# Patient Record
Sex: FEMALE | Race: OTHER | HISPANIC OR LATINO | URBAN - METROPOLITAN AREA
[De-identification: names, ages, dates, MRNs, and addresses within clinical notes are randomized per-mention and may not be internally consistent; named-entity substitution may affect disease eponyms.]

---

## 2017-04-23 ENCOUNTER — EMERGENCY (EMERGENCY)
Facility: HOSPITAL | Age: 62
LOS: 1 days | Discharge: HOME | End: 2017-04-23
Attending: EMERGENCY MEDICINE
Payer: MEDICAID

## 2017-04-23 VITALS
DIASTOLIC BLOOD PRESSURE: 85 MMHG | HEIGHT: 63 IN | OXYGEN SATURATION: 98 % | WEIGHT: 160.06 LBS | SYSTOLIC BLOOD PRESSURE: 148 MMHG | TEMPERATURE: 98 F | HEART RATE: 94 BPM | RESPIRATION RATE: 16 BRPM

## 2017-04-23 VITALS
TEMPERATURE: 98 F | SYSTOLIC BLOOD PRESSURE: 138 MMHG | HEART RATE: 91 BPM | DIASTOLIC BLOOD PRESSURE: 86 MMHG | OXYGEN SATURATION: 98 % | RESPIRATION RATE: 20 BRPM
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
## 2017-04-23 DIAGNOSIS — E03.9 HYPOTHYROIDISM, UNSPECIFIED: ICD-10-CM
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
## 2017-04-23 DIAGNOSIS — Z79.899 OTHER LONG TERM (CURRENT) DRUG THERAPY: ICD-10-CM

## 2017-04-23 DIAGNOSIS — I10 ESSENTIAL (PRIMARY) HYPERTENSION: ICD-10-CM

## 2017-04-23 DIAGNOSIS — F17.200 NICOTINE DEPENDENCE, UNSPECIFIED, UNCOMPLICATED: ICD-10-CM
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
## 2017-04-23 DIAGNOSIS — S00.11XA CONTUSION OF RIGHT EYELID AND PERIOCULAR AREA, INITIAL ENCOUNTER: ICD-10-CM

## 2017-04-23 DIAGNOSIS — K21.9 GASTRO-ESOPHAGEAL REFLUX DISEASE WITHOUT ESOPHAGITIS: ICD-10-CM

## 2017-04-23 DIAGNOSIS — Z98.89 OTHER SPECIFIED POSTPROCEDURAL STATES: Chronic | ICD-10-CM

## 2017-04-23 DIAGNOSIS — Z98.890 OTHER SPECIFIED POSTPROCEDURAL STATES: ICD-10-CM
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
## 2017-04-23 DIAGNOSIS — F10.10 ALCOHOL ABUSE, UNCOMPLICATED: ICD-10-CM

## 2017-04-23 LAB
ALBUMIN SERPL ELPH-MCNC: 2.8 G/DL — LOW (ref 3.3–5.2)
ALP SERPL-CCNC: 175 U/L — HIGH (ref 40–120)
ALT FLD-CCNC: 24 U/L — SIGNIFICANT CHANGE UP
ANION GAP SERPL CALC-SCNC: 13 MMOL/L — SIGNIFICANT CHANGE UP (ref 5–17)
ANION GAP SERPL CALC-SCNC: 16 MMOL/L — SIGNIFICANT CHANGE UP (ref 5–17)
ANISOCYTOSIS BLD QL: SLIGHT — SIGNIFICANT CHANGE UP
APTT BLD: 32.1 SEC — SIGNIFICANT CHANGE UP (ref 27.5–37.4)
AST SERPL-CCNC: 64 U/L — HIGH
BASOPHILS # BLD AUTO: 0 K/UL — SIGNIFICANT CHANGE UP (ref 0–0.2)
BASOPHILS NFR BLD AUTO: 0 % — SIGNIFICANT CHANGE UP (ref 0–2)
BILIRUB SERPL-MCNC: 0.9 MG/DL — SIGNIFICANT CHANGE UP (ref 0.4–2)
BUN SERPL-MCNC: 20 MG/DL — SIGNIFICANT CHANGE UP (ref 8–20)
BUN SERPL-MCNC: 23 MG/DL — HIGH (ref 8–20)
CALCIUM SERPL-MCNC: 8.6 MG/DL — SIGNIFICANT CHANGE UP (ref 8.6–10.2)
CALCIUM SERPL-MCNC: 8.6 MG/DL — SIGNIFICANT CHANGE UP (ref 8.6–10.2)
CHLORIDE SERPL-SCNC: 94 MMOL/L — LOW (ref 98–107)
CHLORIDE SERPL-SCNC: 98 MMOL/L — SIGNIFICANT CHANGE UP (ref 98–107)
CO2 SERPL-SCNC: 15 MMOL/L — LOW (ref 22–29)
CO2 SERPL-SCNC: 19 MMOL/L — LOW (ref 22–29)
CREAT SERPL-MCNC: 0.7 MG/DL — SIGNIFICANT CHANGE UP (ref 0.5–1.3)
CREAT SERPL-MCNC: 0.79 MG/DL — SIGNIFICANT CHANGE UP (ref 0.5–1.3)
EOSINOPHIL # BLD AUTO: 0 K/UL — SIGNIFICANT CHANGE UP (ref 0–0.5)
EOSINOPHIL NFR BLD AUTO: 1 % — SIGNIFICANT CHANGE UP (ref 0–5)
ETHANOL SERPL-MCNC: 224 MG/DL — SIGNIFICANT CHANGE UP
GLUCOSE SERPL-MCNC: 107 MG/DL — SIGNIFICANT CHANGE UP (ref 70–115)
GLUCOSE SERPL-MCNC: 99 MG/DL — SIGNIFICANT CHANGE UP (ref 70–115)
HCT VFR BLD CALC: 31.4 % — LOW (ref 37–47)
HGB BLD-MCNC: 10.3 G/DL — LOW (ref 12–16)
HYPOCHROMIA BLD QL: SLIGHT — SIGNIFICANT CHANGE UP
INR BLD: 1.15 RATIO — SIGNIFICANT CHANGE UP (ref 0.88–1.16)
LYMPHOCYTES # BLD AUTO: 1.7 K/UL — SIGNIFICANT CHANGE UP (ref 1–4.8)
LYMPHOCYTES # BLD AUTO: 16 % — LOW (ref 20–55)
MACROCYTES BLD QL: SLIGHT — SIGNIFICANT CHANGE UP
MCHC RBC-ENTMCNC: 27.8 PG — SIGNIFICANT CHANGE UP (ref 27–31)
MCHC RBC-ENTMCNC: 32.8 G/DL — SIGNIFICANT CHANGE UP (ref 32–36)
MCV RBC AUTO: 84.9 FL — SIGNIFICANT CHANGE UP (ref 81–99)
MICROCYTES BLD QL: SLIGHT — SIGNIFICANT CHANGE UP
MONOCYTES # BLD AUTO: 0.8 K/UL — SIGNIFICANT CHANGE UP (ref 0–0.8)
MONOCYTES NFR BLD AUTO: 7 % — SIGNIFICANT CHANGE UP (ref 3–10)
NEUTROPHILS # BLD AUTO: 5 K/UL — SIGNIFICANT CHANGE UP (ref 1.8–8)
NEUTROPHILS NFR BLD AUTO: 74 % — HIGH (ref 37–73)
NEUTS BAND # BLD: 2 % — SIGNIFICANT CHANGE UP (ref 0–8)
PLAT MORPH BLD: NORMAL — SIGNIFICANT CHANGE UP
PLATELET # BLD AUTO: 208 K/UL — SIGNIFICANT CHANGE UP (ref 150–400)
POLYCHROMASIA BLD QL SMEAR: SLIGHT — SIGNIFICANT CHANGE UP
POTASSIUM SERPL-MCNC: 4.6 MMOL/L — SIGNIFICANT CHANGE UP (ref 3.5–5.3)
POTASSIUM SERPL-MCNC: 4.8 MMOL/L — SIGNIFICANT CHANGE UP (ref 3.5–5.3)
POTASSIUM SERPL-SCNC: 4.6 MMOL/L — SIGNIFICANT CHANGE UP (ref 3.5–5.3)
POTASSIUM SERPL-SCNC: 4.8 MMOL/L — SIGNIFICANT CHANGE UP (ref 3.5–5.3)
PROT SERPL-MCNC: 8.3 G/DL — SIGNIFICANT CHANGE UP (ref 6.6–8.7)
PROTHROM AB SERPL-ACNC: 12.7 SEC — SIGNIFICANT CHANGE UP (ref 9.8–12.7)
RBC # BLD: 3.7 M/UL — LOW (ref 4.4–5.2)
RBC # FLD: 22.9 % — HIGH (ref 11–15.6)
RBC BLD AUTO: ABNORMAL
SODIUM SERPL-SCNC: 125 MMOL/L — LOW (ref 135–145)
SODIUM SERPL-SCNC: 130 MMOL/L — LOW (ref 135–145)
WBC # BLD: 7.6 K/UL — SIGNIFICANT CHANGE UP (ref 4.8–10.8)
WBC # FLD AUTO: 7.6 K/UL — SIGNIFICANT CHANGE UP (ref 4.8–10.8)

## 2017-04-23 PROCEDURE — 72125 CT NECK SPINE W/O DYE: CPT

## 2017-04-23 PROCEDURE — 70450 CT HEAD/BRAIN W/O DYE: CPT | Mod: 26

## 2017-04-23 PROCEDURE — 73552 X-RAY EXAM OF FEMUR 2/>: CPT

## 2017-04-23 PROCEDURE — 72125 CT NECK SPINE W/O DYE: CPT | Mod: 26

## 2017-04-23 PROCEDURE — 73502 X-RAY EXAM HIP UNI 2-3 VIEWS: CPT | Mod: 26,RT

## 2017-04-23 PROCEDURE — 85027 COMPLETE CBC AUTOMATED: CPT

## 2017-04-23 PROCEDURE — 80053 COMPREHEN METABOLIC PANEL: CPT

## 2017-04-23 PROCEDURE — 73552 X-RAY EXAM OF FEMUR 2/>: CPT | Mod: 26,RT

## 2017-04-23 PROCEDURE — 80307 DRUG TEST PRSMV CHEM ANLYZR: CPT

## 2017-04-23 PROCEDURE — 80048 BASIC METABOLIC PNL TOTAL CA: CPT

## 2017-04-23 PROCEDURE — 73590 X-RAY EXAM OF LOWER LEG: CPT | Mod: 26,RT

## 2017-04-23 PROCEDURE — 99284 EMERGENCY DEPT VISIT MOD MDM: CPT | Mod: 25

## 2017-04-23 PROCEDURE — 73590 X-RAY EXAM OF LOWER LEG: CPT

## 2017-04-23 PROCEDURE — 70450 CT HEAD/BRAIN W/O DYE: CPT

## 2017-04-23 PROCEDURE — 73502 X-RAY EXAM HIP UNI 2-3 VIEWS: CPT

## 2017-04-23 PROCEDURE — 85610 PROTHROMBIN TIME: CPT

## 2017-04-23 PROCEDURE — 85730 THROMBOPLASTIN TIME PARTIAL: CPT

## 2017-04-23 PROCEDURE — T1013: CPT

## 2017-04-23 PROCEDURE — 82962 GLUCOSE BLOOD TEST: CPT

## 2017-04-23 RX ORDER — SODIUM CHLORIDE 9 MG/ML
1000 INJECTION INTRAMUSCULAR; INTRAVENOUS; SUBCUTANEOUS ONCE
Qty: 0 | Refills: 0 | Status: COMPLETED | OUTPATIENT
Start: 2017-04-23 | End: 2017-04-23

## 2017-04-23 RX ORDER — SODIUM CHLORIDE 9 MG/ML
3 INJECTION INTRAMUSCULAR; INTRAVENOUS; SUBCUTANEOUS ONCE
Qty: 0 | Refills: 0 | Status: COMPLETED | OUTPATIENT
Start: 2017-04-23 | End: 2017-04-23

## 2017-04-23 RX ADMIN — Medication 50 MILLIGRAM(S): at 16:54

## 2017-04-23 RX ADMIN — SODIUM CHLORIDE 1000 MILLILITER(S): 9 INJECTION INTRAMUSCULAR; INTRAVENOUS; SUBCUTANEOUS at 16:27

## 2017-04-23 RX ADMIN — SODIUM CHLORIDE 1000 MILLILITER(S): 9 INJECTION INTRAMUSCULAR; INTRAVENOUS; SUBCUTANEOUS at 11:37

## 2017-04-23 RX ADMIN — SODIUM CHLORIDE 3 MILLILITER(S): 9 INJECTION INTRAMUSCULAR; INTRAVENOUS; SUBCUTANEOUS at 09:50

## 2017-04-23 NOTE — ED ADULT NURSE NOTE - OBJECTIVE STATEMENT
60 yo female presents s/p fall. Patient has alcohol on breath. Poor historian. Denies any complaints. EMS states fall.  Ecchymosis noted to head. 62 yo female presents s/p fall. Patient has alcohol on breath. Poor historian. Denies any complaints. EMS states fall.  Ecchymosis noted to head and right leg pain and guarding when ambulating. Pt poor historian.

## 2017-04-23 NOTE — ED PROVIDER NOTE - PMH
Alcohol abuse    Alcohol abuse    Anemia    Chronic back pain    Constipation    ETOH abuse    GERD (gastroesophageal reflux disease)    HTN (hypertension)    Hypothyroidism    Liver cirrhosis    Lump in female breast    Lymphangitis, acute, lower leg    Pancreatitis    Transitory  hyperthyroidism    Urea cycle metabolism disorder    UTI (urinary tract infection)

## 2017-04-23 NOTE — ED ADULT NURSE NOTE - CHIEF COMPLAINT QUOTE
.States had 2 beers last night. Does not remember falling. EMS states found on ground. Easily arousable to verbal and tactile stimuli. OMER. Following commands. Ecchymosis and swelling present to right orbital. Dr. Nunez called to bedside. Denies complaints. Clothing covered in urine.

## 2017-04-23 NOTE — ED PROVIDER NOTE - OBJECTIVE STATEMENT
60 yo F presents to ED s/p reported fall after drinking 2 beers.  Pt with noted R periorbital ecchymosis.  Pt initially reported drinking 2 beers, but now denies any EtOH use or complaints.  Pt with ALOOB and urine soaked clothing on arrival. Pt is an unreliable historian

## 2017-04-23 NOTE — ED PROVIDER NOTE - PROGRESS NOTE DETAILS
Repeat labs as noted.  Pt mildly tremulous and given Librium 50 mg with improvement.  Pt stable for d/c at this time

## 2017-04-23 NOTE — ED BEHAVIORAL HEALTH NOTE - BEHAVIORAL HEALTH NOTE
Social work note: Pt medically cleared per MD.  Pt requiring transportation back to home address. [address] in [place].  Pt provided with $20 taxi voucher and placed in waiting room.  No other social work needs at this time.

## 2017-04-23 NOTE — ED PROVIDER NOTE - ENMT, MLM
Airway patent, Nasal mucosa clear. Mouth with normal mucosa. Throat has no vesicles, no oropharyngeal exudates and uvula is midline, no evlia/rhinorrhea

## 2017-05-19 ENCOUNTER — INPATIENT (INPATIENT)
Facility: HOSPITAL | Age: 62
LOS: 6 days | Discharge: NURSING, REHAB OR LONG-TERM CARE | DRG: 442 | End: 2017-05-26
Attending: INTERNAL MEDICINE | Admitting: HOSPITALIST
Payer: MEDICAID

## 2017-05-19 VITALS
OXYGEN SATURATION: 97 % | RESPIRATION RATE: 20 BRPM | HEART RATE: 74 BPM | HEIGHT: 58 IN | DIASTOLIC BLOOD PRESSURE: 111 MMHG | SYSTOLIC BLOOD PRESSURE: 192 MMHG

## 2017-05-19 DIAGNOSIS — K72.90 HEPATIC FAILURE, UNSPECIFIED WITHOUT COMA: ICD-10-CM

## 2017-05-19 DIAGNOSIS — I10 ESSENTIAL (PRIMARY) HYPERTENSION: ICD-10-CM

## 2017-05-19 DIAGNOSIS — Z98.89 OTHER SPECIFIED POSTPROCEDURAL STATES: Chronic | ICD-10-CM

## 2017-05-19 LAB
ALBUMIN SERPL ELPH-MCNC: 3.2 G/DL — LOW (ref 3.3–5.2)
ALP SERPL-CCNC: 167 U/L — HIGH (ref 40–120)
ALT FLD-CCNC: 39 U/L — HIGH
AMMONIA BLD-MCNC: 112 UMOL/L — HIGH (ref 11–55)
AMMONIA BLD-MCNC: 124 UMOL/L — HIGH (ref 11–55)
AMMONIA BLD-MCNC: 146 UMOL/L — HIGH (ref 11–55)
AMMONIA BLD-MCNC: 149 UMOL/L — HIGH (ref 11–55)
ANION GAP SERPL CALC-SCNC: 13 MMOL/L — SIGNIFICANT CHANGE UP (ref 5–17)
ANISOCYTOSIS BLD QL: SLIGHT — SIGNIFICANT CHANGE UP
APTT BLD: 30.8 SEC — SIGNIFICANT CHANGE UP (ref 27.5–37.4)
AST SERPL-CCNC: 57 U/L — HIGH
BASOPHILS # BLD AUTO: 0 K/UL — SIGNIFICANT CHANGE UP (ref 0–0.2)
BASOPHILS NFR BLD AUTO: 1 % — SIGNIFICANT CHANGE UP (ref 0–2)
BILIRUB SERPL-MCNC: 0.9 MG/DL — SIGNIFICANT CHANGE UP (ref 0.4–2)
BUN SERPL-MCNC: 29 MG/DL — HIGH (ref 8–20)
CALCIUM SERPL-MCNC: 9.2 MG/DL — SIGNIFICANT CHANGE UP (ref 8.6–10.2)
CHLORIDE SERPL-SCNC: 105 MMOL/L — SIGNIFICANT CHANGE UP (ref 98–107)
CK SERPL-CCNC: 62 U/L — SIGNIFICANT CHANGE UP (ref 25–170)
CO2 SERPL-SCNC: 17 MMOL/L — LOW (ref 22–29)
CREAT SERPL-MCNC: 0.93 MG/DL — SIGNIFICANT CHANGE UP (ref 0.5–1.3)
DACRYOCYTES BLD QL SMEAR: SLIGHT — SIGNIFICANT CHANGE UP
ELLIPTOCYTES BLD QL SMEAR: SLIGHT — SIGNIFICANT CHANGE UP
EOSINOPHIL # BLD AUTO: 0.1 K/UL — SIGNIFICANT CHANGE UP (ref 0–0.5)
EOSINOPHIL NFR BLD AUTO: 2 % — SIGNIFICANT CHANGE UP (ref 0–5)
ETHANOL SERPL-MCNC: <10 MG/DL — SIGNIFICANT CHANGE UP
GAS PNL BLDA: SIGNIFICANT CHANGE UP
GLUCOSE SERPL-MCNC: 134 MG/DL — HIGH (ref 70–115)
HCT VFR BLD CALC: 35.2 % — LOW (ref 37–47)
HGB BLD-MCNC: 12.1 G/DL — SIGNIFICANT CHANGE UP (ref 12–16)
HYPOCHROMIA BLD QL: SLIGHT — SIGNIFICANT CHANGE UP
INR BLD: 1.04 RATIO — SIGNIFICANT CHANGE UP (ref 0.88–1.16)
LYMPHOCYTES # BLD AUTO: 2 K/UL — SIGNIFICANT CHANGE UP (ref 1–4.8)
LYMPHOCYTES # BLD AUTO: 26 % — SIGNIFICANT CHANGE UP (ref 20–55)
MCHC RBC-ENTMCNC: 30.9 PG — SIGNIFICANT CHANGE UP (ref 27–31)
MCHC RBC-ENTMCNC: 34.4 G/DL — SIGNIFICANT CHANGE UP (ref 32–36)
MCV RBC AUTO: 89.8 FL — SIGNIFICANT CHANGE UP (ref 81–99)
MONOCYTES # BLD AUTO: 1.1 K/UL — HIGH (ref 0–0.8)
MONOCYTES NFR BLD AUTO: 15 % — HIGH (ref 3–10)
NEUTROPHILS # BLD AUTO: 3.4 K/UL — SIGNIFICANT CHANGE UP (ref 1.8–8)
NEUTROPHILS NFR BLD AUTO: 53 % — SIGNIFICANT CHANGE UP (ref 37–73)
NEUTS BAND # BLD: 3 % — SIGNIFICANT CHANGE UP (ref 0–8)
OVALOCYTES BLD QL SMEAR: SLIGHT — SIGNIFICANT CHANGE UP
PLAT MORPH BLD: NORMAL — SIGNIFICANT CHANGE UP
PLATELET # BLD AUTO: 151 K/UL — SIGNIFICANT CHANGE UP (ref 150–400)
POIKILOCYTOSIS BLD QL AUTO: SLIGHT — SIGNIFICANT CHANGE UP
POTASSIUM SERPL-MCNC: 5 MMOL/L — SIGNIFICANT CHANGE UP (ref 3.5–5.3)
POTASSIUM SERPL-SCNC: 5 MMOL/L — SIGNIFICANT CHANGE UP (ref 3.5–5.3)
PROT SERPL-MCNC: 7.6 G/DL — SIGNIFICANT CHANGE UP (ref 6.6–8.7)
PROTHROM AB SERPL-ACNC: 11.4 SEC — SIGNIFICANT CHANGE UP (ref 9.8–12.7)
RBC # BLD: 3.92 M/UL — LOW (ref 4.4–5.2)
RBC # FLD: 27.6 % — HIGH (ref 11–15.6)
RBC BLD AUTO: ABNORMAL
SCHISTOCYTES BLD QL AUTO: SLIGHT — SIGNIFICANT CHANGE UP
SODIUM SERPL-SCNC: 135 MMOL/L — SIGNIFICANT CHANGE UP (ref 135–145)
TARGETS BLD QL SMEAR: SIGNIFICANT CHANGE UP
TROPONIN T SERPL-MCNC: <0.01 NG/ML — SIGNIFICANT CHANGE UP (ref 0–0.06)
WBC # BLD: 6.7 K/UL — SIGNIFICANT CHANGE UP (ref 4.8–10.8)
WBC # FLD AUTO: 6.7 K/UL — SIGNIFICANT CHANGE UP (ref 4.8–10.8)

## 2017-05-19 PROCEDURE — 71010: CPT | Mod: 26

## 2017-05-19 PROCEDURE — 70450 CT HEAD/BRAIN W/O DYE: CPT | Mod: 26

## 2017-05-19 PROCEDURE — 99222 1ST HOSP IP/OBS MODERATE 55: CPT

## 2017-05-19 PROCEDURE — 93010 ELECTROCARDIOGRAM REPORT: CPT

## 2017-05-19 PROCEDURE — 99285 EMERGENCY DEPT VISIT HI MDM: CPT | Mod: 25

## 2017-05-19 PROCEDURE — 99223 1ST HOSP IP/OBS HIGH 75: CPT

## 2017-05-19 RX ORDER — LABETALOL HCL 100 MG
100 TABLET ORAL
Qty: 0 | Refills: 0 | Status: DISCONTINUED | OUTPATIENT
Start: 2017-05-19 | End: 2017-05-19

## 2017-05-19 RX ORDER — LACTULOSE 10 G/15ML
200 SOLUTION ORAL ONCE
Qty: 0 | Refills: 0 | Status: COMPLETED | OUTPATIENT
Start: 2017-05-19 | End: 2017-05-19

## 2017-05-19 RX ORDER — FOLIC ACID 0.8 MG
1 TABLET ORAL DAILY
Qty: 0 | Refills: 0 | Status: DISCONTINUED | OUTPATIENT
Start: 2017-05-19 | End: 2017-05-19

## 2017-05-19 RX ORDER — LACTULOSE 10 G/15ML
200 SOLUTION ORAL EVERY 12 HOURS
Qty: 0 | Refills: 0 | Status: DISCONTINUED | OUTPATIENT
Start: 2017-05-19 | End: 2017-05-19

## 2017-05-19 RX ORDER — SPIRONOLACTONE 25 MG/1
100 TABLET, FILM COATED ORAL DAILY
Qty: 0 | Refills: 0 | Status: DISCONTINUED | OUTPATIENT
Start: 2017-05-19 | End: 2017-05-21

## 2017-05-19 RX ORDER — LACTULOSE 10 G/15ML
20 SOLUTION ORAL
Qty: 0 | Refills: 0 | Status: DISCONTINUED | OUTPATIENT
Start: 2017-05-19 | End: 2017-05-20

## 2017-05-19 RX ORDER — FUROSEMIDE 40 MG
40 TABLET ORAL DAILY
Qty: 0 | Refills: 0 | Status: DISCONTINUED | OUTPATIENT
Start: 2017-05-19 | End: 2017-05-21

## 2017-05-19 RX ORDER — LACTULOSE 10 G/15ML
30 SOLUTION ORAL EVERY 12 HOURS
Qty: 0 | Refills: 0 | Status: DISCONTINUED | OUTPATIENT
Start: 2017-05-19 | End: 2017-05-19

## 2017-05-19 RX ORDER — LABETALOL HCL 100 MG
20 TABLET ORAL
Qty: 0 | Refills: 0 | Status: DISCONTINUED | OUTPATIENT
Start: 2017-05-19 | End: 2017-05-19

## 2017-05-19 RX ORDER — HYDRALAZINE HCL 50 MG
10 TABLET ORAL
Qty: 0 | Refills: 0 | Status: DISCONTINUED | OUTPATIENT
Start: 2017-05-19 | End: 2017-05-19

## 2017-05-19 RX ORDER — LABETALOL HCL 100 MG
10 TABLET ORAL ONCE
Qty: 0 | Refills: 0 | Status: COMPLETED | OUTPATIENT
Start: 2017-05-19 | End: 2017-05-19

## 2017-05-19 RX ORDER — FAMOTIDINE 10 MG/ML
20 INJECTION INTRAVENOUS
Qty: 0 | Refills: 0 | Status: DISCONTINUED | OUTPATIENT
Start: 2017-05-19 | End: 2017-05-19

## 2017-05-19 RX ORDER — SODIUM CHLORIDE 9 MG/ML
1000 INJECTION, SOLUTION INTRAVENOUS
Qty: 0 | Refills: 0 | Status: DISCONTINUED | OUTPATIENT
Start: 2017-05-19 | End: 2017-05-20

## 2017-05-19 RX ORDER — NADOLOL 80 MG/1
10 TABLET ORAL DAILY
Qty: 0 | Refills: 0 | Status: DISCONTINUED | OUTPATIENT
Start: 2017-05-19 | End: 2017-05-19

## 2017-05-19 RX ORDER — LABETALOL HCL 100 MG
100 TABLET ORAL EVERY 12 HOURS
Qty: 0 | Refills: 0 | Status: DISCONTINUED | OUTPATIENT
Start: 2017-05-19 | End: 2017-05-19

## 2017-05-19 RX ORDER — ENOXAPARIN SODIUM 100 MG/ML
40 INJECTION SUBCUTANEOUS DAILY
Qty: 0 | Refills: 0 | Status: DISCONTINUED | OUTPATIENT
Start: 2017-05-19 | End: 2017-05-19

## 2017-05-19 RX ORDER — HYDRALAZINE HCL 50 MG
20 TABLET ORAL ONCE
Qty: 0 | Refills: 0 | Status: COMPLETED | OUTPATIENT
Start: 2017-05-19 | End: 2017-05-19

## 2017-05-19 RX ORDER — SODIUM CHLORIDE 9 MG/ML
1000 INJECTION, SOLUTION INTRAVENOUS
Qty: 0 | Refills: 0 | Status: DISCONTINUED | OUTPATIENT
Start: 2017-05-19 | End: 2017-05-19

## 2017-05-19 RX ORDER — PANTOPRAZOLE SODIUM 20 MG/1
40 TABLET, DELAYED RELEASE ORAL DAILY
Qty: 0 | Refills: 0 | Status: DISCONTINUED | OUTPATIENT
Start: 2017-05-19 | End: 2017-05-19

## 2017-05-19 RX ORDER — LACTULOSE 10 G/15ML
200 SOLUTION ORAL EVERY 6 HOURS
Qty: 0 | Refills: 0 | Status: DISCONTINUED | OUTPATIENT
Start: 2017-05-19 | End: 2017-05-20

## 2017-05-19 RX ORDER — LACTULOSE 10 G/15ML
30 SOLUTION ORAL ONCE
Qty: 0 | Refills: 0 | Status: COMPLETED | OUTPATIENT
Start: 2017-05-19 | End: 2017-05-19

## 2017-05-19 RX ADMIN — Medication 10 MILLIGRAM(S): at 15:30

## 2017-05-19 RX ADMIN — SODIUM CHLORIDE 100 MILLILITER(S): 9 INJECTION, SOLUTION INTRAVENOUS at 18:55

## 2017-05-19 RX ADMIN — Medication 20 MILLIGRAM(S): at 15:30

## 2017-05-19 RX ADMIN — Medication 10 MILLIGRAM(S): at 12:08

## 2017-05-19 RX ADMIN — Medication 10 MILLIGRAM(S): at 18:38

## 2017-05-19 RX ADMIN — LACTULOSE 30 GRAM(S): 10 SOLUTION ORAL at 09:55

## 2017-05-19 RX ADMIN — LACTULOSE 200 GRAM(S): 10 SOLUTION ORAL at 15:29

## 2017-05-19 NOTE — H&P ADULT - HISTORY OF PRESENT ILLNESS
62 yo female pmh cirrhosis , HTN, portal HTN, hepatic encephalopathy presented to ED from [facility],  with difficulty speaking and answering questions; pt withdrawing from painful stimuli; Pt's son is at bed side, reports her base line is normal, she eats by herself in NH, Pt's son last time met with his mother 2 days ago,        he reports she was fine, unable to get information from patient, unable to answer questions, 62 yo female pmh cirrhosis , HTN, portal HTN, hepatic encephalopathy presented to ED from [facility],  with difficulty speaking and answering questions; pt withdrawing from painful stimuli; Pt's son is at bed side, reports her base line is normal, she eats by herself in NH, Pt's son last time met with his mother 2 days ago,        he reports she was fine, unable to get information from patient, unable to answer questions,

## 2017-05-19 NOTE — ED ADULT NURSE NOTE - OBJECTIVE STATEMENT
Pt BIBA c/o AMS found in nursing home with AMS, a&ox0, Code stroke activated, last known well 3am MD Corona @ bedside, pt not following commands upon time of arrival, pt non-verbal to RN's leaning Right side on stretcher.  on arrival to ED. IV noted to Left AC by EMS, Pt to CT scan per protocol with MD @ bedside on cm. Pt with known ETOH abuse. #20G IV placed to Right hand, ran to lab per protocol. Blood work drawn and ran to lab. Pt transported by RN to A7Left by RN placed on cm, will continue to monitor and reassess

## 2017-05-19 NOTE — CONSULT NOTE ADULT - SUBJECTIVE AND OBJECTIVE BOX
Patient is a 61y old  Female who presents with a chief complaint of altered mental status    HPI:      REVIEW OF SYSTEMS:unable to obtain due to altered mental status    PAST MEDICAL & SURGICAL HISTORY:  Pancreatitis  GERD (gastroesophageal reflux disease)  HTN (hypertension)  Chronic back pain  Hypothyroidism  Anemia  Lymphangitis, acute, lower leg  UTI (urinary tract infection)  Lump in female breast  Constipation  Transitory  hyperthyroidism  Urea cycle metabolism disorder  ETOH abuse  Liver cirrhosis  No pertinent past medical history  Alcohol abuse  Alcohol abuse  No pertinent past medical history  S/P abdominoplasty  No significant past surgical history  No significant past surgical history      FAMILY HISTORY:  No pertinent family history      SOCIAL HISTORY:  Smoking Status: [ ] Current, [ ] Former, [ ] Never  Pack Years:  Alcohol Use:    MEDICATIONS:  MEDICATIONS  (STANDING):  enoxaparin Injectable 40milliGRAM(s) SubCutaneous daily  dextrose 5% + sodium chloride 0.9%. 1000milliLiter(s) IV Continuous <Continuous>  lactulose Syrup 20Gram(s) Enteral Tube every 3 hours  rifaximin 550milliGRAM(s) Oral two times a day    MEDICATIONS  (PRN):      Allergies    No Known Allergies    Intolerances        Vital Signs Last 24 Hrs  T(C): --  T(F): --  HR: 74 (74 - 74)  BP: 192/111 (192/111 - 192/111)  BP(mean): --  RR: 20 (20 - 20)  SpO2: 97% (97% - 97%)        PHYSICAL EXAM:    General: Well developed; well nourished; in no acute distress, awaoke but noncommunicative and does not follow simple commands  HEENT: MMM, conjunctiva and sclera clear  Lungs: Clear  Heart: Rhythm Regular, No Murmurs  Gastrointestinal: Soft, non-tender non-distended; Normal bowel sounds; No rebound or guarding; No Organomegaly & No Masses  Extremities: Normal range of motion, No clubbing, cyanosis or edema  Neurological: Alert and oriented x3, Non-focal  Skin: Warm and dry. No obvious rash  Rectal: No Masses, brown stool, scant but formed      LABS:                        12.1   6.7   )-----------( 151      ( 19 May 2017 07:52 )             35.2     05-19    135  |  105  |  29.0<H>  ----------------------------<  134<H>  5.0   |  17.0<L>  |  0.93    Ca    9.2      19 May 2017 07:52    TPro  7.6  /  Alb  3.2<L>  /  TBili  0.9  /  DBili  x   /  AST  57<H>  /  ALT  39<H>  /  AlkPhos  167<H>    Ammonia, Serum: 149: Ammonia levels will be falsely elevated if specimen is NOT collected,  processed and maintained at 4-8 C umol/L (17 @ 07:52    Prothrombin Time and INR, Plasma (17 @ 07:52)   Prothrombin Time, Plasma: 11.4: Effective , the reference range for PT has changed. sec    INR: 1.04: RECOMMENDED RANGES FOR THERAPEUTIC INR:                    RADIOLOGY & ADDITIONAL STUDIES:    EXAM:  CT BRAIN STROKE PROTOCOL                          PROCEDURE DATE:  2017        INTERPRETATION:  CLINICAL HISTORY: Stroke, altered mental status    COMPARISON: CT head dated 2017.    TECHNIQUE: Noncontrast CT of the head. Multiplanar reformations are   submitted.    FINDINGS:  There is periventricular and subcortical white matter hypodensity without   mass effect, nonspecific, likely representing moderate chronic   microvascular ischemic changes. There is no compelling evidence for an   acute transcortical infarction. There is no evidence of mass, mass   effect, midline shift or extra-axial fluid collection. The lateral   ventricles and cortical sulci are age-appropriate in size and   configuration. Trace inflammatory mucosal changes are seen throughout the   various portions of the paranasal sinuses. The orbits and mastoid air   cells are unremarkable. The calvarium is intact.    IMPRESSION:  Moderate chronic microvascular changes without evidence of   an acute transcortical infarction or hemorrhage. MR is a more sensitive   imaging modality for the evaluation of an acute infarction. Findings   discussed with Dr. Corona at 7:15 AM.                ALDO KOCH M.D., ATTENDING RADIOLOGIST  This document has been electronically signed. May 19 2017  7:27AM Patient is a 61y old  Female who presents with a chief complaint of altered mental status    HPI:Patient with known alcoholic cirrhosis with prior encephalopthy brought to ER by EMS for altered mental status. No other history available as patient is noncommunicative and family not present. Ct of brain without acute pathology and ammonia level is high.      REVIEW OF SYSTEMS:unable to obtain due to altered mental status    PAST MEDICAL & SURGICAL HISTORY:  Pancreatitis  GERD (gastroesophageal reflux disease)  HTN (hypertension)  Chronic back pain  Hypothyroidism  Anemia  Lymphangitis, acute, lower leg  UTI (urinary tract infection)  Lump in female breast  Constipation  Transitory  hyperthyroidism  Urea cycle metabolism disorder  ETOH abuse  Liver cirrhosis  No pertinent past medical history  Alcohol abuse  Alcohol abuse  No pertinent past medical history  S/P abdominoplasty  No significant past surgical history  No significant past surgical history      FAMILY HISTORY:  No pertinent family history      SOCIAL HISTORY:  Smoking Status: [ ] Current, [ ] Former, [ ] Never  Pack Years:  Alcohol Use:    Meds PTA in -? how many actual taking  xifaxan  lactulose  carvedilol  aldactone 25mg  pantoprazole  MVI  lasix  thiamine    MEDICATIONS:  MEDICATIONS  (STANDING):  enoxaparin Injectable 40milliGRAM(s) SubCutaneous daily  dextrose 5% + sodium chloride 0.9%. 1000milliLiter(s) IV Continuous <Continuous>  lactulose Syrup 20Gram(s) Enteral Tube every 3 hours  rifaximin 550milliGRAM(s) Oral two times a day    MEDICATIONS  (PRN):      Allergies    No Known Allergies    Intolerances        Vital Signs Last 24 Hrs  T(C): --  T(F): --  HR: 74 (74 - 74)  BP: 192/111 (192/111 - 192/111)  BP(mean): --  RR: 20 (20 - 20)  SpO2: 97% (97% - 97%)        PHYSICAL EXAM:    General: Well developed; well nourished; in no acute distress, awaoke but noncommunicative and does not follow simple commands  HEENT: MMM, conjunctiva and sclera clear  Lungs: Clear  Heart: Rhythm Regular, No Murmurs  Gastrointestinal: Soft, non-tender non-distended; Normal bowel sounds; No rebound or guarding; No Organomegaly & No Masses  Extremities: Normal range of motion, No clubbing, cyanosis or edema  Neurological: Alert and oriented x3, Non-focal  Skin: Warm and dry. No obvious rash  Rectal: No Masses, brown stool, scant but formed      LABS:                        12.1   6.7   )-----------( 151      ( 19 May 2017 07:52 )             35.2         135  |  105  |  29.0<H>  ----------------------------<  134<H>  5.0   |  17.0<L>  |  0.93    Ca    9.2      19 May 2017 07:52    TPro  7.6  /  Alb  3.2<L>  /  TBili  0.9  /  DBili  x   /  AST  57<H>  /  ALT  39<H>  /  AlkPhos  167<H>    Ammonia, Serum: 149: Ammonia levels will be falsely elevated if specimen is NOT collected,  processed and maintained at 4-8 C umol/L (17 @ 07:52    Prothrombin Time and INR, Plasma (17 @ 07:52)   Prothrombin Time, Plasma: 11.4: Effective , the reference range for PT has changed. sec    INR: 1.04: RECOMMENDED RANGES FOR THERAPEUTIC INR:                    RADIOLOGY & ADDITIONAL STUDIES:    EXAM:  CT BRAIN STROKE PROTOCOL                          PROCEDURE DATE:  2017        INTERPRETATION:  CLINICAL HISTORY: Stroke, altered mental status    COMPARISON: CT head dated 2017.    TECHNIQUE: Noncontrast CT of the head. Multiplanar reformations are   submitted.    FINDINGS:  There is periventricular and subcortical white matter hypodensity without   mass effect, nonspecific, likely representing moderate chronic   microvascular ischemic changes. There is no compelling evidence for an   acute transcortical infarction. There is no evidence of mass, mass   effect, midline shift or extra-axial fluid collection. The lateral   ventricles and cortical sulci are age-appropriate in size and   configuration. Trace inflammatory mucosal changes are seen throughout the   various portions of the paranasal sinuses. The orbits and mastoid air   cells are unremarkable. The calvarium is intact.    IMPRESSION:  Moderate chronic microvascular changes without evidence of   an acute transcortical infarction or hemorrhage. MR is a more sensitive   imaging modality for the evaluation of an acute infarction. Findings   discussed with Dr. Corona at 7:15 AM.                ALDO KOCH M.D., ATTENDING RADIOLOGIST  This document has been electronically signed. May 19 2017  7:27AM Patient is a 61y old  Female who presents with a chief complaint of altered mental status    HPI:Patient with known alcoholic cirrhosis with prior encephalopthy brought to ER by EMS for altered mental status. No other history available as patient is noncommunicative and family not present. Ct of brain without acute pathology and ammonia level is high. Office records note that she was seen in the hospital by our group in  when she presented with a small amount of ascites and intoxication. An EGD at that time revealed small distal esophageal varices and minor PUD.      REVIEW OF SYSTEMS:unable to obtain due to altered mental status    PAST MEDICAL & SURGICAL HISTORY:  Pancreatitis  GERD (gastroesophageal reflux disease)  HTN (hypertension)  Chronic back pain  Hypothyroidism  Anemia  Lymphangitis, acute, lower leg  UTI (urinary tract infection)  Lump in female breast  Constipation  Transitory  hyperthyroidism  Urea cycle metabolism disorder  ETOH abuse  Liver cirrhosis  No pertinent past medical history  Alcohol abuse  S/P abdominoplasty  No significant past surgical history      FAMILY HISTORY:  No pertinent family history      SOCIAL HISTORY:  Smoking Status: [ ] Current, [ ] Former, [ ] Never  Pack Years:  Alcohol Use:    Meds PTA in -? how many actual taking  xifaxan  lactulose  carvedilol  aldactone 25mg  pantoprazole  MVI  lasix  thiamine    MEDICATIONS:  MEDICATIONS  (STANDING):  enoxaparin Injectable 40milliGRAM(s) SubCutaneous daily  dextrose 5% + sodium chloride 0.9%. 1000milliLiter(s) IV Continuous <Continuous>  lactulose Syrup 20Gram(s) Enteral Tube every 3 hours  rifaximin 550milliGRAM(s) Oral two times a day    MEDICATIONS  (PRN):      Allergies    No Known Allergies    Intolerances        Vital Signs Last 24 Hrs  T(C): --  T(F): --  HR: 74 (74 - 74)  BP: 192/111 (192/111 - 192/111)  BP(mean): --  RR: 20 (20 - 20)  SpO2: 97% (97% - 97%)        PHYSICAL EXAM:    General: Well developed; well nourished; in no acute distress, awaoke but noncommunicative and does not follow simple commands  HEENT: MMM, conjunctiva and sclera clear  Lungs: Clear  Heart: Rhythm Regular, No Murmurs  Gastrointestinal: Soft, non-tender non-distended; Normal bowel sounds; No rebound or guarding; No Organomegaly & No Masses  Extremities: Normal range of motion, No clubbing, cyanosis or edema  Neurological: Alert and oriented x3, Non-focal  Skin: Warm and dry. No obvious rash  Rectal: No Masses, brown stool, scant but formed      LABS:                        12.1   6.7   )-----------( 151      ( 19 May 2017 07:52 )             35.2         135  |  105  |  29.0<H>  ----------------------------<  134<H>  5.0   |  17.0<L>  |  0.93    Ca    9.2      19 May 2017 07:52    TPro  7.6  /  Alb  3.2<L>  /  TBili  0.9  /  DBili  x   /  AST  57<H>  /  ALT  39<H>  /  AlkPhos  167<H>    Ammonia, Serum: 149: Ammonia levels will be falsely elevated if specimen is NOT collected,  processed and maintained at 4-8 C umol/L (17 @ 07:52    Prothrombin Time and INR, Plasma (17 @ 07:52)   Prothrombin Time, Plasma: 11.4: Effective , the reference range for PT has changed. sec    INR: 1.04: RECOMMENDED RANGES FOR THERAPEUTIC INR:                    RADIOLOGY & ADDITIONAL STUDIES:    EXAM:  CT BRAIN STROKE PROTOCOL                          PROCEDURE DATE:  2017        INTERPRETATION:  CLINICAL HISTORY: Stroke, altered mental status    COMPARISON: CT head dated 2017.    TECHNIQUE: Noncontrast CT of the head. Multiplanar reformations are   submitted.    FINDINGS:  There is periventricular and subcortical white matter hypodensity without   mass effect, nonspecific, likely representing moderate chronic   microvascular ischemic changes. There is no compelling evidence for an   acute transcortical infarction. There is no evidence of mass, mass   effect, midline shift or extra-axial fluid collection. The lateral   ventricles and cortical sulci are age-appropriate in size and   configuration. Trace inflammatory mucosal changes are seen throughout the   various portions of the paranasal sinuses. The orbits and mastoid air   cells are unremarkable. The calvarium is intact.    IMPRESSION:  Moderate chronic microvascular changes without evidence of   an acute transcortical infarction or hemorrhage. MR is a more sensitive   imaging modality for the evaluation of an acute infarction. Findings   discussed with Dr. Corona at 7:15 AM.                ALDO KOCH M.D., ATTENDING RADIOLOGIST  This document has been electronically signed. May 19 2017  7:27AM

## 2017-05-19 NOTE — H&P ADULT - ASSESSMENT
62 yo female pmh cirrhosis , HTN, portal HTN, hepatic encephalopathy presented to ED from [facility],  with difficulty speaking and answering questions; pt withdrawing from painful stimuli;

## 2017-05-19 NOTE — H&P ADULT - NSHPPHYSICALEXAM_GEN_ALL_CORE
PHYSICAL EXAM:  Vital Signs Last 24 Hrs  T(C): 37.2, Max: 37.2 (05-19 @ 12:04)  T(F): 98.9, Max: 98.9 (05-19 @ 12:04)  HR: 80 (74 - 80)  BP: 226/120 (192/111 - 226/120)  BP(mean): --  RR: 16 (16 - 20)  SpO2: 100% (97% - 100%)  GENERAL: AMS, responds to verbal stimuli   HEAD:  Atraumatic, Normocephalic  EYES: EOMI, PERRLA, conjunctiva and sclera clear  ENMT: unable to assess   NECK: Supple, No JVD, Normal thyroid  NERVOUS SYSTEM:  non verbal,   CHEST/LUNG: Clear to percussion bilaterally; No rales, rhonchi, wheezing, or rubs  HEART: Regular rate and rhythm; No murmurs, rubs, or gallops  ABDOMEN: Soft, Nontender, Nondistended; Bowel sounds present  EXTREMITIES:  2+ Peripheral Pulses, No clubbing, cyanosis, or edema  LYMPH: No lymphadenopathy noted  SKIN: No rashes or lesions PHYSICAL EXAM:  Vital Signs Last 24 Hrs  T(C): 37.2, Max: 37.2 (05-19 @ 12:04)  T(F): 98.9, Max: 98.9 (05-19 @ 12:04)  HR: 80 (74 - 80)  BP: 226/120 (192/111 - 226/120)  BP(mean): --  RR: 16 (16 - 20)  SpO2: 100% (97% - 100%)  GENERAL: withdraws with pain stimuli   HEAD:  Atraumatic, Normocephalic  EYES: EOMI, PERRLA, conjunctiva and sclera clear  ENMT: unable to assess   NECK: Supple, No JVD, Normal thyroid  NERVOUS SYSTEM:  non verbal, withdraws with verbal stimuli   CHEST/LUNG: Clear to percussion bilaterally; No rales, rhonchi, wheezing, or rubs  HEART: Regular rate and rhythm; No murmurs, rubs, or gallops  ABDOMEN: Soft, Nontender, Nondistended; Bowel sounds present  EXTREMITIES:  2+ Peripheral Pulses, No clubbing, cyanosis, or edema  LYMPH: No lymphadenopathy noted  SKIN: No rashes or lesions

## 2017-05-19 NOTE — ED PROVIDER NOTE - OBJECTIVE STATEMENT
62 yo female pmh cirrhosis , hepatic encephalopathy comes to ed  last known at baseline 3am  comes to ed with ams with difficulty speaking and answering questions;pt withdrawing from painful stimuli;

## 2017-05-19 NOTE — CONSULT NOTE ADULT - ASSESSMENT
altered mental status most likely hepatic encephalopathy-no evidence of acute CNS event or infection or bleed

## 2017-05-19 NOTE — H&P ADULT - PROBLEM SELECTOR PLAN 1
Pt is non verbal, Pt NH3 was 149, pt got lactulose, repeat ammonia level ordered is 129, c/w lactulse, GI consult Dr Huizar service called

## 2017-05-20 DIAGNOSIS — K70.30 ALCOHOLIC CIRRHOSIS OF LIVER WITHOUT ASCITES: ICD-10-CM

## 2017-05-20 DIAGNOSIS — F10.10 ALCOHOL ABUSE, UNCOMPLICATED: ICD-10-CM

## 2017-05-20 LAB
ALBUMIN SERPL ELPH-MCNC: 2.9 G/DL — LOW (ref 3.3–5.2)
ALP SERPL-CCNC: 117 U/L — SIGNIFICANT CHANGE UP (ref 40–120)
ALT FLD-CCNC: 36 U/L — HIGH
AMMONIA BLD-MCNC: 177 UMOL/L — HIGH (ref 11–55)
ANION GAP SERPL CALC-SCNC: 15 MMOL/L — SIGNIFICANT CHANGE UP (ref 5–17)
AST SERPL-CCNC: 56 U/L — HIGH
BILIRUB SERPL-MCNC: 1.6 MG/DL — SIGNIFICANT CHANGE UP (ref 0.4–2)
BUN SERPL-MCNC: 25 MG/DL — HIGH (ref 8–20)
CALCIUM SERPL-MCNC: 9.2 MG/DL — SIGNIFICANT CHANGE UP (ref 8.6–10.2)
CHLORIDE SERPL-SCNC: 109 MMOL/L — HIGH (ref 98–107)
CHOLEST SERPL-MCNC: 163 MG/DL — SIGNIFICANT CHANGE UP (ref 110–199)
CO2 SERPL-SCNC: 16 MMOL/L — LOW (ref 22–29)
CREAT SERPL-MCNC: 0.89 MG/DL — SIGNIFICANT CHANGE UP (ref 0.5–1.3)
GLUCOSE SERPL-MCNC: 129 MG/DL — HIGH (ref 70–115)
HCT VFR BLD CALC: 34.3 % — LOW (ref 37–47)
HDLC SERPL-MCNC: 82 MG/DL — SIGNIFICANT CHANGE UP
HGB BLD-MCNC: 11.7 G/DL — LOW (ref 12–16)
LIPID PNL WITH DIRECT LDL SERPL: 71 MG/DL — SIGNIFICANT CHANGE UP
MCHC RBC-ENTMCNC: 30.9 PG — SIGNIFICANT CHANGE UP (ref 27–31)
MCHC RBC-ENTMCNC: 34.1 G/DL — SIGNIFICANT CHANGE UP (ref 32–36)
MCV RBC AUTO: 90.5 FL — SIGNIFICANT CHANGE UP (ref 81–99)
PLATELET # BLD AUTO: 149 K/UL — LOW (ref 150–400)
POTASSIUM SERPL-MCNC: 4.3 MMOL/L — SIGNIFICANT CHANGE UP (ref 3.5–5.3)
POTASSIUM SERPL-SCNC: 4.3 MMOL/L — SIGNIFICANT CHANGE UP (ref 3.5–5.3)
PROT SERPL-MCNC: 7.4 G/DL — SIGNIFICANT CHANGE UP (ref 6.6–8.7)
RBC # BLD: 3.79 M/UL — LOW (ref 4.4–5.2)
RBC # FLD: 27.5 % — HIGH (ref 11–15.6)
SODIUM SERPL-SCNC: 140 MMOL/L — SIGNIFICANT CHANGE UP (ref 135–145)
TOTAL CHOLESTEROL/HDL RATIO MEASUREMENT: 2 RATIO — LOW (ref 3.3–7.1)
TRIGL SERPL-MCNC: 51 MG/DL — SIGNIFICANT CHANGE UP (ref 10–200)
WBC # BLD: 7 K/UL — SIGNIFICANT CHANGE UP (ref 4.8–10.8)
WBC # FLD AUTO: 7 K/UL — SIGNIFICANT CHANGE UP (ref 4.8–10.8)

## 2017-05-20 PROCEDURE — 99232 SBSQ HOSP IP/OBS MODERATE 35: CPT

## 2017-05-20 PROCEDURE — 99233 SBSQ HOSP IP/OBS HIGH 50: CPT

## 2017-05-20 RX ORDER — FUROSEMIDE 40 MG
40 TABLET ORAL ONCE
Qty: 0 | Refills: 0 | Status: COMPLETED | OUTPATIENT
Start: 2017-05-20 | End: 2017-05-20

## 2017-05-20 RX ORDER — LACTULOSE 10 G/15ML
20 SOLUTION ORAL THREE TIMES A DAY
Qty: 0 | Refills: 0 | Status: DISCONTINUED | OUTPATIENT
Start: 2017-05-20 | End: 2017-05-22

## 2017-05-20 RX ORDER — LACTULOSE 10 G/15ML
20 SOLUTION ORAL
Qty: 0 | Refills: 0 | Status: DISCONTINUED | OUTPATIENT
Start: 2017-05-20 | End: 2017-05-20

## 2017-05-20 RX ADMIN — LACTULOSE 20 GRAM(S): 10 SOLUTION ORAL at 13:34

## 2017-05-20 RX ADMIN — ENOXAPARIN SODIUM 40 MILLIGRAM(S): 100 INJECTION SUBCUTANEOUS at 11:53

## 2017-05-20 RX ADMIN — Medication 1 MILLIGRAM(S): at 11:53

## 2017-05-20 RX ADMIN — LACTULOSE 200 GRAM(S): 10 SOLUTION ORAL at 06:48

## 2017-05-20 RX ADMIN — Medication 100 MILLIGRAM(S): at 18:15

## 2017-05-20 RX ADMIN — LACTULOSE 20 GRAM(S): 10 SOLUTION ORAL at 21:43

## 2017-05-20 RX ADMIN — PANTOPRAZOLE SODIUM 40 MILLIGRAM(S): 20 TABLET, DELAYED RELEASE ORAL at 05:23

## 2017-05-20 RX ADMIN — LACTULOSE 200 GRAM(S): 10 SOLUTION ORAL at 00:41

## 2017-05-20 RX ADMIN — SODIUM CHLORIDE 100 MILLILITER(S): 9 INJECTION, SOLUTION INTRAVENOUS at 06:50

## 2017-05-20 RX ADMIN — Medication 40 MILLIGRAM(S): at 11:53

## 2017-05-20 NOTE — PROGRESS NOTE ADULT - SUBJECTIVE AND OBJECTIVE BOX
Internal Medicine Hospitalist - Dr. Tru LOVE    [id number]    61y      Female    Patient is a 61y old  Female who presents with a chief complaint of AMS (19 May 2017 13:26)    HPI:  62 yo female pmh cirrhosis , HTN, portal HTN, hepatic encephalopathy presented to ED from [facility],  with difficulty speaking and answering questions; pt withdrawing from painful stimuli; Pt's son is at bed side, reports her base line is normal, she eats by herself in NH, Pt's son last time met with his mother 2 days ago,        he reports she was fine, unable to get information from patient, unable to answer questions, (19 May 2017 13:26    INTERVAL HPI/ OVERNIGHT EVENTS: Patient is seen and examined, pt is more awake today, oriented to person and place, had few loose BM, denied abd. pain, nausea, vomiting, said she is hungry.     REVIEW OF SYSTEMS:    Denied fever, chills, abd. pain, nausea, vomiting, chest pain, SOB, headache, dizziness    PHYSICAL EXAM:    Vital Signs Last 24 Hrs  T(C): 37.1, Max: 37.8 (05-20 @ 04:53)  T(F): 98.8, Max: 100 (05-20 @ 04:53)  HR: 71 (71 - 113)  BP: 127/66 (127/66 - 194/95)  BP(mean): --  RR: 20 (16 - 24)  SpO2: 100% (98% - 100%)    GENERAL: NAD  HEENT: EOMI  Neck: supple  CHEST/LUNG: positive air entry   HEART: S1S2+ audible  ABDOMEN: Soft, Nontender, Nondistended; Bowel sounds present  EXTREMITIES:  no edema  CNS: AAO X 2 - person and place     LABS:                        11.7   7.0   )-----------( 149      ( 20 May 2017 08:04 )             34.3     05-20    140  |  109<H>  |  25.0<H>  ----------------------------<  129<H>  4.3   |  16.0<L>  |  0.89    Ca    9.2      20 May 2017 08:04    TPro  7.4  /  Alb  2.9<L>  /  TBili  1.6  /  DBili  x   /  AST  56<H>  /  ALT  36<H>  /  AlkPhos  117  05-20    PT/INR - ( 19 May 2017 07:52 )   PT: 11.4 sec;   INR: 1.04 ratio         PTT - ( 19 May 2017 07:52 )  PTT:30.8 sec    Ammonia, Serum (05.19.17 @ 18:07)   Ammonia, Serum: 112:       MEDICATIONS  (STANDING):  enoxaparin Injectable 40milliGRAM(s) SubCutaneous daily  labetalol 100milliGRAM(s) Oral two times a day  pantoprazole  Injectable 40milliGRAM(s) IV Push daily  folic acid 1milliGRAM(s) Oral daily  nadolol 10milliGRAM(s) Oral daily  furosemide    Tablet 40milliGRAM(s) Oral daily  spironolactone 100milliGRAM(s) Oral daily  rifaximin 550milliGRAM(s) Oral two times a day  lactulose Syrup 20Gram(s) Oral three times a day    MEDICATIONS  (PRN):  labetalol Injectable 20milliGRAM(s) IV Push every 2 hours PRN SBP > 180  hydrALAZINE Injectable 10milliGRAM(s) IV Push every 2 hours PRN SBP > 180      RADIOLOGY & ADDITIONAL TEST

## 2017-05-20 NOTE — SWALLOW BEDSIDE ASSESSMENT ADULT - SLP PERTINENT HISTORY OF CURRENT PROBLEM
60 yo female pmh cirrhosis , HTN, portal HTN, hepatic encephalopathy presented to ED from [facility],  with difficulty speaking and answering questions. CT head and CXR unremarkable

## 2017-05-20 NOTE — PROGRESS NOTE ADULT - SUBJECTIVE AND OBJECTIVE BOX
Pt seen and examined F/U hepatic encephalopathy  This morning the NG is out but she is awake and conversant in NAD. No somnolence.    REVIEW OF SYSTEMS:    CONSTITUTIONAL: No fever, weight loss, or fatigue  EYES: No eye pain, visual disturbances, or discharge  ENMT:  No difficulty hearing, tinnitus, vertigo; No sinus or throat pain  RESPIRATORY: No cough, wheezing, chills or hemoptysis; No shortness of breath  CARDIOVASCULAR: No chest pain, palpitations, dizziness, or leg swelling  GASTROINTESTINAL: No abdominal or epigastric pain. No nausea, vomiting, or hematemesis; No diarrhea or constipation. No melena or hematochezia.    MEDICATIONS:  MEDICATIONS  (STANDING):  enoxaparin Injectable 40milliGRAM(s) SubCutaneous daily  labetalol 100milliGRAM(s) Oral two times a day  pantoprazole  Injectable 40milliGRAM(s) IV Push daily  folic acid 1milliGRAM(s) Oral daily  nadolol 10milliGRAM(s) Oral daily  furosemide    Tablet 40milliGRAM(s) Oral daily  spironolactone 100milliGRAM(s) Oral daily  furosemide   Injectable 40milliGRAM(s) IV Push once  rifaximin 550milliGRAM(s) Oral two times a day  lactulose Syrup 20Gram(s) Oral three times a day    MEDICATIONS  (PRN):  labetalol Injectable 20milliGRAM(s) IV Push every 2 hours PRN SBP > 180  hydrALAZINE Injectable 10milliGRAM(s) IV Push every 2 hours PRN SBP > 180      Allergies    No Known Allergies    Intolerances        Vital Signs Last 24 Hrs  T(C): 37.4, Max: 37.8 (05-20 @ 04:53)  T(F): 99.4, Max: 100 (05-20 @ 04:53)  HR: 81 (76 - 113)  BP: 169/87 (164/83 - 226/120)  BP(mean): --  RR: 20 (16 - 24)  SpO2: 99% (98% - 100%)    I & Os for current day (as of 05-20 @ 09:53)  =============================================  IN: 0 ml / OUT: 1 ml / NET: -1 ml      PHYSICAL EXAM:    General: Well developed; well nourished; in no acute distress  HEENT: MMM, conjunctiva and sclera clear  Gastrointestinal:Abdomen: Soft non-tender non-distended; Normal bowel sounds; No hepatosplenomegaly  Extremities: no cyanosis, clubbing or edema.  Skin: Warm and dry. No obvious rash    LABS:  ABG - ( 19 May 2017 15:24 )  pH: 7.46  /  pCO2: 22    /  pO2: 121   / HCO3: 19    / Base Excess: -6.2  /  SaO2: 99                  CBC Full  -  ( 20 May 2017 08:04 )  WBC Count : 7.0 K/uL  Hemoglobin : 11.7 g/dL  Hematocrit : 34.3 %  Platelet Count - Automated : 149 K/uL  Mean Cell Volume : 90.5 fl  Mean Cell Hemoglobin : 30.9 pg  Mean Cell Hemoglobin Concentration : 34.1 g/dL  Auto Neutrophil # : x  Auto Lymphocyte # : x  Auto Monocyte # : x  Auto Eosinophil # : x  Auto Basophil # : x  Auto Neutrophil % : x  Auto Lymphocyte % : x  Auto Monocyte % : x  Auto Eosinophil % : x  Auto Basophil % : x    05-20    140  |  109<H>  |  25.0<H>  ----------------------------<  129<H>  4.3   |  16.0<L>  |  0.89    Ca    9.2      20 May 2017 08:04    TPro  7.4  /  Alb  2.9<L>  /  TBili  1.6  /  DBili  x   /  AST  56<H>  /  ALT  36<H>  /  AlkPhos  117  05-20    PT/INR - ( 19 May 2017 07:52 )   PT: 11.4 sec;   INR: 1.04 ratio         PTT - ( 19 May 2017 07:52 )  PTT:30.8 sec                  RADIOLOGY & ADDITIONAL STUDIES (The following images were personally reviewed):

## 2017-05-20 NOTE — ED ADULT NURSE REASSESSMENT NOTE - NS ED NURSE REASSESS COMMENT FT1
Attempted to place NG tube as ordered, unable to secure NG tube due to patient pulling tube out.  Dr. Birch at bedside and aware.
Dr. Birch aware of hypertensive VS.  Will medicate as ordered.
Family at bedside, informed pt has a bed, MD Ott called and informed speech passed pt to regular food, will change orders.
ICU JOSE Lamb at bedside for consult, patient /103, medicated as ordered, verbal orders at that time.  Patient not tolerating rectal enema or NGT, ICU JOSE Lamb inserted NGT and able to give medication (lactulose and xifaxan) and removed. Patient awake and confused at this time, uncooperative.  Patient not accepted to ICU. Will continue to monitor and reassess closely.
Spoke with Dr Birch, made aware patient confused, attempting to get out of bed, 1:1 at bedside for safety at this time, Dr Birch to put order in.
tried to call report, was told bed is not cleaned to call back in 5 minutes.
pt doing much better, able to make needs known, plan of care explained to pt.
MD Ott, Fnu made aware pt passed dysphasia screening, pt more awake and alert asks for food.

## 2017-05-20 NOTE — PATIENT PROFILE ADULT. - HEALTH/HEALTHCARE ANXIETIES, PROFILE
confused as to why she is here.  she thinks it is because of blood sugar even though has been educated numerous times and told it is liver/ammonia issues but due to confusion pt forgets

## 2017-05-20 NOTE — PROGRESS NOTE ADULT - ASSESSMENT
62 yo female pmh cirrhosis , HTN, portal HTN, hepatic encephalopathy presented to ED from [facility],  with AMS due to hepatic encephalopathy, seen by GI, started on Lactulose and Rifaximin, improving mentation and ammonia

## 2017-05-20 NOTE — SWALLOW BEDSIDE ASSESSMENT ADULT - SLP GENERAL OBSERVATIONS
Pt received sleeping on stretcher in ED, easily arousable, [place]ican speaking, able to follow commands, reduced cognition

## 2017-05-21 LAB
ALBUMIN SERPL ELPH-MCNC: 2.7 G/DL — LOW (ref 3.3–5.2)
ALP SERPL-CCNC: 153 U/L — HIGH (ref 40–120)
ALT FLD-CCNC: 27 U/L — SIGNIFICANT CHANGE UP
AMMONIA BLD-MCNC: 128 UMOL/L — HIGH (ref 11–55)
ANION GAP SERPL CALC-SCNC: 12 MMOL/L — SIGNIFICANT CHANGE UP (ref 5–17)
AST SERPL-CCNC: 44 U/L — HIGH
BILIRUB SERPL-MCNC: 0.6 MG/DL — SIGNIFICANT CHANGE UP (ref 0.4–2)
BUN SERPL-MCNC: 35 MG/DL — HIGH (ref 8–20)
CALCIUM SERPL-MCNC: 8 MG/DL — LOW (ref 8.6–10.2)
CHLORIDE SERPL-SCNC: 106 MMOL/L — SIGNIFICANT CHANGE UP (ref 98–107)
CO2 SERPL-SCNC: 18 MMOL/L — LOW (ref 22–29)
CREAT SERPL-MCNC: 1.07 MG/DL — SIGNIFICANT CHANGE UP (ref 0.5–1.3)
GLUCOSE SERPL-MCNC: 126 MG/DL — HIGH (ref 70–115)
HCT VFR BLD CALC: 28.9 % — LOW (ref 37–47)
HGB BLD-MCNC: 9.9 G/DL — LOW (ref 12–16)
MAGNESIUM SERPL-MCNC: 2.5 MG/DL — SIGNIFICANT CHANGE UP (ref 1.6–2.6)
MCHC RBC-ENTMCNC: 31.5 PG — HIGH (ref 27–31)
MCHC RBC-ENTMCNC: 34.3 G/DL — SIGNIFICANT CHANGE UP (ref 32–36)
MCV RBC AUTO: 92 FL — SIGNIFICANT CHANGE UP (ref 81–99)
PLATELET # BLD AUTO: 121 K/UL — LOW (ref 150–400)
POTASSIUM SERPL-MCNC: 4.6 MMOL/L — SIGNIFICANT CHANGE UP (ref 3.5–5.3)
POTASSIUM SERPL-SCNC: 4.6 MMOL/L — SIGNIFICANT CHANGE UP (ref 3.5–5.3)
PROT SERPL-MCNC: 6.5 G/DL — LOW (ref 6.6–8.7)
RBC # BLD: 3.14 M/UL — LOW (ref 4.4–5.2)
RBC # FLD: 27.8 % — HIGH (ref 11–15.6)
SODIUM SERPL-SCNC: 136 MMOL/L — SIGNIFICANT CHANGE UP (ref 135–145)
WBC # BLD: 5.7 K/UL — SIGNIFICANT CHANGE UP (ref 4.8–10.8)
WBC # FLD AUTO: 5.7 K/UL — SIGNIFICANT CHANGE UP (ref 4.8–10.8)

## 2017-05-21 PROCEDURE — 99232 SBSQ HOSP IP/OBS MODERATE 35: CPT

## 2017-05-21 PROCEDURE — 99233 SBSQ HOSP IP/OBS HIGH 50: CPT

## 2017-05-21 RX ORDER — SPIRONOLACTONE 25 MG/1
50 TABLET, FILM COATED ORAL DAILY
Qty: 0 | Refills: 0 | Status: DISCONTINUED | OUTPATIENT
Start: 2017-05-21 | End: 2017-05-19

## 2017-05-21 RX ORDER — FUROSEMIDE 40 MG
20 TABLET ORAL DAILY
Qty: 0 | Refills: 0 | Status: DISCONTINUED | OUTPATIENT
Start: 2017-05-21 | End: 2017-05-19

## 2017-05-21 RX ADMIN — Medication 40 MILLIGRAM(S): at 06:13

## 2017-05-21 RX ADMIN — LACTULOSE 20 GRAM(S): 10 SOLUTION ORAL at 13:44

## 2017-05-21 RX ADMIN — Medication 100 MILLIGRAM(S): at 06:13

## 2017-05-21 RX ADMIN — PANTOPRAZOLE SODIUM 40 MILLIGRAM(S): 20 TABLET, DELAYED RELEASE ORAL at 11:48

## 2017-05-21 RX ADMIN — ENOXAPARIN SODIUM 40 MILLIGRAM(S): 100 INJECTION SUBCUTANEOUS at 11:48

## 2017-05-21 RX ADMIN — Medication 1 MILLIGRAM(S): at 11:48

## 2017-05-21 RX ADMIN — LACTULOSE 20 GRAM(S): 10 SOLUTION ORAL at 06:13

## 2017-05-21 RX ADMIN — SPIRONOLACTONE 50 MILLIGRAM(S): 25 TABLET, FILM COATED ORAL at 13:44

## 2017-05-21 RX ADMIN — NADOLOL 10 MILLIGRAM(S): 80 TABLET ORAL at 06:13

## 2017-05-21 RX ADMIN — LACTULOSE 20 GRAM(S): 10 SOLUTION ORAL at 21:02

## 2017-05-21 RX ADMIN — SPIRONOLACTONE 100 MILLIGRAM(S): 25 TABLET, FILM COATED ORAL at 05:54

## 2017-05-21 RX ADMIN — Medication 100 MILLIGRAM(S): at 17:21

## 2017-05-21 RX ADMIN — Medication 20 MILLIGRAM(S): at 13:44

## 2017-05-21 NOTE — PROGRESS NOTE ADULT - PROBLEM SELECTOR PLAN 1
stable but BUN up on current dose of aldactone and lasix which I will decrease by half with repeat BMP in AM

## 2017-05-21 NOTE — PROGRESS NOTE ADULT - SUBJECTIVE AND OBJECTIVE BOX
Internal Medicine Hospitalist - Dr. Tru LOVE    [id number]    61y      Female    Patient is a 61y old  Female who presents with a chief complaint of AMS (19 May 2017 13:26)    HPI:  62 yo female pmh cirrhosis , HTN, portal HTN, hepatic encephalopathy presented to ED from [facility],  with difficulty speaking and answering questions; pt withdrawing from painful stimuli; Pt's son is at bed side, reports her base line is normal, she eats by herself in NH, Pt's son last time met with his mother 2 days ago,        he reports she was fine, unable to get information from patient, unable to answer questions, (19 May 2017 13:26)    INTERVAL HPI/ OVERNIGHT EVENTS: Patient is seen and examined, awake, following simple commands     REVIEW OF SYSTEMS:    Denied fever, chills, abd. pain, nausea, vomiting, chest pain, SOB, headache, dizziness    PHYSICAL EXAM:    Vital Signs Last 24 Hrs  T(C): 36.8, Max: 37.1 (05-20 @ 11:53)  T(F): 98.3, Max: 98.8 (05-20 @ 11:53)  HR: 80 (71 - 82)  BP: 110/63 (110/63 - 141/80)  BP(mean): --  RR: 18 (18 - 20)  SpO2: 99% (99% - 100%)    GENERAL: NAD  HEENT: EOMI  Neck: supple  CHEST/LUNG: positive air entry   HEART: S1S2+ audible  ABDOMEN: Soft, Nontender, Nondistended; Bowel sounds present  EXTREMITIES:  no edema  CNS: AAO X person       LABS:                        9.9    5.7   )-----------( 121      ( 21 May 2017 07:23 )             28.9     05-21    136  |  106  |  35.0<H>  ----------------------------<  126<H>  4.6   |  18.0<L>  |  1.07    Ca    8.0<L>      21 May 2017 07:23  Mg     2.5     05-21    TPro  6.5<L>  /  Alb  2.7<L>  /  TBili  0.6  /  DBili  x   /  AST  44<H>  /  ALT  27  /  AlkPhos  153<H>  05-21            MEDICATIONS  (STANDING):  enoxaparin Injectable 40milliGRAM(s) SubCutaneous daily  labetalol 100milliGRAM(s) Oral two times a day  pantoprazole  Injectable 40milliGRAM(s) IV Push daily  folic acid 1milliGRAM(s) Oral daily  nadolol 10milliGRAM(s) Oral daily  rifaximin 550milliGRAM(s) Oral two times a day  lactulose Syrup 20Gram(s) Oral three times a day  spironolactone 50milliGRAM(s) Oral daily  furosemide    Tablet 20milliGRAM(s) Oral daily    MEDICATIONS  (PRN):  labetalol Injectable 20milliGRAM(s) IV Push every 2 hours PRN SBP > 180  hydrALAZINE Injectable 10milliGRAM(s) IV Push every 2 hours PRN SBP > 180      RADIOLOGY & ADDITIONAL TEST

## 2017-05-21 NOTE — PROGRESS NOTE ADULT - ASSESSMENT
60 yo female pmh cirrhosis , HTN, portal HTN, hepatic encephalopathy presented to ED from [facility],  with AMS due to hepatic encephalopathy, seen by GI, started on Lactulose and Rifaximin, improving mentation and ammonia

## 2017-05-21 NOTE — PROGRESS NOTE ADULT - PROBLEM SELECTOR PLAN 1
appreciate GI input, improving mentation   cont. lactulose and Rifaximin   repeat ammonia 128  further management as per GI  speech eval - recommend regular diet

## 2017-05-21 NOTE — PROGRESS NOTE ADULT - SUBJECTIVE AND OBJECTIVE BOX
Pt seen and examined f/u hepatic encephalopathy  This morning she feels well with no complaints. She is alert and oriented. Eating wee. The BUN is up to 35.    REVIEW OF SYSTEMS:    CONSTITUTIONAL: No fever, weight loss, or fatigue  EYES: No eye pain, visual disturbances, or discharge  ENMT:  No difficulty hearing, tinnitus, vertigo; No sinus or throat pain  RESPIRATORY: No cough, wheezing, chills or hemoptysis; No shortness of breath  CARDIOVASCULAR: No chest pain, palpitations, dizziness, or leg swelling  GASTROINTESTINAL: No abdominal or epigastric pain. No nausea, vomiting, or hematemesis; No diarrhea or constipation. No melena or hematochezia.    MEDICATIONS:  MEDICATIONS  (STANDING):  enoxaparin Injectable 40milliGRAM(s) SubCutaneous daily  labetalol 100milliGRAM(s) Oral two times a day  pantoprazole  Injectable 40milliGRAM(s) IV Push daily  folic acid 1milliGRAM(s) Oral daily  nadolol 10milliGRAM(s) Oral daily  rifaximin 550milliGRAM(s) Oral two times a day  lactulose Syrup 20Gram(s) Oral three times a day  spironolactone 50milliGRAM(s) Oral daily  furosemide    Tablet 20milliGRAM(s) Oral daily    MEDICATIONS  (PRN):  labetalol Injectable 20milliGRAM(s) IV Push every 2 hours PRN SBP > 180  hydrALAZINE Injectable 10milliGRAM(s) IV Push every 2 hours PRN SBP > 180      Allergies    No Known Allergies    Intolerances        Vital Signs Last 24 Hrs  T(C): 36.8, Max: 37.1 (05-20 @ 11:53)  T(F): 98.3, Max: 98.8 (05-20 @ 11:53)  HR: 80 (71 - 82)  BP: 110/63 (110/63 - 141/80)  BP(mean): --  RR: 18 (18 - 20)  SpO2: 99% (99% - 100%)    I & Os for current day (as of 05-21 @ 09:04)  =============================================  IN: 360 ml / OUT: 0 ml / NET: 360 ml      PHYSICAL EXAM:    General: Well developed; well nourished; in no acute distress  HEENT: MMM, conjunctiva and sclera clear.  Gastrointestinal:Abdomen: Soft non-tender non-distended; Normal bowel sounds; No hepatosplenomegaly  Extremities: no cyanosis, clubbing or edema.  Skin: Warm and dry. No obvious rash    LABS:  ABG - ( 19 May 2017 15:24 )  pH: 7.46  /  pCO2: 22    /  pO2: 121   / HCO3: 19    / Base Excess: -6.2  /  SaO2: 99                  CBC Full  -  ( 21 May 2017 07:23 )  WBC Count : 5.7 K/uL  Hemoglobin : 9.9 g/dL  Hematocrit : 28.9 %  Platelet Count - Automated : 121 K/uL  Mean Cell Volume : 92.0 fl  Mean Cell Hemoglobin : 31.5 pg  Mean Cell Hemoglobin Concentration : 34.3 g/dL  Auto Neutrophil # : x  Auto Lymphocyte # : x  Auto Monocyte # : x  Auto Eosinophil # : x  Auto Basophil # : x  Auto Neutrophil % : x  Auto Lymphocyte % : x  Auto Monocyte % : x  Auto Eosinophil % : x  Auto Basophil % : x    05-21    136  |  106  |  35.0<H>  ----------------------------<  126<H>  4.6   |  18.0<L>  |  1.07    Ca    8.0<L>      21 May 2017 07:23  Mg     2.5     05-21    TPro  6.5<L>  /  Alb  2.7<L>  /  TBili  0.6  /  DBili  x   /  AST  44<H>  /  ALT  27  /  AlkPhos  153<H>  05-21

## 2017-05-22 LAB
AMMONIA BLD-MCNC: 126 UMOL/L — HIGH (ref 11–55)
AMPHET UR-MCNC: NEGATIVE — SIGNIFICANT CHANGE UP
ANION GAP SERPL CALC-SCNC: 11 MMOL/L — SIGNIFICANT CHANGE UP (ref 5–17)
BARBITURATES UR SCN-MCNC: NEGATIVE — SIGNIFICANT CHANGE UP
BENZODIAZ UR-MCNC: NEGATIVE — SIGNIFICANT CHANGE UP
BUN SERPL-MCNC: 31 MG/DL — HIGH (ref 8–20)
CALCIUM SERPL-MCNC: 8.3 MG/DL — LOW (ref 8.6–10.2)
CHLORIDE SERPL-SCNC: 106 MMOL/L — SIGNIFICANT CHANGE UP (ref 98–107)
CO2 SERPL-SCNC: 20 MMOL/L — LOW (ref 22–29)
COCAINE METAB.OTHER UR-MCNC: NEGATIVE — SIGNIFICANT CHANGE UP
CREAT SERPL-MCNC: 1.04 MG/DL — SIGNIFICANT CHANGE UP (ref 0.5–1.3)
GLUCOSE SERPL-MCNC: 113 MG/DL — SIGNIFICANT CHANGE UP (ref 70–115)
HCT VFR BLD CALC: 28.7 % — LOW (ref 37–47)
HGB BLD-MCNC: 9.6 G/DL — LOW (ref 12–16)
MCHC RBC-ENTMCNC: 31 PG — SIGNIFICANT CHANGE UP (ref 27–31)
MCHC RBC-ENTMCNC: 33.4 G/DL — SIGNIFICANT CHANGE UP (ref 32–36)
MCV RBC AUTO: 92.6 FL — SIGNIFICANT CHANGE UP (ref 81–99)
METHADONE UR-MCNC: NEGATIVE — SIGNIFICANT CHANGE UP
OPIATES UR-MCNC: NEGATIVE — SIGNIFICANT CHANGE UP
PCP SPEC-MCNC: SIGNIFICANT CHANGE UP
PCP UR-MCNC: NEGATIVE — SIGNIFICANT CHANGE UP
PLATELET # BLD AUTO: 119 K/UL — LOW (ref 150–400)
POTASSIUM SERPL-MCNC: 4.1 MMOL/L — SIGNIFICANT CHANGE UP (ref 3.5–5.3)
POTASSIUM SERPL-SCNC: 4.1 MMOL/L — SIGNIFICANT CHANGE UP (ref 3.5–5.3)
RBC # BLD: 3.1 M/UL — LOW (ref 4.4–5.2)
RBC # FLD: 27.3 % — HIGH (ref 11–15.6)
SODIUM SERPL-SCNC: 137 MMOL/L — SIGNIFICANT CHANGE UP (ref 135–145)
THC UR QL: NEGATIVE — SIGNIFICANT CHANGE UP
WBC # BLD: 5.3 K/UL — SIGNIFICANT CHANGE UP (ref 4.8–10.8)
WBC # FLD AUTO: 5.3 K/UL — SIGNIFICANT CHANGE UP (ref 4.8–10.8)

## 2017-05-22 PROCEDURE — 99233 SBSQ HOSP IP/OBS HIGH 50: CPT

## 2017-05-22 PROCEDURE — 99232 SBSQ HOSP IP/OBS MODERATE 35: CPT

## 2017-05-22 RX ORDER — LACTULOSE 10 G/15ML
30 SOLUTION ORAL THREE TIMES A DAY
Qty: 0 | Refills: 0 | Status: DISCONTINUED | OUTPATIENT
Start: 2017-05-22 | End: 2017-05-19

## 2017-05-22 RX ADMIN — ENOXAPARIN SODIUM 40 MILLIGRAM(S): 100 INJECTION SUBCUTANEOUS at 12:22

## 2017-05-22 RX ADMIN — Medication 1 MILLIGRAM(S): at 12:22

## 2017-05-22 RX ADMIN — SPIRONOLACTONE 50 MILLIGRAM(S): 25 TABLET, FILM COATED ORAL at 05:02

## 2017-05-22 RX ADMIN — LACTULOSE 30 GRAM(S): 10 SOLUTION ORAL at 21:42

## 2017-05-22 RX ADMIN — Medication 20 MILLIGRAM(S): at 05:03

## 2017-05-22 RX ADMIN — Medication 100 MILLIGRAM(S): at 17:27

## 2017-05-22 RX ADMIN — Medication 100 MILLIGRAM(S): at 05:02

## 2017-05-22 RX ADMIN — PANTOPRAZOLE SODIUM 40 MILLIGRAM(S): 20 TABLET, DELAYED RELEASE ORAL at 12:22

## 2017-05-22 RX ADMIN — LACTULOSE 30 GRAM(S): 10 SOLUTION ORAL at 14:04

## 2017-05-22 RX ADMIN — NADOLOL 10 MILLIGRAM(S): 80 TABLET ORAL at 05:02

## 2017-05-22 RX ADMIN — LACTULOSE 20 GRAM(S): 10 SOLUTION ORAL at 05:03

## 2017-05-22 NOTE — PROGRESS NOTE ADULT - SUBJECTIVE AND OBJECTIVE BOX
Pt seen and examined f/u hepatic encephalopathy  This morning she has no complaints and feels well. She is wide awake, alert and conversant. However she thinks she is at [facility]. She knows its May but not the year. There is some asterixis.    REVIEW OF SYSTEMS:    CONSTITUTIONAL: No fever, weight loss, or fatigue  EYES: No eye pain, visual disturbances, or discharge  ENMT:  No difficulty hearing, tinnitus, vertigo; No sinus or throat pain  RESPIRATORY: No cough, wheezing, chills or hemoptysis; No shortness of breath  CARDIOVASCULAR: No chest pain, palpitations, dizziness, or leg swelling  GASTROINTESTINAL: No abdominal or epigastric pain. No nausea, vomiting, or hematemesis; No diarrhea or constipation. No melena or hematochezia.    MEDICATIONS:  MEDICATIONS  (STANDING):  enoxaparin Injectable 40milliGRAM(s) SubCutaneous daily  labetalol 100milliGRAM(s) Oral two times a day  pantoprazole  Injectable 40milliGRAM(s) IV Push daily  folic acid 1milliGRAM(s) Oral daily  nadolol 10milliGRAM(s) Oral daily  rifaximin 550milliGRAM(s) Oral two times a day  lactulose Syrup 20Gram(s) Oral three times a day  spironolactone 50milliGRAM(s) Oral daily  furosemide    Tablet 20milliGRAM(s) Oral daily    MEDICATIONS  (PRN):  labetalol Injectable 20milliGRAM(s) IV Push every 2 hours PRN SBP > 180  hydrALAZINE Injectable 10milliGRAM(s) IV Push every 2 hours PRN SBP > 180      Allergies    No Known Allergies    Intolerances        Vital Signs Last 24 Hrs  T(C): 37, Max: 37 (05-22 @ 04:58)  T(F): 98.6, Max: 98.6 (05-22 @ 04:58)  HR: 72 (72 - 76)  BP: 121/73 (112/52 - 121/73)  BP(mean): --  RR: 18 (18 - 18)  SpO2: 97% (96% - 97%)      PHYSICAL EXAM:    General: Well developed; well nourished; in no acute distress  HEENT: MMM, conjunctiva and sclera clear  Gastrointestinal:Abdomen: Soft non-tender non-distended; Normal bowel sounds; No hepatosplenomegaly  Extremities: no cyanosis, clubbing or edema.  Skin: Warm and dry. No obvious rash    LABS:      CBC Full  -  ( 21 May 2017 07:23 )  WBC Count : 5.7 K/uL  Hemoglobin : 9.9 g/dL  Hematocrit : 28.9 %  Platelet Count - Automated : 121 K/uL  Mean Cell Volume : 92.0 fl  Mean Cell Hemoglobin : 31.5 pg  Mean Cell Hemoglobin Concentration : 34.3 g/dL  Auto Neutrophil # : x  Auto Lymphocyte # : x  Auto Monocyte # : x  Auto Eosinophil # : x  Auto Basophil # : x  Auto Neutrophil % : x  Auto Lymphocyte % : x  Auto Monocyte % : x  Auto Eosinophil % : x  Auto Basophil % : x    05-21    136  |  106  |  35.0<H>  ----------------------------<  126<H>  4.6   |  18.0<L>  |  1.07    Ca    8.0<L>      21 May 2017 07:23  Mg     2.5     05-21    TPro  6.5<L>  /  Alb  2.7<L>  /  TBili  0.6  /  DBili  x   /  AST  44<H>  /  ALT  27  /  AlkPhos  153<H>  05-21

## 2017-05-22 NOTE — PROGRESS NOTE ADULT - PROBLEM SELECTOR PLAN 1
appreciate GI input, orinented to person and month only, still elevated ammmonia, increase lactulose and cont. Rifaximin   repeat ammonia 126  further management as per GI  speech eval - recommend regular diet  Pt eval pending

## 2017-05-22 NOTE — PROGRESS NOTE ADULT - PROBLEM SELECTOR PLAN 1
Either still with some mild encephalopathy or she has an element of alcohol dementia. The cirrhosis is stable. Yesterday the BUN was elevated and the diuretic dose was decreased. Repeat chems are pending.

## 2017-05-22 NOTE — PROGRESS NOTE ADULT - ASSESSMENT
60 yo female pmh cirrhosis , HTN, portal HTN, hepatic encephalopathy presented to ED from [facility],  with AMS due to hepatic encephalopathy, seen by GI, started on Lactulose and Rifaximin, mentation improved, ammonia improved but still elevated

## 2017-05-22 NOTE — PROGRESS NOTE ADULT - SUBJECTIVE AND OBJECTIVE BOX
Internal Medicine Hospitalist - Dr. Tru LOVE    [id number]    61y      Female    Patient is a 61y old  Female who presents with a chief complaint of AMS (19 May 2017 13:26)    HPI:  62 yo female pmh cirrhosis , HTN, portal HTN, hepatic encephalopathy presented to ED from [facility],  with difficulty speaking and answering questions; pt withdrawing from painful stimuli; Pt's son is at bed side, reports her base line is normal, she eats by herself in NH, Pt's son last time met with his mother 2 days ago,        he reports she was fine, unable to get information from patient, unable to answer questions, (19 May 2017 13:26      INTERVAL HPI/ OVERNIGHT EVENTS: Patient is seen and examined, pt is awake, denied abd. pain, nausea, vomiting, had 2 BM last night, knows her name and month, report she is at [place].     REVIEW OF SYSTEMS:    Denied fever, chills, abd. pain, nausea, vomiting, chest pain, SOB, headache, dizziness    PHYSICAL EXAM:    Vital Signs Last 24 Hrs  T(C): 37, Max: 37 (05-22 @ 04:58)  T(F): 98.6, Max: 98.6 (05-22 @ 04:58)  HR: 72 (72 - 76)  BP: 121/73 (112/52 - 121/73)  BP(mean): --  RR: 18 (18 - 18)  SpO2: 97% (96% - 97%)    GENERAL: NAD  HEENT: EOMI  Neck: supple  CHEST/LUNG: positive air entry   HEART: S1S2+ audible  ABDOMEN: Soft, Nontender, Nondistended; Bowel sounds present  EXTREMITIES:  no edema, mild astrexis   CNS: AAO X name and month [facility]   Psychiatry: normal mood    LABS:                        9.9    5.7   )-----------( 121      ( 21 May 2017 07:23 )             28.9     05-21    136  |  106  |  35.0<H>  ----------------------------<  126<H>  4.6   |  18.0<L>  |  1.07    Ca    8.0<L>      21 May 2017 07:23  Mg     2.5     05-21    Ammonia, Serum (05.22.17 @ 08:33)  Ammonia, Serum: 126: Ammonia levels will be falsely elevated if specimen is NOT collected,      MEDICATIONS  (STANDING):  enoxaparin Injectable 40milliGRAM(s) SubCutaneous daily  labetalol 100milliGRAM(s) Oral two times a day  pantoprazole  Injectable 40milliGRAM(s) IV Push daily  folic acid 1milliGRAM(s) Oral daily  nadolol 10milliGRAM(s) Oral daily  rifaximin 550milliGRAM(s) Oral two times a day  spironolactone 50milliGRAM(s) Oral daily  furosemide    Tablet 20milliGRAM(s) Oral daily  lactulose Syrup 30Gram(s) Oral three times a day    MEDICATIONS  (PRN):  labetalol Injectable 20milliGRAM(s) IV Push every 2 hours PRN SBP > 180  hydrALAZINE Injectable 10milliGRAM(s) IV Push every 2 hours PRN SBP > 180      RADIOLOGY & ADDITIONAL TEST

## 2017-05-23 LAB
AMMONIA BLD-MCNC: 103 UMOL/L — HIGH (ref 11–55)
ANION GAP SERPL CALC-SCNC: 11 MMOL/L — SIGNIFICANT CHANGE UP (ref 5–17)
BUN SERPL-MCNC: 23 MG/DL — HIGH (ref 8–20)
CALCIUM SERPL-MCNC: 8.2 MG/DL — LOW (ref 8.6–10.2)
CHLORIDE SERPL-SCNC: 108 MMOL/L — HIGH (ref 98–107)
CO2 SERPL-SCNC: 18 MMOL/L — LOW (ref 22–29)
CREAT SERPL-MCNC: 0.94 MG/DL — SIGNIFICANT CHANGE UP (ref 0.5–1.3)
GLUCOSE SERPL-MCNC: 140 MG/DL — HIGH (ref 70–115)
HCT VFR BLD CALC: 29.9 % — LOW (ref 37–47)
HGB BLD-MCNC: 10 G/DL — LOW (ref 12–16)
MCHC RBC-ENTMCNC: 31.1 PG — HIGH (ref 27–31)
MCHC RBC-ENTMCNC: 33.4 G/DL — SIGNIFICANT CHANGE UP (ref 32–36)
MCV RBC AUTO: 92.9 FL — SIGNIFICANT CHANGE UP (ref 81–99)
PLATELET # BLD AUTO: 117 K/UL — LOW (ref 150–400)
POTASSIUM SERPL-MCNC: 4.4 MMOL/L — SIGNIFICANT CHANGE UP (ref 3.5–5.3)
POTASSIUM SERPL-SCNC: 4.4 MMOL/L — SIGNIFICANT CHANGE UP (ref 3.5–5.3)
RBC # BLD: 3.22 M/UL — LOW (ref 4.4–5.2)
RBC # FLD: 26.9 % — HIGH (ref 11–15.6)
SODIUM SERPL-SCNC: 137 MMOL/L — SIGNIFICANT CHANGE UP (ref 135–145)
WBC # BLD: 5.6 K/UL — SIGNIFICANT CHANGE UP (ref 4.8–10.8)
WBC # FLD AUTO: 5.6 K/UL — SIGNIFICANT CHANGE UP (ref 4.8–10.8)

## 2017-05-23 PROCEDURE — 99232 SBSQ HOSP IP/OBS MODERATE 35: CPT

## 2017-05-23 RX ADMIN — PANTOPRAZOLE SODIUM 40 MILLIGRAM(S): 20 TABLET, DELAYED RELEASE ORAL at 11:25

## 2017-05-23 RX ADMIN — Medication 1 MILLIGRAM(S): at 11:25

## 2017-05-23 RX ADMIN — NADOLOL 10 MILLIGRAM(S): 80 TABLET ORAL at 06:04

## 2017-05-23 RX ADMIN — Medication 100 MILLIGRAM(S): at 18:06

## 2017-05-23 RX ADMIN — LACTULOSE 30 GRAM(S): 10 SOLUTION ORAL at 21:42

## 2017-05-23 RX ADMIN — LACTULOSE 30 GRAM(S): 10 SOLUTION ORAL at 06:04

## 2017-05-23 RX ADMIN — SPIRONOLACTONE 50 MILLIGRAM(S): 25 TABLET, FILM COATED ORAL at 06:04

## 2017-05-23 RX ADMIN — ENOXAPARIN SODIUM 40 MILLIGRAM(S): 100 INJECTION SUBCUTANEOUS at 11:25

## 2017-05-23 RX ADMIN — Medication 20 MILLIGRAM(S): at 06:04

## 2017-05-23 RX ADMIN — LACTULOSE 30 GRAM(S): 10 SOLUTION ORAL at 14:31

## 2017-05-23 RX ADMIN — Medication 100 MILLIGRAM(S): at 06:04

## 2017-05-23 NOTE — PROGRESS NOTE ADULT - PROBLEM SELECTOR PLAN 2
Pt. appears stable for discharge from a GI standpoint on current diuretics and Lactulose and Xifaxan therapies. ETOH abstinence strongly advised.

## 2017-05-23 NOTE — PROGRESS NOTE ADULT - PROBLEM SELECTOR PLAN 1
appreciate GI input, mentation improving,  improving ammmonia 103,   cont. lactulose and cont. Rifaximin   further management as per GI  speech eval - recommend regular diet  Pt eval pending

## 2017-05-23 NOTE — PROGRESS NOTE ADULT - SUBJECTIVE AND OBJECTIVE BOX
Pt seen and examined She is much more alert this AM and is oriented to person, place and time.      MEDICATIONS:  MEDICATIONS  (STANDING):  enoxaparin Injectable 40milliGRAM(s) SubCutaneous daily  labetalol 100milliGRAM(s) Oral two times a day  pantoprazole  Injectable 40milliGRAM(s) IV Push daily  folic acid 1milliGRAM(s) Oral daily  nadolol 10milliGRAM(s) Oral daily  rifaximin 550milliGRAM(s) Oral two times a day  spironolactone 50milliGRAM(s) Oral daily  furosemide    Tablet 20milliGRAM(s) Oral daily  lactulose Syrup 30Gram(s) Oral three times a day    MEDICATIONS  (PRN):  labetalol Injectable 20milliGRAM(s) IV Push every 2 hours PRN SBP > 180  hydrALAZINE Injectable 10milliGRAM(s) IV Push every 2 hours PRN SBP > 180      Allergies    No Known Allergies    Intolerances        Vital Signs Last 24 Hrs  T(C): 36.9, Max: 37.1 (05-22 @ 16:27)  T(F): 98.5, Max: 98.8 (05-22 @ 16:27)  HR: 71 (69 - 71)  BP: 112/59 (110/64 - 117/67)  BP(mean): --  RR: 18 (17 - 20)  SpO2: 98% (98% - 98%)    I & Os for current day (as of 05-23 @ 07:54)  =============================================  IN: 1010 ml / OUT: 420 ml / NET: 590 ml      PHYSICAL EXAM:    General: Well developed; well nourished; in no acute distress  HEENT: MMM, conjunctiva and sclera icteric  Lungs: clear to auscultation and percussion.  Cor: RRR S1, S2 only.  Gastrointestinal: Abdomen: Soft non-tender non-distended; Normal bowel sounds, no splenomegaly.  Extremities: no cyanosis, clubbing or edema.  Skin: Warm and dry. No obvious rash  Neuro: Pt. a + o x 3 not encephalopathic this AM.    LABS:      CBC Full  -  ( 23 May 2017 07:16 )  WBC Count : 5.6 K/uL  Hemoglobin : 10.0 g/dL  Hematocrit : 29.9 %  Platelet Count - Automated : 117 K/uL  Mean Cell Volume : 92.9 fl  Mean Cell Hemoglobin : 31.1 pg  Mean Cell Hemoglobin Concentration : 33.4 g/dL  Auto Neutrophil # : x  Auto Lymphocyte # : x  Auto Monocyte # : x  Auto Eosinophil # : x  Auto Basophil # : x  Auto Neutrophil % : x  Auto Lymphocyte % : x  Auto Monocyte % : x  Auto Eosinophil % : x  Auto Basophil % : x    05-22    137  |  106  |  31.0<H>  ----------------------------<  113  4.1   |  20.0<L>  |  1.04    Ca    8.3<L>      22 May 2017 08:33                        RADIOLOGY & ADDITIONAL STUDIES (The following images were personally reviewed): Pt seen and examined She is much more alert this AM and is oriented to person, place and time.      MEDICATIONS:  MEDICATIONS  (STANDING):  enoxaparin Injectable 40milliGRAM(s) SubCutaneous daily  labetalol 100milliGRAM(s) Oral two times a day  pantoprazole  Injectable 40milliGRAM(s) IV Push daily  folic acid 1milliGRAM(s) Oral daily  nadolol 10milliGRAM(s) Oral daily  rifaximin 550milliGRAM(s) Oral two times a day  spironolactone 50milliGRAM(s) Oral daily  furosemide    Tablet 20milliGRAM(s) Oral daily  lactulose Syrup 30Gram(s) Oral three times a day    MEDICATIONS  (PRN):  labetalol Injectable 20milliGRAM(s) IV Push every 2 hours PRN SBP > 180  hydrALAZINE Injectable 10milliGRAM(s) IV Push every 2 hours PRN SBP > 180      Allergies    No Known Allergies    Intolerances        Vital Signs Last 24 Hrs  T(C): 36.9, Max: 37.1 (05-22 @ 16:27)  T(F): 98.5, Max: 98.8 (05-22 @ 16:27)  HR: 71 (69 - 71)  BP: 112/59 (110/64 - 117/67)  BP(mean): --  RR: 18 (17 - 20)  SpO2: 98% (98% - 98%)    I & Os for current day (as of 05-23 @ 07:54)  =============================================  IN: 1010 ml / OUT: 420 ml / NET: 590 ml      PHYSICAL EXAM:    General: Well developed; well nourished; in no acute distress  HEENT: MMM, conjunctiva and sclera anicteric  Lungs: clear to auscultation and percussion.  Cor: RRR S1, S2 only.  Gastrointestinal: Abdomen: Soft non-tender non-distended; Normal bowel sounds, no splenomegaly.  Extremities: no cyanosis, clubbing or edema.  Skin: Warm and dry. No obvious rash  Neuro: Pt. a + o x 3 not encephalopathic this AM.    LABS:      CBC Full  -  ( 23 May 2017 07:16 )  WBC Count : 5.6 K/uL  Hemoglobin : 10.0 g/dL  Hematocrit : 29.9 %  Platelet Count - Automated : 117 K/uL  Mean Cell Volume : 92.9 fl  Mean Cell Hemoglobin : 31.1 pg  Mean Cell Hemoglobin Concentration : 33.4 g/dL  Auto Neutrophil # : x  Auto Lymphocyte # : x  Auto Monocyte # : x  Auto Eosinophil # : x  Auto Basophil # : x  Auto Neutrophil % : x  Auto Lymphocyte % : x  Auto Monocyte % : x  Auto Eosinophil % : x  Auto Basophil % : x    05-22    137  |  106  |  31.0<H>  ----------------------------<  113  4.1   |  20.0<L>  |  1.04    Ca    8.3<L>      22 May 2017 08:33                        RADIOLOGY & ADDITIONAL STUDIES (The following images were personally reviewed):

## 2017-05-23 NOTE — PROGRESS NOTE ADULT - ASSESSMENT
60 yo female pmh cirrhosis , HTN, portal HTN, hepatic encephalopathy presented to ED from [facility],  with AMS due to hepatic encephalopathy, seen by GI, started on Lactulose and Rifaximin, mentation improved, ammonia improved but still elevated

## 2017-05-23 NOTE — PROGRESS NOTE ADULT - SUBJECTIVE AND OBJECTIVE BOX
Internal Medicine Hospitalist - Dr. Tru LOVE    [id number]    61y      Female    Patient is a 61y old  Female who presents with a chief complaint of AMS (19 May 2017 13:26)    HPI:  60 yo female pmh cirrhosis , HTN, portal HTN, hepatic encephalopathy presented to ED from [facility],  with difficulty speaking and answering questions; pt withdrawing from painful stimuli; Pt's son is at bed side, reports her base line is normal, she eats by herself in NH, Pt's son last time met with his mother 2 days ago,        he reports she was fine, unable to get information from patient, unable to answer questions, (19 May 2017 13:26)    INTERVAL HPI/ OVERNIGHT EVENTS: Patient is seen and examined, more awake today, denied abd. pain, nausea and vomiting     REVIEW OF SYSTEMS:    Denied fever, chills, abd. pain, nausea, vomiting, chest pain, SOB, headache, dizziness    PHYSICAL EXAM:    Vital Signs Last 24 Hrs  T(C): 37.1, Max: 37.1 (05-22 @ 16:27)  T(F): 98.8, Max: 98.8 (05-22 @ 16:27)  HR: 73 (69 - 73)  BP: 123/73 (110/64 - 123/73)  BP(mean): --  RR: 18 (18 - 20)  SpO2: 100% (98% - 100%)    GENERAL: NAD  HEENT: EOMI  Neck: supple  CHEST/LUNG: positive air entry   HEART: S1S2+ audible  ABDOMEN: Soft, Nontender, obese; Bowel sounds present  EXTREMITIES:  no edema  CNS: AAO X 3  Psychiatry: normal mood    LABS:                        10.0   5.6   )-----------( 117      ( 23 May 2017 07:16 )             29.9     05-23    137  |  108<H>  |  23.0<H>  ----------------------------<  140<H>  4.4   |  18.0<L>  |  0.94    Ca    8.2<L>      23 May 2017 07:16      Ammonia, Serum (05.23.17 @ 07:16)    Ammonia, Serum: 103: Ammonia levels will be falsely elevated if specimen is NOT collected,  processed and maintained at 4-8 C umol/L          MEDICATIONS  (STANDING):  enoxaparin Injectable 40milliGRAM(s) SubCutaneous daily  labetalol 100milliGRAM(s) Oral two times a day  pantoprazole  Injectable 40milliGRAM(s) IV Push daily  folic acid 1milliGRAM(s) Oral daily  nadolol 10milliGRAM(s) Oral daily  rifaximin 550milliGRAM(s) Oral two times a day  spironolactone 50milliGRAM(s) Oral daily  furosemide    Tablet 20milliGRAM(s) Oral daily  lactulose Syrup 30Gram(s) Oral three times a day    MEDICATIONS  (PRN):  labetalol Injectable 20milliGRAM(s) IV Push every 2 hours PRN SBP > 180  hydrALAZINE Injectable 10milliGRAM(s) IV Push every 2 hours PRN SBP > 180      RADIOLOGY & ADDITIONAL TEST

## 2017-05-23 NOTE — PROGRESS NOTE ADULT - PROBLEM SELECTOR PLAN 1
Pt. is no longer encephalopathic and is stable for discharge home from a GI standpoint on current Lactulose and Xifaxan therapies.

## 2017-05-24 ENCOUNTER — TRANSCRIPTION ENCOUNTER (OUTPATIENT)
Age: 62
End: 2017-05-24

## 2017-05-24 PROCEDURE — 99232 SBSQ HOSP IP/OBS MODERATE 35: CPT

## 2017-05-24 RX ORDER — SPIRONOLACTONE 25 MG/1
2 TABLET, FILM COATED ORAL
Qty: 60 | Refills: 0
Start: 2017-05-24 | End: 2017-06-23

## 2017-05-24 RX ORDER — LACTULOSE 10 G/15ML
30 SOLUTION ORAL
Qty: 2700 | Refills: 0
Start: 2017-05-24 | End: 2017-06-23

## 2017-05-24 RX ORDER — LABETALOL HCL 100 MG
1 TABLET ORAL
Qty: 0 | Refills: 0 | DISCHARGE
Start: 2017-05-24

## 2017-05-24 RX ORDER — FUROSEMIDE 40 MG
1 TABLET ORAL
Qty: 0 | Refills: 0 | DISCHARGE
Start: 2017-05-24

## 2017-05-24 RX ADMIN — LACTULOSE 30 GRAM(S): 10 SOLUTION ORAL at 12:32

## 2017-05-24 RX ADMIN — ENOXAPARIN SODIUM 40 MILLIGRAM(S): 100 INJECTION SUBCUTANEOUS at 12:32

## 2017-05-24 RX ADMIN — Medication 20 MILLIGRAM(S): at 06:10

## 2017-05-24 RX ADMIN — Medication 1 MILLIGRAM(S): at 12:32

## 2017-05-24 RX ADMIN — Medication 100 MILLIGRAM(S): at 06:10

## 2017-05-24 RX ADMIN — LACTULOSE 30 GRAM(S): 10 SOLUTION ORAL at 06:10

## 2017-05-24 RX ADMIN — SPIRONOLACTONE 50 MILLIGRAM(S): 25 TABLET, FILM COATED ORAL at 06:10

## 2017-05-24 RX ADMIN — LACTULOSE 30 GRAM(S): 10 SOLUTION ORAL at 22:21

## 2017-05-24 RX ADMIN — Medication 100 MILLIGRAM(S): at 17:35

## 2017-05-24 RX ADMIN — NADOLOL 10 MILLIGRAM(S): 80 TABLET ORAL at 06:10

## 2017-05-24 RX ADMIN — PANTOPRAZOLE SODIUM 40 MILLIGRAM(S): 20 TABLET, DELAYED RELEASE ORAL at 12:32

## 2017-05-24 NOTE — DISCHARGE NOTE ADULT - HOSPITAL COURSE
60 yo female pmh cirrhosis , HTN, portal HTN, hepatic encephalopathy presented to ED from [facility],  with difficulty speaking and answering questions; pt withdrawing from painful stimuli; Pt's son is at bed side, reports her base line is normal, she eats by herself in NH admitted for  hepatic encephalopathy, seen by GI, started on Lactulose and Rifaximin, mentation improved, ammonia improved, eating well, ambulating. Ammonia is still elevated around, reviewed previous labs she has h/o elevated ammonia is past. Pt is clear by  to d/c home today on current dose of rifaximin, Lactulose. I have tried to reach to update, no answer     Liver cirrhosis.  Plan: appreciate GI input, BUN was high, decrease  lasix 20, Aldactone 50, stable renal function      Essential hypertension.  Plan: Stable,  cont. Labetalol, lasix, aldactone.     Alcohol abuse.counseled.     Pt is feeling better, denied chest pain, SOB, abd. pain, nausea and vomiting. Counseled with the help of  for medication compliance and alcohol abstinence.

## 2017-05-24 NOTE — DISCHARGE NOTE ADULT - MEDICATION SUMMARY - MEDICATIONS TO STOP TAKING
I will STOP taking the medications listed below when I get home from the hospital:    carvedilol 6.25 mg oral tablet  -- 1 tab(s) by mouth every 12 hours

## 2017-05-24 NOTE — PROGRESS NOTE ADULT - ASSESSMENT
60 yo female pmh cirrhosis , HTN, portal HTN, hepatic encephalopathy presented to ED from [facility],  with AMS due to hepatic encephalopathy, seen by GI, started on Lactulose and Rifaximin, mentation improved, ammonia improved, mentation improved, eating well, ambulating. Pt si clear by THOR to d/c home today.

## 2017-05-24 NOTE — PROGRESS NOTE ADULT - SUBJECTIVE AND OBJECTIVE BOX
Internal Medicine Hospitalist - Dr. Tru LOVE    [id number]    61y      Female    Patient is a 61y old  Female who presents with a chief complaint of AMS (19 May 2017 13:26)    HPI:  60 yo female pmh cirrhosis , HTN, portal HTN, hepatic encephalopathy presented to ED from [facility],  with difficulty speaking and answering questions; pt withdrawing from painful stimuli; Pt's son is at bed side, reports her base line is normal, she eats by herself in NH, Pt's son last time met with his mother 2 days ago,        he reports she was fine, unable to get information from patient, unable to answer questions, (19 May 2017 13:26)        INTERVAL HPI/ OVERNIGHT EVENTS: Patient is seen and examined, no new event overnight.     PHYSICAL EXAM:    Vital Signs Last 24 Hrs  T(C): 36.9, Max: 37.1 (05-23 @ 18:02)  T(F): 98.4, Max: 98.8 (05-23 @ 18:02)  HR: 70 (70 - 79)  BP: 119/57 (119/57 - 125/71)  BP(mean): --  RR: 18 (18 - 18)  SpO2: 100% (100% - 100%)    GENERAL: NAD  CHEST/LUNG: Clear to percussion bilaterally; No wheezing,  S1S2+ audible  ABDOMEN: Soft, Nontender, Nondistended; Bowel sounds present  EXTREMITIES:  no edema      LABS:                        10.0   5.6   )-----------( 117      ( 23 May 2017 07:16 )             29.9     05-23    137  |  108<H>  |  23.0<H>  ----------------------------<  140<H>  4.4   |  18.0<L>  |  0.94    Ca    8.2<L>      23 May 2017 07:16              MEDICATIONS  (STANDING):  enoxaparin Injectable 40milliGRAM(s) SubCutaneous daily  labetalol 100milliGRAM(s) Oral two times a day  pantoprazole  Injectable 40milliGRAM(s) IV Push daily  folic acid 1milliGRAM(s) Oral daily  nadolol 10milliGRAM(s) Oral daily  rifaximin 550milliGRAM(s) Oral two times a day  spironolactone 50milliGRAM(s) Oral daily  furosemide    Tablet 20milliGRAM(s) Oral daily  lactulose Syrup 30Gram(s) Oral three times a day    MEDICATIONS  (PRN):  labetalol Injectable 20milliGRAM(s) IV Push every 2 hours PRN SBP > 180  hydrALAZINE Injectable 10milliGRAM(s) IV Push every 2 hours PRN SBP > 180      RADIOLOGY & ADDITIONAL TEST

## 2017-05-24 NOTE — DISCHARGE NOTE ADULT - CARE PROVIDER_API CALL
Miguel Rose), Gastroenterology; Internal Medicine  [address] [place] [address]  Phone: [phone]  Fax: [phone]    PCP,   Phone: (   )    -  Fax: (   )    -

## 2017-05-24 NOTE — DISCHARGE NOTE ADULT - PLAN OF CARE
improved cont. Lactulose, Rifaximin, f/u gi cont. labetalol, monitor BP f/u gi counseled about alcohol abstinence

## 2017-05-24 NOTE — PROGRESS NOTE ADULT - PROBLEM SELECTOR PLAN 1
appreciate GI input, mentation improving,  improving ammmonia 103,   cont. lactulose and cont. Rifaximin   patient is clear to d.c home by GI   speech eval - recommend regular diet  Pt eval pending

## 2017-05-24 NOTE — PHYSICAL THERAPY INITIAL EVALUATION ADULT - ADDITIONAL COMMENTS
pt a questionable historian, states does not use any DME, as per chart and ANTHONY pt lives at [facility], receives supervision prn

## 2017-05-24 NOTE — PROGRESS NOTE ADULT - SUBJECTIVE AND OBJECTIVE BOX
Pt seen and examined F/U hepatuc encephalopathy  This morning she is awake, alert, oriented X3 and eating well without complaints. BUN and creat yestersay down even more.    REVIEW OF SYSTEMS:    CONSTITUTIONAL: No fever, weight loss, or fatigue  EYES: No eye pain, visual disturbances, or discharge  ENMT:  No difficulty hearing, tinnitus, vertigo; No sinus or throat pain  RESPIRATORY: No cough, wheezing, chills or hemoptysis; No shortness of breath  CARDIOVASCULAR: No chest pain, palpitations, dizziness, or leg swelling  GASTROINTESTINAL: No abdominal or epigastric pain. No nausea, vomiting, or hematemesis; No diarrhea or constipation. No melena or hematochezia.    MEDICATIONS:  MEDICATIONS  (STANDING):  enoxaparin Injectable 40milliGRAM(s) SubCutaneous daily  labetalol 100milliGRAM(s) Oral two times a day  pantoprazole  Injectable 40milliGRAM(s) IV Push daily  folic acid 1milliGRAM(s) Oral daily  nadolol 10milliGRAM(s) Oral daily  rifaximin 550milliGRAM(s) Oral two times a day  spironolactone 50milliGRAM(s) Oral daily  furosemide    Tablet 20milliGRAM(s) Oral daily  lactulose Syrup 30Gram(s) Oral three times a day    MEDICATIONS  (PRN):  labetalol Injectable 20milliGRAM(s) IV Push every 2 hours PRN SBP > 180  hydrALAZINE Injectable 10milliGRAM(s) IV Push every 2 hours PRN SBP > 180      Allergies    No Known Allergies    Intolerances        Vital Signs Last 24 Hrs  T(C): 36.9, Max: 37.1 (05-23 @ 10:07)  T(F): 98.4, Max: 98.8 (05-23 @ 10:07)  HR: 70 (70 - 79)  BP: 119/57 (119/57 - 125/71)  BP(mean): --  RR: 18 (18 - 18)  SpO2: 100% (100% - 100%)      PHYSICAL EXAM:    General: Well developed; well nourished; in no acute distress  HEENT: MMM, conjunctiva and sclera regulo  Gastrointestinal:Abdomen: Soft non-tender non-distended; Normal bowel sounds; No hepatosplenomegaly  Extremities: no cyanosis, clubbing or edema.  Skin: Warm and dry. No obvious rash    LABS:      CBC Full  -  ( 23 May 2017 07:16 )  WBC Count : 5.6 K/uL  Hemoglobin : 10.0 g/dL  Hematocrit : 29.9 %  Platelet Count - Automated : 117 K/uL  Mean Cell Volume : 92.9 fl  Mean Cell Hemoglobin : 31.1 pg  Mean Cell Hemoglobin Concentration : 33.4 g/dL  Auto Neutrophil # : x  Auto Lymphocyte # : x  Auto Monocyte # : x  Auto Eosinophil # : x  Auto Basophil # : x  Auto Neutrophil % : x  Auto Lymphocyte % : x  Auto Monocyte % : x  Auto Eosinophil % : x  Auto Basophil % : x    05-23    137  |  108<H>  |  23.0<H>  ----------------------------<  140<H>  4.4   |  18.0<L>  |  0.94    Ca    8.2<L>      23 May 2017 07:16

## 2017-05-24 NOTE — DISCHARGE NOTE ADULT - PATIENT PORTAL LINK FT
“You can access the Follow[employer] Patient Portal, offered by [facility], by registering with the following website: [email or web address]”

## 2017-05-24 NOTE — DISCHARGE NOTE ADULT - CARE PLAN
Principal Discharge DX:	Hepatic encephalopathy  Goal:	improved  Instructions for follow-up, activity and diet:	cont. Lactulose, Rifaximin, f/u gi  Secondary Diagnosis:	Essential hypertension  Instructions for follow-up, activity and diet:	cont. labetalol, monitor BP  Secondary Diagnosis:	Alcoholic cirrhosis of liver without ascites  Instructions for follow-up, activity and diet:	f/u gi  Secondary Diagnosis:	Alcohol abuse  Instructions for follow-up, activity and diet:	counseled about alcohol abstinence  Secondary Diagnosis:	Hypothyroidism

## 2017-05-24 NOTE — DISCHARGE NOTE ADULT - MEDICATION SUMMARY - MEDICATIONS TO CHANGE
I will SWITCH the dose or number of times a day I take the medications listed below when I get home from the hospital:    spironolactone 25 mg oral tablet  -- 4 tab(s) by mouth once a day    lactulose 10 g/15 mL oral syrup  -- 45 milliliter(s) by mouth 3 times a day    Aldactone 25 mg oral tablet  --  by mouth once a day    Lasix 40 mg oral tablet  -- 1 tab(s) by mouth once a day

## 2017-05-24 NOTE — DISCHARGE NOTE ADULT - MEDICATION SUMMARY - MEDICATIONS TO TAKE
I will START or STAY ON the medications listed below when I get home from the hospital:    Aldactone 25 mg oral tablet  -- 2 tab(s) by mouth once a day  -- Indication: For cirrhosis     labetalol 100 mg oral tablet  -- 1 tab(s) by mouth 2 times a day  -- Indication: For Htn    furosemide 20 mg oral tablet  -- 1 tab(s) by mouth once a day  -- Indication: For cirrhosis    ferrous sulfate 324 mg (65 mg elemental iron) oral delayed release tablet  --  by mouth once a day  -- Indication: For supplement     Colace 100 mg oral capsule  --  by mouth 3 times a day  -- Indication: For constipation    lactulose 10 g/15 mL oral syrup  -- 30 milliliter(s) by mouth 3 times a day  hold if having more than 4 BM   -- Indication: For Hepatic encephalopathy    rifaximin 550 mg oral tablet  -- 1 tab(s) by mouth 2 times a day  -- Indication: For Hepatic encephalopathy    omeprazole 20 mg oral delayed release capsule  -- 2 tab(s) by mouth   -- Indication: For Home medication    Synthroid 25 mcg (0.025 mg) oral tablet  -- 1 tab(s) by mouth once a day  -- Indication: For Hypothyroidism     multivitamin  -- 1 tab(s)  once a day  -- Indication: For supplemenrt     Vitamin B1 100 mg oral tablet  -- 1 tab(s) by mouth once a day  -- Indication: For supplement     folic acid 1 mg oral tablet  -- 1 tab(s) by mouth once a day  -- Indication: For supplement

## 2017-05-24 NOTE — PROGRESS NOTE ADULT - PROBLEM SELECTOR PLAN 2
reolved-suggest patient be discharged on current dose of lactulose and xifaxan. Ammonia level does not have to be normal for patient to be discharged as ;wicho as she is awake and oriented. Will only see prn your request.

## 2017-05-24 NOTE — DISCHARGE NOTE ADULT - CARE PROVIDERS DIRECT ADDRESSES
[email or web address]

## 2017-05-25 PROCEDURE — 99232 SBSQ HOSP IP/OBS MODERATE 35: CPT

## 2017-05-25 RX ADMIN — PANTOPRAZOLE SODIUM 40 MILLIGRAM(S): 20 TABLET, DELAYED RELEASE ORAL at 11:57

## 2017-05-25 RX ADMIN — LACTULOSE 30 GRAM(S): 10 SOLUTION ORAL at 14:07

## 2017-05-25 RX ADMIN — Medication 1 MILLIGRAM(S): at 11:57

## 2017-05-25 RX ADMIN — Medication 20 MILLIGRAM(S): at 05:38

## 2017-05-25 RX ADMIN — Medication 100 MILLIGRAM(S): at 05:38

## 2017-05-25 RX ADMIN — NADOLOL 10 MILLIGRAM(S): 80 TABLET ORAL at 05:38

## 2017-05-25 RX ADMIN — Medication 100 MILLIGRAM(S): at 18:27

## 2017-05-25 RX ADMIN — LACTULOSE 30 GRAM(S): 10 SOLUTION ORAL at 05:39

## 2017-05-25 RX ADMIN — ENOXAPARIN SODIUM 40 MILLIGRAM(S): 100 INJECTION SUBCUTANEOUS at 11:57

## 2017-05-25 RX ADMIN — LACTULOSE 30 GRAM(S): 10 SOLUTION ORAL at 22:30

## 2017-05-25 RX ADMIN — SPIRONOLACTONE 50 MILLIGRAM(S): 25 TABLET, FILM COATED ORAL at 05:39

## 2017-05-25 NOTE — PROGRESS NOTE ADULT - ASSESSMENT
60 yo female pmh cirrhosis , HTN, portal HTN, hepatic encephalopathy presented to ED from [facility],  with AMS due to hepatic encephalopathy, seen by GI, started on Lactulose and Rifaximin, mentation improved, ammonia improved, mentation improved, eating well, ambulating. Pt si clear by THOR to d/c to [facility], pending auth

## 2017-05-25 NOTE — PROGRESS NOTE ADULT - PROBLEM SELECTOR PLAN 1
appreciate GI input, mentation improving  cont. lactulose and cont. Rifaximin   patient is clear to d.c home by GI   speech eval - recommend regular diet  Pt is discharged pending auth

## 2017-05-25 NOTE — PROGRESS NOTE ADULT - SUBJECTIVE AND OBJECTIVE BOX
Internal Medicine Hospitalist - Dr. Tru LOVE    [id number]    61y      Female    Patient is a 61y old  Female who presents with a chief complaint of AMS (24 May 2017 10:11)    HPI:  60 yo female pmh cirrhosis , HTN, portal HTN, hepatic encephalopathy presented to ED from [facility],  with difficulty speaking and answering questions; pt withdrawing from painful stimuli; Pt's son is at bed side, reports her base line is normal, she eats by herself in NH, Pt's son last time met with his mother 2 days ago,        he reports she was fine, unable to get information from patient, unable to answer questions, (19 May 2017 13:26)        INTERVAL HPI/ OVERNIGHT EVENTS: Patient is seen and examined, no new event overnight.     REVIEW OF SYSTEMS:    Denied fever, chills, abd. pain, nausea, vomiting, chest pain, SOB, headache, dizziness    PHYSICAL EXAM:    Vital Signs Last 24 Hrs  T(C): 37.3, Max: 37.7 (05-24 @ 23:37)  T(F): 99.2, Max: 99.9 (05-24 @ 23:37)  HR: 73 (73 - 78)  BP: 118/56 (118/56 - 119/67)  BP(mean): --  RR: 16 (16 - 18)  SpO2: 99% (99% - 100%)    GENERAL: NAD  CHEST/LUNG: positive air entry, s1/s2 audible   ABDOMEN: Soft, Nontender, obese ; Bowel sounds present  EXTREMITIES:  no edema      LABS:                  MEDICATIONS  (STANDING):  enoxaparin Injectable 40milliGRAM(s) SubCutaneous daily  labetalol 100milliGRAM(s) Oral two times a day  pantoprazole  Injectable 40milliGRAM(s) IV Push daily  folic acid 1milliGRAM(s) Oral daily  nadolol 10milliGRAM(s) Oral daily  rifaximin 550milliGRAM(s) Oral two times a day  spironolactone 50milliGRAM(s) Oral daily  furosemide    Tablet 20milliGRAM(s) Oral daily  lactulose Syrup 30Gram(s) Oral three times a day    MEDICATIONS  (PRN):  labetalol Injectable 20milliGRAM(s) IV Push every 2 hours PRN SBP > 180  hydrALAZINE Injectable 10milliGRAM(s) IV Push every 2 hours PRN SBP > 180      RADIOLOGY & ADDITIONAL TEST

## 2017-05-26 VITALS
RESPIRATION RATE: 18 BRPM | OXYGEN SATURATION: 99 % | TEMPERATURE: 98 F | DIASTOLIC BLOOD PRESSURE: 79 MMHG | HEART RATE: 78 BPM | SYSTOLIC BLOOD PRESSURE: 117 MMHG

## 2017-05-26 PROCEDURE — 70450 CT HEAD/BRAIN W/O DYE: CPT

## 2017-05-26 PROCEDURE — 93005 ELECTROCARDIOGRAM TRACING: CPT

## 2017-05-26 PROCEDURE — 80048 BASIC METABOLIC PNL TOTAL CA: CPT

## 2017-05-26 PROCEDURE — 82140 ASSAY OF AMMONIA: CPT

## 2017-05-26 PROCEDURE — 80307 DRUG TEST PRSMV CHEM ANLYZR: CPT

## 2017-05-26 PROCEDURE — 99285 EMERGENCY DEPT VISIT HI MDM: CPT | Mod: 25

## 2017-05-26 PROCEDURE — 83605 ASSAY OF LACTIC ACID: CPT

## 2017-05-26 PROCEDURE — T1013: CPT

## 2017-05-26 PROCEDURE — 82435 ASSAY OF BLOOD CHLORIDE: CPT

## 2017-05-26 PROCEDURE — 82803 BLOOD GASES ANY COMBINATION: CPT

## 2017-05-26 PROCEDURE — 84295 ASSAY OF SERUM SODIUM: CPT

## 2017-05-26 PROCEDURE — 36415 COLL VENOUS BLD VENIPUNCTURE: CPT

## 2017-05-26 PROCEDURE — 84484 ASSAY OF TROPONIN QUANT: CPT

## 2017-05-26 PROCEDURE — 85730 THROMBOPLASTIN TIME PARTIAL: CPT

## 2017-05-26 PROCEDURE — 85014 HEMATOCRIT: CPT

## 2017-05-26 PROCEDURE — 71045 X-RAY EXAM CHEST 1 VIEW: CPT

## 2017-05-26 PROCEDURE — 82550 ASSAY OF CK (CPK): CPT

## 2017-05-26 PROCEDURE — 80053 COMPREHEN METABOLIC PANEL: CPT

## 2017-05-26 PROCEDURE — 82947 ASSAY GLUCOSE BLOOD QUANT: CPT

## 2017-05-26 PROCEDURE — 92610 EVALUATE SWALLOWING FUNCTION: CPT

## 2017-05-26 PROCEDURE — 99238 HOSP IP/OBS DSCHRG MGMT 30/<: CPT

## 2017-05-26 PROCEDURE — 85610 PROTHROMBIN TIME: CPT

## 2017-05-26 PROCEDURE — 84132 ASSAY OF SERUM POTASSIUM: CPT

## 2017-05-26 PROCEDURE — 83735 ASSAY OF MAGNESIUM: CPT

## 2017-05-26 PROCEDURE — 82330 ASSAY OF CALCIUM: CPT

## 2017-05-26 PROCEDURE — 80061 LIPID PANEL: CPT

## 2017-05-26 PROCEDURE — 97163 PT EVAL HIGH COMPLEX 45 MIN: CPT

## 2017-05-26 PROCEDURE — 85027 COMPLETE CBC AUTOMATED: CPT

## 2017-05-26 RX ADMIN — Medication 20 MILLIGRAM(S): at 05:57

## 2017-05-26 RX ADMIN — PANTOPRAZOLE SODIUM 40 MILLIGRAM(S): 20 TABLET, DELAYED RELEASE ORAL at 11:28

## 2017-05-26 RX ADMIN — SPIRONOLACTONE 50 MILLIGRAM(S): 25 TABLET, FILM COATED ORAL at 05:57

## 2017-05-26 RX ADMIN — Medication 100 MILLIGRAM(S): at 05:57

## 2017-05-26 RX ADMIN — ENOXAPARIN SODIUM 40 MILLIGRAM(S): 100 INJECTION SUBCUTANEOUS at 11:28

## 2017-05-26 RX ADMIN — Medication 1 MILLIGRAM(S): at 11:28

## 2017-05-26 RX ADMIN — LACTULOSE 30 GRAM(S): 10 SOLUTION ORAL at 05:57

## 2017-05-26 RX ADMIN — NADOLOL 10 MILLIGRAM(S): 80 TABLET ORAL at 05:57

## 2017-05-26 RX ADMIN — LACTULOSE 30 GRAM(S): 10 SOLUTION ORAL at 13:15

## 2017-05-26 NOTE — PROGRESS NOTE ADULT - ASSESSMENT
62 yo female pmh cirrhosis , HTN, portal HTN, hepatic encephalopathy presented to ED from [facility],  with AMS due to hepatic encephalopathy, seen by GI, started on Lactulose and Rifaximin, mentation improved, ammonia improved, mentation improved, eating well, ambulating. Pt si clear by THOR to d/c to [facility], pending auth , will paced her on ALOC

## 2017-05-26 NOTE — PROGRESS NOTE ADULT - PROBLEM SELECTOR PLAN 4
still drinking, counseled

## 2017-05-26 NOTE — PROGRESS NOTE ADULT - PROBLEM SELECTOR PLAN 3
Stable,  cont. Labetalol, lasix, aldactone
Stable,  cont. Labetalol, lasix, aldactone  hydralazine prn
stable
Improving, cont. Labetalol, lasix, aldactone  hydralazine prn

## 2017-05-26 NOTE — PROGRESS NOTE ADULT - SUBJECTIVE AND OBJECTIVE BOX
Internal Medicine Hospitalist - Dr. Tru LOVE    [id number]    61y      Female    Patient is a 61y old  Female who presents with a chief complaint of AMS (24 May 2017 10:11)    HPI:  62 yo female pmh cirrhosis , HTN, portal HTN, hepatic encephalopathy presented to ED from [facility],  with difficulty speaking and answering questions; pt withdrawing from painful stimuli; Pt's son is at bed side, reports her base line is normal, she eats by herself in NH, Pt's son last time met with his mother 2 days ago,        he reports she was fine, unable to get information from patient, unable to answer questions, (19 May 2017 13:26)      INTERVAL HPI/ OVERNIGHT EVENTS: Patient is seen and examined, no new event overnight.     REVIEW OF SYSTEMS:    Denied fever, chills, abd. pain, nausea, vomiting, chest pain, SOB, headache, dizziness    PHYSICAL EXAM:    Vital Signs Last 24 Hrs  T(C): 37.7, Max: 37.7 (05-26 @ 08:12)  T(F): 99.8, Max: 99.8 (05-26 @ 08:12)  HR: 77 (74 - 79)  BP: 117/69 (116/76 - 129/77)  BP(mean): --  RR: 20 (16 - 20)  SpO2: 98% (98% - 99%)    GENERAL: NAD  CHEST/LUNG: Clear to percussion bilaterally; No wheezing, S1S2+ audible  ABDOMEN: Soft, Nontender, Nondistended; Bowel sounds present  EXTREMITIES:  no edema      LABS:                  MEDICATIONS  (STANDING):  enoxaparin Injectable 40milliGRAM(s) SubCutaneous daily  labetalol 100milliGRAM(s) Oral two times a day  pantoprazole  Injectable 40milliGRAM(s) IV Push daily  folic acid 1milliGRAM(s) Oral daily  nadolol 10milliGRAM(s) Oral daily  rifaximin 550milliGRAM(s) Oral two times a day  spironolactone 50milliGRAM(s) Oral daily  furosemide    Tablet 20milliGRAM(s) Oral daily  lactulose Syrup 30Gram(s) Oral three times a day    MEDICATIONS  (PRN):  labetalol Injectable 20milliGRAM(s) IV Push every 2 hours PRN SBP > 180  hydrALAZINE Injectable 10milliGRAM(s) IV Push every 2 hours PRN SBP > 180      RADIOLOGY & ADDITIONAL TEST

## 2017-05-26 NOTE — PROGRESS NOTE ADULT - PROBLEM SELECTOR PROBLEM 1
Hepatic encephalopathy
Alcoholic cirrhosis of liver without ascites
Hepatic encephalopathy
Hepatic encephalopathy

## 2017-05-26 NOTE — PROGRESS NOTE ADULT - PROBLEM SELECTOR PROBLEM 2
R/O Liver cirrhosis
Alcohol abuse
Alcoholic cirrhosis of liver without ascites
Hepatic encephalopathy

## 2017-11-22 NOTE — PROGRESS NOTE ADULT - PROBLEM SELECTOR PROBLEM 3
Essential hypertension
Alcohol abuse
Alcoholic cirrhosis of liver without ascites
Self

## 2017-12-06 NOTE — ED PROVIDER NOTE - NSCAREINITIATED _GEN_ER
please call patient and advise needs to be seen for further refills, and will need to establish care. Last OV notes from 10/5/17 advised follow up in one month. Please advise if unable to be seen this week as many openings tomorrow and Friday. If unable to be scheduled this week please route back to RN. Thank you,  Mili Bailey R.N.     Xavier Nunez(Attending)

## 2018-05-06 NOTE — ED ADULT TRIAGE NOTE - CCCP TRG CHIEF CMPLNT
TRANSFER - OUT REPORT:    Verbal report given to Nader Malone RN(name) on [address]  being transferred to NSTU(unit) for routine progression of care       Report consisted of patients Situation, Background, Assessment and   Recommendations(SBAR). Information from the following report(s) SBAR, ED Summary and Recent Results was reviewed with the receiving nurse. Opportunity for questions and clarification was provided.       Patient transported with:   Monitor  Registered Nurse
fall

## 2019-09-09 PROBLEM — Z00.00 ENCOUNTER FOR PREVENTIVE HEALTH EXAMINATION: Noted: 2019-09-09

## 2019-09-13 ENCOUNTER — APPOINTMENT (OUTPATIENT)
Dept: UROLOGY | Facility: CLINIC | Age: 64
End: 2019-09-13
Payer: MEDICAID

## 2019-09-13 VITALS — BODY MASS INDEX: 42.74 KG/M2 | HEIGHT: 56 IN | WEIGHT: 190 LBS

## 2019-09-13 DIAGNOSIS — Z86.39 PERSONAL HISTORY OF OTHER ENDOCRINE, NUTRITIONAL AND METABOLIC DISEASE: ICD-10-CM

## 2019-09-13 DIAGNOSIS — Z86.2 PERSONAL HISTORY OF DISEASES OF THE BLOOD AND BLOOD-FORMING ORGANS AND CERTAIN DISORDERS INVOLVING THE IMMUNE MECHANISM: ICD-10-CM

## 2019-09-13 DIAGNOSIS — Z87.19 PERSONAL HISTORY OF OTHER DISEASES OF THE DIGESTIVE SYSTEM: ICD-10-CM

## 2019-09-13 DIAGNOSIS — Z78.9 OTHER SPECIFIED HEALTH STATUS: ICD-10-CM

## 2019-09-13 DIAGNOSIS — K21.9 GASTRO-ESOPHAGEAL REFLUX DISEASE W/OUT ESOPHAGITIS: ICD-10-CM

## 2019-09-13 DIAGNOSIS — I89.1 LYMPHANGITIS: ICD-10-CM

## 2019-09-13 DIAGNOSIS — Z87.39 PERSONAL HISTORY OF OTHER DISEASES OF THE MUSCULOSKELETAL SYSTEM AND CONNECTIVE TISSUE: ICD-10-CM

## 2019-09-13 DIAGNOSIS — I10 ESSENTIAL (PRIMARY) HYPERTENSION: ICD-10-CM

## 2019-09-13 DIAGNOSIS — E72.20 DISORDER OF UREA CYCLE METABOLISM, UNSPECIFIED: ICD-10-CM

## 2019-09-13 DIAGNOSIS — F10.11 ALCOHOL ABUSE, IN REMISSION: ICD-10-CM

## 2019-09-13 DIAGNOSIS — N63.0 UNSPECIFIED LUMP IN UNSPECIFIED BREAST: ICD-10-CM

## 2019-09-13 LAB
BILIRUB UR QL STRIP: NORMAL
CLARITY UR: CLEAR
COLLECTION METHOD: NORMAL
GLUCOSE UR-MCNC: NORMAL
HCG UR QL: 0.2 EU/DL
HGB UR QL STRIP.AUTO: NORMAL
KETONES UR-MCNC: NORMAL
LEUKOCYTE ESTERASE UR QL STRIP: NORMAL
NITRITE UR QL STRIP: NORMAL
PH UR STRIP: 6.5
PROT UR STRIP-MCNC: 100
SP GR UR STRIP: 1.01

## 2019-09-13 PROCEDURE — 99204 OFFICE O/P NEW MOD 45 MIN: CPT | Mod: 25

## 2019-09-13 PROCEDURE — 81003 URINALYSIS AUTO W/O SCOPE: CPT | Mod: QW

## 2019-09-15 PROBLEM — Z78.9 CURRENT NON-DRINKER OF ALCOHOL: Status: ACTIVE | Noted: 2019-09-15

## 2019-09-15 PROBLEM — K21.9 CHRONIC GERD: Status: RESOLVED | Noted: 2019-09-15 | Resolved: 2019-09-15

## 2019-09-15 PROBLEM — Z87.39 HISTORY OF BACK PAIN: Status: RESOLVED | Noted: 2019-09-15 | Resolved: 2019-09-15

## 2019-09-15 PROBLEM — Z87.19 HISTORY OF PANCREATITIS: Status: RESOLVED | Noted: 2019-09-15 | Resolved: 2019-09-15

## 2019-09-15 PROBLEM — F10.11 HISTORY OF ALCOHOL ABUSE: Status: RESOLVED | Noted: 2019-09-15 | Resolved: 2019-09-15

## 2019-09-15 PROBLEM — I89.1 LYMPHANGITIS: Status: RESOLVED | Noted: 2019-09-15 | Resolved: 2019-09-15

## 2019-09-15 PROBLEM — N63.0 BREAST LUMP IN FEMALE: Status: RESOLVED | Noted: 2019-09-15 | Resolved: 2019-09-15

## 2019-09-15 PROBLEM — Z86.2 HISTORY OF ANEMIA: Status: RESOLVED | Noted: 2019-09-15 | Resolved: 2019-09-15

## 2019-09-15 PROBLEM — Z87.19 HISTORY OF CIRRHOSIS: Status: RESOLVED | Noted: 2019-09-15 | Resolved: 2019-09-15

## 2019-09-15 PROBLEM — E72.20: Status: RESOLVED | Noted: 2019-09-15 | Resolved: 2019-09-15

## 2019-09-15 PROBLEM — Z78.9 NON-SMOKER: Status: ACTIVE | Noted: 2019-09-15

## 2019-09-15 PROBLEM — Z86.39 HISTORY OF DIABETES MELLITUS: Status: RESOLVED | Noted: 2019-09-15 | Resolved: 2019-09-15

## 2019-09-15 PROBLEM — Z86.39 HISTORY OF HYPOTHYROIDISM: Status: RESOLVED | Noted: 2019-09-15 | Resolved: 2019-09-15

## 2019-09-15 PROBLEM — I10 HTN (HYPERTENSION), BENIGN: Status: RESOLVED | Noted: 2019-09-15 | Resolved: 2019-09-15

## 2019-09-15 RX ORDER — SPIRONOLACTONE 50 MG/1
50 TABLET, FILM COATED ORAL
Refills: 0 | Status: ACTIVE | COMMUNITY

## 2019-09-15 RX ORDER — OMEPRAZOLE 20 MG/1
20 CAPSULE, DELAYED RELEASE ORAL
Refills: 0 | Status: ACTIVE | COMMUNITY

## 2019-09-15 RX ORDER — LABETALOL HYDROCHLORIDE 100 MG/1
100 TABLET, FILM COATED ORAL
Refills: 0 | Status: ACTIVE | COMMUNITY

## 2019-09-15 RX ORDER — INSULIN GLARGINE 100 [IU]/ML
100 INJECTION, SOLUTION SUBCUTANEOUS
Refills: 0 | Status: ACTIVE | COMMUNITY

## 2019-09-15 RX ORDER — CYANOCOBALAMIN (VITAMIN B-12) 1000 MCG
TABLET ORAL
Refills: 0 | Status: ACTIVE | COMMUNITY

## 2019-09-15 RX ORDER — LACTULOSE 10 G/15ML
10 SOLUTION ORAL
Refills: 0 | Status: ACTIVE | COMMUNITY

## 2019-09-15 RX ORDER — LEVOTHYROXINE SODIUM 25 UG/1
25 TABLET ORAL
Refills: 0 | Status: ACTIVE | COMMUNITY

## 2019-09-15 RX ORDER — CALCIUM CARBONATE/VITAMIN D3 600 MG-10
600-400 TABLET ORAL
Refills: 0 | Status: ACTIVE | COMMUNITY

## 2019-09-15 RX ORDER — INSULIN LISPRO 100 U/ML
100 INJECTION, SOLUTION SUBCUTANEOUS
Refills: 0 | Status: ACTIVE | COMMUNITY

## 2019-09-15 RX ORDER — LORATADINE 10 MG/1
10 TABLET ORAL
Refills: 0 | Status: ACTIVE | COMMUNITY

## 2019-09-15 RX ORDER — FOLIC ACID 1 MG/1
1 TABLET ORAL
Refills: 0 | Status: ACTIVE | COMMUNITY

## 2019-09-15 NOTE — ASSESSMENT
[FreeTextEntry1] : 63 year old woman with microscopic hematuria. We discussed that the differential diagnosis includes both benign and malignant conditions including renal stones, urinary tract infections, and cancer of the bladder or ureter or kidney. Cystoscopy was recommended to rule out pathology in the bladder. CT Urogram was recommended to evaluate for presence of nephroureteral stones or malignancies. Urinalysis and urine culture were recommended to check for urinary tract infection. Pt agrees and understands.

## 2019-09-15 NOTE — PHYSICAL EXAM
[General Appearance - Well Developed] : well developed [Normal Appearance] : normal appearance [General Appearance - Well Nourished] : well nourished [Well Groomed] : well groomed [Edema] : no peripheral edema [General Appearance - In No Acute Distress] : no acute distress [Respiration, Rhythm And Depth] : normal respiratory rhythm and effort [Abdomen Soft] : soft [Exaggerated Use Of Accessory Muscles For Inspiration] : no accessory muscle use [Abdomen Tenderness] : non-tender [FreeTextEntry1] : R [Urinary Bladder Findings] : the bladder was normal on palpation [Costovertebral Angle Tenderness] : no ~M costovertebral angle tenderness [Normal Station and Gait] : the gait and station were normal for the patient's age [No Focal Deficits] : no focal deficits [] : no rash [Affect] : the affect was normal [Oriented To Time, Place, And Person] : oriented to person, place, and time [Mood] : the mood was normal [Not Anxious] : not anxious [No Palpable Adenopathy] : no palpable adenopathy

## 2019-09-15 NOTE — HISTORY OF PRESENT ILLNESS
[FreeTextEntry1] : 63 year old woman  with complaint of microscopic hematuria. This began on 9/6/19 when it was seen on routine screening UA. Cytology by PCP was negative. \par It is mild in severity as the patient denies any gross hematuria. She does report bilateral flank pain. It is moderate 7/10 in severity. Notihng makes the symptoms better. Lying down makes the symptoms worse. \par It is associated with nothing.\par No gross hematuria, no dysuria, no frequency, no urgency, no hesitancy, no straining. No incontinence. \par No fevers, no chills, no nausea, no vomiting. \par \par No family history contributory to microscopic hematuria.

## 2019-09-16 LAB
APPEARANCE: CLEAR
BACTERIA UR CULT: NORMAL
BACTERIA: NEGATIVE
BILIRUBIN URINE: NEGATIVE
BLOOD URINE: ABNORMAL
COLOR: YELLOW
GLUCOSE QUALITATIVE U: NEGATIVE
HYALINE CASTS: 2 /LPF
KETONES URINE: NEGATIVE
LEUKOCYTE ESTERASE URINE: NEGATIVE
MICROSCOPIC-UA: NORMAL
NITRITE URINE: NEGATIVE
PH URINE: 6.5
PROTEIN URINE: ABNORMAL
RED BLOOD CELLS URINE: 155 /HPF
SPECIFIC GRAVITY URINE: 1.01
SQUAMOUS EPITHELIAL CELLS: 2 /HPF
UROBILINOGEN URINE: NORMAL
WHITE BLOOD CELLS URINE: 3 /HPF

## 2019-09-27 ENCOUNTER — MESSAGE (OUTPATIENT)
Age: 64
End: 2019-09-27

## 2019-09-30 ENCOUNTER — APPOINTMENT (OUTPATIENT)
Dept: UROLOGY | Facility: CLINIC | Age: 64
End: 2019-09-30

## 2019-09-30 ENCOUNTER — FORM ENCOUNTER (OUTPATIENT)
Age: 64
End: 2019-09-30

## 2019-10-01 ENCOUNTER — OUTPATIENT (OUTPATIENT)
Dept: OUTPATIENT SERVICES | Facility: HOSPITAL | Age: 64
LOS: 1 days | End: 2019-10-01
Payer: MEDICAID

## 2019-10-01 ENCOUNTER — APPOINTMENT (OUTPATIENT)
Dept: CT IMAGING | Facility: CLINIC | Age: 64
End: 2019-10-01
Payer: MEDICAID

## 2019-10-01 DIAGNOSIS — R31.29 OTHER MICROSCOPIC HEMATURIA: ICD-10-CM

## 2019-10-01 DIAGNOSIS — Z98.89 OTHER SPECIFIED POSTPROCEDURAL STATES: Chronic | ICD-10-CM

## 2019-10-01 PROCEDURE — 74178 CT ABD&PLV WO CNTR FLWD CNTR: CPT | Mod: 26

## 2019-10-01 PROCEDURE — 82565 ASSAY OF CREATININE: CPT

## 2019-10-01 PROCEDURE — 74178 CT ABD&PLV WO CNTR FLWD CNTR: CPT

## 2019-10-14 ENCOUNTER — APPOINTMENT (OUTPATIENT)
Dept: UROLOGY | Facility: CLINIC | Age: 64
End: 2019-10-14
Payer: MEDICAID

## 2019-10-14 VITALS
SYSTOLIC BLOOD PRESSURE: 141 MMHG | DIASTOLIC BLOOD PRESSURE: 87 MMHG | HEIGHT: 56 IN | OXYGEN SATURATION: 98 % | RESPIRATION RATE: 16 BRPM | WEIGHT: 190 LBS | HEART RATE: 69 BPM | BODY MASS INDEX: 42.74 KG/M2

## 2019-10-14 PROCEDURE — 99213 OFFICE O/P EST LOW 20 MIN: CPT

## 2019-10-14 NOTE — PHYSICAL EXAM
[General Appearance - Well Developed] : well developed [General Appearance - Well Nourished] : well nourished [General Appearance - In No Acute Distress] : no acute distress [Well Groomed] : well groomed [Normal Appearance] : normal appearance [Abdomen Soft] : soft [Abdomen Tenderness] : non-tender [Urinary Bladder Findings] : the bladder was normal on palpation [Costovertebral Angle Tenderness] : no ~M costovertebral angle tenderness [Edema] : no peripheral edema [] : no rash [Exaggerated Use Of Accessory Muscles For Inspiration] : no accessory muscle use [Respiration, Rhythm And Depth] : normal respiratory rhythm and effort [Oriented To Time, Place, And Person] : oriented to person, place, and time [Not Anxious] : not anxious [Affect] : the affect was normal [Mood] : the mood was normal [Normal Station and Gait] : the gait and station were normal for the patient's age [No Focal Deficits] : no focal deficits [No Palpable Adenopathy] : no palpable adenopathy

## 2019-10-14 NOTE — ASSESSMENT
[FreeTextEntry1] : 63 year old woman with microscopic hematuria.  CT Urogram was negative for nephroureteral stones or malignancies.We discussed that the differential diagnosis still includes both intravesical pathology such as cancer of the bladder. Cystoscopy was recommended to rule out pathology in the bladder. Pt agrees and understands.

## 2019-10-14 NOTE — HISTORY OF PRESENT ILLNESS
[FreeTextEntry1] : 63 year old woman  with complaint of microscopic hematuria. This began on 9/6/19 when it was seen on routine screening UA. Cytology by PCP was negative. \par It is mild in severity as the patient denies any gross hematuria. She does report bilateral flank pain. It is moderate 7/10 in severity. Notihng makes the symptoms better. Lying down makes the symptoms worse. \par It is associated with nothing.\par No gross hematuria, no dysuria, no frequency, no urgency, no hesitancy, no straining. No incontinence. \par No fevers, no chills, no nausea, no vomiting. No family history contributory to microscopic hematuria. \par \par 10/14/2019: Patient presents for follow up. She obtained CTU, which showed bladder wall thickening but negative for upper tract pathology. She denies any new  symptoms and other medical  issues remain unchanged from above. No hematuria, no dysuria, no frequency, no urgency, no hesitancy, no straining. No incontinence. No fevers, no chills, no nausea, no vomiting, no flank pain.

## 2019-10-21 ENCOUNTER — APPOINTMENT (OUTPATIENT)
Dept: UROLOGY | Facility: CLINIC | Age: 64
End: 2019-10-21
Payer: MEDICAID

## 2019-10-21 VITALS
HEART RATE: 65 BPM | SYSTOLIC BLOOD PRESSURE: 146 MMHG | BODY MASS INDEX: 42.74 KG/M2 | OXYGEN SATURATION: 97 % | DIASTOLIC BLOOD PRESSURE: 79 MMHG | WEIGHT: 190 LBS | HEIGHT: 56 IN | RESPIRATION RATE: 16 BRPM

## 2019-10-21 DIAGNOSIS — R31.29 OTHER MICROSCOPIC HEMATURIA: ICD-10-CM

## 2019-10-21 PROCEDURE — 52000 CYSTOURETHROSCOPY: CPT

## 2019-10-22 PROBLEM — R31.29 MICROSCOPIC HEMATURIA: Status: ACTIVE | Noted: 2019-09-13

## 2019-10-25 LAB — URINE CYTOLOGY: NORMAL

## 2020-06-01 ENCOUNTER — APPOINTMENT (OUTPATIENT)
Dept: UROLOGY | Facility: CLINIC | Age: 65
End: 2020-06-01

## 2020-07-14 NOTE — DIETITIAN INITIAL EVALUATION ADULT. - NS AS NUTRI DX NUTRIENT
Please start process for home light box unit through [email or web address] for this patient. Thanks! Increased nutrient needs (specify)

## 2020-08-03 NOTE — DIETITIAN INITIAL EVALUATION ADULT. - WEIGHT IN KG
Outpatient Physical Therapy  INITIAL EVALUATION    [facility] Physical Therapy [place] [id number] [facility], [address]  [place] [address]  Phone:  [phone]    Date of Evaluation: 2020    Patient: Marilou Arias Reyes  YOB: 1959  MRN: [id number]     Referring Provider: Geno Benitez M.D.  [address]  [address]  [place] [address]   Referring Diagnosis Cervical spondylosis [M47.812];Impingement syndrome of shoulder region, left [M75.42];Right shoulder pain, unspecified chronicity [M25.511]     Time Calculation    Start time: 1030  Stop time: 1145 Time Calculation (min): 75 minutes         Chief Complaint: Shoulder Problem    Visit Diagnoses     ICD-10-CM   1. Cervical spondylosis  M47.812   2. Impingement syndrome of shoulder region, left  M75.42   3. Right shoulder pain, unspecified chronicity  M25.511         Subjective:   History of Present Illness:     Date of onset:  8/3/2018    Mechanism of injury:  L. Shoulder pain came on first and now feeling similar symptoms on R. Shoulder but to lesser intensity. Pain has been chronic and worsening over the years. Experiencing some tingling down L. Arm but it doesn't travel below the elbow. Don't like needs and trying to avoid injections.   Currently taking: mm relaxer as needed.   Occupation: pharmacist.   Tried PT before and attended a few visits but found it unhelpful   Hx of L. Clavicle fracture from 4 years ago  Sleep disturbance:  Interrupted sleep  Pain:     Current pain ratin    At best pain ratin    At worst pain ratin    Location:  Bilateral ACJ/GHJ region and tingling down L. arm above the elbow     Progression:  Worsening    Pain Comments::  Unable to lay onto L. Shoulder   Unable to weight bear through arms without shoulder pain  Pain with dressing and bathing.   Lifting above shoulder height  Carrying below shoulder height   Hand dominance:  Right  Patient Goals:     Patient goals for therapy:  Increased motion  and decreased pain      Past Medical History:   Diagnosis Date   • Arthritis    • Gestational diabetes    • Hypertension    • Prolactinoma (HCC)     Followed by Dr. Antony     Past Surgical History:   Procedure Laterality Date   • CHOLECYSTECTOMY     • PRIMARY C SECTION       Social History     Tobacco Use   • Smoking status: Never Smoker   • Smokeless tobacco: Never Used   Substance Use Topics   • Alcohol use: Yes     Comment: Rare     Family and Occupational History     Socioeconomic History   • Marital status:      Spouse name: Not on file   • Number of children: Not on file   • Years of education: Not on file   • Highest education level: Not on file   Occupational History   • Not on file       Objective     Palpation   Left   No palpable tenderness to the cervical paraspinals.   Hypertonic in the levator scapulae, pectoralis major, pectoralis minor, subscapularis, supraspinatus, teres minor and upper trapezius.   Tenderness of the levator scapulae, subscapularis, supraspinatus, teres minor and upper trapezius.     Active Range of Motion     Cervical Spine   Flexion: decreased  Extension: decreased  Left rotation: decreased  Right rotation: decreased  Left Shoulder   Flexion: 90 degrees with pain  Extension: 35 degrees with pain  Abduction: 70 degrees with pain  External rotation 0°: 30 degrees with pain  Internal rotation BTB: sacrum with pain    Right Shoulder   Flexion: with pain  Abduction: with pain  External rotation 0°: with pain    Additional Active Range of Motion Details  Flexion: DN  Extension: DN  Rotation: DN    Non painful but restricted cervical range      Passive Range of Motion   Left Shoulder   Flexion: 80 degrees with pain  Extension: 40 degrees with pain  Abduction: 60 degrees with pain  External rotation 0°: 25 degrees with pain  External rotation 45°: with pain    Additional Passive Range of Motion Details  Firm end feel for shoulder external rotation with pain. All others have empty  end feel with pain.     Joint Play   Left Shoulder     Anterior capsule: hypermobile    Posterior capsule: hypomobile    Inferior capsule: within functional limits    AC joint: hypomobile    Cervical spine: hypomobile    Thoracic spine: hypomobile    Additional joint play details:  Pain with prone spring test T1-T5. Pain in L. Shoulder     Additional Joint Play Details  Pain with prone spring test T1-T5. Pain in L. Shoulder     Tests   Cervical spine   Positive cervical spine distraction.  Additional testing details: All ULTT testing (+) for reproduction of shoulder pain and tingling down L. UE but does not travel below elbow     Left Shoulder   Positive painful arc, Spurling's sign, ULTT2, ULTT3 and ULTT4.     Right Shoulder   Positive Spurling's sign.         Therapeutic Treatments and Modalities:     1. Manual Therapy (CPT 58765), Cervical, Cervical distraction. upglide into RR C4-C6, STW scalenes and SCM for tonic inhibtion     2. Manual Therapy (CPT 47192), shoulder, Scap mobs; stw external rotators for tissue hydration and tonic inhibtion; lateral distraction GHJ gr II oscillation;     3. Functional Training, Self Care (CPT 94331), shoulder , sleep positioning for keeping GHJ in open pack position    4. E Stim Unattended (CPT 27718), shoulder and neck , IFC and MHP 15 min duration     Time-based treatments/modalities:    Physical Therapy Timed Treatment Charges  Functional training, self care minutes (CPT 26857): 5 minutes  Manual therapy minutes (CPT 34564): 20 minutes      Assessment, Response and Plan:   Impairments: abnormal muscle tone, abnormal or restricted ROM, limited ADL's and pain with function    Assessment details:  Patient attending PT for acute episode of chronic neck and shoulder pain. Patient has severe loss of active shoulder mobility, passive mobility was limited predominantly by pain but external rotation had significant loss of mobility as well as glenohumeral capsular stiffness. ULTT and  Spurling's were both positive for reproducing L. Shoulder pain as well as tingling in proximal L. Humerus. Cervical distraction was relieving of shoulder symptoms. Patient has combination of glenohumeral OA causing pain and restricted shoulder mobility as well as some (+) cervical radicular findings. Patient can benefit from PT to work towards improving shoulder functional mobility and strength.   Barriers to therapy:  Age and comorbidities  Prognosis: fair    Goals:   Short Term Goals:   25-50% improvement in shoulder AROM  Improved sleep posture with ability to sleep 4 hours or more without being awaken with shoulder pain  Improved ULTT findings for neural tension and paraesthesia   Short term goal time span:  4-6 weeks      Long Term Goals:    75% or better shoulder AROM with 0-2/10 pain   Patient will be independent for long term home exercise program, along with any progressions or regressions from previous, and all self care strategies   Patient will have a QDASH score indicating a significant perceived functional improvement from time of evaluation, and less than 15% perceived disability   Long term goal time span:  2-4 months    Plan:   Planned therapy interventions:  E Stim Unattended (CPT 80085), Functional Training, Self Care (CPT 57254), Hot or Cold Pack Therapy (CPT 36982), Manual Therapy (CPT 50615), Neuromuscular Re-education (CPT 31129), Self Care ADL Training (CPT 64538), Therapeutic Activities (CPT 19540) and Therapeutic Exercise (CPT 34795)  Frequency:  2x week  Duration in weeks:  10  Duration in visits:  20  Discussed with:  Patient      Functional Assessment Used        Referring provider co-signature:  I have reviewed this plan of care and my co-signature certifies the need for services.    Certification Period: 08/03/2020 to  10/19/20    Physician Signature: ________________________________ Date: ______________                53.5

## 2020-12-23 NOTE — ED ADULT NURSE NOTE - AS SC BRADEN ACTIVITY
Attempt #1 to call report. Floor 2s will call back  
Report was given to Elly SOLORIO, pt is going to [facility] as a medical patient.  
Still awaiting urine from pt. Pt aware of sample needed. Urinal at bedside.   
(1) bedfast

## 2021-05-18 ENCOUNTER — RX ONLY (RX ONLY)
Age: 66
End: 2021-05-18

## 2021-05-18 ENCOUNTER — DOCTOR'S OFFICE (OUTPATIENT)
Dept: URBAN - METROPOLITAN AREA CLINIC 163 | Facility: CLINIC | Age: 66
Setting detail: OPHTHALMOLOGY
End: 2021-05-18
Payer: COMMERCIAL

## 2021-05-18 PROCEDURE — 92020 GONIOSCOPY: CPT | Performed by: OPHTHALMOLOGY

## 2021-05-18 PROCEDURE — 92250 FUNDUS PHOTOGRAPHY W/I&R: CPT | Performed by: OPHTHALMOLOGY

## 2021-05-18 PROCEDURE — 92014 COMPRE OPH EXAM EST PT 1/>: CPT | Performed by: OPHTHALMOLOGY

## 2021-05-18 ASSESSMENT — VISUAL ACUITY
OD_BCVA: 20/20
OS_BCVA: 20/20-2

## 2021-05-18 ASSESSMENT — CONFRONTATIONAL VISUAL FIELD TEST (CVF)
OD_FINDINGS: FULL
OS_FINDINGS: FULL

## 2021-06-12 ENCOUNTER — INPATIENT (INPATIENT)
Facility: HOSPITAL | Age: 66
LOS: 15 days | Discharge: HOME | DRG: 433 | End: 2021-06-28
Attending: STUDENT IN AN ORGANIZED HEALTH CARE EDUCATION/TRAINING PROGRAM | Admitting: HOSPITALIST
Payer: MEDICAID

## 2021-06-12 VITALS
HEART RATE: 93 BPM | OXYGEN SATURATION: 100 % | RESPIRATION RATE: 18 BRPM | SYSTOLIC BLOOD PRESSURE: 129 MMHG | TEMPERATURE: 99 F | DIASTOLIC BLOOD PRESSURE: 80 MMHG

## 2021-06-12 DIAGNOSIS — Z98.89 OTHER SPECIFIED POSTPROCEDURAL STATES: Chronic | ICD-10-CM

## 2021-06-12 DIAGNOSIS — R17 UNSPECIFIED JAUNDICE: ICD-10-CM

## 2021-06-12 LAB
ALBUMIN SERPL ELPH-MCNC: 2.4 G/DL — LOW (ref 3.3–5.2)
ALP SERPL-CCNC: 505 U/L — HIGH (ref 40–120)
ALT FLD-CCNC: 88 U/L — HIGH
AMMONIA BLD-MCNC: 28 UMOL/L — SIGNIFICANT CHANGE UP (ref 11–55)
ANION GAP SERPL CALC-SCNC: 10 MMOL/L — SIGNIFICANT CHANGE UP (ref 5–17)
ANION GAP SERPL CALC-SCNC: 11 MMOL/L — SIGNIFICANT CHANGE UP (ref 5–17)
ANISOCYTOSIS BLD QL: SLIGHT — SIGNIFICANT CHANGE UP
APPEARANCE UR: CLEAR — SIGNIFICANT CHANGE UP
APTT BLD: 25.3 SEC — LOW (ref 27.5–35.5)
AST SERPL-CCNC: 191 U/L — HIGH
BASOPHILS # BLD AUTO: 0.11 K/UL — SIGNIFICANT CHANGE UP (ref 0–0.2)
BASOPHILS NFR BLD AUTO: 0.9 % — SIGNIFICANT CHANGE UP (ref 0–2)
BILIRUB SERPL-MCNC: 5.3 MG/DL — HIGH (ref 0.4–2)
BILIRUB UR-MCNC: NEGATIVE — SIGNIFICANT CHANGE UP
BLD GP AB SCN SERPL QL: SIGNIFICANT CHANGE UP
BUN SERPL-MCNC: 22.7 MG/DL — HIGH (ref 8–20)
BUN SERPL-MCNC: 25 MG/DL — HIGH (ref 8–20)
CALCIUM SERPL-MCNC: 7.6 MG/DL — LOW (ref 8.6–10.2)
CALCIUM SERPL-MCNC: 8.4 MG/DL — LOW (ref 8.6–10.2)
CHLORIDE SERPL-SCNC: 76 MMOL/L — LOW (ref 98–107)
CHLORIDE SERPL-SCNC: 85 MMOL/L — LOW (ref 98–107)
CO2 SERPL-SCNC: 26 MMOL/L — SIGNIFICANT CHANGE UP (ref 22–29)
CO2 SERPL-SCNC: 27 MMOL/L — SIGNIFICANT CHANGE UP (ref 22–29)
COLOR SPEC: YELLOW — SIGNIFICANT CHANGE UP
CREAT SERPL-MCNC: 0.85 MG/DL — SIGNIFICANT CHANGE UP (ref 0.5–1.3)
CREAT SERPL-MCNC: 0.96 MG/DL — SIGNIFICANT CHANGE UP (ref 0.5–1.3)
DIFF PNL FLD: ABNORMAL
EOSINOPHIL # BLD AUTO: 0.11 K/UL — SIGNIFICANT CHANGE UP (ref 0–0.5)
EOSINOPHIL NFR BLD AUTO: 0.9 % — SIGNIFICANT CHANGE UP (ref 0–6)
EPI CELLS # UR: SIGNIFICANT CHANGE UP
GIANT PLATELETS BLD QL SMEAR: PRESENT — SIGNIFICANT CHANGE UP
GLUCOSE BLDC GLUCOMTR-MCNC: 369 MG/DL — HIGH (ref 70–99)
GLUCOSE SERPL-MCNC: 385 MG/DL — HIGH (ref 70–99)
GLUCOSE SERPL-MCNC: 641 MG/DL — CRITICAL HIGH (ref 70–99)
GLUCOSE UR QL: 1000 MG/DL
HCT VFR BLD CALC: 16.1 % — CRITICAL LOW (ref 34.5–45)
HGB BLD-MCNC: 5.2 G/DL — CRITICAL LOW (ref 11.5–15.5)
HYPOCHROMIA BLD QL: SLIGHT — SIGNIFICANT CHANGE UP
INR BLD: 1.75 RATIO — HIGH (ref 0.88–1.16)
KETONES UR-MCNC: ABNORMAL
LACTATE BLDV-MCNC: 3.6 MMOL/L — HIGH (ref 0.5–2)
LEUKOCYTE ESTERASE UR-ACNC: ABNORMAL
LIDOCAIN IGE QN: 173 U/L — HIGH (ref 22–51)
LYMPHOCYTES # BLD AUTO: 0.63 K/UL — LOW (ref 1–3.3)
LYMPHOCYTES # BLD AUTO: 5.3 % — LOW (ref 13–44)
MACROCYTES BLD QL: SLIGHT — SIGNIFICANT CHANGE UP
MANUAL SMEAR VERIFICATION: SIGNIFICANT CHANGE UP
MCHC RBC-ENTMCNC: 29.4 PG — SIGNIFICANT CHANGE UP (ref 27–34)
MCHC RBC-ENTMCNC: 32.3 GM/DL — SIGNIFICANT CHANGE UP (ref 32–36)
MCV RBC AUTO: 91 FL — SIGNIFICANT CHANGE UP (ref 80–100)
MONOCYTES # BLD AUTO: 0.53 K/UL — SIGNIFICANT CHANGE UP (ref 0–0.9)
MONOCYTES NFR BLD AUTO: 4.4 % — SIGNIFICANT CHANGE UP (ref 2–14)
NEUTROPHILS # BLD AUTO: 10.58 K/UL — HIGH (ref 1.8–7.4)
NEUTROPHILS NFR BLD AUTO: 88.5 % — HIGH (ref 43–77)
NITRITE UR-MCNC: NEGATIVE — SIGNIFICANT CHANGE UP
NRBC # BLD: 2 /100 — HIGH (ref 0–0)
OB PNL STL: NEGATIVE — SIGNIFICANT CHANGE UP
PH UR: 6.5 — SIGNIFICANT CHANGE UP (ref 5–8)
PLAT MORPH BLD: NORMAL — SIGNIFICANT CHANGE UP
PLATELET # BLD AUTO: 220 K/UL — SIGNIFICANT CHANGE UP (ref 150–400)
POIKILOCYTOSIS BLD QL AUTO: SLIGHT — SIGNIFICANT CHANGE UP
POLYCHROMASIA BLD QL SMEAR: SLIGHT — SIGNIFICANT CHANGE UP
POTASSIUM SERPL-MCNC: 3.7 MMOL/L — SIGNIFICANT CHANGE UP (ref 3.5–5.3)
POTASSIUM SERPL-MCNC: 4.1 MMOL/L — SIGNIFICANT CHANGE UP (ref 3.5–5.3)
POTASSIUM SERPL-SCNC: 3.7 MMOL/L — SIGNIFICANT CHANGE UP (ref 3.5–5.3)
POTASSIUM SERPL-SCNC: 4.1 MMOL/L — SIGNIFICANT CHANGE UP (ref 3.5–5.3)
PROT SERPL-MCNC: 6.1 G/DL — LOW (ref 6.6–8.7)
PROT UR-MCNC: 15 MG/DL
PROTHROM AB SERPL-ACNC: 19.8 SEC — HIGH (ref 10.6–13.6)
RBC # BLD: 1.77 M/UL — LOW (ref 3.8–5.2)
RBC # FLD: 20.1 % — HIGH (ref 10.3–14.5)
RBC BLD AUTO: ABNORMAL
RBC CASTS # UR COMP ASSIST: ABNORMAL /HPF (ref 0–4)
SODIUM SERPL-SCNC: 113 MMOL/L — CRITICAL LOW (ref 135–145)
SODIUM SERPL-SCNC: 122 MMOL/L — LOW (ref 135–145)
SP GR SPEC: 1.01 — SIGNIFICANT CHANGE UP (ref 1.01–1.02)
TARGETS BLD QL SMEAR: SLIGHT — SIGNIFICANT CHANGE UP
UROBILINOGEN FLD QL: 1 MG/DL
WBC # BLD: 11.95 K/UL — HIGH (ref 3.8–10.5)
WBC # FLD AUTO: 11.95 K/UL — HIGH (ref 3.8–10.5)
WBC UR QL: SIGNIFICANT CHANGE UP

## 2021-06-12 PROCEDURE — 71045 X-RAY EXAM CHEST 1 VIEW: CPT | Mod: 26

## 2021-06-12 PROCEDURE — 99223 1ST HOSP IP/OBS HIGH 75: CPT

## 2021-06-12 PROCEDURE — 93010 ELECTROCARDIOGRAM REPORT: CPT

## 2021-06-12 PROCEDURE — 74177 CT ABD & PELVIS W/CONTRAST: CPT | Mod: 26,MG

## 2021-06-12 PROCEDURE — 99497 ADVNCD CARE PLAN 30 MIN: CPT | Mod: 25

## 2021-06-12 RX ORDER — FAMOTIDINE 10 MG/ML
20 INJECTION INTRAVENOUS ONCE
Refills: 0 | Status: COMPLETED | OUTPATIENT
Start: 2021-06-12 | End: 2021-06-12

## 2021-06-12 RX ORDER — SODIUM CHLORIDE 9 MG/ML
1000 INJECTION INTRAMUSCULAR; INTRAVENOUS; SUBCUTANEOUS ONCE
Refills: 0 | Status: COMPLETED | OUTPATIENT
Start: 2021-06-12 | End: 2021-06-12

## 2021-06-12 RX ORDER — PANTOPRAZOLE SODIUM 20 MG/1
80 TABLET, DELAYED RELEASE ORAL ONCE
Refills: 0 | Status: COMPLETED | OUTPATIENT
Start: 2021-06-12 | End: 2021-06-12

## 2021-06-12 RX ORDER — INSULIN HUMAN 100 [IU]/ML
6 INJECTION, SOLUTION SUBCUTANEOUS ONCE
Refills: 0 | Status: COMPLETED | OUTPATIENT
Start: 2021-06-12 | End: 2021-06-12

## 2021-06-12 RX ORDER — ONDANSETRON 8 MG/1
4 TABLET, FILM COATED ORAL ONCE
Refills: 0 | Status: COMPLETED | OUTPATIENT
Start: 2021-06-12 | End: 2021-06-12

## 2021-06-12 RX ORDER — SODIUM CHLORIDE 9 MG/ML
500 INJECTION INTRAMUSCULAR; INTRAVENOUS; SUBCUTANEOUS ONCE
Refills: 0 | Status: COMPLETED | OUTPATIENT
Start: 2021-06-12 | End: 2021-06-12

## 2021-06-12 RX ORDER — INSULIN LISPRO 100/ML
6 VIAL (ML) SUBCUTANEOUS ONCE
Refills: 0 | Status: COMPLETED | OUTPATIENT
Start: 2021-06-12 | End: 2021-06-12

## 2021-06-12 RX ADMIN — ONDANSETRON 4 MILLIGRAM(S): 8 TABLET, FILM COATED ORAL at 18:21

## 2021-06-12 RX ADMIN — SODIUM CHLORIDE 500 MILLILITER(S): 9 INJECTION INTRAMUSCULAR; INTRAVENOUS; SUBCUTANEOUS at 18:22

## 2021-06-12 RX ADMIN — INSULIN HUMAN 6 UNIT(S): 100 INJECTION, SOLUTION SUBCUTANEOUS at 21:04

## 2021-06-12 RX ADMIN — PANTOPRAZOLE SODIUM 80 MILLIGRAM(S): 20 TABLET, DELAYED RELEASE ORAL at 21:10

## 2021-06-12 RX ADMIN — FAMOTIDINE 20 MILLIGRAM(S): 10 INJECTION INTRAVENOUS at 18:21

## 2021-06-12 RX ADMIN — SODIUM CHLORIDE 1000 MILLILITER(S): 9 INJECTION INTRAMUSCULAR; INTRAVENOUS; SUBCUTANEOUS at 21:04

## 2021-06-12 NOTE — ED PROVIDER NOTE - CARE PLAN
Principal Discharge DX:	Jaundice of recent onset  Secondary Diagnosis:	Symptomatic anemia  Secondary Diagnosis:	Hyperglycemia

## 2021-06-12 NOTE — ED PROVIDER NOTE - OBJECTIVE STATEMENT
62 yo female pmh cirrhosis , HTN, portal HTN, hepatic encephalopathy; now p/w abd pain x1 week--epigastric, cramping, associated with nausea and vomiting, severe abd pain today. states she has been drinking pedialyte this whole week and unable to tolerate any solids. denies f/c/s. reports some dark stools earlier in the week that had resolved in the past few days.  denies cp/sob/palp. denies f/c/s. denies back pain.  prior admission to [facility] for hepatic encephalopathy and was given rifaximin and Lactulose at that time. she has not followed up since.  last alcoholic drink 2 weeks ago  PMH: cirrhosis , HTN, portal HTN, hepatic encephalopathy  SOCIAL: No tobacco/illicit substance use/socialEtOH

## 2021-06-12 NOTE — H&P ADULT - NSHPPHYSICALEXAM_GEN_ALL_CORE
Vital Signs Last 24 Hrs  T(C): 37 (12 Jun 2021 17:20), Max: 37 (12 Jun 2021 17:20)  T(F): 98.6 (12 Jun 2021 17:20), Max: 98.6 (12 Jun 2021 17:20)  HR: 86 (12 Jun 2021 19:13) (86 - 93)  BP: 122/73 (12 Jun 2021 19:13) (122/73 - 129/80)  BP(mean): --  RR: 17 (12 Jun 2021 19:13) (17 - 18)  SpO2: 100% (12 Jun 2021 19:13) (100% - 100%)

## 2021-06-12 NOTE — H&P ADULT - NSHPSOCIALHISTORY_GEN_ALL_CORE
No cigarette, EtOH hx, now drinks beer occasionally last drink 2 weeks ago. No illicit drug use. Lives with sister

## 2021-06-12 NOTE — ED ADULT TRIAGE NOTE - CHIEF COMPLAINT QUOTE
patient c/o feeling weak since thursday, and vomiting since yesterday, patient stated that she has no appetite and generalized body aches.

## 2021-06-12 NOTE — ED PROVIDER NOTE - PHYSICAL EXAMINATION
General:     NAD, well-nourished, well-appearing  Head:     NC/AT, EOMI, oral mucosa moist, scleral icterus  Neck:     trachea midline  Lungs:     CTA b/l, no w/r/r  CVS:     S1S2, RRR, no m/g/r  Abd:     +BS, TTP @ epigastrium, no rebound or guardin, s/nd, no organomegaly  Ext:    2+ radial and pedal pulses, trace pedal edema  Neuro: AAOx3, no sensory/motor deficits

## 2021-06-12 NOTE — H&P ADULT - NSICDXPASTMEDICALHX_GEN_ALL_CORE_FT
PAST MEDICAL HISTORY:  Alcohol abuse     Alcohol abuse     Anemia     Chronic back pain     Constipation     ETOH abuse     GERD (gastroesophageal reflux disease)     HTN (hypertension)     Hypothyroidism     Liver cirrhosis     Lump in female breast     Lymphangitis, acute, lower leg     Pancreatitis     Transitory  hyperthyroidism     Urea cycle metabolism disorder     UTI (urinary tract infection)

## 2021-06-12 NOTE — H&P ADULT - NSICDXFAMILYHX_GEN_ALL_CORE_FT
FAMILY HISTORY:  Child  Still living? Yes, Estimated age: Age Unknown  FH: depression, Age at diagnosis: Age Unknown

## 2021-06-12 NOTE — H&P ADULT - HISTORY OF PRESENT ILLNESS
60 y/o female with PMH of alcoholic liver cirrhosis with hepatic encephalopathy, portal HTN, HTN, DM-2, hypothyroidism came to the ED complaining of epigastric pain x 1 week. Pain was sharp radiating to her chest, associated with nausea and non-bloody emesis. Patient said her symptoms now improved at the time of interview. She endorsed dark stool to ED but said no when I asked her. She also noted shortness of breath with exertion and fatigue. She has no chest pain, melena, hematemesis, hematuria, recent travel, fever, chills. Of note patient still drinks alcohol, last drink was 2 weeks ago.

## 2021-06-12 NOTE — H&P ADULT - ASSESSMENT
60 y/o female with PMH of alcoholic liver cirrhosis with hepatic encephalopathy, portal HTN, HTN, DM-2, hypothyroidism came to the ED complaining of epigastric pain x 1 week. Pain was sharp radiating to her chest, associated with nausea and non-bloody emesis. Patient said her symptoms now improved at the time of interview. She endorsed dark stool to ED attending but said no when I asked her. She also noted shortness of breath with exertion and fatigue. She has no chest pain, melena, hematemesis, hematuria, recent travel, fever, chills. Of note patient still drinks alcohol, last drink was 2 weeks ago.   Found to have hb of 5.2 in the ED, occult negative and also hyperglycemic.     Please complete med rec in AM, patient does not know her medications and she said there is no one home to call    Acute anemia   Admit to telemetry   Likely due to underlying alcohol liver dx   Hb: 5.2;  FOBT negative   Total melvin: 5.3; will check direct/indirect melvin, haptoglobin LDH, retic count   2 units ordered   Monitor CBC    Epigastric abdominal pain   Likely due to gastritis patient endorsed hx of gastric ulcer   PPI 40mg   Pain control   CT abdomen done, no pancreatitis     Hyperglycemia with underlying DM-2   Patient said she is not on insulin   HbA1C   Insulin sliding scale     Alcoholic liver cirrhosis with hepatic encephalopathy and portal HTN   Patient was last seen in 2017, said she does not follow with GI/hepatologist   Was discharged on Rifaximin 550mg bid will continue   Aldactone 50mg   Lasix 20mg   PPI 40mg     EtOH abuse   Thiamine 100mg for possible thiamine deficiency in the setting of EtOH use  Folic acid   MVT   Ativan PRN for withdrawal precaution     HTN   Labetalol 100mg bid with holding parameters     Hypothyroidism   Synthroid 25mcg     Supportive   DVT prophylaxis: CSD for now given low Hb   Diet: DASH/CHO     Plan of care discussed with patient     70 minutes spent

## 2021-06-12 NOTE — ED ADULT NURSE NOTE - OBJECTIVE STATEMENT
65 year old female, PMHx of pancreatitis, GERD, HTN, anemia, UTI, ETOH abuse, liver cirrhosis and hypothyroid, present to the ED with c/o feeling weak with associated nausea and vomiting since Thursday. Pt denies any blood in her vomit or dark stools. Patient A/Ox4, respirations are even and non labored, NSR on the monitor, abdomen is soft and non tender, NAD noted.

## 2021-06-12 NOTE — ED PROVIDER NOTE - CLINICAL SUMMARY MEDICAL DECISION MAKING FREE TEXT BOX
patient arrived with abd pain, vomiting s/p drinking 2 weeks ago. now with new onset jaundice, elevated in LFTs and bili. also found to have drop in H/H. found to have hyperglycemia.  hyperglycemia treated with insulin and fluids, beginning to improve. ct to evaluate elevated bili revealed cirrhosis and ascites.  guaiac negative to acute gi bleed. will transfuse. will admit for further eval of acute jaundice.

## 2021-06-12 NOTE — ED PROVIDER NOTE - NS ED ROS FT
Constitutional: (-) fever  (-)chills  (-)sweats  Eyes/ENT: (-)   Cardiovascular: (-) chest pain, (-) palpitations (-) edema   Respiratory: (-) cough, (-) shortness of breath   Gastrointestinal: (+)nausea  (+)vomiting, (-) diarrhea  (+) abdominal pain   :  (-)dysuria, (-)frequency, (-)urgency, (-)hematuria  Musculoskeletal: (-) neck pain, (-) back pain, (-) joint pain  Integumentary: (-) rash, (-) edema  Neurological: (-) headache, (-) altered mental status  (-)LOC

## 2021-06-13 LAB
A1C WITH ESTIMATED AVERAGE GLUCOSE RESULT: 8 % — HIGH (ref 4–5.6)
ALBUMIN SERPL ELPH-MCNC: 2.4 G/DL — LOW (ref 3.3–5.2)
ALP SERPL-CCNC: 476 U/L — HIGH (ref 40–120)
ALT FLD-CCNC: 86 U/L — HIGH
ANION GAP SERPL CALC-SCNC: 13 MMOL/L — SIGNIFICANT CHANGE UP (ref 5–17)
ANION GAP SERPL CALC-SCNC: 9 MMOL/L — SIGNIFICANT CHANGE UP (ref 5–17)
AST SERPL-CCNC: 216 U/L — HIGH
BILIRUB DIRECT SERPL-MCNC: 4.6 MG/DL — HIGH (ref 0–0.3)
BILIRUB SERPL-MCNC: 6.5 MG/DL — HIGH (ref 0.4–2)
BUN SERPL-MCNC: 15.1 MG/DL — SIGNIFICANT CHANGE UP (ref 8–20)
BUN SERPL-MCNC: 16.6 MG/DL — SIGNIFICANT CHANGE UP (ref 8–20)
CALCIUM SERPL-MCNC: 7.8 MG/DL — LOW (ref 8.6–10.2)
CALCIUM SERPL-MCNC: 8.1 MG/DL — LOW (ref 8.6–10.2)
CHLORIDE SERPL-SCNC: 82 MMOL/L — LOW (ref 98–107)
CHLORIDE SERPL-SCNC: 84 MMOL/L — LOW (ref 98–107)
CO2 SERPL-SCNC: 26 MMOL/L — SIGNIFICANT CHANGE UP (ref 22–29)
CO2 SERPL-SCNC: 28 MMOL/L — SIGNIFICANT CHANGE UP (ref 22–29)
CREAT SERPL-MCNC: 0.89 MG/DL — SIGNIFICANT CHANGE UP (ref 0.5–1.3)
CREAT SERPL-MCNC: 0.89 MG/DL — SIGNIFICANT CHANGE UP (ref 0.5–1.3)
CULTURE RESULTS: SIGNIFICANT CHANGE UP
ESTIMATED AVERAGE GLUCOSE: 183 MG/DL — HIGH (ref 68–114)
GLUCOSE BLDC GLUCOMTR-MCNC: 217 MG/DL — HIGH (ref 70–99)
GLUCOSE BLDC GLUCOMTR-MCNC: 239 MG/DL — HIGH (ref 70–99)
GLUCOSE BLDC GLUCOMTR-MCNC: 292 MG/DL — HIGH (ref 70–99)
GLUCOSE BLDC GLUCOMTR-MCNC: 336 MG/DL — HIGH (ref 70–99)
GLUCOSE SERPL-MCNC: 240 MG/DL — HIGH (ref 70–99)
GLUCOSE SERPL-MCNC: 262 MG/DL — HIGH (ref 70–99)
HAPTOGLOB SERPL-MCNC: 20 MG/DL — LOW (ref 34–200)
HCT VFR BLD CALC: 33 % — LOW (ref 34.5–45)
HCV AB S/CO SERPL IA: 0.27 S/CO — SIGNIFICANT CHANGE UP (ref 0–0.99)
HCV AB SERPL-IMP: SIGNIFICANT CHANGE UP
HGB BLD-MCNC: 10.6 G/DL — LOW (ref 11.5–15.5)
LDH SERPL L TO P-CCNC: 341 U/L — HIGH (ref 98–192)
MAGNESIUM SERPL-MCNC: 1.8 MG/DL — SIGNIFICANT CHANGE UP (ref 1.6–2.6)
MCHC RBC-ENTMCNC: 29 PG — SIGNIFICANT CHANGE UP (ref 27–34)
MCHC RBC-ENTMCNC: 32.1 GM/DL — SIGNIFICANT CHANGE UP (ref 32–36)
MCV RBC AUTO: 90.2 FL — SIGNIFICANT CHANGE UP (ref 80–100)
PHOSPHATE SERPL-MCNC: 1.2 MG/DL — LOW (ref 2.4–4.7)
PLATELET # BLD AUTO: 188 K/UL — SIGNIFICANT CHANGE UP (ref 150–400)
POTASSIUM SERPL-MCNC: 3.1 MMOL/L — LOW (ref 3.5–5.3)
POTASSIUM SERPL-MCNC: 3.5 MMOL/L — SIGNIFICANT CHANGE UP (ref 3.5–5.3)
POTASSIUM SERPL-SCNC: 3.1 MMOL/L — LOW (ref 3.5–5.3)
POTASSIUM SERPL-SCNC: 3.5 MMOL/L — SIGNIFICANT CHANGE UP (ref 3.5–5.3)
PROT SERPL-MCNC: 6.2 G/DL — LOW (ref 6.6–8.7)
RBC # BLD: 3.66 M/UL — LOW (ref 3.8–5.2)
RBC # BLD: 3.66 M/UL — LOW (ref 3.8–5.2)
RBC # FLD: 18.3 % — HIGH (ref 10.3–14.5)
RETICS #: 179 K/UL — HIGH (ref 25–125)
RETICS/RBC NFR: 4.9 % — HIGH (ref 0.5–2.5)
SARS-COV-2 RNA SPEC QL NAA+PROBE: SIGNIFICANT CHANGE UP
SODIUM SERPL-SCNC: 121 MMOL/L — LOW (ref 135–145)
SODIUM SERPL-SCNC: 121 MMOL/L — LOW (ref 135–145)
SPECIMEN SOURCE: SIGNIFICANT CHANGE UP
WBC # BLD: 13.14 K/UL — HIGH (ref 3.8–10.5)
WBC # FLD AUTO: 13.14 K/UL — HIGH (ref 3.8–10.5)

## 2021-06-13 PROCEDURE — 99233 SBSQ HOSP IP/OBS HIGH 50: CPT

## 2021-06-13 RX ORDER — SODIUM CHLORIDE 9 MG/ML
1000 INJECTION INTRAMUSCULAR; INTRAVENOUS; SUBCUTANEOUS
Refills: 0 | Status: COMPLETED | OUTPATIENT
Start: 2021-06-13 | End: 2021-06-13

## 2021-06-13 RX ORDER — SODIUM CHLORIDE 9 MG/ML
1000 INJECTION, SOLUTION INTRAVENOUS
Refills: 0 | Status: DISCONTINUED | OUTPATIENT
Start: 2021-06-13 | End: 2021-06-28

## 2021-06-13 RX ORDER — LABETALOL HCL 100 MG
100 TABLET ORAL
Refills: 0 | Status: DISCONTINUED | OUTPATIENT
Start: 2021-06-13 | End: 2021-06-15

## 2021-06-13 RX ORDER — DEXTROSE 50 % IN WATER 50 %
25 SYRINGE (ML) INTRAVENOUS ONCE
Refills: 0 | Status: DISCONTINUED | OUTPATIENT
Start: 2021-06-13 | End: 2021-06-28

## 2021-06-13 RX ORDER — FOLIC ACID 0.8 MG
1 TABLET ORAL DAILY
Refills: 0 | Status: DISCONTINUED | OUTPATIENT
Start: 2021-06-13 | End: 2021-06-28

## 2021-06-13 RX ORDER — POTASSIUM CHLORIDE 20 MEQ
40 PACKET (EA) ORAL EVERY 4 HOURS
Refills: 0 | Status: COMPLETED | OUTPATIENT
Start: 2021-06-13 | End: 2021-06-13

## 2021-06-13 RX ORDER — INSULIN LISPRO 100/ML
VIAL (ML) SUBCUTANEOUS AT BEDTIME
Refills: 0 | Status: DISCONTINUED | OUTPATIENT
Start: 2021-06-13 | End: 2021-06-28

## 2021-06-13 RX ORDER — DEXTROSE 50 % IN WATER 50 %
12.5 SYRINGE (ML) INTRAVENOUS ONCE
Refills: 0 | Status: DISCONTINUED | OUTPATIENT
Start: 2021-06-13 | End: 2021-06-28

## 2021-06-13 RX ORDER — PANTOPRAZOLE SODIUM 20 MG/1
40 TABLET, DELAYED RELEASE ORAL
Refills: 0 | Status: DISCONTINUED | OUTPATIENT
Start: 2021-06-13 | End: 2021-06-15

## 2021-06-13 RX ORDER — GLUCAGON INJECTION, SOLUTION 0.5 MG/.1ML
1 INJECTION, SOLUTION SUBCUTANEOUS ONCE
Refills: 0 | Status: DISCONTINUED | OUTPATIENT
Start: 2021-06-13 | End: 2021-06-28

## 2021-06-13 RX ORDER — INSULIN GLARGINE 100 [IU]/ML
5 INJECTION, SOLUTION SUBCUTANEOUS AT BEDTIME
Refills: 0 | Status: DISCONTINUED | OUTPATIENT
Start: 2021-06-13 | End: 2021-06-13

## 2021-06-13 RX ORDER — LACTULOSE 10 G/15ML
10 SOLUTION ORAL DAILY
Refills: 0 | Status: DISCONTINUED | OUTPATIENT
Start: 2021-06-13 | End: 2021-06-28

## 2021-06-13 RX ORDER — DEXTROSE 50 % IN WATER 50 %
15 SYRINGE (ML) INTRAVENOUS ONCE
Refills: 0 | Status: DISCONTINUED | OUTPATIENT
Start: 2021-06-13 | End: 2021-06-28

## 2021-06-13 RX ORDER — THIAMINE MONONITRATE (VIT B1) 100 MG
100 TABLET ORAL DAILY
Refills: 0 | Status: DISCONTINUED | OUTPATIENT
Start: 2021-06-13 | End: 2021-06-28

## 2021-06-13 RX ORDER — FUROSEMIDE 40 MG
20 TABLET ORAL DAILY
Refills: 0 | Status: DISCONTINUED | OUTPATIENT
Start: 2021-06-13 | End: 2021-06-13

## 2021-06-13 RX ORDER — SPIRONOLACTONE 25 MG/1
50 TABLET, FILM COATED ORAL DAILY
Refills: 0 | Status: DISCONTINUED | OUTPATIENT
Start: 2021-06-13 | End: 2021-06-13

## 2021-06-13 RX ORDER — LEVOTHYROXINE SODIUM 125 MCG
25 TABLET ORAL DAILY
Refills: 0 | Status: DISCONTINUED | OUTPATIENT
Start: 2021-06-13 | End: 2021-06-14

## 2021-06-13 RX ORDER — INSULIN GLARGINE 100 [IU]/ML
10 INJECTION, SOLUTION SUBCUTANEOUS EVERY MORNING
Refills: 0 | Status: DISCONTINUED | OUTPATIENT
Start: 2021-06-13 | End: 2021-06-22

## 2021-06-13 RX ORDER — INSULIN LISPRO 100/ML
VIAL (ML) SUBCUTANEOUS
Refills: 0 | Status: DISCONTINUED | OUTPATIENT
Start: 2021-06-13 | End: 2021-06-28

## 2021-06-13 RX ADMIN — Medication 100 MILLIGRAM(S): at 07:57

## 2021-06-13 RX ADMIN — INSULIN GLARGINE 10 UNIT(S): 100 INJECTION, SOLUTION SUBCUTANEOUS at 11:40

## 2021-06-13 RX ADMIN — Medication 4: at 16:20

## 2021-06-13 RX ADMIN — Medication 1 TABLET(S): at 07:57

## 2021-06-13 RX ADMIN — Medication 20 MILLIGRAM(S): at 06:40

## 2021-06-13 RX ADMIN — Medication 40 MILLIEQUIVALENT(S): at 16:19

## 2021-06-13 RX ADMIN — SPIRONOLACTONE 50 MILLIGRAM(S): 25 TABLET, FILM COATED ORAL at 06:40

## 2021-06-13 RX ADMIN — Medication 1 MILLIGRAM(S): at 07:57

## 2021-06-13 RX ADMIN — SODIUM CHLORIDE 75 MILLILITER(S): 9 INJECTION INTRAMUSCULAR; INTRAVENOUS; SUBCUTANEOUS at 16:20

## 2021-06-13 RX ADMIN — Medication 8: at 07:58

## 2021-06-13 RX ADMIN — Medication 25 MICROGRAM(S): at 06:39

## 2021-06-13 RX ADMIN — LACTULOSE 10 GRAM(S): 10 SOLUTION ORAL at 16:20

## 2021-06-13 RX ADMIN — Medication 6: at 11:39

## 2021-06-13 RX ADMIN — Medication 40 MILLIEQUIVALENT(S): at 20:01

## 2021-06-13 RX ADMIN — PANTOPRAZOLE SODIUM 40 MILLIGRAM(S): 20 TABLET, DELAYED RELEASE ORAL at 06:40

## 2021-06-13 RX ADMIN — Medication 6 UNIT(S): at 00:09

## 2021-06-13 NOTE — PROGRESS NOTE ADULT - ASSESSMENT
62 y/o female with PMH of alcoholic liver cirrhosis with hepatic encephalopathy, portal HTN, HTN, DM-2, hypothyroidism came to the ED complaining of epigastric pain x 1 week. Pain was sharp radiating to her chest, associated with nausea and non-bloody emesis. Patient said her symptoms now improved at the time of interview. She endorsed dark stool to ED attending but said no when I asked her. She also noted shortness of breath with exertion and fatigue. She has no chest pain, melena, hematemesis, hematuria, recent travel, fever, chills. Of note patient still drinks alcohol, last drink was 2 weeks ago. Found to have hb of 5.2 in the ED, occult negative and also hyperglycemic.     Acute anemia likely anemia of chronic disease  possibly due to bone marrow suppression from ETOH and liver disease  Hb 5.2 on admission; denies hematochezia or melena  occult blood negative; rule out hemolysis  check retic, LDH and haptoglobin   Hb 10.1 s/p 2 units of PRBC  Hematology consulted - Dr. Noriega  Repeat CBC this evening    Hypovolemic Hyponatremia  -patient on lasix and aldactone at home  -appears hypovolemic  -check serum osm, uric acid, TSH, AM cortisol  -check urine studies  -corrected sodium 122 on admission which has corrected to 127 (when corrected for glucose)  -hold diuretics  -check orthostatics  -judicious hydration given small ascites and small effusion  -monitor I/Os  -repeat labs in AM; if sodium does not appropriately improve will consult renal    Epigastric abdominal pain - resolved  Likely due to gastritis as seen on imaging  Continue PPI    Analgesia as needed  CT abdomen negative for pancreatitis but suggestive of gastritis    Hyperglycemia with underlying DM-2   Patient said she is not on insulin   HbA1c 8.0  Increase lantus  Continue ISS  Fingersticks slowly improving  ADA diet    Alcoholic liver cirrhosis with hepatic encephalopathy and portal HTN   Patient was last seen in 2017, said she does not follow with GI/hepatologist   Was discharged on Rifaximin 550mg bid will continue   Hold diuretics due to hyponatremia  Check ammonia level; continue rifaximin and add lactulose  Continue beta-blocker  Outpatient GI/hepatology follow  Outpatient surveillance for HCC  Monitor LFTs    History of ETOH abuse   no evidence of withdrawals  continue Thiamine, Folic Acid and MVI  Ativan PRN for withdrawal precaution     HTN   BP stable  Continue Labetalol      Hypothyroidism   Check TSH  Continue Synthroid     Hypokalemia  -replete   -repeat BMP this evening    DVT ppx - SCDs    Dispo - Monitor H/H. Anemia work up; hem/onc consulted. PT evaluation.

## 2021-06-13 NOTE — PROGRESS NOTE ADULT - SUBJECTIVE AND OBJECTIVE BOX
CHIEF COMPLAINT/INTERVAL HISTORY: ( Inga)     Patient is a 65y old  Female who presents with a chief complaint of Anemia (2021 23:34)    SUBJECTIVE & OBJECTIVE: Pt seen and examined at bedside. No overnight events reported. Patient received 2 units of PRBC. Post transfusion Hb 10. Patient reports inability to ambulate properly; occasional dizziness.     ROS: No chest pain, palpitations, SOB, light headedness, headache, nausea/vomiting, fevers/chills, abdominal pain, dysuria or increased urinary frequency.    ICU Vital Signs Last 24 Hrs  T(C): 36.9 (2021 12:07), Max: 37.1 (2021 01:01)  T(F): 98.4 (2021 12:07), Max: 98.7 (2021 01:01)  HR: 83 (2021 12:07) (80 - 93)  BP: 123/80 (2021 12:07) (95/55 - 129/80)  RR: 16 (2021 12:07) (16 - 18)  SpO2: 95% (2021 12:07) (94% - 100%)    MEDICATIONS  (STANDING):  dextrose 40% Gel 15 Gram(s) Oral once  dextrose 5%. 1000 milliLiter(s) (50 mL/Hr) IV Continuous <Continuous>  dextrose 5%. 1000 milliLiter(s) (100 mL/Hr) IV Continuous <Continuous>  dextrose 50% Injectable 25 Gram(s) IV Push once  dextrose 50% Injectable 12.5 Gram(s) IV Push once  dextrose 50% Injectable 25 Gram(s) IV Push once  folic acid 1 milliGRAM(s) Oral daily  furosemide    Tablet 20 milliGRAM(s) Oral daily  glucagon  Injectable 1 milliGRAM(s) IntraMuscular once  insulin glargine Injectable (LANTUS) 10 Unit(s) SubCutaneous every morning  insulin lispro (ADMELOG) corrective regimen sliding scale   SubCutaneous three times a day before meals  insulin lispro (ADMELOG) corrective regimen sliding scale   SubCutaneous at bedtime  labetalol 100 milliGRAM(s) Oral two times a day  levothyroxine 25 MICROGram(s) Oral daily  multivitamin 1 Tablet(s) Oral daily  pantoprazole    Tablet 40 milliGRAM(s) Oral before breakfast  potassium chloride    Tablet ER 40 milliEquivalent(s) Oral every 4 hours  rifAXIMin 550 milliGRAM(s) Oral two times a day  sodium chloride 0.9%. 1000 milliLiter(s) (75 mL/Hr) IV Continuous <Continuous>  spironolactone 50 milliGRAM(s) Oral daily  thiamine 100 milliGRAM(s) Oral daily    MEDICATIONS  (PRN):  LORazepam   Injectable 1 milliGRAM(s) IV Push every 2 hours PRN Agitation      LABS:                        10.6   13.14 )-----------( 188      ( 2021 09:51 )             33.0     06-13    121<L>  |  82<L>  |  16.6  ----------------------------<  262<H>  3.1<L>   |  26.0  |  0.89    Ca    8.1<L>      2021 09:51    TPro  6.2<L>  /  Alb  2.4<L>  /  TBili  6.5<H>  /  DBili  4.6<H>  /  AST  216<H>  /  ALT  86<H>  /  AlkPhos  476<H>  06-13    PT/INR - ( 2021 18:36 )   PT: 19.8 sec;   INR: 1.75 ratio         PTT - ( 2021 18:36 )  PTT:25.3 sec  Urinalysis Basic - ( 2021 22:08 )    Color: Yellow / Appearance: Clear / S.010 / pH: x  Gluc: x / Ketone: Trace  / Bili: Negative / Urobili: 1 mg/dL   Blood: x / Protein: 15 mg/dL / Nitrite: Negative   Leuk Esterase: Moderate / RBC: 3-5 /HPF / WBC 3-5   Sq Epi: x / Non Sq Epi: Occasional / Bacteria: x        CAPILLARY BLOOD GLUCOSE      POCT Blood Glucose.: 292 mg/dL (2021 11:36)  POCT Blood Glucose.: 336 mg/dL (2021 07:51)  POCT Blood Glucose.: 369 mg/dL (2021 23:53)      PHYSICAL EXAM:    GENERAL: elderly female, laying in bed, appears weak, NAD  HEAD:  Atraumatic, Normocephalic  EYES: EOMI, PERRLA, conjunctiva and sclera clear  ENMT: Moist mucous membranes  NECK: Supple   NERVOUS SYSTEM:  Alert & Oriented X3   CHEST/LUNG: Clear to auscultation bilaterally   HEART: Regular rate and rhythm; + S1/S2  ABDOMEN: Soft, Nontender, Nondistended; Bowel sounds present  EXTREMITIES: no pedal edema

## 2021-06-13 NOTE — GOALS OF CARE CONVERSATION - ADVANCED CARE PLANNING - CONVERSATION DETAILS
Goal of care conversation had with patient, she mentioned her family knows her wishes and her son (Sunday) is her health care proxy. Patient said she is full code, wants everything done to keep her alive. Her diagnosis and continued use of EtOH discussed using .

## 2021-06-14 LAB
ALBUMIN SERPL ELPH-MCNC: 2 G/DL — LOW (ref 3.3–5.2)
ALP SERPL-CCNC: 352 U/L — HIGH (ref 40–120)
ALT FLD-CCNC: 70 U/L — HIGH
AMMONIA BLD-MCNC: 45 UMOL/L — SIGNIFICANT CHANGE UP (ref 11–55)
ANION GAP SERPL CALC-SCNC: 9 MMOL/L — SIGNIFICANT CHANGE UP (ref 5–17)
APPEARANCE UR: CLEAR — SIGNIFICANT CHANGE UP
AST SERPL-CCNC: 177 U/L — HIGH
BACTERIA # UR AUTO: ABNORMAL
BASOPHILS # BLD AUTO: 0.02 K/UL — SIGNIFICANT CHANGE UP (ref 0–0.2)
BASOPHILS NFR BLD AUTO: 0.2 % — SIGNIFICANT CHANGE UP (ref 0–2)
BILIRUB SERPL-MCNC: 4.4 MG/DL — HIGH (ref 0.4–2)
BILIRUB UR-MCNC: ABNORMAL
BUN SERPL-MCNC: 12.1 MG/DL — SIGNIFICANT CHANGE UP (ref 8–20)
CALCIUM SERPL-MCNC: 7.5 MG/DL — LOW (ref 8.6–10.2)
CHLORIDE SERPL-SCNC: 88 MMOL/L — LOW (ref 98–107)
CO2 SERPL-SCNC: 26 MMOL/L — SIGNIFICANT CHANGE UP (ref 22–29)
COLOR SPEC: YELLOW — SIGNIFICANT CHANGE UP
CORTIS AM PEAK SERPL-MCNC: 13.4 UG/DL — SIGNIFICANT CHANGE UP (ref 6–18.4)
COVID-19 SPIKE DOMAIN AB INTERP: POSITIVE
COVID-19 SPIKE DOMAIN ANTIBODY RESULT: >250 U/ML — HIGH
CREAT SERPL-MCNC: 0.68 MG/DL — SIGNIFICANT CHANGE UP (ref 0.5–1.3)
DIFF PNL FLD: ABNORMAL
EOSINOPHIL # BLD AUTO: 0.12 K/UL — SIGNIFICANT CHANGE UP (ref 0–0.5)
EOSINOPHIL NFR BLD AUTO: 1.4 % — SIGNIFICANT CHANGE UP (ref 0–6)
EPI CELLS # UR: SIGNIFICANT CHANGE UP
FERRITIN SERPL-MCNC: 76 NG/ML — SIGNIFICANT CHANGE UP (ref 15–150)
FOLATE SERPL-MCNC: 12.7 NG/ML — SIGNIFICANT CHANGE UP
GLUCOSE BLDC GLUCOMTR-MCNC: 244 MG/DL — HIGH (ref 70–99)
GLUCOSE BLDC GLUCOMTR-MCNC: 263 MG/DL — HIGH (ref 70–99)
GLUCOSE BLDC GLUCOMTR-MCNC: 290 MG/DL — HIGH (ref 70–99)
GLUCOSE BLDC GLUCOMTR-MCNC: 300 MG/DL — HIGH (ref 70–99)
GLUCOSE SERPL-MCNC: 216 MG/DL — HIGH (ref 70–99)
GLUCOSE UR QL: NEGATIVE MG/DL — SIGNIFICANT CHANGE UP
HCT VFR BLD CALC: 25.1 % — LOW (ref 34.5–45)
HCT VFR BLD CALC: 26 % — LOW (ref 34.5–45)
HGB BLD-MCNC: 8.3 G/DL — LOW (ref 11.5–15.5)
HGB BLD-MCNC: 8.7 G/DL — LOW (ref 11.5–15.5)
IMM GRANULOCYTES NFR BLD AUTO: 0.8 % — SIGNIFICANT CHANGE UP (ref 0–1.5)
IRON SATN MFR SERPL: 11 % — LOW (ref 14–50)
IRON SATN MFR SERPL: 23 UG/DL — LOW (ref 37–145)
KETONES UR-MCNC: ABNORMAL
LEUKOCYTE ESTERASE UR-ACNC: ABNORMAL
LYMPHOCYTES # BLD AUTO: 1.52 K/UL — SIGNIFICANT CHANGE UP (ref 1–3.3)
LYMPHOCYTES # BLD AUTO: 17.3 % — SIGNIFICANT CHANGE UP (ref 13–44)
MAGNESIUM SERPL-MCNC: 1.7 MG/DL — SIGNIFICANT CHANGE UP (ref 1.6–2.6)
MCHC RBC-ENTMCNC: 28.8 PG — SIGNIFICANT CHANGE UP (ref 27–34)
MCHC RBC-ENTMCNC: 33.1 GM/DL — SIGNIFICANT CHANGE UP (ref 32–36)
MCV RBC AUTO: 87.2 FL — SIGNIFICANT CHANGE UP (ref 80–100)
MONOCYTES # BLD AUTO: 1.06 K/UL — HIGH (ref 0–0.9)
MONOCYTES NFR BLD AUTO: 12.1 % — SIGNIFICANT CHANGE UP (ref 2–14)
NEUTROPHILS # BLD AUTO: 5.98 K/UL — SIGNIFICANT CHANGE UP (ref 1.8–7.4)
NEUTROPHILS NFR BLD AUTO: 68.2 % — SIGNIFICANT CHANGE UP (ref 43–77)
NITRITE UR-MCNC: NEGATIVE — SIGNIFICANT CHANGE UP
OSMOLALITY SERPL: 269 MOSMOL/KG — LOW (ref 280–301)
OSMOLALITY UR: 453 MOSM/KG — SIGNIFICANT CHANGE UP (ref 300–1000)
OSMOLALITY UR: 498 MOSM/KG — SIGNIFICANT CHANGE UP (ref 300–1000)
PH UR: 6 — SIGNIFICANT CHANGE UP (ref 5–8)
PHOSPHATE SERPL-MCNC: 2.2 MG/DL — LOW (ref 2.4–4.7)
PLATELET # BLD AUTO: 131 K/UL — LOW (ref 150–400)
POTASSIUM SERPL-MCNC: 3.9 MMOL/L — SIGNIFICANT CHANGE UP (ref 3.5–5.3)
POTASSIUM SERPL-SCNC: 3.9 MMOL/L — SIGNIFICANT CHANGE UP (ref 3.5–5.3)
POTASSIUM UR-SCNC: 90 MMOL/L — SIGNIFICANT CHANGE UP
PROT SERPL-MCNC: 5.3 G/DL — LOW (ref 6.6–8.7)
PROT UR-MCNC: 30 MG/DL
RBC # BLD: 2.88 M/UL — LOW (ref 3.8–5.2)
RBC # FLD: 18.5 % — HIGH (ref 10.3–14.5)
RBC CASTS # UR COMP ASSIST: SIGNIFICANT CHANGE UP /HPF (ref 0–4)
SARS-COV-2 IGG+IGM SERPL QL IA: >250 U/ML — HIGH
SARS-COV-2 IGG+IGM SERPL QL IA: POSITIVE
SODIUM SERPL-SCNC: 123 MMOL/L — LOW (ref 135–145)
SODIUM UR-SCNC: <30 MMOL/L — SIGNIFICANT CHANGE UP
SODIUM UR-SCNC: <30 MMOL/L — SIGNIFICANT CHANGE UP
SP GR SPEC: 1.01 — SIGNIFICANT CHANGE UP (ref 1.01–1.02)
TIBC SERPL-MCNC: 209 UG/DL — LOW (ref 220–430)
TRANSFERRIN SERPL-MCNC: 146 MG/DL — LOW (ref 192–382)
TSH SERPL-MCNC: 10.61 UIU/ML — HIGH (ref 0.27–4.2)
URATE SERPL-MCNC: 4.1 MG/DL — SIGNIFICANT CHANGE UP (ref 2.4–5.7)
UROBILINOGEN FLD QL: 12 MG/DL
VIT B12 SERPL-MCNC: >2000 PG/ML — HIGH (ref 232–1245)
WBC # BLD: 8.77 K/UL — SIGNIFICANT CHANGE UP (ref 3.8–10.5)
WBC # FLD AUTO: 8.77 K/UL — SIGNIFICANT CHANGE UP (ref 3.8–10.5)
WBC UR QL: ABNORMAL

## 2021-06-14 PROCEDURE — 99233 SBSQ HOSP IP/OBS HIGH 50: CPT

## 2021-06-14 RX ORDER — SPIRONOLACTONE 25 MG/1
50 TABLET, FILM COATED ORAL DAILY
Refills: 0 | Status: DISCONTINUED | OUTPATIENT
Start: 2021-06-14 | End: 2021-06-23

## 2021-06-14 RX ORDER — LEVOTHYROXINE SODIUM 125 MCG
50 TABLET ORAL DAILY
Refills: 0 | Status: DISCONTINUED | OUTPATIENT
Start: 2021-06-14 | End: 2021-06-28

## 2021-06-14 RX ORDER — SODIUM CHLORIDE 5 G/100ML
1000 INJECTION, SOLUTION INTRAVENOUS
Refills: 0 | Status: DISCONTINUED | OUTPATIENT
Start: 2021-06-14 | End: 2021-06-15

## 2021-06-14 RX ORDER — IRON SUCROSE 20 MG/ML
200 INJECTION, SOLUTION INTRAVENOUS EVERY 24 HOURS
Refills: 0 | Status: COMPLETED | OUTPATIENT
Start: 2021-06-14 | End: 2021-06-16

## 2021-06-14 RX ADMIN — Medication 100 MILLIGRAM(S): at 18:31

## 2021-06-14 RX ADMIN — Medication 100 MILLIGRAM(S): at 05:46

## 2021-06-14 RX ADMIN — LACTULOSE 10 GRAM(S): 10 SOLUTION ORAL at 12:10

## 2021-06-14 RX ADMIN — PANTOPRAZOLE SODIUM 40 MILLIGRAM(S): 20 TABLET, DELAYED RELEASE ORAL at 05:46

## 2021-06-14 RX ADMIN — IRON SUCROSE 110 MILLIGRAM(S): 20 INJECTION, SOLUTION INTRAVENOUS at 16:55

## 2021-06-14 RX ADMIN — SODIUM CHLORIDE 50 MILLILITER(S): 5 INJECTION, SOLUTION INTRAVENOUS at 18:31

## 2021-06-14 RX ADMIN — Medication 2: at 21:21

## 2021-06-14 RX ADMIN — Medication 6: at 18:30

## 2021-06-14 RX ADMIN — Medication 1 TABLET(S): at 12:11

## 2021-06-14 RX ADMIN — Medication 4: at 09:15

## 2021-06-14 RX ADMIN — Medication 62.5 MILLIMOLE(S): at 00:11

## 2021-06-14 RX ADMIN — Medication 25 MICROGRAM(S): at 05:46

## 2021-06-14 RX ADMIN — Medication 1 MILLIGRAM(S): at 12:11

## 2021-06-14 RX ADMIN — INSULIN GLARGINE 10 UNIT(S): 100 INJECTION, SOLUTION SUBCUTANEOUS at 09:16

## 2021-06-14 RX ADMIN — Medication 62.5 MILLIMOLE(S): at 12:08

## 2021-06-14 RX ADMIN — Medication 6: at 12:02

## 2021-06-14 NOTE — PROGRESS NOTE ADULT - SUBJECTIVE AND OBJECTIVE BOX
CC: anemia    INTERVAL HPI/OVERNIGHT EVENTS:  Pt seen and examined at bedside.   She remains AOx2, offers no complaints at this time.     ROS:   CONSTITUTIONAL: (-) fever, (-) chills  RESPIRATORY: (-) SOB  CV: (-) chest pain, (-) palpitations  GI: (-) abdominal pain, (-) nausea, (-) vomiting, (-) diarrhea, (-) constipation   NEURO: (-) headache    VITAL SIGNS:  T(C): 36.7 (21 @ 07:40), Max: 36.9 (21 @ 23:12)  HR: 70 (21 @ 07:40) (70 - 87)  BP: 99/61 (21 @ 07:40) (99/61 - 123/73)  RR: 18 (21 @ 07:40) (18 - 20)  SpO2: 97% (21 @ 07:40) (95% - 99%)  Wt(kg): --Vital Signs Last 24 Hrs  T(C): 36.7 (2021 07:40), Max: 36.9 (2021 23:12)  T(F): 98 (2021 07:40), Max: 98.4 (2021 23:12)  HR: 70 (2021 07:40) (70 - 87)  BP: 99/61 (2021 07:40) (99/61 - 123/73)  BP(mean): --  RR: 18 (2021 07:40) (18 - 20)  SpO2: 97% (2021 07:40) (95% - 99%)    PHYSICAL EXAM:  GENERAL: Laying comfortably in bed, AOx3, NAD  HEAD:  Atraumatic  EYES: sclera clear  CHEST/LUNG: Clear to auscultation bilaterally. Unlabored respirations on RA. speaks in full sentences without difficulty  HEART: +S1S2. Regular rate and rhythm  ABDOMEN: Bowel sounds present; Soft, Nontender, mild distension. No guarding or rigidity   EXTREMITIES: MAEx4. No clubbing, cyanosis, or edema.    LABS:                        8.3    8.77  )-----------( 131      ( 2021 08:08 )             25.1     06-14    123<L>  |  88<L>  |  12.1  ----------------------------<  216<H>  3.9   |  26.0  |  0.68    Ca    7.5<L>      2021 08:08  Phos  2.2     06-  Mg     1.7         TPro  5.3<L>  /  Alb  2.0<L>  /  TBili  4.4<H>  /  DBili  x   /  AST  177<H>  /  ALT  70<H>  /  AlkPhos  352<H>      PT/INR - ( 2021 18:36 )   PT: 19.8 sec;   INR: 1.75 ratio         PTT - ( 2021 18:36 )  PTT:25.3 sec    Urinalysis Basic - ( 2021 12:20 )  Color: Yellow / Appearance: Clear / S.015 / pH: x  Gluc: x / Ketone: Trace  / Bili: Moderate / Urobili: 12 mg/dL   Blood: x / Protein: 30 mg/dL / Nitrite: Negative   Leuk Esterase: Moderate / RBC: 0-2 /HPF / WBC 11-25   Sq Epi: x / Non Sq Epi: Few / Bacteria: Occasional     CC: anemia    INTERVAL HPI/OVERNIGHT EVENTS:  Pt seen and examined at bedside.   She remains AOx2, offers no complaints at this time.     ROS:   CONSTITUTIONAL: (-) fever, (-) chills  RESPIRATORY: (-) SOB  CV: (-) chest pain, (-) palpitations  GI: (-) abdominal pain, (-) nausea, (-) vomiting, (-) diarrhea, (-) constipation   NEURO: (-) headache    VITAL SIGNS:  T(C): 36.7 (21 @ 07:40), Max: 36.9 (21 @ 23:12)  HR: 70 (21 @ 07:40) (70 - 87)  BP: 99/61 (21 @ 07:40) (99/61 - 123/73)  RR: 18 (21 @ 07:40) (18 - 20)  SpO2: 97% (21 @ 07:40) (95% - 99%)  Wt(kg): --Vital Signs Last 24 Hrs  T(C): 36.7 (2021 07:40), Max: 36.9 (2021 23:12)  T(F): 98 (2021 07:40), Max: 98.4 (2021 23:12)  HR: 70 (2021 07:40) (70 - 87)  BP: 99/61 (2021 07:40) (99/61 - 123/73)  BP(mean): --  RR: 18 (2021 07:40) (18 - 20)  SpO2: 97% (2021 07:40) (95% - 99%)    PHYSICAL EXAM:  GENERAL: Laying comfortably in bed, AOx2, NAD  HEAD:  Atraumatic  EYES: sclera clear  CHEST/LUNG: Clear to auscultation bilaterally. Unlabored respirations on RA. speaks in full sentences without difficulty  HEART: +S1S2. Regular rate and rhythm  ABDOMEN: Bowel sounds present; Soft, Nontender, mild distension. No guarding or rigidity   EXTREMITIES: MAEx4. No clubbing, cyanosis, or edema.    LABS:                        8.3    8.77  )-----------( 131      ( 2021 08:08 )             25.1     06-14    123<L>  |  88<L>  |  12.1  ----------------------------<  216<H>  3.9   |  26.0  |  0.68    Ca    7.5<L>      2021 08:08  Phos  2.2     06-  Mg     1.7         TPro  5.3<L>  /  Alb  2.0<L>  /  TBili  4.4<H>  /  DBili  x   /  AST  177<H>  /  ALT  70<H>  /  AlkPhos  352<H>      PT/INR - ( 2021 18:36 )   PT: 19.8 sec;   INR: 1.75 ratio         PTT - ( 2021 18:36 )  PTT:25.3 sec    Urinalysis Basic - ( 2021 12:20 )  Color: Yellow / Appearance: Clear / S.015 / pH: x  Gluc: x / Ketone: Trace  / Bili: Moderate / Urobili: 12 mg/dL   Blood: x / Protein: 30 mg/dL / Nitrite: Negative   Leuk Esterase: Moderate / RBC: 0-2 /HPF / WBC 11-25   Sq Epi: x / Non Sq Epi: Few / Bacteria: Occasional

## 2021-06-14 NOTE — PROGRESS NOTE ADULT - ASSESSMENT
62 y/o female with PMH of alcoholic liver cirrhosis with hepatic encephalopathy, portal HTN, HTN, DM-2, hypothyroidism came to the ED complaining of epigastric pain x 1 week. She was found to have hb of 5.2 in the ED, occult negative. She was admitted for workup and treatment of anemia.     Acute anemia likely anemia of chronic disease  possibly due to bone marrow suppression from ETOH and liver disease  Hb 5.2 on admission, now improved s/p 2u PRBCs  Low haptoglobin and high LDH  Hematology following, likely not hemolytic anemia  Venofer  will recheck h/h tonight    Hypovolemic Hyponatremia  -patient on lasix and aldactone at home  -hold diuretics  -check orthostatics  -monitor I/Os  -renal consulted; pt now on hypertonic saline    Epigastric abdominal pain - resolved  Likely due to gastritis as seen on imaging  Continue PPI    Analgesia as needed  CT abdomen negative for pancreatitis but suggestive of gastritis    Hyperglycemia with underlying DM-2   Patient said she is not on insulin   HbA1c 8.0  Increase lantus  Continue ISS  Fingersticks slowly improving  ADA diet    Alcoholic liver cirrhosis with hepatic encephalopathy and portal HTN   Patient was last seen in 2017, said she does not follow with GI/hepatologist   Was discharged on Rifaximin 550mg bid will continue   Hold diuretics due to hyponatremia  Check ammonia level; continue rifaximin and add lactulose  Continue beta-blocker  Outpatient GI/hepatology follow  Outpatient surveillance for HCC  Monitor LFTs    History of ETOH abuse   no evidence of withdrawals  continue Thiamine, Folic Acid and MVI  Ativan PRN for withdrawal precaution     HTN   BP stable  Continue Labetalol      Hypothyroidism   Check TSH  Continue Synthroid     Hypokalemia  -replete   -repeat BMP this evening    DVT ppx - SCDs    Dispo - Monitor H/H. Anemia work up; hem/onc consulted. PT evaluation. 62 y/o female with PMH of alcoholic liver cirrhosis with hepatic encephalopathy, portal HTN, HTN, DM-2, hypothyroidism came to the ED complaining of epigastric pain x 1 week. She was found to have hb of 5.2 in the ED, occult negative. She was admitted for workup and treatment of anemia.     Acute anemia likely anemia of chronic disease  -possibly due to bone marrow suppression from ETOH and liver disease  -Hb 5.2 on admission, now improved s/p 2u PRBCs  -Low haptoglobin and high LDH  -Hematology following, likely not hemolytic anemia, may be 2/2 chronic liver dz  -Venofer  -will recheck h/h tonight    Hypovolemic Hyponatremia  -holding lasix, continue spironolactone  -check orthostatics  -monitor I/Os  -renal following; pt now on hypertonic saline    Alcoholic liver cirrhosis with hepatic encephalopathy and portal HTN   -Hold diuretics due to hyponatremia  -ammonia level WNL  -rifaximin, lactulose  -Continue beta-blocker  -Outpatient GI/hepatology follow  -Outpatient surveillance for HCC  -Monitor LFTs    Epigastric abdominal pain - resolved  -CT abdomen negative for pancreatitis but suggestive of gastritis  -Continue PPI, Analgesia PRN    Hyperglycemia with underlying DM-2, not on home insulin   -HbA1c 8.0  -Lantus increased to 10u  -Continue ISS  -ADA diet    History of ETOH abuse   -no evidence of withdrawal  -continue Thiamine, Folic Acid and MVI  -Ativan PRN for withdrawal precaution     HTN   -BP stable  -Continue Labetalol      Hypothyroidism   -TSH elevated, synthroid increased    Hypokalemia  -repleated, improved    DVT ppx - SCDs    Dispo - Monitor H/H. Not medically ready. PT consult ordered and pending.

## 2021-06-14 NOTE — CONSULT NOTE ADULT - SUBJECTIVE AND OBJECTIVE BOX
Patient is a 65y old  Female who presents with a chief complaint of Anemia (2021 08:53)      HPI:  62 y/o female with PMH of alcoholic liver cirrhosis with hepatic encephalopathy, portal HTN, HTN, DM-2, hypothyroidism came to the ED complaining of epigastric pain x 1 week. Pain was sharp radiating to her chest, associated with nausea and non-bloody emesis. Patient said her symptoms now improved at the time of interview. She endorsed dark stool to ED but said no when I asked her. She also noted shortness of breath with exertion and fatigue. She has no chest pain, melena, hematemesis, hematuria, recent travel, fever, chills. Of note patient still drinks alcohol, last drink was 2 weeks ago.      (2021 23:34)      PAST MEDICAL & SURGICAL HISTORY:    Alcohol abuse    Liver cirrhosis      Urea cycle metabolism disorder      Constipation    Lump in female breast    UTI (urinary tract infection)    Lymphangitis, acute, lower leg    Anemia    Hypothyroidism    Chronic back pain    HTN (hypertension)    GERD (gastroesophageal reflux disease)    Pancreatitis    No significant past surgical history    S/P abdominoplasty       FAMILY HISTORY:  FH: depression (Child)    NC    Social History: smoker    MEDICATIONS  (STANDING):  dextrose 40% Gel 15 Gram(s) Oral once  dextrose 5%. 1000 milliLiter(s) (50 mL/Hr) IV Continuous <Continuous>  dextrose 5%. 1000 milliLiter(s) (100 mL/Hr) IV Continuous <Continuous>  dextrose 50% Injectable 25 Gram(s) IV Push once  dextrose 50% Injectable 12.5 Gram(s) IV Push once  dextrose 50% Injectable 25 Gram(s) IV Push once  folic acid 1 milliGRAM(s) Oral daily  glucagon  Injectable 1 milliGRAM(s) IntraMuscular once  insulin glargine Injectable (LANTUS) 10 Unit(s) SubCutaneous every morning  insulin lispro (ADMELOG) corrective regimen sliding scale   SubCutaneous three times a day before meals  insulin lispro (ADMELOG) corrective regimen sliding scale   SubCutaneous at bedtime  iron sucrose IVPB 200 milliGRAM(s) IV Intermittent every 24 hours  labetalol 100 milliGRAM(s) Oral two times a day  lactulose Syrup 10 Gram(s) Oral daily  levothyroxine 25 MICROGram(s) Oral daily  multivitamin 1 Tablet(s) Oral daily  pantoprazole    Tablet 40 milliGRAM(s) Oral before breakfast  rifAXIMin 550 milliGRAM(s) Oral two times a day  sodium phosphate IVPB 15 milliMole(s) IV Intermittent once  thiamine 100 milliGRAM(s) Oral daily    MEDICATIONS  (PRN):  LORazepam   Injectable 1 milliGRAM(s) IV Push every 2 hours PRN Agitation   Meds reviewed    Allergies    No Known Allergies        REVIEW OF SYSTEMS:    As above    ALLERY AND IMMUNOLOGIC: No hives or eczema      Vital Signs Last 24 Hrs  T(C): 36.7 (2021 07:40), Max: 36.9 (2021 12:07)  T(F): 98 (2021 07:40), Max: 98.4 (2021 12:07)  HR: 70 (2021 07:40) (70 - 87)  BP: 99/61 (2021 07:40) (99/61 - 123/80)  BP(mean): --  RR: 18 (2021 07:40) (16 - 20)  SpO2: 97% (2021 07:40) (95% - 99%)      PHYSICAL EXAM:    GENERAL: appears chronically ill, sluggish in bed  HEAD:  Atraumatic, Normocephalic  EYES: EOMI, PERRLA, conjunctiva and sclera clear  ENMT: No tonsillar erythema, exudates, or enlargement; Moist mucous membranes  NECK: Supple, neck  veins flat  NERVOUS SYSTEM:  Alert & Oriented X3, Good concentration; Motor Strength wnl upper and lower extremities;  CHEST/LUNG: Clear to percussion bilaterally; No rales, rhonchi, wheezing, or rubs  HEART: Regular rate and rhythm; No rubs, or gallops  ABDOMEN: Soft, Nontender, Nondistended; Bowel sounds present  EXTREMITIES:  Muscle wasting  LYMPH: No lymphadenopathy noted  SKIN: No rashes or lesions, pale  : Neg      LABS:                        8.3    8.77  )-----------( 131      ( 2021 08:08 )             25.1     06-14    123<L>  |  88<L>  |  12.1  ----------------------------<  216<H>  3.9   |  26.0  |  0.68    Ca    7.5<L>      2021 08:08  Phos  2.2     06-14  Mg     1.7         TPro  5.3<L>  /  Alb  2.0<L>  /  TBili  4.4<H>  /  DBili  x   /  AST  177<H>  /  ALT  70<H>  /  AlkPhos  352<H>      PT/INR - ( 2021 18:36 )   PT: 19.8 sec;   INR: 1.75 ratio         PTT - ( 2021 18:36 )  PTT:25.3 sec  Urinalysis Basic - ( 2021 22:08 )    Color: Yellow / Appearance: Clear / S.010 / pH: x  Gluc: x / Ketone: Trace  / Bili: Negative / Urobili: 1 mg/dL   Blood: x / Protein: 15 mg/dL / Nitrite: Negative   Leuk Esterase: Moderate / RBC: 3-5 /HPF / WBC 3-5   Sq Epi: x / Non Sq Epi: Occasional / Bacteria: x      Magnesium, Serum: 1.7 mg/dL ( @ 08:08)  Phosphorus Level, Serum: 2.2 mg/dL ( @ 08:08)  Magnesium, Serum: 1.8 mg/dL ( @ 19:00)  Phosphorus Level, Serum: 1.2 mg/dL ( @ 19:00)          RADIOLOGY & ADDITIONAL TESTS:

## 2021-06-14 NOTE — CONSULT NOTE ADULT - SUBJECTIVE AND OBJECTIVE BOX
HPI: Patient is a 65y Female seen on consultation for the evaluation and management of anemia; has long term history of cirrhosis; admitted to [facility] with Hb of 5.2 unassociated with bleeding. Received 2 units of PRBC with reultant Hb of 10.6.  Noted TB of 6.5, mostly direct, with elevated transaminases, mildly elevated retic of 4.9.  Awake, alert, appears comfortable.  In no resp distress.     PAST MEDICAL & SURGICAL HISTORY:  Alcohol abuse    Alcohol abuse    Liver cirrhosis    ETOH abuse    Urea cycle metabolism disorder    Transitory  hyperthyroidism    Constipation    Lump in female breast    UTI (urinary tract infection)    Lymphangitis, acute, lower leg    Anemia    Hypothyroidism    Chronic back pain    HTN (hypertension)    GERD (gastroesophageal reflux disease)    Pancreatitis    No significant past surgical history    S/P abdominoplasty    MEDICATIONS  (STANDING):  dextrose 40% Gel 15 Gram(s) Oral once  dextrose 5%. 1000 milliLiter(s) (50 mL/Hr) IV Continuous <Continuous>  dextrose 5%. 1000 milliLiter(s) (100 mL/Hr) IV Continuous <Continuous>  dextrose 50% Injectable 25 Gram(s) IV Push once  dextrose 50% Injectable 12.5 Gram(s) IV Push once  dextrose 50% Injectable 25 Gram(s) IV Push once  folic acid 1 milliGRAM(s) Oral daily  glucagon  Injectable 1 milliGRAM(s) IntraMuscular once  insulin glargine Injectable (LANTUS) 10 Unit(s) SubCutaneous every morning  insulin lispro (ADMELOG) corrective regimen sliding scale   SubCutaneous three times a day before meals  insulin lispro (ADMELOG) corrective regimen sliding scale   SubCutaneous at bedtime  labetalol 100 milliGRAM(s) Oral two times a day  lactulose Syrup 10 Gram(s) Oral daily  levothyroxine 25 MICROGram(s) Oral daily  multivitamin 1 Tablet(s) Oral daily  pantoprazole    Tablet 40 milliGRAM(s) Oral before breakfast  rifAXIMin 550 milliGRAM(s) Oral two times a day  thiamine 100 milliGRAM(s) Oral daily    MEDICATIONS  (PRN):  LORazepam   Injectable 1 milliGRAM(s) IV Push every 2 hours PRN Agitation      Allergies    No Known Allergies    Intolerances        FAMILY HISTORY:  FH: depression (Child)        REVIEW OF SYSTEMS        Vital Signs Last 24 Hrs  T(C): 36.7 (2021 07:40), Max: 36.9 (2021 12:07)  T(F): 98 (2021 07:40), Max: 98.4 (2021 12:07)  HR: 70 (2021 07:40) (70 - 87)  BP: 99/61 (2021 07:40) (99/61 - 123/80)  BP(mean): --  RR: 18 (2021 07:40) (16 - 20)  SpO2: 97% (2021 07:40) (95% - 99%)    PHYSICAL EXAM:      Constitutional:awake, comfortable    Eyes:icteric    ENMT:no adenopathy    Respiratory:Clear    Cardiovascular:RRR    Gastrointestinal:distended, HSM, distended                Extremities:bilateral LE edema          LABS:                        8.3    8.77  )-----------( 131      ( 2021 08:08 )             25.1     06-13    121<L>  |  84<L>  |  15.1  ----------------------------<  240<H>  3.5   |  28.0  |  0.89    Ca    7.8<L>      2021 19:00  Phos  1.2     06-13  Mg     1.8     06-13    TPro  6.2<L>  /  Alb  2.4<L>  /  TBili  6.5<H>  /  DBili  4.6<H>  /  AST  216<H>  /  ALT  86<H>  /  AlkPhos  476<H>  06-13    PT/INR - ( 2021 18:36 )   PT: 19.8 sec;   INR: 1.75 ratio         PTT - ( 2021 18:36 )  PTT:25.3 sec  Urinalysis Basic - ( 2021 22:08 )    Color: Yellow / Appearance: Clear / S.010 / pH: x  Gluc: x / Ketone: Trace  / Bili: Negative / Urobili: 1 mg/dL   Blood: x / Protein: 15 mg/dL / Nitrite: Negative   Leuk Esterase: Moderate / RBC: 3-5 /HPF / WBC 3-5   Sq Epi: x / Non Sq Epi: Occasional / Bacteria: x        RADIOLOGY & ADDITIONAL STUDIES:

## 2021-06-14 NOTE — PROGRESS NOTE ADULT - ATTENDING COMMENTS
I have seen and examined the patient and agree with the above assessment and plan. Patient with flat affect I have seen and examined the patient and agree with the above assessment and plan. Patient with flat affect and denies any current symptoms. Labs reviewed; hyponatremic and not responding to NS. Renal consulted; started on hypertonic saline. Work up pending. Also, discussed anemia with hematology; not concerned for hemolysis. low hapto and high LDH can also be seen with liver dysfunction. Recommending serial CBC and transfusion as needed. Patient known to hematology service from hospitalization at [facility] in 2012 where she was admitted for anemia as well but failed to follow up. Unable to reach patient's sister today therefore will have social work assist with helping team obtain contact number. Rest of management as detailed above.    Plan discussed with patient, Dr. Paredes, Dr. Ramirez, medicine PA

## 2021-06-15 DIAGNOSIS — F10.10 ALCOHOL ABUSE, UNCOMPLICATED: ICD-10-CM

## 2021-06-15 DIAGNOSIS — R10.13 EPIGASTRIC PAIN: ICD-10-CM

## 2021-06-15 DIAGNOSIS — K70.31 ALCOHOLIC CIRRHOSIS OF LIVER WITH ASCITES: ICD-10-CM

## 2021-06-15 DIAGNOSIS — D64.9 ANEMIA, UNSPECIFIED: ICD-10-CM

## 2021-06-15 LAB
ALBUMIN SERPL ELPH-MCNC: 2.3 G/DL — LOW (ref 3.3–5.2)
ALP SERPL-CCNC: 474 U/L — HIGH (ref 40–120)
ALT FLD-CCNC: 78 U/L — HIGH
ANION GAP SERPL CALC-SCNC: 8 MMOL/L — SIGNIFICANT CHANGE UP (ref 5–17)
AST SERPL-CCNC: 177 U/L — HIGH
BASOPHILS # BLD AUTO: 0.05 K/UL — SIGNIFICANT CHANGE UP (ref 0–0.2)
BASOPHILS NFR BLD AUTO: 0.5 % — SIGNIFICANT CHANGE UP (ref 0–2)
BILIRUB DIRECT SERPL-MCNC: 2.8 MG/DL — HIGH (ref 0–0.3)
BILIRUB INDIRECT FLD-MCNC: 1.3 MG/DL — HIGH (ref 0.2–1)
BILIRUB SERPL-MCNC: 4 MG/DL — HIGH (ref 0.4–2)
BUN SERPL-MCNC: 12.1 MG/DL — SIGNIFICANT CHANGE UP (ref 8–20)
CA-I BLD-SCNC: 1.09 MMOL/L — LOW (ref 1.15–1.33)
CALCIUM SERPL-MCNC: 8 MG/DL — LOW (ref 8.6–10.2)
CHLORIDE SERPL-SCNC: 90 MMOL/L — LOW (ref 98–107)
CO2 SERPL-SCNC: 25 MMOL/L — SIGNIFICANT CHANGE UP (ref 22–29)
CREAT SERPL-MCNC: 0.88 MG/DL — SIGNIFICANT CHANGE UP (ref 0.5–1.3)
EOSINOPHIL # BLD AUTO: 0.12 K/UL — SIGNIFICANT CHANGE UP (ref 0–0.5)
EOSINOPHIL NFR BLD AUTO: 1.3 % — SIGNIFICANT CHANGE UP (ref 0–6)
GLUCOSE BLDC GLUCOMTR-MCNC: 234 MG/DL — HIGH (ref 70–99)
GLUCOSE BLDC GLUCOMTR-MCNC: 256 MG/DL — HIGH (ref 70–99)
GLUCOSE BLDC GLUCOMTR-MCNC: 267 MG/DL — HIGH (ref 70–99)
GLUCOSE BLDC GLUCOMTR-MCNC: 368 MG/DL — HIGH (ref 70–99)
GLUCOSE SERPL-MCNC: 239 MG/DL — HIGH (ref 70–99)
HCT VFR BLD CALC: 26.1 % — LOW (ref 34.5–45)
HGB BLD-MCNC: 8.7 G/DL — LOW (ref 11.5–15.5)
IMM GRANULOCYTES NFR BLD AUTO: 1 % — SIGNIFICANT CHANGE UP (ref 0–1.5)
LYMPHOCYTES # BLD AUTO: 1.46 K/UL — SIGNIFICANT CHANGE UP (ref 1–3.3)
LYMPHOCYTES # BLD AUTO: 15.5 % — SIGNIFICANT CHANGE UP (ref 13–44)
MAGNESIUM SERPL-MCNC: 1.9 MG/DL — SIGNIFICANT CHANGE UP (ref 1.6–2.6)
MCHC RBC-ENTMCNC: 28.8 PG — SIGNIFICANT CHANGE UP (ref 27–34)
MCHC RBC-ENTMCNC: 33.3 GM/DL — SIGNIFICANT CHANGE UP (ref 32–36)
MCV RBC AUTO: 86.4 FL — SIGNIFICANT CHANGE UP (ref 80–100)
MONOCYTES # BLD AUTO: 0.96 K/UL — HIGH (ref 0–0.9)
MONOCYTES NFR BLD AUTO: 10.2 % — SIGNIFICANT CHANGE UP (ref 2–14)
NEUTROPHILS # BLD AUTO: 6.73 K/UL — SIGNIFICANT CHANGE UP (ref 1.8–7.4)
NEUTROPHILS NFR BLD AUTO: 71.5 % — SIGNIFICANT CHANGE UP (ref 43–77)
PHOSPHATE SERPL-MCNC: 2.8 MG/DL — SIGNIFICANT CHANGE UP (ref 2.4–4.7)
PLATELET # BLD AUTO: 136 K/UL — LOW (ref 150–400)
POTASSIUM SERPL-MCNC: 3.9 MMOL/L — SIGNIFICANT CHANGE UP (ref 3.5–5.3)
POTASSIUM SERPL-SCNC: 3.9 MMOL/L — SIGNIFICANT CHANGE UP (ref 3.5–5.3)
PROT SERPL-MCNC: 6.2 G/DL — LOW (ref 6.6–8.7)
RBC # BLD: 3.02 M/UL — LOW (ref 3.8–5.2)
RBC # FLD: 18.8 % — HIGH (ref 10.3–14.5)
SODIUM SERPL-SCNC: 123 MMOL/L — LOW (ref 135–145)
T3FREE SERPL-MCNC: 1.26 PG/ML — LOW (ref 1.8–4.6)
T4 FREE SERPL-MCNC: 0.9 NG/DL — SIGNIFICANT CHANGE UP (ref 0.9–1.8)
WBC # BLD: 9.41 K/UL — SIGNIFICANT CHANGE UP (ref 3.8–10.5)
WBC # FLD AUTO: 9.41 K/UL — SIGNIFICANT CHANGE UP (ref 3.8–10.5)

## 2021-06-15 PROCEDURE — 99233 SBSQ HOSP IP/OBS HIGH 50: CPT

## 2021-06-15 RX ORDER — ALBUMIN HUMAN 25 %
50 VIAL (ML) INTRAVENOUS EVERY 12 HOURS
Refills: 0 | Status: COMPLETED | OUTPATIENT
Start: 2021-06-15 | End: 2021-06-17

## 2021-06-15 RX ORDER — PANTOPRAZOLE SODIUM 20 MG/1
40 TABLET, DELAYED RELEASE ORAL
Refills: 0 | Status: DISCONTINUED | OUTPATIENT
Start: 2021-06-15 | End: 2021-06-28

## 2021-06-15 RX ORDER — POTASSIUM CHLORIDE 20 MEQ
30 PACKET (EA) ORAL
Refills: 0 | Status: DISCONTINUED | OUTPATIENT
Start: 2021-06-15 | End: 2021-06-18

## 2021-06-15 RX ORDER — MIDODRINE HYDROCHLORIDE 2.5 MG/1
5 TABLET ORAL THREE TIMES A DAY
Refills: 0 | Status: DISCONTINUED | OUTPATIENT
Start: 2021-06-15 | End: 2021-06-17

## 2021-06-15 RX ORDER — FUROSEMIDE 40 MG
40 TABLET ORAL EVERY 12 HOURS
Refills: 0 | Status: COMPLETED | OUTPATIENT
Start: 2021-06-15 | End: 2021-06-17

## 2021-06-15 RX ORDER — SODIUM CHLORIDE 9 MG/ML
2 INJECTION INTRAMUSCULAR; INTRAVENOUS; SUBCUTANEOUS
Refills: 0 | Status: DISCONTINUED | OUTPATIENT
Start: 2021-06-15 | End: 2021-06-28

## 2021-06-15 RX ADMIN — Medication 30 MILLIEQUIVALENT(S): at 17:34

## 2021-06-15 RX ADMIN — IRON SUCROSE 110 MILLIGRAM(S): 20 INJECTION, SOLUTION INTRAVENOUS at 09:42

## 2021-06-15 RX ADMIN — Medication 6: at 17:34

## 2021-06-15 RX ADMIN — PANTOPRAZOLE SODIUM 40 MILLIGRAM(S): 20 TABLET, DELAYED RELEASE ORAL at 17:37

## 2021-06-15 RX ADMIN — Medication 100 MILLIGRAM(S): at 12:37

## 2021-06-15 RX ADMIN — Medication 1 TABLET(S): at 12:36

## 2021-06-15 RX ADMIN — SPIRONOLACTONE 50 MILLIGRAM(S): 25 TABLET, FILM COATED ORAL at 05:22

## 2021-06-15 RX ADMIN — INSULIN GLARGINE 10 UNIT(S): 100 INJECTION, SOLUTION SUBCUTANEOUS at 08:37

## 2021-06-15 RX ADMIN — PANTOPRAZOLE SODIUM 40 MILLIGRAM(S): 20 TABLET, DELAYED RELEASE ORAL at 07:22

## 2021-06-15 RX ADMIN — Medication 1 MILLIGRAM(S): at 12:36

## 2021-06-15 RX ADMIN — Medication 50 MILLILITER(S): at 17:38

## 2021-06-15 RX ADMIN — LACTULOSE 10 GRAM(S): 10 SOLUTION ORAL at 12:37

## 2021-06-15 RX ADMIN — SODIUM CHLORIDE 2 GRAM(S): 9 INJECTION INTRAMUSCULAR; INTRAVENOUS; SUBCUTANEOUS at 17:34

## 2021-06-15 RX ADMIN — Medication 4: at 08:35

## 2021-06-15 RX ADMIN — Medication 6: at 12:35

## 2021-06-15 RX ADMIN — MIDODRINE HYDROCHLORIDE 5 MILLIGRAM(S): 2.5 TABLET ORAL at 12:45

## 2021-06-15 RX ADMIN — Medication 6: at 21:24

## 2021-06-15 RX ADMIN — Medication 40 MILLIGRAM(S): at 17:32

## 2021-06-15 RX ADMIN — MIDODRINE HYDROCHLORIDE 5 MILLIGRAM(S): 2.5 TABLET ORAL at 17:34

## 2021-06-15 RX ADMIN — SODIUM CHLORIDE 50 MILLILITER(S): 5 INJECTION, SOLUTION INTRAVENOUS at 09:40

## 2021-06-15 RX ADMIN — Medication 50 MICROGRAM(S): at 05:22

## 2021-06-15 NOTE — PROGRESS NOTE ADULT - ATTENDING COMMENTS
I have seen and examined the patient and agree with the above assessment and plan. No overnight events reported. Patient with flat affect; ? alcoholic dementia. Unable to reach sister. Sodium remains low; trail of IV albumin and lasix per renal. Also salt tabs were added. Continue aldactone. Monitor I/Os. Worsening transaminitis. GI consulted. Hb remains stable. Continue venofer. Hem/onc on board. PT- recommending home upon discharge. Monitor labs closely; repeat ordered for AM. BP low normal, midodrine added per renal to assist with diuresis. Rest of management as detailed above. Prognosis guarded. SW consult.

## 2021-06-15 NOTE — PROGRESS NOTE ADULT - ASSESSMENT
60 y/o female with PMH of alcoholic liver cirrhosis with hepatic encephalopathy, portal HTN, HTN, DM-2, hypothyroidism came to the ED complaining of epigastric pain x 1 week. She was found to have hb of 5.2 in the ED, occult negative. She was admitted for workup and treatment of anemia.     Acute anemia likely anemia of chronic disease  -possibly due to bone marrow suppression from ETOH and liver disease  -Hb 5.2 on admission, now improved s/p 2u PRBCs  -Low haptoglobin and high LDH  -Hematology following, likely not hemolytic anemia, may be 2/2 chronic liver dz  -Venofer  -stable Hg now 8.7    Hypovolemic Hyponatremia  -lasix restarted by nephro and trial of IV albumin  -orthostatics negative  -monitor I/Os  -renal following; hypertonic saline discontinued  -midodrine started, beta-blocker discontinued    Alcoholic liver cirrhosis with hepatic encephalopathy and portal HTN   -diuretics restarted  -ammonia level WNL  -rifaximin, lactulose  -beta-blocker discontinued  -Outpatient GI/hepatology follow  -Outpatient surveillance for HCC  -Monitor LFTs    Epigastric abdominal pain - resolved  -CT abdomen negative for pancreatitis but suggestive of gastritis  -Continue PPI, Analgesia PRN    Hyperglycemia with underlying DM-2, not on home insulin   -HbA1c 8.0  -Lantus increased to 10u  -Continue ISS  -ADA diet    History of ETOH abuse   -no evidence of withdrawal  -continue Thiamine, Folic Acid and MVI  -Ativan PRN for withdrawal precaution     HTN   -BP stable  -Continue Labetalol      Hypothyroidism   -TSH elevated, synthroid increased    Hypokalemia  -repleated, improved    DVT ppx - SCDs    Dispo - Monitor H/H. Not medically ready. PT recommending home with assist

## 2021-06-15 NOTE — PROGRESS NOTE ADULT - SUBJECTIVE AND OBJECTIVE BOX
NEPHROLOGY INTERVAL HPI/OVERNIGHT EVENTS:    No new events.    MEDICATIONS  (STANDING):  dextrose 40% Gel 15 Gram(s) Oral once  dextrose 5%. 1000 milliLiter(s) (50 mL/Hr) IV Continuous <Continuous>  dextrose 5%. 1000 milliLiter(s) (100 mL/Hr) IV Continuous <Continuous>  dextrose 50% Injectable 25 Gram(s) IV Push once  dextrose 50% Injectable 12.5 Gram(s) IV Push once  dextrose 50% Injectable 25 Gram(s) IV Push once  folic acid 1 milliGRAM(s) Oral daily  glucagon  Injectable 1 milliGRAM(s) IntraMuscular once  insulin glargine Injectable (LANTUS) 10 Unit(s) SubCutaneous every morning  insulin lispro (ADMELOG) corrective regimen sliding scale   SubCutaneous three times a day before meals  insulin lispro (ADMELOG) corrective regimen sliding scale   SubCutaneous at bedtime  iron sucrose IVPB 200 milliGRAM(s) IV Intermittent every 24 hours  labetalol 100 milliGRAM(s) Oral two times a day  lactulose Syrup 10 Gram(s) Oral daily  levothyroxine 50 MICROGram(s) Oral daily  multivitamin 1 Tablet(s) Oral daily  pantoprazole    Tablet 40 milliGRAM(s) Oral before breakfast  rifAXIMin 550 milliGRAM(s) Oral two times a day  sodium chloride 1.5% 1000 milliLiter(s) (50 mL/Hr) IV Continuous <Continuous>  spironolactone 50 milliGRAM(s) Oral daily  thiamine 100 milliGRAM(s) Oral daily    MEDICATIONS  (PRN):  LORazepam   Injectable 1 milliGRAM(s) IV Push every 2 hours PRN Agitation      Allergies    No Known Allergies          Vital Signs Last 24 Hrs  T(C): 36.7 (15 Claudio 2021 08:00), Max: 37.1 (2021 15:37)  T(F): 98.1 (15 Claudio 2021 08:00), Max: 98.8 (2021 15:37)  HR: 73 (15 Claudio 2021 08:00) (71 - 75)  BP: 108/65 (15 Claudio 2021 08:00) (93/65 - 108/65)  BP(mean): --  RR: 18 (15 Claudio 2021 08:00) (17 - 18)  SpO2: 98% (15 Claudio 2021 08:00) (92% - 98%)      PHYSICAL EXAM:    GENERAL: OOB in chair appears chronically ill  HEAD:  wnl  EYES:   ENMT: wnl  NECK: veins  flat in chair  NERVOUS SYSTEM: alert, ate   CHEST/LUNG: decreased bs bases  HEART: no rub noted  ABDOMEN: distended with acsites   EXTREMITIES:  Muscle wasting, no edema  LYMPH:   SKIN: Jaundiced  : Neg        LABS:                        8.7    9.41  )-----------( 136      ( 15 Claudio 2021 05:58 )             26.1     06-15    123<L>  |  90<L>  |  12.1  ----------------------------<  239<H>  3.9   |  25.0  |  0.88    Ca    8.0<L>      15 Claudio 2021 05:58  Phos  2.8     06-15  Mg     1.9     06-15    TPro  6.2<L>  /  Alb  2.3<L>  /  TBili  4.0<H>  /  DBili  2.8<H>  /  AST  177<H>  /  ALT  78<H>  /  AlkPhos  474<H>  06-15      Urinalysis Basic - ( 2021 12:20 )    Color: Yellow / Appearance: Clear / S.015 / pH: x  Gluc: x / Ketone: Trace  / Bili: Moderate / Urobili: 12 mg/dL   Blood: x / Protein: 30 mg/dL / Nitrite: Negative   Leuk Esterase: Moderate / RBC: 0-2 /HPF / WBC 11-25   Sq Epi: x / Non Sq Epi: Few / Bacteria: Occasional      Magnesium, Serum: 1.9 mg/dL (06-15 @ 05:58)  Phosphorus Level, Serum: 2.8 mg/dL (06-15 @ 05:58)          RADIOLOGY & ADDITIONAL TESTS:

## 2021-06-15 NOTE — PROGRESS NOTE ADULT - SUBJECTIVE AND OBJECTIVE BOX
HPI: Patient is a 65y Female seen on consultation for the evaluation and management of anemia; has long term history of cirrhosis; admitted to [facility] with Hb of 5.2 unassociated with bleeding. Received 2 units of PRBC with reultant Hb of 10.6.  Noted TB of 6.5, mostly direct, with elevated transaminases, mildly elevated retic of 4.9.  Awake, alert, appears comfortable.  In no resp distress.   Pt seen this AM, awake, alert , comfortable; no bleeding reported.  Hb 8.7.    PAST MEDICAL & SURGICAL HISTORY:  Alcohol abuse    Alcohol abuse    Liver cirrhosis    ETOH abuse    Urea cycle metabolism disorder    Transitory  hyperthyroidism    Constipation    Lump in female breast    UTI (urinary tract infection)    Lymphangitis, acute, lower leg    Anemia    Hypothyroidism    Chronic back pain    HTN (hypertension)    GERD (gastroesophageal reflux disease)    Pancreatitis    No significant past surgical history    S/P abdominoplasty    MEDICATIONS  (STANDING):  dextrose 40% Gel 15 Gram(s) Oral once  dextrose 5%. 1000 milliLiter(s) (50 mL/Hr) IV Continuous <Continuous>  dextrose 5%. 1000 milliLiter(s) (100 mL/Hr) IV Continuous <Continuous>  dextrose 50% Injectable 25 Gram(s) IV Push once  dextrose 50% Injectable 12.5 Gram(s) IV Push once  dextrose 50% Injectable 25 Gram(s) IV Push once  folic acid 1 milliGRAM(s) Oral daily  glucagon  Injectable 1 milliGRAM(s) IntraMuscular once  insulin glargine Injectable (LANTUS) 10 Unit(s) SubCutaneous every morning  insulin lispro (ADMELOG) corrective regimen sliding scale   SubCutaneous three times a day before meals  insulin lispro (ADMELOG) corrective regimen sliding scale   SubCutaneous at bedtime  labetalol 100 milliGRAM(s) Oral two times a day  lactulose Syrup 10 Gram(s) Oral daily  levothyroxine 25 MICROGram(s) Oral daily  multivitamin 1 Tablet(s) Oral daily  pantoprazole    Tablet 40 milliGRAM(s) Oral before breakfast  rifAXIMin 550 milliGRAM(s) Oral two times a day  thiamine 100 milliGRAM(s) Oral daily    MEDICATIONS  (PRN):  LORazepam   Injectable 1 milliGRAM(s) IV Push every 2 hours PRN Agitation      Allergies    No Known Allergies    Intolerances        FAMILY HISTORY:  FH: depression (Child)        REVIEW OF SYSTEMS        Vital Signs Last 24 Hrs  T(C): 36.7 (2021 07:40), Max: 36.9 (2021 12:07)  T(F): 98 (2021 07:40), Max: 98.4 (2021 12:07)  HR: 70 (2021 07:40) (70 - 87)  BP: 99/61 (2021 07:40) (99/61 - 123/80)  BP(mean): --  RR: 18 (2021 07:40) (16 - 20)  SpO2: 97% (2021 07:40) (95% - 99%)    PHYSICAL EXAM:      Constitutional:awake, comfortable    Eyes:icteric    ENMT:no adenopathy    Respiratory:Clear    Cardiovascular:RRR    Gastrointestinal:distended, HSM, distended                Extremities:bilateral LE edema          LABS:           8.7/26.1             8.3    8.77  )-----------( 131      ( 2021 08:08 )             25.1     06-13    121<L>  |  84<L>  |  15.1  ----------------------------<  240<H>  3.5   |  28.0  |  0.89    Ca    7.8<L>      2021 19:00  Phos  1.2     06-  Mg     1.8     13    TPro  6.2<L>  /  Alb  2.4<L>  /  TBili  6.5<H>  /  DBili  4.6<H>  /  AST  216<H>  /  ALT  86<H>  /  AlkPhos  476<H>      PT/INR - ( 2021 18:36 )   PT: 19.8 sec;   INR: 1.75 ratio         PTT - ( 2021 18:36 )  PTT:25.3 sec  Urinalysis Basic - ( 2021 22:08 )    Color: Yellow / Appearance: Clear / S.010 / pH: x  Gluc: x / Ketone: Trace  / Bili: Negative / Urobili: 1 mg/dL   Blood: x / Protein: 15 mg/dL / Nitrite: Negative   Leuk Esterase: Moderate / RBC: 3-5 /HPF / WBC 3-5   Sq Epi: x / Non Sq Epi: Occasional / Bacteria: x        RADIOLOGY & ADDITIONAL STUDIES:

## 2021-06-15 NOTE — PROGRESS NOTE ADULT - ASSESSMENT
Imp:  62 y/o female with PMH of alcoholic liver cirrhosis with hepatic encephalopathy, portal HTN, HTN, DM-2, hypothyroidism came to the ED complaining of epigastric pain x 1 week. Pain was sharp radiating to her chest, associated with nausea and non-bloody emesis. Patient said her symptoms now improved at the time of interview. She endorsed dark stool to ED attending but said no when I asked her. She also noted shortness of breath with exertion and fatigue. She has no chest pain, melena, hematemesis, hematuria, recent travel, fever, chills. Of note patient still drinks alcohol, last drink was 2 weeks ago.   Found to have hb of 5.2 in the ED, occult negative and also hyperglycemic.         Acute anemia     Likely due to underlying alcohol liver dx   Hb: 5.2 on admit;  FOBT negative   Hb improved post transfusion; Elevated TB, mostly direct; unlikely hemolysis.  Low haptoglobin 2 chantelle to undrlying liver disease, as is LDH elevation; rec: trend retic, LDH  Monitor CBC; transfuse to keep Hb over 7; monitor coags    Epigastric abdominal pain   Likely due to gastritisPPI 40mg   CT abdomen done, no pancreatitis       Alcoholic liver cirrhosis with hepatic encephalopathy and portal HTN   Patient was last seen in 2017, said she does not follow with GI/hepatologist   Was discharged on Rifaximin 550mg bid   Aldactone 50mg   Lasix 20mg   PPI 40mg     EtOH abuse   Thiamine 100mg for possible thiamine deficiency in the setting of EtOH use  Folic acid   MVT   Ativan PRN for withdrawal precaution     HTN   Labetalol 100mg bid with holding parameters     Hypothyroidism   Synthroid 25mcg     Will follow

## 2021-06-15 NOTE — PROGRESS NOTE ADULT - SUBJECTIVE AND OBJECTIVE BOX
8.7    9.41  )-----------( 136      ( 15 Claudio 2021 05:58 )             26.1   CC: anemia    INTERVAL HPI/OVERNIGHT EVENTS:  Pt seen and examined at bedside. No overnight events  She remains AOx2, offers no complaints at this time.     ROS:   CONSTITUTIONAL: (-) fever, (-) chills  RESPIRATORY: (-) SOB  CV: (-) chest pain, (-) palpitations  GI: (-) abdominal pain, (-) nausea, (-) vomiting, (-) diarrhea, (-) constipation   NEURO: (-) headache    Vital Signs Last 24 Hrs  T(C): 36.7 (15 Claudio 2021 08:00), Max: 37.1 (14 Jun 2021 15:37)  T(F): 98.1 (15 Claudio 2021 08:00), Max: 98.8 (14 Jun 2021 15:37)  HR: 73 (15 Claudio 2021 08:00) (71 - 75)  BP: 108/65 (15 Claudio 2021 08:00) (93/65 - 108/65)  BP(mean): --  RR: 18 (15 Claudio 2021 08:00) (17 - 18)  SpO2: 98% (15 Claudio 2021 08:00) (92% - 98%)      PHYSICAL EXAM:  GENERAL: Laying comfortably in bed, AOx2, NAD  HEAD:  Atraumatic  EYES: sclera clear  CHEST/LUNG: Clear to auscultation bilaterally. Unlabored respirations on RA. speaks in full sentences without difficulty  HEART: +S1S2. Regular rate and rhythm  ABDOMEN: Bowel sounds present; Soft, Nontender, mild distension. No guarding or rigidity   EXTREMITIES: MAEx4. No clubbing, cyanosis, or edema.    LABS:                                         8.7    9.41  )-----------( 136      ( 15 Claudio 2021 05:58 )             26.1       06-15    123<L>  |  90<L>  |  12.1  ----------------------------<  239<H>  3.9   |  25.0  |  0.88    Ca    8.0<L>      15 Claudio 2021 05:58  Phos  2.8     06-15  Mg     1.9     06-15    TPro  6.2<L>  /  Alb  2.3<L>  /  TBili  4.0<H>  /  DBili  2.8<H>  /  AST  177<H>  /  ALT  78<H>  /  AlkPhos  474<H>  06-15

## 2021-06-15 NOTE — CONSULT NOTE ADULT - SUBJECTIVE AND OBJECTIVE BOX
Patient is a 65y old  Female who presents with a chief complaint of Anemia (15 Claudio 2021 11:58)      HPI:  62 y/o female with PMH of alcoholic liver cirrhosis with hepatic encephalopathy, portal HTN, HTN, DM-2, hypothyroidism came to the ED complaining of epigastric pain x 1 week. Pain was sharp radiating to her chest, associated with nausea and non-bloody emesis. Patient said her symptoms now improved at the time of interview. She endorsed dark stool to ED but said no when I asked her. She also noted shortness of breath with exertion and fatigue. She has no chest pain, melena, hematemesis, hematuria, recent travel, fever, chills. Of note patient still drinks alcohol, last drink was 2 weeks ago.    (2021 23:34)  Today patient being consulted by GI for anemia, worsening transaminitis and alcoholic cirrhosis, as per patient symptoms started last Thursday were she developed weakness to a point that she could not walk, patient also stating that last alcoholic drink was 2 weeks ago. CT of abdomen and pelvis revealed  cirrhosis with evidence of portal hypertension, mild volume of abdominopelvic ascites, mild left pleural effusion with associated subsegmental atelectasis, possible gastritis and gallstones. Denies ever having EGD or Colonoscopy. At present patient reports poor appetite but denies nausea, vomiting, abdominal pain, chest pain, shortness of breath, hematemesis, hematochezia, melena.    PAST MEDICAL & SURGICAL HISTORY:  Alcohol abuse    Alcohol abuse    Liver cirrhosis    ETOH abuse    Urea cycle metabolism disorder    Transitory  hyperthyroidism    Constipation    Lump in female breast    UTI (urinary tract infection)    Lymphangitis, acute, lower leg    Anemia    Hypothyroidism    Chronic back pain    HTN (hypertension)    GERD (gastroesophageal reflux disease)    Pancreatitis    No significant past surgical history    S/P abdominoplasty        Allergies    No Known Allergies    Intolerances        MEDICATIONS  (STANDING):  albumin human 25% IVPB 50 milliLiter(s) IV Intermittent every 12 hours  dextrose 40% Gel 15 Gram(s) Oral once  dextrose 5%. 1000 milliLiter(s) (50 mL/Hr) IV Continuous <Continuous>  dextrose 5%. 1000 milliLiter(s) (100 mL/Hr) IV Continuous <Continuous>  dextrose 50% Injectable 25 Gram(s) IV Push once  dextrose 50% Injectable 12.5 Gram(s) IV Push once  dextrose 50% Injectable 25 Gram(s) IV Push once  folic acid 1 milliGRAM(s) Oral daily  furosemide   Injectable 40 milliGRAM(s) IV Push every 12 hours  glucagon  Injectable 1 milliGRAM(s) IntraMuscular once  insulin glargine Injectable (LANTUS) 10 Unit(s) SubCutaneous every morning  insulin lispro (ADMELOG) corrective regimen sliding scale   SubCutaneous three times a day before meals  insulin lispro (ADMELOG) corrective regimen sliding scale   SubCutaneous at bedtime  iron sucrose IVPB 200 milliGRAM(s) IV Intermittent every 24 hours  lactulose Syrup 10 Gram(s) Oral daily  levothyroxine 50 MICROGram(s) Oral daily  midodrine. 5 milliGRAM(s) Oral three times a day  multivitamin 1 Tablet(s) Oral daily  pantoprazole    Tablet 40 milliGRAM(s) Oral before breakfast  potassium chloride    Tablet ER 30 milliEquivalent(s) Oral two times a day  rifAXIMin 550 milliGRAM(s) Oral two times a day  sodium chloride 2 Gram(s) Oral two times a day  spironolactone 50 milliGRAM(s) Oral daily  thiamine 100 milliGRAM(s) Oral daily    MEDICATIONS  (PRN):  LORazepam   Injectable 1 milliGRAM(s) IV Push every 2 hours PRN Agitation      Social History:    Marital Status:  (   )    (   ) Single    (   )    (  )   Occupation:   Lives with: (  ) alone  (  ) children   (  ) spouse   (  ) parents  (  ) other    Substance Use (street drugs): (  ) never used  (  ) other:  Tobacco Usage:  (   ) never smoked   (   ) former smoker   (   ) current smoker  (     ) pack year  (        ) last cigarette date  Alcohol Usage:  Sexual History:     Family History   IBD (  ) Yes   (  ) No  GI Malignancy (  )  Yes    (  ) No    Health Management     Last Colonoscopy -      (     ) Advanced Directives: (     ) None    (      ) DNR    (     ) DNI    (     ) Health Care Proxy:     Review of Systems:    General: Afebrile, weak  CV:  No pain, palpitations hypo/hypertension  Resp:  No dyspnea, cough, tachypnea, wheezing  GI:  No pain, No nausea, No vomiting, No diarrhea, No constipation No weight loss, No fever, No pruritis, No rectal bleeding, No tarry stools, No dysphagia,  :  No pain, bleeding, incontinence, nocturia  Muscle:  No pain, weakness  Neuro:  No weakness, tingling, memory problems  Psych:  No fatigue, insomnia, mood problems, depression  Endocrine:  No polyuria, polydipsia cold/heat intolerance  Heme:  No petechiae, ecchymosis, easy bruisability  Skin:  No rash, tattoos, scars, edema      Vital Signs Last 24 Hrs  T(C): 36.7 (15 Claudio 2021 08:00), Max: 37.1 (2021 15:37)  T(F): 98.1 (15 Claudio 2021 08:00), Max: 98.8 (2021 15:37)  HR: 73 (15 Claudio 2021 08:00) (71 - 75)  BP: 108/65 (15 Claudio 2021 08:00) (93/65 - 108/65)  BP(mean): --  RR: 18 (15 Claudio 2021 08:00) (17 - 18)  SpO2: 98% (15 Claudio 2021 08:00) (92% - 98%)    PHYSICAL EXAM:    Constitutional:   woman, fatigue, weak, in no apparent distress.   Neck: No LAD, supple  Respiratory: Lungs clear to auscultation,   Cardiovascular: S1 and S2, RRR, no MRG  Gastrointestinal: Abdomen soft non tender non distended. Bowel sounds present all 4 quads.   Extremities: No peripheral edema, neg clubbing cyanosis  Vascular: 2+ peripheral pulses  Neurological: A/O x 3, no focal deficits  Psychiatric: Normal mood, normal affect  Skin: No rashes        LABS:                        8.7    9.41  )-----------( 136      ( 15 Claudio 2021 05:58 )             26.1     06-15    123<L>  |  90<L>  |  12.1  ----------------------------<  239<H>  3.9   |  25.0  |  0.88    Ca    8.0<L>      15 Claudio 2021 05:58  Phos  2.8     06-15  Mg     1.9     06-15    TPro  6.2<L>  /  Alb  2.3<L>  /  TBili  4.0<H>  /  DBili  2.8<H>  /  AST  177<H>  /  ALT  78<H>  /  AlkPhos  474<H>  -15      Urinalysis Basic - ( 2021 12:20 )    Color: Yellow / Appearance: Clear / S.015 / pH: x  Gluc: x / Ketone: Trace  / Bili: Moderate / Urobili: 12 mg/dL   Blood: x / Protein: 30 mg/dL / Nitrite: Negative   Leuk Esterase: Moderate / RBC: 0-2 /HPF / WBC 11-25   Sq Epi: x / Non Sq Epi: Few / Bacteria: Occasional      LIVER FUNCTIONS - ( 15 Claudio 2021 05:58 )  Alb: 2.3 g/dL / Pro: 6.2 g/dL / ALK PHOS: 474 U/L / ALT: 78 U/L / AST: 177 U/L / GGT: x             RADIOLOGY & ADDITIONAL TESTS:     EXAM:  CT ABDOMEN AND PELVIS IC                          PROCEDURE DATE:  2021          INTERPRETATION:  CLINICAL INFORMATION: Abdominal pain, nausea and vomiting    COMPARISON: CT of 2019.    CONTRAST/COMPLICATIONS:  IV Contrast:96 cc of Omnipaque 300. 4 cc was discarded.  Oral Contrast:  Complications: None reported.    PROCEDURE:  Axial images were obtained, along with reformatted coronal and sagittal images.    FINDINGS:  LOWER CHEST: Mild left pleural effusion with associated subsegmental atelectasis.  LIVER: Cirrhotic and fatty.  BILE DUCTS: Normal caliber.  GALLBLADDER: Gallstones.  SPLEEN: Within normal limits.  PANCREAS: Within normal limits.  ADRENALS: Within normal limits.  KIDNEYS/URETERS: Within normal limits.    BLADDER: Incompletely distended.  REPRODUCTIVE ORGANS: No pelvic masses.    BOWEL: Although the stomach is under distended, there is apparent thickening of the distal gastric antrum and pyloric channel suspicious for gastritis. No bowel obstruction. Appendix is unremarkable. The colon is underdistended without significant fecal load.  PERITONEUM: Mild volume of abdominopelvic ascites.  VESSELS: The aorta is not dilated. Mild atherosclerotic vascular calcification. Redemonstration of an of a massively enlarged recanalized umbilical vein with umbilical varices that connect to the right external iliac vein.  RETROPERITONEUM/LYMPH NODES: Scattered subcentimeter retroperitoneal lymph nodes.  ABDOMINAL WALL: Subcutaneous edema.  BONES: Within normal limits.    IMPRESSION:    Cirrhosis with evidence of portal hypertension. Mild volume of abdominopelvic ascites. Mild left pleural effusion with associated subsegmental atelectasis.  Annual surveillance MRI is recommended in cirrhotic patients to screen for hepatocellular carcinoma.    Possible gastritis. Correlate with symptoms, upper endoscopy, or trial of proton pump inhibitors.    Gallstones.

## 2021-06-15 NOTE — CONSULT NOTE ADULT - ATTENDING COMMENTS
Patient with alcoholic cirrhosis presenting with epigastric pain and elevated liver chemistries.  Likely mild alcoholic pancreatitis, which appears to be improving with a peaked bilirubin.  Possible gastritis seen on CT.  Once chemically optimized, should undergo EGD for evaluation.  Would consider increasing PPI to BID for now.  Will follow up. Patient with alcoholic cirrhosis presenting with epigastric pain and elevated liver chemistries.  Likely mild alcoholic hepatitis, which appears to be improving with a peaked bilirubin.  Possible gastritis seen on CT.  Once chemically optimized, should undergo EGD for evaluation.  Would consider increasing PPI to BID for now.  Will follow up.

## 2021-06-16 ENCOUNTER — RESULT REVIEW (OUTPATIENT)
Age: 66
End: 2021-06-16

## 2021-06-16 LAB
ALBUMIN SERPL ELPH-MCNC: 2.6 G/DL — LOW (ref 3.3–5.2)
ALBUMIN SERPL ELPH-MCNC: 2.6 G/DL — LOW (ref 3.3–5.2)
ALP SERPL-CCNC: 387 U/L — HIGH (ref 40–120)
ALP SERPL-CCNC: 387 U/L — HIGH (ref 40–120)
ALT FLD-CCNC: 64 U/L — HIGH
ALT FLD-CCNC: 64 U/L — HIGH
ANION GAP SERPL CALC-SCNC: 10 MMOL/L — SIGNIFICANT CHANGE UP (ref 5–17)
ANION GAP SERPL CALC-SCNC: 10 MMOL/L — SIGNIFICANT CHANGE UP (ref 5–17)
ANION GAP SERPL CALC-SCNC: 14 MMOL/L — SIGNIFICANT CHANGE UP (ref 5–17)
APPEARANCE UR: CLEAR — SIGNIFICANT CHANGE UP
APTT BLD: 30.7 SEC — SIGNIFICANT CHANGE UP (ref 27.5–35.5)
AST SERPL-CCNC: 138 U/L — HIGH
AST SERPL-CCNC: 138 U/L — HIGH
B PERT IGG+IGM PNL SER: CLEAR — SIGNIFICANT CHANGE UP
BACTERIA # UR AUTO: ABNORMAL
BASOPHILS # BLD AUTO: 0.03 K/UL — SIGNIFICANT CHANGE UP (ref 0–0.2)
BASOPHILS NFR BLD AUTO: 0.2 % — SIGNIFICANT CHANGE UP (ref 0–2)
BILIRUB DIRECT SERPL-MCNC: 3 MG/DL — HIGH (ref 0–0.3)
BILIRUB INDIRECT FLD-MCNC: 1.7 MG/DL — HIGH (ref 0.2–1)
BILIRUB SERPL-MCNC: 4.7 MG/DL — HIGH (ref 0.4–2)
BILIRUB SERPL-MCNC: 4.7 MG/DL — HIGH (ref 0.4–2)
BILIRUB UR-MCNC: ABNORMAL
BLD GP AB SCN SERPL QL: SIGNIFICANT CHANGE UP
BUN SERPL-MCNC: 12 MG/DL — SIGNIFICANT CHANGE UP (ref 8–20)
BUN SERPL-MCNC: 12 MG/DL — SIGNIFICANT CHANGE UP (ref 8–20)
BUN SERPL-MCNC: 13.8 MG/DL — SIGNIFICANT CHANGE UP (ref 8–20)
CALCIUM SERPL-MCNC: 8.3 MG/DL — LOW (ref 8.6–10.2)
CALCIUM SERPL-MCNC: 8.3 MG/DL — LOW (ref 8.6–10.2)
CALCIUM SERPL-MCNC: 8.4 MG/DL — LOW (ref 8.6–10.2)
CHLORIDE SERPL-SCNC: 92 MMOL/L — LOW (ref 98–107)
CO2 SERPL-SCNC: 18 MMOL/L — LOW (ref 22–29)
CO2 SERPL-SCNC: 24 MMOL/L — SIGNIFICANT CHANGE UP (ref 22–29)
CO2 SERPL-SCNC: 24 MMOL/L — SIGNIFICANT CHANGE UP (ref 22–29)
COLOR FLD: YELLOW
COLOR SPEC: YELLOW — SIGNIFICANT CHANGE UP
CREAT SERPL-MCNC: 0.73 MG/DL — SIGNIFICANT CHANGE UP (ref 0.5–1.3)
CREAT SERPL-MCNC: 0.76 MG/DL — SIGNIFICANT CHANGE UP (ref 0.5–1.3)
CREAT SERPL-MCNC: 0.76 MG/DL — SIGNIFICANT CHANGE UP (ref 0.5–1.3)
DIFF PNL FLD: ABNORMAL
EOSINOPHIL # BLD AUTO: 0.06 K/UL — SIGNIFICANT CHANGE UP (ref 0–0.5)
EOSINOPHIL NFR BLD AUTO: 0.4 % — SIGNIFICANT CHANGE UP (ref 0–6)
EPI CELLS # UR: ABNORMAL
FLUID INTAKE SUBSTANCE CLASS: SIGNIFICANT CHANGE UP
FLUID SEGMENTED GRANULOCYTES: 48 % — SIGNIFICANT CHANGE UP
GLUCOSE BLDC GLUCOMTR-MCNC: 185 MG/DL — HIGH (ref 70–99)
GLUCOSE BLDC GLUCOMTR-MCNC: 220 MG/DL — HIGH (ref 70–99)
GLUCOSE BLDC GLUCOMTR-MCNC: 242 MG/DL — HIGH (ref 70–99)
GLUCOSE BLDC GLUCOMTR-MCNC: 393 MG/DL — HIGH (ref 70–99)
GLUCOSE SERPL-MCNC: 172 MG/DL — HIGH (ref 70–99)
GLUCOSE SERPL-MCNC: 172 MG/DL — HIGH (ref 70–99)
GLUCOSE SERPL-MCNC: 280 MG/DL — HIGH (ref 70–99)
GLUCOSE UR QL: NEGATIVE MG/DL — SIGNIFICANT CHANGE UP
GRAM STN FLD: SIGNIFICANT CHANGE UP
HCT VFR BLD CALC: 23.9 % — LOW (ref 34.5–45)
HCT VFR BLD CALC: 25.4 % — LOW (ref 34.5–45)
HGB BLD-MCNC: 7.9 G/DL — LOW (ref 11.5–15.5)
HGB BLD-MCNC: 8.4 G/DL — LOW (ref 11.5–15.5)
IMM GRANULOCYTES NFR BLD AUTO: 1.4 % — SIGNIFICANT CHANGE UP (ref 0–1.5)
INR BLD: 1.48 RATIO — HIGH (ref 0.88–1.16)
INR BLD: 1.86 RATIO — HIGH (ref 0.88–1.16)
KETONES UR-MCNC: NEGATIVE — SIGNIFICANT CHANGE UP
LACTATE BLDV-MCNC: 2.9 MMOL/L — HIGH (ref 0.5–2)
LACTATE SERPL-SCNC: 4.4 MMOL/L — CRITICAL HIGH (ref 0.5–2)
LDH SERPL L TO P-CCNC: 457 U/L — HIGH (ref 98–192)
LEUKOCYTE ESTERASE UR-ACNC: ABNORMAL
LYMPHOCYTES # BLD AUTO: 0.76 K/UL — LOW (ref 1–3.3)
LYMPHOCYTES # BLD AUTO: 5.4 % — LOW (ref 13–44)
LYMPHOCYTES # FLD: 12 % — SIGNIFICANT CHANGE UP
MAGNESIUM SERPL-MCNC: 1.6 MG/DL — SIGNIFICANT CHANGE UP (ref 1.6–2.6)
MCHC RBC-ENTMCNC: 29.7 PG — SIGNIFICANT CHANGE UP (ref 27–34)
MCHC RBC-ENTMCNC: 29.9 PG — SIGNIFICANT CHANGE UP (ref 27–34)
MCHC RBC-ENTMCNC: 33.1 GM/DL — SIGNIFICANT CHANGE UP (ref 32–36)
MCHC RBC-ENTMCNC: 33.1 GM/DL — SIGNIFICANT CHANGE UP (ref 32–36)
MCV RBC AUTO: 89.8 FL — SIGNIFICANT CHANGE UP (ref 80–100)
MCV RBC AUTO: 90.4 FL — SIGNIFICANT CHANGE UP (ref 80–100)
MESOTHL CELL # FLD: 3 % — SIGNIFICANT CHANGE UP
MONOCYTES # BLD AUTO: 0.52 K/UL — SIGNIFICANT CHANGE UP (ref 0–0.9)
MONOCYTES NFR BLD AUTO: 3.7 % — SIGNIFICANT CHANGE UP (ref 2–14)
MONOS+MACROS # FLD: 37 % — SIGNIFICANT CHANGE UP
NEUTROPHILS # BLD AUTO: 12.58 K/UL — HIGH (ref 1.8–7.4)
NEUTROPHILS NFR BLD AUTO: 88.9 % — HIGH (ref 43–77)
NITRITE UR-MCNC: NEGATIVE — SIGNIFICANT CHANGE UP
NRBC # BLD: 1 /100 WBCS — HIGH (ref 0–0)
PH UR: 6.5 — SIGNIFICANT CHANGE UP (ref 5–8)
PHOSPHATE SERPL-MCNC: 3.4 MG/DL — SIGNIFICANT CHANGE UP (ref 2.4–4.7)
PLATELET # BLD AUTO: 116 K/UL — LOW (ref 150–400)
PLATELET # BLD AUTO: 120 K/UL — LOW (ref 150–400)
POTASSIUM SERPL-MCNC: 4.6 MMOL/L — SIGNIFICANT CHANGE UP (ref 3.5–5.3)
POTASSIUM SERPL-MCNC: 4.6 MMOL/L — SIGNIFICANT CHANGE UP (ref 3.5–5.3)
POTASSIUM SERPL-MCNC: 4.8 MMOL/L — SIGNIFICANT CHANGE UP (ref 3.5–5.3)
POTASSIUM SERPL-SCNC: 4.6 MMOL/L — SIGNIFICANT CHANGE UP (ref 3.5–5.3)
POTASSIUM SERPL-SCNC: 4.6 MMOL/L — SIGNIFICANT CHANGE UP (ref 3.5–5.3)
POTASSIUM SERPL-SCNC: 4.8 MMOL/L — SIGNIFICANT CHANGE UP (ref 3.5–5.3)
PROCALCITONIN SERPL-MCNC: 0.83 NG/ML — HIGH (ref 0.02–0.1)
PROCALCITONIN SERPL-MCNC: 2.95 NG/ML — HIGH (ref 0.02–0.1)
PROT SERPL-MCNC: 6 G/DL — LOW (ref 6.6–8.7)
PROT SERPL-MCNC: 6 G/DL — LOW (ref 6.6–8.7)
PROT UR-MCNC: NEGATIVE MG/DL — SIGNIFICANT CHANGE UP
PROTHROM AB SERPL-ACNC: 16.8 SEC — HIGH (ref 10.6–13.6)
PROTHROM AB SERPL-ACNC: 21 SEC — HIGH (ref 10.6–13.6)
RAPID RVP RESULT: SIGNIFICANT CHANGE UP
RBC # BLD: 2.66 M/UL — LOW (ref 3.8–5.2)
RBC # BLD: 2.81 M/UL — LOW (ref 3.8–5.2)
RBC # BLD: 2.81 M/UL — LOW (ref 3.8–5.2)
RBC # FLD: 19.7 % — HIGH (ref 10.3–14.5)
RBC # FLD: 21.1 % — HIGH (ref 10.3–14.5)
RBC CASTS # UR COMP ASSIST: SIGNIFICANT CHANGE UP /HPF (ref 0–4)
RCV VOL RI: 20 /UL — HIGH (ref 0–0)
RETICS #: 180.8 K/UL — HIGH (ref 25–125)
RETICS/RBC NFR: 6.3 % — HIGH (ref 0.5–2.5)
SARS-COV-2 RNA SPEC QL NAA+PROBE: SIGNIFICANT CHANGE UP
SODIUM SERPL-SCNC: 124 MMOL/L — LOW (ref 135–145)
SODIUM SERPL-SCNC: 126 MMOL/L — LOW (ref 135–145)
SODIUM SERPL-SCNC: 126 MMOL/L — LOW (ref 135–145)
SP GR SPEC: 1 — LOW (ref 1.01–1.02)
SPECIMEN SOURCE: SIGNIFICANT CHANGE UP
TOTAL NUCLEATED CELL COUNT, BODY FLUID: 100 /UL — SIGNIFICANT CHANGE UP
TUBE TYPE: SIGNIFICANT CHANGE UP
UROBILINOGEN FLD QL: 8 MG/DL
WBC # BLD: 14.15 K/UL — HIGH (ref 3.8–10.5)
WBC # BLD: 22.37 K/UL — HIGH (ref 3.8–10.5)
WBC # FLD AUTO: 14.15 K/UL — HIGH (ref 3.8–10.5)
WBC # FLD AUTO: 22.37 K/UL — HIGH (ref 3.8–10.5)
WBC UR QL: SIGNIFICANT CHANGE UP

## 2021-06-16 PROCEDURE — 88112 CYTOPATH CELL ENHANCE TECH: CPT | Mod: 26

## 2021-06-16 PROCEDURE — 99233 SBSQ HOSP IP/OBS HIGH 50: CPT

## 2021-06-16 PROCEDURE — 76705 ECHO EXAM OF ABDOMEN: CPT | Mod: 26

## 2021-06-16 PROCEDURE — 71045 X-RAY EXAM CHEST 1 VIEW: CPT | Mod: 26

## 2021-06-16 PROCEDURE — 88305 TISSUE EXAM BY PATHOLOGIST: CPT | Mod: 26

## 2021-06-16 RX ORDER — IBUPROFEN 200 MG
400 TABLET ORAL ONCE
Refills: 0 | Status: COMPLETED | OUTPATIENT
Start: 2021-06-16 | End: 2021-06-16

## 2021-06-16 RX ORDER — VANCOMYCIN HCL 1 G
1000 VIAL (EA) INTRAVENOUS ONCE
Refills: 0 | Status: COMPLETED | OUTPATIENT
Start: 2021-06-16 | End: 2021-06-16

## 2021-06-16 RX ORDER — SODIUM CHLORIDE 9 MG/ML
1000 INJECTION INTRAMUSCULAR; INTRAVENOUS; SUBCUTANEOUS ONCE
Refills: 0 | Status: COMPLETED | OUTPATIENT
Start: 2021-06-16 | End: 2021-06-16

## 2021-06-16 RX ORDER — ACETAMINOPHEN 500 MG
650 TABLET ORAL ONCE
Refills: 0 | Status: COMPLETED | OUTPATIENT
Start: 2021-06-16 | End: 2021-06-16

## 2021-06-16 RX ORDER — PHYTONADIONE (VIT K1) 5 MG
5 TABLET ORAL DAILY
Refills: 0 | Status: DISCONTINUED | OUTPATIENT
Start: 2021-06-17 | End: 2021-06-17

## 2021-06-16 RX ORDER — PIPERACILLIN AND TAZOBACTAM 4; .5 G/20ML; G/20ML
3.38 INJECTION, POWDER, LYOPHILIZED, FOR SOLUTION INTRAVENOUS ONCE
Refills: 0 | Status: COMPLETED | OUTPATIENT
Start: 2021-06-16 | End: 2021-06-16

## 2021-06-16 RX ORDER — ACETAMINOPHEN 500 MG
1000 TABLET ORAL ONCE
Refills: 0 | Status: DISCONTINUED | OUTPATIENT
Start: 2021-06-16 | End: 2021-06-16

## 2021-06-16 RX ORDER — PIPERACILLIN AND TAZOBACTAM 4; .5 G/20ML; G/20ML
3.38 INJECTION, POWDER, LYOPHILIZED, FOR SOLUTION INTRAVENOUS EVERY 8 HOURS
Refills: 0 | Status: DISCONTINUED | OUTPATIENT
Start: 2021-06-16 | End: 2021-06-19

## 2021-06-16 RX ORDER — MORPHINE SULFATE 50 MG/1
1 CAPSULE, EXTENDED RELEASE ORAL EVERY 6 HOURS
Refills: 0 | Status: DISCONTINUED | OUTPATIENT
Start: 2021-06-16 | End: 2021-06-23

## 2021-06-16 RX ORDER — SODIUM CHLORIDE 9 MG/ML
1950 INJECTION, SOLUTION INTRAVENOUS ONCE
Refills: 0 | Status: DISCONTINUED | OUTPATIENT
Start: 2021-06-16 | End: 2021-06-16

## 2021-06-16 RX ADMIN — Medication 40 MILLIGRAM(S): at 05:15

## 2021-06-16 RX ADMIN — SODIUM CHLORIDE 2 GRAM(S): 9 INJECTION INTRAMUSCULAR; INTRAVENOUS; SUBCUTANEOUS at 05:16

## 2021-06-16 RX ADMIN — PANTOPRAZOLE SODIUM 40 MILLIGRAM(S): 20 TABLET, DELAYED RELEASE ORAL at 17:17

## 2021-06-16 RX ADMIN — SPIRONOLACTONE 50 MILLIGRAM(S): 25 TABLET, FILM COATED ORAL at 05:16

## 2021-06-16 RX ADMIN — MORPHINE SULFATE 1 MILLIGRAM(S): 50 CAPSULE, EXTENDED RELEASE ORAL at 10:34

## 2021-06-16 RX ADMIN — SODIUM CHLORIDE 2 GRAM(S): 9 INJECTION INTRAMUSCULAR; INTRAVENOUS; SUBCUTANEOUS at 17:36

## 2021-06-16 RX ADMIN — PIPERACILLIN AND TAZOBACTAM 200 GRAM(S): 4; .5 INJECTION, POWDER, LYOPHILIZED, FOR SOLUTION INTRAVENOUS at 09:57

## 2021-06-16 RX ADMIN — LACTULOSE 10 GRAM(S): 10 SOLUTION ORAL at 12:48

## 2021-06-16 RX ADMIN — MIDODRINE HYDROCHLORIDE 5 MILLIGRAM(S): 2.5 TABLET ORAL at 17:17

## 2021-06-16 RX ADMIN — Medication 2: at 08:42

## 2021-06-16 RX ADMIN — PIPERACILLIN AND TAZOBACTAM 200 GRAM(S): 4; .5 INJECTION, POWDER, LYOPHILIZED, FOR SOLUTION INTRAVENOUS at 17:22

## 2021-06-16 RX ADMIN — Medication 1 TABLET(S): at 12:50

## 2021-06-16 RX ADMIN — MORPHINE SULFATE 1 MILLIGRAM(S): 50 CAPSULE, EXTENDED RELEASE ORAL at 11:00

## 2021-06-16 RX ADMIN — Medication 50 MILLILITER(S): at 17:19

## 2021-06-16 RX ADMIN — Medication 650 MILLIGRAM(S): at 07:37

## 2021-06-16 RX ADMIN — Medication 1 MILLIGRAM(S): at 12:48

## 2021-06-16 RX ADMIN — Medication 30 MILLIEQUIVALENT(S): at 05:16

## 2021-06-16 RX ADMIN — Medication 4: at 12:46

## 2021-06-16 RX ADMIN — SODIUM CHLORIDE 1000 MILLILITER(S): 9 INJECTION INTRAMUSCULAR; INTRAVENOUS; SUBCUTANEOUS at 08:12

## 2021-06-16 RX ADMIN — Medication 100 MILLIGRAM(S): at 12:48

## 2021-06-16 RX ADMIN — Medication 50 MICROGRAM(S): at 05:16

## 2021-06-16 RX ADMIN — Medication 10: at 17:16

## 2021-06-16 RX ADMIN — MIDODRINE HYDROCHLORIDE 5 MILLIGRAM(S): 2.5 TABLET ORAL at 12:48

## 2021-06-16 RX ADMIN — INSULIN GLARGINE 10 UNIT(S): 100 INJECTION, SOLUTION SUBCUTANEOUS at 08:41

## 2021-06-16 RX ADMIN — MIDODRINE HYDROCHLORIDE 5 MILLIGRAM(S): 2.5 TABLET ORAL at 05:17

## 2021-06-16 RX ADMIN — Medication 400 MILLIGRAM(S): at 10:07

## 2021-06-16 RX ADMIN — Medication 400 MILLIGRAM(S): at 08:41

## 2021-06-16 RX ADMIN — Medication 30 MILLIEQUIVALENT(S): at 17:18

## 2021-06-16 RX ADMIN — Medication 50 MILLILITER(S): at 05:17

## 2021-06-16 RX ADMIN — SODIUM CHLORIDE 1000 MILLILITER(S): 9 INJECTION INTRAMUSCULAR; INTRAVENOUS; SUBCUTANEOUS at 19:04

## 2021-06-16 RX ADMIN — Medication 250 MILLIGRAM(S): at 19:06

## 2021-06-16 RX ADMIN — Medication 650 MILLIGRAM(S): at 06:21

## 2021-06-16 RX ADMIN — PANTOPRAZOLE SODIUM 40 MILLIGRAM(S): 20 TABLET, DELAYED RELEASE ORAL at 05:16

## 2021-06-16 RX ADMIN — IRON SUCROSE 110 MILLIGRAM(S): 20 INJECTION, SOLUTION INTRAVENOUS at 09:57

## 2021-06-16 NOTE — PROVIDER CONTACT NOTE (CRITICAL VALUE NOTIFICATION) - BACKGROUND
pt had fever this am cultures sent, bolus give and pt started on IV antibiotics, hx hyponatremia, liver cirrhosis

## 2021-06-16 NOTE — PROGRESS NOTE ADULT - SUBJECTIVE AND OBJECTIVE BOX
Patient seen and examined; chart reviewed;  Febrile overnight to 102 F.  Now afebrile.  Looks ok.  No cough or sputum production.  UA is negative.  No abdominal pain.  BC drawn and Empiric antibiotics started, Zosyn.  Arranged for LVP urgently.  Currently in progress.  clear yellow ascites fluid aspirated.    Coherent; not agitated.                PAST MEDICAL & SURGICAL HISTORY:  Alcohol abuse    Alcohol abuse    Liver cirrhosis    ETOH abuse    Urea cycle metabolism disorder    Transitory  hyperthyroidism    Constipation    Lump in female breast    UTI (urinary tract infection)    Lymphangitis, acute, lower leg    Anemia    Hypothyroidism    Chronic back pain    HTN (hypertension)    GERD (gastroesophageal reflux disease)    Pancreatitis    No significant past surgical history    S/P abdominoplasty        ROS:  No Heartburn, regurgitation, dysphagia, odynophagia.  No dyspepsia  No abdominal pain.    No Nausea, vomiting.  No Bleeding.  No hematemesis.   No diarrhea.    No hematochesia.  No weight loss, anorexia.  No edema.      MEDICATIONS  (STANDING):  albumin human 25% IVPB 50 milliLiter(s) IV Intermittent every 12 hours  dextrose 40% Gel 15 Gram(s) Oral once  dextrose 5%. 1000 milliLiter(s) (50 mL/Hr) IV Continuous <Continuous>  dextrose 5%. 1000 milliLiter(s) (100 mL/Hr) IV Continuous <Continuous>  dextrose 50% Injectable 25 Gram(s) IV Push once  dextrose 50% Injectable 12.5 Gram(s) IV Push once  dextrose 50% Injectable 25 Gram(s) IV Push once  folic acid 1 milliGRAM(s) Oral daily  furosemide   Injectable 40 milliGRAM(s) IV Push every 12 hours  glucagon  Injectable 1 milliGRAM(s) IntraMuscular once  insulin glargine Injectable (LANTUS) 10 Unit(s) SubCutaneous every morning  insulin lispro (ADMELOG) corrective regimen sliding scale   SubCutaneous three times a day before meals  insulin lispro (ADMELOG) corrective regimen sliding scale   SubCutaneous at bedtime  lactulose Syrup 10 Gram(s) Oral daily  levothyroxine 50 MICROGram(s) Oral daily  midodrine. 5 milliGRAM(s) Oral three times a day  multivitamin 1 Tablet(s) Oral daily  pantoprazole  Injectable 40 milliGRAM(s) IV Push two times a day  piperacillin/tazobactam IVPB. 3.375 Gram(s) IV Intermittent once  piperacillin/tazobactam IVPB.. 3.375 Gram(s) IV Intermittent every 8 hours  potassium chloride    Tablet ER 30 milliEquivalent(s) Oral two times a day  rifAXIMin 550 milliGRAM(s) Oral two times a day  sodium chloride 2 Gram(s) Oral two times a day  spironolactone 50 milliGRAM(s) Oral daily  thiamine 100 milliGRAM(s) Oral daily    MEDICATIONS  (PRN):  LORazepam   Injectable 1 milliGRAM(s) IV Push every 2 hours PRN Agitation  morphine  - Injectable 1 milliGRAM(s) IV Push every 6 hours PRN Moderate Pain (4 - 6)      Allergies    No Known Allergies    Intolerances        Vital Signs Last 24 Hrs  T(C): 36.8 (2021 14:02), Max: 39.3 (2021 06:01)  T(F): 98.3 (2021 14:02), Max: 102.8 (2021 06:01)  HR: 90 (2021 14:02) (80 - 122)  BP: 125/67 (2021 14:02) (94/58 - 146/69)  BP(mean): --  RR: 16 (2021 14:02) (16 - 17)  SpO2: 96% (2021 14:02) (92% - 98%)    PHYSICAL EXAM:    GENERAL: NAD, well-groomed, well-developed  HEAD:  Atraumatic, Normocephalic  EYES: EOMI, PERRLA, conjunctiva and sclera clear  ENMT: No tonsillar erythema, exudates, or enlargement; Moist mucous membranes, Good dentition, No lesions  NECK: Supple, No JVD, Normal thyroid  CHEST/LUNG: Clear to percussion bilaterally; No rales, rhonchi, wheezing, or rubs  HEART: Regular rate and rhythm; No murmurs, rubs, or gallops  ABDOMEN: Soft, Nontender, Moderate distention; Bowel sounds present;  No HSM.  No MTR.    EXTREMITIES:  2+ Peripheral Pulses, No clubbing, cyanosis, or edema  LYMPH: No lymphadenopathy noted  SKIN: No rashes or lesions      LABS:                        7.9    14.15 )-----------( 120      ( 2021 07:41 )             23.9     06-16    126<L>  |  92<L>  |  12.0  ----------------------------<  172<H>  4.6   |  24.0  |  0.76    Ca    8.3<L>      2021 07:41  Phos  3.4       Mg     1.6         TPro  6.0<L>  /  Alb  2.6<L>  /  TBili  4.7<H>  /  DBili  3.0<H>  /  AST  138<H>  /  ALT  64<H>  /  AlkPhos  387<H>      PT/INR - ( 2021 07:41 )   PT: 16.8 sec;   INR: 1.48 ratio         PTT - ( 2021 07:41 )  PTT:30.7 sec   Urinalysis Basic - ( 2021 10:10 )    Color: Yellow / Appearance: Clear / S.005 / pH: x  Gluc: x / Ketone: Negative  / Bili: Small / Urobili: 8 mg/dL   Blood: x / Protein: Negative mg/dL / Nitrite: Negative   Leuk Esterase: Trace / RBC: 0-2 /HPF / WBC 0-2   Sq Epi: x / Non Sq Epi: Moderate / Bacteria: Few          RADIOLOGY & ADDITIONAL STUDIES:  CT A/P:  cirrhosis:  FL, Gallstones;  Portal hypertension.  B/L pleural effusions.

## 2021-06-16 NOTE — PROGRESS NOTE ADULT - ASSESSMENT
Source for fever being investigated with LVP.  Seems to be a negative tap;  Awaiting results.    Awaiting BC results.  Hold on the Elective Endo for now.

## 2021-06-16 NOTE — PROGRESS NOTE ADULT - SUBJECTIVE AND OBJECTIVE BOX
HPI: Patient is a 65y Female seen on consultation for the evaluation and management of anemia; has long term history of cirrhosis; admitted to [facility] with Hb of 5.2 unassociated with bleeding. Received 2 units of PRBC with reultant Hb of 10.6.  Noted TB of 6.5, mostly direct, with elevated transaminases, mildly elevated retic of 4.9.  Awake, alert, appears comfortable.  In no resp distress.   Pt seen this AM, awake, alert , comfortable; no bleeding reported.  Hb 8.7.  C/O abd discomfort this AM    PAST MEDICAL & SURGICAL HISTORY:  Alcohol abuse    Alcohol abuse    Liver cirrhosis    ETOH abuse    Urea cycle metabolism disorder    Transitory  hyperthyroidism    Constipation    Lump in female breast    UTI (urinary tract infection)    Lymphangitis, acute, lower leg    Anemia    Hypothyroidism    Chronic back pain    HTN (hypertension)    GERD (gastroesophageal reflux disease)    Pancreatitis    No significant past surgical history    S/P abdominoplasty    MEDICATIONS  (STANDING):  dextrose 40% Gel 15 Gram(s) Oral once  dextrose 5%. 1000 milliLiter(s) (50 mL/Hr) IV Continuous <Continuous>  dextrose 5%. 1000 milliLiter(s) (100 mL/Hr) IV Continuous <Continuous>  dextrose 50% Injectable 25 Gram(s) IV Push once  dextrose 50% Injectable 12.5 Gram(s) IV Push once  dextrose 50% Injectable 25 Gram(s) IV Push once  folic acid 1 milliGRAM(s) Oral daily  glucagon  Injectable 1 milliGRAM(s) IntraMuscular once  insulin glargine Injectable (LANTUS) 10 Unit(s) SubCutaneous every morning  insulin lispro (ADMELOG) corrective regimen sliding scale   SubCutaneous three times a day before meals  insulin lispro (ADMELOG) corrective regimen sliding scale   SubCutaneous at bedtime  labetalol 100 milliGRAM(s) Oral two times a day  lactulose Syrup 10 Gram(s) Oral daily  levothyroxine 25 MICROGram(s) Oral daily  multivitamin 1 Tablet(s) Oral daily  pantoprazole    Tablet 40 milliGRAM(s) Oral before breakfast  rifAXIMin 550 milliGRAM(s) Oral two times a day  thiamine 100 milliGRAM(s) Oral daily    MEDICATIONS  (PRN):  LORazepam   Injectable 1 milliGRAM(s) IV Push every 2 hours PRN Agitation      Allergies    No Known Allergies    Intolerances        FAMILY HISTORY:  FH: depression (Child)        REVIEW OF SYSTEMS        Vital Signs Last 24 Hrs  T(C): 36.7 (2021 07:40), Max: 36.9 (2021 12:07)  T(F): 98 (2021 07:40), Max: 98.4 (2021 12:07)  HR: 70 (2021 07:40) (70 - 87)  BP: 99/61 (2021 07:40) (99/61 - 123/80)  BP(mean): --  RR: 18 (2021 07:40) (16 - 20)  SpO2: 97% (2021 07:40) (95% - 99%)    PHYSICAL EXAM:      Constitutional:awake, comfortable    Eyes:icteric    ENMT:no adenopathy    Respiratory:Clear    Cardiovascular:RRR    Gastrointestinal:distended, HSM, distended                Extremities:bilateral LE edema          LABS:           8.7/26.1             8.3    8.77  )-----------( 131      ( 2021 08:08 )             25.1     06-13    121<L>  |  84<L>  |  15.1  ----------------------------<  240<H>  3.5   |  28.0  |  0.89    Ca    7.8<L>      2021 19:00  Phos  1.2       Mg     1.8         TPro  6.2<L>  /  Alb  2.4<L>  /  TBili  6.5<H>  /  DBili  4.6<H>  /  AST  216<H>  /  ALT  86<H>  /  AlkPhos  476<H>  13    PT/INR - ( 2021 18:36 )   PT: 19.8 sec;   INR: 1.75 ratio         PTT - ( 2021 18:36 )  PTT:25.3 sec  Urinalysis Basic - ( 2021 22:08 )    Color: Yellow / Appearance: Clear / S.010 / pH: x  Gluc: x / Ketone: Trace  / Bili: Negative / Urobili: 1 mg/dL   Blood: x / Protein: 15 mg/dL / Nitrite: Negative   Leuk Esterase: Moderate / RBC: 3-5 /HPF / WBC 3-5   Sq Epi: x / Non Sq Epi: Occasional / Bacteria: x        RADIOLOGY & ADDITIONAL STUDIES:

## 2021-06-16 NOTE — PROGRESS NOTE ADULT - ASSESSMENT
Imp:  62 y/o female with PMH of alcoholic liver cirrhosis with hepatic encephalopathy, portal HTN, HTN, DM-2, hypothyroidism came to the ED complaining of epigastric pain x 1 week. Pain was sharp radiating to her chest, associated with nausea and non-bloody emesis. Patient said her symptoms now improved at the time of interview. She endorsed dark stool to ED attending but said no when I asked her. She also noted shortness of breath with exertion and fatigue. She has no chest pain, melena, hematemesis, hematuria, recent travel, fever, chills. Of note patient still drinks alcohol, last drink was 2 weeks ago.   Found to have hb of 5.2 in the ED, occult negative and also hyperglycemic.         Acute anemia     Likely due to underlying alcohol liver dx   Hb: 5.2 on admit;  FOBT negative   Hb improved post transfusion; Elevated TB, mostly direct; unlikely hemolysis.  Low haptoglobin 2 chantelle to undrlying liver disease, as is LDH elevation; rec: trend retic, LDH; would repeat LDH, retic, TB  Monitor CBC; transfuse to keep Hb over 7; monitor coags    Epigastric abdominal pain   Likely due to gastritisPPI 40mg   CT abdomen done, no pancreatitis       Alcoholic liver cirrhosis with hepatic encephalopathy and portal HTN   Patient was last seen in 2017, said she does not follow with GI/hepatologist   Was discharged on Rifaximin 550mg bid   Aldactone 50mg   Lasix 20mg   PPI 40mg     EtOH abuse   Thiamine 100mg for possible thiamine deficiency in the setting of EtOH use  Folic acid   MVT   Ativan PRN for withdrawal precaution     HTN   Labetalol 100mg bid with holding parameters     Hypothyroidism   Synthroid 25mcg     Will follow

## 2021-06-16 NOTE — PROGRESS NOTE ADULT - SUBJECTIVE AND OBJECTIVE BOX
NEPHROLOGY INTERVAL HPI/OVERNIGHT EVENTS:    Examined  No distress  Fever o/n      MEDICATIONS  (STANDING):  albumin human 25% IVPB 50 milliLiter(s) IV Intermittent every 12 hours  dextrose 40% Gel 15 Gram(s) Oral once  dextrose 5%. 1000 milliLiter(s) (50 mL/Hr) IV Continuous <Continuous>  dextrose 5%. 1000 milliLiter(s) (100 mL/Hr) IV Continuous <Continuous>  dextrose 50% Injectable 25 Gram(s) IV Push once  dextrose 50% Injectable 12.5 Gram(s) IV Push once  dextrose 50% Injectable 25 Gram(s) IV Push once  folic acid 1 milliGRAM(s) Oral daily  furosemide   Injectable 40 milliGRAM(s) IV Push every 12 hours  glucagon  Injectable 1 milliGRAM(s) IntraMuscular once  insulin glargine Injectable (LANTUS) 10 Unit(s) SubCutaneous every morning  insulin lispro (ADMELOG) corrective regimen sliding scale   SubCutaneous three times a day before meals  insulin lispro (ADMELOG) corrective regimen sliding scale   SubCutaneous at bedtime  lactulose Syrup 10 Gram(s) Oral daily  levothyroxine 50 MICROGram(s) Oral daily  midodrine. 5 milliGRAM(s) Oral three times a day  multivitamin 1 Tablet(s) Oral daily  pantoprazole  Injectable 40 milliGRAM(s) IV Push two times a day  piperacillin/tazobactam IVPB.. 3.375 Gram(s) IV Intermittent every 8 hours  potassium chloride    Tablet ER 30 milliEquivalent(s) Oral two times a day  rifAXIMin 550 milliGRAM(s) Oral two times a day  sodium chloride 2 Gram(s) Oral two times a day  spironolactone 50 milliGRAM(s) Oral daily  thiamine 100 milliGRAM(s) Oral daily    MEDICATIONS  (PRN):  LORazepam   Injectable 1 milliGRAM(s) IV Push every 2 hours PRN Agitation  morphine  - Injectable 1 milliGRAM(s) IV Push every 6 hours PRN Moderate Pain (4 - 6)      Allergies    No Known Allergies    Intolerances        Vital Signs Last 24 Hrs  T(C): 36.7 (2021 16:24), Max: 39.3 (2021 06:01)  T(F): 98.1 (2021 16:24), Max: 102.8 (2021 06:01)  HR: 85 (2021 16:24) (82 - 122)  BP: 118/70 (2021 16:24) (94/58 - 146/69)  BP(mean): --  RR: 17 (2021 16:24) (16 - 17)  SpO2: 97% (2021 16:24) (92% - 98%)  Daily     Daily     PHYSICAL EXAM:    GENERAL: NAD, chronically ill  HEAD: NCAT  EYES: EOMI  NECK: Supple  NERVOUS SYSTEM:  Awake Alert  CHEST/LUNG: Clear to percussion bilaterally  HEART: Regular rate and rhythm  ABDOMEN: Soft, mild distention  EXTREMITIES:  + edema B/L LE    LABS:                        7.9    14.15 )-----------( 120      ( 2021 07:41 )             23.9     06-16    126<L>  |  92<L>  |  12.0  ----------------------------<  172<H>  4.6   |  24.0  |  0.76    Ca    8.3<L>      2021 07:41  Phos  3.4       Mg     1.6         TPro  6.0<L>  /  Alb  2.6<L>  /  TBili  4.7<H>  /  DBili  3.0<H>  /  AST  138<H>  /  ALT  64<H>  /  AlkPhos  387<H>  16    PT/INR - ( 2021 07:41 )   PT: 16.8 sec;   INR: 1.48 ratio         PTT - ( 2021 07:41 )  PTT:30.7 sec  Urinalysis Basic - ( 2021 10:10 )    Color: Yellow / Appearance: Clear / S.005 / pH: x  Gluc: x / Ketone: Negative  / Bili: Small / Urobili: 8 mg/dL   Blood: x / Protein: Negative mg/dL / Nitrite: Negative   Leuk Esterase: Trace / RBC: 0-2 /HPF / WBC 0-2   Sq Epi: x / Non Sq Epi: Moderate / Bacteria: Few      Magnesium, Serum: 1.6 mg/dL ( @ 07:41)  Phosphorus Level, Serum: 3.4 mg/dL ( @ 07:41)          RADIOLOGY & ADDITIONAL TESTS:

## 2021-06-16 NOTE — PROGRESS NOTE ADULT - ASSESSMENT
60 y/o female with PMH of alcoholic liver cirrhosis with hepatic encephalopathy, portal HTN, HTN, DM-2  came to the ED complaining of epigastric pain x 1 week. She was found to have hg of 5.2 in the ED, occult negative  She was admitted for workup and treatment of anemia.    HypoNatremia suspect dilutional  Cont Lasix / Albumin   Serum Na improving  HypoKalemia supplemented --> better    AM labs, will follow

## 2021-06-16 NOTE — PROGRESS NOTE ADULT - ASSESSMENT
62 y/o female with PMH of alcoholic liver cirrhosis with hepatic encephalopathy, portal HTN, HTN, DM-2, hypothyroidism came to the ED complaining of epigastric pain x 1 week. She was found to have hb of 5.2 in the ED, occult negative. She was admitted for workup and treatment of anemia.     SIRS, concerning for sepsis 2/2 ?SBP in the setting of cirrhotic ascites   - patient with elevated temp(102.8), tachycardia and leukocytosis w/ L shift this AM  - 1L bolus NS given, given hypovolemic hyponatremia in the setting of cirrhosis  - Blood cultures were drawn prior to starting empiric zosyn   - lactate 2.9, will trend  - abdominal U/S noted for mild-moderate ascites.   - will consult IR for possible paracentesis     Acute anemia likely anemia of chronic disease  -possibly due to bone marrow suppression from ETOH and liver disease  -Hb 5.2 on admission, s/p 2u PRBCs, hgb 7.9 this AM  -Low haptoglobin and high LDH  -Hematology following, likely not hemolytic anemia, may be 2/2 chronic liver dz  -c/w Venofer  -will repeat H/H in afternoon and transfuse if <7.  - T/S ordered    Hypovolemic Hyponatremia  -lasix restarted by nephro and trial of IV albumin  -orthostatics negative  -monitor I/Os  -renal following; hypertonic saline discontinued  -midodrine started, beta-blocker discontinued    Alcoholic liver cirrhosis with hepatic encephalopathy and portal HTN   -diuretics restarted  -ammonia level WNL  -rifaximin, lactulose  -beta-blocker discontinued  -Outpatient GI/hepatology follow  -Outpatient surveillance for HCC  -Monitor LFTs    Epigastric abdominal pain - resolved  -CT abdomen negative for pancreatitis but suggestive of gastritis  - per GI once optimized, should undergo EGD  -Continue PPI, Analgesia PRN    Hyperglycemia with underlying DM-2, not on home insulin   -HbA1c 8.0  -Lantus increased to 10u  -Continue ISS  -ADA diet    History of ETOH abuse   -no evidence of withdrawal  -continue Thiamine, Folic Acid and MVI  -Ativan PRN for withdrawal precaution     HTN   -BP stable  -Continue Labetalol      Hypothyroidism   -TSH elevated, synthroid increased    Hypokalemia  -repleated, improved    DVT ppx - SCDs    Dispo: patient is still acute. Pending cultures and possible IR intervention. Not medically ready. PT recommending home with assist 60 y/o female with PMH of alcoholic liver cirrhosis with hepatic encephalopathy, portal HTN, HTN, DM-2, hypothyroidism came to the ED complaining of epigastric pain x 1 week. She was found to have hb of 5.2 in the ED, occult negative. She was admitted for workup and treatment of anemia.     SIRS, concerning for sepsis 2/2 ?SBP in the setting of cirrhotic ascites   - patient with elevated temp(102.8), tachycardia and leukocytosis w/ L shift this AM  - 1L bolus NS given, given hypovolemic hyponatremia in the setting of cirrhosis  - Blood cultures were drawn prior to starting empiric zosyn   - lactate 2.9, will trend  - abdominal U/S noted for mild-moderate ascites.   - will consult IR for possible paracentesis     Acute anemia likely anemia of chronic disease  -possibly due to bone marrow suppression from ETOH and liver disease  -Hb 5.2 on admission, s/p 2u PRBCs, hgb downtrending to 7.9 this AM  -Low haptoglobin and high LDH  -c/w Venofer  -will repeat H/H in afternoon and transfuse if <7.  - T/S ordered  -Hematology following, likely not hemolytic anemia, may be 2/2 chronic liver dz    Hypovolemic Hyponatremia  -sodium slowly improving to 126  -lasix restarted by nephro and trial of IV albumin  -received 1 liter NS bolus due to hypotension/fever earlier today  -hold afternoon lasix while waiting for BMP  -orthostatics negative  -monitor I/Os  -midodrine started  -f/u renal recommendations    Alcoholic liver cirrhosis with hepatic encephalopathy and portal HTN   -diuretics restarted  -ammonia level WNL  -rifaximin, lactulose  -beta-blocker discontinued  -Outpatient GI/hepatology follow  -Outpatient surveillance for HCC  -f/u hepatitis and serologies ordered by GI  -Monitor LFTs  -GI recommendations appreciated    Epigastric abdominal pain   - patient with worsening distention and abdominal pain  -CT abdomen negative for pancreatitis but suggestive of gastritis  -US today with mild to moderate ascites; s/p paracentesis with 700 cc removed  -f/u ascitic fluid studies  -per GI once optimized, should undergo EGD  -Continue PPI, Analgesia PRN    Hyperglycemia with underlying DM-2, not on home insulin   -HbA1c 8.0  -hyperglycemic today bc diet was changed to a regular diet   -change back to ADA diet  -continue current dose of insulin  -Continue ISS  -Adjust insulin as needed    History of ETOH abuse   -no evidence of withdrawal  -continue Thiamine, Folic Acid and MVI  -Ativan PRN for withdrawal precaution     HTN   -BP stable  -Continue Labetalol      Hypothyroidism   -TSH elevated, synthroid increased    Hypokalemia  -repleated, improved    DVT ppx - SCDs    Dispo: patient is still acute. Not medically ready. PT recommending home with assist

## 2021-06-16 NOTE — CHART NOTE - NSCHARTNOTEFT_GEN_A_CORE
Called by RN with sinus tachycardia on cardiac monitor.  Pt is admitted and being treated for anemia.  Pt with hypovolemic hyponatremia likely from dilutional as per Nephro, on lasix/albumin.  Pt is feeling cold, denies any other complaints.  RN to obtain rectal temp.  ICU Vital Signs Last 24 Hrs  T(C): 39.3 (16 Jun 2021 06:01), Max: 39.3 (16 Jun 2021 06:01)  T(F): 102.8 (16 Jun 2021 06:01), Max: 102.8 (16 Jun 2021 06:01)  HR: 84 (15 Claudio 2021 20:49) (73 - 84)  BP: 106/71 (15 Claudio 2021 20:49) (106/71 - 109/72)  BP(mean): --  ABP: --  ABP(mean): --  RR: 16 (15 Claudio 2021 20:49) (16 - 18)  SpO2: 96% (15 Claudio 2021 20:49) (96% - 98%)  General    Pt is A&Ox3 in NAD  Lungs       fair air entry with decreased BS in lower fields   Heart       s1/s2  Abdomen soft NT +BS  Ext            no edema  Fever  CBC with diff, CMP, Coags, lactate, procalcitonin, BCx2, UA, if positive, please f/u with urine culture  CXR, respiratory viral panel  Tylenol 650mg pox1  Cold compress to axilla/groin to decrease core temp  Will sign out to day PA to f/u with IVF due to pt's baseline hyponatremia  Continue to monitor and reassess prn.

## 2021-06-16 NOTE — CHART NOTE - NSCHARTNOTEFT_GEN_A_CORE
Medicine PA Note:    Procedure: Phlebotomy    Site: [L] Arm    Called by  re: blood work   unable to obtain afternoon labs x2  Patient identified  venipuncture performed and blood work collected by JOSE holman signed with writers initials and time of collect   RN and provider aware that labs were successfully drawn

## 2021-06-16 NOTE — PROGRESS NOTE ADULT - SUBJECTIVE AND OBJECTIVE BOX
JUSTIN LOVE  65y  Female    Interval/overnight events/pt complaints:  patient was seen and examined this morning. Patient was noted to have a elevated temp (102.8) and tachycardia into the 130s this morning. Per patient, she is endorsing abdominal pain.     MEDICATIONS  (STANDING):  albumin human 25% IVPB 50 milliLiter(s) IV Intermittent every 12 hours  dextrose 40% Gel 15 Gram(s) Oral once  dextrose 5%. 1000 milliLiter(s) (50 mL/Hr) IV Continuous <Continuous>  dextrose 5%. 1000 milliLiter(s) (100 mL/Hr) IV Continuous <Continuous>  dextrose 50% Injectable 25 Gram(s) IV Push once  dextrose 50% Injectable 12.5 Gram(s) IV Push once  dextrose 50% Injectable 25 Gram(s) IV Push once  folic acid 1 milliGRAM(s) Oral daily  furosemide   Injectable 40 milliGRAM(s) IV Push every 12 hours  glucagon  Injectable 1 milliGRAM(s) IntraMuscular once  insulin glargine Injectable (LANTUS) 10 Unit(s) SubCutaneous every morning  insulin lispro (ADMELOG) corrective regimen sliding scale   SubCutaneous three times a day before meals  insulin lispro (ADMELOG) corrective regimen sliding scale   SubCutaneous at bedtime  lactulose Syrup 10 Gram(s) Oral daily  levothyroxine 50 MICROGram(s) Oral daily  midodrine. 5 milliGRAM(s) Oral three times a day  multivitamin 1 Tablet(s) Oral daily  pantoprazole  Injectable 40 milliGRAM(s) IV Push two times a day  potassium chloride    Tablet ER 30 milliEquivalent(s) Oral two times a day  rifAXIMin 550 milliGRAM(s) Oral two times a day  sodium chloride 2 Gram(s) Oral two times a day  spironolactone 50 milliGRAM(s) Oral daily  thiamine 100 milliGRAM(s) Oral daily    MEDICATIONS  (PRN):  LORazepam   Injectable 1 milliGRAM(s) IV Push every 2 hours PRN Agitation  morphine  - Injectable 1 milliGRAM(s) IV Push every 6 hours PRN Moderate Pain (4 - 6)    AllergiesNo Known Allergies    Vital Signs Last 24 Hrs  T(C): 37.8 (2021 10:07), Max: 39.3 (2021 06:01)  T(F): 100 (2021 10:07), Max: 102.8 (2021 06:01)  HR: 119 (2021 07:37) (80 - 119)  BP: 94/58 (2021 07:37) (94/58 - 146/69)  BP(mean): --  RR: 16 (2021 07:37) (16 - 17)  SpO2: 98% (2021 07:37) (92% - 98%)  I&O's Summary    PHYSICAL EXAM:  GENERAL: NAD  HEENT - atraumatic, EOMI  NECK: Supple  CHEST/LUNG:  unlabored respirations, diminished bibasilar BS.   HEART: Regular rate and rhythm; No murmurs, rubs, or gallops  ABDOMEN: + distended, but soft, nontender; Bowel sounds present  EXTREMITIES:  2+ Peripheral Pulses, No clubbing, cyanosis, edema, calf tenderness  NEURO:  No Focal deficits, sensory and motor intact    CAPILLARY BLOOD GLUCOSE    POCT Blood Glucose.: 185 mg/dL (2021 08:40)  POCT Blood Glucose.: 368 mg/dL (15 Claudio 2021 21:23)  POCT Blood Glucose.: 256 mg/dL (15 Claudio 2021 17:31)  POCT Blood Glucose.: 267 mg/dL (15 Claudio 2021 12:34)      LABS    ( @ 07:41)                      7.9  14.15 )-----------( 120                 23.9    Neutrophils = 12.58 (88.9%)  Lymphocytes = 0.76 (5.4%)  Eosinophils = 0.06 (0.4%)  Basophils = 0.03 (0.2%)  Monocytes = 0.52 (3.7%)  Bands = --%    -16    126<L>  |  92<L>  |  12.0  ----------------------------<  172<H>  4.6   |  24.0  |  0.76    Ca    8.3<L>      2021 07:41  Phos  3.4     06-16  Mg     1.6     -16    TPro  6.0<L>  /  Alb  2.6<L>  /  TBili  4.7<H>  /  DBili  3.0<H>  /  AST  138<H>  /  ALT  64<H>  /  AlkPhos  387<H>  -    ( 2021 07:41 )   PT: 16.8 sec;   INR: 1.48 ratio; PTT:30.7 sec  Blood Gas Venous - Lactate: 2.90  Urinalysis Basic - ( 2021 10:10 )    Color: Yellow / Appearance: Clear / S.005 / pH: x  Gluc: x / Ketone: Negative  / Bili: Small / Urobili: 8 mg/dL   Blood: x / Protein: Negative mg/dL / Nitrite: Negative   Leuk Esterase: Trace / RBC: 0-2 /HPF / WBC 0-2   Sq Epi: x / Non Sq Epi: Moderate / Bacteria: Few    IMAGING  < from: Xray Chest 1 View- PORTABLE-Urgent (Xray Chest 1 View- PORTABLE-Urgent .) (21 @ 07:05) >   EXAM:  XR CHEST PORTABLE URGENT 1V                          PROCEDURE DATE:  2021      INTERPRETATION:  Clinical history: 65-year-old female, fever.    Two expiratory/rotated views are compared to 2021 and are correlated with the abdominal CT of 2021.    FINDINGS: Haze in the left hemithorax extending along the lateral aspect consistent with pleural effusion, increased.    Cardiac silhouette and pulmonary vasculature are within normal limits with no gross consolidation, right effusion, pneumothorax or acute osseous finding.    IMPRESSION:  Rotated study, large left pleural effusion, increased    < end of copied text >  < from: US Abdomen Limited (21 @ 11:02) >     EXAM:  US ABDOMEN LIMITED                          PROCEDURE DATE:  2021      INTERPRETATION:  CLINICAL INFORMATION: Abdominal distention    COMPARISON: 10/19/2015    TECHNIQUE: Limited abdominal ultrasound was performed using a low frequency curved transducer to assess for ascites    FINDINGS AND  IMPRESSION:    There is mild to moderate ascites in all 4 quadrants. Left-sided pleural effusion is partially visualized.    < end of copied text >    Imaging Personally Reviewed:  [X] YES  [ ] NO    Consultant(s) Notes Reviewed:  [X] YES  [ ] NO

## 2021-06-16 NOTE — PROGRESS NOTE ADULT - ATTENDING COMMENTS
I have seen and examined the patient and agree with the above assessment and plan. All necessary addendums made. Febrile earlier this morning with tachycardia and hypotension. Pancultured and started on empiric abx. Patient complaining of abdominal pain and increasing distention today. US obtained and revealed mild to moderate ascites. IR guided paracentesis s/p 700 cc fluid removal. F/u ascitic fluid studies. Vitals have stabilized s/p IV resuscitation. Continue IV albumin and hold lasix this evening until repeat labs are obtained. Rest of the management as detailed above. Prognosis remains guarded.     Plan discussed with patient, son at bedside, RN, Dr. Villareal, medicine PA, IR attending

## 2021-06-17 ENCOUNTER — RESULT REVIEW (OUTPATIENT)
Age: 66
End: 2021-06-17

## 2021-06-17 DIAGNOSIS — R50.9 FEVER, UNSPECIFIED: ICD-10-CM

## 2021-06-17 DIAGNOSIS — J90 PLEURAL EFFUSION, NOT ELSEWHERE CLASSIFIED: ICD-10-CM

## 2021-06-17 LAB
-  K. PNEUMONIAE GROUP: SIGNIFICANT CHANGE UP
ALBUMIN FLD-MCNC: 0.3 G/DL — SIGNIFICANT CHANGE UP
ANA TITR SER: NEGATIVE — SIGNIFICANT CHANGE UP
ANION GAP SERPL CALC-SCNC: 9 MMOL/L — SIGNIFICANT CHANGE UP (ref 5–17)
APTT BLD: 29.9 SEC — SIGNIFICANT CHANGE UP (ref 27.5–35.5)
B PERT IGG+IGM PNL SER: CLEAR — SIGNIFICANT CHANGE UP
BASOPHILS # BLD AUTO: 0.04 K/UL — SIGNIFICANT CHANGE UP (ref 0–0.2)
BASOPHILS NFR BLD AUTO: 0.2 % — SIGNIFICANT CHANGE UP (ref 0–2)
BUN SERPL-MCNC: 11.8 MG/DL — SIGNIFICANT CHANGE UP (ref 8–20)
CALCIUM SERPL-MCNC: 8.3 MG/DL — LOW (ref 8.6–10.2)
CHLORIDE SERPL-SCNC: 96 MMOL/L — LOW (ref 98–107)
CO2 SERPL-SCNC: 21 MMOL/L — LOW (ref 22–29)
COLOR FLD: YELLOW
CREAT SERPL-MCNC: 0.68 MG/DL — SIGNIFICANT CHANGE UP (ref 0.5–1.3)
CULTURE RESULTS: SIGNIFICANT CHANGE UP
CULTURE RESULTS: SIGNIFICANT CHANGE UP
EOSINOPHIL # BLD AUTO: 0.03 K/UL — SIGNIFICANT CHANGE UP (ref 0–0.5)
EOSINOPHIL NFR BLD AUTO: 0.2 % — SIGNIFICANT CHANGE UP (ref 0–6)
FLUID INTAKE SUBSTANCE CLASS: SIGNIFICANT CHANGE UP
FLUID SEGMENTED GRANULOCYTES: 62 % — SIGNIFICANT CHANGE UP
GLUCOSE BLDC GLUCOMTR-MCNC: 199 MG/DL — HIGH (ref 70–99)
GLUCOSE BLDC GLUCOMTR-MCNC: 223 MG/DL — HIGH (ref 70–99)
GLUCOSE BLDC GLUCOMTR-MCNC: 242 MG/DL — HIGH (ref 70–99)
GLUCOSE BLDC GLUCOMTR-MCNC: 277 MG/DL — HIGH (ref 70–99)
GLUCOSE FLD-MCNC: 243 MG/DL — SIGNIFICANT CHANGE UP
GLUCOSE SERPL-MCNC: 250 MG/DL — HIGH (ref 70–99)
GRAM STN FLD: SIGNIFICANT CHANGE UP
GRAM STN FLD: SIGNIFICANT CHANGE UP
HAPTOGLOB SERPL-MCNC: 36 MG/DL — SIGNIFICANT CHANGE UP (ref 34–200)
HAV IGG SER QL IA: REACTIVE
HBV CORE AB SER-ACNC: SIGNIFICANT CHANGE UP
HBV SURFACE AB SER-ACNC: SIGNIFICANT CHANGE UP
HBV SURFACE AG SER-ACNC: SIGNIFICANT CHANGE UP
HCT VFR BLD CALC: 25.1 % — LOW (ref 34.5–45)
HGB BLD-MCNC: 7.9 G/DL — LOW (ref 11.5–15.5)
IMM GRANULOCYTES NFR BLD AUTO: 1.1 % — SIGNIFICANT CHANGE UP (ref 0–1.5)
INR BLD: 1.64 RATIO — HIGH (ref 0.88–1.16)
LACTATE BLDV-MCNC: 2.6 MMOL/L — HIGH (ref 0.5–2)
LACTATE SERPL-SCNC: 2.6 MMOL/L — HIGH (ref 0.5–2)
LDH SERPL L TO P-CCNC: 33 U/L — SIGNIFICANT CHANGE UP
LYMPHOCYTES # BLD AUTO: 1.41 K/UL — SIGNIFICANT CHANGE UP (ref 1–3.3)
LYMPHOCYTES # BLD AUTO: 7.8 % — LOW (ref 13–44)
LYMPHOCYTES # FLD: 19 % — SIGNIFICANT CHANGE UP
MAGNESIUM SERPL-MCNC: 1.8 MG/DL — SIGNIFICANT CHANGE UP (ref 1.6–2.6)
MCHC RBC-ENTMCNC: 29.4 PG — SIGNIFICANT CHANGE UP (ref 27–34)
MCHC RBC-ENTMCNC: 31.5 GM/DL — LOW (ref 32–36)
MCV RBC AUTO: 93.3 FL — SIGNIFICANT CHANGE UP (ref 80–100)
MESOTHL CELL # FLD: 11 % — SIGNIFICANT CHANGE UP
METHOD TYPE: SIGNIFICANT CHANGE UP
MONOCYTES # BLD AUTO: 1.3 K/UL — HIGH (ref 0–0.9)
MONOCYTES NFR BLD AUTO: 7.2 % — SIGNIFICANT CHANGE UP (ref 2–14)
MONOS+MACROS # FLD: 8 % — SIGNIFICANT CHANGE UP
NEUTROPHILS # BLD AUTO: 15.13 K/UL — HIGH (ref 1.8–7.4)
NEUTROPHILS NFR BLD AUTO: 83.5 % — HIGH (ref 43–77)
OB PNL STL: NEGATIVE — SIGNIFICANT CHANGE UP
ORGANISM # SPEC MICROSCOPIC CNT: SIGNIFICANT CHANGE UP
PH FLD: 8 — SIGNIFICANT CHANGE UP
PHOSPHATE SERPL-MCNC: 2.4 MG/DL — SIGNIFICANT CHANGE UP (ref 2.4–4.7)
PLATELET # BLD AUTO: 95 K/UL — LOW (ref 150–400)
POTASSIUM SERPL-MCNC: 4.9 MMOL/L — SIGNIFICANT CHANGE UP (ref 3.5–5.3)
POTASSIUM SERPL-SCNC: 4.9 MMOL/L — SIGNIFICANT CHANGE UP (ref 3.5–5.3)
PROT FLD-MCNC: <1 G/DL — SIGNIFICANT CHANGE UP
PROTHROM AB SERPL-ACNC: 18.6 SEC — HIGH (ref 10.6–13.6)
RBC # BLD: 2.69 M/UL — LOW (ref 3.8–5.2)
RBC # FLD: 21.4 % — HIGH (ref 10.3–14.5)
RCV VOL RI: 115 /UL — HIGH (ref 0–0)
SODIUM SERPL-SCNC: 126 MMOL/L — LOW (ref 135–145)
SPECIMEN SOURCE: SIGNIFICANT CHANGE UP
TOTAL NUCLEATED CELL COUNT, BODY FLUID: 126 /UL — SIGNIFICANT CHANGE UP
TUBE TYPE: SIGNIFICANT CHANGE UP
WBC # BLD: 18.11 K/UL — HIGH (ref 3.8–10.5)
WBC # FLD AUTO: 18.11 K/UL — HIGH (ref 3.8–10.5)

## 2021-06-17 PROCEDURE — 71045 X-RAY EXAM CHEST 1 VIEW: CPT | Mod: 26

## 2021-06-17 PROCEDURE — 99233 SBSQ HOSP IP/OBS HIGH 50: CPT

## 2021-06-17 PROCEDURE — 99221 1ST HOSP IP/OBS SF/LOW 40: CPT | Mod: 25

## 2021-06-17 PROCEDURE — 32551 INSERTION OF CHEST TUBE: CPT

## 2021-06-17 PROCEDURE — 99223 1ST HOSP IP/OBS HIGH 75: CPT

## 2021-06-17 RX ORDER — FUROSEMIDE 40 MG
20 TABLET ORAL DAILY
Refills: 0 | Status: DISCONTINUED | OUTPATIENT
Start: 2021-06-17 | End: 2021-06-19

## 2021-06-17 RX ORDER — IBUPROFEN 200 MG
600 TABLET ORAL EVERY 8 HOURS
Refills: 0 | Status: DISCONTINUED | OUTPATIENT
Start: 2021-06-17 | End: 2021-06-18

## 2021-06-17 RX ORDER — LABETALOL HCL 100 MG
50 TABLET ORAL
Refills: 0 | Status: DISCONTINUED | OUTPATIENT
Start: 2021-06-17 | End: 2021-06-28

## 2021-06-17 RX ORDER — PHYTONADIONE (VIT K1) 5 MG
5 TABLET ORAL DAILY
Refills: 0 | Status: COMPLETED | OUTPATIENT
Start: 2021-06-17 | End: 2021-06-19

## 2021-06-17 RX ORDER — SODIUM CHLORIDE 9 MG/ML
1000 INJECTION INTRAMUSCULAR; INTRAVENOUS; SUBCUTANEOUS
Refills: 0 | Status: DISCONTINUED | OUTPATIENT
Start: 2021-06-17 | End: 2021-06-17

## 2021-06-17 RX ADMIN — MIDODRINE HYDROCHLORIDE 5 MILLIGRAM(S): 2.5 TABLET ORAL at 06:16

## 2021-06-17 RX ADMIN — MORPHINE SULFATE 1 MILLIGRAM(S): 50 CAPSULE, EXTENDED RELEASE ORAL at 11:35

## 2021-06-17 RX ADMIN — Medication 101 MILLIGRAM(S): at 11:40

## 2021-06-17 RX ADMIN — Medication 6: at 08:25

## 2021-06-17 RX ADMIN — MORPHINE SULFATE 1 MILLIGRAM(S): 50 CAPSULE, EXTENDED RELEASE ORAL at 02:02

## 2021-06-17 RX ADMIN — INSULIN GLARGINE 10 UNIT(S): 100 INJECTION, SOLUTION SUBCUTANEOUS at 08:25

## 2021-06-17 RX ADMIN — SODIUM CHLORIDE 83 MILLILITER(S): 9 INJECTION INTRAMUSCULAR; INTRAVENOUS; SUBCUTANEOUS at 16:23

## 2021-06-17 RX ADMIN — PIPERACILLIN AND TAZOBACTAM 25 GRAM(S): 4; .5 INJECTION, POWDER, LYOPHILIZED, FOR SOLUTION INTRAVENOUS at 00:40

## 2021-06-17 RX ADMIN — Medication 30 MILLIEQUIVALENT(S): at 06:16

## 2021-06-17 RX ADMIN — LACTULOSE 10 GRAM(S): 10 SOLUTION ORAL at 11:15

## 2021-06-17 RX ADMIN — Medication 1 TABLET(S): at 11:20

## 2021-06-17 RX ADMIN — MORPHINE SULFATE 1 MILLIGRAM(S): 50 CAPSULE, EXTENDED RELEASE ORAL at 11:20

## 2021-06-17 RX ADMIN — PIPERACILLIN AND TAZOBACTAM 25 GRAM(S): 4; .5 INJECTION, POWDER, LYOPHILIZED, FOR SOLUTION INTRAVENOUS at 06:15

## 2021-06-17 RX ADMIN — Medication 4: at 16:01

## 2021-06-17 RX ADMIN — Medication 20 MILLIGRAM(S): at 16:12

## 2021-06-17 RX ADMIN — Medication 2: at 11:15

## 2021-06-17 RX ADMIN — PIPERACILLIN AND TAZOBACTAM 25 GRAM(S): 4; .5 INJECTION, POWDER, LYOPHILIZED, FOR SOLUTION INTRAVENOUS at 15:00

## 2021-06-17 RX ADMIN — Medication 50 MICROGRAM(S): at 06:16

## 2021-06-17 RX ADMIN — Medication 50 MILLILITER(S): at 05:27

## 2021-06-17 RX ADMIN — MORPHINE SULFATE 1 MILLIGRAM(S): 50 CAPSULE, EXTENDED RELEASE ORAL at 02:40

## 2021-06-17 RX ADMIN — PANTOPRAZOLE SODIUM 40 MILLIGRAM(S): 20 TABLET, DELAYED RELEASE ORAL at 18:00

## 2021-06-17 RX ADMIN — SODIUM CHLORIDE 2 GRAM(S): 9 INJECTION INTRAMUSCULAR; INTRAVENOUS; SUBCUTANEOUS at 06:16

## 2021-06-17 RX ADMIN — Medication 600 MILLIGRAM(S): at 17:01

## 2021-06-17 RX ADMIN — SODIUM CHLORIDE 2 GRAM(S): 9 INJECTION INTRAMUSCULAR; INTRAVENOUS; SUBCUTANEOUS at 18:03

## 2021-06-17 RX ADMIN — Medication 30 MILLIEQUIVALENT(S): at 18:00

## 2021-06-17 RX ADMIN — PIPERACILLIN AND TAZOBACTAM 25 GRAM(S): 4; .5 INJECTION, POWDER, LYOPHILIZED, FOR SOLUTION INTRAVENOUS at 21:13

## 2021-06-17 RX ADMIN — PANTOPRAZOLE SODIUM 40 MILLIGRAM(S): 20 TABLET, DELAYED RELEASE ORAL at 06:15

## 2021-06-17 RX ADMIN — Medication 600 MILLIGRAM(S): at 16:11

## 2021-06-17 RX ADMIN — Medication 100 MILLIGRAM(S): at 11:20

## 2021-06-17 RX ADMIN — Medication 1 MILLIGRAM(S): at 11:15

## 2021-06-17 RX ADMIN — SPIRONOLACTONE 50 MILLIGRAM(S): 25 TABLET, FILM COATED ORAL at 06:16

## 2021-06-17 NOTE — PROGRESS NOTE ADULT - SUBJECTIVE AND OBJECTIVE BOX
JUSTIN LOVE  65y  Female    Interval/overnight events/pt complaints: Pt reportedly had a small amount of blood in stool overnight, FOBT negative and no further reports. Pt examined at bedside, c/o lower abominal/flank/back discomfort(-CVAT, U/A negative)which she states is intermittent and started a few weeks ago. Pt also noted to have mild temp 100.9 with tachycardia, Motrin prn ordered and IVF hydration. Pt denies NVDC, chest pain/sob, palpitations, headache, paresthesia or dizziness. Additional ROS wnl.       Vital Signs Last 24 Hrs  T(C): 37.9 (2021 14:00), Max: 38.1 (2021 21:05)  T(F): 100.2 (2021 14:00), Max: 100.6 (2021 21:05)  HR: 112 (2021 14:00) (84 - 135)  BP: 123/77 (2021 14:00) (118/70 - 173/82)  BP(mean): 91 (2021 12:00) (84 - 99)  RR: 18 (2021 14:00) (16 - 24)  SpO2: 98% (2021 14:00) (93% - 100%)  I&O's Summary    2021 07:  -  2021 07:00  --------------------------------------------------------  IN: 150 mL / OUT: 0 mL / NET: 150 mL    2021 07:01  -  2021 16:18  --------------------------------------------------------  IN: 177 mL / OUT: 200 mL / NET: -23 mL        PHYSICAL EXAM:  GENERAL: NAD, middle aged female, toxic appearing   HEENT - EOMI, PERRL;  MMM, icterus sclera   NECK: Supple, No JVD, no adenopathy, no bruit   CHEST/LUNG: diminished bibasilar L>R  HEART: Regular rate and rhythm; No murmurs, rubs, or gallops  ABDOMEN: Soft, Bowel sounds present, distended TTP, no guarding or rebound tenderness  EXTREMITIES:  2+ Peripheral Pulses, No clubbing, cyanosis,    NEURO:  A&O to person and place, sensory and motor intact, asterixis noted  SKIN: No rashes or lesions, warm, dry    CAPILLARY BLOOD GLUCOSE      POCT Blood Glucose.: 223 mg/dL (2021 16:00)  POCT Blood Glucose.: 199 mg/dL (2021 11:04)  POCT Blood Glucose.: 277 mg/dL (2021 08:09)  POCT Blood Glucose.: 242 mg/dL (2021 21:11)  POCT Blood Glucose.: 393 mg/dL (2021 16:39)      LABS    ( @ 06:05)                      7.9  18.11 )-----------( 95                 25.1    Neutrophils = 15.13 (83.5%)  Lymphocytes = 1.41 (7.8%)  Eosinophils = 0.03 (0.2%)  Basophils = 0.04 (0.2%)  Monocytes = 1.30 (7.2%)  Bands = --%        126<L>  |  96<L>  |  11.8  ----------------------------<  250<H>  4.9   |  21.0<L>  |  0.68    Ca    8.3<L>      2021 06:05  Phos  2.4       Mg     1.8         TPro  6.0<L>  /  Alb  2.6<L>  /  TBili  4.7<H>  /  DBili  3.0<H>  /  AST  138<H>  /  ALT  64<H>  /  AlkPhos  387<H>      ( 2021 06:05 )   PT: 18.6 sec;   INR: 1.64 ratio;       PTT:29.9 sec      ( @ 10:09)  NotDetec    Urinalysis Basic - ( 2021 10:10 )    Color: Yellow / Appearance: Clear / S.005 / pH: x  Gluc: x / Ketone: Negative  / Bili: Small / Urobili: 8 mg/dL   Blood: x / Protein: Negative mg/dL / Nitrite: Negative   Leuk Esterase: Trace / RBC: 0-2 /HPF / WBC 0-2   Sq Epi: x / Non Sq Epi: Moderate / Bacteria: Few          IMAGING    Imaging Personally Reviewed:  [ ] YES  [ ] NO    MEDICATIONS  (STANDING):  dextrose 40% Gel 15 Gram(s) Oral once  dextrose 5%. 1000 milliLiter(s) (50 mL/Hr) IV Continuous <Continuous>  dextrose 5%. 1000 milliLiter(s) (100 mL/Hr) IV Continuous <Continuous>  dextrose 50% Injectable 25 Gram(s) IV Push once  dextrose 50% Injectable 12.5 Gram(s) IV Push once  dextrose 50% Injectable 25 Gram(s) IV Push once  folic acid 1 milliGRAM(s) Oral daily  furosemide    Tablet 20 milliGRAM(s) Oral daily  glucagon  Injectable 1 milliGRAM(s) IntraMuscular once  insulin glargine Injectable (LANTUS) 10 Unit(s) SubCutaneous every morning  insulin lispro (ADMELOG) corrective regimen sliding scale   SubCutaneous three times a day before meals  insulin lispro (ADMELOG) corrective regimen sliding scale   SubCutaneous at bedtime  lactulose Syrup 10 Gram(s) Oral daily  levothyroxine 50 MICROGram(s) Oral daily  midodrine. 5 milliGRAM(s) Oral three times a day  multivitamin 1 Tablet(s) Oral daily  pantoprazole  Injectable 40 milliGRAM(s) IV Push two times a day  phytonadione  IVPB 5 milliGRAM(s) IV Intermittent daily  piperacillin/tazobactam IVPB.. 3.375 Gram(s) IV Intermittent every 8 hours  potassium chloride    Tablet ER 30 milliEquivalent(s) Oral two times a day  rifAXIMin 550 milliGRAM(s) Oral two times a day  sodium chloride 2 Gram(s) Oral two times a day  sodium chloride 0.9%. 1000 milliLiter(s) (83 mL/Hr) IV Continuous <Continuous>  spironolactone 50 milliGRAM(s) Oral daily  thiamine 100 milliGRAM(s) Oral daily    MEDICATIONS  (PRN):  ibuprofen  Tablet. 600 milliGRAM(s) Oral every 8 hours PRN Temp greater or equal to 38C (100.4F), Mild Pain (1 - 3)  LORazepam   Injectable 1 milliGRAM(s) IV Push every 2 hours PRN Agitation  morphine  - Injectable 1 milliGRAM(s) IV Push every 6 hours PRN Moderate Pain (4 - 6)    AllergiesNo Known Allergies      Consultant(s) Notes Reviewed:  [x ] YES  [ ] NO  Care Discussed with Consultants/Other Providers [ x] YES  [ ] NO       EXAM:  XR CHEST PORTABLE URGENT 1V                          PROCEDURE DATE:  2021      INTERPRETATION:  Clinical history: 65-year-old female, fever.    Two expiratory/rotated views are compared to 2021 and are correlated with the abdominal CT of 2021.    FINDINGS: Haze in the left hemithorax extending along the lateral aspect consistent with pleural effusion, increased.    Cardiac silhouette and pulmonary vasculature are within normal limits with no gross consolidation, right effusion, pneumothorax or acute osseous finding.    IMPRESSION:  Rotated study, large left pleural effusion, increased    < end of copied text >  < from: US Abdomen Limited (21 @ 11:02) >     EXAM:  US ABDOMEN LIMITED                          PROCEDURE DATE:  2021      INTERPRETATION:  CLINICAL INFORMATION: Abdominal distention    COMPARISON: 10/19/2015    TECHNIQUE: Limited abdominal ultrasound was performed using a low frequency curved transducer to assess for ascites    FINDINGS AND  IMPRESSION:    There is mild to moderate ascites in all 4 quadrants. Left-sided pleural effusion is partially visualized.

## 2021-06-17 NOTE — PROGRESS NOTE ADULT - ATTENDING COMMENTS
I have seen and examined the patient and agree with the above assessment and plan. No overnight events. I have seen and examined the patient and agree with the above assessment and plan. Addendum added. No overnight events reported. Patient with 100.9 fever today. Blood cultures positive for GNR. ID consulted. CXR with large effusion; CT surgery consulted for chest tube. Diuretics adjusted. No further IV fluids. Prognosis remains guarded; palliative care consulted.

## 2021-06-17 NOTE — PROGRESS NOTE ADULT - SUBJECTIVE AND OBJECTIVE BOX
HPI: Patient is a 65y Female seen on consultation for the evaluation and management of anemia; has long term history of cirrhosis; admitted to [facility] with Hb of 5.2 unassociated with bleeding. Received 2 units of PRBC with reultant Hb of 10.6.  Noted TB of 6.5, mostly direct, with elevated transaminases, mildly elevated retic of 4.9.  Awake, alert, appears comfortable.  In no resp distress.     seen at bedside, low grade temp 100.7, no bleeding, ON had episode of small thread of blood in stool.        MEDICATIONS  (STANDING):  dextrose 40% Gel 15 Gram(s) Oral once  dextrose 5%. 1000 milliLiter(s) (50 mL/Hr) IV Continuous <Continuous>  dextrose 5%. 1000 milliLiter(s) (100 mL/Hr) IV Continuous <Continuous>  dextrose 50% Injectable 25 Gram(s) IV Push once  dextrose 50% Injectable 12.5 Gram(s) IV Push once  dextrose 50% Injectable 25 Gram(s) IV Push once  folic acid 1 milliGRAM(s) Oral daily  glucagon  Injectable 1 milliGRAM(s) IntraMuscular once  insulin glargine Injectable (LANTUS) 10 Unit(s) SubCutaneous every morning  insulin lispro (ADMELOG) corrective regimen sliding scale   SubCutaneous at bedtime  insulin lispro (ADMELOG) corrective regimen sliding scale   SubCutaneous three times a day before meals  lactulose Syrup 10 Gram(s) Oral daily  levothyroxine 50 MICROGram(s) Oral daily  midodrine. 5 milliGRAM(s) Oral three times a day  multivitamin 1 Tablet(s) Oral daily  pantoprazole  Injectable 40 milliGRAM(s) IV Push two times a day  phytonadione   Solution 5 milliGRAM(s) Oral daily  piperacillin/tazobactam IVPB.. 3.375 Gram(s) IV Intermittent every 8 hours  potassium chloride    Tablet ER 30 milliEquivalent(s) Oral two times a day  rifAXIMin 550 milliGRAM(s) Oral two times a day  sodium chloride 2 Gram(s) Oral two times a day  spironolactone 50 milliGRAM(s) Oral daily  thiamine 100 milliGRAM(s) Oral daily    MEDICATIONS  (PRN):  LORazepam   Injectable 1 milliGRAM(s) IV Push every 2 hours PRN Agitation  morphine  - Injectable 1 milliGRAM(s) IV Push every 6 hours PRN Moderate Pain (4 - 6)    ICU Vital Signs Last 24 Hrs  T(C): 37.3 (2021 08:00), Max: 38.1 (2021 21:05)  T(F): 99.2 (2021 08:00), Max: 100.6 (2021 21:05)  HR: 92 (2021 08:00) (84 - 135)  BP: 118/86 (2021 08:00) (97/54 - 173/82)  BP(mean): 90 (2021 06:17) (84 - 99)  ABP: --  ABP(mean): --  RR: 20 (2021 08:00) (16 - 24)  SpO2: 98% (2021 08:00) (93% - 100%)              PHYSICAL EXAM:      Constitutional:awake, comfortable    Eyes:icteric    ENMT:no adenopathy    Respiratory:Clear    Cardiovascular:RRR    Gastrointestinal:distended, HSM, distended                Extremities:bilateral LE edema          LABS:                          7.9    18.11 )-----------( 95       ( 2021 06:05 )             25.1              8.7/26.1             8.3    8.77  )-----------( 131      ( 2021 08:08 )             25.1       06-17    126<L>  |  96<L>  |  11.8  ----------------------------<  250<H>  4.9   |  21.0<L>  |  0.68    Ca    8.3<L>      2021 06:05  Phos  2.4     -17  Mg     1.8         TPro  6.0<L>  /  Alb  2.6<L>  /  TBili  4.7<H>  /  DBili  3.0<H>  /  AST  138<H>  /  ALT  64<H>  /  AlkPhos  387<H>      121<L>  |  84<L>  |  15.1  ----------------------------<  240<H>  3.5   |  28.0  |  0.89    Ca    7.8<L>      2021 19:00  Phos  1.2     -  Mg     1.8         TPro  6.2<L>  /  Alb  2.4<L>  /  TBili  6.5<H>  /  DBili  4.6<H>  /  AST  216<H>  /  ALT  86<H>  /  AlkPhos  476<H>      PT/INR - ( 2021 18:36 )   PT: 19.8 sec;   INR: 1.75 ratio         PTT - ( 2021 18:36 )  PTT:25.3 sec  Urinalysis Basic - ( 2021 22:08 )    Color: Yellow / Appearance: Clear / S.010 / pH: x  Gluc: x / Ketone: Trace  / Bili: Negative / Urobili: 1 mg/dL   Blood: x / Protein: 15 mg/dL / Nitrite: Negative   Leuk Esterase: Moderate / RBC: 3-5 /HPF / WBC 3-5   Sq Epi: x / Non Sq Epi: Occasional / Bacteria: x        RADIOLOGY & ADDITIONAL STUDIES:

## 2021-06-17 NOTE — PROGRESS NOTE ADULT - SUBJECTIVE AND OBJECTIVE BOX
NEPHROLOGY INTERVAL HPI/OVERNIGHT EVENTS:    Examined  Appears chronically ill  today afebrile    MEDICATIONS  (STANDING):  dextrose 40% Gel 15 Gram(s) Oral once  dextrose 5%. 1000 milliLiter(s) (50 mL/Hr) IV Continuous <Continuous>  dextrose 5%. 1000 milliLiter(s) (100 mL/Hr) IV Continuous <Continuous>  dextrose 50% Injectable 25 Gram(s) IV Push once  dextrose 50% Injectable 12.5 Gram(s) IV Push once  dextrose 50% Injectable 25 Gram(s) IV Push once  folic acid 1 milliGRAM(s) Oral daily  furosemide    Tablet 20 milliGRAM(s) Oral daily  glucagon  Injectable 1 milliGRAM(s) IntraMuscular once  insulin glargine Injectable (LANTUS) 10 Unit(s) SubCutaneous every morning  insulin lispro (ADMELOG) corrective regimen sliding scale   SubCutaneous three times a day before meals  insulin lispro (ADMELOG) corrective regimen sliding scale   SubCutaneous at bedtime  lactulose Syrup 10 Gram(s) Oral daily  levothyroxine 50 MICROGram(s) Oral daily  midodrine. 5 milliGRAM(s) Oral three times a day  multivitamin 1 Tablet(s) Oral daily  pantoprazole  Injectable 40 milliGRAM(s) IV Push two times a day  phytonadione  IVPB 5 milliGRAM(s) IV Intermittent daily  piperacillin/tazobactam IVPB.. 3.375 Gram(s) IV Intermittent every 8 hours  potassium chloride    Tablet ER 30 milliEquivalent(s) Oral two times a day  rifAXIMin 550 milliGRAM(s) Oral two times a day  sodium chloride 2 Gram(s) Oral two times a day  spironolactone 50 milliGRAM(s) Oral daily  thiamine 100 milliGRAM(s) Oral daily    MEDICATIONS  (PRN):  LORazepam   Injectable 1 milliGRAM(s) IV Push every 2 hours PRN Agitation  morphine  - Injectable 1 milliGRAM(s) IV Push every 6 hours PRN Moderate Pain (4 - 6)      Allergies    No Known Allergies    Intolerances        Vital Signs Last 24 Hrs  T(C): 36.7 (2021 12:00), Max: 38.1 (2021 21:05)  T(F): 98.1 (2021 12:00), Max: 100.6 (2021 21:05)  HR: 86 (2021 12:00) (84 - 135)  BP: 123/74 (2021 12:00) (118/70 - 173/82)  BP(mean): 91 (2021 12:00) (84 - 99)  RR: 18 (2021 12:00) (16 - 24)  SpO2: 99% (2021 12:00) (93% - 100%)  Daily     Daily Weight in k.9 (2021 06:17)    PHYSICAL EXAM:  GENERAL: NAD, chronically ill  HEAD: NCAT  EYES: EOMI  NECK: Supple  NERVOUS SYSTEM:  Awake Alert  CHEST/LUNG: Clear to percussion bilaterally  HEART: Regular rate and rhythm  ABDOMEN: Soft, mild distention  EXTREMITIES:  trace edema B/L LE    LABS:                        7.9    18.11 )-----------( 95       ( 2021 06:05 )             25.1     06-17    126<L>  |  96<L>  |  11.8  ----------------------------<  250<H>  4.9   |  21.0<L>  |  0.68    Ca    8.3<L>      2021 06:05  Phos  2.4     -  Mg     1.8         TPro  6.0<L>  /  Alb  2.6<L>  /  TBili  4.7<H>  /  DBili  3.0<H>  /  AST  138<H>  /  ALT  64<H>  /  AlkPhos  387<H>  06-16    PT/INR - ( 2021 06:05 )   PT: 18.6 sec;   INR: 1.64 ratio         PTT - ( 2021 06:05 )  PTT:29.9 sec  Urinalysis Basic - ( 2021 10:10 )    Color: Yellow / Appearance: Clear / S.005 / pH: x  Gluc: x / Ketone: Negative  / Bili: Small / Urobili: 8 mg/dL   Blood: x / Protein: Negative mg/dL / Nitrite: Negative   Leuk Esterase: Trace / RBC: 0-2 /HPF / WBC 0-2   Sq Epi: x / Non Sq Epi: Moderate / Bacteria: Few      Magnesium, Serum: 1.8 mg/dL ( @ 06:05)  Phosphorus Level, Serum: 2.4 mg/dL ( @ 06:05)          RADIOLOGY & ADDITIONAL TESTS:

## 2021-06-17 NOTE — CONSULT NOTE ADULT - ASSESSMENT
60 y/o female with PMH of alcoholic liver cirrhosis with hepatic encephalopathy, portal HTN, HTN, DM-2, hypothyroidism presented to [facility] ED 6/12/21 complaining of sharp epigastric pain. Since admission, patient has been treated for gram negative bacteremia (remains febrile, followed by ID, on abx), anemia of chronic disease (likely cirrhosis related, negative GIB work up), hyponatremia, hyperglycemia, HTN, hypothyroidism, alcoholic liver cirrhosis with hepatic encephalopathy (s/p paracentesis 6/16, seen by GI considered end stage, on rifaximin and lactulose). Thoracic Surgery was consulted of left sided pleural effusion seen on CXR. Left thoracentesis was done for 1.1 L of serous fluid. Chest tube was not left in place 2/2 history of cirrhosis per Dr. Wilson. 
65y  Female with h/o alcoholic liver cirrhosis with hepatic encephalopathy, portal HTN, HTN, DM 2, hypothyroidism. Patient initially admitted with epigastric pain, worsening transaminitis, alcoholic cirrhosis and weakness. CT of abdomen and pelvis revealed  cirrhosis with evidence of portal hypertension, mild volume of abdominopelvic ascites, mild left pleural effusion with associated subsegmental atelectasis, possible gastritis and gallstones. Patient was followed by GI, Hematology, nephrology. Patient developed fever to 102.8F 6/16. Also had paracentesis 6/16. Blood cultures with GNR.       Gram negative sepsis  fever  leukocytosis  Alcoholic liver cirrrhosis      - Blood cultures reporting GNR  - Repeat blood cultures ordered   - RVP/COVID 19 PCR negative   - CXR reporting left pleural effusion   - UA negative   - Procalcitonin level 2.95  - Continue Zosyn  - D/C Vancomycin   - Follow up cultures  - Trend Fever  - Trend Leukocytosis      Thank you for allowing me to participate in the care of your patient.   Will Follow      d/w Dr Jackson   
Imp:  60 y/o female with PMH of alcoholic liver cirrhosis with hepatic encephalopathy, portal HTN, HTN, DM-2, hypothyroidism came to the ED complaining of epigastric pain x 1 week. Pain was sharp radiating to her chest, associated with nausea and non-bloody emesis. Patient said her symptoms now improved at the time of interview. She endorsed dark stool to ED attending but said no when I asked her. She also noted shortness of breath with exertion and fatigue. She has no chest pain, melena, hematemesis, hematuria, recent travel, fever, chills. Of note patient still drinks alcohol, last drink was 2 weeks ago.   Found to have hb of 5.2 in the ED, occult negative and also hyperglycemic.         Acute anemia     Likely due to underlying alcohol liver dx   Hb: 5.2 on admit;  FOBT negative   Hb improved post transfusion; Elevated TB, mostly direct; unlikely hemolysis  Monitor CBC; transfuse to keep Hb over 7; monitor coags    Epigastric abdominal pain   Likely due to gastritisPPI 40mg   CT abdomen done, no pancreatitis       Alcoholic liver cirrhosis with hepatic encephalopathy and portal HTN   Patient was last seen in 2017, said she does not follow with GI/hepatologist   Was discharged on Rifaximin 550mg bid   Aldactone 50mg   Lasix 20mg   PPI 40mg     EtOH abuse   Thiamine 100mg for possible thiamine deficiency in the setting of EtOH use  Folic acid   MVT   Ativan PRN for withdrawal precaution     HTN   Labetalol 100mg bid with holding parameters     Hypothyroidism   Synthroid 25mcg     Will follow
Cirrhosis with hyponatremia, hypothyroid, and high  glucose, low urine sodium, also has low H&H with low T-sat but also has low haptoglobin with high LDH. Low K.    P) Potassium, fluid restriction, iv sodium with K, thyroid med; cortisol level, ? hemolysis work up.

## 2021-06-17 NOTE — PROGRESS NOTE ADULT - ASSESSMENT
62 y/o female with PMH of alcoholic liver cirrhosis with hepatic encephalopathy, portal HTN, HTN, DM-2, hypothyroidism came to the ED complaining of epigastric pain x 1 week. She was found to have hb of 5.2 in the ED, occult negative. She was admitted for workup and treatment of anemia.     SIRS, concerning for sepsis 2/2 ?SBP in the setting of cirrhotic ascites   - motrin 600mg TID prn fever/mild pain ([id number])  -repeat lactate in am  - trend fever/leukocytosis  -BC and peritoneal cultures pending  -continue zoysn  -s/p paracentesis with 700ml removed  -ID and GI following, recs appreciated   -continue lasix/aldactone    Large Left Pleural Effusion with DOZIER exertional tachypnea  -CTS consulted  -plan for thoracentesis  -fluid analysis     Anemia of chronic disease in the setting of cirrhotic liver   -Hb 5.2 on admission, s/p 2u PRBCs, hgb downtrending to 7.9 this AM  -Low haptoglobin and high LDH  -transfuse <7  -FOBT negative   -s/p venofer     Hyponatremia improving   -sodium slowly improving to 126  -continue NaCL tabs/lasix   -NS @ 83ml/hr x12  -am labs   -orthostatics negative  -monitor I&O's  -neph following recs appreciated     Alcoholic liver cirrhosis with hepatic encephalopathy and portal HTN   -diuretics restarted  -ammonia level WNL  -rifaximin, lactulose  -beta-blocker discontinued  -Outpatient GI/hepatology follow  -Outpatient surveillance for HCC  -f/u hepatitis and serologies ordered by GI  -Monitor LFTs  -GI recommendations appreciated     Lower abdominal/back and flank pain   -Continue PPI, Analgesia PRN  -PT ordered   -U/A negative     Hyperglycemia with underlying DM-2, not on home insulin   -HbA1c 8.0, goal <7  -continue lantus   -Continue ISS  -appetite poor, add glucerna shakes     History of ETOH abuse   -no evidence of withdrawal  -continue Thiamine, Folic Acid and MVI  -Ativan PRN for withdrawal precaution     HTN   -BP stable  -Continue Labetalol      Hypothyroidism   -TSH elevated, synthroid increased    DVT ppx - SCDs  Plan of care discussed with RN, patient, son at bedside, and Dr. Jackson  Prognosis Guarded  Dispo: SDU for now, pending clinical improvement  60 y/o female with PMH of alcoholic liver cirrhosis with hepatic encephalopathy, portal HTN, HTN, DM-2, hypothyroidism came to the ED complaining of epigastric pain x 1 week. She was found to have hb of 5.2 in the ED, occult negative. She was admitted for workup and treatment of anemia.     SIRS, concerning for sepsis 2/2 ?SBP in the setting of cirrhotic ascites   - motrin 600mg TID prn fever/mild pain ([id number])  -repeat lactate in am  - trend fever/leukocytosis  -BC and peritoneal cultures pending  -continue zoysn  -s/p paracentesis with 700ml removed  -ID and GI following, recs appreciated   -continue lasix/aldactone    Large Left Pleural Effusion with DOZIER exertional tachypnea  -CTS consulted  -plan for thoracentesis  -fluid analysis     Anemia of chronic disease in the setting of cirrhotic liver   -Hb 5.2 on admission, s/p 2u PRBCs, hgb downtrending to 7.9 this AM  -Low haptoglobin and high LDH  -transfuse <7  -FOBT negative   -s/p venofer     Hyponatremia improving   -sodium slowly improving to 126  -continue NaCL tabs/lasix   -am labs   -orthostatics negative  -monitor I&O's  -neph following recs appreciated     Alcoholic liver cirrhosis with hepatic encephalopathy and portal HTN   -diuretics restarted  -ammonia level WNL  -rifaximin, lactulose  -beta-blocker discontinued  -Outpatient GI/hepatology follow  -Outpatient surveillance for HCC  -f/u hepatitis and serologies ordered by GI  -Monitor LFTs  -GI recommendations appreciated     Lower abdominal/back and flank pain   -Continue PPI, Analgesia PRN  -PT ordered   -U/A negative     Hyperglycemia with underlying DM-2, not on home insulin   -HbA1c 8.0, goal <7  -continue lantus   -Continue ISS  -appetite poor, add glucerna shakes     History of ETOH abuse   -no evidence of withdrawal  -continue Thiamine, Folic Acid and MVI  -Ativan PRN for withdrawal precaution     HTN   -BP stable  -Continue Labetalol      Hypothyroidism   -TSH elevated, synthroid increased    DVT ppx - SCDs  Plan of care discussed with RN, patient, son at bedside, and Dr. Jackson  Prognosis Guarded  Dispo: SDU for now, pending clinical improvement  60 y/o female with PMH of alcoholic liver cirrhosis with hepatic encephalopathy, portal HTN, HTN, DM-2, hypothyroidism came to the ED complaining of epigastric pain x 1 week. She was found to have hb of 5.2 in the ED, occult negative. She was admitted for workup and treatment of anemia.     SIRS, concerning for sepsis 2/2 ?SBP in the setting of cirrhotic ascites   - motrin 600mg TID prn fever/mild pain ([id number])  -repeat lactate in am  - trend fever/leukocytosis  -BC and peritoneal cultures pending  -continue zoysn  -s/p paracentesis with 700ml removed  -ID and GI following, recs appreciated   -continue lasix/aldactone    Large Left Pleural Effusion with DOZIER exertional tachypnea  -CTS consulted  -plan for thoracentesis  -fluid analysis     Anemia of chronic disease in the setting of cirrhotic liver   -Hb 5.2 on admission, s/p 2u PRBCs, hgb downtrending to 7.9 this AM  -Low haptoglobin and high LDH  -transfuse <7  -FOBT negative   -s/p venofer     Hyponatremia improving   -sodium slowly improving to 126  -continue NaCL tabs/lasix   -am labs   -orthostatics negative  -monitor I&O's  -neph following recs appreciated     Alcoholic liver cirrhosis with hepatic encephalopathy and portal HTN   -diuretics restarted  -ammonia level WNL  -rifaximin, lactulose  -beta-blocker discontinued  -Outpatient GI/hepatology follow  -Outpatient surveillance for HCC  -f/u hepatitis and serologies ordered by GI  -Monitor LFTs  -GI recommendations appreciated     Lower abdominal/back and flank pain   -Continue PPI, Analgesia PRN  -PT ordered   -U/A negative     Hyperglycemia with underlying DM-2, not on home insulin   -HbA1c 8.0, goal <7  -continue lantus   -Continue ISS  -appetite poor, add glucerna shakes     History of ETOH abuse   -no evidence of withdrawal  -continue Thiamine, Folic Acid and MVI  -Ativan PRN for withdrawal precaution     HTN   -BP stable  -Resume Labetalol, with hold parameters     Hypothyroidism   -TSH elevated, synthroid increased    DVT ppx - SCDs  Plan of care discussed with RN, patient, son at bedside, and Dr. Jackson  Prognosis Guarded  Dispo: SDU for now, pending clinical improvement  60 y/o female with PMH of alcoholic liver cirrhosis with hepatic encephalopathy, portal HTN, HTN, DM-2, hypothyroidism came to the ED complaining of epigastric pain x 1 week. She was found to have hb of 5.2 in the ED, occult negative. She was admitted for workup and treatment of anemia.     Gram Negative Bacteremia; unclear source possibly due to infected pleural effusion  -patient remains critically ill with fevers; Tmax 100.9  -will add motrin 600mg TID prn fever/mild pain given no GI bleed  -repeat lactate in am; no further fluid resuscitation given large pleural effusion  -SBP ruled out; f/u ascitic fluid culture  -repeat blood cultures  -UA noted  - trend fever/leukocytosis  -continue zoysn  -s/p paracentesis with 700ml removed  -ID consulted; recommendations appreciated     Large Left Pleural Effusion with DOZIER exertional tachypnea  -CTS consulted; tentative plans for thoracentesis  -check pleural fluid culture and cytology  -diuretics; add lasix to aldactone  -check TTE  -monitor I/Os, daily weights  -repeat CXR after chest tube placement     Anemia of chronic disease in the setting of cirrhotic liver; no concerns for GIB  -Hb 5.2 on admission, s/p 2u PRBCs, hgb downtrending to 7.9 this AM  -Low haptoglobin and high LDH; discussed with hematology - no concerns for hemolysis  -FOBT negative   -s/p venofer  -continue PPI  -Monitor CBC and transfuse for Hb<7  -hematology and GI recommendations appreciated    Hyponatremia improving   -sodium slowly improving to 126  -continue NaCL tabs/lasix   -orthostatics negative  -monitor I&O's  -no further IV hydration given hypervolemia and pleural effusion  -neph following recs appreciated     Alcoholic liver cirrhosis with hepatic encephalopathy and portal HTN   -advanced cirrhosis; nearing end stage per GI  -diuretics restarted given hypervolemia  -ammonia level WNL  -rifaximin, lactulose  -beta-blocker discontinued  -Outpatient surveillance for HCC  -f/u hepatitis and serologies ordered by GI  -Monitor LFTs  -GI recommendations appreciated    Lower abdominal/back and flank pain   -CT abdomen and US reviewed; s/p paracentesis 700 cc on 6/16  -Continue PPI  -Analgesia PRN  -U/A negative   -may need to consider imaging of lower back  -OOB to chair; mobilize patient    Hyperglycemia with underlying DM-2, not on home insulin   -HbA1c 8.0, goal <7  -continue lantus   -Continue ISS  -appetite poor, added glucerna shakes     History of ETOH abuse   -no evidence of withdrawal  -continue Thiamine, Folic Acid and MVI  -Ativan PRN for withdrawal precaution     HTN   -BP stable  -Resume Labetalol, with hold parameters     Hypothyroidism   -TSH elevated, synthroid increased    DVT ppx - SCDs  Plan of care discussed with RN, patient, son at bedside, and Dr. Jackson  Prognosis Guarded  Dispo: SDU for now, pending clinical improvement. Palliative care consult.

## 2021-06-17 NOTE — CHART NOTE - NSCHARTNOTEFT_GEN_A_CORE
Upon transfer to stepdown unit, patient with small thread of blood in stool - very minimal amount   No active bleeding     Vital Signs  T(F): 100.3   HR: 108  BP: 137/79  RR: 24  SpO2: 96%    FOBT ordered  CBC pending  RN to monitor for any signs of active bleeding and notify with any acute changes Patient with ?small thread of blood in stool - minimal amount   No active bleeding     Vital Signs  T(F): 100.3   HR: 108  BP: 137/79  RR: 24  SpO2: 96%    FOBT ordered  CBC pending  RN to monitor for any signs of active bleeding and notify with any acute changes

## 2021-06-17 NOTE — PROGRESS NOTE ADULT - PROBLEM SELECTOR PLAN 1
no evidence of SBP. Possibly pulmonary and ? related to pleural effusion. Will leave to ID for further evaluation. Her cirrhosis is very advanced and nearing end stage. no evidence of SBP. Possibly pulmonary and ? related to pleural effusion. Will leave to ID for further evaluation. Her cirrhosis is very advanced and nearing end stage. Will add lasix to the aldactone.

## 2021-06-17 NOTE — PROVIDER CONTACT NOTE (CRITICAL VALUE NOTIFICATION) - TEST AND RESULT REPORTED:
Na 113  Glucose 641
Lactate 3.6  Hemoglobin 5.2  Hematocrit 16.1
lactate 4.4
Positive blood cultures

## 2021-06-17 NOTE — CONSULT NOTE ADULT - SUBJECTIVE AND OBJECTIVE BOX
HPI:  60 y/o female with PMH of alcoholic liver cirrhosis with hepatic encephalopathy, portal HTN, HTN, DM-2, hypothyroidism presented to [facility] ED 21 complaining of sharp epigastric pain x 1 week that radiated to her chest, associated with nausea, non-bloody emesis, shortness of breath with exertion, and fatigue. Patient is a poor historian and her bedside interview is not consistent with documented HPI. She states her last drink was one month ago and that she drinks 2-3 cans of Budweiser daily. Her son was at the bedside, stating that she is often confused. He endorses that she lives alone, hangs out with the wrong crowd, and does not admit her drinking habits to him. He states that he has seen her intoxicated and in the streets. He explains that this began to occur after she was evicted from her home several years ago.    Since admission, patient has been treated for gram negative bacteremia (remains febrile, followed by ID, on abx), anemia of chronic disease (likely cirrhosis related, negative GIB work up), hyponatremia, hyperglycemia, HTN, hypothyroidism, alcoholic liver cirrhosis with hepatic encephalopathy (s/p paracentesis , seen by GI considered end stage, on rifaximin and lactulose). Thoracic Surgery was consulted of left sided pleural effusion seen on CXR. At time of interview, patient complaints of SOB and fatigue, otherwise denies CP, nausea, vomiting, HA, dizziness, abdominal pain.     PAST MEDICAL & SURGICAL HISTORY:  Alcohol abuse    Alcohol abuse    Liver cirrhosis    ETOH abuse    Urea cycle metabolism disorder    Transitory  hyperthyroidism    Constipation    Lump in female breast    UTI (urinary tract infection)    Lymphangitis, acute, lower leg    Anemia    Hypothyroidism    Chronic back pain    HTN (hypertension)    GERD (gastroesophageal reflux disease)    Pancreatitis    No significant past surgical history    S/P abdominoplasty    MEDICATIONS  (STANDING):  dextrose 40% Gel 15 Gram(s) Oral once  dextrose 5%. 1000 milliLiter(s) (50 mL/Hr) IV Continuous <Continuous>  dextrose 5%. 1000 milliLiter(s) (100 mL/Hr) IV Continuous <Continuous>  dextrose 50% Injectable 25 Gram(s) IV Push once  dextrose 50% Injectable 12.5 Gram(s) IV Push once  dextrose 50% Injectable 25 Gram(s) IV Push once  folic acid 1 milliGRAM(s) Oral daily  furosemide    Tablet 20 milliGRAM(s) Oral daily  glucagon  Injectable 1 milliGRAM(s) IntraMuscular once  insulin glargine Injectable (LANTUS) 10 Unit(s) SubCutaneous every morning  insulin lispro (ADMELOG) corrective regimen sliding scale   SubCutaneous three times a day before meals  insulin lispro (ADMELOG) corrective regimen sliding scale   SubCutaneous at bedtime  labetalol 50 milliGRAM(s) Oral two times a day  lactulose Syrup 10 Gram(s) Oral daily  levothyroxine 50 MICROGram(s) Oral daily  multivitamin 1 Tablet(s) Oral daily  pantoprazole  Injectable 40 milliGRAM(s) IV Push two times a day  phytonadione  IVPB 5 milliGRAM(s) IV Intermittent daily  piperacillin/tazobactam IVPB.. 3.375 Gram(s) IV Intermittent every 8 hours  potassium chloride    Tablet ER 30 milliEquivalent(s) Oral two times a day  rifAXIMin 550 milliGRAM(s) Oral two times a day  sodium chloride 2 Gram(s) Oral two times a day  spironolactone 50 milliGRAM(s) Oral daily  thiamine 100 milliGRAM(s) Oral daily    MEDICATIONS  (PRN):  ibuprofen  Tablet. 600 milliGRAM(s) Oral every 8 hours PRN Temp greater or equal to 38C (100.4F), Mild Pain (1 - 3)  LORazepam   Injectable 1 milliGRAM(s) IV Push every 2 hours PRN Agitation  morphine  - Injectable 1 milliGRAM(s) IV Push every 6 hours PRN Moderate Pain (4 - 6)      Allergies  No Known Allergies  Intolerances    SOCIAL HISTORY:  Alcohol: 2-3 budweiser cans daily, reportedly last drink was 1 month ago  Drugs: Denies  Smoking: Denies  Retired   Lives alone  Does not use assistive device for ambulation     FAMILY HISTORY:  FH: depression (Child)    Vital Signs Last 24 Hrs  T(C): 38.3 (2021 15:30), Max: 38.3 (2021 15:30)  T(F): 100.9 (2021 15:30), Max: 100.9 (2021 15:30)  HR: 86 (2021 16:00) (84 - 135)  BP: 147/84 (2021 15:30) (118/86 - 173/82)  BP(mean): 91 (2021 12:00) (84 - 99)  RR: 18 (2021 16:00) (16 - 24)  SpO2: 99% (2021 16:00) (93% - 100%)    Physical Exam:  General: labored breathing, NAD, jaundice, weak / debilitated, unable to sit up in bed  Neuro: AxO x3, non-focal, OMER  Cardiac: S1S2, no murmurs  Pulm: CTA b/l, no wheezing or rales  Abdomen: Soft, NT, ND, hypoactive BS  Peripheral: +DP pulses b/l, no peripheral edema     LABS:                        7.9    18.11 )-----------( 95       ( 2021 06:05 )             25.1     06-17    126<L>  |  96<L>  |  11.8  ----------------------------<  250<H>  4.9   |  21.0<L>  |  0.68    Ca    8.3<L>      2021 06:05  Phos  2.4     06-17  Mg     1.8     -17    TPro  6.0<L>  /  Alb  2.6<L>  /  TBili  4.7<H>  /  DBili  3.0<H>  /  AST  138<H>  /  ALT  64<H>  /  AlkPhos  387<H>  06-16    PT/INR - ( 2021 06:05 )   PT: 18.6 sec;   INR: 1.64 ratio       PTT - ( 2021 06:05 )  PTT:29.9 sec  Urinalysis Basic - ( 2021 10:10 )    Color: Yellow / Appearance: Clear / S.005 / pH: x  Gluc: x / Ketone: Negative  / Bili: Small / Urobili: 8 mg/dL   Blood: x / Protein: Negative mg/dL / Nitrite: Negative   Leuk Esterase: Trace / RBC: 0-2 /HPF / WBC 0-2   Sq Epi: x / Non Sq Epi: Moderate / Bacteria: Few    RADIOLOGY & ADDITIONAL STUDIES:    CXR:  < from: Xray Chest 1 View- PORTABLE-Urgent (Xray Chest 1 View- PORTABLE-Urgent .) (21 @ 07:05) >  INTERPRETATION:  Clinical history: 65-year-old female, fever.    Two expiratory/rotated views are compared to 2021 and are correlated with the abdominal CT of 2021.    FINDINGS: Haze in the left hemithorax extending along the lateral aspect consistent with pleural effusion, increased.    Cardiac silhouette and pulmonary vasculature are within normal limits with no gross consolidation, right effusion, pneumothorax or acute osseous finding.    IMPRESSION:  Rotated study, large left pleural effusion, increased    < end of copied text >

## 2021-06-17 NOTE — PROGRESS NOTE ADULT - ASSESSMENT
62 y/o female with PMH of alcoholic liver cirrhosis with hepatic encephalopathy, portal HTN, HTN, DM-2  came to the ED complaining of epigastric pain x 1 week. She was found to have hg of 5.2 in the ED, occult negative  She was admitted for workup and treatment of anemia.    HypoNatremia suspect dilutional  Cont Lasix / NaCL tabs  Serum Na improving daily  HypoKalemia supplemented --> better    AM labs, will follow

## 2021-06-17 NOTE — PROGRESS NOTE ADULT - SUBJECTIVE AND OBJECTIVE BOX
Pt seen and examined f/u alcoholic cirrhosis, ascites and fever.    This AM she has no complaints. Denies any abdominal pain. No nuasea or vomiting. T max yesterday 102.8. Paracentesis c/w ascites form portal hypertension and no evidence of SBP. On antibioitcs.    REVIEW OF SYSTEMS:    CONSTITUTIONAL: No fever, weight loss, or fatigue  EYES: No eye pain, visual disturbances, or discharge  ENMT:  No difficulty hearing, tinnitus, vertigo; No sinus or throat pain  RESPIRATORY: No cough, wheezing, chills or hemoptysis; No shortness of breath  CARDIOVASCULAR: No chest pain, palpitations, dizziness, or leg swelling  GASTROINTESTINAL: No abdominal or epigastric pain. No nausea, vomiting, or hematemesis; No diarrhea or constipation. No melena or hematochezia.    MEDICATIONS:  MEDICATIONS  (STANDING):  dextrose 40% Gel 15 Gram(s) Oral once  dextrose 5%. 1000 milliLiter(s) (50 mL/Hr) IV Continuous <Continuous>  dextrose 5%. 1000 milliLiter(s) (100 mL/Hr) IV Continuous <Continuous>  dextrose 50% Injectable 25 Gram(s) IV Push once  dextrose 50% Injectable 12.5 Gram(s) IV Push once  dextrose 50% Injectable 25 Gram(s) IV Push once  folic acid 1 milliGRAM(s) Oral daily  glucagon  Injectable 1 milliGRAM(s) IntraMuscular once  insulin glargine Injectable (LANTUS) 10 Unit(s) SubCutaneous every morning  insulin lispro (ADMELOG) corrective regimen sliding scale   SubCutaneous three times a day before meals  insulin lispro (ADMELOG) corrective regimen sliding scale   SubCutaneous at bedtime  lactulose Syrup 10 Gram(s) Oral daily  levothyroxine 50 MICROGram(s) Oral daily  midodrine. 5 milliGRAM(s) Oral three times a day  multivitamin 1 Tablet(s) Oral daily  pantoprazole  Injectable 40 milliGRAM(s) IV Push two times a day  phytonadione   Solution 5 milliGRAM(s) Oral daily  piperacillin/tazobactam IVPB.. 3.375 Gram(s) IV Intermittent every 8 hours  potassium chloride    Tablet ER 30 milliEquivalent(s) Oral two times a day  rifAXIMin 550 milliGRAM(s) Oral two times a day  sodium chloride 2 Gram(s) Oral two times a day  spironolactone 50 milliGRAM(s) Oral daily  thiamine 100 milliGRAM(s) Oral daily    MEDICATIONS  (PRN):  LORazepam   Injectable 1 milliGRAM(s) IV Push every 2 hours PRN Agitation  morphine  - Injectable 1 milliGRAM(s) IV Push every 6 hours PRN Moderate Pain (4 - 6)      Allergies    No Known Allergies    Intolerances        Vital Signs Last 24 Hrs  T(C): 36.3 (2021 10:00), Max: 38.1 (2021 21:05)  T(F): 97.3 (2021 10:00), Max: 100.6 (2021 21:05)  HR: 103 (2021 10:00) (84 - 135)  BP: 138/86 (2021 10:00) (97/54 - 173/82)  BP(mean): 90 (2021 06:17) (84 - 99)  RR: 22 (2021 10:00) (16 - 24)  SpO2: 98% (2021 10:00) (93% - 100%)     @ 07:  -   @ 07:00  --------------------------------------------------------  IN: 150 mL / OUT: 0 mL / NET: 150 mL     @ 07: @ 10:38  --------------------------------------------------------  IN: 177 mL / OUT: 0 mL / NET: 177 mL        PHYSICAL EXAM:    General: jaundiced female in no acute distress  HEENT: MMM, conjunctiva and scleral icterus  Gastrointestinal:Abdomen: Soft non-tender with very mild distention, ; Normal bowel sounds; No hepatosplenomegaly  Extremities: compression boots in place  Skin: Warm and dry. No obvious rash    LABS:      CBC Full  -  ( 2021 06:05 )  WBC Count : 18.11 K/uL  RBC Count : 2.69 M/uL  Hemoglobin : 7.9 g/dL  Hematocrit : 25.1 %  Platelet Count - Automated : 95 K/uL  Mean Cell Volume : 93.3 fl  Mean Cell Hemoglobin : 29.4 pg  Mean Cell Hemoglobin Concentration : 31.5 gm/dL  Auto Neutrophil # : 15.13 K/uL  Auto Lymphocyte # : 1.41 K/uL  Auto Monocyte # : 1.30 K/uL  Auto Eosinophil # : 0.03 K/uL  Auto Basophil # : 0.04 K/uL  Auto Neutrophil % : 83.5 %  Auto Lymphocyte % : 7.8 %  Auto Monocyte % : 7.2 %  Auto Eosinophil % : 0.2 %  Auto Basophil % : 0.2 %        126<L>  |  96<L>  |  11.8  ----------------------------<  250<H>  4.9   |  21.0<L>  |  0.68    Ca    8.3<L>      2021 06:05  Phos  2.4       Mg     1.8         TPro  6.0<L>  /  Alb  2.6<L>  /  TBili  4.7<H>  /  DBili  3.0<H>  /  AST  138<H>  /  ALT  64<H>  /  AlkPhos  387<H>      PT/INR - ( 2021 06:05 )   PT: 18.6 sec;   INR: 1.64 ratio         PTT - ( 2021 06:05 )  PTT:29.9 sec      Urinalysis Basic - ( 2021 10:10 )    Color: Yellow / Appearance: Clear / S.005 / pH: x  Gluc: x / Ketone: Negative  / Bili: Small / Urobili: 8 mg/dL   Blood: x / Protein: Negative mg/dL / Nitrite: Negative   Leuk Esterase: Trace / RBC: 0-2 /HPF / WBC 0-2   Sq Epi: x / Non Sq Epi: Moderate / Bacteria: Few                RADIOLOGY & ADDITIONAL STUDIES (The following images were personally reviewed):  < from: Xray Chest 1 View- PORTABLE-Urgent (Xray Chest 1 View- PORTABLE-Urgent .) (21 @ 07:05) >   EXAM:  XR CHEST PORTABLE URGENT 1V                          PROCEDURE DATE:  2021          INTERPRETATION:  Clinical history: 65-year-old female, fever.    Two expiratory/rotated views are compared to 2021 and are correlated with the abdominal CT of 2021.    FINDINGS: Haze in the left hemithorax extending along the lateral aspect consistent with pleural effusion, increased.    Cardiac silhouette and pulmonary vasculature are within normal limits with no gross consolidation, right effusion, pneumothorax or acute osseous finding.    IMPRESSION:  Rotated study, large left pleural effusion, increased            LAURIE THURMAN DO; Attending Radiologist  This document has been electronically signed. 2021  9:03AM    < end of copied text >

## 2021-06-17 NOTE — PROGRESS NOTE ADULT - ASSESSMENT
Imp:  62 y/o female with PMH of alcoholic liver cirrhosis with hepatic encephalopathy, portal HTN, HTN, DM-2, hypothyroidism came to the ED complaining of epigastric pain x 1 week. Pain was sharp radiating to her chest, associated with nausea and non-bloody emesis. Patient said her symptoms now improved at the time of interview. She endorsed dark stool to ED attending but said no when I asked her. She also noted shortness of breath with exertion and fatigue. She has no chest pain, melena, hematemesis, hematuria, recent travel, fever, chills. Of note patient still drinks alcohol, last drink was 2 weeks ago.   Found to have hb of 5.2 in the ED, occult negative and also hyperglycemic.         Acute anemia     Likely due to underlying alcohol liver dx   Hb: 5.2 on admit;  FOBT negative   Hb improved post transfusion; Elevated TB, mostly direct; unlikely hemolysis.  Low haptoglobin 2 chantelle to undrlying liver disease, as is LDH elevation; rec: trend retic, LDH; would repeat LDH, retic, TB  Monitor CBC; transfuse to keep Hb over 7; monitor coags  Hgb 7.5 today, plt count 90,000    Epigastric abdominal pain   Likely due to gastritis PPI 40mg   CT abdomen done, no pancreatitis     fever and leucocytosis  on IV abx  f/u blood cultures  further plan as per primary team      Alcoholic liver cirrhosis with hepatic encephalopathy and portal HTN   Patient was last seen in 2017, said she does not follow with GI/hepatologist   Was discharged on Rifaximin 550mg bid   Aldactone 50mg   Lasix 20mg   PPI 40mg     EtOH abuse   Thiamine 100mg for possible thiamine deficiency in the setting of EtOH use  Folic acid   MVT   Ativan PRN for withdrawal precaution     HTN   Labetalol 100mg bid with holding parameters     Hypothyroidism   Synthroid 25mcg     Will follow

## 2021-06-17 NOTE — PROVIDER CONTACT NOTE (CRITICAL VALUE NOTIFICATION) - SITUATION
Microbiology called Microbiology called in regards to positive blood cultures to both sets of blood cultures taken. Gram -rods present in both aerobic and anerobic bottles. Communciated with MD Hay at bedside about results and current antibiotic regimen. As per MD will re-evaluate. No further orders at this time.

## 2021-06-17 NOTE — CONSULT NOTE ADULT - SUBJECTIVE AND OBJECTIVE BOX
[facility]  INFECTIOUS DISEASES AND INTERNAL MEDICINE at [place]  =======================================================  Benrardo La MD  Diplomates American Board of Internal Medicine and Infectious Diseases  Tel: [phone]      Fax: [phone]  =======================================================      [id number]  JUTSIN LOVE    CC: Patient is a 65y old  Female who presents with a chief complaint of Anemia (2021 08:20)      65y  Female with h/o alcoholic liver cirrhosis with hepatic encephalopathy, portal HTN, HTN, DM 2, hypothyroidism. Patient initially admitted with epigastric pain, worsening transaminitis, alcoholic cirrhosis and weakness. CT of abdomen and pelvis revealed  cirrhosis with evidence of portal hypertension, mild volume of abdominopelvic ascites, mild left pleural effusion with associated subsegmental atelectasis, possible gastritis and gallstones. Patient was followed by GI, Hematology, nephrology. Patient developed fever to 102.8F . Also had paracentesis . Blood cultures with GNR. ID input requested.       Past Medical & Surgical Hx:  Alcohol abuse  Liver cirrhosis  ETOH abuse  Urea cycle metabolism disorder  Transitory  hyperthyroidism  Constipation  Lump in female breast  UTI (urinary tract infection)  Lymphangitis, acute, lower leg  Anemia  Hypothyroidism  Chronic back pain  HTN (hypertension)  GERD (gastroesophageal reflux disease)  Pancreatitis  No significant past surgical history  S/P abdominoplasty      Social Hx:  Denies smoking, + ETOH       FAMILY HISTORY:  FH: depression (Child)      Allergies  No Known Allergies       REVIEW OF SYSTEMS:  CONSTITUTIONAL:  + Fever + chills  HEENT:  No diplopia or blurred vision.  No earache, sore throat or runny nose.  CARDIOVASCULAR:  No pressure, squeezing, strangling, tightness, heaviness or aching about the chest, neck, axilla or epigastrium.  RESPIRATORY:  No cough, shortness of breath  GASTROINTESTINAL:  No nausea, vomiting or diarrhea.  GENITOURINARY:  No dysuria, frequency or urgency.   MUSCULOSKELETAL:  no joint aches, no muscle pain  SKIN:  No change in skin, hair or nails.  NEUROLOGIC:  No Headaches, seizures   PSYCHIATRIC:  No disorder of thought or mood.  ENDOCRINE:  No heat or cold intolerance  HEMATOLOGICAL:  No easy bruising or bleeding.       Physical Exam:  GEN: NAD, pleasant  HEENT: normocephalic and atraumatic. EOMI. PERRL.  Anicteric  NECK: Supple.   LUNGS: Clear to auscultation.  HEART: Regular rate and rhythm   ABDOMEN: Soft, nontender, and nondistended.  Positive bowel sounds.    : No CVA tenderness  EXTREMITIES: Without any edema.  MSK: No joint swelling  NEUROLOGIC:  No Focal Deficits  PSYCHIATRIC: Appropriate affect .  SKIN: No Rash      Vitals:  T(F): 99.2 (2021 08:00), Max: 100.6 (2021 21:05)  HR: 92 (2021 08:00)  BP: 118/86 (2021 08:00)  RR: 20 (2021 08:00)  SpO2: 98% (2021 08:00) (93% - 100%)  temp max in last 48H T(F): , Max: 102.8 (21 @ 06:01)      Current Antibiotics:  piperacillin/tazobactam IVPB.. 3.375 Gram(s) IV Intermittent every 8 hours  rifAXIMin 550 milliGRAM(s) Oral two times a day    Other medications:  dextrose 40% Gel 15 Gram(s) Oral once  dextrose 5%. 1000 milliLiter(s) IV Continuous <Continuous>  dextrose 5%. 1000 milliLiter(s) IV Continuous <Continuous>  dextrose 50% Injectable 25 Gram(s) IV Push once  dextrose 50% Injectable 12.5 Gram(s) IV Push once  dextrose 50% Injectable 25 Gram(s) IV Push once  folic acid 1 milliGRAM(s) Oral daily  glucagon  Injectable 1 milliGRAM(s) IntraMuscular once  insulin glargine Injectable (LANTUS) 10 Unit(s) SubCutaneous every morning  insulin lispro (ADMELOG) corrective regimen sliding scale   SubCutaneous three times a day before meals  insulin lispro (ADMELOG) corrective regimen sliding scale   SubCutaneous at bedtime  lactulose Syrup 10 Gram(s) Oral daily  levothyroxine 50 MICROGram(s) Oral daily  midodrine. 5 milliGRAM(s) Oral three times a day  multivitamin 1 Tablet(s) Oral daily  pantoprazole  Injectable 40 milliGRAM(s) IV Push two times a day  phytonadione   Solution 5 milliGRAM(s) Oral daily  potassium chloride    Tablet ER 30 milliEquivalent(s) Oral two times a day  sodium chloride 2 Gram(s) Oral two times a day  spironolactone 50 milliGRAM(s) Oral daily  thiamine 100 milliGRAM(s) Oral daily               7.9    18.11 )-----------( 95       ( 2021 06:05 )             25.1         126<L>  |  96<L>  |  11.8  ----------------------------<  250<H>  4.9   |  21.0<L>  |  0.68    Ca    8.3<L>      2021 06:05  Phos  2.4       Mg     1.8         TPro  6.0<L>  /  Alb  2.6<L>  /  TBili  4.7<H>  /  DBili  3.0<H>  /  AST  138<H>  /  ALT  64<H>  /  AlkPhos  387<H>      RECENT CULTURES:   @ 21:18 .Body Fluid Peritoneal Fluid     Moderate polymorphonuclear leukocytes  No organisms seen  by cytocentrifuge     @ 07:44 .Blood Blood-Peripheral Blood Culture PCR    Growth in aerobic and anaerobic bottles: Gram Negative Rods  ***Blood Panel PCR results on this specimen are available  approximately 3 hours after the Gram stain result.***  Gram stain, PCR, and/or culture results may not always  correspond due todifference in methodologies.  ************************************************************  This PCR assay was performed using [employer].  The following targets are tested for: Enterococcus,  vancomycin resistant enterococci, Listeria monocytogenes,  coagulase negative staphylococci, S. aureus,  methicillin resistant S. aureus, Streptococcus agalactiae  (Group B), S. pneumoniae, S. pyogenes (Group A),  Acinetobacter baumannii, Enterobacter cloacae, E. coli,  Klebsiella oxytoca, K. pneumoniae, Proteus sp.,  Serratia marcescens, Haemophilus influenzae,  Neisseria meningitidis, Pseudomonas aeruginosa, Candida  albicans, C. glabrata, C krusei, C parapsilosis,  C. tropicalis and the KPC resistance gene.  "Due to technical problems, Pseudomonas aeruginosa  will Not be reported as part of the BCID panel  until further notice".  Gram Stain and BCID performed by:  [facility]  [address] [place] [id number]     @ 01:57 .Urine Clean Catch (Midstream)     >=3 organisms. Probable collection contamination.     @ 18:38 .Blood Blood-Peripheral     No growth at 48 hours     @ 18:37 .Blood Blood-Peripheral     No growth at 48 hours      WBC Count: 18.11 K/uL (21 @ 06:05)  WBC Count: 22.37 K/uL (21 @ 18:14)  WBC Count: 14.15 K/uL (21 @ 07:41)  WBC Count: 9.41 K/uL (06-15-21 @ 05:58)  WBC Count: 8.77 K/uL (21 @ 08:08)  WBC Count: 13.14 K/uL (21 @ 09:51)  WBC Count: 11.95 K/uL (21 @ 18:36)    Creatinine, Serum: 0.68 mg/dL (21 @ 06:05)  Creatinine, Serum: 0.73 mg/dL (21 @ 17:59)  Creatinine, Serum: 0.76 mg/dL (21 @ 07:41)  Creatinine, Serum: 0.76 mg/dL (21 @ 07:41)  Creatinine, Serum: 0.88 mg/dL (06-15-21 @ 05:58)  Creatinine, Serum: 0.68 mg/dL (21 @ 08:08)  Creatinine, Serum: 0.89 mg/dL (21 @ 19:00)  Creatinine, Serum: 0.89 mg/dL (21 @ 09:51)  Creatinine, Serum: 0.85 mg/dL (21 @ 21:55)  Creatinine, Serum: 0.96 mg/dL (21 @ 18:36)    Ferritin, Serum: 76 ng/mL (21 @ 08:08)    Procalcitonin, Serum: 2.95 ng/mL (21 @ 17:59)  Procalcitonin, Serum: 0.83 ng/mL (21 @ 07:42)     SARS-CoV-2: NotDetec (21 @ 10:09)  Rapid RVP Result: NotDetec (21 @ 10:09)    COVID-19 PCR: NotDetec (21 @ 02:24)      Urinalysis Basic - ( 2021 10:10 )    Color: Yellow / Appearance: Clear / S.005 / pH: x  Gluc: x / Ketone: Negative  / Bili: Small / Urobili: 8 mg/dL   Blood: x / Protein: Negative mg/dL / Nitrite: Negative   Leuk Esterase: Trace / RBC: 0-2 /HPF / WBC 0-2   Sq Epi: x / Non Sq Epi: Moderate / Bacteria: Few      < from: Xray Chest 1 View- PORTABLE-Urgent (Xray Chest 1 View- PORTABLE-Urgent .) (21 @ 07:05) >   EXAM:  XR CHEST PORTABLE URGENT 1V                          PROCEDURE DATE:  2021      INTERPRETATION:  Clinical history: 65-year-old female, fever.    Two expiratory/rotated views are compared to 2021 and are correlated with the abdominal CT of 2021.    FINDINGS: Haze in the left hemithorax extending along the lateral aspect consistent with pleural effusion, increased.    Cardiac silhouette and pulmonary vasculature are within normal limits with no gross consolidation, right effusion, pneumothorax or acute osseous finding.    IMPRESSION:  Rotated study, large left pleural effusion, increased    < end of copied text >

## 2021-06-18 DIAGNOSIS — R52 PAIN, UNSPECIFIED: ICD-10-CM

## 2021-06-18 DIAGNOSIS — R78.81 BACTEREMIA: ICD-10-CM

## 2021-06-18 DIAGNOSIS — Z51.5 ENCOUNTER FOR PALLIATIVE CARE: ICD-10-CM

## 2021-06-18 DIAGNOSIS — E87.1 HYPO-OSMOLALITY AND HYPONATREMIA: ICD-10-CM

## 2021-06-18 DIAGNOSIS — J90 PLEURAL EFFUSION, NOT ELSEWHERE CLASSIFIED: ICD-10-CM

## 2021-06-18 DIAGNOSIS — D61.818 OTHER PANCYTOPENIA: ICD-10-CM

## 2021-06-18 DIAGNOSIS — R53.81 OTHER MALAISE: ICD-10-CM

## 2021-06-18 DIAGNOSIS — D64.9 ANEMIA, UNSPECIFIED: ICD-10-CM

## 2021-06-18 LAB
ALBUMIN FLD-MCNC: 0.2 G/DL — SIGNIFICANT CHANGE UP
ALBUMIN SERPL ELPH-MCNC: 2.3 G/DL — LOW (ref 3.3–5.2)
ALP SERPL-CCNC: 262 U/L — HIGH (ref 40–120)
ALT FLD-CCNC: 46 U/L — HIGH
ANION GAP SERPL CALC-SCNC: 7 MMOL/L — SIGNIFICANT CHANGE UP (ref 5–17)
AST SERPL-CCNC: 79 U/L — HIGH
BASOPHILS # BLD AUTO: 0.06 K/UL — SIGNIFICANT CHANGE UP (ref 0–0.2)
BASOPHILS NFR BLD AUTO: 0.5 % — SIGNIFICANT CHANGE UP (ref 0–2)
BILIRUB DIRECT SERPL-MCNC: 2.5 MG/DL — HIGH (ref 0–0.3)
BILIRUB INDIRECT FLD-MCNC: 1.5 MG/DL — HIGH (ref 0.2–1)
BILIRUB SERPL-MCNC: 4.1 MG/DL — HIGH (ref 0.4–2)
BUN SERPL-MCNC: 9.1 MG/DL — SIGNIFICANT CHANGE UP (ref 8–20)
CALCIUM SERPL-MCNC: 8.2 MG/DL — LOW (ref 8.6–10.2)
CHLORIDE SERPL-SCNC: 100 MMOL/L — SIGNIFICANT CHANGE UP (ref 98–107)
CO2 SERPL-SCNC: 21 MMOL/L — LOW (ref 22–29)
CREAT SERPL-MCNC: 0.73 MG/DL — SIGNIFICANT CHANGE UP (ref 0.5–1.3)
EOSINOPHIL # BLD AUTO: 0.14 K/UL — SIGNIFICANT CHANGE UP (ref 0–0.5)
EOSINOPHIL NFR BLD AUTO: 1.2 % — SIGNIFICANT CHANGE UP (ref 0–6)
GLUCOSE BLDC GLUCOMTR-MCNC: 204 MG/DL — HIGH (ref 70–99)
GLUCOSE BLDC GLUCOMTR-MCNC: 207 MG/DL — HIGH (ref 70–99)
GLUCOSE BLDC GLUCOMTR-MCNC: 286 MG/DL — HIGH (ref 70–99)
GLUCOSE BLDC GLUCOMTR-MCNC: 292 MG/DL — HIGH (ref 70–99)
GLUCOSE FLD-MCNC: 216 MG/DL — SIGNIFICANT CHANGE UP
GLUCOSE SERPL-MCNC: 190 MG/DL — HIGH (ref 70–99)
GRAM STN FLD: SIGNIFICANT CHANGE UP
HCT VFR BLD CALC: 22.1 % — LOW (ref 34.5–45)
HCT VFR BLD CALC: 26 % — LOW (ref 34.5–45)
HGB BLD-MCNC: 7.1 G/DL — LOW (ref 11.5–15.5)
HGB BLD-MCNC: 8.7 G/DL — LOW (ref 11.5–15.5)
IMM GRANULOCYTES NFR BLD AUTO: 1 % — SIGNIFICANT CHANGE UP (ref 0–1.5)
INR BLD: 1.65 RATIO — HIGH (ref 0.88–1.16)
LACTATE BLDV-MCNC: 1.6 MMOL/L — SIGNIFICANT CHANGE UP (ref 0.5–2)
LDH SERPL L TO P-CCNC: 43 U/L — SIGNIFICANT CHANGE UP
LYMPHOCYTES # BLD AUTO: 1.7 K/UL — SIGNIFICANT CHANGE UP (ref 1–3.3)
LYMPHOCYTES # BLD AUTO: 15 % — SIGNIFICANT CHANGE UP (ref 13–44)
MAGNESIUM SERPL-MCNC: 1.7 MG/DL — SIGNIFICANT CHANGE UP (ref 1.6–2.6)
MCHC RBC-ENTMCNC: 30.2 PG — SIGNIFICANT CHANGE UP (ref 27–34)
MCHC RBC-ENTMCNC: 32.1 GM/DL — SIGNIFICANT CHANGE UP (ref 32–36)
MCV RBC AUTO: 94 FL — SIGNIFICANT CHANGE UP (ref 80–100)
MITOCHONDRIA AB SER-ACNC: SIGNIFICANT CHANGE UP
MONOCYTES # BLD AUTO: 1.08 K/UL — HIGH (ref 0–0.9)
MONOCYTES NFR BLD AUTO: 9.5 % — SIGNIFICANT CHANGE UP (ref 2–14)
NEUTROPHILS # BLD AUTO: 8.23 K/UL — HIGH (ref 1.8–7.4)
NEUTROPHILS NFR BLD AUTO: 72.8 % — SIGNIFICANT CHANGE UP (ref 43–77)
PHOSPHATE SERPL-MCNC: 2.2 MG/DL — LOW (ref 2.4–4.7)
PLATELET # BLD AUTO: 88 K/UL — LOW (ref 150–400)
POTASSIUM SERPL-MCNC: 4.5 MMOL/L — SIGNIFICANT CHANGE UP (ref 3.5–5.3)
POTASSIUM SERPL-SCNC: 4.5 MMOL/L — SIGNIFICANT CHANGE UP (ref 3.5–5.3)
PROT FLD-MCNC: <1 G/DL — SIGNIFICANT CHANGE UP
PROT SERPL-MCNC: 5.4 G/DL — LOW (ref 6.6–8.7)
PROTHROM AB SERPL-ACNC: 18.7 SEC — HIGH (ref 10.6–13.6)
RBC # BLD: 2.35 M/UL — LOW (ref 3.8–5.2)
RBC # FLD: 22.5 % — HIGH (ref 10.3–14.5)
SMOOTH MUSCLE AB SER-ACNC: SIGNIFICANT CHANGE UP
SODIUM SERPL-SCNC: 128 MMOL/L — LOW (ref 135–145)
SPECIMEN SOURCE: SIGNIFICANT CHANGE UP
WBC # BLD: 11.32 K/UL — HIGH (ref 3.8–10.5)
WBC # FLD AUTO: 11.32 K/UL — HIGH (ref 3.8–10.5)

## 2021-06-18 PROCEDURE — 99233 SBSQ HOSP IP/OBS HIGH 50: CPT

## 2021-06-18 PROCEDURE — 36569 INSJ PICC 5 YR+ W/O IMAGING: CPT

## 2021-06-18 PROCEDURE — 99232 SBSQ HOSP IP/OBS MODERATE 35: CPT

## 2021-06-18 PROCEDURE — 76937 US GUIDE VASCULAR ACCESS: CPT | Mod: 26,59

## 2021-06-18 PROCEDURE — 99222 1ST HOSP IP/OBS MODERATE 55: CPT

## 2021-06-18 RX ORDER — ACETAMINOPHEN 500 MG
500 TABLET ORAL EVERY 12 HOURS
Refills: 0 | Status: DISCONTINUED | OUTPATIENT
Start: 2021-06-18 | End: 2021-06-28

## 2021-06-18 RX ORDER — LIDOCAINE 4 G/100G
1 CREAM TOPICAL DAILY
Refills: 0 | Status: DISCONTINUED | OUTPATIENT
Start: 2021-06-18 | End: 2021-06-28

## 2021-06-18 RX ADMIN — INSULIN GLARGINE 10 UNIT(S): 100 INJECTION, SOLUTION SUBCUTANEOUS at 09:04

## 2021-06-18 RX ADMIN — SPIRONOLACTONE 50 MILLIGRAM(S): 25 TABLET, FILM COATED ORAL at 06:35

## 2021-06-18 RX ADMIN — LACTULOSE 10 GRAM(S): 10 SOLUTION ORAL at 12:43

## 2021-06-18 RX ADMIN — Medication 50 MILLIGRAM(S): at 06:36

## 2021-06-18 RX ADMIN — LIDOCAINE 1 PATCH: 4 CREAM TOPICAL at 21:09

## 2021-06-18 RX ADMIN — PANTOPRAZOLE SODIUM 40 MILLIGRAM(S): 20 TABLET, DELAYED RELEASE ORAL at 17:24

## 2021-06-18 RX ADMIN — SODIUM CHLORIDE 2 GRAM(S): 9 INJECTION INTRAMUSCULAR; INTRAVENOUS; SUBCUTANEOUS at 06:36

## 2021-06-18 RX ADMIN — Medication 50 MILLIGRAM(S): at 17:24

## 2021-06-18 RX ADMIN — Medication 30 MILLIEQUIVALENT(S): at 17:24

## 2021-06-18 RX ADMIN — PIPERACILLIN AND TAZOBACTAM 25 GRAM(S): 4; .5 INJECTION, POWDER, LYOPHILIZED, FOR SOLUTION INTRAVENOUS at 21:08

## 2021-06-18 RX ADMIN — Medication 1 TABLET(S): at 12:42

## 2021-06-18 RX ADMIN — Medication 2: at 21:08

## 2021-06-18 RX ADMIN — Medication 4: at 12:42

## 2021-06-18 RX ADMIN — Medication 50 MICROGRAM(S): at 06:36

## 2021-06-18 RX ADMIN — PIPERACILLIN AND TAZOBACTAM 25 GRAM(S): 4; .5 INJECTION, POWDER, LYOPHILIZED, FOR SOLUTION INTRAVENOUS at 14:34

## 2021-06-18 RX ADMIN — Medication 1 MILLIGRAM(S): at 14:34

## 2021-06-18 RX ADMIN — MORPHINE SULFATE 1 MILLIGRAM(S): 50 CAPSULE, EXTENDED RELEASE ORAL at 20:30

## 2021-06-18 RX ADMIN — Medication 4: at 09:05

## 2021-06-18 RX ADMIN — Medication 20 MILLIGRAM(S): at 06:35

## 2021-06-18 RX ADMIN — Medication 62.5 MILLIMOLE(S): at 13:01

## 2021-06-18 RX ADMIN — PANTOPRAZOLE SODIUM 40 MILLIGRAM(S): 20 TABLET, DELAYED RELEASE ORAL at 06:37

## 2021-06-18 RX ADMIN — Medication 30 MILLIEQUIVALENT(S): at 06:35

## 2021-06-18 RX ADMIN — Medication 101 MILLIGRAM(S): at 14:33

## 2021-06-18 RX ADMIN — PIPERACILLIN AND TAZOBACTAM 25 GRAM(S): 4; .5 INJECTION, POWDER, LYOPHILIZED, FOR SOLUTION INTRAVENOUS at 06:35

## 2021-06-18 RX ADMIN — Medication 100 MILLIGRAM(S): at 12:43

## 2021-06-18 RX ADMIN — MORPHINE SULFATE 1 MILLIGRAM(S): 50 CAPSULE, EXTENDED RELEASE ORAL at 20:09

## 2021-06-18 RX ADMIN — SODIUM CHLORIDE 2 GRAM(S): 9 INJECTION INTRAMUSCULAR; INTRAVENOUS; SUBCUTANEOUS at 17:24

## 2021-06-18 RX ADMIN — Medication 6: at 17:23

## 2021-06-18 NOTE — DIETITIAN INITIAL EVALUATION ADULT. - PERTINENT LABORATORY DATA
06-18 Na128 mmol/L<L> Glu 190 mg/dL<H> K+ 4.5 mmol/L Cr  0.73 mg/dL BUN 9.1 mg/dL Phos 2.2 mg/dL<L> Alb 2.3 g/dL<L> PAB n/a

## 2021-06-18 NOTE — PROCEDURE NOTE - NSPROCDETAILS_GEN_ALL_CORE
location identified, draped/prepped, sterile technique used/blood seen on insertion/dressing applied/flushes easily/secured in place
location identified, draped/prepped, sterile technique used, needle inserted/introduced/Seldinger technique/catheter inserted over needle/connection to syringe/ultrasound assessment of effusion (localization)
location identified, draped/prepped, sterile technique used/sterile dressing applied/sterile technique, catheter placed/supine position/ultrasound guidance

## 2021-06-18 NOTE — PROGRESS NOTE ADULT - ASSESSMENT
Dilutional hyponatremia: improving  EtOH cirrhosis  Poor nutrition  - cont NaCl tabs and Lasix  - oral fluid restriction  - trend labs

## 2021-06-18 NOTE — PROGRESS NOTE ADULT - PROBLEM SELECTOR PLAN 1
Decompensated, very advance and nearing end stage.  Continue Lasix and Aldactone.  Continue to monitor electrolytes

## 2021-06-18 NOTE — PROGRESS NOTE ADULT - ASSESSMENT
Imp:  60 y/o female with PMH of alcoholic liver cirrhosis with hepatic encephalopathy, portal HTN, HTN, DM-2, hypothyroidism came to the ED complaining of epigastric pain x 1 week. Pain was sharp radiating to her chest, associated with nausea and non-bloody emesis. Patient said her symptoms now improved at the time of interview. She endorsed dark stool to ED attending but said no when I asked her. She also noted shortness of breath with exertion and fatigue. She has no chest pain, melena, hematemesis, hematuria, recent travel, fever, chills. Of note patient still drinks alcohol, last drink was 2 weeks ago.   Found to have hb of 5.2 in the ED, occult negative and also hyperglycemic.         Acute anemia     Likely due to underlying alcohol liver dx   Hb: 5.2 on admit;  FOBT negative   Hb improved post transfusion; Elevated TB, mostly direct; unlikely hemolysis.  Low haptoglobin 2 chantelle to underlying liver disease, as is LDH elevation; rec: trend retic, LDH; would repeat LDH, retic, TB  Monitor CBC; transfuse to keep Hb over 7; monitor coags  Hgb 7.1 today, plt count 88K  Agree with addnl unit of blood; monitor for bleeding    Epigastric abdominal pain   Likely due to gastritis PPI 40mg   CT abdomen done, no pancreatitis     fever and leucocytosis  on IV abx  f/u blood cultures  further plan as per primary team      Alcoholic liver cirrhosis with hepatic encephalopathy and portal HTN   Patient was last seen in 2017, said she does not follow with GI/hepatologist   Was discharged on Rifaximin 550mg bid   Aldactone 50mg   Lasix 20mg   PPI 40mg     EtOH abuse   Thiamine 100mg for possible thiamine deficiency in the setting of EtOH use  Folic acid   MVT   Ativan PRN for withdrawal precaution     HTN   Labetalol 100mg bid with holding parameters     Hypothyroidism   Synthroid 25mcg     Will follow

## 2021-06-18 NOTE — DIETITIAN INITIAL EVALUATION ADULT. - ENTER FROM (CAL/KG)
Patient Progress Note      CC: DM2      Referring Provider  No referring provider defined for this encounter  History of Present Illness:   Miriam Harkins  is a 73 y o  male with a history of type 2 diabetes without long term use of insulin  Diabetes course has been stable  Complications of DM: None reported  Denies recent illness or hospitalizations  Denies recent severe hypoglycemic or severe hyperglycemic episodes  Denies any issues with his current regimen  Home glucose monitoring: are performed sporadically     Home blood glucose readings: 127, 102, 124, 145, 158, 113, 163 mg/dl  He reports higher readings are often due to eating ice cream     Current regimen: Januvia 100 mg daily, metformin 1000 mg twice a day, glimepiride 2 mg with breakfast  compliant most of the time, denies any side effects from medications  Hypoglycemic episodes: No, rare  H/o of hypoglycemia causing hospitalization or Intervention such as glucagon injection or ambulance call : No  Hypoglycemia symptoms: dizziness and sweating and jittery  Treatment of hypoglycemia: candy     Diet: 3 meals per day, 0-1 snacks per day  Timing of meals is predictable  Diabetic diet compliance: compliant some of the time  He states he does not avoid any foods but his portions are smaller  Activity: Daily activity is predictable: Yes  He is active around the house  Ophthamology: eye exam UTD, July 2019  Due now  Podiatry: foot exam UTD, February 2020     Has hypertension: on ACE inhibitor/ARB, compliant most of the time  Has hyperlipidemia: on fenofibrate- tolerating well, no myalgias  compliant most of the time, denies any side effects from medications    Thyroid disorders: No  History of pancreatitis: No    Patient Active Problem List   Diagnosis    Anxiety    Benign essential hypertension    Type 2 diabetes mellitus with hyperglycemia, without long-term current use of insulin ([facility] [address] )    Hyperlipidemia    Healthcare maintenance    Acute pain of right knee    Acute sinusitis    Overweight      Past Medical History:   Diagnosis Date    DM (diabetes mellitus), type 2 ([facility] [address] )       Past Surgical History:   Procedure Laterality Date    KNEE SURGERY Right       Family History   Problem Relation Age of Onset    Diabetes unspecified Mother      Social History     Tobacco Use    Smoking status: Never Smoker    Smokeless tobacco: Never Used   Substance Use Topics    Alcohol use: Not on file     No Known Allergies      Current Outpatient Medications:     aspirin 81 mg chewable tablet, Chew 1 tablet daily, Disp: , Rfl:     bisoprolol-hydrochlorothiazide (ZIAC) 5-6 25 MG per tablet, Take 1 tablet by mouth daily, Disp: 90 tablet, Rfl: 3    citalopram (CeleXA) 10 mg tablet, Take 1 tablet (10 mg total) by mouth daily, Disp: 90 tablet, Rfl: 3    fenofibrate (TRICOR) 145 mg tablet, Take 1 tablet (145 mg total) by mouth daily, Disp: 90 tablet, Rfl: 3    glimepiride (AMARYL) 4 mg tablet, Take 2 mg by mouth , Disp: , Rfl:     glucose blood (ACCU-CHEK GUIDE) test strip, Use to test blood sugars twice daily as instructed, Disp: 100 each, Rfl: 1    Lancets MISC, by Does not apply route 2 (two) times a day, Disp: , Rfl:     losartan (COZAAR) 50 mg tablet, TAKE 1 TABLET EVERY DAY, Disp: 90 tablet, Rfl: 3    metFORMIN (GLUCOPHAGE-XR) 500 mg 24 hr tablet, take 2 tablets by mouth twice a day, Disp: 360 tablet, Rfl: 1    sitaGLIPtin (JANUVIA) 100 mg tablet, Take 1 tablet (100 mg total) by mouth daily, Disp: 30 tablet, Rfl: 4  Review of Systems   Constitutional: Negative for activity change, appetite change, fatigue and unexpected weight change  HENT: Negative for trouble swallowing  Eyes: Negative for visual disturbance  Respiratory: Negative for shortness of breath  Cardiovascular: Negative for chest pain and palpitations  Gastrointestinal: Negative for constipation and diarrhea  Endocrine: Negative for polydipsia and polyuria  Musculoskeletal: Negative  Skin: Negative for wound  Neurological: Negative for numbness  Psychiatric/Behavioral: Negative  Physical Exam:  Body mass index is 29 15 kg/m²  /90   Pulse 68   Temp 97 8 °F (36 6 °C)   Ht 5' 11" (1 803 m)   Wt 94 8 kg (209 lb)   BMI 29 15 kg/m²    Wt Readings from Last 3 Encounters:   07/15/20 94 8 kg (209 lb)   07/13/20 94 8 kg (209 lb)   02/10/20 93 4 kg (206 lb)       Physical Exam   Constitutional: He appears well-developed and well-nourished  HENT:   Head: Normocephalic  Eyes: Pupils are equal, round, and reactive to light  EOM are normal  No scleral icterus  Neck: Neck supple  No thyromegaly present  Cardiovascular: Normal rate and regular rhythm  No murmur heard  Pulses:       Radial pulses are 2+ on the right side, and 2+ on the left side  Pulmonary/Chest: Effort normal and breath sounds normal  No respiratory distress  He has no wheezes  Neurological: He is alert  Skin: Skin is warm and dry  Psychiatric: He has a normal mood and affect  Nursing note and vitals reviewed  Patient's shoes and socks were not removed        Labs:   Component      Latest Ref Rng & Units 2/12/2020 4/2/2020   Glucose, Random      65 - 99 mg/dL 86    BUN      7 - 25 mg/dL 18    Creatinine      0 70 - 1 25 mg/dL 1 09    eGFR Non       > OR = 60 mL/min/1 73m2 69    eGFR       > OR = 60 mL/min/1 73m2 80    SL AMB BUN/CREATININE RATIO      6 - 22 (calc) NOT APPLICABLE    Sodium      135 - 146 mmol/L 139    Potassium      3 5 - 5 3 mmol/L 4 2    Chloride      98 - 110 mmol/L 103    CO2      20 - 32 mmol/L 29    Calcium      8 6 - 10 3 mg/dL 9 7    Total Protein      6 1 - 8 1 g/dL     Albumin      3 6 - 5 1 g/dL     Globulin      1 9 - 3 7 g/dL (calc)     Albumin/Globulin Ratio      1 0 - 2 5 (calc)     TOTAL BILIRUBIN      0 2 - 1 2 mg/dL     Alkaline Phosphatase      35 - 144 U/L     AST      10 - 35 U/L     ALT      9 - 46 U/L     Cholesterol      <200 mg/dL  177   HDL      > OR = 40 mg/dL  38 (L)   Triglycerides      <150 mg/dL  158 (H)   LDL Calculated      mg/dL (calc)  112 (H)   Chol HDLC Ratio      <5 0 (calc)  4 7   Non-HDL Cholesterol      <130 mg/dL (calc)  139 (H)   EXT Creatinine Urine      20 - 320 mg/dL 135    MICROALBUM ,U,RANDOM      See Note: mg/dL 0 5    MICROALBUMIN/CREATININE RATIO      <30 mcg/mg creat 4    Comment(s)           ABN TEST REFUSED           Hemoglobin A1C      <5 7 % of total Hgb 6 2 (H)    TSH W/RFX TO FREE T4      0 40 - 4 50 mIU/L       Component      Latest Ref Rng & Units 5/26/2020   Glucose, Random      65 - 99 mg/dL 91   BUN      7 - 25 mg/dL 20   Creatinine      0 70 - 1 25 mg/dL 0 98   eGFR Non       > OR = 60 mL/min/1 73m2 79   eGFR       > OR = 60 mL/min/1 73m2 91   SL AMB BUN/CREATININE RATIO      6 - 22 (calc) NOT APPLICABLE   Sodium      135 - 146 mmol/L 139   Potassium      3 5 - 5 3 mmol/L 4 3   Chloride      98 - 110 mmol/L 104   CO2      20 - 32 mmol/L 28   Calcium      8 6 - 10 3 mg/dL 9 6   Total Protein      6 1 - 8 1 g/dL 6 9   Albumin      3 6 - 5 1 g/dL 4 7   Globulin      1 9 - 3 7 g/dL (calc) 2 2   Albumin/Globulin Ratio      1 0 - 2 5 (calc) 2 1   TOTAL BILIRUBIN      0 2 - 1 2 mg/dL 0 6   Alkaline Phosphatase      35 - 144 U/L 34 (L)   AST      10 - 35 U/L 17   ALT      9 - 46 U/L 16   Cholesterol      <200 mg/dL    HDL      > OR = 40 mg/dL    Triglycerides      <150 mg/dL    LDL Calculated      mg/dL (calc)    Chol HDLC Ratio      <5 0 (calc)    Non-HDL Cholesterol      <130 mg/dL (calc)    EXT Creatinine Urine      20 - 320 mg/dL    MICROALBUM ,U,RANDOM      See Note: mg/dL    MICROALBUMIN/CREATININE RATIO      <30 mcg/mg creat    Comment(s)          ABN TEST REFUSED       7,600   Hemoglobin A1C      <5 7 % of total Hgb 6 9 (H)   TSH W/RFX TO FREE T4      0 40 - 4 50 mIU/L 0 65     Plan:    Diagnoses and all orders for this visit:    Type 2 diabetes mellitus with hyperglycemia, without long-term current use of insulin (HCC)  HGA1C 6 9%  Worsened  Treatment regimen: continue current treatment  Discussed risks/complications associated with uncontrolled diabetes  Advised to adhere to diabetic diet, and recommended staying active/exercising routinely as tolerated  Keep carbohydrates consistent to limit blood glucose fluctuations  Advised to call if blood sugars less than 70 mg/dl or over 300 mg/dl  Check blood glucose 1 time a day  Discussed symptoms and treatment of hypoglycemia  Recommended routine follow-up with podiatry and ophthalmology  Ordered blood work to complete prior to next visit  Benign essential hypertension  Diastolic reading elevated today  Advised to monitor BP at home and follow-up with PCP if remaining elevated >140/ >90  Continue current treatment for now  Mixed hyperlipidemia    Triglycerides 158  Continue fenofibrate  Managed by PCP       Discussed with the patient diagnosis and treatment and all questions fully answered  He will call me if any problems arise  Counseled patient on diagnostic results, prognosis, risk and benefit of treatment options, instruction for management, importance of treatment compliance, risk factor reduction and impressions        Angy Oliveira PA-C 25

## 2021-06-18 NOTE — DIETITIAN INITIAL EVALUATION ADULT. - OTHER INFO
Pt with h/o alcoholic liver cirrhosis with hepatic encephalopathy, portal HTN, HTN, DM-2, hypothyroidism came to the ED complaining of epigastric pain x 1 week. She was found to have hb of 5.2 in the ED, occult negative. She was admitted for workup and treatment of anemia.

## 2021-06-18 NOTE — PROGRESS NOTE ADULT - SUBJECTIVE AND OBJECTIVE BOX
HPI: Patient is a 65y Female seen on consultation for the evaluation and management of anemia; has long term history of cirrhosis; admitted to [facility] with Hb of 5.2 unassociated with bleeding. Received 2 units of PRBC with reultant Hb of 10.6.  Noted TB of 6.5, mostly direct, with elevated transaminases, mildly elevated retic of 4.9.  Awake, alert, appears comfortable.  In no resp distress.     seen at bedside, low grade temp 100.7, no bleeding, ON had episode of small thread of blood in stool.  Hb fell to 7.1 without active bleeding        MEDICATIONS  (STANDING):  dextrose 40% Gel 15 Gram(s) Oral once  dextrose 5%. 1000 milliLiter(s) (50 mL/Hr) IV Continuous <Continuous>  dextrose 5%. 1000 milliLiter(s) (100 mL/Hr) IV Continuous <Continuous>  dextrose 50% Injectable 25 Gram(s) IV Push once  dextrose 50% Injectable 12.5 Gram(s) IV Push once  dextrose 50% Injectable 25 Gram(s) IV Push once  folic acid 1 milliGRAM(s) Oral daily  glucagon  Injectable 1 milliGRAM(s) IntraMuscular once  insulin glargine Injectable (LANTUS) 10 Unit(s) SubCutaneous every morning  insulin lispro (ADMELOG) corrective regimen sliding scale   SubCutaneous at bedtime  insulin lispro (ADMELOG) corrective regimen sliding scale   SubCutaneous three times a day before meals  lactulose Syrup 10 Gram(s) Oral daily  levothyroxine 50 MICROGram(s) Oral daily  midodrine. 5 milliGRAM(s) Oral three times a day  multivitamin 1 Tablet(s) Oral daily  pantoprazole  Injectable 40 milliGRAM(s) IV Push two times a day  phytonadione   Solution 5 milliGRAM(s) Oral daily  piperacillin/tazobactam IVPB.. 3.375 Gram(s) IV Intermittent every 8 hours  potassium chloride    Tablet ER 30 milliEquivalent(s) Oral two times a day  rifAXIMin 550 milliGRAM(s) Oral two times a day  sodium chloride 2 Gram(s) Oral two times a day  spironolactone 50 milliGRAM(s) Oral daily  thiamine 100 milliGRAM(s) Oral daily    MEDICATIONS  (PRN):  LORazepam   Injectable 1 milliGRAM(s) IV Push every 2 hours PRN Agitation  morphine  - Injectable 1 milliGRAM(s) IV Push every 6 hours PRN Moderate Pain (4 - 6)    ICU Vital Signs Last 24 Hrs  T(C): 37.3 (2021 08:00), Max: 38.1 (2021 21:05)  T(F): 99.2 (2021 08:00), Max: 100.6 (2021 21:05)  HR: 92 (2021 08:00) (84 - 135)  BP: 118/86 (2021 08:00) (97/54 - 173/82)  BP(mean): 90 (2021 06:17) (84 - 99)  ABP: --  ABP(mean): --  RR: 20 (2021 08:00) (16 - 24)  SpO2: 98% (2021 08:00) (93% - 100%)              PHYSICAL EXAM:      Constitutional:awake, comfortable    Eyes:icteric    ENMT:no adenopathy    Respiratory:Clear    Cardiovascular:RRR    Gastrointestinal:distended, HSM, distended                Extremities:bilateral LE edema          LABS:    11.32  H/H 7.1/22.1, plt ct 88 ( )                        7.9    18.11 )-----------( 95       ( 2021 06:05 )             25.1              8.7/26.1             8.3    8.77  )-----------( 131      ( 2021 08:08 )             25.1       06-17    126<L>  |  96<L>  |  11.8  ----------------------------<  250<H>  4.9   |  21.0<L>  |  0.68    Ca    8.3<L>      2021 06:05  Phos  2.4       Mg     1.8         TPro  6.0<L>  /  Alb  2.6<L>  /  TBili  4.7<H>  /  DBili  3.0<H>  /  AST  138<H>  /  ALT  64<H>  /  AlkPhos  387<H>      121<L>  |  84<L>  |  15.1  ----------------------------<  240<H>  3.5   |  28.0  |  0.89    Ca    7.8<L>      2021 19:00  Phos  1.2       Mg     1.8         TPro  6.2<L>  /  Alb  2.4<L>  /  TBili  6.5<H>  /  DBili  4.6<H>  /  AST  216<H>  /  ALT  86<H>  /  AlkPhos  476<H>      PT/INR - ( 2021 18:36 )   PT: 19.8 sec;   INR: 1.75 ratio         PTT - ( 2021 18:36 )  PTT:25.3 sec  Urinalysis Basic - ( 2021 22:08 )    Color: Yellow / Appearance: Clear / S.010 / pH: x  Gluc: x / Ketone: Trace  / Bili: Negative / Urobili: 1 mg/dL   Blood: x / Protein: 15 mg/dL / Nitrite: Negative   Leuk Esterase: Moderate / RBC: 3-5 /HPF / WBC 3-5   Sq Epi: x / Non Sq Epi: Occasional / Bacteria: x        RADIOLOGY & ADDITIONAL STUDIES:

## 2021-06-18 NOTE — CONSULT NOTE ADULT - PROBLEM SELECTOR RECOMMENDATION 4
Patient last drink 2 weeks ago  [facility] protocol  Thiamine 100mg for possible thiamine deficiency in the setting of EtOH use  Folic acid   MVT   Ativan PRN for withdrawal precaution
-improving: Na: 113 on arrival, improving  -followed by nephrology: sodium tabs, fluid restriction

## 2021-06-18 NOTE — PROGRESS NOTE ADULT - SUBJECTIVE AND OBJECTIVE BOX
Patient is a 65y old  Female who presents with a chief complaint of Anemia (18 Jun 2021 09:08)      INTERVAL HPI/OVERNIGHT EVENTS: Patient seeing and evaluated at bedside, reporting mild lower abdominal discomfort,, sp paracentesis removing 700 cc of fluid, also thoracentesis draining over 1000cc of serous fluid. Patient tolerating oral intake but stating poor appetite Denies nausea, vomiting, abdominal pain, chest pain, shortness of breath, hematemesis, hematochezia, melena.      MEDICATIONS  (STANDING):  dextrose 40% Gel 15 Gram(s) Oral once  dextrose 5%. 1000 milliLiter(s) (50 mL/Hr) IV Continuous <Continuous>  dextrose 5%. 1000 milliLiter(s) (100 mL/Hr) IV Continuous <Continuous>  dextrose 50% Injectable 25 Gram(s) IV Push once  dextrose 50% Injectable 12.5 Gram(s) IV Push once  dextrose 50% Injectable 25 Gram(s) IV Push once  folic acid 1 milliGRAM(s) Oral daily  furosemide    Tablet 20 milliGRAM(s) Oral daily  glucagon  Injectable 1 milliGRAM(s) IntraMuscular once  insulin glargine Injectable (LANTUS) 10 Unit(s) SubCutaneous every morning  insulin lispro (ADMELOG) corrective regimen sliding scale   SubCutaneous three times a day before meals  insulin lispro (ADMELOG) corrective regimen sliding scale   SubCutaneous at bedtime  labetalol 50 milliGRAM(s) Oral two times a day  lactulose Syrup 10 Gram(s) Oral daily  levothyroxine 50 MICROGram(s) Oral daily  multivitamin 1 Tablet(s) Oral daily  pantoprazole  Injectable 40 milliGRAM(s) IV Push two times a day  phytonadione  IVPB 5 milliGRAM(s) IV Intermittent daily  piperacillin/tazobactam IVPB.. 3.375 Gram(s) IV Intermittent every 8 hours  potassium chloride    Tablet ER 30 milliEquivalent(s) Oral two times a day  rifAXIMin 550 milliGRAM(s) Oral two times a day  sodium chloride 2 Gram(s) Oral two times a day  sodium phosphate IVPB 15 milliMole(s) IV Intermittent once  spironolactone 50 milliGRAM(s) Oral daily  thiamine 100 milliGRAM(s) Oral daily    MEDICATIONS  (PRN):  ibuprofen  Tablet. 600 milliGRAM(s) Oral every 8 hours PRN Temp greater or equal to 38C (100.4F), Mild Pain (1 - 3)  LORazepam   Injectable 1 milliGRAM(s) IV Push every 2 hours PRN Agitation  morphine  - Injectable 1 milliGRAM(s) IV Push every 6 hours PRN Moderate Pain (4 - 6)      Allergies    No Known Allergies    Intolerances    Review of Systems:  CONSTITUTIONAL: No fever, weight loss, or fatigue  EYES: No eye pain, visual disturbances, or discharge  ENMT:  No difficulty hearing, tinnitus, vertigo; No sinus or throat pain  RESPIRATORY: No cough, wheezing, chills or hemoptysis; No shortness of breath  CARDIOVASCULAR: No chest pain, palpitations, dizziness, or leg swelling  GASTROINTESTINAL: No abdominal or epigastric pain. No nausea, vomiting, or hematemesis; No diarrhea or constipation. No melena or hematochezia.      Vital Signs Last 24 Hrs  T(C): 36.9 (18 Jun 2021 09:42), Max: 38.3 (17 Jun 2021 15:30)  T(F): 98.5 (18 Jun 2021 09:42), Max: 100.9 (17 Jun 2021 15:30)  HR: 99 (18 Jun 2021 09:42) (74 - 112)  BP: 129/76 (18 Jun 2021 08:03) (96/60 - 147/84)  BP(mean): 79 (18 Jun 2021 06:00) (72 - 91)  RR: 20 (18 Jun 2021 09:42) (16 - 23)  SpO2: 100% (18 Jun 2021 09:42) (98% - 100%)    PHYSICAL EXAM:  General:  woman, jaundiced in no acute distress  HEENT: MMM, conjunctiva and scleral icterus  Gastrointestinal: Abdomen: Soft, mildly laterally tender with very mild distention, ; Normal bowel sounds; No hepatosplenomegaly  Extremities: compression boots in place  Skin: Warm and dry. No obvious. Jaundice    LABS:                        7.1    11.32 )-----------( 88       ( 18 Jun 2021 05:24 )             22.1     06-18    128<L>  |  100  |  9.1  ----------------------------<  190<H>  4.5   |  21.0<L>  |  0.73    Ca    8.2<L>      18 Jun 2021 05:24  Phos  2.2     06-18  Mg     1.7     06-18    TPro  5.4<L>  /  Alb  2.3<L>  /  TBili  4.1<H>  /  DBili  2.5<H>  /  AST  79<H>  /  ALT  46<H>  /  AlkPhos  262<H>  06-18    PT/INR - ( 18 Jun 2021 05:24 )   PT: 18.7 sec;   INR: 1.65 ratio         PTT - ( 17 Jun 2021 06:05 )  PTT:29.9 sec    LIVER FUNCTIONS - ( 18 Jun 2021 05:24 )  Alb: 2.3 g/dL / Pro: 5.4 g/dL / ALK PHOS: 262 U/L / ALT: 46 U/L / AST: 79 U/L / GGT: x             RADIOLOGY & ADDITIONAL TESTS:     EXAM:  US ABDOMEN LIMITED                          PROCEDURE DATE:  06/16/2021          INTERPRETATION:  CLINICAL INFORMATION: Abdominal distention    COMPARISON: 10/19/2015    TECHNIQUE: Limited abdominal ultrasound was performed using a low frequency curved transducer to assess for ascites    FINDINGS AND  IMPRESSION:    There is mild to moderate ascites in all 4 quadrants. Left-sided pleural effusion is partially visualized.   Patient is a 65y old  Female who presents with a chief complaint of Anemia (18 Jun 2021 09:08)  f/u alcoholic cirrhosis, ascites and fever.    INTERVAL HPI/OVERNIGHT EVENTS: Patient seeing and evaluated at bedside, reporting mild lower abdominal discomfort,, sp paracentesis removing 700 cc of fluid, also thoracentesis draining over 1000cc of serous fluid. Patient tolerating oral intake but stating poor appetite Denies nausea, vomiting, abdominal pain, chest pain, shortness of breath, hematemesis, hematochezia, melena.      MEDICATIONS  (STANDING):  dextrose 40% Gel 15 Gram(s) Oral once  dextrose 5%. 1000 milliLiter(s) (50 mL/Hr) IV Continuous <Continuous>  dextrose 5%. 1000 milliLiter(s) (100 mL/Hr) IV Continuous <Continuous>  dextrose 50% Injectable 25 Gram(s) IV Push once  dextrose 50% Injectable 12.5 Gram(s) IV Push once  dextrose 50% Injectable 25 Gram(s) IV Push once  folic acid 1 milliGRAM(s) Oral daily  furosemide    Tablet 20 milliGRAM(s) Oral daily  glucagon  Injectable 1 milliGRAM(s) IntraMuscular once  insulin glargine Injectable (LANTUS) 10 Unit(s) SubCutaneous every morning  insulin lispro (ADMELOG) corrective regimen sliding scale   SubCutaneous three times a day before meals  insulin lispro (ADMELOG) corrective regimen sliding scale   SubCutaneous at bedtime  labetalol 50 milliGRAM(s) Oral two times a day  lactulose Syrup 10 Gram(s) Oral daily  levothyroxine 50 MICROGram(s) Oral daily  multivitamin 1 Tablet(s) Oral daily  pantoprazole  Injectable 40 milliGRAM(s) IV Push two times a day  phytonadione  IVPB 5 milliGRAM(s) IV Intermittent daily  piperacillin/tazobactam IVPB.. 3.375 Gram(s) IV Intermittent every 8 hours  potassium chloride    Tablet ER 30 milliEquivalent(s) Oral two times a day  rifAXIMin 550 milliGRAM(s) Oral two times a day  sodium chloride 2 Gram(s) Oral two times a day  sodium phosphate IVPB 15 milliMole(s) IV Intermittent once  spironolactone 50 milliGRAM(s) Oral daily  thiamine 100 milliGRAM(s) Oral daily    MEDICATIONS  (PRN):  ibuprofen  Tablet. 600 milliGRAM(s) Oral every 8 hours PRN Temp greater or equal to 38C (100.4F), Mild Pain (1 - 3)  LORazepam   Injectable 1 milliGRAM(s) IV Push every 2 hours PRN Agitation  morphine  - Injectable 1 milliGRAM(s) IV Push every 6 hours PRN Moderate Pain (4 - 6)      Allergies    No Known Allergies    Intolerances    Review of Systems:  CONSTITUTIONAL: No fever, weight loss, or fatigue  EYES: No eye pain, visual disturbances, or discharge  ENMT:  No difficulty hearing, tinnitus, vertigo; No sinus or throat pain  RESPIRATORY: No cough, wheezing, chills or hemoptysis; No shortness of breath  CARDIOVASCULAR: No chest pain, palpitations, dizziness, or leg swelling  GASTROINTESTINAL: No abdominal or epigastric pain. No nausea, vomiting, or hematemesis; No diarrhea or constipation. No melena or hematochezia.      Vital Signs Last 24 Hrs  T(C): 36.9 (18 Jun 2021 09:42), Max: 38.3 (17 Jun 2021 15:30)  T(F): 98.5 (18 Jun 2021 09:42), Max: 100.9 (17 Jun 2021 15:30)  HR: 99 (18 Jun 2021 09:42) (74 - 112)  BP: 129/76 (18 Jun 2021 08:03) (96/60 - 147/84)  BP(mean): 79 (18 Jun 2021 06:00) (72 - 91)  RR: 20 (18 Jun 2021 09:42) (16 - 23)  SpO2: 100% (18 Jun 2021 09:42) (98% - 100%)    PHYSICAL EXAM:  General:  woman, jaundiced in no acute distress  HEENT: MMM, conjunctiva and scleral icterus  Gastrointestinal: Abdomen: Soft, mildly laterally tender with very mild distention, ; Normal bowel sounds; No hepatosplenomegaly  Extremities: compression boots in place  Skin: Warm and dry. No obvious. Jaundice    LABS:                        7.1    11.32 )-----------( 88       ( 18 Jun 2021 05:24 )             22.1     06-18    128<L>  |  100  |  9.1  ----------------------------<  190<H>  4.5   |  21.0<L>  |  0.73    Ca    8.2<L>      18 Jun 2021 05:24  Phos  2.2     06-18  Mg     1.7     06-18    TPro  5.4<L>  /  Alb  2.3<L>  /  TBili  4.1<H>  /  DBili  2.5<H>  /  AST  79<H>  /  ALT  46<H>  /  AlkPhos  262<H>  06-18    PT/INR - ( 18 Jun 2021 05:24 )   PT: 18.7 sec;   INR: 1.65 ratio         PTT - ( 17 Jun 2021 06:05 )  PTT:29.9 sec    LIVER FUNCTIONS - ( 18 Jun 2021 05:24 )  Alb: 2.3 g/dL / Pro: 5.4 g/dL / ALK PHOS: 262 U/L / ALT: 46 U/L / AST: 79 U/L / GGT: x             RADIOLOGY & ADDITIONAL TESTS:     EXAM:  US ABDOMEN LIMITED                          PROCEDURE DATE:  06/16/2021          INTERPRETATION:  CLINICAL INFORMATION: Abdominal distention    COMPARISON: 10/19/2015    TECHNIQUE: Limited abdominal ultrasound was performed using a low frequency curved transducer to assess for ascites    FINDINGS AND  IMPRESSION:    There is mild to moderate ascites in all 4 quadrants. Left-sided pleural effusion is partially visualized.

## 2021-06-18 NOTE — PROGRESS NOTE ADULT - ASSESSMENT
65y  Female with h/o alcoholic liver cirrhosis with hepatic encephalopathy, portal HTN, HTN, DM 2, hypothyroidism. Patient initially admitted with epigastric pain, worsening transaminitis, alcoholic cirrhosis and weakness. CT of abdomen and pelvis revealed  cirrhosis with evidence of portal hypertension, mild volume of abdominopelvic ascites, mild left pleural effusion with associated subsegmental atelectasis, possible gastritis and gallstones. Patient was followed by GI, Hematology, nephrology. Patient developed fever to 102.8F 6/16. Also had paracentesis 6/16. Blood cultures with GNR.       Gram negative sepsis  fever  leukocytosis  Alcoholic liver cirrrhosis      - Blood cultures reporting Klebsiella pneumoniae  - Repeat blood cultures pending  - RVP/COVID 19 PCR negative   - CXR reporting left pleural effusion   - UA negative   - Procalcitonin level 2.95  - Continue Zosyn  - Follow up cultures  - Trend Fever  - Trend Leukocytosis      Thank you for allowing me to participate in the care of your patient.   Will Follow      d/w Dr Jackson

## 2021-06-18 NOTE — DIETITIAN INITIAL EVALUATION ADULT. - ETIOLOGY
related to inability to consume sufficient protein energy intake with poor appetite in setting of alcoholic liver cirrhosis with hepatic encephalopathy

## 2021-06-18 NOTE — CONSULT NOTE ADULT - PROBLEM SELECTOR RECOMMENDATION 2
Alcoholic liver cirrhosis with hepatic encephalopathy and portal HTN   Please hold diuretics due to hyponatremia  Please continue Rifaximin 550mg bid and Lactulose  Continue Beta-blockers.   Continue to monitor LFT's and Bili  Thiamine 100mg for possible thiamine deficiency in the setting of EtOH use  Folic acid   MVT
per primary team
-followed by heme/onc, GI.  -No evidence of GIB. Per heme onc, thought to be 2/2 advanced liver disease related to ETOH use  -Monitor h/h, transfuse as needed.

## 2021-06-18 NOTE — PROGRESS NOTE ADULT - SUBJECTIVE AND OBJECTIVE BOX
NEPHROLOGY INTERVAL HPI/OVERNIGHT EVENTS:  pt comfortable when seen earlier  no cp, sob, n/v/d    MEDICATIONS  (STANDING):  dextrose 40% Gel 15 Gram(s) Oral once  dextrose 5%. 1000 milliLiter(s) (50 mL/Hr) IV Continuous <Continuous>  dextrose 5%. 1000 milliLiter(s) (100 mL/Hr) IV Continuous <Continuous>  dextrose 50% Injectable 25 Gram(s) IV Push once  dextrose 50% Injectable 12.5 Gram(s) IV Push once  dextrose 50% Injectable 25 Gram(s) IV Push once  folic acid 1 milliGRAM(s) Oral daily  furosemide    Tablet 20 milliGRAM(s) Oral daily  glucagon  Injectable 1 milliGRAM(s) IntraMuscular once  insulin glargine Injectable (LANTUS) 10 Unit(s) SubCutaneous every morning  insulin lispro (ADMELOG) corrective regimen sliding scale   SubCutaneous three times a day before meals  insulin lispro (ADMELOG) corrective regimen sliding scale   SubCutaneous at bedtime  labetalol 50 milliGRAM(s) Oral two times a day  lactulose Syrup 10 Gram(s) Oral daily  levothyroxine 50 MICROGram(s) Oral daily  multivitamin 1 Tablet(s) Oral daily  pantoprazole  Injectable 40 milliGRAM(s) IV Push two times a day  phytonadione  IVPB 5 milliGRAM(s) IV Intermittent daily  piperacillin/tazobactam IVPB.. 3.375 Gram(s) IV Intermittent every 8 hours  potassium chloride    Tablet ER 30 milliEquivalent(s) Oral two times a day  rifAXIMin 550 milliGRAM(s) Oral two times a day  sodium chloride 2 Gram(s) Oral two times a day  sodium phosphate IVPB 15 milliMole(s) IV Intermittent once  spironolactone 50 milliGRAM(s) Oral daily  thiamine 100 milliGRAM(s) Oral daily    MEDICATIONS  (PRN):  ibuprofen  Tablet. 600 milliGRAM(s) Oral every 8 hours PRN Temp greater or equal to 38C (100.4F), Mild Pain (1 - 3)  LORazepam   Injectable 1 milliGRAM(s) IV Push every 2 hours PRN Agitation  morphine  - Injectable 1 milliGRAM(s) IV Push every 6 hours PRN Moderate Pain (4 - 6)      Allergies    No Known Allergies    Intolerances        Vital Signs Last 24 Hrs  T(C): 36.9 (18 Jun 2021 09:42), Max: 38.3 (17 Jun 2021 15:30)  T(F): 98.5 (18 Jun 2021 09:42), Max: 100.9 (17 Jun 2021 15:30)  HR: 99 (18 Jun 2021 09:42) (74 - 112)  BP: 129/76 (18 Jun 2021 08:03) (96/60 - 147/84)  BP(mean): 79 (18 Jun 2021 06:00) (72 - 91)  RR: 20 (18 Jun 2021 09:42) (16 - 23)  SpO2: 100% (18 Jun 2021 09:42) (98% - 100%)    PHYSICAL EXAM:  GENERAL: Frail, weak  NECK: Supple, no jvd  NERVOUS SYSTEM:  Awake Alert not confused; no asterixis  CHEST/LUNG: Clear bilaterally  HEART: Regular rate and rhythm  ABDOMEN: Soft, distended +BS  EXTREMITIES:  trace edema B/L LE    LABS:                        7.1    11.32 )-----------( 88       ( 18 Jun 2021 05:24 )             22.1     06-18    128<L>  |  100  |  9.1  ----------------------------<  190<H>  4.5   |  21.0<L>  |  0.73        Ca    8.2<L>      18 Jun 2021 05:24  Phos  2.2     06-18  Mg     1.7     06-18    TPro  5.4<L>  /  Alb  2.3<L>  /  TBili  4.1<H>  /  DBili  2.5<H>  /  AST  79<H>  /  ALT  46<H>  /  AlkPhos  262<H>  06-18    PT/INR - ( 18 Jun 2021 05:24 )   PT: 18.7 sec;   INR: 1.65 ratio         PTT - ( 17 Jun 2021 06:05 )  PTT:29.9 sec    Magnesium, Serum: 1.7 mg/dL (06-18 @ 05:24)  Phosphorus Level, Serum: 2.2 mg/dL (06-18 @ 05:24)      RADIOLOGY & ADDITIONAL TESTS:

## 2021-06-18 NOTE — DIETITIAN INITIAL EVALUATION ADULT. - ORAL INTAKE PTA/DIET HISTORY
Aware pt with slight confusion.  Pt reports poor appetite/po intake at meals; consumed ~25% at breakfast this morning.  Pt likes Glucerna supplements.  Pt states poor appetite prior to admission; unsure of any wt changes.  Food preferences obtained.

## 2021-06-18 NOTE — PROGRESS NOTE ADULT - SUBJECTIVE AND OBJECTIVE BOX
Subjective:  c/o pain at thoracentesis site, otherwise feels better, denies CP, palpitations, SOB, cough, fever, chills, itchiness/rash, diaphoresis, vision changes, HA, dizziness/lightheadedness, numbness/tingling, abd pain, N/V;    Review of Systems: as above    Patient is a 65y old  Female who presents with a chief complaint of Anemia (2021 13:21)    HPI:  62 y/o female with PMH of alcoholic liver cirrhosis with hepatic encephalopathy, portal HTN, HTN, DM-2, hypothyroidism came to the ED complaining of epigastric pain x 1 week. Pain was sharp radiating to her chest, associated with nausea and non-bloody emesis. Patient said her symptoms now improved at the time of interview. She endorsed dark stool to ED but said no when I asked her. She also noted shortness of breath with exertion and fatigue. She has no chest pain, melena, hematemesis, hematuria, recent travel, fever, chills. Of note patient still drinks alcohol, last drink was 2 weeks ago.     PAST MEDICAL & SURGICAL HISTORY:  Alcohol abuse  Alcohol abuse  Liver cirrhosis  ETOH abuse  Urea cycle metabolism disorder  Transitory  hyperthyroidism  Lump in female breast  UTI (urinary tract infection)  Lymphangitis, acute, lower leg  Anemia  Hypothyroidism  Chronic back pain  HTN (hypertension)  GERD (gastroesophageal reflux disease)  Pancreatitis  No significant past surgical history  S/P abdominoplasty    FAMILY HISTORY:  FH: depression (Child)    Vitals   ICU Vital Signs Last 24 Hrs  T(C): 37 (2021 15:20), Max: 37.4 (2021 20:00)  T(F): 98.6 (2021 15:20), Max: 99.3 (2021 20:00)  HR: 88 (2021 15:20) (74 - 99)  BP: 120/73 (2021 15:20) (96/60 - 135/84)  BP(mean): 79 (2021 06:00) (72 - 81)  RR: 20 (2021 15:20) (16 - 23)  SpO2: 97% (2021 15:20) (97% - 100%)    I&O's Detail    2021 07:01  -  2021 07:00  --------------------------------------------------------  IN:    IV PiggyBack: 275 mL    Oral Fluid: 926 mL    sodium chloride 0.9%: 166 mL  Total IN: 1367 mL    OUT:    Chest Tube (mL): 700 mL    Voided (mL): 650 mL  Total OUT: 1350 mL  Total NET: 17 mL    2021 07:01  -  2021 17:08  --------------------------------------------------------  IN:    Oral Fluid: 480 mL  Total IN: 480 mL    OUT:    Voided (mL): 600 mL  Total OUT: 600 mL  Total NET: -120 mL    LABS                        7.1    11.32 )-----------( 88       ( 2021 05:24 )             22.1     06-18    128<L>  |  100  |  9.1  ----------------------------<  190<H>  4.5   |  21.0<L>  |  0.73    Ca    8.2<L>      2021 05:24  Phos  2.2     06-18  Mg     1.7     -18    TPro  5.4<L>  /  Alb  2.3<L>  /  TBili  4.1<H>  /  DBili  2.5<H>  /  AST  79<H>  /  ALT  46<H>  /  AlkPhos  262<H>  18    LIVER FUNCTIONS - ( 2021 05:24 )  Alb: 2.3 g/dL / Pro: 5.4 g/dL / ALK PHOS: 262 U/L / ALT: 46 U/L / AST: 79 U/L / GGT: x         PT/INR - ( 2021 05:24 )   PT: 18.7 sec;   INR: 1.65 ratio   PTT - ( 2021 06:05 )  PTT:29.9 sec    POCT Blood Glucose.: 207 mg/dL (21 @ 12:19)  POCT Blood Glucose.: 204 mg/dL (21 @ 08:45)  POCT Blood Glucose.: 242 mg/dL (21 @ 21:16)    MEDICATIONS  (STANDING):  dextrose 40% Gel 15 Gram(s) Oral once  dextrose 5%. 1000 milliLiter(s) (50 mL/Hr) IV Continuous <Continuous>  dextrose 5%. 1000 milliLiter(s) (100 mL/Hr) IV Continuous <Continuous>  dextrose 50% Injectable 25 Gram(s) IV Push once  dextrose 50% Injectable 12.5 Gram(s) IV Push once  dextrose 50% Injectable 25 Gram(s) IV Push once  folic acid 1 milliGRAM(s) Oral daily  furosemide    Tablet 20 milliGRAM(s) Oral daily  glucagon  Injectable 1 milliGRAM(s) IntraMuscular once  insulin glargine Injectable (LANTUS) 10 Unit(s) SubCutaneous every morning  insulin lispro (ADMELOG) corrective regimen sliding scale   SubCutaneous three times a day before meals  insulin lispro (ADMELOG) corrective regimen sliding scale   SubCutaneous at bedtime  labetalol 50 milliGRAM(s) Oral two times a day  lactulose Syrup 10 Gram(s) Oral daily  levothyroxine 50 MICROGram(s) Oral daily  multivitamin 1 Tablet(s) Oral daily  pantoprazole  Injectable 40 milliGRAM(s) IV Push two times a day  phytonadione  IVPB 5 milliGRAM(s) IV Intermittent daily  piperacillin/tazobactam IVPB.. 3.375 Gram(s) IV Intermittent every 8 hours  potassium chloride    Tablet ER 30 milliEquivalent(s) Oral two times a day  rifAXIMin 550 milliGRAM(s) Oral two times a day  sodium chloride 2 Gram(s) Oral two times a day  spironolactone 50 milliGRAM(s) Oral daily  thiamine 100 milliGRAM(s) Oral daily    MEDICATIONS  (PRN):  acetaminophen   Tablet .. 500 milliGRAM(s) Oral every 12 hours PRN Temp greater or equal to 38C (100.4F), Mild Pain (1 - 3)  LORazepam   Injectable 1 milliGRAM(s) IV Push every 2 hours PRN Agitation  morphine  - Injectable 1 milliGRAM(s) IV Push every 6 hours PRN Moderate Pain (4 - 6)    Allergies:  No Known Allergies    Physical Exam  Constitutional: NAD,   Neuro: A+O x 3,   CV: S1S2 RRR  Pulm/chest: decreased BS on left with crackles, clear on right  Abd: +BS soft NT mild dist  Ext: no edema

## 2021-06-18 NOTE — CONSULT NOTE ADULT - PROBLEM SELECTOR RECOMMENDATION 6
-patient reported abd/flank pain to writer; per primary team, report has been abd/flank/back.  -CT abd/pelvis revealed: cirrhosis with evidence of portal hypertension. Mild volume of abdominopelvic ascites. Mild left pleural effusion with associated subsegmental atelectasis.  Possible gastritis. Correlate with symptoms, upper endoscopy, or trial of proton pump inhibitors.  Gallstones.  -Ascites noted on abd US.  -Would consider acetaminophen 500 mg PO q 12 hr PRN (pain)  -morphine 1 mg IV q 6 hr prn (moderate pain) per primary team  -Would have a low threshold to re-image abd, obtain imaging of back (no back pain reported to writer) given clinical condition of patient

## 2021-06-18 NOTE — CONSULT NOTE ADULT - PROBLEM SELECTOR RECOMMENDATION 8
-Full code orders  -No noted HCP. Per patient-not , children are surrogates.  -Given fatigue, limited time, unable to explore assignment of health care proxy.  Would explore as absent a health care proxy, five children are equal surrogates  -Attempted to reach sonAbiodun, left message  -Guarded prognosis: palliative care will follow, work with pt/family to identify goals of care, help develop a care plan , as appropriate.    Pt discussed with Dr. Renetta RN.

## 2021-06-18 NOTE — PROGRESS NOTE ADULT - ASSESSMENT
60 y/o female with PMH of alcoholic liver cirrhosis with hepatic encephalopathy, portal HTN, HTN, DM-2, hypothyroidism came to the ED complaining of epigastric pain x 1 week. She was found to have hb of 5.2 in the ED, occult negative. She was admitted for workup and treatment of anemia. Hospital course complicated by gram negative sepsis, hyponatremia, abdominal pain with moderate ascites requiring paracentesis and then dyspnea with large left pleural effusion requiring thoracentesis. Patient's hemodynamics are improving; repeat blood cultures are pending. ID, renal, GI, hematology and CT surgery on board. Prognosis guarded and palliative care was consulted.    Gram Negative Bacteremia; unclear source possibly due to infected pleural effusion  -patient remains ill on SDU  -however hemodynamics are slowly improving; fevers have resolved and BP has improved  -repeat lactate normalized  -no further fluid resuscitation given large pleural effusion requiring thoracentesis  -f/u pleural fluid culture  -SBP ruled out; f/u ascitic fluid culture - no growth to date  -repeat blood cultures pending  -UA noted  -trend fever/leukocytosis  -continue zoysn  -s/p paracentesis with 700ml removed on 6/16  -ID recommendations appreciated     Large Left Pleural Effusion with DOZIER exertional tachypnea  -s/p thoracentesis on 6/17 with 1.1 liters removes  -f/u pleural fluid culture and cytology  -continue diuretics as ordered  -TTE pending  -monitor I/Os, daily weights  -repeat CXR on 6/20     Anemia of chronic disease in the setting of cirrhotic liver and bone narrow suppression from ETOH  -Hb 5.2 on admission, s/p 2u PRBCs, hgb downtrending to 7.1 this AM  -Low haptoglobin and high LDH; discussed with hematology - no concerns for hemolysis  -FOBT negative   -s/p venofer  -continue PPI  -transfuse 1 unit of PRBC today and f/u post transfusion H/H  -no active signs/symptoms of GIB  -hematology and GI recommendations appreciated    Hyponatremia improving   -sodium slowly improving to 128  -continue NaCL tabs/lasix   -continue free water restriction  -orthostatics negative  -monitor I&O's  -renal recommendations appreciated    Alcoholic liver cirrhosis with hepatic encephalopathy and portal HTN   -advanced cirrhosis; nearing end stage per GI  -continue lasix and aldactone  -ammonia level WNL; rifaximin, lactulose  -beta-blocker resumed  -Outpatient surveillance for HCC  -hepatitis A IgG positive; autoimmune liver serologies still pending  -Monitor LFTs  -GI recommendations appreciated    Lower back and flank pain   -Continue PPI  -Analgesia PRN; per palliative add low dose tylenol   -Will also add lidoderm  -may need to consider imaging of lower back if pain persists  -OOB to chair; mobilize patient    Hyperglycemia with underlying DM-2, not on home insulin   -HbA1c 8.0, goal <7  -continue lantus   -Continue ISS  -glucerna supplements as ordered    History of ETOH abuse   -no evidence of withdrawal  -continue Thiamine, Folic Acid and MVI  -Ativan PRN for withdrawal precaution     HTN   -BP stable  -Continue Labetalol with hold parameters     Hypothyroidism   -TSH elevated, synthroid increased  -repeat TFTS in 4 weeks    DVT ppx - SCDs    Dispo: SDU for now, pending clinical improvement may downgrade to tele over the weekend. Palliative care consulted. Prognosis guarded. GOC discussion with shaunna Peralta earlier this week; full code.    Plan discussed with patient, RN, Palliative care NP, attempted to call shaunna Rascon [phone]

## 2021-06-18 NOTE — CONSULT NOTE ADULT - SUBJECTIVE AND OBJECTIVE BOX
HPI:  60 y/o female with PMH of alcoholic liver cirrhosis with hepatic encephalopathy, portal HTN, HTN, DM-2, hypothyroidism came to the ED complaining of epigastric pain x 1 week. Pain was sharp radiating to her chest, associated with nausea and non-bloody emesis. Patient said her symptoms now improved at the time of interview. She endorsed dark stool to ED but said no when I asked her. She also noted shortness of breath with exertion and fatigue. She has no chest pain, melena, hematemesis, hematuria, recent travel, fever, chills. Of note patient still drinks alcohol, last drink was 2 weeks ago.      (2021 23:34)          PAST MEDICAL & SURGICAL HISTORY:  Alcohol abuse    Alcohol abuse    Liver cirrhosis    ETOH abuse    Urea cycle metabolism disorder    Transitory  hyperthyroidism    Constipation    Lump in female breast    UTI (urinary tract infection)    Lymphangitis, acute, lower leg    Anemia    Hypothyroidism    Chronic back pain    HTN (hypertension)    GERD (gastroesophageal reflux disease)    Pancreatitis    No significant past surgical history    S/P abdominoplasty        SOCIAL HISTORY:    Admitted from:  home assisted living [facility]   Substance abuse history:              Tobacco hx:                  Alcohol hx:              Home Opioid hx:  Oriental orthodox:                                    Preferred Language:    Surrogate/HCP/Guardian:            Phone#:    FAMILY HISTORY:  FH: depression (Child)      Baseline ADLs (prior to admission):    Allergies    No Known Allergies    Intolerances      Present Symptoms:   Dyspnea:   Nausea/Vomiting:   Anxiety:  Depressed   Fatigue:  Loss of appetite:   Pain:                                location:          Review of Systems: [All others negative or Unable to obtain due to poor mentation]    MEDICATIONS  (STANDING):  dextrose 40% Gel 15 Gram(s) Oral once  dextrose 5%. 1000 milliLiter(s) (50 mL/Hr) IV Continuous <Continuous>  dextrose 5%. 1000 milliLiter(s) (100 mL/Hr) IV Continuous <Continuous>  dextrose 50% Injectable 25 Gram(s) IV Push once  dextrose 50% Injectable 12.5 Gram(s) IV Push once  dextrose 50% Injectable 25 Gram(s) IV Push once  folic acid 1 milliGRAM(s) Oral daily  furosemide    Tablet 20 milliGRAM(s) Oral daily  glucagon  Injectable 1 milliGRAM(s) IntraMuscular once  insulin glargine Injectable (LANTUS) 10 Unit(s) SubCutaneous every morning  insulin lispro (ADMELOG) corrective regimen sliding scale   SubCutaneous three times a day before meals  insulin lispro (ADMELOG) corrective regimen sliding scale   SubCutaneous at bedtime  labetalol 50 milliGRAM(s) Oral two times a day  lactulose Syrup 10 Gram(s) Oral daily  levothyroxine 50 MICROGram(s) Oral daily  multivitamin 1 Tablet(s) Oral daily  pantoprazole  Injectable 40 milliGRAM(s) IV Push two times a day  phytonadione  IVPB 5 milliGRAM(s) IV Intermittent daily  piperacillin/tazobactam IVPB.. 3.375 Gram(s) IV Intermittent every 8 hours  potassium chloride    Tablet ER 30 milliEquivalent(s) Oral two times a day  rifAXIMin 550 milliGRAM(s) Oral two times a day  sodium chloride 2 Gram(s) Oral two times a day  sodium phosphate IVPB 15 milliMole(s) IV Intermittent once  spironolactone 50 milliGRAM(s) Oral daily  thiamine 100 milliGRAM(s) Oral daily    MEDICATIONS  (PRN):  ibuprofen  Tablet. 600 milliGRAM(s) Oral every 8 hours PRN Temp greater or equal to 38C (100.4F), Mild Pain (1 - 3)  LORazepam   Injectable 1 milliGRAM(s) IV Push every 2 hours PRN Agitation  morphine  - Injectable 1 milliGRAM(s) IV Push every 6 hours PRN Moderate Pain (4 - 6)      PHYSICAL EXAM:    Vital Signs Last 24 Hrs  T(C): 37 (2021 06:00), Max: 38.3 (2021 15:30)  T(F): 98.6 (2021 06:00), Max: 100.9 (2021 15:30)  HR: 80 (2021 08:03) (74 - 112)  BP: 110/63 (2021 06:00) (96/60 - 147/84)  BP(mean): 79 (2021 06:00) (72 - 91)  RR: 20 (2021 08:03) (16 - 23)  SpO2: 100% (2021 08:03) (98% - 100%)    General: alert  oriented x ____    [lethargic distressed cachexia  nonverbal  unarousable verbal]  Karnofsky Performance Score/Palliative Performance Status Version2:     %    HEENT: normal  dry mouth  ET tube/trach oral lesions:  Lungs: comfortable tachypnea/labored breathing  excessive secretions  CV: normal  tachycardia  GI: normal  distended  tender  incontinent               PEG/NG/OG tube  constipation  last BM:   : normal  incontinent  oliguria/anuria  manriquez  Musculoskeletal: normal  weakness  edema             ambulatory  bedbound/wheelchair bound  Skin: normal  pressure ulcers: stage: edema: other:  Neuro: no deficits cognitive impairment dsyphagia/dysarthria paresis: other:  Oral intake ability: unable/only mouth care [minimal moderate full capability]  Diet: [NPO]    LABS:                        7.1    11.32 )-----------( 88       ( 2021 05:24 )             22.1     06-18    128<L>  |  100  |  9.1  ----------------------------<  190<H>  4.5   |  21.0<L>  |  0.73    Ca    8.2<L>      2021 05:24  Phos  2.2     06-18  Mg     1.7     06-18    TPro  5.4<L>  /  Alb  2.3<L>  /  TBili  4.1<H>  /  DBili  2.5<H>  /  AST  79<H>  /  ALT  46<H>  /  AlkPhos  262<H>  -18    Urinalysis Basic - ( 2021 10:10 )    Color: Yellow / Appearance: Clear / S.005 / pH: x  Gluc: x / Ketone: Negative  / Bili: Small / Urobili: 8 mg/dL   Blood: x / Protein: Negative mg/dL / Nitrite: Negative   Leuk Esterase: Trace / RBC: 0-2 /HPF / WBC 0-2   Sq Epi: x / Non Sq Epi: Moderate / Bacteria: Few        RADIOLOGY & ADDITIONAL STUDIES:    ADVANCE DIRECTIVES:   Advanced Care Planning discussion total time spent:   HPI:  60 y/o female with PMH of alcoholic liver cirrhosis with hepatic encephalopathy, portal HTN, HTN, DM-2, hypothyroidism came to the ED complaining of epigastric pain x 1 week. Pain was sharp radiating to her chest, associated with nausea and non-bloody emesis. Patient said her symptoms now improved at the time of interview. She endorsed dark stool to ED but said no when I asked her. She also noted shortness of breath with exertion and fatigue. She has no chest pain, melena, hematemesis, hematuria, recent travel, fever, chills. Of note patient still drinks alcohol, last drink was 2 weeks ago.      (2021 23:34)    60 yo female with a history of HTN, T2DM, hypothyroidism, ETOH liver cirrhosis with hepatic encephalopathy, portal HTN, presents to [facility] with epigastric pain x 1 week.  Pain described as sharp, radiating to her chest, associated with nausea and non-bloody emesis.  Initially endorsed dark stool, then denied it.  + SOB, fatigue. + ETOH consumption, last drink 2 weeks ago.    Found with hyponatremia (113), with a hemoglobin of 5.2, elevated bilirubin, hyperglycemic,        60 y/o female with PMH of alcoholic liver cirrhosis with hepatic encephalopathy, portal HTN, HTN, DM-2, hypothyroidism came to the ED complaining of epigastric pain x 1 week. Pain was sharp radiating to her chest, associated with nausea and non-bloody emesis. Patient said her symptoms now improved at the time of interview. She endorsed dark stool to ED attending but said no when I asked her. She also noted shortness of breath with exertion and fatigue. She has no chest pain, melena, hematemesis, hematuria, recent travel, fever, chills. Of note patient still drinks alcohol, last drink was 2 weeks ago.   Found to have hb of 5.2 in the ED, occult negative and also hyperglycemic.         PAST MEDICAL & SURGICAL HISTORY:  Alcohol abuse    Alcohol abuse    Liver cirrhosis    ETOH abuse    Urea cycle metabolism disorder    Transitory  hyperthyroidism    Constipation    Lump in female breast    UTI (urinary tract infection)    Lymphangitis, acute, lower leg    Anemia    Hypothyroidism    Chronic back pain    HTN (hypertension)    GERD (gastroesophageal reflux disease)    Pancreatitis    No significant past surgical history    S/P abdominoplasty        SOCIAL HISTORY:    Admitted from:  home assisted living [facility]   Substance abuse history:              Tobacco hx:                  Alcohol hx:              Home Opioid hx:  Yazidism:                                    Preferred Language:    Surrogate/HCP/Guardian:            Phone#:    FAMILY HISTORY:  FH: depression (Child)      Baseline ADLs (prior to admission):    Allergies    No Known Allergies    Intolerances      Present Symptoms:   Dyspnea:   Nausea/Vomiting:   Anxiety:  Depressed   Fatigue:  Loss of appetite:   Pain:                                location:          Review of Systems: [All others negative or Unable to obtain due to poor mentation]    MEDICATIONS  (STANDING):  dextrose 40% Gel 15 Gram(s) Oral once  dextrose 5%. 1000 milliLiter(s) (50 mL/Hr) IV Continuous <Continuous>  dextrose 5%. 1000 milliLiter(s) (100 mL/Hr) IV Continuous <Continuous>  dextrose 50% Injectable 25 Gram(s) IV Push once  dextrose 50% Injectable 12.5 Gram(s) IV Push once  dextrose 50% Injectable 25 Gram(s) IV Push once  folic acid 1 milliGRAM(s) Oral daily  furosemide    Tablet 20 milliGRAM(s) Oral daily  glucagon  Injectable 1 milliGRAM(s) IntraMuscular once  insulin glargine Injectable (LANTUS) 10 Unit(s) SubCutaneous every morning  insulin lispro (ADMELOG) corrective regimen sliding scale   SubCutaneous three times a day before meals  insulin lispro (ADMELOG) corrective regimen sliding scale   SubCutaneous at bedtime  labetalol 50 milliGRAM(s) Oral two times a day  lactulose Syrup 10 Gram(s) Oral daily  levothyroxine 50 MICROGram(s) Oral daily  multivitamin 1 Tablet(s) Oral daily  pantoprazole  Injectable 40 milliGRAM(s) IV Push two times a day  phytonadione  IVPB 5 milliGRAM(s) IV Intermittent daily  piperacillin/tazobactam IVPB.. 3.375 Gram(s) IV Intermittent every 8 hours  potassium chloride    Tablet ER 30 milliEquivalent(s) Oral two times a day  rifAXIMin 550 milliGRAM(s) Oral two times a day  sodium chloride 2 Gram(s) Oral two times a day  sodium phosphate IVPB 15 milliMole(s) IV Intermittent once  spironolactone 50 milliGRAM(s) Oral daily  thiamine 100 milliGRAM(s) Oral daily    MEDICATIONS  (PRN):  ibuprofen  Tablet. 600 milliGRAM(s) Oral every 8 hours PRN Temp greater or equal to 38C (100.4F), Mild Pain (1 - 3)  LORazepam   Injectable 1 milliGRAM(s) IV Push every 2 hours PRN Agitation  morphine  - Injectable 1 milliGRAM(s) IV Push every 6 hours PRN Moderate Pain (4 - 6)      PHYSICAL EXAM:    Vital Signs Last 24 Hrs  T(C): 37 (2021 06:00), Max: 38.3 (2021 15:30)  T(F): 98.6 (2021 06:00), Max: 100.9 (2021 15:30)  HR: 80 (2021 08:03) (74 - 112)  BP: 110/63 (2021 06:00) (96/60 - 147/84)  BP(mean): 79 (2021 06:00) (72 - 91)  RR: 20 (2021 08:03) (16 - 23)  SpO2: 100% (2021 08:03) (98% - 100%)    General: alert  oriented x ____    [lethargic distressed cachexia  nonverbal  unarousable verbal]  Karnofsky Performance Score/Palliative Performance Status Version2:     %    HEENT: normal  dry mouth  ET tube/trach oral lesions:  Lungs: comfortable tachypnea/labored breathing  excessive secretions  CV: normal  tachycardia  GI: normal  distended  tender  incontinent               PEG/NG/OG tube  constipation  last BM:   : normal  incontinent  oliguria/anuria  manriquez  Musculoskeletal: normal  weakness  edema             ambulatory  bedbound/wheelchair bound  Skin: normal  pressure ulcers: stage: edema: other:  Neuro: no deficits cognitive impairment dsyphagia/dysarthria paresis: other:  Oral intake ability: unable/only mouth care [minimal moderate full capability]  Diet: [NPO]    LABS:                        7.1    11.32 )-----------( 88       ( 2021 05:24 )             22.1     06-18    128<L>  |  100  |  9.1  ----------------------------<  190<H>  4.5   |  21.0<L>  |  0.73    Ca    8.2<L>      2021 05:24  Phos  2.2     06-18  Mg     1.7     06-18    TPro  5.4<L>  /  Alb  2.3<L>  /  TBili  4.1<H>  /  DBili  2.5<H>  /  AST  79<H>  /  ALT  46<H>  /  AlkPhos  262<H>  06-18    Urinalysis Basic - ( 2021 10:10 )    Color: Yellow / Appearance: Clear / S.005 / pH: x  Gluc: x / Ketone: Negative  / Bili: Small / Urobili: 8 mg/dL   Blood: x / Protein: Negative mg/dL / Nitrite: Negative   Leuk Esterase: Trace / RBC: 0-2 /HPF / WBC 0-2   Sq Epi: x / Non Sq Epi: Moderate / Bacteria: Few        RADIOLOGY & ADDITIONAL STUDIES:    ADVANCE DIRECTIVES:   Advanced Care Planning discussion total time spent:   HPI:  60 y/o female with PMH of alcoholic liver cirrhosis with hepatic encephalopathy, portal HTN, HTN, DM-2, hypothyroidism came to the ED complaining of epigastric pain x 1 week. Pain was sharp radiating to her chest, associated with nausea and non-bloody emesis. Patient said her symptoms now improved at the time of interview. She endorsed dark stool to ED but said no when I asked her. She also noted shortness of breath with exertion and fatigue. She has no chest pain, melena, hematemesis, hematuria, recent travel, fever, chills. Of note patient still drinks alcohol, last drink was 2 weeks ago.      (2021 23:34)    60 yo female with a history of HTN, T2DM, hypothyroidism, ETOH liver cirrhosis with hepatic encephalopathy, portal HTN, presents to [facility] with epigastric pain x 1 week.  Pain described as sharp, radiating to her chest, associated with nausea and non-bloody emesis.  Initially endorsed dark stool, then denied it.  + SOB, fatigue. + ETOH consumption, last drink 2 weeks ago on admission.    Found with hyponatremia (113), with a hemoglobin of 5.2, elevated bilirubin, hyperglycemic.  FOBT negative.  Anemic, thrombocytopenic, followed by heme-onc, who notes that anemia, likely 2/2 advanced alcoholic liver disease.  Transfusion of PRBC has been needed.  Followed by GI, who notes patient's liver cirrhosis is nearing end stage.  S/P paracentesis.  Nephrology following for hyponatremia.  Seen by thoracic surgery, as patient noted to have a left sided pleural effusion, s/p left thoracentesis, no chest tube placed, given cirrhosis history.  Patient also bacteremic-GNR: Klebsiella pneumoniae.      Given patient's multiple medical problems, palliative care consulted to address goals of care, provide support to family.  Per Dr. Jackson, primary contact has been son, Abiodun: [phone].   Dr. Jackson also notes another son, Marshall: [phone].    For goals of care, support.   [id number] used.  Interview somewhat limited 2/2 to patient's need to leave floor for testing, fatigue in patient.    Patient reports that she came into the hospital because, "I couldn't walk, I couldn't eat."  Reports that she has problems with her liver, notes that she used to drink.    Endorses abdominal/flank pain, constant, unable to describe quality, severity, what makes it better/worse.  States that "it just hurts."     Reports that she was living with her sister, and has five children.        PERTINENT PMH REVIEWED: Yes    PAST MEDICAL & SURGICAL HISTORY:  Alcohol abuse    Alcohol abuse    Liver cirrhosis    ETOH abuse    Urea cycle metabolism disorder    Transitory  hyperthyroidism    Lump in female breast    UTI (urinary tract infection)    Lymphangitis, acute, lower leg    Anemia    Hypothyroidism    Chronic back pain    HTN (hypertension)    GERD (gastroesophageal reflux disease)    Pancreatitis    No significant past surgical history    S/P abdominoplasty        SOCIAL HISTORY:  resides with sister, 5 children: Jose J Mabry Roberto, Alex, Richardo                                      Surrogate/HCP/Guardian: children, as above, Alfieluís: [phone]; Rafa: [phone]    FAMILY HISTORY:  FH: depression (Child)        Baseline ADLs (prior to admission): no aide, relies on support of family    Allergies    No Known Allergies    Intolerances        Present Symptoms:     Dyspnea: 0   Nausea/Vomiting: No  Anxiety:  No  Depression: Sad about being in the hospital  Fatigue: Yes  Loss of appetite: Yes    Pain: as above            Character-            Duration-            Effect-            Factors-            Frequency-            Location-            Severity-    Review of Systems: Reviewed, limited by fatigue                                          Positive: general weakness  Denies other troubling symptoms    MEDICATIONS  (STANDING):  dextrose 40% Gel 15 Gram(s) Oral once  dextrose 5%. 1000 milliLiter(s) (50 mL/Hr) IV Continuous <Continuous>  dextrose 5%. 1000 milliLiter(s) (100 mL/Hr) IV Continuous <Continuous>  dextrose 50% Injectable 25 Gram(s) IV Push once  dextrose 50% Injectable 12.5 Gram(s) IV Push once  dextrose 50% Injectable 25 Gram(s) IV Push once  folic acid 1 milliGRAM(s) Oral daily  furosemide    Tablet 20 milliGRAM(s) Oral daily  glucagon  Injectable 1 milliGRAM(s) IntraMuscular once  insulin glargine Injectable (LANTUS) 10 Unit(s) SubCutaneous every morning  insulin lispro (ADMELOG) corrective regimen sliding scale   SubCutaneous three times a day before meals  insulin lispro (ADMELOG) corrective regimen sliding scale   SubCutaneous at bedtime  labetalol 50 milliGRAM(s) Oral two times a day  lactulose Syrup 10 Gram(s) Oral daily  levothyroxine 50 MICROGram(s) Oral daily  lidocaine   Patch 1 Patch Transdermal daily  multivitamin 1 Tablet(s) Oral daily  pantoprazole  Injectable 40 milliGRAM(s) IV Push two times a day  phytonadione  IVPB 5 milliGRAM(s) IV Intermittent daily  piperacillin/tazobactam IVPB.. 3.375 Gram(s) IV Intermittent every 8 hours  rifAXIMin 550 milliGRAM(s) Oral two times a day  sodium chloride 2 Gram(s) Oral two times a day  spironolactone 50 milliGRAM(s) Oral daily  thiamine 100 milliGRAM(s) Oral daily    MEDICATIONS  (PRN):  acetaminophen   Tablet .. 500 milliGRAM(s) Oral every 12 hours PRN Temp greater or equal to 38C (100.4F), Mild Pain (1 - 3)  LORazepam   Injectable 1 milliGRAM(s) IV Push every 2 hours PRN Agitation  morphine  - Injectable 1 milliGRAM(s) IV Push every 6 hours PRN Moderate Pain (4 - 6)      PHYSICAL EXAM:    Vital Signs Last 24 Hrs  T(C): 37.2 (2021 20:00), Max: 37.2 (2021 17:31)  T(F): 98.9 (2021 20:00), Max: 98.9 (2021 17:31)  HR: 93 (2021 20:00) (74 - 99)  BP: 129/82 (2021 20:00) (96/60 - 135/84)  BP(mean): 79 (2021 06:00) (72 - 81)  RR: 20 (2021 20:00) (18 - 23)  SpO2: 98% (2021 20:00) (97% - 100%)    Karnofsky: 20 %    Gen: Fatigued.  Ill appearing.  No pain behaviors or work of breathing noted  Neuro: alert and oriented x 2 (self, place), communicates simple needs.    Head: NC/AT  Eyes: non-injected conjunctivae  ENT: moist oral mucosa  Resp: unlabored  CV: S1, S2  Abd: softly mildly distended, non-tender  Peripheral Vasc: warm  Int: warm and dry  Psych: no psychomotor agitation        LABS:                        8.7    x     )-----------( x        ( 2021 19:09 )             26.0     06-18    128<L>  |  100  |  9.1  ----------------------------<  190<H>  4.5   |  21.0<L>  |  0.73    Ca    8.2<L>      2021 05:24  Phos  2.2     06-18  Mg     1.7     06-18    TPro  5.4<L>  /  Alb  2.3<L>  /  TBili  4.1<H>  /  DBili  2.5<H>  /  AST  79<H>  /  ALT  46<H>  /  AlkPhos  262<H>  06-18    PT/INR - ( 2021 05:24 )   PT: 18.7 sec;   INR: 1.65 ratio         PTT - ( 2021 06:05 )  PTT:29.9 sec    I&O's Summary    2021 07:  -  2021 07:00  --------------------------------------------------------  IN: 1367 mL / OUT: 1350 mL / NET: 17 mL    2021 07:01  -  2021 20:08  --------------------------------------------------------  IN: 480 mL / OUT: 600 mL / NET: -120 mL        RADIOLOGY & ADDITIONAL STUDIES: reviewed

## 2021-06-18 NOTE — PROGRESS NOTE ADULT - SUBJECTIVE AND OBJECTIVE BOX
[facility]  INFECTIOUS DISEASES AND INTERNAL MEDICINE at [place]  =======================================================  Bernardo La MD  Diplomates American Board of Internal Medicine and Infectious Diseases  Tel: [phone]      Fax: [phone]  =======================================================    JUSTIN LOVE [id number]    Follow up: Klebsiella pneumoniae    + Fever 6/17  Reports feeling better      Allergies:  No Known Allergies      REVIEW OF SYSTEMS:  CONSTITUTIONAL:  + Fever + chills  HEENT:  No diplopia or blurred vision.  No earache, sore throat or runny nose.  CARDIOVASCULAR:  No pressure, squeezing, strangling, tightness, heaviness or aching about the chest, neck, axilla or epigastrium.  RESPIRATORY:  No cough, shortness of breath  GASTROINTESTINAL:  No nausea, vomiting or diarrhea.  GENITOURINARY:  No dysuria, frequency or urgency.   MUSCULOSKELETAL:  no joint aches, no muscle pain  SKIN:  No change in skin, hair or nails.  NEUROLOGIC:  No Headaches, seizures   PSYCHIATRIC:  No disorder of thought or mood.  ENDOCRINE:  No heat or cold intolerance  HEMATOLOGICAL:  No easy bruising or bleeding.       Physical Exam:  GEN: NAD, pleasant  HEENT: normocephalic and atraumatic. EOMI. PERRL.  Anicteric  NECK: Supple.   LUNGS: Clear to auscultation.  HEART: Regular rate and rhythm   ABDOMEN: Soft, nontender, and nondistended.  Positive bowel sounds.    : No CVA tenderness  EXTREMITIES: Without any edema.  MSK: No joint swelling  NEUROLOGIC:  No Focal Deficits  PSYCHIATRIC: Appropriate affect .  SKIN: No Rash      Vitals:  T(F): 98.5 (18 Jun 2021 09:42), Max: 100.9 (17 Jun 2021 15:30)  HR: 99 (18 Jun 2021 09:42)  BP: 129/76 (18 Jun 2021 08:03)  RR: 20 (18 Jun 2021 09:42)  SpO2: 100% (18 Jun 2021 09:42) (98% - 100%)  temp max in last 48H T(F): , Max: 100.9 (06-17-21 @ 15:30)      Current Antibiotics:  piperacillin/tazobactam IVPB.. 3.375 Gram(s) IV Intermittent every 8 hours  rifAXIMin 550 milliGRAM(s) Oral two times a day      Other medications:  dextrose 40% Gel 15 Gram(s) Oral once  dextrose 5%. 1000 milliLiter(s) IV Continuous <Continuous>  dextrose 5%. 1000 milliLiter(s) IV Continuous <Continuous>  dextrose 50% Injectable 25 Gram(s) IV Push once  dextrose 50% Injectable 12.5 Gram(s) IV Push once  dextrose 50% Injectable 25 Gram(s) IV Push once  folic acid 1 milliGRAM(s) Oral daily  furosemide    Tablet 20 milliGRAM(s) Oral daily  glucagon  Injectable 1 milliGRAM(s) IntraMuscular once  insulin glargine Injectable (LANTUS) 10 Unit(s) SubCutaneous every morning  insulin lispro (ADMELOG) corrective regimen sliding scale   SubCutaneous three times a day before meals  insulin lispro (ADMELOG) corrective regimen sliding scale   SubCutaneous at bedtime  labetalol 50 milliGRAM(s) Oral two times a day  lactulose Syrup 10 Gram(s) Oral daily  levothyroxine 50 MICROGram(s) Oral daily  multivitamin 1 Tablet(s) Oral daily  pantoprazole  Injectable 40 milliGRAM(s) IV Push two times a day  phytonadione  IVPB 5 milliGRAM(s) IV Intermittent daily  potassium chloride    Tablet ER 30 milliEquivalent(s) Oral two times a day  sodium chloride 2 Gram(s) Oral two times a day  sodium phosphate IVPB 15 milliMole(s) IV Intermittent once  spironolactone 50 milliGRAM(s) Oral daily  thiamine 100 milliGRAM(s) Oral daily                 7.1    11.32 )-----------( 88       ( 18 Jun 2021 05:24 )             22.1     06-18    128<L>  |  100  |  9.1  ----------------------------<  190<H>  4.5   |  21.0<L>  |  0.73    Ca    8.2<L>      18 Jun 2021 05:24  Phos  2.2     06-18  Mg     1.7     06-18    TPro  5.4<L>  /  Alb  2.3<L>  /  TBili  4.1<H>  /  DBili  2.5<H>  /  AST  79<H>  /  ALT  46<H>  /  AlkPhos  262<H>  06-18      RECENT CULTURES:  06-18 @ 01:21 .Body Fluid Pleural Fluid     Testing in progress  polymorphonuclear leukocytes seen  No organisms seen  by cytocentrifuge    06-16 @ 21:18 .Body Fluid Peritoneal Fluid     No growth to date.  Moderate polymorphonuclear leukocytes  No organisms seen  by cytocentrifuge    06-16 @ 07:44 .Blood Blood-Peripheral Blood Culture PCR    Growth in aerobic and anaerobic bottles: Gram Negative Rods  ***Blood Panel PCR results on this specimen are available  approximately 3 hours after the Gram stain result.***  Gram stain, PCR, and/or culture results may not always  correspond due todifference in methodologies.  ************************************************************  This PCR assay was performed using L8 SmartLight.  The following targets are tested for: Enterococcus,  vancomycin resistant enterococci, Listeria monocytogenes,  coagulase negative staphylococci, S. aureus,  methicillin resistant S. aureus, Streptococcus agalactiae  (Group B), S. pneumoniae, S. pyogenes (Group A),  Acinetobacter baumannii, Enterobacter cloacae, E. coli,  Klebsiella oxytoca, K. pneumoniae, Proteus sp.,  Serratia marcescens, Haemophilus influenzae,  Neisseria meningitidis, Pseudomonas aeruginosa, Candida  albicans, C. glabrata, C krusei, C parapsilosis,  C. tropicalis and the KPC resistance gene.  "Due to technical problems, Pseudomonas aeruginosa  will Not be reported as part of the BCID panel  until further notice".  Gram Stain and BCID performed by:  [facility]  [address] [place] [id number]    06-13 @ 01:57 .Urine Clean Catch (Midstream)     >=3 organisms. Probable collection contamination.    06-12 @ 18:38 .Blood Blood-Peripheral     No growth at 5 days.    06-12 @ 18:37 .Blood Blood-Peripheral     No growth at 5 days.      WBC Count: 11.32 K/uL (06-18-21 @ 05:24)  WBC Count: 18.11 K/uL (06-17-21 @ 06:05)  WBC Count: 22.37 K/uL (06-16-21 @ 18:14)  WBC Count: 14.15 K/uL (06-16-21 @ 07:41)  WBC Count: 9.41 K/uL (06-15-21 @ 05:58)  WBC Count: 8.77 K/uL (06-14-21 @ 08:08)    Creatinine, Serum: 0.73 mg/dL (06-18-21 @ 05:24)  Creatinine, Serum: 0.68 mg/dL (06-17-21 @ 06:05)  Creatinine, Serum: 0.73 mg/dL (06-16-21 @ 17:59)  Creatinine, Serum: 0.76 mg/dL (06-16-21 @ 07:41)  Creatinine, Serum: 0.76 mg/dL (06-16-21 @ 07:41)  Creatinine, Serum: 0.88 mg/dL (06-15-21 @ 05:58)  Creatinine, Serum: 0.68 mg/dL (06-14-21 @ 08:08)  Creatinine, Serum: 0.89 mg/dL (06-13-21 @ 19:00)    Ferritin, Serum: 76 ng/mL (06-14-21 @ 08:08)    Procalcitonin, Serum: 2.95 ng/mL (06-16-21 @ 17:59)  Procalcitonin, Serum: 0.83 ng/mL (06-16-21 @ 07:42)     SARS-CoV-2: NotDetec (06-16-21 @ 10:09)  Rapid RVP Result: NotDetec (06-16-21 @ 10:09)    COVID-19 PCR: NotDetec (06-13-21 @ 02:24)      < from: US Abdomen Limited (06.16.21 @ 11:02) >  EXAM:  US ABDOMEN LIMITED                          PROCEDURE DATE:  06/16/2021      INTERPRETATION:  CLINICAL INFORMATION: Abdominal distention    COMPARISON: 10/19/2015    TECHNIQUE: Limited abdominal ultrasound was performed using a low frequency curved transducer to assess for ascites    FINDINGS AND  IMPRESSION:    There is mild to moderate ascites in all 4 quadrants. Left-sided pleural effusion is partially visualized.    < end of copied text >        < from: CT Abdomen and Pelvis w/ IV Cont (06.12.21 @ 20:17) >  EXAM:  CT ABDOMEN AND PELVIS IC                          PROCEDURE DATE:  06/12/2021      INTERPRETATION:  CLINICAL INFORMATION: Abdominal pain, nausea and vomiting    COMPARISON: CT of October 1, 2019.    CONTRAST/COMPLICATIONS:  IV Contrast:96 cc of Omnipaque 300. 4 cc was discarded.  Oral Contrast:  Complications: None reported.    PROCEDURE:  Axial images were obtained, along with reformatted coronal and sagittal images.    FINDINGS:  LOWER CHEST: Mild left pleural effusion with associated subsegmental atelectasis.  LIVER: Cirrhotic and fatty.  BILE DUCTS: Normal caliber.  GALLBLADDER: Gallstones.  SPLEEN: Within normal limits.  PANCREAS: Within normal limits.  ADRENALS: Within normal limits.  KIDNEYS/URETERS: Within normal limits.    BLADDER: Incompletely distended.  REPRODUCTIVE ORGANS: No pelvic masses.    BOWEL: Although the stomach is under distended, there is apparent thickening of the distal gastric antrum and pyloric channel suspicious for gastritis. No bowel obstruction. Appendix is unremarkable. The colon is underdistended without significant fecal load.  PERITONEUM: Mild volume of abdominopelvic ascites.  VESSELS: The aorta is not dilated. Mild atherosclerotic vascular calcification. Redemonstration of an of a massively enlarged recanalized umbilical vein with umbilical varices that connect to the right external iliac vein.  RETROPERITONEUM/LYMPH NODES: Scattered subcentimeter retroperitoneal lymph nodes.  ABDOMINAL WALL: Subcutaneous edema.  BONES: Within normal limits.    IMPRESSION:    Cirrhosis with evidence of portal hypertension. Mild volume of abdominopelvic ascites. Mild left pleural effusion with associated subsegmental atelectasis.  Annual surveillance MRI is recommended in cirrhotic patients to screen for hepatocellular carcinoma.    Possible gastritis. Correlate with symptoms, upper endoscopy, or trial of proton pump inhibitors.    Gallstones.    < end of copied text >

## 2021-06-18 NOTE — PROGRESS NOTE ADULT - SUBJECTIVE AND OBJECTIVE BOX
CHIEF COMPLAINT/INTERVAL HISTORY:    Patient is a 65y old  Female who presents with a chief complaint of Anemia (18 Jun 2021 17:08)    SUBJECTIVE & OBJECTIVE: Pt seen and examined at bedside. No overnight events. Patient mentating better today; awake, alert and oriented. Hb 7.1; additional unit of PRBCs ordered. Complaining of back pain.    ROS: No chest pain, palpitations, SOB, light headedness, dizziness, headache, nausea/vomiting, fevers/chills, abdominal pain, dysuria.    ICU Vital Signs Last 24 Hrs  T(C): 37.2 (18 Jun 2021 17:31), Max: 37.4 (17 Jun 2021 20:00)  T(F): 98.9 (18 Jun 2021 17:31), Max: 99.3 (17 Jun 2021 20:00)  HR: 85 (18 Jun 2021 17:31) (74 - 99)  BP: 130/80 (18 Jun 2021 17:31) (96/60 - 135/84)  BP(mean): 79 (18 Jun 2021 06:00) (72 - 81)  RR: 20 (18 Jun 2021 17:31) (16 - 23)  SpO2: 98% (18 Jun 2021 17:31) (97% - 100%)    MEDICATIONS  (STANDING):  dextrose 40% Gel 15 Gram(s) Oral once  dextrose 5%. 1000 milliLiter(s) (50 mL/Hr) IV Continuous <Continuous>  dextrose 5%. 1000 milliLiter(s) (100 mL/Hr) IV Continuous <Continuous>  dextrose 50% Injectable 25 Gram(s) IV Push once  dextrose 50% Injectable 12.5 Gram(s) IV Push once  dextrose 50% Injectable 25 Gram(s) IV Push once  folic acid 1 milliGRAM(s) Oral daily  furosemide    Tablet 20 milliGRAM(s) Oral daily  glucagon  Injectable 1 milliGRAM(s) IntraMuscular once  insulin glargine Injectable (LANTUS) 10 Unit(s) SubCutaneous every morning  insulin lispro (ADMELOG) corrective regimen sliding scale   SubCutaneous three times a day before meals  insulin lispro (ADMELOG) corrective regimen sliding scale   SubCutaneous at bedtime  labetalol 50 milliGRAM(s) Oral two times a day  lactulose Syrup 10 Gram(s) Oral daily  levothyroxine 50 MICROGram(s) Oral daily  multivitamin 1 Tablet(s) Oral daily  pantoprazole  Injectable 40 milliGRAM(s) IV Push two times a day  phytonadione  IVPB 5 milliGRAM(s) IV Intermittent daily  piperacillin/tazobactam IVPB.. 3.375 Gram(s) IV Intermittent every 8 hours  rifAXIMin 550 milliGRAM(s) Oral two times a day  sodium chloride 2 Gram(s) Oral two times a day  spironolactone 50 milliGRAM(s) Oral daily  thiamine 100 milliGRAM(s) Oral daily    MEDICATIONS  (PRN):  acetaminophen   Tablet .. 500 milliGRAM(s) Oral every 12 hours PRN Temp greater or equal to 38C (100.4F), Mild Pain (1 - 3)  LORazepam   Injectable 1 milliGRAM(s) IV Push every 2 hours PRN Agitation  morphine  - Injectable 1 milliGRAM(s) IV Push every 6 hours PRN Moderate Pain (4 - 6)      LABS:                        7.1    11.32 )-----------( 88       ( 18 Jun 2021 05:24 )             22.1     06-18    128<L>  |  100  |  9.1  ----------------------------<  190<H>  4.5   |  21.0<L>  |  0.73    Ca    8.2<L>      18 Jun 2021 05:24  Phos  2.2     06-18  Mg     1.7     06-18    TPro  5.4<L>  /  Alb  2.3<L>  /  TBili  4.1<H>  /  DBili  2.5<H>  /  AST  79<H>  /  ALT  46<H>  /  AlkPhos  262<H>  06-18    PT/INR - ( 18 Jun 2021 05:24 )   PT: 18.7 sec;   INR: 1.65 ratio         PTT - ( 17 Jun 2021 06:05 )  PTT:29.9 sec      CAPILLARY BLOOD GLUCOSE      POCT Blood Glucose.: 292 mg/dL (18 Jun 2021 17:12)  POCT Blood Glucose.: 207 mg/dL (18 Jun 2021 12:19)  POCT Blood Glucose.: 204 mg/dL (18 Jun 2021 08:45)  POCT Blood Glucose.: 242 mg/dL (17 Jun 2021 21:16)    PHYSICAL EXAM:    GENERAL: elderly female, laying in bed, appears weak, NAD  HEAD:  Atraumatic, Normocephalic  EYES: EOMI, PERRLA, conjunctiva and sclera clear  ENMT: Moist mucous membranes  NECK: Supple   NERVOUS SYSTEM:  Alert & Oriented X3   CHEST/LUNG: coarse breath sounds  HEART: Regular rate and rhythm; + S1/S2  ABDOMEN: Soft, Nontender, distended; Bowel sounds present  EXTREMITIES: no pedal edema

## 2021-06-18 NOTE — CONSULT NOTE ADULT - PROBLEM SELECTOR RECOMMENDATION 3
abdominal pain  now resolved.   Likely due to gastritis patient endorsed hx of gastric ulcer   PPI 40mg   Pain control   CT abdomen done, no pancreatitis
-s/p thoracentesis by CT surgery.  -Chest tube not placed, given cirrhosis

## 2021-06-18 NOTE — CONSULT NOTE ADULT - PROBLEM SELECTOR RECOMMENDATION 9
Large left effusion on POCUS  Left thoracentesis for pleural effusion - 1.1L serous fluid obtained  Post CXR pending  Fluid sent for analysis / cytology
Likely due to bone marrow suppression from underlying Alcoholic liver disease  Hemoglobin now improved s/p 2 units of PRBC's now 8.7  Continue to monitor CBC and transfuse as needed  Hem/oncology on board.
-per GI: advanced cirrhosis; near end stage  -continue lasix, aldactone,  rifaximin, lactulose  -ammonia level WNL  -s/p paracentesis  -monitor mental status

## 2021-06-18 NOTE — PROGRESS NOTE ADULT - PROBLEM SELECTOR PLAN 2
Multifactorial with chronic disease and bone marrow suppression from alcohol use disorder.  Continue to monitor daily CBC.   Patient with no evidence of active GI bleeding. Monitor for any signs of over bleeding. Multifactorial with chronic disease, cirrhosis and bone marrow suppression from alcohol use disorder.  Continue to monitor daily CBC.   Patient with no evidence of active GI bleeding. Monitor for any signs of over bleeding.

## 2021-06-18 NOTE — PROGRESS NOTE ADULT - ASSESSMENT
61 F, PMH of alcoholic liver cirrhosis with hepatic encephalopathy, portal HTN, HTN, DM-2, hypothyroidism presented to [facility] ED 6/12/21 complaining of sharp epigastric pain. Since admission, patient has been treated for gram negative bacteremia (remains febrile, followed by ID, on abx), anemia of chronic disease (likely cirrhosis related, negative GIB work up), hyponatremia, hyperglycemia, HTN, hypothyroidism, alcoholic liver cirrhosis with hepatic encephalopathy (s/p paracentesis 6/16, seen by GI considered end stage, on rifaximin and lactulose). 6/17 Thoracic Surgery was consulted of left sided pleural effusion seen on CXR. Left thoracentesis was done for 1.1 L of serous fluid. Chest tube was not left in place 2/2 history of cirrhosis per Dr. Wilson.

## 2021-06-18 NOTE — PROGRESS NOTE ADULT - ATTENDING COMMENTS
No obvious infection by way of cell counts on pleural fluid but cultures pending. However with antibiotics she is now afebrile and she little if any distention due to the ascites. At this time would just continue the diuretic and monitor renal function and electrolytes. Nothing more to do otherwise from GI standpoint.

## 2021-06-19 LAB
-  AMIKACIN: SIGNIFICANT CHANGE UP
-  AMPICILLIN/SULBACTAM: SIGNIFICANT CHANGE UP
-  AMPICILLIN: SIGNIFICANT CHANGE UP
-  AZTREONAM: SIGNIFICANT CHANGE UP
-  CEFAZOLIN: SIGNIFICANT CHANGE UP
-  CEFEPIME: SIGNIFICANT CHANGE UP
-  CEFOXITIN: SIGNIFICANT CHANGE UP
-  CEFTRIAXONE: SIGNIFICANT CHANGE UP
-  CIPROFLOXACIN: SIGNIFICANT CHANGE UP
-  ERTAPENEM: SIGNIFICANT CHANGE UP
-  GENTAMICIN: SIGNIFICANT CHANGE UP
-  IMIPENEM: SIGNIFICANT CHANGE UP
-  LEVOFLOXACIN: SIGNIFICANT CHANGE UP
-  MEROPENEM: SIGNIFICANT CHANGE UP
-  PIPERACILLIN/TAZOBACTAM: SIGNIFICANT CHANGE UP
-  TOBRAMYCIN: SIGNIFICANT CHANGE UP
-  TRIMETHOPRIM/SULFAMETHOXAZOLE: SIGNIFICANT CHANGE UP
ALBUMIN SERPL ELPH-MCNC: 2.4 G/DL — LOW (ref 3.3–5.2)
ALP SERPL-CCNC: 312 U/L — HIGH (ref 40–120)
ALT FLD-CCNC: 45 U/L — HIGH
ANION GAP SERPL CALC-SCNC: 9 MMOL/L — SIGNIFICANT CHANGE UP (ref 5–17)
AST SERPL-CCNC: 77 U/L — HIGH
BILIRUB DIRECT SERPL-MCNC: 3.2 MG/DL — HIGH (ref 0–0.3)
BILIRUB INDIRECT FLD-MCNC: 1.5 MG/DL — HIGH (ref 0.2–1)
BILIRUB SERPL-MCNC: 4.7 MG/DL — HIGH (ref 0.4–2)
BUN SERPL-MCNC: 7.9 MG/DL — LOW (ref 8–20)
CALCIUM SERPL-MCNC: 7.9 MG/DL — LOW (ref 8.6–10.2)
CHLORIDE SERPL-SCNC: 99 MMOL/L — SIGNIFICANT CHANGE UP (ref 98–107)
CO2 SERPL-SCNC: 19 MMOL/L — LOW (ref 22–29)
CREAT SERPL-MCNC: 0.62 MG/DL — SIGNIFICANT CHANGE UP (ref 0.5–1.3)
CULTURE RESULTS: SIGNIFICANT CHANGE UP
CULTURE RESULTS: SIGNIFICANT CHANGE UP
GLUCOSE BLDC GLUCOMTR-MCNC: 216 MG/DL — HIGH (ref 70–99)
GLUCOSE BLDC GLUCOMTR-MCNC: 224 MG/DL — HIGH (ref 70–99)
GLUCOSE BLDC GLUCOMTR-MCNC: 237 MG/DL — HIGH (ref 70–99)
GLUCOSE BLDC GLUCOMTR-MCNC: 254 MG/DL — HIGH (ref 70–99)
GLUCOSE BLDC GLUCOMTR-MCNC: 297 MG/DL — HIGH (ref 70–99)
GLUCOSE SERPL-MCNC: 279 MG/DL — HIGH (ref 70–99)
HCT VFR BLD CALC: 25.7 % — LOW (ref 34.5–45)
HGB BLD-MCNC: 8.4 G/DL — LOW (ref 11.5–15.5)
INR BLD: 1.54 RATIO — HIGH (ref 0.88–1.16)
MCHC RBC-ENTMCNC: 29.6 PG — SIGNIFICANT CHANGE UP (ref 27–34)
MCHC RBC-ENTMCNC: 32.7 GM/DL — SIGNIFICANT CHANGE UP (ref 32–36)
MCV RBC AUTO: 90.5 FL — SIGNIFICANT CHANGE UP (ref 80–100)
METHOD TYPE: SIGNIFICANT CHANGE UP
ORGANISM # SPEC MICROSCOPIC CNT: SIGNIFICANT CHANGE UP
PLATELET # BLD AUTO: 70 K/UL — LOW (ref 150–400)
POTASSIUM SERPL-MCNC: 4.4 MMOL/L — SIGNIFICANT CHANGE UP (ref 3.5–5.3)
POTASSIUM SERPL-SCNC: 4.4 MMOL/L — SIGNIFICANT CHANGE UP (ref 3.5–5.3)
PROT SERPL-MCNC: 5.5 G/DL — LOW (ref 6.6–8.7)
PROTHROM AB SERPL-ACNC: 17.5 SEC — HIGH (ref 10.6–13.6)
RBC # BLD: 2.84 M/UL — LOW (ref 3.8–5.2)
RBC # FLD: 22.4 % — HIGH (ref 10.3–14.5)
SODIUM SERPL-SCNC: 127 MMOL/L — LOW (ref 135–145)
SPECIMEN SOURCE: SIGNIFICANT CHANGE UP
SPECIMEN SOURCE: SIGNIFICANT CHANGE UP
WBC # BLD: 11.46 K/UL — HIGH (ref 3.8–10.5)
WBC # FLD AUTO: 11.46 K/UL — HIGH (ref 3.8–10.5)

## 2021-06-19 PROCEDURE — 99233 SBSQ HOSP IP/OBS HIGH 50: CPT

## 2021-06-19 PROCEDURE — 71045 X-RAY EXAM CHEST 1 VIEW: CPT | Mod: 26

## 2021-06-19 PROCEDURE — 99231 SBSQ HOSP IP/OBS SF/LOW 25: CPT

## 2021-06-19 PROCEDURE — 99232 SBSQ HOSP IP/OBS MODERATE 35: CPT

## 2021-06-19 RX ORDER — CEFTRIAXONE 500 MG/1
2000 INJECTION, POWDER, FOR SOLUTION INTRAMUSCULAR; INTRAVENOUS EVERY 24 HOURS
Refills: 0 | Status: DISCONTINUED | OUTPATIENT
Start: 2021-06-19 | End: 2021-06-28

## 2021-06-19 RX ORDER — FUROSEMIDE 40 MG
40 TABLET ORAL DAILY
Refills: 0 | Status: DISCONTINUED | OUTPATIENT
Start: 2021-06-19 | End: 2021-06-22

## 2021-06-19 RX ADMIN — CEFTRIAXONE 100 MILLIGRAM(S): 500 INJECTION, POWDER, FOR SOLUTION INTRAMUSCULAR; INTRAVENOUS at 17:57

## 2021-06-19 RX ADMIN — Medication 100 MILLIGRAM(S): at 12:04

## 2021-06-19 RX ADMIN — PANTOPRAZOLE SODIUM 40 MILLIGRAM(S): 20 TABLET, DELAYED RELEASE ORAL at 05:05

## 2021-06-19 RX ADMIN — LACTULOSE 10 GRAM(S): 10 SOLUTION ORAL at 12:05

## 2021-06-19 RX ADMIN — Medication 50 MILLIGRAM(S): at 05:05

## 2021-06-19 RX ADMIN — Medication 1 TABLET(S): at 12:04

## 2021-06-19 RX ADMIN — Medication 101 MILLIGRAM(S): at 15:16

## 2021-06-19 RX ADMIN — SODIUM CHLORIDE 2 GRAM(S): 9 INJECTION INTRAMUSCULAR; INTRAVENOUS; SUBCUTANEOUS at 05:06

## 2021-06-19 RX ADMIN — SODIUM CHLORIDE 2 GRAM(S): 9 INJECTION INTRAMUSCULAR; INTRAVENOUS; SUBCUTANEOUS at 18:03

## 2021-06-19 RX ADMIN — PIPERACILLIN AND TAZOBACTAM 25 GRAM(S): 4; .5 INJECTION, POWDER, LYOPHILIZED, FOR SOLUTION INTRAVENOUS at 05:05

## 2021-06-19 RX ADMIN — LIDOCAINE 1 PATCH: 4 CREAM TOPICAL at 09:07

## 2021-06-19 RX ADMIN — SPIRONOLACTONE 50 MILLIGRAM(S): 25 TABLET, FILM COATED ORAL at 05:07

## 2021-06-19 RX ADMIN — INSULIN GLARGINE 10 UNIT(S): 100 INJECTION, SOLUTION SUBCUTANEOUS at 09:04

## 2021-06-19 RX ADMIN — Medication 40 MILLIGRAM(S): at 19:31

## 2021-06-19 RX ADMIN — LIDOCAINE 1 PATCH: 4 CREAM TOPICAL at 18:56

## 2021-06-19 RX ADMIN — LIDOCAINE 1 PATCH: 4 CREAM TOPICAL at 12:05

## 2021-06-19 RX ADMIN — LIDOCAINE 1 PATCH: 4 CREAM TOPICAL at 09:06

## 2021-06-19 RX ADMIN — Medication 20 MILLIGRAM(S): at 05:06

## 2021-06-19 RX ADMIN — PANTOPRAZOLE SODIUM 40 MILLIGRAM(S): 20 TABLET, DELAYED RELEASE ORAL at 18:00

## 2021-06-19 RX ADMIN — Medication 6: at 12:10

## 2021-06-19 RX ADMIN — Medication 50 MILLIGRAM(S): at 18:04

## 2021-06-19 RX ADMIN — Medication 4: at 17:52

## 2021-06-19 RX ADMIN — Medication 4: at 09:03

## 2021-06-19 RX ADMIN — Medication 50 MICROGRAM(S): at 05:07

## 2021-06-19 RX ADMIN — Medication 1 MILLIGRAM(S): at 12:07

## 2021-06-19 NOTE — PROGRESS NOTE ADULT - ASSESSMENT
60 y/o female with PMH of alcoholic liver cirrhosis with hepatic encephalopathy, portal HTN, HTN, DM-2, hypothyroidism came to the ED complaining of epigastric pain x 1 week. She was found to have hb of 5.2 in the ED, occult negative. She was admitted for workup and treatment of anemia. Hospital course complicated by gram negative sepsis, hyponatremia, abdominal pain with moderate ascites requiring paracentesis and then dyspnea with large left pleural effusion requiring thoracentesis. Patient's hemodynamics are improving; repeat blood cultures are pending. ID, renal, GI, hematology and CT surgery on board. Prognosis guarded and palliative care was consulted.    Gram Negative Bacteremia; unclear source possibly due to infected pleural effusion  -f/u pleural fluid culture  -SBP ruled out; f/u ascitic fluid culture - no growth to date  -repeat blood cultures pending  -trend fever/leukocytosis  -continue zoysn  -s/p paracentesis with 700ml removed on 6/16  -ID recommendations appreciated     Large Left Pleural Effusion with DOZIER exertional tachypnea  -s/p thoracentesis on 6/17 with 1.1 liters removes  -f/u pleural fluid culture and cytology  -continue diuretics as ordered  -TTE pending  -monitor I/Os, daily weights  -repeat CXR on 6/20     Anemia of chronic disease in the setting of cirrhotic liver and bone narrow suppression from ETOH  -Hb 5.2 on admission, s/p 2u PRBCs, hgb downtrending to 7.1 this AM  -Low haptoglobin and high LDH; discussed with hematology - no concerns for hemolysis  -FOBT negative   -s/p venofer  -continue PPI  -no active signs/symptoms of GIB  -hematology and GI recommendations appreciated    Hyponatremia improving   -sodium slowly improving to 127  -continue NaCL tabs/lasix   -continue free water restriction    Alcoholic liver cirrhosis with hepatic encephalopathy and portal HTN   -advanced cirrhosis; nearing end stage per GI  -continue lasix and aldactone  -ammonia level WNL; rifaximin, lactulose  -beta-blocker resumed  -Outpatient surveillance for HCC  -hepatitis A IgG positive; autoimmune liver serologies still pending  -Monitor LFTs  -GI recommendations appreciated    Lower back and flank pain   -Continue PPI  -Analgesia PRN; per palliative add low dose tylenol   - lidoderm  -OOB to chair; mobilize patient    Hyperglycemia with underlying DM-2, not on home insulin   -HbA1c 8.0, goal <7  -continue lantus   -Continue ISS    History of ETOH abuse   -no evidence of withdrawal  -continue Thiamine, Folic Acid and MVI  -Ativan PRN for withdrawal precaution     HTN   -BP stable  -Continue Labetalol with hold parameters     Hypothyroidism   -TSH elevated, synthroid increased  -repeat TFTS in 4 weeks    Dispo:  Palliative care consulted. Prognosis guarded. [facility] discussion with shaunna Peralta earlier this week; full code.    shaunna Rascon [phone]

## 2021-06-19 NOTE — PROGRESS NOTE ADULT - SUBJECTIVE AND OBJECTIVE BOX
Pt seen and examined f/u for alcoholic cirrhosis with ascites and fever with klebsiella pneumoniae in blood    This morning continues to note bilateral flank pain. No nausea or vomiting. Afebrile for 48 hours now. Ascitic fluid cultures negative. Pleural effusion worse tody.    REVIEW OF SYSTEMS:    CONSTITUTIONAL: No fever, weight loss, or fatigue  EYES: No eye pain, visual disturbances, or discharge  ENMT:  No difficulty hearing, tinnitus, vertigo; No sinus or throat pain  RESPIRATORY: No cough, wheezing, chills or hemoptysis; No shortness of breath  CARDIOVASCULAR: No chest pain, palpitations, dizziness, or leg swelling  GASTROINTESTINAL: as above    MEDICATIONS:  MEDICATIONS  (STANDING):  dextrose 40% Gel 15 Gram(s) Oral once  dextrose 5%. 1000 milliLiter(s) (50 mL/Hr) IV Continuous <Continuous>  dextrose 5%. 1000 milliLiter(s) (100 mL/Hr) IV Continuous <Continuous>  dextrose 50% Injectable 25 Gram(s) IV Push once  dextrose 50% Injectable 12.5 Gram(s) IV Push once  dextrose 50% Injectable 25 Gram(s) IV Push once  folic acid 1 milliGRAM(s) Oral daily  furosemide    Tablet 20 milliGRAM(s) Oral daily  glucagon  Injectable 1 milliGRAM(s) IntraMuscular once  insulin glargine Injectable (LANTUS) 10 Unit(s) SubCutaneous every morning  insulin lispro (ADMELOG) corrective regimen sliding scale   SubCutaneous three times a day before meals  insulin lispro (ADMELOG) corrective regimen sliding scale   SubCutaneous at bedtime  labetalol 50 milliGRAM(s) Oral two times a day  lactulose Syrup 10 Gram(s) Oral daily  levothyroxine 50 MICROGram(s) Oral daily  lidocaine   Patch 1 Patch Transdermal daily  multivitamin 1 Tablet(s) Oral daily  pantoprazole  Injectable 40 milliGRAM(s) IV Push two times a day  phytonadione  IVPB 5 milliGRAM(s) IV Intermittent daily  piperacillin/tazobactam IVPB.. 3.375 Gram(s) IV Intermittent every 8 hours  rifAXIMin 550 milliGRAM(s) Oral two times a day  sodium chloride 2 Gram(s) Oral two times a day  spironolactone 50 milliGRAM(s) Oral daily  thiamine 100 milliGRAM(s) Oral daily    MEDICATIONS  (PRN):  acetaminophen   Tablet .. 500 milliGRAM(s) Oral every 12 hours PRN Temp greater or equal to 38C (100.4F), Mild Pain (1 - 3)  LORazepam   Injectable 1 milliGRAM(s) IV Push every 2 hours PRN Agitation  morphine  - Injectable 1 milliGRAM(s) IV Push every 6 hours PRN Moderate Pain (4 - 6)      Allergies    No Known Allergies    Intolerances        Vital Signs Last 24 Hrs  T(C): 37.6 (19 Jun 2021 10:00), Max: 37.6 (19 Jun 2021 10:00)  T(F): 99.6 (19 Jun 2021 10:00), Max: 99.6 (19 Jun 2021 10:00)  HR: 80 (19 Jun 2021 10:00) (80 - 93)  BP: 125/75 (19 Jun 2021 10:00) (108/68 - 130/80)  BP(mean): --  RR: 18 (19 Jun 2021 10:00) (18 - 20)  SpO2: 98% (19 Jun 2021 10:00) (96% - 98%)    06-18 @ 07:01  -  06-19 @ 07:00  --------------------------------------------------------  IN: 1410 mL / OUT: 2000 mL / NET: -590 mL        PHYSICAL EXAM:    General: jaundiced female in no acute distress  HEENT: MMM, conjunctiva and scleral icterus  Gastrointestinal:Abdomen: Soft non-tender and only mildly distended; Normal bowel sounds; No hepatosplenomegaly  Extremities: no cyanosis, clubbing or edema.  Skin: Warm and dry. No obvious rash    LABS:      CBC Full  -  ( 19 Jun 2021 06:23 )  WBC Count : 11.46 K/uL  RBC Count : 2.84 M/uL  Hemoglobin : 8.4 g/dL  Hematocrit : 25.7 %  Platelet Count - Automated : 70 K/uL  Mean Cell Volume : 90.5 fl  Mean Cell Hemoglobin : 29.6 pg  Mean Cell Hemoglobin Concentration : 32.7 gm/dL  Auto Neutrophil # : x  Auto Lymphocyte # : x  Auto Monocyte # : x  Auto Eosinophil # : x  Auto Basophil # : x  Auto Neutrophil % : x  Auto Lymphocyte % : x  Auto Monocyte % : x  Auto Eosinophil % : x  Auto Basophil % : x    06-19    127<L>  |  99  |  7.9<L>  ----------------------------<  279<H>  4.4   |  19.0<L>  |  0.62    Ca    7.9<L>      19 Jun 2021 06:21  Phos  2.2     06-18  Mg     1.7     06-18    TPro  5.5<L>  /  Alb  2.4<L>  /  TBili  4.7<H>  /  DBili  3.2<H>  /  AST  77<H>  /  ALT  45<H>  /  AlkPhos  312<H>  06-19    PT/INR - ( 19 Jun 2021 06:21 )   PT: 17.5 sec;   INR: 1.54 ratio

## 2021-06-19 NOTE — PROGRESS NOTE ADULT - SUBJECTIVE AND OBJECTIVE BOX
Subjective: "A little better" pt seen and examined with [employer]  #[id number]. Pt denies sob but does appear mildly sob. Denies chest pain.    Vital Signs:  Vital Signs Last 24 Hrs  T(C): 37.5 (06-19-21 @ 16:05), Max: 37.6 (06-19-21 @ 10:00)  T(F): 99.5 (06-19-21 @ 16:05), Max: 99.6 (06-19-21 @ 10:00)  HR: 79 (06-19-21 @ 17:49) (79 - 93)  BP: 128/78 (06-19-21 @ 17:49) (108/68 - 129/82)  RR: 18 (06-19-21 @ 17:49) (18 - 20)  SpO2: 98% (06-19-21 @ 17:49) (96% - 99%) on RA    I&O's Detail    18 Jun 2021 07:01  -  19 Jun 2021 07:00  --------------------------------------------------------  IN:    IV PiggyBack: 450 mL    Oral Fluid: 960 mL  Total IN: 1410 mL    OUT:    Voided (mL): 2000 mL  Total OUT: 2000 mL    Total NET: -590 mL      Telemetry/Alarms: SR, no continuous pulseox    Relevant labs, radiology and Medications reviewed    Pertinent Physical Exam  Gen: thin/WD NAD  Neuro: A+Ox3, nonfocal  Pulm: diminished left chest throughout  CV: RRR  LE: no edema    CXR: < from: Xray Chest 1 View- PORTABLE-Routine (Xray Chest 1 View- PORTABLE-Routine in AM.) (06.19.21 @ 04:52) >    IMPRESSION: Enlarging left pleural effusion    < end of copied text >

## 2021-06-19 NOTE — PROGRESS NOTE ADULT - SUBJECTIVE AND OBJECTIVE BOX
CC: Alcoholic cirrhosis with ascites. Anemia.   HPI:  60 y/o female with PMH of alcoholic liver cirrhosis with hepatic encephalopathy, portal HTN, HTN, DM-2, hypothyroidism came to the ED complaining of epigastric pain x 1 week. Pain was sharp radiating to her chest, associated with nausea and non-bloody emesis. Patient said her symptoms now improved at the time of interview. She endorsed dark stool to ED but said no when I asked her. She also noted shortness of breath with exertion and fatigue. She has no chest pain, melena, hematemesis, hematuria, recent travel, fever, chills. Of note patient still drinks alcohol, last drink was 2 weeks ago.      (12 Jun 2021 23:34)    REVIEW OF SYSTEMS:    Patient denied fever, chills, abdominal pain, nausea, vomiting, cough, shortness of breath, chest pain or palpitations    Vital Signs Last 24 Hrs  T(C): 37.5 (19 Jun 2021 16:05), Max: 37.6 (19 Jun 2021 10:00)  T(F): 99.5 (19 Jun 2021 16:05), Max: 99.6 (19 Jun 2021 10:00)  HR: 79 (19 Jun 2021 17:49) (79 - 93)  BP: 128/78 (19 Jun 2021 17:49) (108/68 - 129/82)  BP(mean): --  RR: 18 (19 Jun 2021 17:49) (18 - 20)  SpO2: 98% (19 Jun 2021 17:49) (96% - 99%)I&O's Summary    18 Jun 2021 07:01  -  19 Jun 2021 07:00  --------------------------------------------------------  IN: 1410 mL / OUT: 2000 mL / NET: -590 mL      PHYSICAL EXAM:  GENERAL: NAD, well-groomed  HEENT: PERRL, +EOMI, anicteric, no [place]  NECK: Supple, No JVD   CHEST/LUNG: CTA bilaterally; Normal effort  HEART: S1S2 Normal intensity, no murmurs, gallops or rubs noted  ABDOMEN: Soft, BS Normoactive, Distended. Ascites.   EXTREMITIES:  2+ radial and DP pulses noted, no clubbing, cyanosis, or edema noted, Limited mobility   SKIN: No rashes or lesions noted  NEURO: A&Ox3, no focal deficits noted, CN II-XII intact  PSYCH: Depressed mood and affect; insight/judgement appropriate  LABS:                        8.4    11.46 )-----------( 70       ( 19 Jun 2021 06:23 )             25.7     06-19    127<L>  |  99  |  7.9<L>  ----------------------------<  279<H>  4.4   |  19.0<L>  |  0.62    Ca    7.9<L>      19 Jun 2021 06:21  Phos  2.2     06-18  Mg     1.7     06-18    TPro  5.5<L>  /  Alb  2.4<L>  /  TBili  4.7<H>  /  DBili  3.2<H>  /  AST  77<H>  /  ALT  45<H>  /  AlkPhos  312<H>  06-19    PT/INR - ( 19 Jun 2021 06:21 )   PT: 17.5 sec;   INR: 1.54 ratio             RADIOLOGY & ADDITIONAL TESTS:    MEDICATIONS:  MEDICATIONS  (STANDING):  cefTRIAXone   IVPB 2000 milliGRAM(s) IV Intermittent every 24 hours  dextrose 40% Gel 15 Gram(s) Oral once  dextrose 5%. 1000 milliLiter(s) (50 mL/Hr) IV Continuous <Continuous>  dextrose 5%. 1000 milliLiter(s) (100 mL/Hr) IV Continuous <Continuous>  dextrose 50% Injectable 25 Gram(s) IV Push once  dextrose 50% Injectable 12.5 Gram(s) IV Push once  dextrose 50% Injectable 25 Gram(s) IV Push once  folic acid 1 milliGRAM(s) Oral daily  furosemide    Tablet 20 milliGRAM(s) Oral daily  glucagon  Injectable 1 milliGRAM(s) IntraMuscular once  insulin glargine Injectable (LANTUS) 10 Unit(s) SubCutaneous every morning  insulin lispro (ADMELOG) corrective regimen sliding scale   SubCutaneous three times a day before meals  insulin lispro (ADMELOG) corrective regimen sliding scale   SubCutaneous at bedtime  labetalol 50 milliGRAM(s) Oral two times a day  lactulose Syrup 10 Gram(s) Oral daily  levothyroxine 50 MICROGram(s) Oral daily  lidocaine   Patch 1 Patch Transdermal daily  multivitamin 1 Tablet(s) Oral daily  pantoprazole  Injectable 40 milliGRAM(s) IV Push two times a day  rifAXIMin 550 milliGRAM(s) Oral two times a day  sodium chloride 2 Gram(s) Oral two times a day  spironolactone 50 milliGRAM(s) Oral daily  thiamine 100 milliGRAM(s) Oral daily    MEDICATIONS  (PRN):  acetaminophen   Tablet .. 500 milliGRAM(s) Oral every 12 hours PRN Temp greater or equal to 38C (100.4F), Mild Pain (1 - 3)  LORazepam   Injectable 1 milliGRAM(s) IV Push every 2 hours PRN Agitation  morphine  - Injectable 1 milliGRAM(s) IV Push every 6 hours PRN Moderate Pain (4 - 6)

## 2021-06-19 NOTE — PROGRESS NOTE ADULT - ASSESSMENT
Dilutional hyponatremia  EtOH cirrhosis  Poor nutrition  Clinically improved  - cont NaCl tabs and Lasix  - oral fluid restriction  - increase oral protein intake  - trend labs

## 2021-06-19 NOTE — PROGRESS NOTE ADULT - SUBJECTIVE AND OBJECTIVE BOX
NEPHROLOGY INTERVAL HPI/OVERNIGHT EVENTS:  pt clinically ok  no acute distress    MEDICATIONS  (STANDING):  dextrose 40% Gel 15 Gram(s) Oral once  dextrose 5%. 1000 milliLiter(s) (50 mL/Hr) IV Continuous <Continuous>  dextrose 5%. 1000 milliLiter(s) (100 mL/Hr) IV Continuous <Continuous>  dextrose 50% Injectable 25 Gram(s) IV Push once  dextrose 50% Injectable 12.5 Gram(s) IV Push once  dextrose 50% Injectable 25 Gram(s) IV Push once  folic acid 1 milliGRAM(s) Oral daily  furosemide    Tablet 20 milliGRAM(s) Oral daily  glucagon  Injectable 1 milliGRAM(s) IntraMuscular once  insulin glargine Injectable (LANTUS) 10 Unit(s) SubCutaneous every morning  insulin lispro (ADMELOG) corrective regimen sliding scale   SubCutaneous three times a day before meals  insulin lispro (ADMELOG) corrective regimen sliding scale   SubCutaneous at bedtime  labetalol 50 milliGRAM(s) Oral two times a day  lactulose Syrup 10 Gram(s) Oral daily  levothyroxine 50 MICROGram(s) Oral daily  lidocaine   Patch 1 Patch Transdermal daily  multivitamin 1 Tablet(s) Oral daily  pantoprazole  Injectable 40 milliGRAM(s) IV Push two times a day  phytonadione  IVPB 5 milliGRAM(s) IV Intermittent daily  piperacillin/tazobactam IVPB.. 3.375 Gram(s) IV Intermittent every 8 hours  rifAXIMin 550 milliGRAM(s) Oral two times a day  sodium chloride 2 Gram(s) Oral two times a day  spironolactone 50 milliGRAM(s) Oral daily  thiamine 100 milliGRAM(s) Oral daily    MEDICATIONS  (PRN):  acetaminophen   Tablet .. 500 milliGRAM(s) Oral every 12 hours PRN Temp greater or equal to 38C (100.4F), Mild Pain (1 - 3)  LORazepam   Injectable 1 milliGRAM(s) IV Push every 2 hours PRN Agitation  morphine  - Injectable 1 milliGRAM(s) IV Push every 6 hours PRN Moderate Pain (4 - 6)      Allergies    No Known Allergies        Vital Signs Last 24 Hrs  T(C): 36.8 (19 Jun 2021 06:08), Max: 37.4 (19 Jun 2021 00:32)  T(F): 98.3 (19 Jun 2021 06:08), Max: 99.3 (19 Jun 2021 00:32)  HR: 83 (19 Jun 2021 06:08) (80 - 99)  BP: 111/72 (19 Jun 2021 06:08) (108/68 - 135/84)  BP(mean): --  RR: 18 (19 Jun 2021 06:08) (18 - 20)  SpO2: 98% (19 Jun 2021 06:08) (96% - 100%)    PHYSICAL EXAM:  GENERAL: Deconditioned  NECK: Supple, no jvd  NERVOUS SYSTEM:  Awake, alert  CHEST/LUNG: Clear with diminished BS  HEART: Regular rate and rhythm; no rub  ABDOMEN: Soft, distended +BS  EXTREMITIES:  trace edema B/L LE    LABS:                        8.4    11.46 )-----------( 70       ( 19 Jun 2021 06:23 )             25.7     06-19    127<L>  |  99  |  7.9<L>  ----------------------------<  279<H>  4.4   |  19.0<L>  |  0.62    Ca    7.9<L>      19 Jun 2021 06:21  Phos  2.2     06-18  Mg     1.7     06-18    TPro  5.5<L>  /  Alb  2.4<L>  /  TBili  4.7<H>  /  DBili  3.2<H>  /  AST  77<H>  /  ALT  45<H>  /  AlkPhos  312<H>  06-19    PT/INR - ( 19 Jun 2021 06:21 )   PT: 17.5 sec;   INR: 1.54 ratio                 RADIOLOGY & ADDITIONAL TESTS:  < from: US Abdomen Limited (06.16.21 @ 11:02) >     EXAM:  US ABDOMEN LIMITED                          PROCEDURE DATE:  06/16/2021          INTERPRETATION:  CLINICAL INFORMATION: Abdominal distention    COMPARISON: 10/19/2015    TECHNIQUE: Limited abdominal ultrasound was performed using a low frequency curved transducer to assess for ascites    FINDINGS AND  IMPRESSION:    There is mild to moderate ascites in all 4 quadrants. Left-sided pleural effusion is partially visualized.    < end of copied text >

## 2021-06-19 NOTE — PROGRESS NOTE ADULT - SUBJECTIVE AND OBJECTIVE BOX
[facility]  INFECTIOUS DISEASES AND INTERNAL MEDICINE at [place]  =======================================================  Bernardo La MD  Diplomates American Board of Internal Medicine and Infectious Diseases  Tel: [phone]      Fax: [phone]  =======================================================      [id number]  JUSTIN LOVE    follow up: Blood cultures with GNR.   cultures now show Klebsiella     no new issues        Past Medical & Surgical Hx:  Alcohol abuse  Liver cirrhosis  ETOH abuse  Urea cycle metabolism disorder  Transitory  hyperthyroidism  Constipation  Lump in female breast  UTI (urinary tract infection)  Lymphangitis, acute, lower leg  Anemia  Hypothyroidism  Chronic back pain  HTN (hypertension)  GERD (gastroesophageal reflux disease)  Pancreatitis  No significant past surgical history  S/P abdominoplasty      Social Hx:  Denies smoking, + ETOH       FAMILY HISTORY:  FH: depression (Child)      Allergies  No Known Allergies       REVIEW OF SYSTEMS:  CONSTITUTIONAL:  as per HPI  HEENT:  No diplopia or blurred vision.  No earache, sore throat or runny nose.  CARDIOVASCULAR:  No pressure, squeezing, strangling, tightness, heaviness or aching about the chest, neck, axilla or epigastrium.  RESPIRATORY:  No cough, shortness of breath  GASTROINTESTINAL:  No nausea, vomiting or diarrhea.  GENITOURINARY:  No dysuria, frequency or urgency.   MUSCULOSKELETAL:  no joint aches, no muscle pain  SKIN:  No change in skin, hair or nails.  Vitals:  ============  T(F): 99.6 (2021 10:00), Max: 99.6 (2021 10:00)  HR: 80 (2021 10:00)  BP: 125/75 (2021 10:00)  RR: 18 (2021 10:00)  SpO2: 98% (2021 10:00) (96% - 98%)  temp max in last 48H T(F): , Max: 100.9 (21 @ 15:30)    =======================================================  Current Antibiotics:  cefTRIAXone   IVPB 2000 milliGRAM(s) IV Intermittent every 24 hours  rifAXIMin 550 milliGRAM(s) Oral two times a day    Other medications:  dextrose 40% Gel 15 Gram(s) Oral once  dextrose 5%. 1000 milliLiter(s) IV Continuous <Continuous>  dextrose 5%. 1000 milliLiter(s) IV Continuous <Continuous>  dextrose 50% Injectable 25 Gram(s) IV Push once  dextrose 50% Injectable 12.5 Gram(s) IV Push once  dextrose 50% Injectable 25 Gram(s) IV Push once  folic acid 1 milliGRAM(s) Oral daily  furosemide    Tablet 20 milliGRAM(s) Oral daily  glucagon  Injectable 1 milliGRAM(s) IntraMuscular once  insulin glargine Injectable (LANTUS) 10 Unit(s) SubCutaneous every morning  insulin lispro (ADMELOG) corrective regimen sliding scale   SubCutaneous three times a day before meals  insulin lispro (ADMELOG) corrective regimen sliding scale   SubCutaneous at bedtime  labetalol 50 milliGRAM(s) Oral two times a day  lactulose Syrup 10 Gram(s) Oral daily  levothyroxine 50 MICROGram(s) Oral daily  lidocaine   Patch 1 Patch Transdermal daily  multivitamin 1 Tablet(s) Oral daily  pantoprazole  Injectable 40 milliGRAM(s) IV Push two times a day  sodium chloride 2 Gram(s) Oral two times a day  spironolactone 50 milliGRAM(s) Oral daily  thiamine 100 milliGRAM(s) Oral daily      =======================================================  Labs:                        8.4    11.46 )-----------( 70       ( 2021 06:23 )             25.7     06-19    127<L>  |  99  |  7.9<L>  ----------------------------<  279<H>  4.4   |  19.0<L>  |  0.62    Ca    7.9<L>      2021 06:21  Phos  2.2       Mg     1.7         TPro  5.5<L>  /  Alb  2.4<L>  /  TBili  4.7<H>  /  DBili  3.2<H>  /  AST  77<H>  /  ALT  45<H>  /  AlkPhos  312<H>        Culture - Fungal, Body Fluid (collected 21 @ 01:21)  Source: .Body Fluid Pleural Fluid    Culture - Body Fluid with Gram Stain (collected 21 @ 01:21)  Source: .Body Fluid Pleural Fluid  Gram Stain (21 @ 03:47):    polymorphonuclear leukocytes seen    No organisms seen    by cytocentrifuge    Culture - Body Fluid with Gram Stain (collected 21 @ 21:18)  Source: .Body Fluid Peritoneal Fluid  Gram Stain (21 @ 22:51):    Moderate polymorphonuclear leukocytes    No organisms seen    by cytocentrifuge    Culture - Blood (collected 21 @ 07:44)  Source: .Blood Blood-Peripheral  Gram Stain (21 @ 09:36):    Growth in aerobic and anaerobic bottles: Gram Negative Rods    Gram Stain performed by:    [facility]    [address] [place] [id number]    .    TYPE: (C=Critical, N=Notification, A=Abnormal) C    TESTS:  _ GS    DATE/TIME CALLED: _ 2021 09:35:37    CALLED TO: Verona Styles RN    READ BACK (2 Patient Identifiers)(Y/N): _ Y    READ BACK VALUES (Y/N): _ Y    CALLED BY: Verona Frazier  Final Report (21 @ 11:28):    Growth in aerobic and anaerobic bottles: Klebsiella pneumoniae  Organism: Klebsiella pneumoniae (21 @ 11:28)  Organism: Klebsiella pneumoniae (21 @ 11:28)    Sensitivities:      -  Amikacin: S <=16      -  Ampicillin: R >16 These ampicillin results predict results for amoxicillin      -  Ampicillin/Sulbactam: S <=4/2 Enterobacter, Citrobacter, and Serratia may develop resistance during prolonged therapy (3-4 days)      -  Aztreonam: S <=4      -  Cefazolin: S <=2 Enterobacter, Citrobacter, and Serratia may develop resistance during prolonged therapy (3-4 days)      -  Cefepime: S <=2      -  Cefoxitin: S <=8      -  Ceftriaxone: S <=1 Enterobacter, Citrobacter, and Serratia may develop resistance during prolonged therapy      -  Ciprofloxacin: S <=0.25      -  Ertapenem: S <=0.5      -  Gentamicin: S <=2      -  Imipenem: S <=1      -  Levofloxacin: S <=0.5      -  Meropenem: S <=1      -  Piperacillin/Tazobactam: S <=8      -  Tobramycin: S <=2      -  Trimethoprim/Sulfamethoxazole: S <=0.5/9.5      Method Type: JOHANNA    Culture - Blood (collected 21 @ 07:44)  Source: .Blood Blood-Peripheral  Gram Stain (21 @ 09:33):    Growth in aerobic and anaerobic bottles: Gram Negative Rods    ***Blood Panel PCR results on this specimen are available    approximately 3 hours after the Gram stain result.***    Gram stain, PCR, and/or culture results may not always    correspond due todifference in methodologies.    ************************************************************    This PCR assay was performed using [employer].    The following targets are tested for: Enterococcus,    vancomycin resistant enterococci, Listeria monocytogenes,    coagulase negative staphylococci, S. aureus,    methicillin resistant S. aureus, Streptococcus agalactiae    (Group B), S. pneumoniae, S. pyogenes (Group A),    Acinetobacter baumannii, Enterobacter cloacae, E. coli,    Klebsiella oxytoca, K. pneumoniae, Proteus sp.,    Serratia marcescens, Haemophilus influenzae,    Neisseria meningitidis, Pseudomonas aeruginosa, Candida    albicans, C. glabrata, C krusei, C parapsilosis,    C. tropicalis and the KPC resistance gene.    "Due to technical problems, Pseudomonas aeruginosa    will Not be reported as part of the BCID panel    until further notice".    Gram Stain and BCID performed by:    [facility]    [address] [place] [id number]    .    TYPE: (C=Critical, N=Notification, A=Abnormal) C    TESTS:  _ GS    DATE/TIME CALLED: _ 2021 09:31:26    CALLED TO: Verona Styles RN    READ BACK (2 Patient Identifiers)(Y/N): _ Y    READ BACK VALUES (Y/N): _ Y    CALLED BY: Verona Frazier  Final Report (21 @ 11:31):    Growth in aerobic and anaerobic bottles: Klebsiella pneumoniae    See previous culture [id number]  Organism: Blood Culture PCR (21 @ 11:31)  Organism: Blood Culture PCR (21 @ 11:31)    Sensitivities:      -  Klebsiella pneumoniae: Detec      Method Type: PCR    Culture - Urine (collected 21 @ 01:57)  Source: .Urine Clean Catch (Midstream)  Final Report (21 @ 20:42):    >=3 organisms. Probable collection contamination.    Culture - Blood (collected 21 @ 18:38)  Source: .Blood Blood-Peripheral  Final Report (21 @ 19:01):    No growth at 5 days.    Culture - Blood (collected 21 @ 18:37)  Source: .Blood Blood-Peripheral  Final Report (21 @ 19:01):    No growth at 5 days.            Procalcitonin, Serum: 2.95 ng/mL (21 @ 17:59)  Procalcitonin, Serum: 0.83 ng/mL (21 @ 07:42)    SARS-CoV-2: NotDetec (21 @ 10:09)  Rapid RVP Result: NotDetec (21 @ 10:09)    COVID-19 PCR: NotDetec (21 @ 02:24)  NEUROLOGIC:  No Headaches, seizures   PSYCHIATRIC:  No disorder of thought or mood.  ENDOCRINE:  No heat or cold intolerance  HEMATOLOGICAL:  No easy bruising or bleeding.       Physical Exam:  GEN: NAD, pleasant  HEENT: normocephalic and atraumatic. EOMI. PERRL.  ICTERIC  NECK: Supple.   LUNGS: Clear to auscultation.  HEART: Regular rate and rhythm   ABDOMEN: Soft, nontender, and DISTENDED.  Positive bowel sounds.    : No CVA tenderness  EXTREMITIES: Without any edema.  MSK: No joint swelling  NEUROLOGIC:  No Focal Deficits  PSYCHIATRIC: Appropriate affect .  SKIN: No Rash         < from: Xray Chest 1 View- PORTABLE-Urgent (Xray Chest 1 View- PORTABLE-Urgent .) (21 @ 07:05) >   EXAM:  XR CHEST PORTABLE URGENT 1V                          PROCEDURE DATE:  2021      INTERPRETATION:  Clinical history: 65-year-old female, fever.    Two expiratory/rotated views are compared to 2021 and are correlated with the abdominal CT of 2021.    FINDINGS: Haze in the left hemithorax extending along the lateral aspect consistent with pleural effusion, increased.    Cardiac silhouette and pulmonary vasculature are within normal limits with no gross consolidation, right effusion, pneumothorax or acute osseous finding.    IMPRESSION:  Rotated study, large left pleural effusion, increased    < end of copied text >

## 2021-06-19 NOTE — PROGRESS NOTE ADULT - ASSESSMENT
61 F, PMH of alcoholic liver cirrhosis with hepatic encephalopathy, portal HTN, HTN, DM-2, hypothyroidism presented to [facility] ED 6/12/21 complaining of sharp epigastric pain. Since admission, patient has been treated for gram negative bacteremia (remains febrile, followed by ID, on abx), anemia of chronic disease (likely cirrhosis related, negative GIB work up), hyponatremia, hyperglycemia, HTN, hypothyroidism, alcoholic liver cirrhosis with hepatic encephalopathy (s/p paracentesis 6/16, seen by GI considered end stage, on rifaximin and lactulose). 6/17 Thoracic Surgery was consulted of left sided pleural effusion seen on CXR. Left thoracentesis was done for 1.1 L of serous fluid. Chest tube was not left in place 2/2 history of cirrhosis per Dr. Wilson.  6/19 reaccumulating pleural effusion on cxr

## 2021-06-19 NOTE — PROGRESS NOTE ADULT - ASSESSMENT
65y  Female with h/o alcoholic liver cirrhosis with hepatic encephalopathy, portal HTN, HTN, DM 2, hypothyroidism. Patient initially admitted with epigastric pain, worsening transaminitis, alcoholic cirrhosis and weakness. CT of abdomen and pelvis revealed  cirrhosis with evidence of portal hypertension, mild volume of abdominopelvic ascites, mild left pleural effusion with associated subsegmental atelectasis, possible gastritis and gallstones. Patient was followed by GI, Hematology, nephrology. Patient developed fever to 102.8F 6/16. Also had paracentesis 6/16. Blood cultures with GNR.       Gram negative sepsis - Klebsiella  fever  leukocytosis  Alcoholic liver cirrhosis      - Blood cultures reporting Klebsiella  - Repeat blood cultures ordered  6/18 sent  - RVP/COVID 19 PCR negative   - CXR reporting left pleural effusion   - UA negative   - Procalcitonin level 2.95    d/c Zosyn  start Ceftriaxone    - Follow up cultures  - Trend Fever  - Trend Leukocytosis      Thank you for allowing me to participate in the care of your patient.   Will Follow

## 2021-06-19 NOTE — PROGRESS NOTE ADULT - PROBLEM SELECTOR PLAN 1
s/p thoracentesis 6/17   Light's criteria c/w transudative effusion  CXR today reveals reaccumulation of left pleural effusion  Clinically, pt stable  Will discuss with Dr Wilson re: thora vs pigtail cath  Likely not a candidate for pleurx d/t cirrhosis  Would recommending increasing diuretic, possibly switching to IV dosing  thoracic surgery to follow

## 2021-06-20 LAB
GLUCOSE BLDC GLUCOMTR-MCNC: 212 MG/DL — HIGH (ref 70–99)
GLUCOSE BLDC GLUCOMTR-MCNC: 262 MG/DL — HIGH (ref 70–99)
GLUCOSE BLDC GLUCOMTR-MCNC: 274 MG/DL — HIGH (ref 70–99)
GLUCOSE BLDC GLUCOMTR-MCNC: 275 MG/DL — HIGH (ref 70–99)

## 2021-06-20 PROCEDURE — 93306 TTE W/DOPPLER COMPLETE: CPT | Mod: 26

## 2021-06-20 PROCEDURE — 99233 SBSQ HOSP IP/OBS HIGH 50: CPT

## 2021-06-20 PROCEDURE — 71045 X-RAY EXAM CHEST 1 VIEW: CPT | Mod: 26

## 2021-06-20 PROCEDURE — 99231 SBSQ HOSP IP/OBS SF/LOW 25: CPT

## 2021-06-20 RX ADMIN — INSULIN GLARGINE 10 UNIT(S): 100 INJECTION, SOLUTION SUBCUTANEOUS at 08:51

## 2021-06-20 RX ADMIN — SODIUM CHLORIDE 2 GRAM(S): 9 INJECTION INTRAMUSCULAR; INTRAVENOUS; SUBCUTANEOUS at 17:37

## 2021-06-20 RX ADMIN — LACTULOSE 10 GRAM(S): 10 SOLUTION ORAL at 11:28

## 2021-06-20 RX ADMIN — CEFTRIAXONE 100 MILLIGRAM(S): 500 INJECTION, POWDER, FOR SOLUTION INTRAMUSCULAR; INTRAVENOUS at 17:31

## 2021-06-20 RX ADMIN — LIDOCAINE 1 PATCH: 4 CREAM TOPICAL at 19:55

## 2021-06-20 RX ADMIN — Medication 6: at 17:38

## 2021-06-20 RX ADMIN — Medication 2: at 21:22

## 2021-06-20 RX ADMIN — SODIUM CHLORIDE 2 GRAM(S): 9 INJECTION INTRAMUSCULAR; INTRAVENOUS; SUBCUTANEOUS at 05:30

## 2021-06-20 RX ADMIN — Medication 50 MILLIGRAM(S): at 17:37

## 2021-06-20 RX ADMIN — Medication 1 TABLET(S): at 11:27

## 2021-06-20 RX ADMIN — Medication 100 MILLIGRAM(S): at 11:27

## 2021-06-20 RX ADMIN — LIDOCAINE 1 PATCH: 4 CREAM TOPICAL at 22:55

## 2021-06-20 RX ADMIN — PANTOPRAZOLE SODIUM 40 MILLIGRAM(S): 20 TABLET, DELAYED RELEASE ORAL at 17:40

## 2021-06-20 RX ADMIN — PANTOPRAZOLE SODIUM 40 MILLIGRAM(S): 20 TABLET, DELAYED RELEASE ORAL at 06:21

## 2021-06-20 RX ADMIN — Medication 50 MILLIGRAM(S): at 05:29

## 2021-06-20 RX ADMIN — Medication 1 MILLIGRAM(S): at 11:27

## 2021-06-20 RX ADMIN — Medication 50 MICROGRAM(S): at 05:31

## 2021-06-20 RX ADMIN — Medication 6: at 11:28

## 2021-06-20 RX ADMIN — LIDOCAINE 1 PATCH: 4 CREAM TOPICAL at 11:27

## 2021-06-20 RX ADMIN — Medication 4: at 08:52

## 2021-06-20 RX ADMIN — Medication 40 MILLIGRAM(S): at 06:21

## 2021-06-20 RX ADMIN — SPIRONOLACTONE 50 MILLIGRAM(S): 25 TABLET, FILM COATED ORAL at 05:33

## 2021-06-20 NOTE — PROGRESS NOTE ADULT - SUBJECTIVE AND OBJECTIVE BOX
Pt seen and examined f/u for alcoholic cirrhosis with a small amount of ascites, anemia, epigastric pain and klegsiella bacteremia and pleural effusion    This morning she has no new complaints. Still with vague abdominal discomfort, ? flanks but tolerating diet and no epigastric pain. Undergoing revaluation by CT surgery for effusion. H/H continue stable.    REVIEW OF SYSTEMS:    CONSTITUTIONAL: No fever, weight loss, or fatigue  EYES: No eye pain, visual disturbances, or discharge  ENMT:  No difficulty hearing, tinnitus, vertigo; No sinus or throat pain  RESPIRATORY: No cough, wheezing, chills or hemoptysis; No shortness of breath  CARDIOVASCULAR: No chest pain, palpitations, dizziness, or leg swelling  GASTROINTESTINAL: as above    MEDICATIONS:  MEDICATIONS  (STANDING):  cefTRIAXone   IVPB 2000 milliGRAM(s) IV Intermittent every 24 hours  dextrose 40% Gel 15 Gram(s) Oral once  dextrose 5%. 1000 milliLiter(s) (50 mL/Hr) IV Continuous <Continuous>  dextrose 5%. 1000 milliLiter(s) (100 mL/Hr) IV Continuous <Continuous>  dextrose 50% Injectable 25 Gram(s) IV Push once  dextrose 50% Injectable 12.5 Gram(s) IV Push once  dextrose 50% Injectable 25 Gram(s) IV Push once  folic acid 1 milliGRAM(s) Oral daily  furosemide   Injectable 40 milliGRAM(s) IV Push daily  glucagon  Injectable 1 milliGRAM(s) IntraMuscular once  insulin glargine Injectable (LANTUS) 10 Unit(s) SubCutaneous every morning  insulin lispro (ADMELOG) corrective regimen sliding scale   SubCutaneous three times a day before meals  insulin lispro (ADMELOG) corrective regimen sliding scale   SubCutaneous at bedtime  labetalol 50 milliGRAM(s) Oral two times a day  lactulose Syrup 10 Gram(s) Oral daily  levothyroxine 50 MICROGram(s) Oral daily  lidocaine   Patch 1 Patch Transdermal daily  multivitamin 1 Tablet(s) Oral daily  pantoprazole  Injectable 40 milliGRAM(s) IV Push two times a day  rifAXIMin 550 milliGRAM(s) Oral two times a day  sodium chloride 2 Gram(s) Oral two times a day  spironolactone 50 milliGRAM(s) Oral daily  thiamine 100 milliGRAM(s) Oral daily    MEDICATIONS  (PRN):  acetaminophen   Tablet .. 500 milliGRAM(s) Oral every 12 hours PRN Temp greater or equal to 38C (100.4F), Mild Pain (1 - 3)  LORazepam   Injectable 1 milliGRAM(s) IV Push every 2 hours PRN Agitation  morphine  - Injectable 1 milliGRAM(s) IV Push every 6 hours PRN Moderate Pain (4 - 6)      Allergies    No Known Allergies    Intolerances        Vital Signs Last 24 Hrs  T(C): 36.9 (20 Jun 2021 09:00), Max: 37.5 (19 Jun 2021 16:05)  T(F): 98.4 (20 Jun 2021 09:00), Max: 99.5 (19 Jun 2021 16:05)  HR: 68 (20 Jun 2021 09:00) (68 - 92)  BP: 105/69 (20 Jun 2021 09:00) (101/71 - 128/78)  BP(mean): --  RR: 18 (20 Jun 2021 09:00) (17 - 18)  SpO2: 97% (20 Jun 2021 09:00) (95% - 100%)    06-19 @ 07:01  -  06-20 @ 07:00  --------------------------------------------------------  IN: 0 mL / OUT: 400 mL / NET: -400 mL        PHYSICAL EXAM:    General:  female in no acute distress  HEENT: MMM, conjunctiva and sclera clear  Gastrointestinal:Abdomen: Soft non-tender wit minimal distention.  Normal bowel sounds; No hepatosplenomegaly  Extremities: no cyanosis, clubbing or edema.  Skin: Warm and dry. No obvious rash    LABS:      CBC Full  -  ( 19 Jun 2021 06:23 )  WBC Count : 11.46 K/uL  RBC Count : 2.84 M/uL  Hemoglobin : 8.4 g/dL  Hematocrit : 25.7 %  Platelet Count - Automated : 70 K/uL  Mean Cell Volume : 90.5 fl  Mean Cell Hemoglobin : 29.6 pg  Mean Cell Hemoglobin Concentration : 32.7 gm/dL  Auto Neutrophil # : x  Auto Lymphocyte # : x  Auto Monocyte # : x  Auto Eosinophil # : x  Auto Basophil # : x  Auto Neutrophil % : x  Auto Lymphocyte % : x  Auto Monocyte % : x  Auto Eosinophil % : x  Auto Basophil % : x    06-19    127<L>  |  99  |  7.9<L>  ----------------------------<  279<H>  4.4   |  19.0<L>  |  0.62    Ca    7.9<L>      19 Jun 2021 06:21    TPro  5.5<L>  /  Alb  2.4<L>  /  TBili  4.7<H>  /  DBili  3.2<H>  /  AST  77<H>  /  ALT  45<H>  /  AlkPhos  312<H>  06-19    PT/INR - ( 19 Jun 2021 06:21 )   PT: 17.5 sec;   INR: 1.54 ratio

## 2021-06-20 NOTE — PROGRESS NOTE ADULT - PROBLEM SELECTOR PLAN 1
but only small amount of ascites and she has been on diuretic with minimal if any distention. Continue diuretics and other meds.

## 2021-06-20 NOTE — PROGRESS NOTE ADULT - SUBJECTIVE AND OBJECTIVE BOX
CC: Hepatic encephalopathy from alcoholic liver .  Anemia of chronic liver disease . Hyponatremia .  Klebsiella bacteremia/sepsis   HPI:  62 y/o female with PMH of alcoholic liver cirrhosis with hepatic encephalopathy, portal HTN, HTN, DM-2, hypothyroidism came to the ED complaining of epigastric pain x 1 week. Pain was sharp radiating to her chest, associated with nausea and non-bloody emesis. Patient said her symptoms now improved at the time of interview. She endorsed dark stool to ED but said no when I asked her. She also noted shortness of breath with exertion and fatigue. She has no chest pain, melena, hematemesis, hematuria, recent travel, fever, chills. Of note patient still drinks alcohol, last drink was 2 weeks ago.      (12 Jun 2021 23:34)    REVIEW OF SYSTEMS:    Patient denied fever, chills, abdominal pain, nausea, vomiting, cough, shortness of breath, chest pain or palpitations    Vital Signs Last 24 Hrs  T(C): 36.9 (20 Jun 2021 09:00), Max: 37.5 (19 Jun 2021 16:05)  T(F): 98.4 (20 Jun 2021 09:00), Max: 99.5 (19 Jun 2021 16:05)  HR: 68 (20 Jun 2021 09:00) (68 - 92)  BP: 105/69 (20 Jun 2021 09:00) (101/71 - 128/78)  BP(mean): --  RR: 18 (20 Jun 2021 09:00) (17 - 18)  SpO2: 97% (20 Jun 2021 09:00) (95% - 100%)I&O's Summary    19 Jun 2021 07:01  -  20 Jun 2021 07:00  --------------------------------------------------------  IN: 0 mL / OUT: 400 mL / NET: -400 mL      PHYSICAL EXAM:  GENERAL: NAD,   HEENT: PERRL, +EOMI, anicteric, no Platinum  NECK: Supple, No JVD   CHEST/LUNG: CTA bilaterally; Normal effort  HEART: S1S2 Normal intensity, no murmurs, gallops or rubs noted  ABDOMEN: Soft, BS Normoactive, NT, ND, no HSM noted  EXTREMITIES:  2+ radial and DP pulses noted, no clubbing, cyanosis, or edema noted, Limited mobility   SKIN: No rashes or lesions noted  NEURO: Lethargy, no focal deficits noted, CN II-XII intact  PSYCH: Depressed mood and affect; insight/judgement appropriate  LABS:                        8.4    11.46 )-----------( 70       ( 19 Jun 2021 06:23 )             25.7     06-19    127<L>  |  99  |  7.9<L>  ----------------------------<  279<H>  4.4   |  19.0<L>  |  0.62    Ca    7.9<L>      19 Jun 2021 06:21    TPro  5.5<L>  /  Alb  2.4<L>  /  TBili  4.7<H>  /  DBili  3.2<H>  /  AST  77<H>  /  ALT  45<H>  /  AlkPhos  312<H>  06-19    PT/INR - ( 19 Jun 2021 06:21 )   PT: 17.5 sec;   INR: 1.54 ratio             RADIOLOGY & ADDITIONAL TESTS:    MEDICATIONS:  MEDICATIONS  (STANDING):  cefTRIAXone   IVPB 2000 milliGRAM(s) IV Intermittent every 24 hours  dextrose 40% Gel 15 Gram(s) Oral once  dextrose 5%. 1000 milliLiter(s) (50 mL/Hr) IV Continuous <Continuous>  dextrose 5%. 1000 milliLiter(s) (100 mL/Hr) IV Continuous <Continuous>  dextrose 50% Injectable 25 Gram(s) IV Push once  dextrose 50% Injectable 12.5 Gram(s) IV Push once  dextrose 50% Injectable 25 Gram(s) IV Push once  folic acid 1 milliGRAM(s) Oral daily  furosemide   Injectable 40 milliGRAM(s) IV Push daily  glucagon  Injectable 1 milliGRAM(s) IntraMuscular once  insulin glargine Injectable (LANTUS) 10 Unit(s) SubCutaneous every morning  insulin lispro (ADMELOG) corrective regimen sliding scale   SubCutaneous three times a day before meals  insulin lispro (ADMELOG) corrective regimen sliding scale   SubCutaneous at bedtime  labetalol 50 milliGRAM(s) Oral two times a day  lactulose Syrup 10 Gram(s) Oral daily  levothyroxine 50 MICROGram(s) Oral daily  lidocaine   Patch 1 Patch Transdermal daily  multivitamin 1 Tablet(s) Oral daily  pantoprazole  Injectable 40 milliGRAM(s) IV Push two times a day  rifAXIMin 550 milliGRAM(s) Oral two times a day  sodium chloride 2 Gram(s) Oral two times a day  spironolactone 50 milliGRAM(s) Oral daily  thiamine 100 milliGRAM(s) Oral daily    MEDICATIONS  (PRN):  acetaminophen   Tablet .. 500 milliGRAM(s) Oral every 12 hours PRN Temp greater or equal to 38C (100.4F), Mild Pain (1 - 3)  LORazepam   Injectable 1 milliGRAM(s) IV Push every 2 hours PRN Agitation  morphine  - Injectable 1 milliGRAM(s) IV Push every 6 hours PRN Moderate Pain (4 - 6)

## 2021-06-20 NOTE — PROGRESS NOTE ADULT - SUBJECTIVE AND OBJECTIVE BOX
NEPHROLOGY INTERVAL HPI/OVERNIGHT EVENTS:  pt resting comfortably  no acute distress    MEDICATIONS  (STANDING):  cefTRIAXone   IVPB 2000 milliGRAM(s) IV Intermittent every 24 hours  dextrose 40% Gel 15 Gram(s) Oral once  dextrose 5%. 1000 milliLiter(s) (50 mL/Hr) IV Continuous <Continuous>  dextrose 5%. 1000 milliLiter(s) (100 mL/Hr) IV Continuous <Continuous>  dextrose 50% Injectable 25 Gram(s) IV Push once  dextrose 50% Injectable 12.5 Gram(s) IV Push once  dextrose 50% Injectable 25 Gram(s) IV Push once  folic acid 1 milliGRAM(s) Oral daily  furosemide   Injectable 40 milliGRAM(s) IV Push daily  glucagon  Injectable 1 milliGRAM(s) IntraMuscular once  insulin glargine Injectable (LANTUS) 10 Unit(s) SubCutaneous every morning  insulin lispro (ADMELOG) corrective regimen sliding scale   SubCutaneous three times a day before meals  insulin lispro (ADMELOG) corrective regimen sliding scale   SubCutaneous at bedtime  labetalol 50 milliGRAM(s) Oral two times a day  lactulose Syrup 10 Gram(s) Oral daily  levothyroxine 50 MICROGram(s) Oral daily  lidocaine   Patch 1 Patch Transdermal daily  multivitamin 1 Tablet(s) Oral daily  pantoprazole  Injectable 40 milliGRAM(s) IV Push two times a day  rifAXIMin 550 milliGRAM(s) Oral two times a day  sodium chloride 2 Gram(s) Oral two times a day  spironolactone 50 milliGRAM(s) Oral daily  thiamine 100 milliGRAM(s) Oral daily    MEDICATIONS  (PRN):  acetaminophen   Tablet .. 500 milliGRAM(s) Oral every 12 hours PRN Temp greater or equal to 38C (100.4F), Mild Pain (1 - 3)  LORazepam   Injectable 1 milliGRAM(s) IV Push every 2 hours PRN Agitation  morphine  - Injectable 1 milliGRAM(s) IV Push every 6 hours PRN Moderate Pain (4 - 6)      Allergies    No Known Allergies          Vital Signs Last 24 Hrs  T(C): 36.3 (20 Jun 2021 04:46), Max: 37.6 (19 Jun 2021 10:00)  T(F): 97.4 (20 Jun 2021 04:46), Max: 99.6 (19 Jun 2021 10:00)  HR: 78 (20 Jun 2021 04:46) (78 - 92)  BP: 124/76 (20 Jun 2021 04:46) (101/71 - 128/78)  BP(mean): --  RR: 18 (20 Jun 2021 04:46) (17 - 18)  SpO2: 100% (20 Jun 2021 04:46) (95% - 100%)    PHYSICAL EXAM:  GENERAL: Fatigued, weak  NECK: Supple, no JVD  NERVOUS SYSTEM:  Awake, alert  CHEST/LUNG: Clear with diminished BS at bases (L>R)  HEART: Regular rate and rhythm; no rub  ABDOMEN: Soft, distended +BS  EXTREMITIES:  trace edema B/L LE    LABS:                        8.4    11.46 )-----------( 70       ( 19 Jun 2021 06:23 )             25.7     06-19    127<L>  |  99  |  7.9<L>  ----------------------------<  279<H>  4.4   |  19.0<L>  |  0.62    Ca    7.9<L>      19 Jun 2021 06:21    TPro  5.5<L>  /  Alb  2.4<L>  /  TBili  4.7<H>  /  DBili  3.2<H>  /  AST  77<H>  /  ALT  45<H>  /  AlkPhos  312<H>  06-19    PT/INR - ( 19 Jun 2021 06:21 )   PT: 17.5 sec;   INR: 1.54 ratio                 RADIOLOGY & ADDITIONAL TESTS:

## 2021-06-20 NOTE — PROGRESS NOTE ADULT - PROBLEM SELECTOR PLAN 2
multifactorial and probably anemia of chronic disease and marrow suppression. No evidence of GI bleeding

## 2021-06-20 NOTE — PROGRESS NOTE ADULT - ASSESSMENT
61 F, PMH of alcoholic liver cirrhosis with hepatic encephalopathy, portal HTN, HTN, DM-2, hypothyroidism presented to [facility] ED 6/12/21 complaining of sharp epigastric pain. Since admission, patient has been treated for gram negative bacteremia (remains febrile, followed by ID, on abx), anemia of chronic disease (likely cirrhosis related, negative GIB work up), hyponatremia, hyperglycemia, HTN, hypothyroidism, alcoholic liver cirrhosis with hepatic encephalopathy (s/p paracentesis 6/16, seen by GI considered end stage, on rifaximin and lactulose). 6/17 Thoracic Surgery was consulted of left sided pleural effusion seen on CXR. Left thoracentesis was done for 1.1 L of serous fluid. Chest tube was not left in place 2/2 history of cirrhosis per Dr. Wilson.  6/19-6/20 Reaccumulating pleural effusion on CXR, pt currently in NAD, at 98% on RA.

## 2021-06-20 NOTE — PROGRESS NOTE ADULT - PROBLEM SELECTOR PLAN 1
S/p thoracentesis 6/17   Light's criteria c/w transudative effusion  CXR + reaccumulation of left pleural effusion  Clinically, pt stable. Sat 98% RA, speaking full sentences, no accessory muscle use noted.  Will discuss with Dr Wilson re: thora vs pigtail cath  Likely not a candidate for pleurx d/t cirrhosis  Would recommending increasing diuretic, possibly switching to IV dosing  Thoracic surgery will cont to follow.

## 2021-06-20 NOTE — PROGRESS NOTE ADULT - ASSESSMENT
Dilutional hyponatremia---> slowly resolving  EtOH cirrhosis  Poor nutrition  - cont NaCl tabs and Lasix  - oral fluid restriction  - increase oral protein intake  - trend labs

## 2021-06-20 NOTE — PROGRESS NOTE ADULT - SUBJECTIVE AND OBJECTIVE BOX
Significant recent/past 24 hr events:  No acute overnight events. Pt remained HD stable and without acute complaints.  Pt currently in NAD and without acute complaints. Denies N/V/D HA, dizziness, blurry vision, numbness/tingling, SOB, cough, chest pain, palpitations, abd pain or urinary sx's.   Educated at bedside with interrupter about pleural effusion and the possible need for CT placement in the future.   Risk/benefits explain in detail, all questions answered.     Subjective:  Review of Systems  ROS negative x 10 systems except as noted above    Patient is a 65y old  Female who presents with a chief complaint of Anemia (2021 12:17)    HPI:  62 y/o female with PMH of alcoholic liver cirrhosis with hepatic encephalopathy, portal HTN, HTN, DM-2, hypothyroidism came to the ED complaining of epigastric pain x 1 week. Pain was sharp radiating to her chest, associated with nausea and non-bloody emesis. Patient said her symptoms now improved at the time of interview. She endorsed dark stool to ED but said no when I asked her. She also noted shortness of breath with exertion and fatigue. She has no chest pain, melena, hematemesis, hematuria, recent travel, fever, chills. Of note patient still drinks alcohol, last drink was 2 weeks ago.      (2021 23:34)    PAST MEDICAL & SURGICAL HISTORY:  Alcohol abuse    Alcohol abuse    Liver cirrhosis    ETOH abuse    Urea cycle metabolism disorder    Transitory  hyperthyroidism    Lump in female breast    UTI (urinary tract infection)    Lymphangitis, acute, lower leg    Anemia    Hypothyroidism    Chronic back pain    HTN (hypertension)    GERD (gastroesophageal reflux disease)    Pancreatitis    No significant past surgical history    S/P abdominoplasty      FAMILY HISTORY:  FH: depression (Child)      Vitals   ICU Vital Signs Last 24 Hrs  T(C): 36.9 (2021 09:00), Max: 37.5 (2021 16:05)  T(F): 98.4 (2021 09:00), Max: 99.5 (2021 16:05)  HR: 68 (2021 09:00) (68 - 92)  BP: 105/69 (2021 09:00) (101/71 - 128/78)  BP(mean): --  ABP: --  ABP(mean): --  RR: 18 (2021 09:00) (17 - 18)  SpO2: 97% (2021 09:00) (95% - 100%)    I&O's Detail    2021 07:01  -  2021 07:00  --------------------------------------------------------  IN:  Total IN: 0 mL    OUT:    Voided (mL): 400 mL  Total OUT: 400 mL    Total NET: -400 mL    LABS                        8.4    11.46 )-----------( 70       ( 2021 06:23 )             25.7         127<L>  |  99  |  7.9<L>  ----------------------------<  279<H>  4.4   |  19.0<L>  |  0.62    Ca    7.9<L>      2021 06:21    TPro  5.5<L>  /  Alb  2.4<L>  /  TBili  4.7<H>  /  DBili  3.2<H>  /  AST  77<H>  /  ALT  45<H>  /  AlkPhos  312<H>      LIVER FUNCTIONS - ( 2021 06:21 )  Alb: 2.4 g/dL / Pro: 5.5 g/dL / ALK PHOS: 312 U/L / ALT: 45 U/L / AST: 77 U/L / GGT: x           PT/INR - ( 2021 06:21 )   PT: 17.5 sec;   INR: 1.54 ratio        POCT Blood Glucose.: 274 mg/dL (21 @ 11:27)  POCT Blood Glucose.: 212 mg/dL (21 @ 08:51)  POCT Blood Glucose.: 216 mg/dL (21 @ 20:53)  POCT Blood Glucose.: 224 mg/dL (21 @ 17:32)        MEDICATIONS  (STANDING):  cefTRIAXone   IVPB 2000 milliGRAM(s) IV Intermittent every 24 hours  dextrose 40% Gel 15 Gram(s) Oral once  dextrose 5%. 1000 milliLiter(s) (50 mL/Hr) IV Continuous <Continuous>  dextrose 5%. 1000 milliLiter(s) (100 mL/Hr) IV Continuous <Continuous>  dextrose 50% Injectable 25 Gram(s) IV Push once  dextrose 50% Injectable 12.5 Gram(s) IV Push once  dextrose 50% Injectable 25 Gram(s) IV Push once  folic acid 1 milliGRAM(s) Oral daily  furosemide   Injectable 40 milliGRAM(s) IV Push daily  glucagon  Injectable 1 milliGRAM(s) IntraMuscular once  insulin glargine Injectable (LANTUS) 10 Unit(s) SubCutaneous every morning  insulin lispro (ADMELOG) corrective regimen sliding scale   SubCutaneous three times a day before meals  insulin lispro (ADMELOG) corrective regimen sliding scale   SubCutaneous at bedtime  labetalol 50 milliGRAM(s) Oral two times a day  lactulose Syrup 10 Gram(s) Oral daily  levothyroxine 50 MICROGram(s) Oral daily  lidocaine   Patch 1 Patch Transdermal daily  multivitamin 1 Tablet(s) Oral daily  pantoprazole  Injectable 40 milliGRAM(s) IV Push two times a day  rifAXIMin 550 milliGRAM(s) Oral two times a day  sodium chloride 2 Gram(s) Oral two times a day  spironolactone 50 milliGRAM(s) Oral daily  thiamine 100 milliGRAM(s) Oral daily    MEDICATIONS  (PRN):  acetaminophen   Tablet .. 500 milliGRAM(s) Oral every 12 hours PRN Temp greater or equal to 38C (100.4F), Mild Pain (1 - 3)  LORazepam   Injectable 1 milliGRAM(s) IV Push every 2 hours PRN Agitation  morphine  - Injectable 1 milliGRAM(s) IV Push every 6 hours PRN Moderate Pain (4 - 6)      Allergies:  No Known Allergies        Physical Exam:   Constitutional: NAD  Neck: supple, trachea midline.  No JVD  Respiratory: RA sat 98%, speaking in full sentences. Lt chest diffusely diminished to auscultation, no accessory muscle use noted  Cardiovascular: Regular rate, regular rhythm, normal S1, S2; no murmurs or rub  Gastrointestinal: Distended. ,Non-tender, normal bowel sounds  Extremities: OMER x 4, mild LE peripheral edema, no cyanosis, no clubbing   Vascular: Equal and normal pulses: 2+ peripheral pulses throughout  Neurological: A+O x 3; speech clear and intact; no gross sensory/motor deficits  Psychiatric: calm, normal mood, normal affect  Skin: warm, dry, well perfused, no rashes      Code Status: Full Code      Critical care time spent: 35 minutes (reviewing chart including medication, labs and imaging results, discussions with interdisciplinary team, discussing goals of care/advanced directives, counseling patient and/or family, non-inclusive of procedures)    Case including assessment/plan of care discussed with attending.

## 2021-06-20 NOTE — PROGRESS NOTE ADULT - ASSESSMENT
60 y/o female with PMH of alcoholic liver cirrhosis with hepatic encephalopathy, portal HTN, HTN, DM-2, hypothyroidism came to the ED complaining of epigastric pain x 1 week. She was found to have hb of 5.2 in the ED, occult negative. She was admitted for workup and treatment of anemia. Hospital course complicated by gram negative sepsis, hyponatremia, abdominal pain with moderate ascites requiring paracentesis and then dyspnea with large left pleural effusion requiring thoracentesis. Patient's hemodynamics are improving; repeat blood cultures are pending. ID, renal, GI, hematology and CT surgery on board. Prognosis guarded and palliative care was consulted.    Gram Negative Bacteremia; unclear source possibly due to infected pleural effusion  Klebsiella bacteremia.   -SBP ruled out; f/u ascitic fluid culture - no growth to date  -repeat blood cultures 6/18 no growth  -s/p paracentesis with 700ml removed on 6/16  -ID recommend continue ceftriaxone     Large Left Pleural Effusion with DOZIER exertional tachypnea  -s/p thoracentesis on 6/17 with 1.1 liters removes  -f/u pleural fluid culture and cytology  -continue diuretics   -TTE pending  -monitor I/Os, daily weights  -repeat CXR on 6/20     Anemia of chronic disease in the setting of cirrhotic liver and bone narrow suppression from ETOH  -Hb 5.2 on admission, s/p 2u PRBC  -Low haptoglobin and high LDH; discussed with hematology - no concerns for hemolysis  -FOBT negative   -s/p venofer  -continue PPI  -no active signs/symptoms of GIB    Hyponatremia improving   -sodium slowly improving to 127  -continue NaCL tabs/lasix   -continue free water restriction    Alcoholic liver cirrhosis with hepatic encephalopathy and portal HTN   -advanced cirrhosis; nearing end stage per GI  -continue lasix and aldactone  -ammonia level WNL; rifaximin, lactulose  -beta-blocker resumed  -Outpatient surveillance for HCC  -hepatitis A IgG positive; autoimmune liver serologies still pending  -Monitor LFTs    Lower back and flank pain   -Continue PPI  -Analgesia PRN; per palliative add low dose tylenol   - lidoderm  -OOB to chair; mobilize patient    Hyperglycemia with underlying DM-2, not on home insulin   -HbA1c 8.0, goal <7  -continue lantus   -Continue ISS    History of ETOH abuse   -no evidence of withdrawal  -continue Thiamine, Folic Acid and MVI  -Ativan PRN for withdrawal precaution     HTN   -BP stable  -Continue Labetalol with hold parameters     Hypothyroidism   -TSH elevated, synthroid increased  -repeat TFTS in 4 weeks    Dispo:  Palliative care consulted. Prognosis guarded. GOC discussion with shaunna Peralta earlier this week; full code.    shaunna Rascon [phone]

## 2021-06-21 LAB
ALBUMIN SERPL ELPH-MCNC: 2 G/DL — LOW (ref 3.3–5.2)
ALP SERPL-CCNC: 274 U/L — HIGH (ref 40–120)
ALT FLD-CCNC: 38 U/L — HIGH
ANION GAP SERPL CALC-SCNC: 10 MMOL/L — SIGNIFICANT CHANGE UP (ref 5–17)
AST SERPL-CCNC: 82 U/L — HIGH
BILIRUB DIRECT SERPL-MCNC: 2.7 MG/DL — HIGH (ref 0–0.3)
BILIRUB INDIRECT FLD-MCNC: 1.5 MG/DL — HIGH (ref 0.2–1)
BILIRUB SERPL-MCNC: 4.2 MG/DL — HIGH (ref 0.4–2)
BUN SERPL-MCNC: 8.8 MG/DL — SIGNIFICANT CHANGE UP (ref 8–20)
CALCIUM SERPL-MCNC: 8.1 MG/DL — LOW (ref 8.6–10.2)
CHLORIDE SERPL-SCNC: 100 MMOL/L — SIGNIFICANT CHANGE UP (ref 98–107)
CO2 SERPL-SCNC: 20 MMOL/L — LOW (ref 22–29)
CREAT SERPL-MCNC: 0.48 MG/DL — LOW (ref 0.5–1.3)
CULTURE RESULTS: SIGNIFICANT CHANGE UP
GLUCOSE BLDC GLUCOMTR-MCNC: 149 MG/DL — HIGH (ref 70–99)
GLUCOSE BLDC GLUCOMTR-MCNC: 167 MG/DL — HIGH (ref 70–99)
GLUCOSE BLDC GLUCOMTR-MCNC: 171 MG/DL — HIGH (ref 70–99)
GLUCOSE BLDC GLUCOMTR-MCNC: 209 MG/DL — HIGH (ref 70–99)
GLUCOSE SERPL-MCNC: 143 MG/DL — HIGH (ref 70–99)
HCT VFR BLD CALC: 25.4 % — LOW (ref 34.5–45)
HGB BLD-MCNC: 8.6 G/DL — LOW (ref 11.5–15.5)
INR BLD: 1.58 RATIO — HIGH (ref 0.88–1.16)
MCHC RBC-ENTMCNC: 30.9 PG — SIGNIFICANT CHANGE UP (ref 27–34)
MCHC RBC-ENTMCNC: 33.9 GM/DL — SIGNIFICANT CHANGE UP (ref 32–36)
MCV RBC AUTO: 91.4 FL — SIGNIFICANT CHANGE UP (ref 80–100)
NON-GYNECOLOGICAL CYTOLOGY STUDY: SIGNIFICANT CHANGE UP
NON-GYNECOLOGICAL CYTOLOGY STUDY: SIGNIFICANT CHANGE UP
PLATELET # BLD AUTO: 77 K/UL — LOW (ref 150–400)
POTASSIUM SERPL-MCNC: 4.1 MMOL/L — SIGNIFICANT CHANGE UP (ref 3.5–5.3)
POTASSIUM SERPL-SCNC: 4.1 MMOL/L — SIGNIFICANT CHANGE UP (ref 3.5–5.3)
PROT SERPL-MCNC: 5.8 G/DL — LOW (ref 6.6–8.7)
PROTHROM AB SERPL-ACNC: 17.9 SEC — HIGH (ref 10.6–13.6)
RBC # BLD: 2.78 M/UL — LOW (ref 3.8–5.2)
RBC # FLD: 24.1 % — HIGH (ref 10.3–14.5)
SODIUM SERPL-SCNC: 130 MMOL/L — LOW (ref 135–145)
SPECIMEN SOURCE: SIGNIFICANT CHANGE UP
WBC # BLD: 7.35 K/UL — SIGNIFICANT CHANGE UP (ref 3.8–10.5)
WBC # FLD AUTO: 7.35 K/UL — SIGNIFICANT CHANGE UP (ref 3.8–10.5)

## 2021-06-21 PROCEDURE — 99232 SBSQ HOSP IP/OBS MODERATE 35: CPT

## 2021-06-21 PROCEDURE — 99233 SBSQ HOSP IP/OBS HIGH 50: CPT

## 2021-06-21 PROCEDURE — 71045 X-RAY EXAM CHEST 1 VIEW: CPT | Mod: 26

## 2021-06-21 PROCEDURE — 99231 SBSQ HOSP IP/OBS SF/LOW 25: CPT

## 2021-06-21 RX ADMIN — PANTOPRAZOLE SODIUM 40 MILLIGRAM(S): 20 TABLET, DELAYED RELEASE ORAL at 17:16

## 2021-06-21 RX ADMIN — SPIRONOLACTONE 50 MILLIGRAM(S): 25 TABLET, FILM COATED ORAL at 05:05

## 2021-06-21 RX ADMIN — Medication 100 MILLIGRAM(S): at 12:11

## 2021-06-21 RX ADMIN — Medication 50 MILLIGRAM(S): at 17:15

## 2021-06-21 RX ADMIN — Medication 1 MILLIGRAM(S): at 12:11

## 2021-06-21 RX ADMIN — Medication 50 MILLIGRAM(S): at 05:05

## 2021-06-21 RX ADMIN — LIDOCAINE 1 PATCH: 4 CREAM TOPICAL at 12:12

## 2021-06-21 RX ADMIN — LIDOCAINE 1 PATCH: 4 CREAM TOPICAL at 19:44

## 2021-06-21 RX ADMIN — Medication 2: at 12:11

## 2021-06-21 RX ADMIN — Medication 50 MICROGRAM(S): at 05:06

## 2021-06-21 RX ADMIN — Medication 2: at 17:15

## 2021-06-21 RX ADMIN — LACTULOSE 10 GRAM(S): 10 SOLUTION ORAL at 12:12

## 2021-06-21 RX ADMIN — SODIUM CHLORIDE 2 GRAM(S): 9 INJECTION INTRAMUSCULAR; INTRAVENOUS; SUBCUTANEOUS at 05:05

## 2021-06-21 RX ADMIN — SODIUM CHLORIDE 2 GRAM(S): 9 INJECTION INTRAMUSCULAR; INTRAVENOUS; SUBCUTANEOUS at 17:15

## 2021-06-21 RX ADMIN — Medication 40 MILLIGRAM(S): at 05:05

## 2021-06-21 RX ADMIN — CEFTRIAXONE 100 MILLIGRAM(S): 500 INJECTION, POWDER, FOR SOLUTION INTRAMUSCULAR; INTRAVENOUS at 17:17

## 2021-06-21 RX ADMIN — Medication 1 TABLET(S): at 12:11

## 2021-06-21 RX ADMIN — PANTOPRAZOLE SODIUM 40 MILLIGRAM(S): 20 TABLET, DELAYED RELEASE ORAL at 05:06

## 2021-06-21 RX ADMIN — INSULIN GLARGINE 10 UNIT(S): 100 INJECTION, SOLUTION SUBCUTANEOUS at 08:28

## 2021-06-21 NOTE — PROGRESS NOTE ADULT - PROBLEM SELECTOR PLAN 1
Decompensated, very advanced and nearing end stage.  Maddrey's score 20  Continue Lasix and aldactone  Monitor electrolytes and replace as needed.

## 2021-06-21 NOTE — PROGRESS NOTE ADULT - ASSESSMENT
65y  Female with h/o alcoholic liver cirrhosis with hepatic encephalopathy, portal HTN, HTN, DM 2, hypothyroidism. Patient initially admitted with epigastric pain, worsening transaminitis, alcoholic cirrhosis and weakness. CT of abdomen and pelvis revealed  cirrhosis with evidence of portal hypertension, mild volume of abdominopelvic ascites, mild left pleural effusion with associated subsegmental atelectasis, possible gastritis and gallstones. Patient was followed by GI, Hematology, nephrology. Patient developed fever to 102.8F 6/16. Also had paracentesis 6/16. Blood cultures with GNR.       Gram negative sepsis  fever  leukocytosis  Alcoholic liver cirrrhosis      - Blood cultures reporting Klebsiella pneumoniae  - Repeat blood cultures no growth 6/18  - RVP/COVID 19 PCR negative   - CXR reporting left pleural effusion   - UA negative   - Procalcitonin level 2.95  - Continue Ceftriaxone  - Follow up cultures  - Trend Fever  - Trend Leukocytosis      Thank you for allowing me to participate in the care of your patient.   Will Follow      d/w Dr Stiles

## 2021-06-21 NOTE — PROGRESS NOTE ADULT - ASSESSMENT
60 y/o female with hx of alcoholic liver cirrhosis with hepatic encephalopathy, portal HTN, HTN, DM-2, hypothyroidism came to the ED complaining of epigastric pain x 1 week. She was found to have hb of 5.2 in the ED, occult negative. She was admitted for workup and treatment of anemia. S/p 2 u prbcs.  Hospital course complicated by klebsiella pna bacteremia with repeat B cx negative on 6/18, pt empirically remains on rocephin. Pt also noted with worsening sob with noted large left sided pleural effusion requiring thoracocentesis as well as worsening abd distension requiring paracentesis. Pt remains on diuresis with aldactone and lasix, being followed by ct sx as decision pending for possible need for repeat intervention for left sided effusion. Prognosis guarded and palliative care was consulted as pt is nearing end stage liver dz.     Alcoholic liver cirrhosis with hepatic encephalopathy, volume overload with ascites and left sided effusion/ portal HTN   -advanced cirrhosis; nearing end stage per GI  -s/p thoracentesis on 6/17 with 1.1 liters removed  - s/p paracentesis with 700ml removed on 6/16  - ammonia level WNL   - transaminitis   - thromboctyopenia all sequela of liver failure   - cxr from 6/21 shows left sided effusion unchanged from prior cxr, but pt is on room air saturating well   -continue lasix IV for additional 24 hrs and aldactone  - will monitor renal fxn closely on diuretics   -c/w  rifaximin, lactulose to prevent hepatic encephalopathy   -c/w labetalol   -c/w monitoring lfts/ plt/ ammonia if pt becomes more lethargic or altered   -GI f/u noted and appreciated   - CT sx f/u noted and appreciated, recommended no intervention on left effusion for now given pt is comfortable and on RA   - pt will likely need recurrent paracentesis and thoracocentesis in the future when she becomes sx   -Outpatient surveillance for HCC    Klebsiella PNA bacteremia unclear source   - afebrile  - no leukocytosis   - repeat B cx negative on 6/18  - pleural cx negative   -SBP ruled out; f/u ascitic fluid culture - no growth to date  -c/w rocephin   -ID recommendations appreciated      Anemia of chronic disease in the setting of cirrhotic liver and bone narrow suppression from alcohol   -Hb 5.2 on admission, s/p 3 u prbcs total inpatient   - hgb remains ~8.6    - likely related to end stage liver, -no active signs/symptoms of GIB  -Low haptoglobin and high LDH; discussed with hematology - no concerns for hemolysis  -FOBT negative   -s/p venofer  -continue PPI  -hematology and GI recommendations appreciated    Hyponatremia improving   -sodium 130 range and stable   -c/w lasix   -continue free water restriction  -monitor I&O's  - nephro f/u noted and appreciated     Lower back and flank pain   -Continue PPI  -c/w tylenol prn   -c/w  lidoderm patch   -OOB to chair; mobilize patient    Hyperglycemia with underlying DM-2, not on home insulin   -HbA1c 8.0, goal <7  -continue lantus 10 u sq qam   -Continue ISS  -c/w hypoglycemia protocol   -c/w glucerna supplements      History of alcohol abuse   -no evidence of withdrawal  -continue Thiamine, Folic Acid and MVI  -d/c ciwa   -d/c ativan prn     HTN   -BP stable  -Continue Labetalol with hold parameters     Hypothyroidism   -TSH elevated,  -c/w synthroid increased dose   -f/u tsh in the am   will also repeat TFTS in 4-6 weeks    DVT ppx   - SCDs b/l   -no chemical AC given thrombocytopenia     Next of kin: SonMera Hinojosa     Dispo: Prognosis guarded, will d/w patients shaunna Hinojosa in terms of dispo planning. Palliative care to c/w following the pt.

## 2021-06-21 NOTE — PHYSICAL THERAPY INITIAL EVALUATION ADULT - ADDITIONAL COMMENTS
Pt states she lives with her sister in a house with no PEYMAN and no steps inside. Reports having no DME.
Pt states she lives with her sister in a house with no PEYMAN and no steps inside. Reports having no DME.

## 2021-06-21 NOTE — PROGRESS NOTE ADULT - ATTENDING COMMENTS
65-year-old woman with end-stage liver disease secondary to alcohol abuse.  MELD-Na is 23, portending a poor prognosis.  Continue current medication regimen.  Will continue to follow.

## 2021-06-21 NOTE — PHYSICAL THERAPY INITIAL EVALUATION ADULT - PLANNED THERAPY INTERVENTIONS, PT EVAL
balance training/bed mobility training/gait training/strengthening/transfer training
balance training/gait training/strengthening/transfer training

## 2021-06-21 NOTE — PROGRESS NOTE ADULT - ASSESSMENT
Dilutional hyponatremia---> trending better  EtOH cirrhosis  Poor nutrition  - cont NaCl tabs and Lasix  - oral fluid restriction  - increase oral protein intake  - trend labs    L pleural effusion: s/p thoracentesis  Now with evidence of reaccumulation  - thoracic surgery noted----> considering pigtail catheter

## 2021-06-21 NOTE — PROGRESS NOTE ADULT - SUBJECTIVE AND OBJECTIVE BOX
NEPHROLOGY INTERVAL HPI/OVERNIGHT EVENTS:  pt clinically stable  no adverse events overnight    MEDICATIONS  (STANDING):  cefTRIAXone   IVPB 2000 milliGRAM(s) IV Intermittent every 24 hours  dextrose 40% Gel 15 Gram(s) Oral once  dextrose 5%. 1000 milliLiter(s) (50 mL/Hr) IV Continuous <Continuous>  dextrose 5%. 1000 milliLiter(s) (100 mL/Hr) IV Continuous <Continuous>  dextrose 50% Injectable 25 Gram(s) IV Push once  dextrose 50% Injectable 12.5 Gram(s) IV Push once  dextrose 50% Injectable 25 Gram(s) IV Push once  folic acid 1 milliGRAM(s) Oral daily  furosemide   Injectable 40 milliGRAM(s) IV Push daily  glucagon  Injectable 1 milliGRAM(s) IntraMuscular once  insulin glargine Injectable (LANTUS) 10 Unit(s) SubCutaneous every morning  insulin lispro (ADMELOG) corrective regimen sliding scale   SubCutaneous three times a day before meals  insulin lispro (ADMELOG) corrective regimen sliding scale   SubCutaneous at bedtime  labetalol 50 milliGRAM(s) Oral two times a day  lactulose Syrup 10 Gram(s) Oral daily  levothyroxine 50 MICROGram(s) Oral daily  lidocaine   Patch 1 Patch Transdermal daily  multivitamin 1 Tablet(s) Oral daily  pantoprazole  Injectable 40 milliGRAM(s) IV Push two times a day  rifAXIMin 550 milliGRAM(s) Oral two times a day  sodium chloride 2 Gram(s) Oral two times a day  spironolactone 50 milliGRAM(s) Oral daily  thiamine 100 milliGRAM(s) Oral daily    MEDICATIONS  (PRN):  acetaminophen   Tablet .. 500 milliGRAM(s) Oral every 12 hours PRN Temp greater or equal to 38C (100.4F), Mild Pain (1 - 3)  morphine  - Injectable 1 milliGRAM(s) IV Push every 6 hours PRN Moderate Pain (4 - 6)      Allergies    No Known Allergies              Vital Signs Last 24 Hrs  T(C): 36.9 (21 Jun 2021 04:46), Max: 37.1 (20 Jun 2021 20:50)  T(F): 98.4 (21 Jun 2021 04:46), Max: 98.8 (20 Jun 2021 20:50)  HR: 72 (21 Jun 2021 04:46) (68 - 88)  BP: 120/68 (21 Jun 2021 04:46) (105/69 - 142/76)  BP(mean): --  RR: 18 (21 Jun 2021 04:46) (17 - 18)  SpO2: 97% (21 Jun 2021 04:46) (96% - 97%)    PHYSICAL EXAM:  GENERAL: Debilitated  NECK: Supple, no JVD  NERVOUS SYSTEM:  Awake, alert  CHEST/LUNG: Clear with diminished BS at bases (L>R)  HEART: Regular rate and rhythm; no rub  ABDOMEN: Soft, distended +BS  EXTREMITIES:  trace edema B/L LE    LABS:      Sodium, Serum: 127 mmol/L (06.19.21)            RADIOLOGY & ADDITIONAL TESTS:  < from: Xray Chest 1 View- PORTABLE-Routine (Xray Chest 1 View- PORTABLE-Routine in AM.) (06.20.21 @ 06:08) >   EXAM:  XR CHEST PORTABLE ROUTINE 1V                          PROCEDURE DATE:  06/20/2021          INTERPRETATION:  Clinical Information: Dyspnea    Technique: AP chest x-ray.    Comparison: 06/17/2021    Findings/  Impression: Small-moderate left pleural effusion. Heart unremarkable.    < end of copied text >

## 2021-06-21 NOTE — PHYSICAL THERAPY INITIAL EVALUATION ADULT - IMPAIRMENTS FOUND, PT EVAL
aerobic capacity/endurance/gait, locomotion, and balance/muscle strength
aerobic capacity/endurance/gait, locomotion, and balance

## 2021-06-21 NOTE — PROGRESS NOTE ADULT - PROBLEM SELECTOR PLAN 2
Multifactorial with chronic disease, cirrhosis and bone marrow suppression from alcohol use disorder.  Continue to monitor daily CBC.   Patient with no evidence of active GI bleeding. Monitor for any signs of over bleeding.

## 2021-06-21 NOTE — PHYSICAL THERAPY INITIAL EVALUATION ADULT - PERTINENT HX OF CURRENT PROBLEM, REHAB EVAL
Pt is a 66 y/o female who presented with home with epigastric pain and Hgb 5.2. (+) ETOH liver cirrhosis and gastritis
60 y/o female with PMH of alcoholic liver cirrhosis with hepatic encephalopathy, portal HTN, HTN, DM-2, hypothyroidism came to the ED complaining of epigastric pain x 1 week. She was found to have hb of 5.2 in the ED, occult negative. She was admitted for workup and treatment of anemia. Hospital course complicated by gram negative sepsis, hyponatremia, abdominal pain with moderate ascites requiring paracentesis and then dyspnea with large left pleural effusion requiring thoracentesis.

## 2021-06-21 NOTE — PHYSICAL THERAPY INITIAL EVALUATION ADULT - GAIT DISTANCE, PT EVAL
3 feet to chair, pt deferring further ambulation at this time, states she wants to eat.
including left and right turns/25 feet

## 2021-06-21 NOTE — PROGRESS NOTE ADULT - ASSESSMENT
Imp:  62 y/o female with PMH of alcoholic liver cirrhosis with hepatic encephalopathy, portal HTN, HTN, DM-2, hypothyroidism came to the ED complaining of epigastric pain x 1 week. Pain was sharp radiating to her chest, associated with nausea and non-bloody emesis. Patient said her symptoms now improved at the time of interview. She endorsed dark stool to ED attending but said no when I asked her. She also noted shortness of breath with exertion and fatigue. She has no chest pain, melena, hematemesis, hematuria, recent travel, fever, chills. Of note patient still drinks alcohol, last drink was 2 weeks ago.   Found to have hb of 5.2 in the ED, occult negative and also hyperglycemic.         Acute anemia   Likely due to underlying alcohol liver dx   Hb: 5.2 on admit;  FOBT negative   Hb improved post transfusion; Elevated TB, mostly direct; unlikely hemolysis.  Low haptoglobin 2 chantelle to underlying liver disease, as is LDH elevation; rec: trend retic, LDH  Monitor CBC; transfuse to keep Hb over 7; monitor coags  Hgb 8.6 today, plt count 77K  monitor for bleeding    Epigastric abdominal pain   Likely due to gastritis PPI 40mg   CT abdomen done, no pancreatitis     fever and leucocytosis  on IV abx, zosyn.  f/u blood cultures  further plan as per primary team        Will follow

## 2021-06-21 NOTE — PROGRESS NOTE ADULT - ASSESSMENT
61 F, PMH of alcoholic liver cirrhosis with hepatic encephalopathy, portal HTN, HTN, DM-2, hypothyroidism presented to [facility] ED 6/12/21 complaining of sharp epigastric pain. Since admission, patient has been treated for gram negative bacteremia (remains febrile, followed by ID, on abx), anemia of chronic disease (likely cirrhosis related, negative GIB work up), hyponatremia, hyperglycemia, HTN, hypothyroidism, alcoholic liver cirrhosis with hepatic encephalopathy (s/p paracentesis 6/16, seen by GI considered end stage, on rifaximin and lactulose). 6/17 Thoracic Surgery was consulted of left sided pleural effusion seen on CXR. Left thoracentesis was done for 1.1 L of serous fluid. Chest tube was not left in place 2/2 history of cirrhosis per Dr. Wilson.  6/19-6/20 Reaccumulating pleural effusion on CXR, pt currently in NAD, at 98% on RA.

## 2021-06-21 NOTE — PHYSICAL THERAPY INITIAL EVALUATION ADULT - CRITERIA FOR SKILLED THERAPEUTIC INTERVENTIONS
impairments found/functional limitations in following categories/risk reduction/prevention/rehab potential/therapy frequency/anticipated equipment needs at discharge/anticipated discharge recommendation
impairments found

## 2021-06-21 NOTE — PROGRESS NOTE ADULT - SUBJECTIVE AND OBJECTIVE BOX
[facility]  INFECTIOUS DISEASES AND INTERNAL MEDICINE at [place]  =======================================================  Bernardo La MD  Diplomates American Board of Internal Medicine and Infectious Diseases  Tel: [phone]      Fax: [phone]  =======================================================    JUSTIN LOVE [id number]    Follow up: Klebsiella pneumoniae    No Fever  Reports feeling better      Allergies:  No Known Allergies      REVIEW OF SYSTEMS:  CONSTITUTIONAL:  No Fever or chills  HEENT:  No diplopia or blurred vision.  No earache, sore throat or runny nose.  CARDIOVASCULAR:  No pressure, squeezing, strangling, tightness, heaviness or aching about the chest, neck, axilla or epigastrium.  RESPIRATORY:  No cough, shortness of breath  GASTROINTESTINAL:  No nausea, vomiting or diarrhea.  GENITOURINARY:  No dysuria, frequency or urgency.   MUSCULOSKELETAL:  no joint aches, no muscle pain  SKIN:  No change in skin, hair or nails.  NEUROLOGIC:  No Headaches, seizures   PSYCHIATRIC:  No disorder of thought or mood.  ENDOCRINE:  No heat or cold intolerance  HEMATOLOGICAL:  No easy bruising or bleeding.       Physical Exam:  GEN: NAD, pleasant  HEENT: normocephalic and atraumatic. EOMI. PERRL.  Anicteric  NECK: Supple.   LUNGS: Clear to auscultation.  HEART: Regular rate and rhythm   ABDOMEN: Soft, nontender, and nondistended.  Positive bowel sounds.    : No CVA tenderness  EXTREMITIES: Without any edema.  MSK: No joint swelling  NEUROLOGIC:  No Focal Deficits  PSYCHIATRIC: Appropriate affect .  SKIN: No Rash      Vitals:  T(F): 98.4 (21 Jun 2021 04:46), Max: 98.8 (20 Jun 2021 20:50)  HR: 72 (21 Jun 2021 04:46)  BP: 120/68 (21 Jun 2021 04:46)  RR: 18 (21 Jun 2021 04:46)  SpO2: 97% (21 Jun 2021 04:46) (96% - 97%)  temp max in last 48H T(F): , Max: 99.5 (06-19-21 @ 16:05)      Current Antibiotics:  cefTRIAXone   IVPB 2000 milliGRAM(s) IV Intermittent every 24 hours  rifAXIMin 550 milliGRAM(s) Oral two times a day    Other medications:  dextrose 40% Gel 15 Gram(s) Oral once  dextrose 5%. 1000 milliLiter(s) IV Continuous <Continuous>  dextrose 5%. 1000 milliLiter(s) IV Continuous <Continuous>  dextrose 50% Injectable 25 Gram(s) IV Push once  dextrose 50% Injectable 12.5 Gram(s) IV Push once  dextrose 50% Injectable 25 Gram(s) IV Push once  folic acid 1 milliGRAM(s) Oral daily  furosemide   Injectable 40 milliGRAM(s) IV Push daily  glucagon  Injectable 1 milliGRAM(s) IntraMuscular once  insulin glargine Injectable (LANTUS) 10 Unit(s) SubCutaneous every morning  insulin lispro (ADMELOG) corrective regimen sliding scale   SubCutaneous three times a day before meals  insulin lispro (ADMELOG) corrective regimen sliding scale   SubCutaneous at bedtime  labetalol 50 milliGRAM(s) Oral two times a day  lactulose Syrup 10 Gram(s) Oral daily  levothyroxine 50 MICROGram(s) Oral daily  lidocaine   Patch 1 Patch Transdermal daily  multivitamin 1 Tablet(s) Oral daily  pantoprazole  Injectable 40 milliGRAM(s) IV Push two times a day  sodium chloride 2 Gram(s) Oral two times a day  spironolactone 50 milliGRAM(s) Oral daily  thiamine 100 milliGRAM(s) Oral daily                            8.6    7.35  )-----------( 77       ( 21 Jun 2021 07:30 )             25.4     06-21    130<L>  |  100  |  8.8  ----------------------------<  143<H>  4.1   |  20.0<L>  |  0.48<L>    Ca    8.1<L>      21 Jun 2021 07:29    TPro  5.8<L>  /  Alb  2.0<L>  /  TBili  4.2<H>  /  DBili  2.7<H>  /  AST  82<H>  /  ALT  38<H>  /  AlkPhos  274<H>  06-21      RECENT CULTURES:  06-18 @ 06:19 .Blood Blood     No growth at 48 hours    06-18 @ 01:21 .Body Fluid Pleural Fluid     No growth  polymorphonuclear leukocytes seen  No organisms seen  by cytocentrifuge    06-16 @ 21:18 .Body Fluid Peritoneal Fluid     No growth to date.  Moderate polymorphonuclear leukocytes  No organisms seen  by cytocentrifuge    06-16 @ 07:44 .Blood Blood-Peripheral Blood Culture PCR    Growth in aerobic and anaerobic bottles: Klebsiella pneumoniae  See previous culture [id number]  Growth in aerobic and anaerobic bottles: Gram Negative Rods  ***Blood Panel PCR results on this specimen are available  approximately 3 hours after the Gram stain result.***  Gram stain, PCR, and/or culture results may not always  correspond due todifference in methodologies.  ************************************************************  This PCR assay was performed using [employer].  The following targets are tested for: Enterococcus,  vancomycin resistant enterococci, Listeria monocytogenes,  coagulase negative staphylococci, S. aureus,  methicillin resistant S. aureus, Streptococcus agalactiae  (Group B), S. pneumoniae, S. pyogenes (Group A),  Acinetobacter baumannii, Enterobacter cloacae, E. coli,  Klebsiella oxytoca, K. pneumoniae, Proteus sp.,  Serratia marcescens, Haemophilus influenzae,  Neisseria meningitidis, Pseudomonas aeruginosa, Candida  albicans, C. glabrata, C krusei, C parapsilosis,  C. tropicalis and the KPC resistance gene.  "Due to technical problems, Pseudomonas aeruginosa  will Not be reported as part of the BCID panel  until further notice".  Gram Stain and BCID performed by:  [facility]  [address] [place] [id number]    06-13 @ 01:57 .Urine Clean Catch (Midstream)     >=3 organisms. Probable collection contamination.    06-12 @ 18:38 .Blood Blood-Peripheral     No growth at 5 days.    06-12 @ 18:37 .Blood Blood-Peripheral     No growth at 5 days.        WBC Count: 7.35 K/uL (06-21-21 @ 07:30)  WBC Count: 11.46 K/uL (06-19-21 @ 06:23)  WBC Count: 11.32 K/uL (06-18-21 @ 05:24)  WBC Count: 18.11 K/uL (06-17-21 @ 06:05)  WBC Count: 22.37 K/uL (06-16-21 @ 18:14)    Creatinine, Serum: 0.48 mg/dL (06-21-21 @ 07:29)  Creatinine, Serum: 0.62 mg/dL (06-19-21 @ 06:21)  Creatinine, Serum: 0.73 mg/dL (06-18-21 @ 05:24)  Creatinine, Serum: 0.68 mg/dL (06-17-21 @ 06:05)  Creatinine, Serum: 0.73 mg/dL (06-16-21 @ 17:59)    Ferritin, Serum: 76 ng/mL (06-14-21 @ 08:08)    Procalcitonin, Serum: 2.95 ng/mL (06-16-21 @ 17:59)  Procalcitonin, Serum: 0.83 ng/mL (06-16-21 @ 07:42)     SARS-CoV-2: NotDetec (06-16-21 @ 10:09)  Rapid RVP Result: NotDetec (06-16-21 @ 10:09)    COVID-19 PCR: NotDetec (06-13-21 @ 02:24)      < from: US Abdomen Limited (06.16.21 @ 11:02) >  EXAM:  US ABDOMEN LIMITED                          PROCEDURE DATE:  06/16/2021      INTERPRETATION:  CLINICAL INFORMATION: Abdominal distention    COMPARISON: 10/19/2015    TECHNIQUE: Limited abdominal ultrasound was performed using a low frequency curved transducer to assess for ascites    FINDINGS AND  IMPRESSION:    There is mild to moderate ascites in all 4 quadrants. Left-sided pleural effusion is partially visualized.    < end of copied text >        < from: CT Abdomen and Pelvis w/ IV Cont (06.12.21 @ 20:17) >  EXAM:  CT ABDOMEN AND PELVIS IC                          PROCEDURE DATE:  06/12/2021      INTERPRETATION:  CLINICAL INFORMATION: Abdominal pain, nausea and vomiting    COMPARISON: CT of October 1, 2019.    CONTRAST/COMPLICATIONS:  IV Contrast:96 cc of Omnipaque 300. 4 cc was discarded.  Oral Contrast:  Complications: None reported.    PROCEDURE:  Axial images were obtained, along with reformatted coronal and sagittal images.    FINDINGS:  LOWER CHEST: Mild left pleural effusion with associated subsegmental atelectasis.  LIVER: Cirrhotic and fatty.  BILE DUCTS: Normal caliber.  GALLBLADDER: Gallstones.  SPLEEN: Within normal limits.  PANCREAS: Within normal limits.  ADRENALS: Within normal limits.  KIDNEYS/URETERS: Within normal limits.    BLADDER: Incompletely distended.  REPRODUCTIVE ORGANS: No pelvic masses.    BOWEL: Although the stomach is under distended, there is apparent thickening of the distal gastric antrum and pyloric channel suspicious for gastritis. No bowel obstruction. Appendix is unremarkable. The colon is underdistended without significant fecal load.  PERITONEUM: Mild volume of abdominopelvic ascites.  VESSELS: The aorta is not dilated. Mild atherosclerotic vascular calcification. Redemonstration of an of a massively enlarged recanalized umbilical vein with umbilical varices that connect to the right external iliac vein.  RETROPERITONEUM/LYMPH NODES: Scattered subcentimeter retroperitoneal lymph nodes.  ABDOMINAL WALL: Subcutaneous edema.  BONES: Within normal limits.    IMPRESSION:    Cirrhosis with evidence of portal hypertension. Mild volume of abdominopelvic ascites. Mild left pleural effusion with associated subsegmental atelectasis.  Annual surveillance MRI is recommended in cirrhotic patients to screen for hepatocellular carcinoma.    Possible gastritis. Correlate with symptoms, upper endoscopy, or trial of proton pump inhibitors.    Gallstones.    < end of copied text >

## 2021-06-21 NOTE — PROGRESS NOTE ADULT - PROBLEM SELECTOR PLAN 1
S/p thoracentesis 6/17   Light's criteria c/w transudative effusion  CXR + reaccumulation of left pleural effusion  Clinically, pt stable. Sat 98% RA, speaking full sentences, no accessory muscle use noted.  recommendations for medical management, no plan for pigtail/ thora at this time,   cont diuretic.  perform paracentesis prn  not a candidate for pleurx d/t cirrhosis  Thoracic surgery will cont to follow.    Plan of care discussed with Dr. Wilson and Thoracic team in AM rounds

## 2021-06-21 NOTE — PHYSICAL THERAPY INITIAL EVALUATION ADULT - GENERAL OBSERVATIONS, REHAB EVAL
Pt received in bed supine, + IV, telemetry/SpO2 monitor, NC O2.
awake in bed on room air, +telemonitor with ; , [id number]), in use via ipad throughout

## 2021-06-21 NOTE — PHYSICAL THERAPY INITIAL EVALUATION ADULT - MANUAL MUSCLE TESTING RESULTS, REHAB EVAL
b/l UE/LE grossly 4+/5
bilateral shoulder flex, elbow flex WFL; bilateral hip flex 4-/5, knee ext 4/5, ankle df 4/5

## 2021-06-21 NOTE — PROGRESS NOTE ADULT - SUBJECTIVE AND OBJECTIVE BOX
HPI: Patient is a 65y Female seen on consultation for the evaluation and management of anemia; has long term history of cirrhosis; admitted to [facility] with Hb of 5.2 unassociated with bleeding. Received 2 units of PRBC with reultant Hb of 10.6.  Noted TB of 6.5, mostly direct, with elevated transaminases, mildly elevated retic of 4.9.  Awake, alert, appears comfortable.  In no resp distress.     No acute events over night.       seen at bedside, low grade temp 100.7, no bleeding, ON had episode of small thread of blood in stool.  Hb fell to 7.1 without active bleeding        MEDICATIONS  (STANDING):  dextrose 40% Gel 15 Gram(s) Oral once  dextrose 5%. 1000 milliLiter(s) (50 mL/Hr) IV Continuous <Continuous>  dextrose 5%. 1000 milliLiter(s) (100 mL/Hr) IV Continuous <Continuous>  dextrose 50% Injectable 25 Gram(s) IV Push once  dextrose 50% Injectable 12.5 Gram(s) IV Push once  dextrose 50% Injectable 25 Gram(s) IV Push once  folic acid 1 milliGRAM(s) Oral daily  glucagon  Injectable 1 milliGRAM(s) IntraMuscular once  insulin glargine Injectable (LANTUS) 10 Unit(s) SubCutaneous every morning  insulin lispro (ADMELOG) corrective regimen sliding scale   SubCutaneous at bedtime  insulin lispro (ADMELOG) corrective regimen sliding scale   SubCutaneous three times a day before meals  lactulose Syrup 10 Gram(s) Oral daily  levothyroxine 50 MICROGram(s) Oral daily  midodrine. 5 milliGRAM(s) Oral three times a day  multivitamin 1 Tablet(s) Oral daily  pantoprazole  Injectable 40 milliGRAM(s) IV Push two times a day  phytonadione   Solution 5 milliGRAM(s) Oral daily  piperacillin/tazobactam IVPB.. 3.375 Gram(s) IV Intermittent every 8 hours  potassium chloride    Tablet ER 30 milliEquivalent(s) Oral two times a day  rifAXIMin 550 milliGRAM(s) Oral two times a day  sodium chloride 2 Gram(s) Oral two times a day  spironolactone 50 milliGRAM(s) Oral daily  thiamine 100 milliGRAM(s) Oral daily    MEDICATIONS  (PRN):  LORazepam   Injectable 1 milliGRAM(s) IV Push every 2 hours PRN Agitation  morphine  - Injectable 1 milliGRAM(s) IV Push every 6 hours PRN Moderate Pain (4 - 6)    ICU Vital Signs Last 24 Hrs  T(C): 37.3 (2021 08:00), Max: 38.1 (2021 21:05)  T(F): 99.2 (2021 08:00), Max: 100.6 (2021 21:05)  HR: 92 (2021 08:00) (84 - 135)  BP: 118/86 (2021 08:00) (97/54 - 173/82)  BP(mean): 90 (2021 06:17) (84 - 99)  ABP: --  ABP(mean): --  RR: 20 (2021 08:00) (16 - 24)  SpO2: 98% (2021 08:00) (93% - 100%)              PHYSICAL EXAM:  Constitutional:awake, comfortable  Eyes:icteric  ENMT:no adenopathy  Respiratory:Clear  Cardiovascular:RRR  Gastrointestinal:distended, HSM, distended  Extremities:bilateral LE edema      LABS:                          8.6    7.35  )-----------( 77       ( 2021 07:30 )             25.4       11.32  H/H 7.1/22.1, plt ct 88 ( )                        7.9    18.11 )-----------( 95       ( 2021 06:05 )             25.1              8.7/26.1             8.3    8.77  )-----------( 131      ( 2021 08:08 )             25.1       06-17    126<L>  |  96<L>  |  11.8  ----------------------------<  250<H>  4.9   |  21.0<L>  |  0.68    Ca    8.3<L>      2021 06:05  Phos  2.4     06-17  Mg     1.8     06-17    TPro  6.0<L>  /  Alb  2.6<L>  /  TBili  4.7<H>  /  DBili  3.0<H>  /  AST  138<H>  /  ALT  64<H>  /  AlkPhos  387<H>      121<L>  |  84<L>  |  15.1  ----------------------------<  240<H>  3.5   |  28.0  |  0.89    Ca    7.8<L>      2021 19:00  Phos  1.2       Mg     1.8         TPro  6.2<L>  /  Alb  2.4<L>  /  TBili  6.5<H>  /  DBili  4.6<H>  /  AST  216<H>  /  ALT  86<H>  /  AlkPhos  476<H>      PT/INR - ( 2021 18:36 )   PT: 19.8 sec;   INR: 1.75 ratio         PTT - ( 2021 18:36 )  PTT:25.3 sec  Urinalysis Basic - ( 2021 22:08 )    Color: Yellow / Appearance: Clear / S.010 / pH: x  Gluc: x / Ketone: Trace  / Bili: Negative / Urobili: 1 mg/dL   Blood: x / Protein: 15 mg/dL / Nitrite: Negative   Leuk Esterase: Moderate / RBC: 3-5 /HPF / WBC 3-5   Sq Epi: x / Non Sq Epi: Occasional / Bacteria: x        RADIOLOGY & ADDITIONAL STUDIES: HPI: Patient is a 65y Female seen on consultation for the evaluation and management of anemia; has long term history of cirrhosis; admitted to [facility] with Hb of 5.2 unassociated with bleeding. Received 2 units of PRBC with reultant Hb of 10.6.  Noted TB of 6.5, mostly direct, with elevated transaminases, mildly elevated retic of 4.9.  Awake, alert, appears comfortable.  In no resp distress.     No acute events over night.  No fever.   No bleeding reported. Hb 8.6 today.       MEDICATIONS  (STANDING):  dextrose 40% Gel 15 Gram(s) Oral once  dextrose 5%. 1000 milliLiter(s) (50 mL/Hr) IV Continuous <Continuous>  dextrose 5%. 1000 milliLiter(s) (100 mL/Hr) IV Continuous <Continuous>  dextrose 50% Injectable 25 Gram(s) IV Push once  dextrose 50% Injectable 12.5 Gram(s) IV Push once  dextrose 50% Injectable 25 Gram(s) IV Push once  folic acid 1 milliGRAM(s) Oral daily  glucagon  Injectable 1 milliGRAM(s) IntraMuscular once  insulin glargine Injectable (LANTUS) 10 Unit(s) SubCutaneous every morning  insulin lispro (ADMELOG) corrective regimen sliding scale   SubCutaneous at bedtime  insulin lispro (ADMELOG) corrective regimen sliding scale   SubCutaneous three times a day before meals  lactulose Syrup 10 Gram(s) Oral daily  levothyroxine 50 MICROGram(s) Oral daily  midodrine. 5 milliGRAM(s) Oral three times a day  multivitamin 1 Tablet(s) Oral daily  pantoprazole  Injectable 40 milliGRAM(s) IV Push two times a day  phytonadione   Solution 5 milliGRAM(s) Oral daily  piperacillin/tazobactam IVPB.. 3.375 Gram(s) IV Intermittent every 8 hours  potassium chloride    Tablet ER 30 milliEquivalent(s) Oral two times a day  rifAXIMin 550 milliGRAM(s) Oral two times a day  sodium chloride 2 Gram(s) Oral two times a day  spironolactone 50 milliGRAM(s) Oral daily  thiamine 100 milliGRAM(s) Oral daily    MEDICATIONS  (PRN):  LORazepam   Injectable 1 milliGRAM(s) IV Push every 2 hours PRN Agitation  morphine  - Injectable 1 milliGRAM(s) IV Push every 6 hours PRN Moderate Pain (4 - 6)    ICU Vital Signs Last 24 Hrs  T(C): 37.3 (2021 08:00), Max: 38.1 (2021 21:05)  T(F): 99.2 (2021 08:00), Max: 100.6 (2021 21:05)  HR: 92 (2021 08:00) (84 - 135)  BP: 118/86 (2021 08:00) (97/54 - 173/82)  BP(mean): 90 (2021 06:17) (84 - 99)  ABP: --  ABP(mean): --  RR: 20 (2021 08:00) (16 - 24)  SpO2: 98% (2021 08:00) (93% - 100%)      PHYSICAL EXAM:  Constitutional:awake, comfortable  Eyes:icteric  ENMT:no adenopathy  Respiratory:Clear  Cardiovascular:RRR  Gastrointestinal:distended, HSM, distended  Extremities:bilateral LE edema      LABS:                          8.6    7.35  )-----------( 77       ( 2021 07:30 )             25.4       11.32  H/H 7.1/22.1, plt ct 88 ( )                        7.9    18.11 )-----------( 95       ( 2021 06:05 )             25.1              8.7/26.1             8.3    8.77  )-----------( 131      ( 2021 08:08 )             25.1       -17    126<L>  |  96<L>  |  11.8  ----------------------------<  250<H>  4.9   |  21.0<L>  |  0.68    Ca    8.3<L>      2021 06:05  Phos  2.4       Mg     1.8         TPro  6.0<L>  /  Alb  2.6<L>  /  TBili  4.7<H>  /  DBili  3.0<H>  /  AST  138<H>  /  ALT  64<H>  /  AlkPhos  387<H>  06-16    06-13    121<L>  |  84<L>  |  15.1  ----------------------------<  240<H>  3.5   |  28.0  |  0.89    Ca    7.8<L>      2021 19:00  Phos  1.2       Mg     1.8         TPro  6.2<L>  /  Alb  2.4<L>  /  TBili  6.5<H>  /  DBili  4.6<H>  /  AST  216<H>  /  ALT  86<H>  /  AlkPhos  476<H>      PT/INR - ( 2021 18:36 )   PT: 19.8 sec;   INR: 1.75 ratio         PTT - ( 2021 18:36 )  PTT:25.3 sec  Urinalysis Basic - ( 2021 22:08 )    Color: Yellow / Appearance: Clear / S.010 / pH: x  Gluc: x / Ketone: Trace  / Bili: Negative / Urobili: 1 mg/dL   Blood: x / Protein: 15 mg/dL / Nitrite: Negative   Leuk Esterase: Moderate / RBC: 3-5 /HPF / WBC 3-5   Sq Epi: x / Non Sq Epi: Occasional / Bacteria: x        RADIOLOGY & ADDITIONAL STUDIES:

## 2021-06-21 NOTE — CHART NOTE - NSCHARTNOTEFT_GEN_A_CORE
Palliative care social work note.    SW contacted patients son Abiodun  to provide support  and address concerns regarding patients dx . Son acknowledges physicians updating him regarding cirrhosis and current treatments and son states that he understands patient will require more assistance then previous and wants rehab planning. Palliative care team following to establish goals of care and discuss advance care planning further.

## 2021-06-21 NOTE — PROGRESS NOTE ADULT - SUBJECTIVE AND OBJECTIVE BOX
Patient is a 65y old  Female who presents with a chief complaint of Anemia (21 Jun 2021 07:06)      INTERVAL HPI/OVERNIGHT EVENTS: Patient seeing and evaluated at bedside, reporting mild left abdominal discomfort, and oozing clear fluid from paracentesis site. Tolerating oral intake.     MEDICATIONS  (STANDING):  cefTRIAXone   IVPB 2000 milliGRAM(s) IV Intermittent every 24 hours  dextrose 40% Gel 15 Gram(s) Oral once  dextrose 5%. 1000 milliLiter(s) (50 mL/Hr) IV Continuous <Continuous>  dextrose 5%. 1000 milliLiter(s) (100 mL/Hr) IV Continuous <Continuous>  dextrose 50% Injectable 25 Gram(s) IV Push once  dextrose 50% Injectable 12.5 Gram(s) IV Push once  dextrose 50% Injectable 25 Gram(s) IV Push once  folic acid 1 milliGRAM(s) Oral daily  furosemide   Injectable 40 milliGRAM(s) IV Push daily  glucagon  Injectable 1 milliGRAM(s) IntraMuscular once  insulin glargine Injectable (LANTUS) 10 Unit(s) SubCutaneous every morning  insulin lispro (ADMELOG) corrective regimen sliding scale   SubCutaneous three times a day before meals  insulin lispro (ADMELOG) corrective regimen sliding scale   SubCutaneous at bedtime  labetalol 50 milliGRAM(s) Oral two times a day  lactulose Syrup 10 Gram(s) Oral daily  levothyroxine 50 MICROGram(s) Oral daily  lidocaine   Patch 1 Patch Transdermal daily  multivitamin 1 Tablet(s) Oral daily  pantoprazole  Injectable 40 milliGRAM(s) IV Push two times a day  rifAXIMin 550 milliGRAM(s) Oral two times a day  sodium chloride 2 Gram(s) Oral two times a day  spironolactone 50 milliGRAM(s) Oral daily  thiamine 100 milliGRAM(s) Oral daily    MEDICATIONS  (PRN):  acetaminophen   Tablet .. 500 milliGRAM(s) Oral every 12 hours PRN Temp greater or equal to 38C (100.4F), Mild Pain (1 - 3)  morphine  - Injectable 1 milliGRAM(s) IV Push every 6 hours PRN Moderate Pain (4 - 6)      Allergies    No Known Allergies    Intolerances        Review of Systems:      Vital Signs Last 24 Hrs  T(C): 36.9 (21 Jun 2021 04:46), Max: 37.1 (20 Jun 2021 20:50)  T(F): 98.4 (21 Jun 2021 04:46), Max: 98.8 (20 Jun 2021 20:50)  HR: 72 (21 Jun 2021 04:46) (72 - 88)  BP: 120/68 (21 Jun 2021 04:46) (119/73 - 142/76)  BP(mean): --  RR: 18 (21 Jun 2021 04:46) (17 - 18)  SpO2: 97% (21 Jun 2021 04:46) (96% - 97%)    PHYSICAL EXAM:      LABS:                        8.6    7.35  )-----------( 77       ( 21 Jun 2021 07:30 )             25.4     06-21    130<L>  |  100  |  8.8  ----------------------------<  143<H>  4.1   |  20.0<L>  |  0.48<L>    Ca    8.1<L>      21 Jun 2021 07:29    TPro  5.8<L>  /  Alb  2.0<L>  /  TBili  4.2<H>  /  DBili  2.7<H>  /  AST  82<H>  /  ALT  38<H>  /  AlkPhos  274<H>  06-21    PT/INR - ( 21 Jun 2021 07:31 )   PT: 17.9 sec;   INR: 1.58 ratio        LIVER FUNCTIONS - ( 21 Jun 2021 07:29 )  Alb: 2.0 g/dL / Pro: 5.8 g/dL / ALK PHOS: 274 U/L / ALT: 38 U/L / AST: 82 U/L / GGT: x           Bilirubin Total, Serum: 4.2 mg/dL (06.21.21 @ 07:29)   Bilirubin Total, Serum: 4.7 mg/dL (06.19.21 @ 06:21)   Bilirubin Total, Serum: 4.1 mg/dL (06.18.21 @ 05:24)   Bilirubin Total, Serum: 4.7 mg/dL (06.16.21 @ 07:41)   Bilirubin Total, Serum: 4.7 mg/dL (06.16.21 @ 07:41)   Bilirubin Total, Serum: 4.0 mg/dL (06.15.21 @ 05:58)   Bilirubin Total, Serum: 4.4 mg/dL (06.14.21 @ 08:08)   Bilirubin Total, Serum: 6.5 mg/dL (06.13.21 @ 09:51)   Bilirubin Total, Serum: 5.3 mg/dL (06.12.21 @ 18:36)      Patient is a 65y old  Female who presents with a chief complaint of Anemia (21 Jun 2021 07:06)      INTERVAL HPI/OVERNIGHT EVENTS: Patient seeing and evaluated at bedside, reporting mild left abdominal discomfort, and oozing small amount of clear fluid from paracentesis site. Tolerating oral intake. Denies nausea, vomiting, chest pain, shortness of breath, hematemesis, hematochezia, melena.    MEDICATIONS  (STANDING):  cefTRIAXone   IVPB 2000 milliGRAM(s) IV Intermittent every 24 hours  dextrose 40% Gel 15 Gram(s) Oral once  dextrose 5%. 1000 milliLiter(s) (50 mL/Hr) IV Continuous <Continuous>  dextrose 5%. 1000 milliLiter(s) (100 mL/Hr) IV Continuous <Continuous>  dextrose 50% Injectable 25 Gram(s) IV Push once  dextrose 50% Injectable 12.5 Gram(s) IV Push once  dextrose 50% Injectable 25 Gram(s) IV Push once  folic acid 1 milliGRAM(s) Oral daily  furosemide   Injectable 40 milliGRAM(s) IV Push daily  glucagon  Injectable 1 milliGRAM(s) IntraMuscular once  insulin glargine Injectable (LANTUS) 10 Unit(s) SubCutaneous every morning  insulin lispro (ADMELOG) corrective regimen sliding scale   SubCutaneous three times a day before meals  insulin lispro (ADMELOG) corrective regimen sliding scale   SubCutaneous at bedtime  labetalol 50 milliGRAM(s) Oral two times a day  lactulose Syrup 10 Gram(s) Oral daily  levothyroxine 50 MICROGram(s) Oral daily  lidocaine   Patch 1 Patch Transdermal daily  multivitamin 1 Tablet(s) Oral daily  pantoprazole  Injectable 40 milliGRAM(s) IV Push two times a day  rifAXIMin 550 milliGRAM(s) Oral two times a day  sodium chloride 2 Gram(s) Oral two times a day  spironolactone 50 milliGRAM(s) Oral daily  thiamine 100 milliGRAM(s) Oral daily    MEDICATIONS  (PRN):  acetaminophen   Tablet .. 500 milliGRAM(s) Oral every 12 hours PRN Temp greater or equal to 38C (100.4F), Mild Pain (1 - 3)  morphine  - Injectable 1 milliGRAM(s) IV Push every 6 hours PRN Moderate Pain (4 - 6)      Allergies    No Known Allergies    Intolerances    Review of Systems:  CONSTITUTIONAL: No fever, weight loss, or fatigue  EYES: No eye pain, visual disturbances, or discharge  ENMT:  No difficulty hearing, tinnitus, vertigo; No sinus or throat pain  RESPIRATORY: No cough, wheezing, chills or hemoptysis; No shortness of breath  CARDIOVASCULAR: No chest pain, palpitations, dizziness, or leg swelling  GASTROINTESTINAL: as     Vital Signs Last 24 Hrs  T(C): 36.9 (21 Jun 2021 04:46), Max: 37.1 (20 Jun 2021 20:50)  T(F): 98.4 (21 Jun 2021 04:46), Max: 98.8 (20 Jun 2021 20:50)  HR: 72 (21 Jun 2021 04:46) (72 - 88)  BP: 120/68 (21 Jun 2021 04:46) (119/73 - 142/76)  BP(mean): --  RR: 18 (21 Jun 2021 04:46) (17 - 18)  SpO2: 97% (21 Jun 2021 04:46) (96% - 97%)    PHYSICAL EXAM:  Genera:  woman, Jaundice, in no acute distress  HEENT: MMM, conjunctiva and sclera clear  Gastrointestinal: Abdomen: Soft non-tender wit minimal distention.  Normal bowel sounds; No hepatosplenomegaly  Extremities: no cyanosis, clubbing or edema.  Skin: Warm and dry. No obvious rash, Jaundice.     LABS:                        8.6    7.35  )-----------( 77       ( 21 Jun 2021 07:30 )             25.4     06-21    130<L>  |  100  |  8.8  ----------------------------<  143<H>  4.1   |  20.0<L>  |  0.48<L>    Ca    8.1<L>      21 Jun 2021 07:29    TPro  5.8<L>  /  Alb  2.0<L>  /  TBili  4.2<H>  /  DBili  2.7<H>  /  AST  82<H>  /  ALT  38<H>  /  AlkPhos  274<H>  06-21    PT/INR - ( 21 Jun 2021 07:31 )   PT: 17.9 sec;   INR: 1.58 ratio        LIVER FUNCTIONS - ( 21 Jun 2021 07:29 )  Alb: 2.0 g/dL / Pro: 5.8 g/dL / ALK PHOS: 274 U/L / ALT: 38 U/L / AST: 82 U/L / GGT: x           Bilirubin Total, Serum: 4.2 mg/dL (06.21.21 @ 07:29)   Bilirubin Total, Serum: 4.7 mg/dL (06.19.21 @ 06:21)   Bilirubin Total, Serum: 4.1 mg/dL (06.18.21 @ 05:24)   Bilirubin Total, Serum: 4.7 mg/dL (06.16.21 @ 07:41)   Bilirubin Total, Serum: 4.7 mg/dL (06.16.21 @ 07:41)   Bilirubin Total, Serum: 4.0 mg/dL (06.15.21 @ 05:58)   Bilirubin Total, Serum: 4.4 mg/dL (06.14.21 @ 08:08)   Bilirubin Total, Serum: 6.5 mg/dL (06.13.21 @ 09:51)   Bilirubin Total, Serum: 5.3 mg/dL (06.12.21 @ 18:36)      Patient is a 65y old  Female who presents with a chief complaint of Anemia (21 Jun 2021 07:06)      INTERVAL HPI/OVERNIGHT EVENTS: Patient seeing and evaluated at bedside, reporting mild left abdominal discomfort, and oozing small amount of clear fluid from paracentesis site. Tolerating oral intake. Denies nausea, vomiting, chest pain, shortness of breath, hematemesis, hematochezia, melena.    MEDICATIONS  (STANDING):  cefTRIAXone   IVPB 2000 milliGRAM(s) IV Intermittent every 24 hours  dextrose 40% Gel 15 Gram(s) Oral once  dextrose 5%. 1000 milliLiter(s) (50 mL/Hr) IV Continuous <Continuous>  dextrose 5%. 1000 milliLiter(s) (100 mL/Hr) IV Continuous <Continuous>  dextrose 50% Injectable 25 Gram(s) IV Push once  dextrose 50% Injectable 12.5 Gram(s) IV Push once  dextrose 50% Injectable 25 Gram(s) IV Push once  folic acid 1 milliGRAM(s) Oral daily  furosemide   Injectable 40 milliGRAM(s) IV Push daily  glucagon  Injectable 1 milliGRAM(s) IntraMuscular once  insulin glargine Injectable (LANTUS) 10 Unit(s) SubCutaneous every morning  insulin lispro (ADMELOG) corrective regimen sliding scale   SubCutaneous three times a day before meals  insulin lispro (ADMELOG) corrective regimen sliding scale   SubCutaneous at bedtime  labetalol 50 milliGRAM(s) Oral two times a day  lactulose Syrup 10 Gram(s) Oral daily  levothyroxine 50 MICROGram(s) Oral daily  lidocaine   Patch 1 Patch Transdermal daily  multivitamin 1 Tablet(s) Oral daily  pantoprazole  Injectable 40 milliGRAM(s) IV Push two times a day  rifAXIMin 550 milliGRAM(s) Oral two times a day  sodium chloride 2 Gram(s) Oral two times a day  spironolactone 50 milliGRAM(s) Oral daily  thiamine 100 milliGRAM(s) Oral daily    MEDICATIONS  (PRN):  acetaminophen   Tablet .. 500 milliGRAM(s) Oral every 12 hours PRN Temp greater or equal to 38C (100.4F), Mild Pain (1 - 3)  morphine  - Injectable 1 milliGRAM(s) IV Push every 6 hours PRN Moderate Pain (4 - 6)      Allergies    No Known Allergies    Intolerances    Review of Systems:  CONSTITUTIONAL: No fever, weight loss, or fatigue  EYES: No eye pain, visual disturbances, or discharge  ENMT:  No difficulty hearing, tinnitus, vertigo; No sinus or throat pain  RESPIRATORY: No cough, wheezing, chills or hemoptysis; No shortness of breath  CARDIOVASCULAR: No chest pain, palpitations, dizziness, or leg swelling  GASTROINTESTINAL: as above    Vital Signs Last 24 Hrs  T(C): 36.9 (21 Jun 2021 04:46), Max: 37.1 (20 Jun 2021 20:50)  T(F): 98.4 (21 Jun 2021 04:46), Max: 98.8 (20 Jun 2021 20:50)  HR: 72 (21 Jun 2021 04:46) (72 - 88)  BP: 120/68 (21 Jun 2021 04:46) (119/73 - 142/76)  BP(mean): --  RR: 18 (21 Jun 2021 04:46) (17 - 18)  SpO2: 97% (21 Jun 2021 04:46) (96% - 97%)    PHYSICAL EXAM:  Genera:  woman, Jaundice, in no acute distress  HEENT: MMM, conjunctiva and sclera clear  Gastrointestinal: Abdomen: Soft non-tender with minimal distention.  Normal bowel sounds; No hepatosplenomegaly  Extremities: no cyanosis, clubbing or edema.  Skin: Warm and dry. No obvious rash, Jaundice.     LABS:                        8.6    7.35  )-----------( 77       ( 21 Jun 2021 07:30 )             25.4     06-21    130<L>  |  100  |  8.8  ----------------------------<  143<H>  4.1   |  20.0<L>  |  0.48<L>    Ca    8.1<L>      21 Jun 2021 07:29    TPro  5.8<L>  /  Alb  2.0<L>  /  TBili  4.2<H>  /  DBili  2.7<H>  /  AST  82<H>  /  ALT  38<H>  /  AlkPhos  274<H>  06-21    PT/INR - ( 21 Jun 2021 07:31 )   PT: 17.9 sec;   INR: 1.58 ratio        LIVER FUNCTIONS - ( 21 Jun 2021 07:29 )  Alb: 2.0 g/dL / Pro: 5.8 g/dL / ALK PHOS: 274 U/L / ALT: 38 U/L / AST: 82 U/L / GGT: x           Bilirubin Total, Serum: 4.2 mg/dL (06.21.21 @ 07:29)   Bilirubin Total, Serum: 4.7 mg/dL (06.19.21 @ 06:21)   Bilirubin Total, Serum: 4.1 mg/dL (06.18.21 @ 05:24)   Bilirubin Total, Serum: 4.7 mg/dL (06.16.21 @ 07:41)   Bilirubin Total, Serum: 4.7 mg/dL (06.16.21 @ 07:41)   Bilirubin Total, Serum: 4.0 mg/dL (06.15.21 @ 05:58)   Bilirubin Total, Serum: 4.4 mg/dL (06.14.21 @ 08:08)   Bilirubin Total, Serum: 6.5 mg/dL (06.13.21 @ 09:51)   Bilirubin Total, Serum: 5.3 mg/dL (06.12.21 @ 18:36)

## 2021-06-21 NOTE — PROGRESS NOTE ADULT - SUBJECTIVE AND OBJECTIVE BOX
SUBJECTIVE:  Pt alert and sitting on the commode waiting for physical therapy to ambulate, pt states, " I am ok, are they going to take the fluid out in my lungs"  Patient denies acute pain with radiating or aggravating factors.   She denies chest pain, shortness of breath, palpitations, headache, dizziness, nausea, or vomiting.      Overnight events:  none    PAST MEDICAL & SURGICAL HISTORY:  Alcohol abuse    Alcohol abuse    Liver cirrhosis    ETOH abuse    Urea cycle metabolism disorder    Transitory  hyperthyroidism    Lump in female breast    UTI (urinary tract infection)    Lymphangitis, acute, lower leg    Anemia    Hypothyroidism    Chronic back pain    HTN (hypertension)    GERD (gastroesophageal reflux disease)    Pancreatitis    No significant past surgical history    S/P abdominoplasty          acetaminophen   Tablet .. 500 milliGRAM(s) Oral every 12 hours PRN  cefTRIAXone   IVPB 2000 milliGRAM(s) IV Intermittent every 24 hours  dextrose 40% Gel 15 Gram(s) Oral once  dextrose 5%. 1000 milliLiter(s) IV Continuous <Continuous>  dextrose 5%. 1000 milliLiter(s) IV Continuous <Continuous>  dextrose 50% Injectable 25 Gram(s) IV Push once  dextrose 50% Injectable 12.5 Gram(s) IV Push once  dextrose 50% Injectable 25 Gram(s) IV Push once  folic acid 1 milliGRAM(s) Oral daily  furosemide   Injectable 40 milliGRAM(s) IV Push daily  glucagon  Injectable 1 milliGRAM(s) IntraMuscular once  insulin glargine Injectable (LANTUS) 10 Unit(s) SubCutaneous every morning  insulin lispro (ADMELOG) corrective regimen sliding scale   SubCutaneous three times a day before meals  insulin lispro (ADMELOG) corrective regimen sliding scale   SubCutaneous at bedtime  labetalol 50 milliGRAM(s) Oral two times a day  lactulose Syrup 10 Gram(s) Oral daily  levothyroxine 50 MICROGram(s) Oral daily  lidocaine   Patch 1 Patch Transdermal daily  morphine  - Injectable 1 milliGRAM(s) IV Push every 6 hours PRN  multivitamin 1 Tablet(s) Oral daily  pantoprazole  Injectable 40 milliGRAM(s) IV Push two times a day  rifAXIMin 550 milliGRAM(s) Oral two times a day  sodium chloride 2 Gram(s) Oral two times a day  spironolactone 50 milliGRAM(s) Oral daily  thiamine 100 milliGRAM(s) Oral daily  MEDICATIONS  (PRN):  acetaminophen   Tablet .. 500 milliGRAM(s) Oral every 12 hours PRN Temp greater or equal to 38C (100.4F), Mild Pain (1 - 3)  morphine  - Injectable 1 milliGRAM(s) IV Push every 6 hours PRN Moderate Pain (4 - 6)      Daily     Daily                               8.6    7.35  )-----------( 77       ( 2021 07:30 )             25.4   -    130<L>  |  100  |  8.8  ----------------------------<  143<H>  4.1   |  20.0<L>  |  0.48<L>    Ca    8.1<L>      2021 07:29    TPro  5.8<L>  /  Alb  2.0<L>  /  TBili  4.2<H>  /  DBili  2.7<H>  /  AST  82<H>  /  ALT  38<H>  /  AlkPhos  274<H>        PT/INR - ( 2021 07:31 )   PT: 17.9 sec;   INR: 1.58 ratio               Objective:  T(C): 36.7 (21 @ 10:43), Max: 37.1 (21 @ 20:50)  HR: 76 (21 @ 10:43) (72 - 88)  BP: 110/66 (21 @ 10:43) (110/66 - 142/76)  RR: 18 (21 @ 10:43) (17 - 18)  SpO2: 98% (21 @ 10:43) (96% - 98%)  Wt(kg): --CAPILLARY BLOOD GLUCOSE      POCT Blood Glucose.: 167 mg/dL (2021 12:10)  POCT Blood Glucose.: 149 mg/dL (2021 08:27)  POCT Blood Glucose.: 262 mg/dL (2021 21:08)  POCT Blood Glucose.: 275 mg/dL (2021 17:33)  I&O's Summary    2021 07:01  -  2021 07:00  --------------------------------------------------------  IN: 300 mL / OUT: 0 mL / NET: 300 mL        Physical Exam  Neuro: A+O x 3, non-focal, speech clear and intact  Neck: supple, trachea midline  Pulm: right CTA, Left diminished no rales/rhonchi/wheezing, no accessory muscle use noted  CV:+S1S2  Abd: soft, NT, ND, + BS  Ext: OMER x 4, no edema, no cyanosis or clubbing,. 2+ DP b/l, distal motor/neuro/circ intact.   Skin: warm, dry, well perfused      Imaging:  CXR:    < from: Xray Chest 1 View- PORTABLE-Routine (Xray Chest 1 View- PORTABLE-Routine in AM.) (21 @ 06:27) >    The lungs show unchanged moderate LEFT effusion opacifying the LEFT lower hemithorax and/or LEFT basilar airspace consolidation. LEFT effusion layers to LEFT posterior seventhrib.  RIGHT lung parenchyma clear. No pneumothorax.    Heart mediastinum shifted towards RIGHT.    Heart size cannot be assessed due to opacified LEFT lower hemithorax.  Visualized osseous structures are intact.    IMPRESSION:   No interval change..    < end of copied text >

## 2021-06-21 NOTE — PHYSICAL THERAPY INITIAL EVALUATION ADULT - ACTIVE RANGE OF MOTION EXAMINATION, REHAB EVAL
bilateral upper extremity Active ROM was WFL (within functional limits)
bilateral upper extremity Active ROM was WFL (within functional limits)/bilateral  lower extremity Active ROM was WFL (within functional limits)

## 2021-06-21 NOTE — PROGRESS NOTE ADULT - SUBJECTIVE AND OBJECTIVE BOX
Patient is a 65y old  Female who presents with a chief complaint of Anemia (21 Jun 2021 14:51)      HEALTH ISSUES - PROBLEM Dx:  Alcoholic cirrhosis of liver with ascites  ETOH abuse  Fever  Pleural effusion on left  Pancytopenia  Bacteremia  Debility  Anemia  Pleural effusion  Hyponatremia  Pain      INTERVAL HPI/OVERNIGHT EVENTS:  Patient seen and examined at bedside. No acute events overnight. Patient states she is doing better, she has some pain in her RLQ where they removed the fluid for the paracentesis, aside from this she reports that her abdomen feels like it has less pressure. Does continue to feel sob intermittently but not like when she first came in. Aside from this she states she is doing better. Patient denies chest pain, SOB, N/V, fever, chills, dysuria or any other complaints. All remainder ROS negative.     Vital Signs Last 24 Hrs  T(C): 36.9 (21 Jun 2021 21:00), Max: 37.3 (21 Jun 2021 16:29)  T(F): 98.5 (21 Jun 2021 21:00), Max: 99.2 (21 Jun 2021 16:29)  HR: 73 (21 Jun 2021 21:00) (72 - 76)  BP: 111/67 (21 Jun 2021 21:00) (110/66 - 122/71)  BP(mean): --  RR: 18 (21 Jun 2021 21:00) (18 - 18)  SpO2: 97% (21 Jun 2021 21:00) (97% - 98%)    CAPILLARY BLOOD GLUCOSE      POCT Blood Glucose.: 209 mg/dL (21 Jun 2021 21:24)  POCT Blood Glucose.: 171 mg/dL (21 Jun 2021 17:14)  POCT Blood Glucose.: 167 mg/dL (21 Jun 2021 12:10)  POCT Blood Glucose.: 149 mg/dL (21 Jun 2021 08:27)      I&O's Summary    20 Jun 2021 07:01  -  21 Jun 2021 07:00  --------------------------------------------------------  IN: 300 mL / OUT: 0 mL / NET: 300 mL        CONSTITUTIONAL: ill appearing,   awake, alert and in no apparent distress  ENMT: Airway patent, Nasal mucosa clear.    EYES: Clear bilaterally, pupils equal, round and reactive to light.  icteric sclera   CARDIAC: Normal rate, regular rhythm.  Heart sounds S1, S2.  No murmurs, rubs or gallops   RESPIRATORY: Fair air entry, left sided diminished bs   GASTROENTEROLOGY: Soft nt distended +ve    RLQ dressing +ve c/d/i   EXTREMITIES: No edema, cyanosis or deformity   NEUROLOGICAL: Alert and oriented to self and place   SKIN: No rash, skin turgor wnl     MEDICATIONS  (STANDING):  cefTRIAXone   IVPB 2000 milliGRAM(s) IV Intermittent every 24 hours  dextrose 40% Gel 15 Gram(s) Oral once  dextrose 5%. 1000 milliLiter(s) (50 mL/Hr) IV Continuous <Continuous>  dextrose 5%. 1000 milliLiter(s) (100 mL/Hr) IV Continuous <Continuous>  dextrose 50% Injectable 25 Gram(s) IV Push once  dextrose 50% Injectable 12.5 Gram(s) IV Push once  dextrose 50% Injectable 25 Gram(s) IV Push once  folic acid 1 milliGRAM(s) Oral daily  furosemide   Injectable 40 milliGRAM(s) IV Push daily  glucagon  Injectable 1 milliGRAM(s) IntraMuscular once  insulin glargine Injectable (LANTUS) 10 Unit(s) SubCutaneous every morning  insulin lispro (ADMELOG) corrective regimen sliding scale   SubCutaneous three times a day before meals  insulin lispro (ADMELOG) corrective regimen sliding scale   SubCutaneous at bedtime  labetalol 50 milliGRAM(s) Oral two times a day  lactulose Syrup 10 Gram(s) Oral daily  levothyroxine 50 MICROGram(s) Oral daily  lidocaine   Patch 1 Patch Transdermal daily  multivitamin 1 Tablet(s) Oral daily  pantoprazole  Injectable 40 milliGRAM(s) IV Push two times a day  rifAXIMin 550 milliGRAM(s) Oral two times a day  sodium chloride 2 Gram(s) Oral two times a day  spironolactone 50 milliGRAM(s) Oral daily  thiamine 100 milliGRAM(s) Oral daily    MEDICATIONS  (PRN):  acetaminophen   Tablet .. 500 milliGRAM(s) Oral every 12 hours PRN Temp greater or equal to 38C (100.4F), Mild Pain (1 - 3)  morphine  - Injectable 1 milliGRAM(s) IV Push every 6 hours PRN Moderate Pain (4 - 6)      LABS:                        8.6    7.35  )-----------( 77       ( 21 Jun 2021 07:30 )             25.4     06-21    130<L>  |  100  |  8.8  ----------------------------<  143<H>  4.1   |  20.0<L>  |  0.48<L>    Ca    8.1<L>      21 Jun 2021 07:29    TPro  5.8<L>  /  Alb  2.0<L>  /  TBili  4.2<H>  /  DBili  2.7<H>  /  AST  82<H>  /  ALT  38<H>  /  AlkPhos  274<H>  06-21    PT/INR - ( 21 Jun 2021 07:31 )   PT: 17.9 sec;   INR: 1.58 ratio             LIVER FUNCTIONS - ( 21 Jun 2021 07:29 )  Alb: 2.0 g/dL / Pro: 5.8 g/dL / ALK PHOS: 274 U/L / ALT: 38 U/L / AST: 82 U/L / GGT: x             RADIOLOGY & ADDITIONAL TESTS:  no new imaging studies

## 2021-06-22 DIAGNOSIS — F10.10 ALCOHOL ABUSE, UNCOMPLICATED: ICD-10-CM

## 2021-06-22 LAB
CULTURE RESULTS: SIGNIFICANT CHANGE UP
GLUCOSE BLDC GLUCOMTR-MCNC: 190 MG/DL — HIGH (ref 70–99)
GLUCOSE BLDC GLUCOMTR-MCNC: 205 MG/DL — HIGH (ref 70–99)
GLUCOSE BLDC GLUCOMTR-MCNC: 207 MG/DL — HIGH (ref 70–99)
GLUCOSE BLDC GLUCOMTR-MCNC: 208 MG/DL — HIGH (ref 70–99)
SPECIMEN SOURCE: SIGNIFICANT CHANGE UP

## 2021-06-22 PROCEDURE — 71045 X-RAY EXAM CHEST 1 VIEW: CPT | Mod: 26

## 2021-06-22 PROCEDURE — 74176 CT ABD & PELVIS W/O CONTRAST: CPT | Mod: 26

## 2021-06-22 PROCEDURE — 99233 SBSQ HOSP IP/OBS HIGH 50: CPT

## 2021-06-22 PROCEDURE — 99497 ADVNCD CARE PLAN 30 MIN: CPT | Mod: 25

## 2021-06-22 PROCEDURE — 99232 SBSQ HOSP IP/OBS MODERATE 35: CPT

## 2021-06-22 RX ORDER — INSULIN GLARGINE 100 [IU]/ML
15 INJECTION, SOLUTION SUBCUTANEOUS AT BEDTIME
Refills: 0 | Status: DISCONTINUED | OUTPATIENT
Start: 2021-06-22 | End: 2021-06-28

## 2021-06-22 RX ORDER — MORPHINE SULFATE 50 MG/1
1 CAPSULE, EXTENDED RELEASE ORAL ONCE
Refills: 0 | Status: DISCONTINUED | OUTPATIENT
Start: 2021-06-22 | End: 2021-06-22

## 2021-06-22 RX ORDER — FUROSEMIDE 40 MG
40 TABLET ORAL DAILY
Refills: 0 | Status: DISCONTINUED | OUTPATIENT
Start: 2021-06-23 | End: 2021-06-28

## 2021-06-22 RX ADMIN — PANTOPRAZOLE SODIUM 40 MILLIGRAM(S): 20 TABLET, DELAYED RELEASE ORAL at 17:26

## 2021-06-22 RX ADMIN — SODIUM CHLORIDE 2 GRAM(S): 9 INJECTION INTRAMUSCULAR; INTRAVENOUS; SUBCUTANEOUS at 05:39

## 2021-06-22 RX ADMIN — Medication 50 MILLIGRAM(S): at 05:39

## 2021-06-22 RX ADMIN — SPIRONOLACTONE 50 MILLIGRAM(S): 25 TABLET, FILM COATED ORAL at 08:23

## 2021-06-22 RX ADMIN — MORPHINE SULFATE 1 MILLIGRAM(S): 50 CAPSULE, EXTENDED RELEASE ORAL at 08:27

## 2021-06-22 RX ADMIN — Medication 4: at 17:27

## 2021-06-22 RX ADMIN — Medication 1 MILLIGRAM(S): at 11:47

## 2021-06-22 RX ADMIN — Medication 4: at 12:14

## 2021-06-22 RX ADMIN — Medication 4: at 08:23

## 2021-06-22 RX ADMIN — CEFTRIAXONE 100 MILLIGRAM(S): 500 INJECTION, POWDER, FOR SOLUTION INTRAMUSCULAR; INTRAVENOUS at 17:29

## 2021-06-22 RX ADMIN — SODIUM CHLORIDE 2 GRAM(S): 9 INJECTION INTRAMUSCULAR; INTRAVENOUS; SUBCUTANEOUS at 17:26

## 2021-06-22 RX ADMIN — Medication 50 MICROGRAM(S): at 05:39

## 2021-06-22 RX ADMIN — Medication 50 MILLIGRAM(S): at 17:25

## 2021-06-22 RX ADMIN — MORPHINE SULFATE 1 MILLIGRAM(S): 50 CAPSULE, EXTENDED RELEASE ORAL at 00:00

## 2021-06-22 RX ADMIN — MORPHINE SULFATE 1 MILLIGRAM(S): 50 CAPSULE, EXTENDED RELEASE ORAL at 12:55

## 2021-06-22 RX ADMIN — PANTOPRAZOLE SODIUM 40 MILLIGRAM(S): 20 TABLET, DELAYED RELEASE ORAL at 05:39

## 2021-06-22 RX ADMIN — Medication 40 MILLIGRAM(S): at 05:39

## 2021-06-22 RX ADMIN — MORPHINE SULFATE 1 MILLIGRAM(S): 50 CAPSULE, EXTENDED RELEASE ORAL at 09:13

## 2021-06-22 RX ADMIN — LIDOCAINE 1 PATCH: 4 CREAM TOPICAL at 19:20

## 2021-06-22 RX ADMIN — LACTULOSE 10 GRAM(S): 10 SOLUTION ORAL at 11:48

## 2021-06-22 RX ADMIN — MORPHINE SULFATE 1 MILLIGRAM(S): 50 CAPSULE, EXTENDED RELEASE ORAL at 15:49

## 2021-06-22 RX ADMIN — INSULIN GLARGINE 10 UNIT(S): 100 INJECTION, SOLUTION SUBCUTANEOUS at 08:23

## 2021-06-22 RX ADMIN — INSULIN GLARGINE 15 UNIT(S): 100 INJECTION, SOLUTION SUBCUTANEOUS at 21:20

## 2021-06-22 RX ADMIN — LIDOCAINE 1 PATCH: 4 CREAM TOPICAL at 11:46

## 2021-06-22 RX ADMIN — Medication 1 TABLET(S): at 11:47

## 2021-06-22 RX ADMIN — Medication 100 MILLIGRAM(S): at 11:47

## 2021-06-22 RX ADMIN — LIDOCAINE 1 PATCH: 4 CREAM TOPICAL at 00:58

## 2021-06-22 RX ADMIN — MORPHINE SULFATE 1 MILLIGRAM(S): 50 CAPSULE, EXTENDED RELEASE ORAL at 11:44

## 2021-06-22 NOTE — PROGRESS NOTE ADULT - SUBJECTIVE AND OBJECTIVE BOX
Patient is a 65y old  Female who presents with a chief complaint of Anemia (22 Jun 2021 06:46)      INTERVAL HPI/OVERNIGHT EVENTS: Patient seeing and evaluated at bedside, remains slightly jaundice, reporting mildly diffuse abdominal discomfort, that started after BM this am. Also reporting poor appetite. Denies nausea, vomiting, chest pain, shortness of breath, hematemesis, hematochezia, melena.      MEDICATIONS  (STANDING):  cefTRIAXone   IVPB 2000 milliGRAM(s) IV Intermittent every 24 hours  dextrose 40% Gel 15 Gram(s) Oral once  dextrose 5%. 1000 milliLiter(s) (50 mL/Hr) IV Continuous <Continuous>  dextrose 5%. 1000 milliLiter(s) (100 mL/Hr) IV Continuous <Continuous>  dextrose 50% Injectable 25 Gram(s) IV Push once  dextrose 50% Injectable 12.5 Gram(s) IV Push once  dextrose 50% Injectable 25 Gram(s) IV Push once  folic acid 1 milliGRAM(s) Oral daily  furosemide   Injectable 40 milliGRAM(s) IV Push daily  glucagon  Injectable 1 milliGRAM(s) IntraMuscular once  insulin glargine Injectable (LANTUS) 10 Unit(s) SubCutaneous every morning  insulin lispro (ADMELOG) corrective regimen sliding scale   SubCutaneous three times a day before meals  insulin lispro (ADMELOG) corrective regimen sliding scale   SubCutaneous at bedtime  labetalol 50 milliGRAM(s) Oral two times a day  lactulose Syrup 10 Gram(s) Oral daily  levothyroxine 50 MICROGram(s) Oral daily  lidocaine   Patch 1 Patch Transdermal daily  multivitamin 1 Tablet(s) Oral daily  pantoprazole  Injectable 40 milliGRAM(s) IV Push two times a day  rifAXIMin 550 milliGRAM(s) Oral two times a day  sodium chloride 2 Gram(s) Oral two times a day  spironolactone 50 milliGRAM(s) Oral daily  thiamine 100 milliGRAM(s) Oral daily    MEDICATIONS  (PRN):  acetaminophen   Tablet .. 500 milliGRAM(s) Oral every 12 hours PRN Temp greater or equal to 38C (100.4F), Mild Pain (1 - 3)  morphine  - Injectable 1 milliGRAM(s) IV Push every 6 hours PRN Moderate Pain (4 - 6)      Allergies    No Known Allergies    Intolerances      Review of Systems:  CONSTITUTIONAL: No fever, weight loss, or fatigue  EYES: No eye pain, visual disturbances, or discharge  ENMT:  No difficulty hearing, tinnitus, vertigo; No sinus or throat pain  RESPIRATORY: No cough, wheezing, chills or hemoptysis; No shortness of breath  CARDIOVASCULAR: No chest pain, palpitations, dizziness, or leg swelling  GASTROINTESTINAL: as above.    Vital Signs Last 24 Hrs  T(C): 36.8 (22 Jun 2021 05:10), Max: 37.3 (21 Jun 2021 16:29)  T(F): 98.3 (22 Jun 2021 05:10), Max: 99.2 (21 Jun 2021 16:29)  HR: 97 (22 Jun 2021 05:10) (73 - 97)  BP: 108/88 (22 Jun 2021 05:10) (108/88 - 122/71)  BP(mean): --  RR: 18 (22 Jun 2021 05:10) (18 - 18)  SpO2: 95% (22 Jun 2021 05:10) (95% - 98%)    PHYSICAL EXAM:   woman, Jaundice, in no acute distress  HEENT: MMM, conjunctiva and sclera clear  Gastrointestinal: Abdomen: Soft non-tender with minimal distention.  Normal bowel sounds; No hepatosplenomegaly  Extremities: no cyanosis, clubbing or edema.  Skin: Warm and dry. No obvious rash, Jaundice.     LABS:                        8.6    7.35  )-----------( 77       ( 21 Jun 2021 07:30 )             25.4     06-21    130<L>  |  100  |  8.8  ----------------------------<  143<H>  4.1   |  20.0<L>  |  0.48<L>    Ca    8.1<L>      21 Jun 2021 07:29    TPro  5.8<L>  /  Alb  2.0<L>  /  TBili  4.2<H>  /  DBili  2.7<H>  /  AST  82<H>  /  ALT  38<H>  /  AlkPhos  274<H>  06-21    PT/INR - ( 21 Jun 2021 07:31 )   PT: 17.9 sec;   INR: 1.58 ratio        LIVER FUNCTIONS - ( 21 Jun 2021 07:29 )  Alb: 2.0 g/dL / Pro: 5.8 g/dL / ALK PHOS: 274 U/L / ALT: 38 U/L / AST: 82 U/L / GGT: x

## 2021-06-22 NOTE — PROGRESS NOTE ADULT - PROBLEM SELECTOR PLAN 1
Decompensated, very advanced and nearing end stage.  Maddrey's score 20, not a candidate for steroidal therapy  Continue Lasix and aldactone  Monitor electrolytes and replace as Please transition patient from IV Lasix to Oral Lasix 40mg daily  Continue Spirolactone and Beta blocker. Decompensated, very advanced and nearing end stage.  Maddrey's score 20, not a candidate for steroidal therapy  Monitor electrolytes and replace as Please transition patient from IV Lasix to Oral Lasix 40mg daily  Continue Spirolactone and Beta blocker.

## 2021-06-22 NOTE — PROGRESS NOTE ADULT - CONVERSATION DETAILS
Spoke with pt at bedside with . Reviewed her understanding of her overall comorbidities and hospital course thus far and plan of care. She was unable to relay information provided by primary team and continues to expresses poor insight into the complexity of her advance liver disease 2/2 chronic etoh use. She did not seem very engaged in conversation, limited to answering my question with short responses.    I inquired about her support system and living situation. She states living at home and able to do most ADLs herself. She has 5 children. She has no prior HCP. Explained importance of having HCP for down the road. She states her son Abiodun is the point of contact should the staff need to speak with family and gave verbal permission.     I called and spoke with son Abiodun [phone] to introduce palliative team. He has been updated by primary team Dr. Jackson last week of mother's hospital course. he understands mother is very ill from her advance liver disease and the importance of her abstaining from etoh use. He does feel mother would want to explore all medical options including possible transplant if deemed a candidate. I also encouraged him to continue discussing HCP and directives with mother which Abiodun reports he has tried in the past. Abiodun and sibling all feel that mother is unsafe to go home as she lives alone (not with sister), would like to consider [facility] or LTC placement when she is ready for discharge. He was appreciative of phone call. All questions answered and emotional support provided.      Updated [facility] Natasha Olson to assist in above.   Updated Hospitalist Dr. Stiles on above.

## 2021-06-22 NOTE — PROGRESS NOTE ADULT - SUBJECTIVE AND OBJECTIVE BOX
NEPHROLOGY INTERVAL HPI/OVERNIGHT EVENTS:  pt resting this AM  no adverse overnight events noted  no cp, sob, n/v/d    MEDICATIONS  (STANDING):  cefTRIAXone   IVPB 2000 milliGRAM(s) IV Intermittent every 24 hours  dextrose 40% Gel 15 Gram(s) Oral once  dextrose 5%. 1000 milliLiter(s) (50 mL/Hr) IV Continuous <Continuous>  dextrose 5%. 1000 milliLiter(s) (100 mL/Hr) IV Continuous <Continuous>  dextrose 50% Injectable 25 Gram(s) IV Push once  dextrose 50% Injectable 12.5 Gram(s) IV Push once  dextrose 50% Injectable 25 Gram(s) IV Push once  folic acid 1 milliGRAM(s) Oral daily  furosemide   Injectable 40 milliGRAM(s) IV Push daily  glucagon  Injectable 1 milliGRAM(s) IntraMuscular once  insulin glargine Injectable (LANTUS) 10 Unit(s) SubCutaneous every morning  insulin lispro (ADMELOG) corrective regimen sliding scale   SubCutaneous three times a day before meals  insulin lispro (ADMELOG) corrective regimen sliding scale   SubCutaneous at bedtime  labetalol 50 milliGRAM(s) Oral two times a day  lactulose Syrup 10 Gram(s) Oral daily  levothyroxine 50 MICROGram(s) Oral daily  lidocaine   Patch 1 Patch Transdermal daily  multivitamin 1 Tablet(s) Oral daily  pantoprazole  Injectable 40 milliGRAM(s) IV Push two times a day  rifAXIMin 550 milliGRAM(s) Oral two times a day  sodium chloride 2 Gram(s) Oral two times a day  spironolactone 50 milliGRAM(s) Oral daily  thiamine 100 milliGRAM(s) Oral daily    MEDICATIONS  (PRN):  acetaminophen   Tablet .. 500 milliGRAM(s) Oral every 12 hours PRN Temp greater or equal to 38C (100.4F), Mild Pain (1 - 3)  morphine  - Injectable 1 milliGRAM(s) IV Push every 6 hours PRN Moderate Pain (4 - 6)      Allergies    No Known Allergies        Vital Signs Last 24 Hrs  T(C): 36.8 (22 Jun 2021 05:10), Max: 37.3 (21 Jun 2021 16:29)  T(F): 98.3 (22 Jun 2021 05:10), Max: 99.2 (21 Jun 2021 16:29)  HR: 97 (22 Jun 2021 05:10) (73 - 97)  BP: 108/88 (22 Jun 2021 05:10) (108/88 - 122/71)  BP(mean): --  RR: 18 (22 Jun 2021 05:10) (18 - 18)  SpO2: 95% (22 Jun 2021 05:10) (95% - 98%)    PHYSICAL EXAM:  GENERAL: Tired, weak  NECK: Supple, no JVD  NERVOUS SYSTEM:  Awake, alert  CHEST/LUNG: Clear with diminished BS at bases (L>R)  HEART: Regular rate and rhythm; no rub  ABDOMEN: Soft, distended +BS  EXTREMITIES:  trace edema B/L LE    LABS:                        8.6    7.35  )-----------( 77       ( 21 Jun 2021 07:30 )             25.4     06-21    130<L>  |  100  |  8.8  ----------------------------<  143<H>  4.1   |  20.0<L>  |  0.48<L>    Sodium, Serum: 127 mmol/L (06.19.21)    Ca    8.1<L>      21 Jun 2021 07:29    TPro  5.8<L>  /  Alb  2.0<L>  /  TBili  4.2<H>  /  DBili  2.7<H>  /  AST  82<H>  /  ALT  38<H>  /  AlkPhos  274<H>  06-21    PT/INR - ( 21 Jun 2021 07:31 )   PT: 17.9 sec;   INR: 1.58 ratio                 RADIOLOGY & ADDITIONAL TESTS:  < from: Xray Chest 1 View- PORTABLE-Routine (Xray Chest 1 View- PORTABLE-Routine in AM.) (06.21.21 @ 06:27) >   EXAM:  XR CHEST PORTABLE ROUTINE 1V                          PROCEDURE DATE:  06/21/2021          INTERPRETATION:  Portable chest radiograph    CLINICAL INFORMATION: Pleural effusion. Follow-up    TECHNIQUE:  Portable  AP view of the chest was obtained.    COMPARISON: 6/20/2021 chest available for review.    FINDINGS:    The lungs show unchanged moderate LEFT effusion opacifying the LEFT lower hemithorax and/or LEFT basilar airspace consolidation. LEFT effusion layers to LEFT posterior seventhrib.  RIGHT lung parenchyma clear. No pneumothorax.    Heart mediastinum shifted towards RIGHT.    Heart size cannot be assessed due to opacified LEFT lower hemithorax.  Visualized osseous structures are intact.    IMPRESSION:   No interval change..    < end of copied text >

## 2021-06-22 NOTE — PROGRESS NOTE ADULT - SUBJECTIVE AND OBJECTIVE BOX
[facility]  INFECTIOUS DISEASES AND INTERNAL MEDICINE at [place]  =======================================================  Bernardo La MD  Diplomates American Board of Internal Medicine and Infectious Diseases  Tel: [phone]      Fax: [phone]  =======================================================    JUSTIN LOVE [id number]    Follow up: Klebsiella pneumoniae    No Fever  Reports feeling better      Allergies:  No Known Allergies      REVIEW OF SYSTEMS:  CONSTITUTIONAL:  No Fever or chills  HEENT:  No diplopia or blurred vision.  No earache, sore throat or runny nose.  CARDIOVASCULAR:  No pressure, squeezing, strangling, tightness, heaviness or aching about the chest, neck, axilla or epigastrium.  RESPIRATORY:  No cough, shortness of breath  GASTROINTESTINAL:  No nausea, vomiting or diarrhea.  GENITOURINARY:  No dysuria, frequency or urgency.   MUSCULOSKELETAL:  no joint aches, no muscle pain  SKIN:  No change in skin, hair or nails.  NEUROLOGIC:  No Headaches, seizures   PSYCHIATRIC:  No disorder of thought or mood.  ENDOCRINE:  No heat or cold intolerance  HEMATOLOGICAL:  No easy bruising or bleeding.       Physical Exam:  GEN: NAD, pleasant  HEENT: normocephalic and atraumatic. EOMI. PERRL.  Anicteric  NECK: Supple.   LUNGS: Clear to auscultation.  HEART: Regular rate and rhythm   ABDOMEN: Soft, nontender, and nondistended.  Positive bowel sounds.    : No CVA tenderness  EXTREMITIES: Without any edema.  MSK: No joint swelling  NEUROLOGIC:  No Focal Deficits  PSYCHIATRIC: Appropriate affect .  SKIN: No Rash      Vitals:  T(F): 98.3 (22 Jun 2021 05:10), Max: 99.2 (21 Jun 2021 16:29)  HR: 97 (22 Jun 2021 05:10)  BP: 108/88 (22 Jun 2021 05:10)  RR: 18 (22 Jun 2021 05:10)  SpO2: 95% (22 Jun 2021 05:10) (95% - 98%)  temp max in last 48H T(F): , Max: 99.2 (06-21-21 @ 16:29)      Current Antibiotics:  cefTRIAXone   IVPB 2000 milliGRAM(s) IV Intermittent every 24 hours  rifAXIMin 550 milliGRAM(s) Oral two times a day    Other medications:  dextrose 40% Gel 15 Gram(s) Oral once  dextrose 5%. 1000 milliLiter(s) IV Continuous <Continuous>  dextrose 5%. 1000 milliLiter(s) IV Continuous <Continuous>  dextrose 50% Injectable 25 Gram(s) IV Push once  dextrose 50% Injectable 12.5 Gram(s) IV Push once  dextrose 50% Injectable 25 Gram(s) IV Push once  folic acid 1 milliGRAM(s) Oral daily  furosemide   Injectable 40 milliGRAM(s) IV Push daily  glucagon  Injectable 1 milliGRAM(s) IntraMuscular once  insulin glargine Injectable (LANTUS) 10 Unit(s) SubCutaneous every morning  insulin lispro (ADMELOG) corrective regimen sliding scale   SubCutaneous at bedtime  insulin lispro (ADMELOG) corrective regimen sliding scale   SubCutaneous three times a day before meals  labetalol 50 milliGRAM(s) Oral two times a day  lactulose Syrup 10 Gram(s) Oral daily  levothyroxine 50 MICROGram(s) Oral daily  lidocaine   Patch 1 Patch Transdermal daily  multivitamin 1 Tablet(s) Oral daily  pantoprazole  Injectable 40 milliGRAM(s) IV Push two times a day  sodium chloride 2 Gram(s) Oral two times a day  spironolactone 50 milliGRAM(s) Oral daily  thiamine 100 milliGRAM(s) Oral daily                            8.6    7.35  )-----------( 77       ( 21 Jun 2021 07:30 )             25.4     06-21    130<L>  |  100  |  8.8  ----------------------------<  143<H>  4.1   |  20.0<L>  |  0.48<L>    Ca    8.1<L>      21 Jun 2021 07:29    TPro  5.8<L>  /  Alb  2.0<L>  /  TBili  4.2<H>  /  DBili  2.7<H>  /  AST  82<H>  /  ALT  38<H>  /  AlkPhos  274<H>  06-21      RECENT CULTURES:  06-18 @ 06:19 .Blood Blood     No growth at 48 hours      06-18 @ 01:21 .Body Fluid Pleural Fluid     No growth  polymorphonuclear leukocytes seen  No organisms seen  by cytocentrifuge      06-16 @ 21:18 .Body Fluid Peritoneal Fluid     No growth at 5 days  Moderate polymorphonuclear leukocytes  No organisms seen  by cytocentrifuge      06-16 @ 07:44 .Blood Blood-Peripheral Blood Culture PCR    Growth in aerobic and anaerobic bottles: Klebsiella pneumoniae  See previous culture [id number]  Growth in aerobic and anaerobic bottles: Gram Negative Rods  ***Blood Panel PCR results on this specimen are available  approximately 3 hours after the Gram stain result.***  Gram stain, PCR, and/or culture results may not always  correspond due todifference in methodologies.  ************************************************************  This PCR assay was performed using [employer].  The following targets are tested for: Enterococcus,  vancomycin resistant enterococci, Listeria monocytogenes,  coagulase negative staphylococci, S. aureus,  methicillin resistant S. aureus, Streptococcus agalactiae  (Group B), S. pneumoniae, S. pyogenes (Group A),  Acinetobacter baumannii, Enterobacter cloacae, E. coli,  Klebsiella oxytoca, K. pneumoniae, Proteus sp.,  Serratia marcescens, Haemophilus influenzae,  Neisseria meningitidis, Pseudomonas aeruginosa, Candida  albicans, C. glabrata, C krusei, C parapsilosis,  C. tropicalis and the KPC resistance gene.  "Due to technical problems, Pseudomonas aeruginosa  will Not be reported as part of the BCID panel  until further notice".  Gram Stain and BCID performed by:  [facility]  [address] [place] [id number]      06-13 @ 01:57 .Urine Clean Catch (Midstream)     >=3 organisms. Probable collection contamination.      06-12 @ 18:38 .Blood Blood-Peripheral     No growth at 5 days.      06-12 @ 18:37 .Blood Blood-Peripheral     No growth at 5 days.      WBC Count: 7.35 K/uL (06-21-21 @ 07:30)  WBC Count: 11.46 K/uL (06-19-21 @ 06:23)  WBC Count: 11.32 K/uL (06-18-21 @ 05:24)    Creatinine, Serum: 0.48 mg/dL (06-21-21 @ 07:29)  Creatinine, Serum: 0.62 mg/dL (06-19-21 @ 06:21)  Creatinine, Serum: 0.73 mg/dL (06-18-21 @ 05:24)    Ferritin, Serum: 76 ng/mL (06-14-21 @ 08:08)    Procalcitonin, Serum: 2.95 ng/mL (06-16-21 @ 17:59)  Procalcitonin, Serum: 0.83 ng/mL (06-16-21 @ 07:42)     SARS-CoV-2: NotDetec (06-16-21 @ 10:09)  Rapid RVP Result: NotDetec (06-16-21 @ 10:09)    COVID-19 PCR: NotDetec (06-13-21 @ 02:24)          < from: US Abdomen Limited (06.16.21 @ 11:02) >  EXAM:  US ABDOMEN LIMITED                          PROCEDURE DATE:  06/16/2021      INTERPRETATION:  CLINICAL INFORMATION: Abdominal distention    COMPARISON: 10/19/2015    TECHNIQUE: Limited abdominal ultrasound was performed using a low frequency curved transducer to assess for ascites    FINDINGS AND  IMPRESSION:    There is mild to moderate ascites in all 4 quadrants. Left-sided pleural effusion is partially visualized.    < end of copied text >        < from: CT Abdomen and Pelvis w/ IV Cont (06.12.21 @ 20:17) >  EXAM:  CT ABDOMEN AND PELVIS IC                          PROCEDURE DATE:  06/12/2021      INTERPRETATION:  CLINICAL INFORMATION: Abdominal pain, nausea and vomiting    COMPARISON: CT of October 1, 2019.    CONTRAST/COMPLICATIONS:  IV Contrast:96 cc of Omnipaque 300. 4 cc was discarded.  Oral Contrast:  Complications: None reported.    PROCEDURE:  Axial images were obtained, along with reformatted coronal and sagittal images.    FINDINGS:  LOWER CHEST: Mild left pleural effusion with associated subsegmental atelectasis.  LIVER: Cirrhotic and fatty.  BILE DUCTS: Normal caliber.  GALLBLADDER: Gallstones.  SPLEEN: Within normal limits.  PANCREAS: Within normal limits.  ADRENALS: Within normal limits.  KIDNEYS/URETERS: Within normal limits.    BLADDER: Incompletely distended.  REPRODUCTIVE ORGANS: No pelvic masses.    BOWEL: Although the stomach is under distended, there is apparent thickening of the distal gastric antrum and pyloric channel suspicious for gastritis. No bowel obstruction. Appendix is unremarkable. The colon is underdistended without significant fecal load.  PERITONEUM: Mild volume of abdominopelvic ascites.  VESSELS: The aorta is not dilated. Mild atherosclerotic vascular calcification. Redemonstration of an of a massively enlarged recanalized umbilical vein with umbilical varices that connect to the right external iliac vein.  RETROPERITONEUM/LYMPH NODES: Scattered subcentimeter retroperitoneal lymph nodes.  ABDOMINAL WALL: Subcutaneous edema.  BONES: Within normal limits.    IMPRESSION:    Cirrhosis with evidence of portal hypertension. Mild volume of abdominopelvic ascites. Mild left pleural effusion with associated subsegmental atelectasis.  Annual surveillance MRI is recommended in cirrhotic patients to screen for hepatocellular carcinoma.    Possible gastritis. Correlate with symptoms, upper endoscopy, or trial of proton pump inhibitors.    Gallstones.    < end of copied text >

## 2021-06-22 NOTE — PROGRESS NOTE ADULT - ASSESSMENT
65F with hx of alcoholic liver cirrhosis, portal HTN, HTN, DM2 presented initially epigastric pain, found with acute anemia complicated by Klebsiella bacteremia and acute decompensated liver failure with ascites and left pleural effusion.     PLAN     Acute Decompensated Liver Disease  Advanced Alcoholic Cirrhosis with Ascites  - s/p paracentesis  - GI following, advanced cirrhosis nearing end stage  - on lasix, rifaximin, lactulose, spironolactone     Acute Anemia  - s/p transfusions this admission, H/H stable  - likely secondary to advance liver disease with secondary coagulopathy  - serial H/H and transfuse PRN     Pleural Effusion  - s/p thoracentesis  - per CTS, not a candidate for Pleurx due to cirrhosis    Bacteremia   - Bcx positive klebsiella pneumonia  - on IV antibiotic per ID    Acute Pain  - intermittent abdominal pain  - c/w actaminophen 500 mg PO q12H PRN, IV morphine 1 mg PRN (hold for lethargy or sbp<100)    Advance Care Planning  - full code  - pt designated shaunna Rascon as point of contact if need    Palliative Care Encounter  See GOC above. Pt continues to show poor insight into her overall advance liver disease due to chronic etoh use. She is not engaged in goc with notable flat affect and throughout conversation answering with eyes closed. Spoke with Abiodun who is realistic and understands mother is very sick given advanced disease process. Son would like to explore all treatment options including possible eval for transplant as he feels mother would want that if offered. Abiodun also acknowledged that mother would need to refrain from etoh abuse which can lead to acute decompensation and further disease progression. Son is interested in [facility] for mother as he feels living alone would be unsafe. [facility] updated and will assist with dispo planning.

## 2021-06-22 NOTE — PROGRESS NOTE ADULT - ASSESSMENT
65y  Female with h/o alcoholic liver cirrhosis with hepatic encephalopathy, portal HTN, HTN, DM 2, hypothyroidism. Patient initially admitted with epigastric pain, worsening transaminitis, alcoholic cirrhosis and weakness. CT of abdomen and pelvis revealed  cirrhosis with evidence of portal hypertension, mild volume of abdominopelvic ascites, mild left pleural effusion with associated subsegmental atelectasis, possible gastritis and gallstones. Patient was followed by GI, Hematology, nephrology. Patient developed fever to 102.8F 6/16. Also had paracentesis 6/16. Blood cultures with GNR.       Klebsiella pneumoniae sepsis  fever  leukocytosis  Alcoholic liver cirrrhosis      - Blood cultures reporting Klebsiella pneumoniae  - Repeat blood cultures no growth 6/18  - RVP/COVID 19 PCR negative   - CXR reporting left pleural effusion   - CT A/P reporting cirrhosis   - UA negative   - Procalcitonin level 2.95  - Continue Ceftriaxone  - Trend Fever  - Trend Leukocytosis      Thank you for allowing me to participate in the care of your patient.   Will Follow      d/w Dr Stiles

## 2021-06-22 NOTE — PROGRESS NOTE ADULT - PROBLEM SELECTOR PLAN 2
Patient with no evidence of active GI bleeding. Monitor for any signs of over bleeding.  Multifactorial with chronic disease, cirrhosis and bone marrow suppression from alcohol use disorder.  Continue to monitor daily CBC.   Continue Pantoprazole for mucosal cytoprotection.

## 2021-06-22 NOTE — PROGRESS NOTE ADULT - SUBJECTIVE AND OBJECTIVE BOX
[facility] PALLIATIVE MEDICINE FOLLOW UP VISIT    INTERVAL HPI/OVERNIGHT EVENTS:  Source if other than patient:  []Family   [x]Team     No acute events overnight.   Seen and examined at beside. Assisted by [employer] language line (Jimmy #[id number])    Present Symptoms:   Dyspnea:  No   Nausea/Vomiting:  No  Anxiety:   No  Depression:  No  Fatigue: Yes    Loss of appetite: Yes   Constipation:  No, had BM this morning  Pain: Yes at abdomen but improved since this morning    MEDICATIONS  (STANDING):  cefTRIAXone   IVPB 2000 milliGRAM(s) IV Intermittent every 24 hours  dextrose 40% Gel 15 Gram(s) Oral once  dextrose 5%. 1000 milliLiter(s) (50 mL/Hr) IV Continuous <Continuous>  dextrose 5%. 1000 milliLiter(s) (100 mL/Hr) IV Continuous <Continuous>  dextrose 50% Injectable 25 Gram(s) IV Push once  dextrose 50% Injectable 12.5 Gram(s) IV Push once  dextrose 50% Injectable 25 Gram(s) IV Push once  folic acid 1 milliGRAM(s) Oral daily  furosemide   Injectable 40 milliGRAM(s) IV Push daily  glucagon  Injectable 1 milliGRAM(s) IntraMuscular once  insulin glargine Injectable (LANTUS) 10 Unit(s) SubCutaneous every morning  insulin lispro (ADMELOG) corrective regimen sliding scale   SubCutaneous three times a day before meals  insulin lispro (ADMELOG) corrective regimen sliding scale   SubCutaneous at bedtime  labetalol 50 milliGRAM(s) Oral two times a day  lactulose Syrup 10 Gram(s) Oral daily  levothyroxine 50 MICROGram(s) Oral daily  lidocaine   Patch 1 Patch Transdermal daily  multivitamin 1 Tablet(s) Oral daily  pantoprazole  Injectable 40 milliGRAM(s) IV Push two times a day  rifAXIMin 550 milliGRAM(s) Oral two times a day  sodium chloride 2 Gram(s) Oral two times a day  spironolactone 50 milliGRAM(s) Oral daily  thiamine 100 milliGRAM(s) Oral daily    MEDICATIONS  (PRN):  acetaminophen   Tablet .. 500 milliGRAM(s) Oral every 12 hours PRN Temp greater or equal to 38C (100.4F), Mild Pain (1 - 3)  morphine  - Injectable 1 milliGRAM(s) IV Push every 6 hours PRN Moderate Pain (4 - 6)      PHYSICAL EXAM:    Vital Signs Last 24 Hrs  T(C): 36.8 (22 Jun 2021 10:00), Max: 37.3 (21 Jun 2021 16:29)  T(F): 98.2 (22 Jun 2021 10:00), Max: 99.2 (21 Jun 2021 16:29)  HR: 69 (22 Jun 2021 10:00) (69 - 97)  BP: 149/81 (22 Jun 2021 10:00) (108/88 - 149/81)  BP(mean): --  RR: 18 (22 Jun 2021 10:00) (18 - 18)  SpO2: 97% (22 Jun 2021 10:00) (95% - 97%)    Palliative Performance Status Version 2: 50-60%  http://npcrc.org/files/news/palliative_performance_scale_ppsv2.pdf    General: Resting comfortably. No acute distress.   HEENT: mucous membrane moist    Lungs: comfortable. non-labored.   CV: +s1/s2. Regular rate and rhythm.    GI: +bowel sound. abdomen soft, non-tender to palaption, distended, no guarding  MSK: Moves all 4 extremities. No cyanosis or edema.    Neuro: nonfocal. Awake and alert, orientedx3. Interactive   Skin: warm and dry.   Psych: flat affect    LABS:                          8.6    7.35  )-----------( 77       ( 21 Jun 2021 07:30 )             25.4     06-21    130<L>  |  100  |  8.8  ----------------------------<  143<H>  4.1   |  20.0<L>  |  0.48<L>    Ca    8.1<L>      21 Jun 2021 07:29    TPro  5.8<L>  /  Alb  2.0<L>  /  TBili  4.2<H>  /  DBili  2.7<H>  /  AST  82<H>  /  ALT  38<H>  /  AlkPhos  274<H>  06-21    PT/INR - ( 21 Jun 2021 07:31 )   PT: 17.9 sec;   INR: 1.58 ratio      RADIOLOGY & ADDITIONAL STUDIES:     CXR 6/22/2021  COMPARISON STUDY: 6/21/2021    Frontal expiratory view of the chest shows the heart to be similar in size. Large left effusion is again noted.    The lungs show mild pulmonary congestion and there is no evidence of pneumothorax nor right pleural effusion.    IMPRESSION:  Similar large left effusion.      ADVANCE DIRECTIVES:   DNR YES NO  Completed on:                     MOLST  YES NO   Completed on:  Living Will  YES NO   Completed on:

## 2021-06-22 NOTE — PROGRESS NOTE ADULT - ATTENDING COMMENTS
Patient was seen and examined at bedside.  Complains of abdominal discomfort.  Otherwise no need for any steroids.  Continuing the diuretic therapy.  We will follow up.

## 2021-06-22 NOTE — PROGRESS NOTE ADULT - SUBJECTIVE AND OBJECTIVE BOX
Patient is a 65y old  Female who presents with a chief complaint of Anemia (22 Jun 2021 12:01) recurrent abd pain repeat ct abd pelvis ordered/ dispo planning       HEALTH ISSUES - PROBLEM Dx:  Alcoholic cirrhosis of liver with ascites  ETOH abuse  Fever  Pleural effusion on left  Pancytopenia  Bacteremia  Debility  Anemia  Pleural effusion  Hyponatremia  Pain        INTERVAL HPI/OVERNIGHT EVENTS: [employer] int via phone   Patient seen and examined at bedside. No acute events overnight. Patient states she is feeling diffuse abd pain, more so in the region where she received the paracentesis, abdomen remains soft and is not as distended as it was before. Aside from this d/w the patient at length in regards to her current hospitalization and failing liver. She had questions in regards to if she was a transplant candidate, d/w her that she has to abstain from alcohol abuse and she reported that she quit one month ago. Pt also states she understands what is happening but then later in the day when asked by the palliative team the pt reported being unaware of the current plan and reasons as to why she has so many complications. Pt is however agreeable to rehab if she needs it. Patient denies chest pain, SOB,  N/V, fever, chills, dysuria or any other complaints. All remainder ROS negative.     Vital Signs Last 24 Hrs  T(C): 36.8 (22 Jun 2021 10:00), Max: 37.3 (21 Jun 2021 16:29)  T(F): 98.2 (22 Jun 2021 10:00), Max: 99.2 (21 Jun 2021 16:29)  HR: 69 (22 Jun 2021 10:00) (69 - 97)  BP: 149/81 (22 Jun 2021 10:00) (108/88 - 149/81)  BP(mean): --  RR: 18 (22 Jun 2021 10:00) (18 - 18)  SpO2: 97% (22 Jun 2021 10:00) (95% - 97%)    CAPILLARY BLOOD GLUCOSE  POCT Blood Glucose.: 205 mg/dL (22 Jun 2021 12:13)  POCT Blood Glucose.: 207 mg/dL (22 Jun 2021 08:21)  POCT Blood Glucose.: 209 mg/dL (21 Jun 2021 21:24)  POCT Blood Glucose.: 171 mg/dL (21 Jun 2021 17:14)      CONSTITUTIONAL: ill appearing,   awake, alert and in no apparent distress  ENMT: Airway patent, Nasal mucosa clear.    EYES: Clear bilaterally, pupils equal, round and reactive to light.  icteric sclera   CARDIAC: Normal rate, regular rhythm.  Heart sounds S1, S2.  No murmurs, rubs or gallops   RESPIRATORY: Fair air entry, left sided diminished bs   GASTROENTEROLOGY: Soft generalized tenderness to palpation  distended +ve    RLQ dressing +ve c/d/i   EXTREMITIES: No edema, cyanosis or deformity   NEUROLOGICAL: Alert and oriented to self and place  lack of insight into her medical condition   SKIN: No rash, skin turgor wnl     MEDICATIONS  (STANDING):  cefTRIAXone   IVPB 2000 milliGRAM(s) IV Intermittent every 24 hours  dextrose 40% Gel 15 Gram(s) Oral once  dextrose 5%. 1000 milliLiter(s) (50 mL/Hr) IV Continuous <Continuous>  dextrose 5%. 1000 milliLiter(s) (100 mL/Hr) IV Continuous <Continuous>  dextrose 50% Injectable 25 Gram(s) IV Push once  dextrose 50% Injectable 12.5 Gram(s) IV Push once  dextrose 50% Injectable 25 Gram(s) IV Push once  folic acid 1 milliGRAM(s) Oral daily  glucagon  Injectable 1 milliGRAM(s) IntraMuscular once  insulin glargine Injectable (LANTUS) 10 Unit(s) SubCutaneous every morning  insulin lispro (ADMELOG) corrective regimen sliding scale   SubCutaneous three times a day before meals  insulin lispro (ADMELOG) corrective regimen sliding scale   SubCutaneous at bedtime  labetalol 50 milliGRAM(s) Oral two times a day  lactulose Syrup 10 Gram(s) Oral daily  levothyroxine 50 MICROGram(s) Oral daily  lidocaine   Patch 1 Patch Transdermal daily  multivitamin 1 Tablet(s) Oral daily  pantoprazole  Injectable 40 milliGRAM(s) IV Push two times a day  rifAXIMin 550 milliGRAM(s) Oral two times a day  sodium chloride 2 Gram(s) Oral two times a day  spironolactone 50 milliGRAM(s) Oral daily  thiamine 100 milliGRAM(s) Oral daily    MEDICATIONS  (PRN):  acetaminophen   Tablet .. 500 milliGRAM(s) Oral every 12 hours PRN Temp greater or equal to 38C (100.4F), Mild Pain (1 - 3)  morphine  - Injectable 1 milliGRAM(s) IV Push every 6 hours PRN Moderate Pain (4 - 6)      LABS:                        8.6    7.35  )-----------( 77       ( 21 Jun 2021 07:30 )             25.4     06-21    130<L>  |  100  |  8.8  ----------------------------<  143<H>  4.1   |  20.0<L>  |  0.48<L>    Ca    8.1<L>      21 Jun 2021 07:29    TPro  5.8<L>  /  Alb  2.0<L>  /  TBili  4.2<H>  /  DBili  2.7<H>  /  AST  82<H>  /  ALT  38<H>  /  AlkPhos  274<H>  06-21    PT/INR - ( 21 Jun 2021 07:31 )   PT: 17.9 sec;   INR: 1.58 ratio             LIVER FUNCTIONS - ( 21 Jun 2021 07:29 )  Alb: 2.0 g/dL / Pro: 5.8 g/dL / ALK PHOS: 274 U/L / ALT: 38 U/L / AST: 82 U/L / GGT: x             RADIOLOGY & ADDITIONAL TESTS:  CT abd/pelvis:   IMPRESSION: Large left pleural effusion with complete compressive collapse of the lingula and left lower lobe. Cirrhotic liver with moderate ascites.

## 2021-06-22 NOTE — CHART NOTE - NSCHARTNOTEFT_GEN_A_CORE
Source: Patient [ ]  Family [ ]   other [x ]EMR    Current Diet: consistent CHO with Glucerna shake TID and 800cc fluid restriction  Likes Glucerna     PO intake:  < 50% [ ]   50-75%  [ ]   %  [ ]  other : 50%    Source for PO intake [ ] Patient [ ] family [x ] chart [ ] staff [ ] other    Enteral /Parenteral Nutrition:     Current Weight: 144.1# 6/18  no edema    % Weight Change     Pertinent Medications: MEDICATIONS  (STANDING):  cefTRIAXone   IVPB 2000 milliGRAM(s) IV Intermittent every 24 hours  dextrose 40% Gel 15 Gram(s) Oral once  dextrose 5%. 1000 milliLiter(s) (50 mL/Hr) IV Continuous <Continuous>  dextrose 5%. 1000 milliLiter(s) (100 mL/Hr) IV Continuous <Continuous>  dextrose 50% Injectable 25 Gram(s) IV Push once  dextrose 50% Injectable 12.5 Gram(s) IV Push once  dextrose 50% Injectable 25 Gram(s) IV Push once  folic acid 1 milliGRAM(s) Oral daily  furosemide   Injectable 40 milliGRAM(s) IV Push daily  glucagon  Injectable 1 milliGRAM(s) IntraMuscular once  insulin glargine Injectable (LANTUS) 10 Unit(s) SubCutaneous every morning  insulin lispro (ADMELOG) corrective regimen sliding scale   SubCutaneous three times a day before meals  insulin lispro (ADMELOG) corrective regimen sliding scale   SubCutaneous at bedtime  labetalol 50 milliGRAM(s) Oral two times a day  lactulose Syrup 10 Gram(s) Oral daily  levothyroxine 50 MICROGram(s) Oral daily  lidocaine   Patch 1 Patch Transdermal daily  multivitamin 1 Tablet(s) Oral daily  pantoprazole  Injectable 40 milliGRAM(s) IV Push two times a day  rifAXIMin 550 milliGRAM(s) Oral two times a day  sodium chloride 2 Gram(s) Oral two times a day  spironolactone 50 milliGRAM(s) Oral daily  thiamine 100 milliGRAM(s) Oral daily    MEDICATIONS  (PRN):  acetaminophen   Tablet .. 500 milliGRAM(s) Oral every 12 hours PRN Temp greater or equal to 38C (100.4F), Mild Pain (1 - 3)  morphine  - Injectable 1 milliGRAM(s) IV Push every 6 hours PRN Moderate Pain (4 - 6)    Pertinent Labs: CBC Full  -  ( 21 Jun 2021 07:30 )  WBC Count : 7.35 K/uL  RBC Count : 2.78 M/uL  Hemoglobin : 8.6 g/dL  Hematocrit : 25.4 %  Platelet Count - Automated : 77 K/uL  Mean Cell Volume : 91.4 fl  Mean Cell Hemoglobin : 30.9 pg  Mean Cell Hemoglobin Concentration : 33.9 gm/dL  Auto Neutrophil # : x  Auto Lymphocyte # : x  Auto Monocyte # : x  Auto Eosinophil # : x  Auto Basophil # : x  Auto Neutrophil % : x  Auto Lymphocyte % : x  Auto Monocyte % : x  Auto Eosinophil % : x  Auto Basophil % : x      06-21 Na130 mmol/L<L> Glu 143 mg/dL<H> K+ 4.1 mmol/L Cr  0.48 mg/dL<L> BUN 8.8 mg/dL Phos n/a   Alb 2.0 g/dL<L> PAB n/a           Skin:     Nutrition focused physical exam conducted - found signs of malnutrition [ ]absent [x ]present    Subcutaneous fat loss: [x ] Orbital fat pads region, [ ]Buccal fat region, [ ]Triceps region,  [ ]Ribs region    Muscle wasting: [ x]Temples region, [ x]Clavicle region, [x ]Shoulder region, [ ]Scapula region, [ ]Interosseous region,  [ ]thigh region, [ ]Calf region    Estimated Needs:   [x ] no change since previous assessment  [ ] recalculated:     Current Nutrition Diagnosis: Malnutrition moderate (acute on chronic) related to inability to consume sufficient protein energy intake with poor appetite in setting of alcoholic liver cirrhosis with hepatic encephalopathy as evidenced by pt meeting <75% est energy intake > 7 days, mild muscle wasting/fat loss. Paracentesis 6/17 noted. Palliative following.         Recommendations: Rec MVI, Thiamine, folic acid  Continue Glucerna shake TID    Monitoring and Evaluation:   [x ] PO intake [ x] Tolerance to diet prescription [X] Weights  [X] Follow up per protocol [X] Labs:

## 2021-06-22 NOTE — PROGRESS NOTE ADULT - ASSESSMENT
Dilutional hyponatremia---> slowly improving  EtOH cirrhosis  Poor nutrition  - cont NaCl tabs and Lasix  - oral fluid restriction  - increase oral protein intake  - follow labs    L pleural effusion: s/p thoracentesis---> evidence of reaccumulation  - thoracic surgery noted----> not considering chest tube at this time

## 2021-06-22 NOTE — PROGRESS NOTE ADULT - ASSESSMENT
62 y/o female with hx of alcoholic liver cirrhosis with hepatic encephalopathy, portal HTN, HTN, DM-2, hypothyroidism came to the ED complaining of epigastric pain x 1 week. She was found to have hb of 5.2 in the ED, occult negative. She was admitted for workup and treatment of anemia. S/p 2 u prbcs.  Hospital course complicated by klebsiella pna bacteremia with repeat B cx negative on 6/18, pt empirically remains on rocephin. Pt also noted with worsening sob with noted large left sided pleural effusion requiring thoracocentesis as well as worsening abd distension requiring paracentesis. Pt remains on diuresis with aldactone and lasix, being followed by ct sx but currently not a candidate for pleurx. Given recurrent generalized abd pain repeat ct abd/pelvis ordered today. Prognosis guarded and palliative care was consulted as pt is nearing end stage liver dz.     Alcoholic liver cirrhosis with hepatic encephalopathy, volume overload with ascites and left sided effusion/ portal HTN   -advanced cirrhosis; nearing end stage per GI  -s/p thoracentesis on 6/17 with 1.1 liters removed  - s/p paracentesis with 700ml removed on 6/16  - pt with generalized abd pain so repeat ct abd pelvis ordered today CT showed Large left pleural effusion with complete compressive collapse of the lingula and left lower lobe. Cirrhotic liver with moderate ascites. Pt will require recurrent paracentesis/ thoracocentesis in the future Will see if repeat tap needed prior to dispo.   - ammonia level WNL   - transaminitis   - thrombocytopenia all sequela of liver failure   - cxr from 6/21 shows left sided effusion unchanged from prior cxr, but pt is on room air saturating well   -lasix IV changed to po today and to c/w aldactone  - will monitor renal fxn closely on diuretics   -c/w  rifaximin, lactulose to prevent hepatic encephalopathy   -c/w labetalol   -c/w monitoring lfts/ plt/ ammonia if pt becomes more lethargic or altered   -GI f/u noted and appreciated   - CT sx f/u noted and appreciated, recommended no intervention on left effusion for now given pt is comfortable and on RA   - pt will likely need recurrent paracentesis and thoracocentesis in the future when she becomes sx   -Outpatient surveillance for HCC    Klebsiella PNA bacteremia unclear source   - afebrile  - no leukocytosis   - repeat B cx negative on 6/18  - pleural cx negative   -SBP ruled out; f/u ascitic fluid culture - no growth to date  -c/w rocephin  end date is 7/1   picc line is in place   -ID recommendations appreciated, d/w Dr. Carlson the above plan of care     Anemia of chronic disease in the setting of cirrhotic liver and bone narrow suppression from alcohol   -Hb 5.2 on admission, s/p 3 u prbcs total inpatient   - hgb remains ~8.6    - likely related to end stage liver, -no active signs/symptoms of GIB  -Low haptoglobin and high LDH; discussed with hematology - no concerns for hemolysis  -FOBT negative   -s/p venofer  -continue PPI  -hematology and GI recommendations appreciated    Hyponatremia improving   -sodium 130 range and stable   -c/w lasix and sodium tabs   -continue free water restriction  -monitor I&O's  - nephro f/u noted and appreciated     Lower back and flank pain   -Continue PPI  -c/w tylenol prn   -c/w  lidoderm patch   -OOB to chair; mobilize patient    Hyperglycemia with underlying DM-2, not on home insulin   -HbA1c 8.0, goal <7  -continue lantus 10 u sq qam   -Continue ISS  -c/w hypoglycemia protocol   -c/w glucerna supplements      History of alcohol abuse   -no evidence of withdrawal  -continue Thiamine, Folic Acid and MVI  -d/c ciwa   -d/c ativan prn     HTN   -BP stable  -Continue Labetalol with hold parameters     Hypothyroidism   -TSH elevated,  -c/w synthroid increased dose   -f/u tsh in the am   will also repeat TFTS in 4-6 weeks    DVT ppx   - SCDs b/l   -no chemical AC given thrombocytopenia     Next of kin: SonMera Hinojosa     Dispo: Prognosis guarded, pts shaunna Hinojosa does not feel that pt can return to home, plan will be for pt to go to [facility].  Palliative care to c/w following the pt. D/w SW to initiate plan for discharge to [facility].            60 y/o female with hx of alcoholic liver cirrhosis with hepatic encephalopathy, portal HTN, HTN, DM-2, hypothyroidism came to the ED complaining of epigastric pain x 1 week. She was found to have hb of 5.2 in the ED, occult negative. She was admitted for workup and treatment of anemia. S/p 2 u prbcs.  Hospital course complicated by klebsiella pna bacteremia with repeat B cx negative on 6/18, pt empirically remains on rocephin. Pt also noted with worsening sob with noted large left sided pleural effusion requiring thoracocentesis as well as worsening abd distension requiring paracentesis. Pt remains on diuresis with aldactone and lasix, being followed by ct sx but currently not a candidate for pleurx. Given recurrent generalized abd pain repeat ct abd/pelvis ordered today. Prognosis guarded and palliative care was consulted as pt is nearing end stage liver dz.     Alcoholic liver cirrhosis with hepatic encephalopathy, volume overload with ascites and left sided effusion/ portal HTN   -advanced cirrhosis; nearing end stage per GI  -s/p thoracentesis on 6/17 with 1.1 liters removed  - s/p paracentesis with 700ml removed on 6/16  - pt with generalized abd pain so repeat ct abd pelvis ordered today CT showed Large left pleural effusion with complete compressive collapse of the lingula and left lower lobe. Cirrhotic liver with moderate ascites. Pt will require recurrent paracentesis/ thoracocentesis in the future Will see if repeat tap needed prior to dispo.   - ammonia level WNL   - transaminitis   - thrombocytopenia all sequela of liver failure   - cxr from 6/21 shows left sided effusion unchanged from prior cxr, but pt is on room air saturating well   -lasix IV changed to po today and to c/w aldactone  - will monitor renal fxn closely on diuretics   -c/w  rifaximin, lactulose to prevent hepatic encephalopathy   -c/w labetalol   -c/w monitoring lfts/ plt/ ammonia if pt becomes more lethargic or altered   -GI f/u noted and appreciated   - CT sx f/u noted and appreciated, recommended no intervention on left effusion for now given pt is comfortable and on RA   - pt will likely need recurrent paracentesis and thoracocentesis in the future when she becomes sx   -Outpatient surveillance for HCC    Klebsiella PNA bacteremia unclear source   - afebrile  - no leukocytosis   - repeat B cx negative on 6/18  - pleural cx negative   -SBP ruled out; f/u ascitic fluid culture - no growth to date  -c/w rocephin  end date is 7/1   picc line is in place   -ID recommendations appreciated, d/w Dr. Carlson the above plan of care     Anemia of chronic disease in the setting of cirrhotic liver and bone narrow suppression from alcohol   -Hb 5.2 on admission, s/p 3 u prbcs total inpatient   - hgb remains ~8.6    - likely related to end stage liver, -no active signs/symptoms of GIB  -Low haptoglobin and high LDH; discussed with hematology - no concerns for hemolysis  -FOBT negative   -s/p venofer  -continue PPI  -hematology and GI recommendations appreciated    Hyponatremia improving   -sodium 130 range and stable   -c/w lasix and sodium tabs   -continue free water restriction  -monitor I&O's  - nephro f/u noted and appreciated     Lower back and flank pain   -Continue PPI  -c/w tylenol prn   -c/w  lidoderm patch   -OOB to chair; mobilize patient    Hyperglycemia with underlying DM-2, not on home insulin   -HbA1c 8.0, goal <7  -continue lantus increased to 15 u sq qam   -Continue ISS  -c/w hypoglycemia protocol   -c/w glucerna supplements      History of alcohol abuse   -no evidence of withdrawal  -continue Thiamine, Folic Acid and MVI  -d/c ciwa   -d/c ativan prn     HTN   -BP stable  -Continue Labetalol with hold parameters     Hypothyroidism   -TSH elevated,  -c/w synthroid increased dose   -f/u tsh in the am   will also repeat TFTS in 4-6 weeks    DVT ppx   - SCDs b/l   -no chemical AC given thrombocytopenia     Next of kin: SonMera Hinojosa     Dispo: Prognosis guarded, pts shaunna Hinojosa does not feel that pt can return to home, plan will be for pt to go to [facility].  Palliative care to c/w following the pt. D/w SW to initiate plan for discharge to [facility].

## 2021-06-23 ENCOUNTER — TRANSCRIPTION ENCOUNTER (OUTPATIENT)
Age: 66
End: 2021-06-23

## 2021-06-23 LAB
ANION GAP SERPL CALC-SCNC: 8 MMOL/L — SIGNIFICANT CHANGE UP (ref 5–17)
BUN SERPL-MCNC: 10.5 MG/DL — SIGNIFICANT CHANGE UP (ref 8–20)
CALCIUM SERPL-MCNC: 8 MG/DL — LOW (ref 8.6–10.2)
CHLORIDE SERPL-SCNC: 101 MMOL/L — SIGNIFICANT CHANGE UP (ref 98–107)
CO2 SERPL-SCNC: 23 MMOL/L — SIGNIFICANT CHANGE UP (ref 22–29)
CREAT SERPL-MCNC: 0.53 MG/DL — SIGNIFICANT CHANGE UP (ref 0.5–1.3)
CULTURE RESULTS: SIGNIFICANT CHANGE UP
CULTURE RESULTS: SIGNIFICANT CHANGE UP
GLUCOSE BLDC GLUCOMTR-MCNC: 101 MG/DL — HIGH (ref 70–99)
GLUCOSE BLDC GLUCOMTR-MCNC: 169 MG/DL — HIGH (ref 70–99)
GLUCOSE BLDC GLUCOMTR-MCNC: 172 MG/DL — HIGH (ref 70–99)
GLUCOSE BLDC GLUCOMTR-MCNC: 180 MG/DL — HIGH (ref 70–99)
GLUCOSE SERPL-MCNC: 148 MG/DL — HIGH (ref 70–99)
METHOD TYPE: SIGNIFICANT CHANGE UP
ORGANISM # SPEC MICROSCOPIC CNT: SIGNIFICANT CHANGE UP
POTASSIUM SERPL-MCNC: 4.1 MMOL/L — SIGNIFICANT CHANGE UP (ref 3.5–5.3)
POTASSIUM SERPL-SCNC: 4.1 MMOL/L — SIGNIFICANT CHANGE UP (ref 3.5–5.3)
SARS-COV-2 RNA SPEC QL NAA+PROBE: SIGNIFICANT CHANGE UP
SODIUM SERPL-SCNC: 132 MMOL/L — LOW (ref 135–145)
SPECIMEN SOURCE: SIGNIFICANT CHANGE UP
SPECIMEN SOURCE: SIGNIFICANT CHANGE UP

## 2021-06-23 PROCEDURE — 99233 SBSQ HOSP IP/OBS HIGH 50: CPT

## 2021-06-23 PROCEDURE — 99232 SBSQ HOSP IP/OBS MODERATE 35: CPT

## 2021-06-23 RX ORDER — SPIRONOLACTONE 25 MG/1
100 TABLET, FILM COATED ORAL DAILY
Refills: 0 | Status: DISCONTINUED | OUTPATIENT
Start: 2021-06-23 | End: 2021-06-28

## 2021-06-23 RX ADMIN — PANTOPRAZOLE SODIUM 40 MILLIGRAM(S): 20 TABLET, DELAYED RELEASE ORAL at 17:27

## 2021-06-23 RX ADMIN — Medication 1 MILLIGRAM(S): at 11:09

## 2021-06-23 RX ADMIN — Medication 2: at 16:26

## 2021-06-23 RX ADMIN — MORPHINE SULFATE 1 MILLIGRAM(S): 50 CAPSULE, EXTENDED RELEASE ORAL at 05:45

## 2021-06-23 RX ADMIN — MORPHINE SULFATE 1 MILLIGRAM(S): 50 CAPSULE, EXTENDED RELEASE ORAL at 05:30

## 2021-06-23 RX ADMIN — Medication 50 MILLIGRAM(S): at 17:28

## 2021-06-23 RX ADMIN — LIDOCAINE 1 PATCH: 4 CREAM TOPICAL at 19:17

## 2021-06-23 RX ADMIN — SPIRONOLACTONE 50 MILLIGRAM(S): 25 TABLET, FILM COATED ORAL at 05:11

## 2021-06-23 RX ADMIN — LIDOCAINE 1 PATCH: 4 CREAM TOPICAL at 00:23

## 2021-06-23 RX ADMIN — LIDOCAINE 1 PATCH: 4 CREAM TOPICAL at 11:09

## 2021-06-23 RX ADMIN — Medication 100 MILLIGRAM(S): at 11:09

## 2021-06-23 RX ADMIN — PANTOPRAZOLE SODIUM 40 MILLIGRAM(S): 20 TABLET, DELAYED RELEASE ORAL at 05:11

## 2021-06-23 RX ADMIN — INSULIN GLARGINE 15 UNIT(S): 100 INJECTION, SOLUTION SUBCUTANEOUS at 21:37

## 2021-06-23 RX ADMIN — Medication 1 TABLET(S): at 11:09

## 2021-06-23 RX ADMIN — LACTULOSE 10 GRAM(S): 10 SOLUTION ORAL at 11:09

## 2021-06-23 RX ADMIN — Medication 50 MILLIGRAM(S): at 05:11

## 2021-06-23 RX ADMIN — SODIUM CHLORIDE 2 GRAM(S): 9 INJECTION INTRAMUSCULAR; INTRAVENOUS; SUBCUTANEOUS at 05:11

## 2021-06-23 RX ADMIN — Medication 40 MILLIGRAM(S): at 05:11

## 2021-06-23 RX ADMIN — SODIUM CHLORIDE 2 GRAM(S): 9 INJECTION INTRAMUSCULAR; INTRAVENOUS; SUBCUTANEOUS at 17:28

## 2021-06-23 RX ADMIN — CEFTRIAXONE 100 MILLIGRAM(S): 500 INJECTION, POWDER, FOR SOLUTION INTRAMUSCULAR; INTRAVENOUS at 17:27

## 2021-06-23 RX ADMIN — Medication 2: at 11:50

## 2021-06-23 RX ADMIN — Medication 50 MICROGRAM(S): at 05:11

## 2021-06-23 NOTE — PROGRESS NOTE ADULT - ASSESSMENT
Patient with alcohol cirrhosis with ascites. Increase spironolactone to 100 mg po daily. Continue lasix at current dose. Continue diet. Continue rifaximin and lactulose.

## 2021-06-23 NOTE — PROGRESS NOTE ADULT - ASSESSMENT
Dilutional hyponatremia---> slowly improving  EtOH cirrhosis  Poor nutrition  - cont NaCl tabs and Lasix/ Aldactone   - oral fluid restriction  - increase oral protein intake  - follow labs    L pleural effusion: s/p thoracentesis---> evidence of reaccumulation  - thoracic surgery noted----> not considering chest tube at this timeDilutional hyponatremia---> slowly improving

## 2021-06-23 NOTE — PROGRESS NOTE ADULT - ASSESSMENT
65F with hx of alcoholic liver cirrhosis, portal HTN, HTN, DM2 presented initially epigastric pain, found with acute anemia complicated by Klebsiella bacteremia and acute decompensated liver failure with ascites and left pleural effusion.     PLAN     Acute Decompensated Liver Disease  Advanced Alcoholic Cirrhosis with Ascites  - s/p paracentesis  - GI following, advanced cirrhosis nearing end stage  - on lasix, rifaximin, lactulose, spironolactone     Acute Anemia  - s/p transfusions this admission, H/H stable     Pleural Effusion  - s/p thoracentesis  - Repeat CT A/P with large left pleural effusion, moderate cirrhosis  - per CTS, not a candidate for Pleurx due to cirrhosis  - may benefit from repeat thoracentesis, defer to CTS    Bacteremia   - Bcx positive klebsiella pneumonia  - on IV Ceftriaxone until 7/1 per ID    Acute Pain  - improved  - c/w actaminophen 500 mg PO q12H PRN, IV morphine 1 mg PRN (hold for lethargy or sbp<100)    Advance Care Planning  - full code  - pt designated shaunna Rascon as point of contact if need    Palliative Care Encounter  Extensive conversation held on 6/22 with pt at bedside and then shaunna Rascon via phone regarding advance liver disease. Pt has verbalized to primary team wishes to pursue transplant for her cirrhosis. Shaunna Rascon does want to have mother pursue ongoing medical interventions and to follow up her physicians in the community. Pt has limited insight into the complexity of her advance illnesses. Son is more realistic and understands the gravity of situation. [facility] and HCP brochures provided to patient for her to review with her son as discussed the previous day.     Plan is for rehab when medically optimized which pt is amendable to. Son would like for mother to consider [facility] thereafter as he feels she is unsafe to return home alone.     GOC established. No further recommendations from a palliative standpoint. Will sign off. Please reconsult PRN.  Dispo planning per CCC. Ongoing medical mgnt per primary team.

## 2021-06-23 NOTE — PROGRESS NOTE ADULT - ASSESSMENT
62 y/o female with hx of alcoholic liver cirrhosis with hepatic encephalopathy, portal HTN, HTN, DM-2, hypothyroidism came to the ED complaining of epigastric pain x 1 week. She was found to have hb of 5.2 in the ED, occult negative. She was admitted for workup and treatment of anemia. S/p 2 u prbcs.  Hospital course complicated by klebsiella pna bacteremia with repeat B cx negative on 6/18, pt empirically remains on rocephin. Pt also noted with worsening sob with noted large left sided pleural effusion requiring thoracocentesis as well as worsening abd distension requiring paracentesis. Pt remains on diuresis with aldactone and lasix, being followed by ct sx but currently not a candidate for pleurx. Given recurrent generalized abd pain repeat ct abd/pelvis completed 6/22 which showed moderate amount of ascites, plan for repeat paracentesis today by IR but pt had only small amount ascites so procedure was cancelled. Currently pt medically optimized for dispo planning, PT evaluated the pt and recommended [facility]. Pending auth.       Alcoholic liver cirrhosis with hepatic encephalopathy, volume overload with ascites and left sided pleural effusion/ portal HTN   -advanced cirrhosis; nearing end stage per GI  -s/p thoracentesis on 6/17 with 1.1 liters removed  - s/p paracentesis with 700ml removed on 6/16  - pt with generalized abd pain so repeat ct abd pelvis ordered 6/22 and noted with Large left pleural effusion with complete compressive collapse of the lingula and left lower lobe. For which ct sx will not proceed with intervention as patient remains asx and on RA. Also noted with cirrhotic liver with moderate ascites. D/w IR and pt planned for repeat paracentesis today but IR reported small ascites during attempt at intervention so procedure cancelled.   - ammonia level WNL   - transaminitis   - thrombocytopenia all sequela of liver failure   - cxr from 6/21 shows left sided effusion unchanged from prior cxr   -c/w lasix, aldactone increased by GI today      - will monitor renal fxn closely on diuretics   -c/w  rifaximin, lactulose to prevent hepatic encephalopathy   -c/w labetalol   -c/w monitoring lfts/ plt/ ammonia if pt becomes more lethargic or altered   -GI f/u noted and appreciated   - CT sx f/u noted and appreciated  -Outpatient surveillance for HCC and f/u with GI/ hepatic specialist     Klebsiella PNA bacteremia unclear source   - afebrile  - no leukocytosis   - repeat B cx negative on 6/18  - pleural cx negative   -SBP ruled out; f/u ascitic fluid culture - no growth to date  -c/w rocephin  end date is 7/1   picc line is in place RUE   -ID recommendations appreciated      Anemia of chronic disease in the setting of cirrhotic liver and bone narrow suppression from alcohol   -Hb 5.2 on admission, s/p 3 u prbcs total inpatient   - hgb remains ~8  - likely related to end stage liver, -no active signs/symptoms of GIB  -FOBT negative   -s/p venofer  -continue PPI  -hematology and GI recommendations appreciated    Hyponatremia improving   -sodium has remained stable   -will f/u labs tomorrow   -c/w lasix and sodium tabs   -continue free water restriction  -monitor I&O's  - nephro f/u noted and appreciated     Lower back and flank pain   - stable   -c/w tylenol prn   -c/w  lidoderm patch   -OOB to chair; mobilize patient    Hyperglycemia with underlying DM-2, not on home insulin   -HbA1c 8.0, goal <7  - fs stable   -continue lantus 15 u sq qam   -Continue ISS  -c/w hypoglycemia protocol   -c/w glucerna supplements      History of alcohol abuse   -no evidence of withdrawal  -continue Thiamine, Folic Acid and MVI  - pt counselled on abstinence from alcohol abuse     HTN   -BP stable  -Continue Labetalol with hold parameters     Hypothyroidism   -TSH elevated,  -c/w synthroid at increased dose   -f/u tsh in the am   will also repeat TFTS in 4-6 weeks    DVT ppx   - SCDs b/l   -no chemical AC given thrombocytopenia     Next of kin: Son- Ashish   Pt overall with guarded prognosis and high risk for decline as well as recurrent hospitalizations given her current state.   Palliative care continues to follow the pt.     Dispo: Prognosis guarded, d/w CM and SW plan for [facility] as pts son Ashish does not feel the pt would be safe at home. Covid PCR ordered and negative, anticipated discharge in 24-48 hrs pending auth.

## 2021-06-23 NOTE — DISCHARGE NOTE PROVIDER - HOSPITAL COURSE
60 y/o female with hx of alcoholic liver cirrhosis with hepatic encephalopathy, portal HTN, HTN, DM-2, hypothyroidism came to the ED complaining of epigastric pain x 1 week. She was found to have hb of 5.2 in the ED, occult negative. She was admitted for workup and treatment of anemia. S/p 2 u prbcs.  Hospital course complicated by klebsiella pna bacteremia with repeat B cx negative on 6/18, pt empirically remains on rocephin. Pt also noted with worsening sob with noted large left sided pleural effusion requiring thoracocentesis as well as worsening abd distension requiring paracentesis. Pt remains on diuresis with aldactone and lasix, being followed by ct sx but currently not a candidate for pleurx. Given recurrent generalized abd pain repeat ct abd/pelvis completed 6/22 which showed moderate amount of ascites, plan for repeat paracentesis today by IR but pt had only small amount ascites so procedure was cancelled. Currently pt medically optimized for dispo planning, PT evaluated the pt and recommended [facility]. Pt to f/u with GI/ liver specialist outpt post rehab. Pt to f/u with the physician at the [facility]. Pt will also need to be set up for recurrent paracentesis and thoracocentesis as an outpt and in the rehab likely every 2-3 weeks. 60 y/o female with hx of alcoholic liver cirrhosis with hepatic encephalopathy, portal HTN, HTN, DM-2, hypothyroidism came to the ED complaining of epigastric pain x 1 week. She was found to have hb of 5.2 in the ED, occult negative. She was admitted for workup and treatment of anemia. S/p 2 u prbcs.  Hospital course complicated by klebsiella pna bacteremia with repeat B cx negative on 6/18, pt empirically remains on rocephin  through 7/1 via midline. . Pt also noted with worsening sob with noted large left sided pleural effusion requiring thoracocentesis as well as worsening abd distension requiring paracentesis. Pt remains on diuresis with aldactone and lasix, being followed by ct sx but currently not a candidate for pleurx. Given recurrent generalized abd pain repeat ct abd/pelvis completed 6/22 which showed moderate amount of ascites, plan for repeat paracentesis today by IR but pt had only small amount ascites so procedure was cancelled. EGD performed on 6/25: normal duodenum, varices in lower third of the esophagus, ulcer in the antrum, c/w Protonix 40mg daily and  Inderal 20mg TID. PT evaluated the pt and recommended [facility]. Pt to f/u with GI/ liver specialist outpt post rehab. Pt to f/u with the physician at the [facility]. Pt will also need to be set up for recurrent paracentesis and thoracocentesis as an outpt and in the rehab likely every 2-3 weeks.

## 2021-06-23 NOTE — PROGRESS NOTE ADULT - TIME BILLING
D/W pt, son Abiodun via phone, hospitalist Dr. Stiles, [facility]
D/W patient, Hospitalist Dr. Stiles

## 2021-06-23 NOTE — DISCHARGE NOTE PROVIDER - NSDCMRMEDTOKEN_GEN_ALL_CORE_FT
Aldactone 25 mg oral tablet: 2 tab(s) orally once a day  Colace 100 mg oral capsule:  orally 3 times a day  ferrous sulfate 324 mg (65 mg elemental iron) oral delayed release tablet:  orally once a day  folic acid 1 mg oral tablet: 1 tab(s) orally once a day  furosemide 20 mg oral tablet: 1 tab(s) orally once a day  labetalol 100 mg oral tablet: 1 tab(s) orally 2 times a day  lactulose 10 g/15 mL oral syrup: 30 milliliter(s) orally 3 times a day  hold if having more than 4 BM   multivitamin: 1 tab(s)  once a day  omeprazole 20 mg oral delayed release capsule: 2 tab(s) orally   rifaximin 550 mg oral tablet: 1 tab(s) orally 2 times a day  Synthroid 25 mcg (0.025 mg) oral tablet: 1 tab(s) orally once a day  Vitamin B1 100 mg oral tablet: 1 tab(s) orally once a day   acetaminophen 500 mg oral tablet: 1 tab(s) orally every 12 hours, As needed, Temp greater or equal to 38C (100.4F), Mild Pain (1 - 3)  cefTRIAXone: 2000 milligram(s) intravenous once a day end date 7/1/21  Colace 100 mg oral capsule:  orally 3 times a day  ferrous sulfate 324 mg (65 mg elemental iron) oral delayed release tablet:  orally once a day  folic acid 1 mg oral tablet: 1 tab(s) orally once a day  furosemide 40 mg oral tablet: 1 tab(s) orally once a day  insulin glargine: 15 unit(s) subcutaneous once a day (at bedtime)  insulin lispro 100 units/mL injectable solution: [facility] with ISS coverage  labetalol: 50 milligram(s) orally 2 times a day  lactulose 10 g/15 mL oral syrup: 15 milliliter(s) orally once a day  levothyroxine 50 mcg (0.05 mg) oral tablet: 1 tab(s) orally once a day  lidocaine 5% topical film: Apply topically to affected area once a day  multivitamin: 1 tab(s)  once a day  pantoprazole 40 mg oral granule, delayed release: 1 each orally 2 times a day  rifaximin 550 mg oral tablet: 1 tab(s) orally 2 times a day  spironolactone 25 mg oral tablet: 4 tab(s) orally once a day  Vitamin B1 100 mg oral tablet: 1 tab(s) orally once a day

## 2021-06-23 NOTE — DISCHARGE NOTE PROVIDER - NSDCCPCAREPLAN_GEN_ALL_CORE_FT
PRINCIPAL DISCHARGE DIAGNOSIS  Diagnosis: End stage liver disease  Assessment and Plan of Treatment: Continue with medications as prescribed. Continue with rifaxmin and lactulose to prevent hepatic encephalopathy. Please continue with labtalol for portal hypertension. Please continue with taking diuretics to off load fluid. Complication of ascites and pleural effusion which will recur. Recommended q2-3weekly paracentesis and possible need for thoracocentesis. Please continue with following with gastroenterologist/ lkiver specialist. Abstain from alcohol abuse.      SECONDARY DISCHARGE DIAGNOSES  Diagnosis: Pleural effusion on left  Assessment and Plan of Treatment: Secondary to end stage liver disease, s/p thoracocentesis during hospitalization. Currently with recurrence of fluid but asymptomatic. Follow up with repeat thoracocentesis and ct surgery if patient becomes symptomatic may need repeat thoracocentesis.    Diagnosis: Symptomatic anemia  Assessment and Plan of Treatment: Continue with vitamin supplementation, monitor closely for bleeding and monitor hemoglobin.    Diagnosis: Bacteremia due to Klebsiella pneumoniae  Assessment and Plan of Treatment: Repeat Blood cultures were negative on 6/18. Continue with rocephin as prescribed until 7/1. Then discontinue picc line. Please follow up with the infectious disease physician Dr. Carlson in one week.     PRINCIPAL DISCHARGE DIAGNOSIS  Diagnosis: End stage liver disease  Assessment and Plan of Treatment: Continue with medications as prescribed. Continue with rifaxmin and lactulose to prevent hepatic encephalopathy. Please continue with labtalol for portal hypertension. Please continue with taking diuretics to off load fluid. Complication of ascites and pleural effusion which will recur. Recommended q2-3weekly paracentesis and possible need for thoracocentesis. Please continue with following with gastroenterologist/ lkiver specialist. Abstain from alcohol abuse.      SECONDARY DISCHARGE DIAGNOSES  Diagnosis: Pleural effusion on left  Assessment and Plan of Treatment: Secondary to end stage liver disease, s/p thoracocentesis during hospitalization. Currently with recurrence of fluid but asymptomatic. Follow up with repeat thoracocentesis and ct surgery if patient becomes symptomatic may need repeat thoracocentesis.    Diagnosis: Symptomatic anemia  Assessment and Plan of Treatment: Continue with vitamin supplementation, monitor closely for bleeding and monitor hemoglobin.    Diagnosis: Bacteremia due to Klebsiella pneumoniae  Assessment and Plan of Treatment: Repeat Blood cultures were negative on 6/18. Continue with rocephin as prescribed until 7/1. Then discontinue picc line. Please follow up with the infectious disease physician Dr. Carlson in one week.    Diagnosis: Hypothyroid  Assessment and Plan of Treatment: Continue with medications as prescribed. TSH elevated 10, please repeat TFTs in 2-3 weeks.    Diagnosis: Essential hypertension  Assessment and Plan of Treatment: Continue with medications as prescribed.    Diagnosis: Diabetes mellitus, type 2  Assessment and Plan of Treatment: Continue with medications as prescribed.     PRINCIPAL DISCHARGE DIAGNOSIS  Diagnosis: End stage liver disease  Assessment and Plan of Treatment: Continue with medications as prescribed. Continue with rifaxmin and lactulose to prevent hepatic encephalopathy. Please continue with labtalol for portal hypertension. Please continue with taking diuretics to off load fluid. Complication of ascites and pleural effusion which will recur. Recommended q2-3weekly paracentesis and possible need for thoracocentesis. Please continue with following with gastroenterologist/ lkiver specialist. Abstain from alcohol abuse.      SECONDARY DISCHARGE DIAGNOSES  Diagnosis: Symptomatic anemia  Assessment and Plan of Treatment: Continue with vitamin supplementation, monitor closely for bleeding and monitor hemoglobin.    Diagnosis: Pleural effusion on left  Assessment and Plan of Treatment: Secondary to end stage liver disease, s/p thoracocentesis during hospitalization. Currently with recurrence of fluid but asymptomatic. Follow up with repeat thoracocentesis and ct surgery if patient becomes symptomatic may need repeat thoracocentesis.    Diagnosis: Bacteremia due to Klebsiella pneumoniae  Assessment and Plan of Treatment: Repeat Blood cultures were negative on 6/18. Continue with rocephin as prescribed until 7/1. Then discontinue mid line. Please follow up with the infectious disease physician Dr. Carlson in one week.    Diagnosis: Hypothyroid  Assessment and Plan of Treatment: Continue with medications as prescribed. TSH elevated 10, please repeat TFTs in 2-3 weeks.    Diagnosis: Essential hypertension  Assessment and Plan of Treatment: Continue with medications as prescribed.    Diagnosis: Diabetes mellitus, type 2  Assessment and Plan of Treatment: Continue with medications as prescribed.

## 2021-06-23 NOTE — DISCHARGE NOTE PROVIDER - CARE PROVIDER_API CALL
Rhonda Carlson)  Infectious Disease; Internal Medicine  [address] [place] [address]  Phone: [phone]  Fax: [phone]  Follow Up Time:     Ashish Muir)  Gastroenterology; Internal Medicine  [address] [facility], [address]  [place] [address]  Phone: [phone]  Fax: [phone]  Follow Up Time:

## 2021-06-23 NOTE — PROGRESS NOTE ADULT - SUBJECTIVE AND OBJECTIVE BOX
Patient is a 65y old  Female who presents with a chief complaint of Anemia (23 Jun 2021 18:00) pending dispo       HEALTH ISSUES - PROBLEM Dx:  Alcoholic cirrhosis of liver with ascites  ETOH abuse  Fever  Pleural effusion on left  Pancytopenia  Bacteremia  Debility  Anemia  Pleural effusion  Hyponatremia  Pain          INTERVAL HPI/OVERNIGHT EVENTS: Arabic inhouse    Patient seen and examined at bedside. No acute events overnight. Patient states her abdominal pain has subsided she is not sure but thinks it was due to her prior paracentesis. D/w her that she will go down for a repeat paracentesis to off load fluid before she leaves to rehab. She is in agreement with this plan of care. D/w her that she will need frequent paracenetesis and possible need for thoracocentesis as well. She has poor insight into her medical condition and continues to ask questions related to it. Asked if she can fix her current situation and in regards to transplant and d/w her that she will need to abstain from alcohol use. She asks the same questions, which shows a lack of insight into her disease process. Pt otherwise reports eating well and having few bms a day. Patient denies chest pain, SOB, N/V, fever, chills, dysuria or any other complaints. All remainder ROS negative.     Vital Signs Last 24 Hrs  T(C): 36.8 (23 Jun 2021 20:45), Max: 36.9 (23 Jun 2021 04:46)  T(F): 98.2 (23 Jun 2021 20:45), Max: 98.5 (23 Jun 2021 04:46)  HR: 74 (23 Jun 2021 20:45) (64 - 74)  BP: 100/55 (23 Jun 2021 20:45) (100/55 - 128/73)  BP(mean): --  RR: 18 (23 Jun 2021 20:45) (18 - 18)  SpO2: 96% (23 Jun 2021 20:45) (95% - 98%)    CAPILLARY BLOOD GLUCOSE  POCT Blood Glucose.: 169 mg/dL (23 Jun 2021 21:36)  POCT Blood Glucose.: 180 mg/dL (23 Jun 2021 16:22)  POCT Blood Glucose.: 172 mg/dL (23 Jun 2021 11:49)  POCT Blood Glucose.: 101 mg/dL (23 Jun 2021 08:33)      CONSTITUTIONAL: ill appearing,   awake, alert and in no apparent distress  ENMT: Airway patent, Nasal mucosa clear.    EYES: Clear bilaterally, pupils equal, round and reactive to light.  icteric sclera   CARDIAC: Normal rate, regular rhythm.  Heart sounds S1, S2.  No murmurs, rubs or gallops   RESPIRATORY: Fair air entry, left sided diminished bs   GASTROENTEROLOGY: Soft generalized tenderness to palpation  distended +ve    RLQ dressing +ve c/d/i   EXTREMITIES: No edema, cyanosis or deformity   NEUROLOGICAL: Alert and oriented to self and place  lack of insight into her medical condition   SKIN: No rash, skin turgor wnl     MEDICATIONS  (STANDING):  cefTRIAXone   IVPB 2000 milliGRAM(s) IV Intermittent every 24 hours  dextrose 40% Gel 15 Gram(s) Oral once  dextrose 5%. 1000 milliLiter(s) (50 mL/Hr) IV Continuous <Continuous>  dextrose 5%. 1000 milliLiter(s) (100 mL/Hr) IV Continuous <Continuous>  dextrose 50% Injectable 25 Gram(s) IV Push once  dextrose 50% Injectable 12.5 Gram(s) IV Push once  dextrose 50% Injectable 25 Gram(s) IV Push once  folic acid 1 milliGRAM(s) Oral daily  furosemide    Tablet 40 milliGRAM(s) Oral daily  glucagon  Injectable 1 milliGRAM(s) IntraMuscular once  insulin glargine Injectable (LANTUS) 15 Unit(s) SubCutaneous at bedtime  insulin lispro (ADMELOG) corrective regimen sliding scale   SubCutaneous three times a day before meals  insulin lispro (ADMELOG) corrective regimen sliding scale   SubCutaneous at bedtime  labetalol 50 milliGRAM(s) Oral two times a day  lactulose Syrup 10 Gram(s) Oral daily  levothyroxine 50 MICROGram(s) Oral daily  lidocaine   Patch 1 Patch Transdermal daily  multivitamin 1 Tablet(s) Oral daily  pantoprazole  Injectable 40 milliGRAM(s) IV Push two times a day  rifAXIMin 550 milliGRAM(s) Oral two times a day  sodium chloride 2 Gram(s) Oral two times a day  spironolactone 100 milliGRAM(s) Oral daily  thiamine 100 milliGRAM(s) Oral daily    MEDICATIONS  (PRN):  acetaminophen   Tablet .. 500 milliGRAM(s) Oral every 12 hours PRN Temp greater or equal to 38C (100.4F), Mild Pain (1 - 3)  morphine  - Injectable 1 milliGRAM(s) IV Push every 6 hours PRN Moderate Pain (4 - 6)      LABS:  no new labs       RADIOLOGY & ADDITIONAL TESTS:  No new imaging studies

## 2021-06-23 NOTE — CHART NOTE - NSCHARTNOTEFT_GEN_A_CORE
Pt brought to IR for paracentesis.  Ultrasound imaging shows only a small amount of ascites, insufficient for paracentesis. Therefore the procedure wasn't performed and the patient was returned to her room.

## 2021-06-23 NOTE — PROGRESS NOTE ADULT - ASSESSMENT
65y  Female with h/o alcoholic liver cirrhosis with hepatic encephalopathy, portal HTN, HTN, DM 2, hypothyroidism. Patient initially admitted with epigastric pain, worsening transaminitis, alcoholic cirrhosis and weakness. CT of abdomen and pelvis revealed  cirrhosis with evidence of portal hypertension, mild volume of abdominopelvic ascites, mild left pleural effusion with associated subsegmental atelectasis, possible gastritis and gallstones. Patient was followed by GI, Hematology, nephrology. Patient developed fever to 102.8F 6/16. Also had paracentesis 6/16. Blood cultures with GNR.       Klebsiella pneumoniae sepsis  fever  leukocytosis  Alcoholic liver cirrrhosis      - Blood cultures reporting Klebsiella pneumoniae  - Repeat blood cultures no growth 6/18  - RVP/COVID 19 PCR negative   - CXR reporting left pleural effusion   - CT A/P reporting cirrhosis   - UA negative   - Procalcitonin level 2.95  - Continue Ceftriaxone  - Will need IV Ceftriaxone till 7/1/21  - Trend Fever  - Trend Leukocytosis      Thank you for allowing me to participate in the care of your patient.   Will Follow      d/w Dr Stiles

## 2021-06-23 NOTE — PROGRESS NOTE ADULT - SUBJECTIVE AND OBJECTIVE BOX
INTERVAL HPI/OVERNIGHT EVENTS:Follow-up is being performed for alcoholic cirrhosis with hepatitis and ascites.  CT abdomen was performed yesterday which showed moderate ascites.  IR tried to do the paracentesis but not enough fluid was noted.  Patient is passing bowel movement without any difficulty.  The patient is only on 50 mg of spironolactone and Lasix of 40 mg.    MEDICATIONS  (STANDING):  cefTRIAXone   IVPB 2000 milliGRAM(s) IV Intermittent every 24 hours  dextrose 40% Gel 15 Gram(s) Oral once  dextrose 5%. 1000 milliLiter(s) (50 mL/Hr) IV Continuous <Continuous>  dextrose 5%. 1000 milliLiter(s) (100 mL/Hr) IV Continuous <Continuous>  dextrose 50% Injectable 25 Gram(s) IV Push once  dextrose 50% Injectable 12.5 Gram(s) IV Push once  dextrose 50% Injectable 25 Gram(s) IV Push once  folic acid 1 milliGRAM(s) Oral daily  furosemide    Tablet 40 milliGRAM(s) Oral daily  glucagon  Injectable 1 milliGRAM(s) IntraMuscular once  insulin glargine Injectable (LANTUS) 15 Unit(s) SubCutaneous at bedtime  insulin lispro (ADMELOG) corrective regimen sliding scale   SubCutaneous at bedtime  insulin lispro (ADMELOG) corrective regimen sliding scale   SubCutaneous three times a day before meals  labetalol 50 milliGRAM(s) Oral two times a day  lactulose Syrup 10 Gram(s) Oral daily  levothyroxine 50 MICROGram(s) Oral daily  lidocaine   Patch 1 Patch Transdermal daily  multivitamin 1 Tablet(s) Oral daily  pantoprazole  Injectable 40 milliGRAM(s) IV Push two times a day  rifAXIMin 550 milliGRAM(s) Oral two times a day  sodium chloride 2 Gram(s) Oral two times a day  spironolactone 50 milliGRAM(s) Oral daily  thiamine 100 milliGRAM(s) Oral daily    MEDICATIONS  (PRN):  acetaminophen   Tablet .. 500 milliGRAM(s) Oral every 12 hours PRN Temp greater or equal to 38C (100.4F), Mild Pain (1 - 3)  morphine  - Injectable 1 milliGRAM(s) IV Push every 6 hours PRN Moderate Pain (4 - 6)      Allergies    No Known Allergies    Intolerances        Vital Signs Last 24 Hrs  T(C): 36.7 (23 Jun 2021 10:22), Max: 36.9 (23 Jun 2021 04:46)  T(F): 98.1 (23 Jun 2021 10:22), Max: 98.5 (23 Jun 2021 04:46)  HR: 70 (23 Jun 2021 10:22) (64 - 70)  BP: 128/73 (23 Jun 2021 10:22) (107/64 - 134/72)  BP(mean): --  RR: 18 (23 Jun 2021 10:22) (18 - 18)  SpO2: 98% (23 Jun 2021 10:22) (96% - 98%)    LABS:                RADIOLOGY & ADDITIONAL TESTS:

## 2021-06-23 NOTE — CHART NOTE - NSCHARTNOTEFT_GEN_A_CORE
Vascular & Interventional Radiology Pre-Procedure Note    Procedure Name: ___paracentesis____    HPI: 65y Female with ____ascites___    65y  Female with h/o alcoholic liver cirrhosis with hepatic encephalopathy, portal HTN, HTN, DM 2, hypothyroidism. Patient initially admitted with epigastric pain, worsening transaminitis, alcoholic cirrhosis and weakness. CT of abdomen and pelvis revealed  cirrhosis with evidence of portal hypertension, mild volume of abdominopelvic ascites, mild left pleural effusion with associated subsegmental atelectasis, possible gastritis and gallstones. Patient was followed by GI, Hematology, nephrology. Patient developed fever to 102.8F 6/16. Also had paracentesis 6/16. Blood cultures with GNR. Fluid now reaccumulated    PMH:  Alcohol abuse  Liver cirrhosis  Lump in female breast  UTI (urinary tract infection)  Lymphangitis, acute, lower leg  Anemia  Hypothyroidism  Chronic back pain  HTN (hypertension)  GERD (gastroesophageal reflux disease)  Pancreatitis  Pleural effusion on left  Pancytopenia  Hyponatremia  Hyperglycemia    S/P abdominoplasty      Allergies: No Known Allergies      Medications (Abx/Cardiac/Anticoagulation/Blood Products)  acetaminophen   Tablet .. 500 milliGRAM(s) Oral every 12 hours PRN  cefTRIAXone   IVPB 2000 milliGRAM(s) IV Intermittent every 24 hours  dextrose 40% Gel 15 Gram(s) Oral once  dextrose 5%. 1000 milliLiter(s) IV Continuous <Continuous>  dextrose 5%. 1000 milliLiter(s) IV Continuous <Continuous>  dextrose 50% Injectable 25 Gram(s) IV Push once  dextrose 50% Injectable 12.5 Gram(s) IV Push once  dextrose 50% Injectable 25 Gram(s) IV Push once  folic acid 1 milliGRAM(s) Oral daily  furosemide    Tablet 40 milliGRAM(s) Oral daily  glucagon  Injectable 1 milliGRAM(s) IntraMuscular once  insulin glargine Injectable (LANTUS) 15 Unit(s) SubCutaneous at bedtime  insulin lispro (ADMELOG) corrective regimen sliding scale   SubCutaneous three times a day before meals  insulin lispro (ADMELOG) corrective regimen sliding scale   SubCutaneous at bedtime  labetalol 50 milliGRAM(s) Oral two times a day  lactulose Syrup 10 Gram(s) Oral daily  levothyroxine 50 MICROGram(s) Oral daily  lidocaine   Patch 1 Patch Transdermal daily  morphine  - Injectable 1 milliGRAM(s) IV Push every 6 hours PRN  multivitamin 1 Tablet(s) Oral daily  pantoprazole  Injectable 40 milliGRAM(s) IV Push two times a day  rifAXIMin 550 milliGRAM(s) Oral two times a day  sodium chloride 2 Gram(s) Oral two times a day  spironolactone 50 milliGRAM(s) Oral daily  thiamine 100 milliGRAM(s) Oral daily      Data:      T(C): 36.7  HR: 70  BP: 128/73  RR: 18  SpO2: 98%    Exam  General: ___wnl____  Chest: ____wnl___  Abdomen: __wnl_____  Extremities: ___wnl____    -WBC 7.35 / HgB 8.6 / Hct 25.4 / Plt 77  -Na 130 / Cl 100 / BUN 8.8 / Glucose 143  -K 4.1 / CO2 20.0 / Cr 0.48  -ALT 38 / Alk Phos 274 / T.Bili 4.2  -INR1.58      Imaging: ___ascites and left pleural effusion____    Plan:   -65y Female presents for ___paracentesis____  -Risks/Benefits/alternatives explained with the patient via a  and witnessed informed consent obtained.

## 2021-06-23 NOTE — PROGRESS NOTE ADULT - SUBJECTIVE AND OBJECTIVE BOX
NEPHROLOGY INTERVAL HPI/OVERNIGHT EVENTS:    IR noted   Not enough ascites to drain   She feels better     MEDICATIONS  (STANDING):  cefTRIAXone   IVPB 2000 milliGRAM(s) IV Intermittent every 24 hours  dextrose 40% Gel 15 Gram(s) Oral once  dextrose 5%. 1000 milliLiter(s) (50 mL/Hr) IV Continuous <Continuous>  dextrose 5%. 1000 milliLiter(s) (100 mL/Hr) IV Continuous <Continuous>  dextrose 50% Injectable 25 Gram(s) IV Push once  dextrose 50% Injectable 12.5 Gram(s) IV Push once  dextrose 50% Injectable 25 Gram(s) IV Push once  folic acid 1 milliGRAM(s) Oral daily  furosemide    Tablet 40 milliGRAM(s) Oral daily  glucagon  Injectable 1 milliGRAM(s) IntraMuscular once  insulin glargine Injectable (LANTUS) 15 Unit(s) SubCutaneous at bedtime  insulin lispro (ADMELOG) corrective regimen sliding scale   SubCutaneous at bedtime  insulin lispro (ADMELOG) corrective regimen sliding scale   SubCutaneous three times a day before meals  labetalol 50 milliGRAM(s) Oral two times a day  lactulose Syrup 10 Gram(s) Oral daily  levothyroxine 50 MICROGram(s) Oral daily  lidocaine   Patch 1 Patch Transdermal daily  multivitamin 1 Tablet(s) Oral daily  pantoprazole  Injectable 40 milliGRAM(s) IV Push two times a day  rifAXIMin 550 milliGRAM(s) Oral two times a day  sodium chloride 2 Gram(s) Oral two times a day  spironolactone 100 milliGRAM(s) Oral daily  thiamine 100 milliGRAM(s) Oral daily    MEDICATIONS  (PRN):  acetaminophen   Tablet .. 500 milliGRAM(s) Oral every 12 hours PRN Temp greater or equal to 38C (100.4F), Mild Pain (1 - 3)  morphine  - Injectable 1 milliGRAM(s) IV Push every 6 hours PRN Moderate Pain (4 - 6)      Allergies    No Known Allergies    Intolerances          Vital Signs Last 24 Hrs  T(C): 36.8 (23 Jun 2021 16:20), Max: 36.9 (23 Jun 2021 04:46)  T(F): 98.3 (23 Jun 2021 16:20), Max: 98.5 (23 Jun 2021 04:46)  HR: 71 (23 Jun 2021 16:20) (64 - 71)  BP: 101/58 (23 Jun 2021 16:20) (101/58 - 128/73)  BP(mean): --  RR: 18 (23 Jun 2021 16:20) (18 - 18)  SpO2: 95% (23 Jun 2021 16:20) (95% - 98%)  Daily     Daily   I&O's Detail    I&O's Summary          PHYSICAL EXAM:  GENERAL: Tired, weak  NECK: Supple, no JVD  NERVOUS SYSTEM:  Awake, alert  CHEST/LUNG: Clear with diminished BS at bases (L>R)  HEART: Regular rate and rhythm; no rub  ABDOMEN: Soft, distended +BS  EXTREMITIES:  trace edema B/L LE    LABS:                      RADIOLOGY & ADDITIONAL TESTS:

## 2021-06-23 NOTE — DISCHARGE NOTE PROVIDER - NSDCFUADDAPPT_GEN_ALL_CORE_FT
Please follow up with the physician at the facility. Please follow up with infectious disease in 1 week. Please follow up with the gastroenterologist in 2-3 weeks.

## 2021-06-23 NOTE — PROGRESS NOTE ADULT - SUBJECTIVE AND OBJECTIVE BOX
[facility] PALLIATIVE MEDICINE FOLLOW UP VISIT    INTERVAL HPI/OVERNIGHT EVENTS:  Source if other than patient:  []Family   [x]Team     No acute events overnight.   Seen and examined at beside.   Assisted by [place]ian language line ([facility] #[id number])    Present Symptoms:   Dyspnea:  No   Nausea/Vomiting:  No  Anxiety:   No  Depression:  No  Fatigue: Yes    Loss of appetite: Yes   Constipation:  No   Pain: No, pt reports abdominal pain improved from yesterday    MEDICATIONS  (STANDING):  cefTRIAXone   IVPB 2000 milliGRAM(s) IV Intermittent every 24 hours  dextrose 40% Gel 15 Gram(s) Oral once  dextrose 5%. 1000 milliLiter(s) (50 mL/Hr) IV Continuous <Continuous>  dextrose 5%. 1000 milliLiter(s) (100 mL/Hr) IV Continuous <Continuous>  dextrose 50% Injectable 25 Gram(s) IV Push once  dextrose 50% Injectable 12.5 Gram(s) IV Push once  dextrose 50% Injectable 25 Gram(s) IV Push once  folic acid 1 milliGRAM(s) Oral daily  furosemide    Tablet 40 milliGRAM(s) Oral daily  glucagon  Injectable 1 milliGRAM(s) IntraMuscular once  insulin glargine Injectable (LANTUS) 15 Unit(s) SubCutaneous at bedtime  insulin lispro (ADMELOG) corrective regimen sliding scale   SubCutaneous at bedtime  insulin lispro (ADMELOG) corrective regimen sliding scale   SubCutaneous three times a day before meals  labetalol 50 milliGRAM(s) Oral two times a day  lactulose Syrup 10 Gram(s) Oral daily  levothyroxine 50 MICROGram(s) Oral daily  lidocaine   Patch 1 Patch Transdermal daily  multivitamin 1 Tablet(s) Oral daily  pantoprazole  Injectable 40 milliGRAM(s) IV Push two times a day  rifAXIMin 550 milliGRAM(s) Oral two times a day  sodium chloride 2 Gram(s) Oral two times a day  spironolactone 50 milliGRAM(s) Oral daily  thiamine 100 milliGRAM(s) Oral daily    MEDICATIONS  (PRN):  acetaminophen   Tablet .. 500 milliGRAM(s) Oral every 12 hours PRN Temp greater or equal to 38C (100.4F), Mild Pain (1 - 3)  morphine  - Injectable 1 milliGRAM(s) IV Push every 6 hours PRN Moderate Pain (4 - 6)      PHYSICAL EXAM:  Vital Signs Last 24 Hrs  T(C): 36.7 (23 Jun 2021 10:22), Max: 36.9 (23 Jun 2021 04:46)  T(F): 98.1 (23 Jun 2021 10:22), Max: 98.5 (23 Jun 2021 04:46)  HR: 70 (23 Jun 2021 10:22) (64 - 70)  BP: 128/73 (23 Jun 2021 10:22) (107/64 - 134/72)  BP(mean): --  RR: 18 (23 Jun 2021 10:22) (18 - 18)  SpO2: 98% (23 Jun 2021 10:22) (96% - 98%)    Palliative Performance Status Version 2: 50-60%  http://npcrc.org/files/news/palliative_performance_scale_[email or web address].pdf    General: Resting comfortably. No acute distress.   HEENT: mucous membrane moist    Lungs: comfortable. non-labored.   CV: +s1/s2. rrr  GI: +bowel sound. abdomen soft, non-tender, distended, no guarding  MSK: Moves all 4 extremities. No cyanosis or edema.    Neuro: nonfocal. Awake and alert, orientedx3. Interactive   Skin: warm and dry.   Psych: flat affect    LABS: reviewed  RADIOLOGY & ADDITIONAL STUDIES:     CT A/P 6/22/2021  FINDINGS:  LOWER CHEST: There is been increase in size of the left pleural effusion, now large with complete compressive collapse of the lingula and left lower lobe. 4 mm calcified granuloma in the right middle lobe.    LIVER: Again is noted a shrunken macro lobular liver with prominence of the left lobe and caudate lobe and mild heterogeneous attenuation, compatible with end-stage cirrhosis. There is marked dilatation of the paraumbilical veins with periumbilical varices and right anterior pelvic wall varices extending to the right groin.  BILE DUCTS: Normal caliber.  GALLBLADDER: Numerous tiny layering stones and/or hyperdense sludge..  SPLEEN: Within normal limits.  PANCREAS: Within normal limits.  ADRENALS: Within normal limits.  KIDNEYS/URETERS: Within normal limits.    BLADDER: Minimally distended.  REPRODUCTIVE ORGANS: The endometrial lining is prominent to 6 mm. The adnexa are unremarkable by CT criteria.    BOWEL: No bowel obstruction. Appendix is normal. There is no mesenteric adenopathy.  PERITONEUM: Again is noted moderate intra-abdominal free fluid, compatible with ascites. No discrete loculated intraperitoneal fluid collection is identified. There is no evidence of hyperattenuating collection in the peritoneal cavity or retroperitoneum to suggest acute hemorrhage.  VESSELS: Within normal limits.  RETROPERITONEUM/LYMPH NODES: No lymphadenopathy.  ABDOMINAL WALL: Within normal limits.  BONES: Within normal limits.    IMPRESSION: Large left pleural effusion with complete compressive collapse of the lingula and left lower lobe. Cirrhotic liver with moderate ascites.      ADVANCE DIRECTIVES:   DNR YES NO  Completed on:                     MOLST  YES NO   Completed on:  Living Will  YES NO   Completed on:

## 2021-06-23 NOTE — PROGRESS NOTE ADULT - SUBJECTIVE AND OBJECTIVE BOX
[facility]  INFECTIOUS DISEASES AND INTERNAL MEDICINE at [place]  =======================================================  Bernardo La MD  Diplomates American Board of Internal Medicine and Infectious Diseases  Tel: [phone]      Fax: [phone]  =======================================================    JUSTIN LOVE [id number]    Follow up: Klebsiella pneumoniae    No Fever  Reports feeling better      Allergies:  No Known Allergies      REVIEW OF SYSTEMS:  CONSTITUTIONAL:  No Fever or chills  HEENT:  No diplopia or blurred vision.  No earache, sore throat or runny nose.  CARDIOVASCULAR:  No pressure, squeezing, strangling, tightness, heaviness or aching about the chest, neck, axilla or epigastrium.  RESPIRATORY:  No cough, shortness of breath  GASTROINTESTINAL:  No nausea, vomiting or diarrhea.  GENITOURINARY:  No dysuria, frequency or urgency.   MUSCULOSKELETAL:  no joint aches, no muscle pain  SKIN:  No change in skin, hair or nails.  NEUROLOGIC:  No Headaches, seizures   PSYCHIATRIC:  No disorder of thought or mood.  ENDOCRINE:  No heat or cold intolerance  HEMATOLOGICAL:  No easy bruising or bleeding.       Physical Exam:  GEN: NAD, pleasant  HEENT: normocephalic and atraumatic. EOMI. PERRL.  Anicteric  NECK: Supple.   LUNGS: Clear to auscultation.  HEART: Regular rate and rhythm   ABDOMEN: Soft, nontender, and nondistended.  Positive bowel sounds.    : No CVA tenderness  EXTREMITIES: Without any edema.  MSK: No joint swelling  NEUROLOGIC:  No Focal Deficits  PSYCHIATRIC: Appropriate affect .  SKIN: No Rash      Vitals:    T(F): 98.5 (23 Jun 2021 04:46), Max: 98.5 (23 Jun 2021 04:46)  HR: 64 (23 Jun 2021 04:46)  BP: 115/71 (23 Jun 2021 04:46)  RR: 18 (23 Jun 2021 04:46)  SpO2: 97% (23 Jun 2021 04:46) (96% - 97%)  temp max in last 48H T(F): , Max: 99.2 (06-21-21 @ 16:29)    Current Antibiotics:  cefTRIAXone   IVPB 2000 milliGRAM(s) IV Intermittent every 24 hours  rifAXIMin 550 milliGRAM(s) Oral two times a day    Other medications:  dextrose 40% Gel 15 Gram(s) Oral once  dextrose 5%. 1000 milliLiter(s) IV Continuous <Continuous>  dextrose 5%. 1000 milliLiter(s) IV Continuous <Continuous>  dextrose 50% Injectable 25 Gram(s) IV Push once  dextrose 50% Injectable 12.5 Gram(s) IV Push once  dextrose 50% Injectable 25 Gram(s) IV Push once  folic acid 1 milliGRAM(s) Oral daily  furosemide    Tablet 40 milliGRAM(s) Oral daily  glucagon  Injectable 1 milliGRAM(s) IntraMuscular once  insulin glargine Injectable (LANTUS) 15 Unit(s) SubCutaneous at bedtime  insulin lispro (ADMELOG) corrective regimen sliding scale   SubCutaneous three times a day before meals  insulin lispro (ADMELOG) corrective regimen sliding scale   SubCutaneous at bedtime  labetalol 50 milliGRAM(s) Oral two times a day  lactulose Syrup 10 Gram(s) Oral daily  levothyroxine 50 MICROGram(s) Oral daily  lidocaine   Patch 1 Patch Transdermal daily  multivitamin 1 Tablet(s) Oral daily  pantoprazole  Injectable 40 milliGRAM(s) IV Push two times a day  sodium chloride 2 Gram(s) Oral two times a day  spironolactone 50 milliGRAM(s) Oral daily  thiamine 100 milliGRAM(s) Oral daily        RECENT CULTURES:  06-18 @ 06:19 .Blood Blood     No growth at 5 days.    06-18 @ 01:21 .Body Fluid Pleural Fluid     No growth at 5 days  polymorphonuclear leukocytes seen  No organisms seen  by cytocentrifuge    06-16 @ 21:18 .Body Fluid Peritoneal Fluid     No growth at 5 days  Moderate polymorphonuclear leukocytes  No organisms seen  by cytocentrifuge    06-16 @ 07:44 .Blood Blood-Peripheral Blood Culture PCR    Growth in aerobic and anaerobic bottles: Klebsiella pneumoniae  See previous culture [id number]  Growth in aerobic and anaerobic bottles: Gram Negative Rods  ***Blood Panel PCR results on this specimen are available  approximately 3 hours after the Gram stain result.***  Gram stain, PCR, and/or culture results may not always  correspond due todifference in methodologies.  ************************************************************  This PCR assay was performed using [employer].  The following targets are tested for: Enterococcus,  vancomycin resistant enterococci, Listeria monocytogenes,  coagulase negative staphylococci, S. aureus,  methicillin resistant S. aureus, Streptococcus agalactiae  (Group B), S. pneumoniae, S. pyogenes (Group A),  Acinetobacter baumannii, Enterobacter cloacae, E. coli,  Klebsiella oxytoca, K. pneumoniae, Proteus sp.,  Serratia marcescens, Haemophilus influenzae,  Neisseria meningitidis, Pseudomonas aeruginosa, Candida  albicans, C. glabrata, C krusei, C parapsilosis,  C. tropicalis and the KPC resistance gene.  "Due to technical problems, Pseudomonas aeruginosa  will Not be reported as part of the BCID panel  until further notice".  Gram Stain and BCID performed by:  [facility]  [address] [place] [id number]    06-13 @ 01:57 .Urine Clean Catch (Midstream)     >=3 organisms. Probable collection contamination.    06-12 @ 18:38 .Blood Blood-Peripheral     No growth at 5 days.    06-12 @ 18:37 .Blood Blood-Peripheral     No growth at 5 days.      WBC Count: 7.35 K/uL (06-21-21 @ 07:30)  WBC Count: 11.46 K/uL (06-19-21 @ 06:23)    Creatinine, Serum: 0.48 mg/dL (06-21-21 @ 07:29)  Creatinine, Serum: 0.62 mg/dL (06-19-21 @ 06:21)    Ferritin, Serum: 76 ng/mL (06-14-21 @ 08:08)    Procalcitonin, Serum: 2.95 ng/mL (06-16-21 @ 17:59)  Procalcitonin, Serum: 0.83 ng/mL (06-16-21 @ 07:42)     SARS-CoV-2: NotDetec (06-16-21 @ 10:09)  Rapid RVP Result: Jose Antoniotepage (06-16-21 @ 10:09)    COVID-19 PCR: Max (06-13-21 @ 02:24)        < from: US Abdomen Limited (06.16.21 @ 11:02) >  EXAM:  US ABDOMEN LIMITED                          PROCEDURE DATE:  06/16/2021      INTERPRETATION:  CLINICAL INFORMATION: Abdominal distention    COMPARISON: 10/19/2015    TECHNIQUE: Limited abdominal ultrasound was performed using a low frequency curved transducer to assess for ascites    FINDINGS AND  IMPRESSION:    There is mild to moderate ascites in all 4 quadrants. Left-sided pleural effusion is partially visualized.    < end of copied text >        < from: CT Abdomen and Pelvis w/ IV Cont (06.12.21 @ 20:17) >  EXAM:  CT ABDOMEN AND PELVIS IC                          PROCEDURE DATE:  06/12/2021      INTERPRETATION:  CLINICAL INFORMATION: Abdominal pain, nausea and vomiting    COMPARISON: CT of October 1, 2019.    CONTRAST/COMPLICATIONS:  IV Contrast:96 cc of Omnipaque 300. 4 cc was discarded.  Oral Contrast:  Complications: None reported.    PROCEDURE:  Axial images were obtained, along with reformatted coronal and sagittal images.    FINDINGS:  LOWER CHEST: Mild left pleural effusion with associated subsegmental atelectasis.  LIVER: Cirrhotic and fatty.  BILE DUCTS: Normal caliber.  GALLBLADDER: Gallstones.  SPLEEN: Within normal limits.  PANCREAS: Within normal limits.  ADRENALS: Within normal limits.  KIDNEYS/URETERS: Within normal limits.    BLADDER: Incompletely distended.  REPRODUCTIVE ORGANS: No pelvic masses.    BOWEL: Although the stomach is under distended, there is apparent thickening of the distal gastric antrum and pyloric channel suspicious for gastritis. No bowel obstruction. Appendix is unremarkable. The colon is underdistended without significant fecal load.  PERITONEUM: Mild volume of abdominopelvic ascites.  VESSELS: The aorta is not dilated. Mild atherosclerotic vascular calcification. Redemonstration of an of a massively enlarged recanalized umbilical vein with umbilical varices that connect to the right external iliac vein.  RETROPERITONEUM/LYMPH NODES: Scattered subcentimeter retroperitoneal lymph nodes.  ABDOMINAL WALL: Subcutaneous edema.  BONES: Within normal limits.    IMPRESSION:    Cirrhosis with evidence of portal hypertension. Mild volume of abdominopelvic ascites. Mild left pleural effusion with associated subsegmental atelectasis.  Annual surveillance MRI is recommended in cirrhotic patients to screen for hepatocellular carcinoma.    Possible gastritis. Correlate with symptoms, upper endoscopy, or trial of proton pump inhibitors.    Gallstones.    < end of copied text >

## 2021-06-24 ENCOUNTER — TRANSCRIPTION ENCOUNTER (OUTPATIENT)
Age: 66
End: 2021-06-24

## 2021-06-24 DIAGNOSIS — D64.9 ANEMIA, UNSPECIFIED: ICD-10-CM

## 2021-06-24 DIAGNOSIS — R78.81 BACTEREMIA: ICD-10-CM

## 2021-06-24 LAB
ANION GAP SERPL CALC-SCNC: 8 MMOL/L — SIGNIFICANT CHANGE UP (ref 5–17)
BUN SERPL-MCNC: 10 MG/DL — SIGNIFICANT CHANGE UP (ref 8–20)
CALCIUM SERPL-MCNC: 8.2 MG/DL — LOW (ref 8.6–10.2)
CHLORIDE SERPL-SCNC: 104 MMOL/L — SIGNIFICANT CHANGE UP (ref 98–107)
CO2 SERPL-SCNC: 25 MMOL/L — SIGNIFICANT CHANGE UP (ref 22–29)
CREAT SERPL-MCNC: 0.59 MG/DL — SIGNIFICANT CHANGE UP (ref 0.5–1.3)
GLUCOSE BLDC GLUCOMTR-MCNC: 129 MG/DL — HIGH (ref 70–99)
GLUCOSE BLDC GLUCOMTR-MCNC: 158 MG/DL — HIGH (ref 70–99)
GLUCOSE BLDC GLUCOMTR-MCNC: 195 MG/DL — HIGH (ref 70–99)
GLUCOSE BLDC GLUCOMTR-MCNC: 237 MG/DL — HIGH (ref 70–99)
GLUCOSE SERPL-MCNC: 142 MG/DL — HIGH (ref 70–99)
MAGNESIUM SERPL-MCNC: 1.6 MG/DL — SIGNIFICANT CHANGE UP (ref 1.6–2.6)
POTASSIUM SERPL-MCNC: 4 MMOL/L — SIGNIFICANT CHANGE UP (ref 3.5–5.3)
POTASSIUM SERPL-SCNC: 4 MMOL/L — SIGNIFICANT CHANGE UP (ref 3.5–5.3)
SARS-COV-2 RNA SPEC QL NAA+PROBE: SIGNIFICANT CHANGE UP
SODIUM SERPL-SCNC: 136 MMOL/L — SIGNIFICANT CHANGE UP (ref 135–145)

## 2021-06-24 PROCEDURE — 99233 SBSQ HOSP IP/OBS HIGH 50: CPT

## 2021-06-24 PROCEDURE — 99232 SBSQ HOSP IP/OBS MODERATE 35: CPT

## 2021-06-24 RX ADMIN — SPIRONOLACTONE 100 MILLIGRAM(S): 25 TABLET, FILM COATED ORAL at 05:20

## 2021-06-24 RX ADMIN — PANTOPRAZOLE SODIUM 40 MILLIGRAM(S): 20 TABLET, DELAYED RELEASE ORAL at 05:20

## 2021-06-24 RX ADMIN — LIDOCAINE 1 PATCH: 4 CREAM TOPICAL at 11:44

## 2021-06-24 RX ADMIN — SODIUM CHLORIDE 2 GRAM(S): 9 INJECTION INTRAMUSCULAR; INTRAVENOUS; SUBCUTANEOUS at 17:49

## 2021-06-24 RX ADMIN — Medication 100 MILLIGRAM(S): at 11:43

## 2021-06-24 RX ADMIN — Medication 4: at 17:49

## 2021-06-24 RX ADMIN — Medication 50 MILLIGRAM(S): at 05:20

## 2021-06-24 RX ADMIN — PANTOPRAZOLE SODIUM 40 MILLIGRAM(S): 20 TABLET, DELAYED RELEASE ORAL at 17:50

## 2021-06-24 RX ADMIN — Medication 2: at 11:43

## 2021-06-24 RX ADMIN — CEFTRIAXONE 100 MILLIGRAM(S): 500 INJECTION, POWDER, FOR SOLUTION INTRAMUSCULAR; INTRAVENOUS at 17:49

## 2021-06-24 RX ADMIN — LACTULOSE 10 GRAM(S): 10 SOLUTION ORAL at 11:44

## 2021-06-24 RX ADMIN — Medication 50 MICROGRAM(S): at 05:20

## 2021-06-24 RX ADMIN — LIDOCAINE 1 PATCH: 4 CREAM TOPICAL at 00:33

## 2021-06-24 RX ADMIN — Medication 1 TABLET(S): at 11:43

## 2021-06-24 RX ADMIN — Medication 1 MILLIGRAM(S): at 11:43

## 2021-06-24 RX ADMIN — Medication 40 MILLIGRAM(S): at 05:20

## 2021-06-24 RX ADMIN — LIDOCAINE 1 PATCH: 4 CREAM TOPICAL at 19:30

## 2021-06-24 RX ADMIN — Medication 50 MILLIGRAM(S): at 17:49

## 2021-06-24 RX ADMIN — SODIUM CHLORIDE 2 GRAM(S): 9 INJECTION INTRAMUSCULAR; INTRAVENOUS; SUBCUTANEOUS at 05:20

## 2021-06-24 RX ADMIN — INSULIN GLARGINE 15 UNIT(S): 100 INJECTION, SOLUTION SUBCUTANEOUS at 21:00

## 2021-06-24 NOTE — PROGRESS NOTE ADULT - SUBJECTIVE AND OBJECTIVE BOX
NEPHROLOGY INTERVAL HPI/OVERNIGHT EVENTS:    Comfortable   ID follow up noted     MEDICATIONS  (STANDING):  cefTRIAXone   IVPB 2000 milliGRAM(s) IV Intermittent every 24 hours  dextrose 40% Gel 15 Gram(s) Oral once  dextrose 5%. 1000 milliLiter(s) (50 mL/Hr) IV Continuous <Continuous>  dextrose 5%. 1000 milliLiter(s) (100 mL/Hr) IV Continuous <Continuous>  dextrose 50% Injectable 25 Gram(s) IV Push once  dextrose 50% Injectable 12.5 Gram(s) IV Push once  dextrose 50% Injectable 25 Gram(s) IV Push once  folic acid 1 milliGRAM(s) Oral daily  furosemide    Tablet 40 milliGRAM(s) Oral daily  glucagon  Injectable 1 milliGRAM(s) IntraMuscular once  insulin glargine Injectable (LANTUS) 15 Unit(s) SubCutaneous at bedtime  insulin lispro (ADMELOG) corrective regimen sliding scale   SubCutaneous three times a day before meals  insulin lispro (ADMELOG) corrective regimen sliding scale   SubCutaneous at bedtime  labetalol 50 milliGRAM(s) Oral two times a day  lactulose Syrup 10 Gram(s) Oral daily  levothyroxine 50 MICROGram(s) Oral daily  lidocaine   Patch 1 Patch Transdermal daily  multivitamin 1 Tablet(s) Oral daily  pantoprazole  Injectable 40 milliGRAM(s) IV Push two times a day  rifAXIMin 550 milliGRAM(s) Oral two times a day  sodium chloride 2 Gram(s) Oral two times a day  spironolactone 100 milliGRAM(s) Oral daily  thiamine 100 milliGRAM(s) Oral daily    MEDICATIONS  (PRN):  acetaminophen   Tablet .. 500 milliGRAM(s) Oral every 12 hours PRN Temp greater or equal to 38C (100.4F), Mild Pain (1 - 3)      Allergies    No Known Allergies    Intolerances      Vital Signs Last 24 Hrs  T(C): 36.7 (24 Jun 2021 11:05), Max: 36.8 (23 Jun 2021 16:20)  T(F): 98 (24 Jun 2021 11:05), Max: 98.3 (23 Jun 2021 16:20)  HR: 69 (24 Jun 2021 11:05) (69 - 74)  BP: 105/67 (24 Jun 2021 11:05) (100/55 - 110/68)  BP(mean): --  RR: 19 (24 Jun 2021 11:05) (18 - 19)  SpO2: 96% (24 Jun 2021 11:05) (95% - 98%)  Daily     Daily   I&O's Detail    I&O's Summary      PHYSICAL EXAM:    GENERAL: Tired, weak  NECK: Supple, no JVD  NERVOUS SYSTEM:  Awake, alert  CHEST/LUNG: Clear with diminished BS at bases (L>R)  HEART: Regular rate and rhythm; no rub  ABDOMEN: Soft, distended +BS  EXTREMITIES:  trace edema B/L LE    LABS:    06-24    136  |  104  |  10.0  ----------------------------<  142<H>  4.0   |  25.0  |  0.59    Ca    8.2<L>      24 Jun 2021 07:15  Mg     1.6     06-24          Magnesium, Serum: 1.6 mg/dL (06-24 @ 07:15)          RADIOLOGY & ADDITIONAL TESTS:

## 2021-06-24 NOTE — PROGRESS NOTE ADULT - SUBJECTIVE AND OBJECTIVE BOX
Pt seen and examined     REVIEW OF SYSTEMS:    CONSTITUTIONAL: No fever, weight loss, or fatigue  EYES: No eye pain, visual disturbances, or discharge  ENMT:  No difficulty hearing, tinnitus, vertigo; No sinus or throat pain  RESPIRATORY: No cough, wheezing, chills or hemoptysis; No shortness of breath  CARDIOVASCULAR: No chest pain, palpitations, dizziness, or leg swelling  GASTROINTESTINAL: No abdominal or epigastric pain. No nausea, vomiting, or hematemesis; No diarrhea or constipation. No melena or hematochezia.    MEDICATIONS:  MEDICATIONS  (STANDING):  cefTRIAXone   IVPB 2000 milliGRAM(s) IV Intermittent every 24 hours  dextrose 40% Gel 15 Gram(s) Oral once  dextrose 5%. 1000 milliLiter(s) (50 mL/Hr) IV Continuous <Continuous>  dextrose 5%. 1000 milliLiter(s) (100 mL/Hr) IV Continuous <Continuous>  dextrose 50% Injectable 25 Gram(s) IV Push once  dextrose 50% Injectable 12.5 Gram(s) IV Push once  dextrose 50% Injectable 25 Gram(s) IV Push once  folic acid 1 milliGRAM(s) Oral daily  furosemide    Tablet 40 milliGRAM(s) Oral daily  glucagon  Injectable 1 milliGRAM(s) IntraMuscular once  insulin glargine Injectable (LANTUS) 15 Unit(s) SubCutaneous at bedtime  insulin lispro (ADMELOG) corrective regimen sliding scale   SubCutaneous at bedtime  insulin lispro (ADMELOG) corrective regimen sliding scale   SubCutaneous three times a day before meals  labetalol 50 milliGRAM(s) Oral two times a day  lactulose Syrup 10 Gram(s) Oral daily  levothyroxine 50 MICROGram(s) Oral daily  lidocaine   Patch 1 Patch Transdermal daily  multivitamin 1 Tablet(s) Oral daily  pantoprazole  Injectable 40 milliGRAM(s) IV Push two times a day  rifAXIMin 550 milliGRAM(s) Oral two times a day  sodium chloride 2 Gram(s) Oral two times a day  spironolactone 100 milliGRAM(s) Oral daily  thiamine 100 milliGRAM(s) Oral daily    MEDICATIONS  (PRN):  acetaminophen   Tablet .. 500 milliGRAM(s) Oral every 12 hours PRN Temp greater or equal to 38C (100.4F), Mild Pain (1 - 3)      Allergies    No Known Allergies    Intolerances        Vital Signs Last 24 Hrs  T(C): 36.7 (24 Jun 2021 04:44), Max: 36.8 (23 Jun 2021 16:20)  T(F): 98 (24 Jun 2021 04:44), Max: 98.3 (23 Jun 2021 16:20)  HR: 74 (24 Jun 2021 04:44) (70 - 74)  BP: 110/68 (24 Jun 2021 04:44) (100/55 - 128/73)  BP(mean): --  RR: 18 (24 Jun 2021 04:44) (18 - 18)  SpO2: 98% (24 Jun 2021 04:44) (95% - 98%)      PHYSICAL EXAM:    General: Well developed; well nourished; in no acute distress  HEENT: MMM, conjunctiva and sclera clear  Gastrointestinal:Abdomen: Soft non-tender non-distended; Normal bowel sounds; No hepatosplenomegaly  Extremities: no cyanosis, clubbing or edema.  Skin: Warm and dry. No obvious rash    LABS:        06-24    136  |  104  |  10.0  ----------------------------<  142<H>  4.0   |  25.0  |  0.59    Ca    8.2<L>      24 Jun 2021 07:15  Mg     1.6     06-24                        RADIOLOGY & ADDITIONAL STUDIES (The following images were personally reviewed): Pt seen and examined: f/u for alcoholic cirrhosis with a small amount of ascites, anemia, epigastric pain and klebsiella bacteremia and pleural effusion    Yesterday sent for IR paracentesis but aonly small amount of ascites and not enough to tap. This AM she denies any abdominal pain, nausea or vomiting. No recent LFTs but when last drawn they were trending down. Tolerating diet. Aldactone increased to 100mg yesterday.    REVIEW OF SYSTEMS:    CONSTITUTIONAL: No fever, weight loss, or fatigue  EYES: No eye pain, visual disturbances, or discharge  ENMT:  No difficulty hearing, tinnitus, vertigo; No sinus or throat pain  RESPIRATORY: No cough, wheezing, chills or hemoptysis; No shortness of breath  CARDIOVASCULAR: No chest pain, palpitations, dizziness, or leg swelling  GASTROINTESTINAL: No abdominal or epigastric pain. No nausea, vomiting, or hematemesis; No diarrhea or constipation. No melena or hematochezia.    MEDICATIONS:  MEDICATIONS  (STANDING):  cefTRIAXone   IVPB 2000 milliGRAM(s) IV Intermittent every 24 hours  dextrose 40% Gel 15 Gram(s) Oral once  dextrose 5%. 1000 milliLiter(s) (50 mL/Hr) IV Continuous <Continuous>  dextrose 5%. 1000 milliLiter(s) (100 mL/Hr) IV Continuous <Continuous>  dextrose 50% Injectable 25 Gram(s) IV Push once  dextrose 50% Injectable 12.5 Gram(s) IV Push once  dextrose 50% Injectable 25 Gram(s) IV Push once  folic acid 1 milliGRAM(s) Oral daily  furosemide    Tablet 40 milliGRAM(s) Oral daily  glucagon  Injectable 1 milliGRAM(s) IntraMuscular once  insulin glargine Injectable (LANTUS) 15 Unit(s) SubCutaneous at bedtime  insulin lispro (ADMELOG) corrective regimen sliding scale   SubCutaneous at bedtime  insulin lispro (ADMELOG) corrective regimen sliding scale   SubCutaneous three times a day before meals  labetalol 50 milliGRAM(s) Oral two times a day  lactulose Syrup 10 Gram(s) Oral daily  levothyroxine 50 MICROGram(s) Oral daily  lidocaine   Patch 1 Patch Transdermal daily  multivitamin 1 Tablet(s) Oral daily  pantoprazole  Injectable 40 milliGRAM(s) IV Push two times a day  rifAXIMin 550 milliGRAM(s) Oral two times a day  sodium chloride 2 Gram(s) Oral two times a day  spironolactone 100 milliGRAM(s) Oral daily  thiamine 100 milliGRAM(s) Oral daily    MEDICATIONS  (PRN):  acetaminophen   Tablet .. 500 milliGRAM(s) Oral every 12 hours PRN Temp greater or equal to 38C (100.4F), Mild Pain (1 - 3)      Allergies    No Known Allergies    Intolerances        Vital Signs Last 24 Hrs  T(C): 36.7 (24 Jun 2021 04:44), Max: 36.8 (23 Jun 2021 16:20)  T(F): 98 (24 Jun 2021 04:44), Max: 98.3 (23 Jun 2021 16:20)  HR: 74 (24 Jun 2021 04:44) (70 - 74)  BP: 110/68 (24 Jun 2021 04:44) (100/55 - 128/73)  BP(mean): --  RR: 18 (24 Jun 2021 04:44) (18 - 18)  SpO2: 98% (24 Jun 2021 04:44) (95% - 98%)      PHYSICAL EXAM:    General: jaundiced female in no acute distress  HEENT: MMM, conjunctiva and scleral icterus  Gastrointestinal:Abdomen: Soft non-tender non-distended but somewhat protuberant,  Normal bowel sounds; No hepatosplenomegaly  Extremities: no cyanosis, clubbing or edema.  Skin: Warm and dry. No obvious rash    LABS:        06-24    136  |  104  |  10.0  ----------------------------<  142<H>  4.0   |  25.0  |  0.59    Ca    8.2<L>      24 Jun 2021 07:15  Mg     1.6     06-24                       Pt seen and examined: f/u for alcoholic cirrhosis with a small amount of ascites, anemia, epigastric pain and klebsiella bacteremia and pleural effusion    Yesterday sent for IR paracentesis but aonly small amount of ascites and not enough to tap. This AM she denies any abdominal pain, nausea or vomiting. No recent LFTs but when last drawn they were trending down. Tolerating diet. Aldactone increased to 100mg yesterday. Afebrile now since 6/18    REVIEW OF SYSTEMS:    CONSTITUTIONAL: No fever, weight loss, or fatigue  EYES: No eye pain, visual disturbances, or discharge  ENMT:  No difficulty hearing, tinnitus, vertigo; No sinus or throat pain  RESPIRATORY: No cough, wheezing, chills or hemoptysis; No shortness of breath  CARDIOVASCULAR: No chest pain, palpitations, dizziness, or leg swelling  GASTROINTESTINAL: No abdominal or epigastric pain. No nausea, vomiting, or hematemesis; No diarrhea or constipation. No melena or hematochezia.    MEDICATIONS:  MEDICATIONS  (STANDING):  cefTRIAXone   IVPB 2000 milliGRAM(s) IV Intermittent every 24 hours  dextrose 40% Gel 15 Gram(s) Oral once  dextrose 5%. 1000 milliLiter(s) (50 mL/Hr) IV Continuous <Continuous>  dextrose 5%. 1000 milliLiter(s) (100 mL/Hr) IV Continuous <Continuous>  dextrose 50% Injectable 25 Gram(s) IV Push once  dextrose 50% Injectable 12.5 Gram(s) IV Push once  dextrose 50% Injectable 25 Gram(s) IV Push once  folic acid 1 milliGRAM(s) Oral daily  furosemide    Tablet 40 milliGRAM(s) Oral daily  glucagon  Injectable 1 milliGRAM(s) IntraMuscular once  insulin glargine Injectable (LANTUS) 15 Unit(s) SubCutaneous at bedtime  insulin lispro (ADMELOG) corrective regimen sliding scale   SubCutaneous at bedtime  insulin lispro (ADMELOG) corrective regimen sliding scale   SubCutaneous three times a day before meals  labetalol 50 milliGRAM(s) Oral two times a day  lactulose Syrup 10 Gram(s) Oral daily  levothyroxine 50 MICROGram(s) Oral daily  lidocaine   Patch 1 Patch Transdermal daily  multivitamin 1 Tablet(s) Oral daily  pantoprazole  Injectable 40 milliGRAM(s) IV Push two times a day  rifAXIMin 550 milliGRAM(s) Oral two times a day  sodium chloride 2 Gram(s) Oral two times a day  spironolactone 100 milliGRAM(s) Oral daily  thiamine 100 milliGRAM(s) Oral daily    MEDICATIONS  (PRN):  acetaminophen   Tablet .. 500 milliGRAM(s) Oral every 12 hours PRN Temp greater or equal to 38C (100.4F), Mild Pain (1 - 3)      Allergies    No Known Allergies    Intolerances        Vital Signs Last 24 Hrs  T(C): 36.7 (24 Jun 2021 04:44), Max: 36.8 (23 Jun 2021 16:20)  T(F): 98 (24 Jun 2021 04:44), Max: 98.3 (23 Jun 2021 16:20)  HR: 74 (24 Jun 2021 04:44) (70 - 74)  BP: 110/68 (24 Jun 2021 04:44) (100/55 - 128/73)  BP(mean): --  RR: 18 (24 Jun 2021 04:44) (18 - 18)  SpO2: 98% (24 Jun 2021 04:44) (95% - 98%)      PHYSICAL EXAM:    General: jaundiced female in no acute distress  HEENT: MMM, conjunctiva and scleral icterus  Gastrointestinal:Abdomen: Soft non-tender non-distended but somewhat protuberant,  Normal bowel sounds; No hepatosplenomegaly  Extremities: no cyanosis, clubbing or edema.  Skin: Warm and dry. No obvious rash    LABS:        06-24    136  |  104  |  10.0  ----------------------------<  142<H>  4.0   |  25.0  |  0.59    Ca    8.2<L>      24 Jun 2021 07:15  Mg     1.6     06-24

## 2021-06-24 NOTE — PROGRESS NOTE ADULT - ASSESSMENT
65y  Female with h/o alcoholic liver cirrhosis with hepatic encephalopathy, portal HTN, HTN, DM 2, hypothyroidism. Patient initially admitted with epigastric pain, worsening transaminitis, alcoholic cirrhosis and weakness. CT of abdomen and pelvis revealed  cirrhosis with evidence of portal hypertension, mild volume of abdominopelvic ascites, mild left pleural effusion with associated subsegmental atelectasis, possible gastritis and gallstones. Patient was followed by GI, Hematology, nephrology. Patient developed fever to 102.8F 6/16. Also had paracentesis 6/16. Blood cultures with GNR.       Klebsiella pneumoniae sepsis  fever  leukocytosis  Alcoholic liver cirrrhosis      - Blood cultures reporting Klebsiella pneumoniae  - Repeat blood cultures no growth 6/18  - RVP/COVID 19 PCR negative   - CXR reporting left pleural effusion   - CT A/P reporting cirrhosis   - UA negative   - Procalcitonin level 2.95  - Continue Ceftriaxone  - Will need IV Ceftriaxone till 7/1/21  - Trend Fever  - Trend Leukocytosis      Thank you for allowing me to participate in the care of your patient.   Will Follow      d/w Dr Camacho

## 2021-06-24 NOTE — PROGRESS NOTE ADULT - PROBLEM SELECTOR PLAN 3
Alcohol rehab.
as per ID
most likely respiratory source
Klebsiella and as above.
probably multifactorial with  chronic disease and marrfow suppression fro alcohol. No evidence of active GI bleeding
As per Nephrology dilutional in nature, and now slowly resolving.  Follow up nephrology recommendations.

## 2021-06-24 NOTE — PROGRESS NOTE ADULT - SUBJECTIVE AND OBJECTIVE BOX
HPI: Patient is a 65y Female seen on consultation for the evaluation and management of anemia; has long term history of cirrhosis; admitted to [facility] with Hb of 5.2 unassociated with bleeding. Received 2 units of PRBC with reultant Hb of 10.6.  Noted TB of 6.5, mostly direct, with elevated transaminases, mildly elevated retic of 4.9.  Awake, alert, appears comfortable.  In no resp distress.     No acute events over night.  No fever.   No bleeding reported. last bloodwork from  stable  CBC pending this am      MEDICATIONS  (STANDING):  dextrose 40% Gel 15 Gram(s) Oral once  dextrose 5%. 1000 milliLiter(s) (50 mL/Hr) IV Continuous <Continuous>  dextrose 5%. 1000 milliLiter(s) (100 mL/Hr) IV Continuous <Continuous>  dextrose 50% Injectable 25 Gram(s) IV Push once  dextrose 50% Injectable 12.5 Gram(s) IV Push once  dextrose 50% Injectable 25 Gram(s) IV Push once  folic acid 1 milliGRAM(s) Oral daily  glucagon  Injectable 1 milliGRAM(s) IntraMuscular once  insulin glargine Injectable (LANTUS) 10 Unit(s) SubCutaneous every morning  insulin lispro (ADMELOG) corrective regimen sliding scale   SubCutaneous at bedtime  insulin lispro (ADMELOG) corrective regimen sliding scale   SubCutaneous three times a day before meals  lactulose Syrup 10 Gram(s) Oral daily  levothyroxine 50 MICROGram(s) Oral daily  midodrine. 5 milliGRAM(s) Oral three times a day  multivitamin 1 Tablet(s) Oral daily  pantoprazole  Injectable 40 milliGRAM(s) IV Push two times a day  phytonadione   Solution 5 milliGRAM(s) Oral daily  piperacillin/tazobactam IVPB.. 3.375 Gram(s) IV Intermittent every 8 hours  potassium chloride    Tablet ER 30 milliEquivalent(s) Oral two times a day  rifAXIMin 550 milliGRAM(s) Oral two times a day  sodium chloride 2 Gram(s) Oral two times a day  spironolactone 50 milliGRAM(s) Oral daily  thiamine 100 milliGRAM(s) Oral daily    MEDICATIONS  (PRN):  LORazepam   Injectable 1 milliGRAM(s) IV Push every 2 hours PRN Agitation  morphine  - Injectable 1 milliGRAM(s) IV Push every 6 hours PRN Moderate Pain (4 - 6)    ICU Vital Signs Last 24 Hrs  T(C): 36.7 (2021 04:44), Max: 36.8 (2021 16:20)  T(F): 98 (2021 04:44), Max: 98.3 (2021 16:20)  HR: 74 (2021 04:44) (70 - 74)  BP: 110/68 (2021 04:44) (100/55 - 128/73)  BP(mean): --  ABP: --  ABP(mean): --  RR: 18 (2021 04:44) (18 - 18)  SpO2: 98% (2021 04:44) (95% - 98%)        PHYSICAL EXAM:  Constitutional:awake, comfortable  Eyes:icteric  ENMT:no adenopathy  Respiratory:Clear  Cardiovascular:RRR  Gastrointestinal:distended, HSM, distended  Extremities:bilateral LE edema      LABS:                            8.6    7.35  )-----------( 77       ( 2021 07:30 )             25.4       11.32  H/H 7.1/22.1, plt ct 88 ( )                        7.9    18.11 )-----------( 95       ( 2021 06:05 )             25.1              8.7/26.1             8.3    8.77  )-----------( 131      ( 2021 08:08 )             25.1       06-17    126<L>  |  96<L>  |  11.8  ----------------------------<  250<H>  4.9   |  21.0<L>  |  0.68    Ca    8.3<L>      2021 06:05  Phos  2.4     06-17  Mg     1.8         TPro  6.0<L>  /  Alb  2.6<L>  /  TBili  4.7<H>  /  DBili  3.0<H>  /  AST  138<H>  /  ALT  64<H>  /  AlkPhos  387<H>  13    121<L>  |  84<L>  |  15.1  ----------------------------<  240<H>  3.5   |  28.0  |  0.89    Ca    7.8<L>      2021 19:00  Phos  1.2       Mg     1.8         TPro  6.2<L>  /  Alb  2.4<L>  /  TBili  6.5<H>  /  DBili  4.6<H>  /  AST  216<H>  /  ALT  86<H>  /  AlkPhos  476<H>      PT/INR - ( 2021 18:36 )   PT: 19.8 sec;   INR: 1.75 ratio         PTT - ( 2021 18:36 )  PTT:25.3 sec  Urinalysis Basic - ( 2021 22:08 )    Color: Yellow / Appearance: Clear / S.010 / pH: x  Gluc: x / Ketone: Trace  / Bili: Negative / Urobili: 1 mg/dL   Blood: x / Protein: 15 mg/dL / Nitrite: Negative   Leuk Esterase: Moderate / RBC: 3-5 /HPF / WBC 3-5   Sq Epi: x / Non Sq Epi: Occasional / Bacteria: x        RADIOLOGY & ADDITIONAL STUDIES:

## 2021-06-24 NOTE — PROGRESS NOTE ADULT - SUBJECTIVE AND OBJECTIVE BOX
[facility]  INFECTIOUS DISEASES AND INTERNAL MEDICINE at [place]  =======================================================  Bernardo La MD  Diplomates American Board of Internal Medicine and Infectious Diseases  Tel: [phone]      Fax: [phone]  =======================================================    JUSTIN LOVE [id number]    Follow up: Klebsiella pneumoniae    No Fever  Reports feeling better      Allergies:  No Known Allergies      REVIEW OF SYSTEMS:  CONSTITUTIONAL:  No Fever or chills  HEENT:  No diplopia or blurred vision.  No earache, sore throat or runny nose.  CARDIOVASCULAR:  No pressure, squeezing, strangling, tightness, heaviness or aching about the chest, neck, axilla or epigastrium.  RESPIRATORY:  No cough, shortness of breath  GASTROINTESTINAL:  No nausea, vomiting or diarrhea.  GENITOURINARY:  No dysuria, frequency or urgency.   MUSCULOSKELETAL:  no joint aches, no muscle pain  SKIN:  No change in skin, hair or nails.  NEUROLOGIC:  No Headaches, seizures   PSYCHIATRIC:  No disorder of thought or mood.  ENDOCRINE:  No heat or cold intolerance  HEMATOLOGICAL:  No easy bruising or bleeding.       Physical Exam:  GEN: NAD, pleasant  HEENT: normocephalic and atraumatic. EOMI. PERRL.  Anicteric  NECK: Supple.   LUNGS: Clear to auscultation.  HEART: Regular rate and rhythm   ABDOMEN: Soft, nontender, and nondistended.  Positive bowel sounds.    : No CVA tenderness  EXTREMITIES: Without any edema.  MSK: No joint swelling  NEUROLOGIC:  No Focal Deficits  PSYCHIATRIC: Appropriate affect .  SKIN: No Rash      Vitals:  T(F): 98 (24 Jun 2021 11:05), Max: 98.3 (23 Jun 2021 16:20)  HR: 69 (24 Jun 2021 11:05)  BP: 105/67 (24 Jun 2021 11:05)  RR: 19 (24 Jun 2021 11:05)  SpO2: 96% (24 Jun 2021 11:05) (95% - 98%)  temp max in last 48H T(F): , Max: 98.5 (06-23-21 @ 04:46)      Current Antibiotics:  cefTRIAXone   IVPB 2000 milliGRAM(s) IV Intermittent every 24 hours  rifAXIMin 550 milliGRAM(s) Oral two times a day    Other medications:  dextrose 40% Gel 15 Gram(s) Oral once  dextrose 5%. 1000 milliLiter(s) IV Continuous <Continuous>  dextrose 5%. 1000 milliLiter(s) IV Continuous <Continuous>  dextrose 50% Injectable 25 Gram(s) IV Push once  dextrose 50% Injectable 12.5 Gram(s) IV Push once  dextrose 50% Injectable 25 Gram(s) IV Push once  folic acid 1 milliGRAM(s) Oral daily  furosemide    Tablet 40 milliGRAM(s) Oral daily  glucagon  Injectable 1 milliGRAM(s) IntraMuscular once  insulin glargine Injectable (LANTUS) 15 Unit(s) SubCutaneous at bedtime  insulin lispro (ADMELOG) corrective regimen sliding scale   SubCutaneous three times a day before meals  insulin lispro (ADMELOG) corrective regimen sliding scale   SubCutaneous at bedtime  labetalol 50 milliGRAM(s) Oral two times a day  lactulose Syrup 10 Gram(s) Oral daily  levothyroxine 50 MICROGram(s) Oral daily  lidocaine   Patch 1 Patch Transdermal daily  multivitamin 1 Tablet(s) Oral daily  pantoprazole  Injectable 40 milliGRAM(s) IV Push two times a day  sodium chloride 2 Gram(s) Oral two times a day  spironolactone 100 milliGRAM(s) Oral daily  thiamine 100 milliGRAM(s) Oral daily        06-24    136  |  104  |  10.0  ----------------------------<  142<H>  4.0   |  25.0  |  0.59    Ca    8.2<L>      24 Jun 2021 07:15  Mg     1.6     06-24      RECENT CULTURES:  06-18 @ 06:19 .Blood Blood     No growth at 5 days.    06-18 @ 01:21 .Body Fluid Pleural Fluid     No growth at 5 days  polymorphonuclear leukocytes seen  No organisms seen  by cytocentrifuge    06-16 @ 21:18 .Body Fluid Peritoneal Fluid     No growth at 5 days  Moderate polymorphonuclear leukocytes  No organisms seen  by cytocentrifuge    06-16 @ 07:44 .Blood Blood-Peripheral Blood Culture PCR    Growth in aerobic and anaerobic bottles: Klebsiella pneumoniae  See previous culture [id number]  Growth in aerobic and anaerobic bottles: Gram Negative Rods  ***Blood Panel PCR results on this specimen are available  approximately 3 hours after the Gram stain result.***  Gram stain, PCR, and/or culture results may not always  correspond due todifference in methodologies.  ************************************************************  This PCR assay was performed using [facility] ([place]).  The following targets are tested for: Enterococcus,  vancomycin resistant enterococci, Listeria monocytogenes,  coagulase negative staphylococci, S. aureus,  methicillin resistant S. aureus, Streptococcus agalactiae  (Group B), S. pneumoniae, S. pyogenes (Group A),  Acinetobacter baumannii, Enterobacter cloacae, E. coli,  Klebsiella oxytoca, K. pneumoniae, Proteus sp.,  Serratia marcescens, Haemophilus influenzae,  Neisseria meningitidis, Pseudomonas aeruginosa, Candida  albicans, C. glabrata, C krusei, C parapsilosis,  C. tropicalis and the KPC resistance gene.  "Due to technical problems, Pseudomonas aeruginosa  will Not be reported as part of the BCID panel  until further notice".  Gram Stain and BCID performed by:  [facility]  [address] [place] [id number]    06-13 @ 01:57 .Urine Clean Catch (Midstream)     >=3 organisms. Probable collection contamination.    06-12 @ 18:38 .Blood Blood-Peripheral     No growth at 5 days.    06-12 @ 18:37 .Blood Blood-Peripheral     No growth at 5 days.      WBC Count: 7.35 K/uL (06-21-21 @ 07:30)    Creatinine, Serum: 0.59 mg/dL (06-24-21 @ 07:15)  Creatinine, Serum: 0.53 mg/dL (06-23-21 @ 22:51)  Creatinine, Serum: 0.48 mg/dL (06-21-21 @ 07:29)    Ferritin, Serum: 76 ng/mL (06-14-21 @ 08:08)    Procalcitonin, Serum: 2.95 ng/mL (06-16-21 @ 17:59)  Procalcitonin, Serum: 0.83 ng/mL (06-16-21 @ 07:42)     SARS-CoV-2: NotDetec (06-16-21 @ 10:09)  Rapid RVP Result: NotDetec (06-16-21 @ 10:09)    COVID-19 PCR: NotDetec (06-23-21 @ 11:26)  COVID-19 PCR: NotDetec (06-13-21 @ 02:24)        < from: US Abdomen Limited (06.16.21 @ 11:02) >  EXAM:  US ABDOMEN LIMITED                          PROCEDURE DATE:  06/16/2021      INTERPRETATION:  CLINICAL INFORMATION: Abdominal distention    COMPARISON: 10/19/2015    TECHNIQUE: Limited abdominal ultrasound was performed using a low frequency curved transducer to assess for ascites    FINDINGS AND  IMPRESSION:    There is mild to moderate ascites in all 4 quadrants. Left-sided pleural effusion is partially visualized.    < end of copied text >        < from: CT Abdomen and Pelvis w/ IV Cont (06.12.21 @ 20:17) >  EXAM:  CT ABDOMEN AND PELVIS IC                          PROCEDURE DATE:  06/12/2021      INTERPRETATION:  CLINICAL INFORMATION: Abdominal pain, nausea and vomiting    COMPARISON: CT of October 1, 2019.    CONTRAST/COMPLICATIONS:  IV Contrast:96 cc of Omnipaque 300. 4 cc was discarded.  Oral Contrast:  Complications: None reported.    PROCEDURE:  Axial images were obtained, along with reformatted coronal and sagittal images.    FINDINGS:  LOWER CHEST: Mild left pleural effusion with associated subsegmental atelectasis.  LIVER: Cirrhotic and fatty.  BILE DUCTS: Normal caliber.  GALLBLADDER: Gallstones.  SPLEEN: Within normal limits.  PANCREAS: Within normal limits.  ADRENALS: Within normal limits.  KIDNEYS/URETERS: Within normal limits.    BLADDER: Incompletely distended.  REPRODUCTIVE ORGANS: No pelvic masses.    BOWEL: Although the stomach is under distended, there is apparent thickening of the distal gastric antrum and pyloric channel suspicious for gastritis. No bowel obstruction. Appendix is unremarkable. The colon is underdistended without significant fecal load.  PERITONEUM: Mild volume of abdominopelvic ascites.  VESSELS: The aorta is not dilated. Mild atherosclerotic vascular calcification. Redemonstration of an of a massively enlarged recanalized umbilical vein with umbilical varices that connect to the right external iliac vein.  RETROPERITONEUM/LYMPH NODES: Scattered subcentimeter retroperitoneal lymph nodes.  ABDOMINAL WALL: Subcutaneous edema.  BONES: Within normal limits.    IMPRESSION:    Cirrhosis with evidence of portal hypertension. Mild volume of abdominopelvic ascites. Mild left pleural effusion with associated subsegmental atelectasis.  Annual surveillance MRI is recommended in cirrhotic patients to screen for hepatocellular carcinoma.    Possible gastritis. Correlate with symptoms, upper endoscopy, or trial of proton pump inhibitors.    Gallstones.    < end of copied text >

## 2021-06-24 NOTE — PROGRESS NOTE ADULT - ASSESSMENT
65y  Female with h/o alcoholic liver cirrhosis with hepatic encephalopathy, portal HTN, HTN, DM 2, hypothyroidism. Patient initially admitted with epigastric pain, worsening transaminitis, alcoholic cirrhosis and weakness. CT of abdomen and pelvis revealed  cirrhosis with evidence of portal hypertension, mild volume of abdominopelvic ascites, mild left pleural effusion with associated subsegmental atelectasis, possible gastritis and gallstones. Patient was followed by GI, Hematology, nephrology. Patient developed fever to 102.8F 6/16. Also had paracentesis 6/16. Blood cultures with GNR.       Dilutional hyponatremia---> Improved   ETOH cirrhosis  Poor nutrition  - cont NaCl tabs and Lasix/ Aldactone   - oral fluid restriction  - increase oral protein intake  - follow labs    L pleural effusion: s/p thoracentesis---> evidence of reaccumulation  - thoracic surgery noted

## 2021-06-24 NOTE — PROGRESS NOTE ADULT - ASSESSMENT
Imp:  62 y/o female with PMH of alcoholic liver cirrhosis with hepatic encephalopathy, portal HTN, HTN, DM-2, hypothyroidism came to the ED complaining of epigastric pain x 1 week. Pain was sharp radiating to her chest, associated with nausea and non-bloody emesis. Patient said her symptoms now improved at the time of interview. She endorsed dark stool to ED attending but said no when I asked her. She also noted shortness of breath with exertion and fatigue. She has no chest pain, melena, hematemesis, hematuria, recent travel, fever, chills. Of note patient still drinks alcohol, last drink was 2 weeks ago.   Found to have hb of 5.2 in the ED, occult negative and also hyperglycemic.         Acute anemia   Likely due to underlying alcohol liver dx   Hb: 5.2 on admit;  FOBT negative   Hb improved post transfusion; Elevated TB, mostly direct; unlikely hemolysis.  Low haptoglobin 2 chantelle to underlying liver disease, as is LDH elevation; rec: trend retic, LDH  Monitor CBC; transfuse to keep Hb over 7; monitor coags  Hgb 8.6 , plt count 77K from 6/21  monitor for bleeding    Epigastric abdominal pain   Likely due to gastritis PPI 40mg   CT abdomen done, no pancreatitis   Plan for EGD this am    fever and leucocytosis  on IV abx, zosyn.  f/u blood cultures  further plan as per primary team        Will follow

## 2021-06-24 NOTE — PROGRESS NOTE ADULT - SUBJECTIVE AND OBJECTIVE BOX
Chief complaint: Anemia    Patient seen and examined at bedside. No acute overnight events reported. Patient complaining of abdominal pain. Denies fever, chills, cough, chest pain, shortness of breath, nausea or vomiting.    Vital Signs Last 24 Hrs  T(F): 98 (24 Jun 2021 04:44), Max: 98.3 (23 Jun 2021 16:20)  HR: 74 (24 Jun 2021 04:44) (71 - 74)  BP: 110/68 (24 Jun 2021 04:44) (100/55 - 110/68)  RR: 18 (24 Jun 2021 04:44) (18 - 18)  SpO2: 98% (24 Jun 2021 04:44) (95% - 98%)    Physical Exam:  Constitutional: alert and oriented, in no acute distress   Neck: Soft and supple  Respiratory: Clear to auscultation bilaterally, no wheezes or crackles  Cardiovascular: Regular rate and rhyhtm, no murmurs, gallops, rubs  Gastrointestinal: distended abdomen, +bs  Vascular: 2+ peripheral pulses  Neurological: A/O x 3, no focal neurological deficits  Musculoskeletal: no lower extremity edema bilaterally    Labs:  06-24    136  |  104  |  10.0  ----------------------------<  142<H>  4.0   |  25.0  |  0.59    Ca    8.2<L>      24 Jun 2021 07:15  Mg     1.6     06-24

## 2021-06-24 NOTE — PROGRESS NOTE ADULT - PROBLEM SELECTOR PLAN 4
? hepatic hydrothorax but minimal intrabdominal ascites. May benefit from repeat cat scan
resolved- will plan for EGD in AM
CIWA protocol  Alcohol rehab  Thiamine, MVI, Folic acid.

## 2021-06-24 NOTE — PROGRESS NOTE ADULT - PROBLEM SELECTOR PROBLEM 2
Alcoholic cirrhosis of liver with ascites
Alcoholic cirrhosis of liver with ascites
Fever
Anemia
Alcoholic cirrhosis of liver with ascites
Alcoholic cirrhosis of liver with ascites
Pancytopenia
Symptomatic anemia
Anemia
Fever
Anemia

## 2021-06-24 NOTE — PROGRESS NOTE ADULT - PROBLEM SELECTOR PLAN 1
but only a small amount of ascites now that she is getting diuretics. The cirrhosis is advanced but stable with no HE or bleeding at this time. Continue current therapy and repeat labs in AM

## 2021-06-24 NOTE — PROGRESS NOTE ADULT - PROBLEM SELECTOR PROBLEM 1
Alcoholic cirrhosis of liver with ascites
Pleural effusion on left
Alcoholic cirrhosis of liver with ascites
Alcoholic cirrhosis of liver with ascites
Pleural effusion on left
Pleural effusion on left
Alcoholic cirrhosis of liver with ascites
Alcoholic cirrhosis of liver with ascites
Pleural effusion on left
Alcoholic cirrhosis of liver with ascites
Alcoholic cirrhosis of liver with ascites

## 2021-06-24 NOTE — PROGRESS NOTE ADULT - ASSESSMENT
66 y/o female with hx of alcoholic liver cirrhosis with hepatic encephalopathy, portal HTN, HTN, DM-2, hypothyroidism came to the ED complaining of epigastric pain x 1 week. She was found to have hb of 5.2 in the ED, occult negative. She was admitted for workup and treatment of anemia. S/p 2 u prbcs.  Hospital course complicated by klebsiella pna bacteremia with repeat B cx negative on 6/18, pt empirically remains on rocephin. Pt also noted with worsening sob with noted large left sided pleural effusion requiring thoracocentesis as well as worsening abd distension requiring paracentesis. Pt remains on diuresis with aldactone and lasix, being followed by ct sx but currently not a candidate for pleurx. Given recurrent generalized abd pain repeat ct abd/pelvis completed 6/22 which showed moderate amount of ascites, plan for repeat paracentesis today by IR but pt had only small amount ascites so procedure was cancelled. PT evaluated the pt and recommended ALISIA.       Alcoholic liver cirrhosis with hepatic encephalopathy, volume overload with ascites and left sided pleural effusion/ portal HTN   -advanced cirrhosis; nearing end stage per GI  -s/p thoracentesis on 6/17 with 1.1 liters removed  - s/p paracentesis with 700ml removed on 6/16  - pt with generalized abd pain so repeat ct abd pelvis ordered 6/22 and noted with Large left pleural effusion with complete compressive collapse of the lingula and left lower lobe. For which ct sx will not proceed with intervention as patient remains asx and on RA. Also noted with cirrhotic liver with moderate ascites. D/w IR and pt planned for repeat paracentesis today but IR reported small ascites during attempt at intervention so procedure cancelled.   - ammonia level WNL   - transaminitis   - thrombocytopenia all sequela of liver failure   - cxr from 6/21 shows left sided effusion unchanged from prior cxr   -c/w lasix, aldactone increased by GI today      - will monitor renal fxn closely on diuretics   -c/w  rifaximin, lactulose to prevent hepatic encephalopathy   -c/w labetalol   -c/w monitoring lfts/ plt/ ammonia if pt becomes more lethargic or altered   -GI f/u noted and appreciated   - CT sx f/u noted and appreciated  -Outpatient surveillance for HCC and f/u with GI/ hepatic specialist   - planned for EGD    Klebsiella PNA bacteremia unclear source   - afebrile  - no leukocytosis   - repeat B cx negative on 6/18  - pleural cx negative   -SBP ruled out; f/u ascitic fluid culture - no growth to date  -c/w rocephin  end date is 7/1   picc line is in place RUE   -ID recommendations appreciated      Anemia of chronic disease in the setting of cirrhotic liver and bone narrow suppression from alcohol   -Hb 5.2 on admission, s/p 3 u prbcs total inpatient   - hgb remains ~8  - likely related to end stage liver, -no active signs/symptoms of GIB  -FOBT negative   -s/p venofer  -continue PPI  -hematology and GI recommendations appreciated    Hyponatremia  -sodium has remained stable   -c/w lasix and sodium tabs   -continue free water restriction  -monitor I&O's  - nephro f/u noted and appreciated     Lower back and flank pain   - stable   -c/w tylenol prn   -c/w  lidoderm patch   -OOB to chair; mobilize patient    Hyperglycemia with underlying DM-2, not on home insulin   -HbA1c 8.0, goal <7  - fs stable   -continue lantus 15 u sq qam   -Continue ISS  -c/w hypoglycemia protocol   -c/w glucerna supplements      History of alcohol abuse   -no evidence of withdrawal  -continue Thiamine, Folic Acid and MVI  - pt counselled on abstinence from alcohol abuse     HTN   -BP stable  -Continue Labetalol with hold parameters     Hypothyroidism   -TSH elevated,  -c/w synthroid at increased dose   -f/u tsh in the am   will also repeat TFTS in 4-6 weeks    DVT ppx   - SCDs b/l   -no chemical AC given thrombocytopenia     Next of kin: Son- Ashish   Pt overall with guarded prognosis and high risk for decline as well as recurrent hospitalizations given her current state.   Palliative care continues to follow the pt.     Dispo: EGD today. Pending auth for ALISIAAlly

## 2021-06-25 ENCOUNTER — RESULT REVIEW (OUTPATIENT)
Age: 66
End: 2021-06-25

## 2021-06-25 LAB
ALBUMIN SERPL ELPH-MCNC: 2.1 G/DL — LOW (ref 3.3–5.2)
ALP SERPL-CCNC: 277 U/L — HIGH (ref 40–120)
ALT FLD-CCNC: 33 U/L — HIGH
ANION GAP SERPL CALC-SCNC: 8 MMOL/L — SIGNIFICANT CHANGE UP (ref 5–17)
AST SERPL-CCNC: 70 U/L — HIGH
BASOPHILS # BLD AUTO: 0.03 K/UL — SIGNIFICANT CHANGE UP (ref 0–0.2)
BASOPHILS NFR BLD AUTO: 0.5 % — SIGNIFICANT CHANGE UP (ref 0–2)
BILIRUB DIRECT SERPL-MCNC: 1.6 MG/DL — HIGH (ref 0–0.3)
BILIRUB INDIRECT FLD-MCNC: 0.9 MG/DL — SIGNIFICANT CHANGE UP (ref 0.2–1)
BILIRUB SERPL-MCNC: 2.5 MG/DL — HIGH (ref 0.4–2)
BUN SERPL-MCNC: 9.5 MG/DL — SIGNIFICANT CHANGE UP (ref 8–20)
CALCIUM SERPL-MCNC: 8.3 MG/DL — LOW (ref 8.6–10.2)
CHLORIDE SERPL-SCNC: 104 MMOL/L — SIGNIFICANT CHANGE UP (ref 98–107)
CO2 SERPL-SCNC: 23 MMOL/L — SIGNIFICANT CHANGE UP (ref 22–29)
CREAT SERPL-MCNC: 0.52 MG/DL — SIGNIFICANT CHANGE UP (ref 0.5–1.3)
EOSINOPHIL # BLD AUTO: 0.17 K/UL — SIGNIFICANT CHANGE UP (ref 0–0.5)
EOSINOPHIL NFR BLD AUTO: 2.7 % — SIGNIFICANT CHANGE UP (ref 0–6)
GLUCOSE BLDC GLUCOMTR-MCNC: 134 MG/DL — HIGH (ref 70–99)
GLUCOSE BLDC GLUCOMTR-MCNC: 159 MG/DL — HIGH (ref 70–99)
GLUCOSE BLDC GLUCOMTR-MCNC: 175 MG/DL — HIGH (ref 70–99)
GLUCOSE SERPL-MCNC: 156 MG/DL — HIGH (ref 70–99)
HCT VFR BLD CALC: 26 % — LOW (ref 34.5–45)
HGB BLD-MCNC: 8.5 G/DL — LOW (ref 11.5–15.5)
IMM GRANULOCYTES NFR BLD AUTO: 0.5 % — SIGNIFICANT CHANGE UP (ref 0–1.5)
INR BLD: 1.37 RATIO — HIGH (ref 0.88–1.16)
LYMPHOCYTES # BLD AUTO: 1.11 K/UL — SIGNIFICANT CHANGE UP (ref 1–3.3)
LYMPHOCYTES # BLD AUTO: 17.8 % — SIGNIFICANT CHANGE UP (ref 13–44)
MCHC RBC-ENTMCNC: 31.1 PG — SIGNIFICANT CHANGE UP (ref 27–34)
MCHC RBC-ENTMCNC: 32.7 GM/DL — SIGNIFICANT CHANGE UP (ref 32–36)
MCV RBC AUTO: 95.2 FL — SIGNIFICANT CHANGE UP (ref 80–100)
MONOCYTES # BLD AUTO: 0.55 K/UL — SIGNIFICANT CHANGE UP (ref 0–0.9)
MONOCYTES NFR BLD AUTO: 8.8 % — SIGNIFICANT CHANGE UP (ref 2–14)
NEUTROPHILS # BLD AUTO: 4.36 K/UL — SIGNIFICANT CHANGE UP (ref 1.8–7.4)
NEUTROPHILS NFR BLD AUTO: 69.7 % — SIGNIFICANT CHANGE UP (ref 43–77)
PLATELET # BLD AUTO: 108 K/UL — LOW (ref 150–400)
POTASSIUM SERPL-MCNC: 4.1 MMOL/L — SIGNIFICANT CHANGE UP (ref 3.5–5.3)
POTASSIUM SERPL-SCNC: 4.1 MMOL/L — SIGNIFICANT CHANGE UP (ref 3.5–5.3)
PROT SERPL-MCNC: 5.9 G/DL — LOW (ref 6.6–8.7)
PROTHROM AB SERPL-ACNC: 15.7 SEC — HIGH (ref 10.6–13.6)
RBC # BLD: 2.73 M/UL — LOW (ref 3.8–5.2)
RBC # FLD: 25.4 % — HIGH (ref 10.3–14.5)
SODIUM SERPL-SCNC: 135 MMOL/L — SIGNIFICANT CHANGE UP (ref 135–145)
WBC # BLD: 6.25 K/UL — SIGNIFICANT CHANGE UP (ref 3.8–10.5)
WBC # FLD AUTO: 6.25 K/UL — SIGNIFICANT CHANGE UP (ref 3.8–10.5)

## 2021-06-25 PROCEDURE — 88305 TISSUE EXAM BY PATHOLOGIST: CPT | Mod: 26

## 2021-06-25 PROCEDURE — 43239 EGD BIOPSY SINGLE/MULTIPLE: CPT

## 2021-06-25 PROCEDURE — 88342 IMHCHEM/IMCYTCHM 1ST ANTB: CPT | Mod: 26

## 2021-06-25 PROCEDURE — 99232 SBSQ HOSP IP/OBS MODERATE 35: CPT

## 2021-06-25 PROCEDURE — 71045 X-RAY EXAM CHEST 1 VIEW: CPT | Mod: 26

## 2021-06-25 PROCEDURE — 99233 SBSQ HOSP IP/OBS HIGH 50: CPT

## 2021-06-25 RX ADMIN — Medication 50 MICROGRAM(S): at 05:10

## 2021-06-25 RX ADMIN — Medication 1 TABLET(S): at 11:48

## 2021-06-25 RX ADMIN — Medication 1 MILLIGRAM(S): at 11:48

## 2021-06-25 RX ADMIN — PANTOPRAZOLE SODIUM 40 MILLIGRAM(S): 20 TABLET, DELAYED RELEASE ORAL at 05:11

## 2021-06-25 RX ADMIN — Medication 50 MILLIGRAM(S): at 17:20

## 2021-06-25 RX ADMIN — PANTOPRAZOLE SODIUM 40 MILLIGRAM(S): 20 TABLET, DELAYED RELEASE ORAL at 17:20

## 2021-06-25 RX ADMIN — SODIUM CHLORIDE 2 GRAM(S): 9 INJECTION INTRAMUSCULAR; INTRAVENOUS; SUBCUTANEOUS at 05:10

## 2021-06-25 RX ADMIN — LIDOCAINE 1 PATCH: 4 CREAM TOPICAL at 11:48

## 2021-06-25 RX ADMIN — Medication 100 MILLIGRAM(S): at 11:48

## 2021-06-25 RX ADMIN — INSULIN GLARGINE 15 UNIT(S): 100 INJECTION, SOLUTION SUBCUTANEOUS at 21:07

## 2021-06-25 RX ADMIN — Medication 50 MILLIGRAM(S): at 05:10

## 2021-06-25 RX ADMIN — SODIUM CHLORIDE 2 GRAM(S): 9 INJECTION INTRAMUSCULAR; INTRAVENOUS; SUBCUTANEOUS at 17:20

## 2021-06-25 RX ADMIN — CEFTRIAXONE 100 MILLIGRAM(S): 500 INJECTION, POWDER, FOR SOLUTION INTRAMUSCULAR; INTRAVENOUS at 17:20

## 2021-06-25 RX ADMIN — SPIRONOLACTONE 100 MILLIGRAM(S): 25 TABLET, FILM COATED ORAL at 05:10

## 2021-06-25 RX ADMIN — LACTULOSE 10 GRAM(S): 10 SOLUTION ORAL at 11:48

## 2021-06-25 RX ADMIN — Medication 2: at 17:20

## 2021-06-25 RX ADMIN — Medication 40 MILLIGRAM(S): at 05:10

## 2021-06-25 RX ADMIN — LIDOCAINE 1 PATCH: 4 CREAM TOPICAL at 19:30

## 2021-06-25 NOTE — PROGRESS NOTE ADULT - SUBJECTIVE AND OBJECTIVE BOX
[facility]  INFECTIOUS DISEASES AND INTERNAL MEDICINE at [place]  =======================================================  Bernardo La MD  Diplomates American Board of Internal Medicine and Infectious Diseases  Tel: [phone]      Fax: [phone]  =======================================================    JUSTIN LOVE [id number]    Follow up: Klebsiella pneumoniae    No Fever  Reports feeling better      Allergies:  No Known Allergies      REVIEW OF SYSTEMS:  CONSTITUTIONAL:  No Fever or chills  HEENT:  No diplopia or blurred vision.  No earache, sore throat or runny nose.  CARDIOVASCULAR:  No pressure, squeezing, strangling, tightness, heaviness or aching about the chest, neck, axilla or epigastrium.  RESPIRATORY:  No cough, shortness of breath  GASTROINTESTINAL:  No nausea, vomiting or diarrhea.  GENITOURINARY:  No dysuria, frequency or urgency.   MUSCULOSKELETAL:  no joint aches, no muscle pain  SKIN:  No change in skin, hair or nails.  NEUROLOGIC:  No Headaches, seizures   PSYCHIATRIC:  No disorder of thought or mood.  ENDOCRINE:  No heat or cold intolerance  HEMATOLOGICAL:  No easy bruising or bleeding.       Physical Exam:  GEN: NAD, pleasant  HEENT: normocephalic and atraumatic. EOMI. PERRL.  Anicteric  NECK: Supple.   LUNGS: Clear to auscultation.  HEART: Regular rate and rhythm   ABDOMEN: Soft, nontender, and nondistended.  Positive bowel sounds.    : No CVA tenderness  EXTREMITIES: Without any edema.  MSK: No joint swelling  NEUROLOGIC:  No Focal Deficits  PSYCHIATRIC: Appropriate affect .  SKIN: No Rash      Vitals:  T(F): 98 (25 Jun 2021 04:24), Max: 98.6 (24 Jun 2021 15:37)  HR: 75 (25 Jun 2021 04:24)  BP: 104/65 (25 Jun 2021 04:24)  RR: 18 (25 Jun 2021 04:24)  SpO2: 98% (25 Jun 2021 04:24) (96% - 98%)  temp max in last 48H T(F): , Max: 98.6 (06-24-21 @ 15:37)      Current Antibiotics:  cefTRIAXone   IVPB 2000 milliGRAM(s) IV Intermittent every 24 hours  rifAXIMin 550 milliGRAM(s) Oral two times a day    Other medications:  dextrose 40% Gel 15 Gram(s) Oral once  dextrose 5%. 1000 milliLiter(s) IV Continuous <Continuous>  dextrose 5%. 1000 milliLiter(s) IV Continuous <Continuous>  dextrose 50% Injectable 25 Gram(s) IV Push once  dextrose 50% Injectable 12.5 Gram(s) IV Push once  dextrose 50% Injectable 25 Gram(s) IV Push once  folic acid 1 milliGRAM(s) Oral daily  furosemide    Tablet 40 milliGRAM(s) Oral daily  glucagon  Injectable 1 milliGRAM(s) IntraMuscular once  insulin glargine Injectable (LANTUS) 15 Unit(s) SubCutaneous at bedtime  insulin lispro (ADMELOG) corrective regimen sliding scale   SubCutaneous three times a day before meals  insulin lispro (ADMELOG) corrective regimen sliding scale   SubCutaneous at bedtime  labetalol 50 milliGRAM(s) Oral two times a day  lactulose Syrup 10 Gram(s) Oral daily  levothyroxine 50 MICROGram(s) Oral daily  lidocaine   Patch 1 Patch Transdermal daily  multivitamin 1 Tablet(s) Oral daily  pantoprazole  Injectable 40 milliGRAM(s) IV Push two times a day  sodium chloride 2 Gram(s) Oral two times a day  spironolactone 100 milliGRAM(s) Oral daily  thiamine 100 milliGRAM(s) Oral daily                 8.5    6.25  )-----------( 108      ( 25 Jun 2021 07:06 )             26.0     06-25    135  |  104  |  9.5  ----------------------------<  156<H>  4.1   |  23.0  |  0.52    Ca    8.3<L>      25 Jun 2021 07:06  Mg     1.6     06-24    TPro  5.9<L>  /  Alb  2.1<L>  /  TBili  2.5<H>  /  DBili  1.6<H>  /  AST  70<H>  /  ALT  33<H>  /  AlkPhos  277<H>  06-25      RECENT CULTURES:  06-18 @ 06:19 .Blood Blood     No growth at 5 days.    06-18 @ 01:21 .Body Fluid Pleural Fluid     No growth at 5 days  polymorphonuclear leukocytes seen  No organisms seen  by cytocentrifuge    06-16 @ 21:18 .Body Fluid Peritoneal Fluid     No growth at 5 days  Moderate polymorphonuclear leukocytes  No organisms seen  by cytocentrifuge    06-16 @ 07:44 .Blood Blood-Peripheral Blood Culture PCR    Growth in aerobic and anaerobic bottles: Klebsiella pneumoniae  See previous culture [id number]  Growth in aerobic and anaerobic bottles: Gram Negative Rods  ***Blood Panel PCR results on this specimen are available  approximately 3 hours after the Gram stain result.***  Gram stain, PCR, and/or culture results may not always  correspond due todifference in methodologies.  ************************************************************  This PCR assay was performed using [employer].  The following targets are tested for: Enterococcus,  vancomycin resistant enterococci, Listeria monocytogenes,  coagulase negative staphylococci, S. aureus,  methicillin resistant S. aureus, Streptococcus agalactiae  (Group B), S. pneumoniae, S. pyogenes (Group A),  Acinetobacter baumannii, Enterobacter cloacae, E. coli,  Klebsiella oxytoca, K. pneumoniae, Proteus sp.,  Serratia marcescens, Haemophilus influenzae,  Neisseria meningitidis, Pseudomonas aeruginosa, Candida  albicans, C. glabrata, C krusei, C parapsilosis,  C. tropicalis and the KPC resistance gene.  "Due to technical problems, Pseudomonas aeruginosa  will Not be reported as part of the BCID panel  until further notice".  Gram Stain and BCID performed by:  [facility]  [address] [place] [id number]    06-13 @ 01:57 .Urine Clean Catch (Midstream)     >=3 organisms. Probable collection contamination.    06-12 @ 18:38 .Blood Blood-Peripheral     No growth at 5 days.  06-12 @ 18:37 .Blood Blood-Peripheral     No growth at 5 days.      WBC Count: 6.25 K/uL (06-25-21 @ 07:06)  WBC Count: 7.35 K/uL (06-21-21 @ 07:30)    Creatinine, Serum: 0.52 mg/dL (06-25-21 @ 07:06)  Creatinine, Serum: 0.59 mg/dL (06-24-21 @ 07:15)  Creatinine, Serum: 0.53 mg/dL (06-23-21 @ 22:51)  Creatinine, Serum: 0.48 mg/dL (06-21-21 @ 07:29)    Ferritin, Serum: 76 ng/mL (06-14-21 @ 08:08)    Procalcitonin, Serum: 2.95 ng/mL (06-16-21 @ 17:59)  Procalcitonin, Serum: 0.83 ng/mL (06-16-21 @ 07:42)     SARS-CoV-2: NotDetec (06-16-21 @ 10:09)  Rapid RVP Result: NotDetec (06-16-21 @ 10:09)    COVID-19 PCR: NotDetec (06-24-21 @ 13:48)  COVID-19 PCR: NotDetec (06-23-21 @ 11:26)  COVID-19 PCR: NotDetec (06-13-21 @ 02:24)      < from: US Abdomen Limited (06.16.21 @ 11:02) >  EXAM:  US ABDOMEN LIMITED                          PROCEDURE DATE:  06/16/2021      INTERPRETATION:  CLINICAL INFORMATION: Abdominal distention    COMPARISON: 10/19/2015    TECHNIQUE: Limited abdominal ultrasound was performed using a low frequency curved transducer to assess for ascites    FINDINGS AND  IMPRESSION:    There is mild to moderate ascites in all 4 quadrants. Left-sided pleural effusion is partially visualized.    < end of copied text >        < from: CT Abdomen and Pelvis w/ IV Cont (06.12.21 @ 20:17) >  EXAM:  CT ABDOMEN AND PELVIS IC                          PROCEDURE DATE:  06/12/2021      INTERPRETATION:  CLINICAL INFORMATION: Abdominal pain, nausea and vomiting    COMPARISON: CT of October 1, 2019.    CONTRAST/COMPLICATIONS:  IV Contrast:96 cc of Omnipaque 300. 4 cc was discarded.  Oral Contrast:  Complications: None reported.    PROCEDURE:  Axial images were obtained, along with reformatted coronal and sagittal images.    FINDINGS:  LOWER CHEST: Mild left pleural effusion with associated subsegmental atelectasis.  LIVER: Cirrhotic and fatty.  BILE DUCTS: Normal caliber.  GALLBLADDER: Gallstones.  SPLEEN: Within normal limits.  PANCREAS: Within normal limits.  ADRENALS: Within normal limits.  KIDNEYS/URETERS: Within normal limits.    BLADDER: Incompletely distended.  REPRODUCTIVE ORGANS: No pelvic masses.    BOWEL: Although the stomach is under distended, there is apparent thickening of the distal gastric antrum and pyloric channel suspicious for gastritis. No bowel obstruction. Appendix is unremarkable. The colon is underdistended without significant fecal load.  PERITONEUM: Mild volume of abdominopelvic ascites.  VESSELS: The aorta is not dilated. Mild atherosclerotic vascular calcification. Redemonstration of an of a massively enlarged recanalized umbilical vein with umbilical varices that connect to the right external iliac vein.  RETROPERITONEUM/LYMPH NODES: Scattered subcentimeter retroperitoneal lymph nodes.  ABDOMINAL WALL: Subcutaneous edema.  BONES: Within normal limits.    IMPRESSION:    Cirrhosis with evidence of portal hypertension. Mild volume of abdominopelvic ascites. Mild left pleural effusion with associated subsegmental atelectasis.  Annual surveillance MRI is recommended in cirrhotic patients to screen for hepatocellular carcinoma.    Possible gastritis. Correlate with symptoms, upper endoscopy, or trial of proton pump inhibitors.    Gallstones.    < end of copied text >

## 2021-06-25 NOTE — PROGRESS NOTE ADULT - ASSESSMENT
65y  Female with h/o alcoholic liver cirrhosis with hepatic encephalopathy, portal HTN, HTN, DM 2, hypothyroidism. Patient initially admitted with epigastric pain, worsening transaminitis, alcoholic cirrhosis and weakness. CT of abdomen and pelvis revealed  cirrhosis with evidence of portal hypertension, mild volume of abdominopelvic ascites, mild left pleural effusion with associated subsegmental atelectasis, possible gastritis and gallstones. Patient was followed by GI, Hematology, nephrology. Patient developed fever to 102.8F 6/16. Also had paracentesis 6/16. Blood cultures with GNR.       Dilutional hyponatremia---> Improved   Serum Na 135 today  ETOH cirrhosis  Poor nutrition  - cont NaCl tabs and Lasix/ Aldactone   - oral fluid restriction  - increase oral protein intake  - follow labs    L pleural effusion: s/p thoracentesis---> evidence of reaccumulation  - thoracic surgery noted    AM labs, will follow

## 2021-06-25 NOTE — PROGRESS NOTE ADULT - ASSESSMENT
Imp:  60 y/o female with PMH of alcoholic liver cirrhosis with hepatic encephalopathy, portal HTN, HTN, DM-2, hypothyroidism came to the ED complaining of epigastric pain x 1 week. Pain was sharp radiating to her chest, associated with nausea and non-bloody emesis. Patient said her symptoms now improved at the time of interview. She endorsed dark stool to ED attending but said no when I asked her. She also noted shortness of breath with exertion and fatigue. She has no chest pain, melena, hematemesis, hematuria, recent travel, fever, chills. Of note patient still drinks alcohol, last drink was 2 weeks ago.   Found to have hb of 5.2 in the ED, occult negative and also hyperglycemic.         Acute anemia   Likely due to underlying alcohol liver dx   Hb: 5.2 on admit;  FOBT negative   Hb improved post transfusion; Elevated TB, mostly direct; unlikely hemolysis.  Low haptoglobin 2 chantelle to underlying liver disease, as is LDH elevation; rec: trend retic, LDH  Monitor CBC; transfuse to keep Hb over 7; monitor coags  Hgb 8.5 , plt count 108,000  monitor for bleeding    Epigastric abdominal pain   Likely due to gastritis PPI 40mg   CT abdomen done, no pancreatitis   Plan for EGD this am    fever and leucocytosis, Bl Cx pos for Klebsielaa , on Ceftriaxone per ID    f/u blood cultures  further plan as per primary team        Will follow

## 2021-06-25 NOTE — PROGRESS NOTE ADULT - SUBJECTIVE AND OBJECTIVE BOX
NEPHROLOGY INTERVAL HPI/OVERNIGHT EVENTS:    Examined  no distress  Alert    MEDICATIONS  (STANDING):  cefTRIAXone   IVPB 2000 milliGRAM(s) IV Intermittent every 24 hours  dextrose 40% Gel 15 Gram(s) Oral once  dextrose 5%. 1000 milliLiter(s) (50 mL/Hr) IV Continuous <Continuous>  dextrose 5%. 1000 milliLiter(s) (100 mL/Hr) IV Continuous <Continuous>  dextrose 50% Injectable 25 Gram(s) IV Push once  dextrose 50% Injectable 12.5 Gram(s) IV Push once  dextrose 50% Injectable 25 Gram(s) IV Push once  folic acid 1 milliGRAM(s) Oral daily  furosemide    Tablet 40 milliGRAM(s) Oral daily  glucagon  Injectable 1 milliGRAM(s) IntraMuscular once  insulin glargine Injectable (LANTUS) 15 Unit(s) SubCutaneous at bedtime  insulin lispro (ADMELOG) corrective regimen sliding scale   SubCutaneous three times a day before meals  insulin lispro (ADMELOG) corrective regimen sliding scale   SubCutaneous at bedtime  labetalol 50 milliGRAM(s) Oral two times a day  lactulose Syrup 10 Gram(s) Oral daily  levothyroxine 50 MICROGram(s) Oral daily  lidocaine   Patch 1 Patch Transdermal daily  multivitamin 1 Tablet(s) Oral daily  pantoprazole  Injectable 40 milliGRAM(s) IV Push two times a day  rifAXIMin 550 milliGRAM(s) Oral two times a day  sodium chloride 2 Gram(s) Oral two times a day  spironolactone 100 milliGRAM(s) Oral daily  thiamine 100 milliGRAM(s) Oral daily    MEDICATIONS  (PRN):  acetaminophen   Tablet .. 500 milliGRAM(s) Oral every 12 hours PRN Temp greater or equal to 38C (100.4F), Mild Pain (1 - 3)      Allergies    No Known Allergies    Intolerances        Vital Signs Last 24 Hrs  T(C): 36.9 (25 Jun 2021 10:59), Max: 37 (24 Jun 2021 15:37)  T(F): 98.4 (25 Jun 2021 10:59), Max: 98.6 (24 Jun 2021 15:37)  HR: 64 (25 Jun 2021 11:47) (64 - 75)  BP: 118/67 (25 Jun 2021 11:47) (104/65 - 133/72)  BP(mean): --  RR: 18 (25 Jun 2021 10:59) (18 - 18)  SpO2: 97% (25 Jun 2021 10:59) (97% - 98%)  Daily     Daily     PHYSICAL EXAM:  GENERAL: Lethargic  NECK: Supple, no JVD  NERVOUS SYSTEM:  Awake, alert  CHEST/LUNG: Clear with diminished BS at bases (L>R)  HEART: Regular rate and rhythm; no rub  ABDOMEN: Soft, distended +BS  EXTREMITIES:  trace edema B/L LE      LABS:                        8.5    6.25  )-----------( 108      ( 25 Jun 2021 07:06 )             26.0     06-25    135  |  104  |  9.5  ----------------------------<  156<H>  4.1   |  23.0  |  0.52    Ca    8.3<L>      25 Jun 2021 07:06  Mg     1.6     06-24    TPro  5.9<L>  /  Alb  2.1<L>  /  TBili  2.5<H>  /  DBili  1.6<H>  /  AST  70<H>  /  ALT  33<H>  /  AlkPhos  277<H>  06-25    PT/INR - ( 25 Jun 2021 07:06 )   PT: 15.7 sec;   INR: 1.37 ratio                     RADIOLOGY & ADDITIONAL TESTS:

## 2021-06-25 NOTE — PROGRESS NOTE ADULT - SUBJECTIVE AND OBJECTIVE BOX
Chief complaint: Anemia    Patient seen and examined at bedside. No acute overnight events reported. Patient states she is feeling fine, no complaints. Denies fever, chills, cough, chest pain, shortness of breath, nausea or vomiting.     Vital Signs Last 24 Hrs  T(F): 98 (25 Jun 2021 04:24), Max: 98.6 (24 Jun 2021 15:37)  HR: 75 (25 Jun 2021 04:24) (69 - 75)  BP: 104/65 (25 Jun 2021 04:24) (104/65 - 110/78)  RR: 18 (25 Jun 2021 04:24) (18 - 19)  SpO2: 98% (25 Jun 2021 04:24) (96% - 98%)    Physical Exam:  Constitutional: alert and oriented, in no acute distress   Neck: Soft and supple  Respiratory: Clear to auscultation bilaterally, no wheezes or crackles  Cardiovascular: Regular rate and rhyhtm, no murmurs, gallops, rubs  Gastrointestinal: Soft, distended abdomen  Vascular: 2+ peripheral pulses  Neurological: A/O x 3, no focal neurological deficits  Musculoskeletal: no lower extremity edema bilaterally    Labs:                        8.5    6.25  )-----------( 108      ( 25 Jun 2021 07:06 )             26.0   06-25    135  |  104  |  9.5  ----------------------------<  156<H>  4.1   |  23.0  |  0.52    Ca    8.3<L>      25 Jun 2021 07:06  Mg     1.6     06-24    TPro  5.9<L>  /  Alb  2.1<L>  /  TBili  2.5<H>  /  DBili  1.6<H>  /  AST  70<H>  /  ALT  33<H>  /  AlkPhos  277<H>  06-25

## 2021-06-25 NOTE — PROGRESS NOTE ADULT - ASSESSMENT
64 y/o female with hx of alcoholic liver cirrhosis with hepatic encephalopathy, portal HTN, HTN, DM-2, hypothyroidism came to the ED complaining of epigastric pain x 1 week. She was found to have hb of 5.2 in the ED, occult negative. She was admitted for workup and treatment of anemia. S/p 2 u prbcs.  Hospital course complicated by klebsiella pna bacteremia with repeat B cx negative on 6/18, pt empirically remains on rocephin. Pt also noted with worsening sob with noted large left sided pleural effusion requiring thoracocentesis as well as worsening abd distension requiring paracentesis. Pt remains on diuresis with aldactone and lasix, being followed by ct sx but currently not a candidate for pleurx. Given recurrent generalized abd pain repeat ct abd/pelvis completed 6/22 which showed moderate amount of ascites, plan for repeat paracentesis today by IR but pt had only small amount ascites so procedure was cancelled. PT evaluated the pt and recommended ALISIA.       Alcoholic liver cirrhosis with hepatic encephalopathy, volume overload with ascites and left sided pleural effusion/ portal HTN   -advanced cirrhosis; nearing end stage per GI  -s/p thoracentesis on 6/17 with 1.1 liters removed  - s/p paracentesis with 700ml removed on 6/16  - pt with generalized abd pain so repeat ct abd pelvis ordered 6/22 and noted with Large left pleural effusion with complete compressive collapse of the lingula and left lower lobe. For which ct sx will not proceed with intervention as patient remains asx and on RA. Also noted with cirrhotic liver with moderate ascites. D/w IR and pt planned for repeat paracentesis today but IR reported small ascites during attempt at intervention so procedure cancelled.   - ammonia level WNL   - transaminitis   - thrombocytopenia all sequela of liver failure   - cxr from 6/21 shows left sided effusion unchanged from prior cxr   -c/w lasix, aldactone  - will monitor renal fxn closely on diuretics   -c/w  rifaximin, lactulose to prevent hepatic encephalopathy   -c/w labetalol   -c/w monitoring lfts/ plt/ ammonia if pt becomes more lethargic or altered   -GI f/u noted and appreciated   - CT sx f/u noted and appreciated  -Outpatient surveillance for HCC and f/u with GI/ hepatic specialist   - EGD performed on 6/25: normal duodenum, varices in lower third of the esophagus, ulcer in the antrum  - c/w Protonix 40mg daily  - start Inderal 20mg TID    Klebsiella PNA bacteremia unclear source   - afebrile    - no leukocytosis   - repeat B cx negative on 6/18  - pleural cx negative   - SBP ruled out; f/u ascitic fluid culture - no growth to date  - ID recommendations appreciated   - c/w Ceftriaxone through 7/1     Anemia of chronic disease in the setting of cirrhotic liver and bone narrow suppression from alcohol   - Hb 5.2 on admission, s/p 3 u prbcs total inpatient   - hgb remains ~8  - likely related to end stage liver, -no active signs/symptoms of GIB  - FOBT negative   - s/p venofer  - continue PPI  - hematology and GI recommendations appreciated    Hyponatremia  - sodium has remained stable   - c/w lasix and sodium tabs   - continue free water restriction  - monitor I&O's  - nephro f/u noted and appreciated     Lower back and flank pain   - stable   -c/w tylenol prn   -c/w  lidoderm patch   -OOB to chair; mobilize patient    Hyperglycemia with underlying DM-2, not on home insulin   -HbA1c 8.0, goal <7  - fs stable   -continue lantus 15 u sq qam   -Continue ISS  -c/w hypoglycemia protocol   -c/w glucerna supplements      History of alcohol abuse   - no evidence of withdrawal  - continue Thiamine, Folic Acid and MVI  - pt counselled on abstinence from alcohol abuse     HTN   -BP stable  -Continue Labetalol with hold parameters     Hypothyroidism   -TSH elevated  - c/w synthroid 50mcg daily  - repeat TFTS in 4-6 weeks    DVT ppx   - SCDs b/l   - no chemical AC given thrombocytopenia     Next of kin: Son- Ashish   Pt overall with guarded prognosis and high risk for decline as well as recurrent hospitalizations given her current state.   Palliative care continues to follow the pt.     Dispo: pending auth for ALISIA

## 2021-06-25 NOTE — PROGRESS NOTE ADULT - ASSESSMENT
65y  Female with h/o alcoholic liver cirrhosis with hepatic encephalopathy, portal HTN, HTN, DM 2, hypothyroidism. Patient initially admitted with epigastric pain, worsening transaminitis, alcoholic cirrhosis and weakness. CT of abdomen and pelvis revealed  cirrhosis with evidence of portal hypertension, mild volume of abdominopelvic ascites, mild left pleural effusion with associated subsegmental atelectasis, possible gastritis and gallstones. Patient was followed by GI, Hematology, nephrology. Patient developed fever to 102.8F 6/16. Also had paracentesis 6/16. Blood cultures with GNR.       Klebsiella pneumoniae sepsis  fever  leukocytosis  Alcoholic liver cirrrhosis      - Blood cultures reporting Klebsiella pneumoniae  - Repeat blood cultures no growth 6/18  - RVP/COVID 19 PCR negative   - CXR reporting left pleural effusion   - CT A/P reporting cirrhosis   - UA negative   - Procalcitonin level 2.95  - Continue Ceftriaxone  - Will need IV Ceftriaxone till 7/1/21  - Trend Fever  - Trend Leukocytosis      Thank you for allowing me to participate in the care of your patient.   Will sign off. Please call PRN.       d/w Dr Camacho

## 2021-06-25 NOTE — PROGRESS NOTE ADULT - SUBJECTIVE AND OBJECTIVE BOX
HPI: Patient is a 65y Female seen on consultation for the evaluation and management of anemia; has long term history of cirrhosis; admitted to [facility] with Hb of 5.2 unassociated with bleeding. Received 2 units of PRBC with reultant Hb of 10.6.  Noted TB of 6.5, mostly direct, with elevated transaminases, mildly elevated retic of 4.9.  Awake, alert, appears comfortable.  In no resp distress.     No acute events over night.  No fever.   No bleeding reported.   Currently afebrile, on antibiotics for Klebsiella    MEDICATIONS  (STANDING):  dextrose 40% Gel 15 Gram(s) Oral once  dextrose 5%. 1000 milliLiter(s) (50 mL/Hr) IV Continuous <Continuous>  dextrose 5%. 1000 milliLiter(s) (100 mL/Hr) IV Continuous <Continuous>  dextrose 50% Injectable 25 Gram(s) IV Push once  dextrose 50% Injectable 12.5 Gram(s) IV Push once  dextrose 50% Injectable 25 Gram(s) IV Push once  folic acid 1 milliGRAM(s) Oral daily  glucagon  Injectable 1 milliGRAM(s) IntraMuscular once  insulin glargine Injectable (LANTUS) 10 Unit(s) SubCutaneous every morning  insulin lispro (ADMELOG) corrective regimen sliding scale   SubCutaneous at bedtime  insulin lispro (ADMELOG) corrective regimen sliding scale   SubCutaneous three times a day before meals  lactulose Syrup 10 Gram(s) Oral daily  levothyroxine 50 MICROGram(s) Oral daily  midodrine. 5 milliGRAM(s) Oral three times a day  multivitamin 1 Tablet(s) Oral daily  pantoprazole  Injectable 40 milliGRAM(s) IV Push two times a day  phytonadione   Solution 5 milliGRAM(s) Oral daily  piperacillin/tazobactam IVPB.. 3.375 Gram(s) IV Intermittent every 8 hours  potassium chloride    Tablet ER 30 milliEquivalent(s) Oral two times a day  rifAXIMin 550 milliGRAM(s) Oral two times a day  sodium chloride 2 Gram(s) Oral two times a day  spironolactone 50 milliGRAM(s) Oral daily  thiamine 100 milliGRAM(s) Oral daily    MEDICATIONS  (PRN):  LORazepam   Injectable 1 milliGRAM(s) IV Push every 2 hours PRN Agitation  morphine  - Injectable 1 milliGRAM(s) IV Push every 6 hours PRN Moderate Pain (4 - 6)    ICU Vital Signs Last 24 Hrs  T(C): 36.7 (2021 04:44), Max: 36.8 (2021 16:20)  T(F): 98 (2021 04:44), Max: 98.3 (2021 16:20)  HR: 74 (2021 04:44) (70 - 74)  BP: 110/68 (2021 04:44) (100/55 - 128/73)  BP(mean): --  ABP: --  ABP(mean): --  RR: 18 (2021 04:44) (18 - 18)  SpO2: 98% (2021 04:44) (95% - 98%)        PHYSICAL EXAM:  Constitutional:awake, comfortable  Eyes:icteric  ENMT:no adenopathy  Respiratory:Clear  Cardiovascular:RRR  Gastrointestinal:distended, HSM, distended  Extremities:bilateral LE edema      LABS:    WBC 6.25,  H/H 8.5/26, plt ct 108                        8.6    7.35  )-----------( 77       ( 2021 07:30 )             25.4       11.32  H/H 7.1/22.1, plt ct 88 ( )                        7.9    18.11 )-----------( 95       ( 2021 06:05 )             25.1              8.7/26.1             8.3    8.77  )-----------( 131      ( 2021 08:08 )             25.1       17    126<L>  |  96<L>  |  11.8  ----------------------------<  250<H>  4.9   |  21.0<L>  |  0.68    Ca    8.3<L>      2021 06:05  Phos  2.4       Mg     1.8         TPro  6.0<L>  /  Alb  2.6<L>  /  TBili  4.7<H>  /  DBili  3.0<H>  /  AST  138<H>  /  ALT  64<H>  /  AlkPhos  387<H>      121<L>  |  84<L>  |  15.1  ----------------------------<  240<H>  3.5   |  28.0  |  0.89    Ca    7.8<L>      2021 19:00  Phos  1.2       Mg     1.8         TPro  6.2<L>  /  Alb  2.4<L>  /  TBili  6.5<H>  /  DBili  4.6<H>  /  AST  216<H>  /  ALT  86<H>  /  AlkPhos  476<H>      PT/INR - ( 2021 18:36 )   PT: 19.8 sec;   INR: 1.75 ratio         PTT - ( 2021 18:36 )  PTT:25.3 sec  Urinalysis Basic - ( 2021 22:08 )    Color: Yellow / Appearance: Clear / S.010 / pH: x  Gluc: x / Ketone: Trace  / Bili: Negative / Urobili: 1 mg/dL   Blood: x / Protein: 15 mg/dL / Nitrite: Negative   Leuk Esterase: Moderate / RBC: 3-5 /HPF / WBC 3-5   Sq Epi: x / Non Sq Epi: Occasional / Bacteria: x        RADIOLOGY & ADDITIONAL STUDIES:

## 2021-06-26 LAB
ALBUMIN SERPL ELPH-MCNC: 2.2 G/DL — LOW (ref 3.3–5.2)
ALP SERPL-CCNC: 240 U/L — HIGH (ref 40–120)
ALT FLD-CCNC: 30 U/L — SIGNIFICANT CHANGE UP
ANION GAP SERPL CALC-SCNC: 8 MMOL/L — SIGNIFICANT CHANGE UP (ref 5–17)
ANISOCYTOSIS BLD QL: SLIGHT — SIGNIFICANT CHANGE UP
AST SERPL-CCNC: 65 U/L — HIGH
BASOPHILS # BLD AUTO: 0.06 K/UL — SIGNIFICANT CHANGE UP (ref 0–0.2)
BASOPHILS NFR BLD AUTO: 0.9 % — SIGNIFICANT CHANGE UP (ref 0–2)
BILIRUB SERPL-MCNC: 2.6 MG/DL — HIGH (ref 0.4–2)
BUN SERPL-MCNC: 9.5 MG/DL — SIGNIFICANT CHANGE UP (ref 8–20)
BURR CELLS BLD QL SMEAR: PRESENT — SIGNIFICANT CHANGE UP
CALCIUM SERPL-MCNC: 8.4 MG/DL — LOW (ref 8.6–10.2)
CHLORIDE SERPL-SCNC: 101 MMOL/L — SIGNIFICANT CHANGE UP (ref 98–107)
CO2 SERPL-SCNC: 24 MMOL/L — SIGNIFICANT CHANGE UP (ref 22–29)
CREAT SERPL-MCNC: 0.53 MG/DL — SIGNIFICANT CHANGE UP (ref 0.5–1.3)
ELLIPTOCYTES BLD QL SMEAR: SLIGHT — SIGNIFICANT CHANGE UP
EOSINOPHIL # BLD AUTO: 0.28 K/UL — SIGNIFICANT CHANGE UP (ref 0–0.5)
EOSINOPHIL NFR BLD AUTO: 4.4 % — SIGNIFICANT CHANGE UP (ref 0–6)
GLUCOSE BLDC GLUCOMTR-MCNC: 158 MG/DL — HIGH (ref 70–99)
GLUCOSE BLDC GLUCOMTR-MCNC: 176 MG/DL — HIGH (ref 70–99)
GLUCOSE BLDC GLUCOMTR-MCNC: 204 MG/DL — HIGH (ref 70–99)
GLUCOSE BLDC GLUCOMTR-MCNC: 97 MG/DL — SIGNIFICANT CHANGE UP (ref 70–99)
GLUCOSE SERPL-MCNC: 94 MG/DL — SIGNIFICANT CHANGE UP (ref 70–99)
HCT VFR BLD CALC: 26.7 % — LOW (ref 34.5–45)
HGB BLD-MCNC: 8.7 G/DL — LOW (ref 11.5–15.5)
HYPOCHROMIA BLD QL: SLIGHT — SIGNIFICANT CHANGE UP
LYMPHOCYTES # BLD AUTO: 1 K/UL — SIGNIFICANT CHANGE UP (ref 1–3.3)
LYMPHOCYTES # BLD AUTO: 15.9 % — SIGNIFICANT CHANGE UP (ref 13–44)
MACROCYTES BLD QL: SLIGHT — SIGNIFICANT CHANGE UP
MANUAL SMEAR VERIFICATION: SIGNIFICANT CHANGE UP
MCHC RBC-ENTMCNC: 31 PG — SIGNIFICANT CHANGE UP (ref 27–34)
MCHC RBC-ENTMCNC: 32.6 GM/DL — SIGNIFICANT CHANGE UP (ref 32–36)
MCV RBC AUTO: 95 FL — SIGNIFICANT CHANGE UP (ref 80–100)
MONOCYTES # BLD AUTO: 0.61 K/UL — SIGNIFICANT CHANGE UP (ref 0–0.9)
MONOCYTES NFR BLD AUTO: 9.7 % — SIGNIFICANT CHANGE UP (ref 2–14)
NEUTROPHILS # BLD AUTO: 4.23 K/UL — SIGNIFICANT CHANGE UP (ref 1.8–7.4)
NEUTROPHILS NFR BLD AUTO: 67.3 % — SIGNIFICANT CHANGE UP (ref 43–77)
OVALOCYTES BLD QL SMEAR: SLIGHT — SIGNIFICANT CHANGE UP
PLAT MORPH BLD: NORMAL — SIGNIFICANT CHANGE UP
PLATELET # BLD AUTO: 121 K/UL — LOW (ref 150–400)
POIKILOCYTOSIS BLD QL AUTO: SLIGHT — SIGNIFICANT CHANGE UP
POLYCHROMASIA BLD QL SMEAR: SLIGHT — SIGNIFICANT CHANGE UP
POTASSIUM SERPL-MCNC: 3.8 MMOL/L — SIGNIFICANT CHANGE UP (ref 3.5–5.3)
POTASSIUM SERPL-SCNC: 3.8 MMOL/L — SIGNIFICANT CHANGE UP (ref 3.5–5.3)
PROT SERPL-MCNC: 5.9 G/DL — LOW (ref 6.6–8.7)
RBC # BLD: 2.81 M/UL — LOW (ref 3.8–5.2)
RBC # FLD: 25.5 % — HIGH (ref 10.3–14.5)
RBC BLD AUTO: ABNORMAL
SMUDGE CELLS # BLD: PRESENT — SIGNIFICANT CHANGE UP
SODIUM SERPL-SCNC: 133 MMOL/L — LOW (ref 135–145)
TARGETS BLD QL SMEAR: SIGNIFICANT CHANGE UP
VARIANT LYMPHS # BLD: 1.8 % — SIGNIFICANT CHANGE UP (ref 0–6)
WBC # BLD: 6.28 K/UL — SIGNIFICANT CHANGE UP (ref 3.8–10.5)
WBC # FLD AUTO: 6.28 K/UL — SIGNIFICANT CHANGE UP (ref 3.8–10.5)

## 2021-06-26 PROCEDURE — 71045 X-RAY EXAM CHEST 1 VIEW: CPT | Mod: 26

## 2021-06-26 PROCEDURE — 99232 SBSQ HOSP IP/OBS MODERATE 35: CPT

## 2021-06-26 RX ADMIN — SODIUM CHLORIDE 2 GRAM(S): 9 INJECTION INTRAMUSCULAR; INTRAVENOUS; SUBCUTANEOUS at 18:17

## 2021-06-26 RX ADMIN — INSULIN GLARGINE 15 UNIT(S): 100 INJECTION, SOLUTION SUBCUTANEOUS at 21:16

## 2021-06-26 RX ADMIN — Medication 50 MICROGRAM(S): at 05:13

## 2021-06-26 RX ADMIN — Medication 40 MILLIGRAM(S): at 05:13

## 2021-06-26 RX ADMIN — CEFTRIAXONE 100 MILLIGRAM(S): 500 INJECTION, POWDER, FOR SOLUTION INTRAMUSCULAR; INTRAVENOUS at 18:16

## 2021-06-26 RX ADMIN — Medication 1 MILLIGRAM(S): at 11:25

## 2021-06-26 RX ADMIN — Medication 2: at 12:50

## 2021-06-26 RX ADMIN — Medication 4: at 17:35

## 2021-06-26 RX ADMIN — PANTOPRAZOLE SODIUM 40 MILLIGRAM(S): 20 TABLET, DELAYED RELEASE ORAL at 05:14

## 2021-06-26 RX ADMIN — Medication 50 MILLIGRAM(S): at 18:17

## 2021-06-26 RX ADMIN — Medication 1 TABLET(S): at 11:25

## 2021-06-26 RX ADMIN — LIDOCAINE 1 PATCH: 4 CREAM TOPICAL at 19:52

## 2021-06-26 RX ADMIN — LIDOCAINE 1 PATCH: 4 CREAM TOPICAL at 11:25

## 2021-06-26 RX ADMIN — Medication 100 MILLIGRAM(S): at 11:25

## 2021-06-26 RX ADMIN — LACTULOSE 10 GRAM(S): 10 SOLUTION ORAL at 11:25

## 2021-06-26 RX ADMIN — Medication 50 MILLIGRAM(S): at 05:13

## 2021-06-26 RX ADMIN — SPIRONOLACTONE 100 MILLIGRAM(S): 25 TABLET, FILM COATED ORAL at 05:14

## 2021-06-26 RX ADMIN — SODIUM CHLORIDE 2 GRAM(S): 9 INJECTION INTRAMUSCULAR; INTRAVENOUS; SUBCUTANEOUS at 05:14

## 2021-06-26 RX ADMIN — LIDOCAINE 1 PATCH: 4 CREAM TOPICAL at 23:00

## 2021-06-26 RX ADMIN — PANTOPRAZOLE SODIUM 40 MILLIGRAM(S): 20 TABLET, DELAYED RELEASE ORAL at 18:17

## 2021-06-26 NOTE — PROGRESS NOTE ADULT - SUBJECTIVE AND OBJECTIVE BOX
HPI: Patient is a 65y Female seen on consultation for the evaluation and management of anemia; has long term history of cirrhosis; admitted to [facility] with Hb of 5.2 unassociated with bleeding. Received 2 units of PRBC with reultant Hb of 10.6.  Noted TB of 6.5, mostly direct, with elevated transaminases, mildly elevated retic of 4.9.  Awake, alert, appears comfortable.  In no resp distress.     No acute events over night.  No fever.   No bleeding reported. Had EGD; no active bleeding  Currently afebrile, on antibiotics for Klebsiella    MEDICATIONS  (STANDING):  dextrose 40% Gel 15 Gram(s) Oral once  dextrose 5%. 1000 milliLiter(s) (50 mL/Hr) IV Continuous <Continuous>  dextrose 5%. 1000 milliLiter(s) (100 mL/Hr) IV Continuous <Continuous>  dextrose 50% Injectable 25 Gram(s) IV Push once  dextrose 50% Injectable 12.5 Gram(s) IV Push once  dextrose 50% Injectable 25 Gram(s) IV Push once  folic acid 1 milliGRAM(s) Oral daily  glucagon  Injectable 1 milliGRAM(s) IntraMuscular once  insulin glargine Injectable (LANTUS) 10 Unit(s) SubCutaneous every morning  insulin lispro (ADMELOG) corrective regimen sliding scale   SubCutaneous at bedtime  insulin lispro (ADMELOG) corrective regimen sliding scale   SubCutaneous three times a day before meals  lactulose Syrup 10 Gram(s) Oral daily  levothyroxine 50 MICROGram(s) Oral daily  midodrine. 5 milliGRAM(s) Oral three times a day  multivitamin 1 Tablet(s) Oral daily  pantoprazole  Injectable 40 milliGRAM(s) IV Push two times a day  phytonadione   Solution 5 milliGRAM(s) Oral daily  piperacillin/tazobactam IVPB.. 3.375 Gram(s) IV Intermittent every 8 hours  potassium chloride    Tablet ER 30 milliEquivalent(s) Oral two times a day  rifAXIMin 550 milliGRAM(s) Oral two times a day  sodium chloride 2 Gram(s) Oral two times a day  spironolactone 50 milliGRAM(s) Oral daily  thiamine 100 milliGRAM(s) Oral daily    MEDICATIONS  (PRN):  LORazepam   Injectable 1 milliGRAM(s) IV Push every 2 hours PRN Agitation  morphine  - Injectable 1 milliGRAM(s) IV Push every 6 hours PRN Moderate Pain (4 - 6)    ICU Vital Signs Last 24 Hrs  T(C): 36.7 (2021 04:44), Max: 36.8 (2021 16:20)  T(F): 98 (2021 04:44), Max: 98.3 (2021 16:20)  HR: 74 (2021 04:44) (70 - 74)  BP: 110/68 (2021 04:44) (100/55 - 128/73)  BP(mean): --  ABP: --  ABP(mean): --  RR: 18 (2021 04:44) (18 - 18)  SpO2: 98% (2021 04:44) (95% - 98%)        PHYSICAL EXAM:  Constitutional:awake, comfortable  Eyes:icteric  ENMT:no adenopathy  Respiratory:Clear  Cardiovascular:RRR  Gastrointestinal:distended, HSM, distended  Extremities:bilateral LE edema      LABS:  WBC 6.28, H/H 8.7/26.7, plt ct 121,000  WBC 6.25,  H/H 8.5/26, plt ct 108                        8.6    7.35  )-----------( 77       ( 2021 07:30 )             25.4       11.32  H/H 7.1/22.1, plt ct 88 ( )                        7.9    18.11 )-----------( 95       ( 2021 06:05 )             25.1              8.7/26.1             8.3    8.77  )-----------( 131      ( 2021 08:08 )             25.1       06-17    126<L>  |  96<L>  |  11.8  ----------------------------<  250<H>  4.9   |  21.0<L>  |  0.68    Ca    8.3<L>      2021 06:05  Phos  2.4     06-17  Mg     1.8     06-17    TPro  6.0<L>  /  Alb  2.6<L>  /  TBili  4.7<H>  /  DBili  3.0<H>  /  AST  138<H>  /  ALT  64<H>  /  AlkPhos  387<H>      121<L>  |  84<L>  |  15.1  ----------------------------<  240<H>  3.5   |  28.0  |  0.89    Ca    7.8<L>      2021 19:00  Phos  1.2       Mg     1.8         TPro  6.2<L>  /  Alb  2.4<L>  /  TBili  6.5<H>  /  DBili  4.6<H>  /  AST  216<H>  /  ALT  86<H>  /  AlkPhos  476<H>      PT/INR - ( 2021 18:36 )   PT: 19.8 sec;   INR: 1.75 ratio         PTT - ( 2021 18:36 )  PTT:25.3 sec  Urinalysis Basic - ( 2021 22:08 )    Color: Yellow / Appearance: Clear / S.010 / pH: x  Gluc: x / Ketone: Trace  / Bili: Negative / Urobili: 1 mg/dL   Blood: x / Protein: 15 mg/dL / Nitrite: Negative   Leuk Esterase: Moderate / RBC: 3-5 /HPF / WBC 3-5   Sq Epi: x / Non Sq Epi: Occasional / Bacteria: x        RADIOLOGY & ADDITIONAL STUDIES:

## 2021-06-26 NOTE — PROGRESS NOTE ADULT - SUBJECTIVE AND OBJECTIVE BOX
NEPHROLOGY INTERVAL HPI/OVERNIGHT EVENTS:    Examined  No acute distress    MEDICATIONS  (STANDING):  cefTRIAXone   IVPB 2000 milliGRAM(s) IV Intermittent every 24 hours  dextrose 40% Gel 15 Gram(s) Oral once  dextrose 5%. 1000 milliLiter(s) (50 mL/Hr) IV Continuous <Continuous>  dextrose 5%. 1000 milliLiter(s) (100 mL/Hr) IV Continuous <Continuous>  dextrose 50% Injectable 25 Gram(s) IV Push once  dextrose 50% Injectable 12.5 Gram(s) IV Push once  dextrose 50% Injectable 25 Gram(s) IV Push once  folic acid 1 milliGRAM(s) Oral daily  furosemide    Tablet 40 milliGRAM(s) Oral daily  glucagon  Injectable 1 milliGRAM(s) IntraMuscular once  insulin glargine Injectable (LANTUS) 15 Unit(s) SubCutaneous at bedtime  insulin lispro (ADMELOG) corrective regimen sliding scale   SubCutaneous three times a day before meals  insulin lispro (ADMELOG) corrective regimen sliding scale   SubCutaneous at bedtime  labetalol 50 milliGRAM(s) Oral two times a day  lactulose Syrup 10 Gram(s) Oral daily  levothyroxine 50 MICROGram(s) Oral daily  lidocaine   Patch 1 Patch Transdermal daily  multivitamin 1 Tablet(s) Oral daily  pantoprazole  Injectable 40 milliGRAM(s) IV Push two times a day  rifAXIMin 550 milliGRAM(s) Oral two times a day  sodium chloride 2 Gram(s) Oral two times a day  spironolactone 100 milliGRAM(s) Oral daily  thiamine 100 milliGRAM(s) Oral daily    MEDICATIONS  (PRN):  acetaminophen   Tablet .. 500 milliGRAM(s) Oral every 12 hours PRN Temp greater or equal to 38C (100.4F), Mild Pain (1 - 3)      Allergies    No Known Allergies    Intolerances        Vital Signs Last 24 Hrs  T(C): 36.9 (26 Jun 2021 04:37), Max: 36.9 (25 Jun 2021 10:59)  T(F): 98.4 (26 Jun 2021 04:37), Max: 98.4 (25 Jun 2021 10:59)  HR: 69 (26 Jun 2021 04:37) (64 - 69)  BP: 125/73 (26 Jun 2021 04:37) (111/72 - 133/72)  BP(mean): --  RR: 18 (26 Jun 2021 04:37) (18 - 18)  SpO2: 96% (26 Jun 2021 04:37) (96% - 97%)  Daily     Daily     PHYSICAL EXAM:  GENERAL: Lethargic  NECK: Supple, no JVD  NERVOUS SYSTEM:  Awake, alert  CHEST/LUNG: Clear with diminished BS at bases (L>R)  HEART: Regular rate and rhythm; no rub  ABDOMEN: Soft, distended +BS  EXTREMITIES:  trace edema B/L LE      LABS:                        8.7    6.28  )-----------( 121      ( 26 Jun 2021 07:52 )             26.7     06-26    133<L>  |  101  |  9.5  ----------------------------<  94  3.8   |  24.0  |  0.53    Ca    8.4<L>      26 Jun 2021 07:52    TPro  5.9<L>  /  Alb  2.2<L>  /  TBili  2.6<H>  /  DBili  x   /  AST  65<H>  /  ALT  30  /  AlkPhos  240<H>  06-26    PT/INR - ( 25 Jun 2021 07:06 )   PT: 15.7 sec;   INR: 1.37 ratio                     RADIOLOGY & ADDITIONAL TESTS:

## 2021-06-26 NOTE — PROGRESS NOTE ADULT - ASSESSMENT
65y  Female with h/o alcoholic liver cirrhosis with hepatic encephalopathy, portal HTN, HTN, DM 2, hypothyroidism. Patient initially admitted with epigastric pain, worsening transaminitis, alcoholic cirrhosis and weakness. CT of abdomen and pelvis revealed  cirrhosis with evidence of portal hypertension, mild volume of abdominopelvic ascites, mild left pleural effusion with associated subsegmental atelectasis, possible gastritis and gallstones. Patient was followed by GI, Hematology, nephrology. Patient developed fever to 102.8F 6/16. Also had paracentesis 6/16. Blood cultures with GNR.       Dilutional hyponatremia---> Improved   Serum Na 135--> today  ETOH cirrhosis  Poor nutrition  - cont NaCl tabs and Lasix/ Aldactone   - oral fluid restriction  - increase oral protein intake  - follow labs    L pleural effusion: s/p thoracentesis  - thoracic surgery noted    AM labs, will follow

## 2021-06-26 NOTE — PROGRESS NOTE ADULT - SUBJECTIVE AND OBJECTIVE BOX
Chief complaint: Anemia    Patient seen and examined at bedside. No acute overnight events reported. Patient states she is doing well and has no complaints. Denies fever, chills, cough, chest pain, nausea or vomiting.     Vital Signs Last 24 Hrs  T(F): 98.4 (26 Jun 2021 04:37), Max: 98.4 (25 Jun 2021 10:59)  HR: 69 (26 Jun 2021 04:37) (64 - 69)  BP: 125/73 (26 Jun 2021 04:37) (111/72 - 133/72)  RR: 18 (26 Jun 2021 04:37) (18 - 18)  SpO2: 96% (26 Jun 2021 04:37) (96% - 97%)    Physical Exam:  Constitutional: alert and oriented, in no acute distress   Skin: jaundiced  Neck: Soft and supple  Respiratory: Clear to auscultation bilaterally, no wheezes or crackles  Cardiovascular: Regular rate and rhythm, no murmurs, gallops, rubs  Gastrointestinal: Soft, non-tender to palpation, +bs  Vascular: 2+ peripheral pulses  Neurological: A/O x 3, no focal neurological deficits  Musculoskeletal: 5/5 strength b/l upper and lower extremities, no lower extremity edema bilaterally    Labs:                        8.7    6.28  )-----------( 121      ( 26 Jun 2021 07:52 )             26.7   06-26    133<L>  |  101  |  9.5  ----------------------------<  94  3.8   |  24.0  |  0.53    Ca    8.4<L>      26 Jun 2021 07:52    TPro  5.9<L>  /  Alb  2.2<L>  /  TBili  2.6<H>  /  DBili  x   /  AST  65<H>  /  ALT  30  /  AlkPhos  240<H>  06-26

## 2021-06-26 NOTE — PROGRESS NOTE ADULT - SUBJECTIVE AND OBJECTIVE BOX
Patient seen and examined; chart reviewed.  afebrile.  Comfortable.  Tolerates diet.  Did well with EGD yesterday.  +  and Mild PHG and small esophageal varices.  No bleeding.  No confusion.  Tolerates meds, diuretics.      PAST MEDICAL & SURGICAL HISTORY:  Alcohol abuse    Alcohol abuse    Liver cirrhosis    ETOH abuse    Urea cycle metabolism disorder    Transitory  hyperthyroidism    Lump in female breast    UTI (urinary tract infection)    Lymphangitis, acute, lower leg    Anemia    Hypothyroidism    Chronic back pain    HTN (hypertension)    GERD (gastroesophageal reflux disease)    Pancreatitis    No significant past surgical history    S/P abdominoplasty        ROS:  No Heartburn, regurgitation, dysphagia, odynophagia.  No dyspepsia  No abdominal pain.    No Nausea, vomiting.  No Bleeding.  No hematemesis.   No diarrhea.    No hematochesia.  No weight loss, anorexia.  No edema.      MEDICATIONS  (STANDING):  cefTRIAXone   IVPB 2000 milliGRAM(s) IV Intermittent every 24 hours  dextrose 40% Gel 15 Gram(s) Oral once  dextrose 5%. 1000 milliLiter(s) (50 mL/Hr) IV Continuous <Continuous>  dextrose 5%. 1000 milliLiter(s) (100 mL/Hr) IV Continuous <Continuous>  dextrose 50% Injectable 25 Gram(s) IV Push once  dextrose 50% Injectable 12.5 Gram(s) IV Push once  dextrose 50% Injectable 25 Gram(s) IV Push once  folic acid 1 milliGRAM(s) Oral daily  furosemide    Tablet 40 milliGRAM(s) Oral daily  glucagon  Injectable 1 milliGRAM(s) IntraMuscular once  insulin glargine Injectable (LANTUS) 15 Unit(s) SubCutaneous at bedtime  insulin lispro (ADMELOG) corrective regimen sliding scale   SubCutaneous three times a day before meals  insulin lispro (ADMELOG) corrective regimen sliding scale   SubCutaneous at bedtime  labetalol 50 milliGRAM(s) Oral two times a day  lactulose Syrup 10 Gram(s) Oral daily  levothyroxine 50 MICROGram(s) Oral daily  lidocaine   Patch 1 Patch Transdermal daily  multivitamin 1 Tablet(s) Oral daily  pantoprazole  Injectable 40 milliGRAM(s) IV Push two times a day  rifAXIMin 550 milliGRAM(s) Oral two times a day  sodium chloride 2 Gram(s) Oral two times a day  spironolactone 100 milliGRAM(s) Oral daily  thiamine 100 milliGRAM(s) Oral daily    MEDICATIONS  (PRN):  acetaminophen   Tablet .. 500 milliGRAM(s) Oral every 12 hours PRN Temp greater or equal to 38C (100.4F), Mild Pain (1 - 3)      Allergies    No Known Allergies    Intolerances        Vital Signs Last 24 Hrs  T(C): 36.6 (2021 19:33), Max: 37 (2021 11:19)  T(F): 97.8 (2021 19:33), Max: 98.6 (2021 11:19)  HR: 72 (:33) (63 - 72)  BP: 105/74 (2021 19:33) (96/58 - 125/73)  BP(mean): --  RR: 18 (:33) (18 - 18)  SpO2: 97% (2021 19:33) (96% - 97%)    PHYSICAL EXAM:    GENERAL: NAD, well-groomed, well-developed  HEAD:  Atraumatic, Normocephalic  EYES: EOMI, PERRLA, conjunctiva and sclera clear  ENMT: No tonsillar erythema, exudates, or enlargement; Moist mucous membranes, Good dentition, No lesions  NECK: Supple, No JVD, Normal thyroid  CHEST/LUNG: Clear to percussion bilaterally; No rales, rhonchi, wheezing, or rubs  HEART: Regular rate and rhythm; No murmurs, rubs, or gallops  ABDOMEN: Soft, Nontender, Nondistended; Bowel sounds present; benign abdomen.    EXTREMITIES:  2+ Peripheral Pulses, No clubbing, cyanosis, or edema  LYMPH: No lymphadenopathy noted  SKIN: No rashes or lesions      LABS:                        8.7    6.28  )-----------( 121      ( 2021 07:52 )             26.7     -    133<L>  |  101  |  9.5  ----------------------------<  94  3.8   |  24.0  |  0.53    Ca    8.4<L>      2021 07:52    TPro  5.9<L>  /  Alb  2.2<L>  /  TBili  2.6<H>  /  DBili  x   /  AST  65<H>  /  ALT  30  /  AlkPhos  240<H>      PT/INR - ( 2021 07:06 )   PT: 15.7 sec;   INR: 1.37 ratio                  RADIOLOGY & ADDITIONAL STUDIES:

## 2021-06-26 NOTE — PROGRESS NOTE ADULT - ASSESSMENT
Imp:  60 y/o female with PMH of alcoholic liver cirrhosis with hepatic encephalopathy, portal HTN, HTN, DM-2, hypothyroidism came to the ED complaining of epigastric pain x 1 week. Pain was sharp radiating to her chest, associated with nausea and non-bloody emesis. Patient said her symptoms now improved at the time of interview. She endorsed dark stool to ED attending but said no when I asked her. She also noted shortness of breath with exertion and fatigue. She has no chest pain, melena, hematemesis, hematuria, recent travel, fever, chills. Of note patient still drinks alcohol, last drink was 2 weeks ago.   Found to have hb of 5.2 in the ED, occult negative and also hyperglycemic.         Acute anemia   Likely due to underlying alcohol liver dx   Hb: 5.2 on admit;  FOBT negative   Hb improved post transfusion; Elevated TB, mostly direct; unlikely hemolysis.  Low haptoglobin 2 chantelle to underlying liver disease, as is LDH elevation; rec: trend retic, LDH  Monitor CBC; transfuse to keep Hb over 7; monitor coags  Hgb 8.7 , plt count 121,000  monitor for bleeding    Epigastric abdominal pain   Likely due to gastritis PPI 40mg   CT abdomen done, no pancreatitis   Plan for EGD this am    fever and leucocytosis, Bl Cx pos for Klebsielaa , on Ceftriaxone per ID    f/u blood cultures  further plan as per primary team        Will follow

## 2021-06-26 NOTE — PROGRESS NOTE ADULT - ASSESSMENT
Alcoholic cirrhosis with small varices and mild PHG.  +  on PPI med.  LFT's improving and Diet tolerated.  Ready for DC from GI POV.  Call in 1 week for path check;  GI OV in 1-2 months.    Same diuretics,BB and PPI.

## 2021-06-27 LAB
ALBUMIN SERPL ELPH-MCNC: 2.4 G/DL — LOW (ref 3.3–5.2)
ALP SERPL-CCNC: 283 U/L — HIGH (ref 40–120)
ALT FLD-CCNC: 30 U/L — SIGNIFICANT CHANGE UP
ANION GAP SERPL CALC-SCNC: 7 MMOL/L — SIGNIFICANT CHANGE UP (ref 5–17)
AST SERPL-CCNC: 64 U/L — HIGH
BASOPHILS # BLD AUTO: 0.04 K/UL — SIGNIFICANT CHANGE UP (ref 0–0.2)
BASOPHILS NFR BLD AUTO: 0.7 % — SIGNIFICANT CHANGE UP (ref 0–2)
BILIRUB SERPL-MCNC: 2.5 MG/DL — HIGH (ref 0.4–2)
BUN SERPL-MCNC: 10.2 MG/DL — SIGNIFICANT CHANGE UP (ref 8–20)
CALCIUM SERPL-MCNC: 8.5 MG/DL — LOW (ref 8.6–10.2)
CHLORIDE SERPL-SCNC: 104 MMOL/L — SIGNIFICANT CHANGE UP (ref 98–107)
CO2 SERPL-SCNC: 24 MMOL/L — SIGNIFICANT CHANGE UP (ref 22–29)
CREAT SERPL-MCNC: 0.56 MG/DL — SIGNIFICANT CHANGE UP (ref 0.5–1.3)
EOSINOPHIL # BLD AUTO: 0.23 K/UL — SIGNIFICANT CHANGE UP (ref 0–0.5)
EOSINOPHIL NFR BLD AUTO: 3.8 % — SIGNIFICANT CHANGE UP (ref 0–6)
GLUCOSE BLDC GLUCOMTR-MCNC: 139 MG/DL — HIGH (ref 70–99)
GLUCOSE BLDC GLUCOMTR-MCNC: 167 MG/DL — HIGH (ref 70–99)
GLUCOSE BLDC GLUCOMTR-MCNC: 206 MG/DL — HIGH (ref 70–99)
GLUCOSE SERPL-MCNC: 146 MG/DL — HIGH (ref 70–99)
HCT VFR BLD CALC: 26.8 % — LOW (ref 34.5–45)
HGB BLD-MCNC: 9 G/DL — LOW (ref 11.5–15.5)
IMM GRANULOCYTES NFR BLD AUTO: 0.3 % — SIGNIFICANT CHANGE UP (ref 0–1.5)
LYMPHOCYTES # BLD AUTO: 1.32 K/UL — SIGNIFICANT CHANGE UP (ref 1–3.3)
LYMPHOCYTES # BLD AUTO: 21.8 % — SIGNIFICANT CHANGE UP (ref 13–44)
MCHC RBC-ENTMCNC: 32 PG — SIGNIFICANT CHANGE UP (ref 27–34)
MCHC RBC-ENTMCNC: 33.6 GM/DL — SIGNIFICANT CHANGE UP (ref 32–36)
MCV RBC AUTO: 95.4 FL — SIGNIFICANT CHANGE UP (ref 80–100)
MONOCYTES # BLD AUTO: 0.46 K/UL — SIGNIFICANT CHANGE UP (ref 0–0.9)
MONOCYTES NFR BLD AUTO: 7.6 % — SIGNIFICANT CHANGE UP (ref 2–14)
NEUTROPHILS # BLD AUTO: 3.99 K/UL — SIGNIFICANT CHANGE UP (ref 1.8–7.4)
NEUTROPHILS NFR BLD AUTO: 65.8 % — SIGNIFICANT CHANGE UP (ref 43–77)
PLATELET # BLD AUTO: 145 K/UL — LOW (ref 150–400)
POTASSIUM SERPL-MCNC: 3.6 MMOL/L — SIGNIFICANT CHANGE UP (ref 3.5–5.3)
POTASSIUM SERPL-SCNC: 3.6 MMOL/L — SIGNIFICANT CHANGE UP (ref 3.5–5.3)
PROT SERPL-MCNC: 6.2 G/DL — LOW (ref 6.6–8.7)
RBC # BLD: 2.81 M/UL — LOW (ref 3.8–5.2)
RBC # FLD: 25.7 % — HIGH (ref 10.3–14.5)
SARS-COV-2 RNA SPEC QL NAA+PROBE: SIGNIFICANT CHANGE UP
SODIUM SERPL-SCNC: 135 MMOL/L — SIGNIFICANT CHANGE UP (ref 135–145)
WBC # BLD: 6.06 K/UL — SIGNIFICANT CHANGE UP (ref 3.8–10.5)
WBC # FLD AUTO: 6.06 K/UL — SIGNIFICANT CHANGE UP (ref 3.8–10.5)

## 2021-06-27 PROCEDURE — 99232 SBSQ HOSP IP/OBS MODERATE 35: CPT

## 2021-06-27 PROCEDURE — 71045 X-RAY EXAM CHEST 1 VIEW: CPT | Mod: 26

## 2021-06-27 RX ORDER — POTASSIUM CHLORIDE 20 MEQ
40 PACKET (EA) ORAL ONCE
Refills: 0 | Status: COMPLETED | OUTPATIENT
Start: 2021-06-27 | End: 2021-06-27

## 2021-06-27 RX ADMIN — PANTOPRAZOLE SODIUM 40 MILLIGRAM(S): 20 TABLET, DELAYED RELEASE ORAL at 18:19

## 2021-06-27 RX ADMIN — PANTOPRAZOLE SODIUM 40 MILLIGRAM(S): 20 TABLET, DELAYED RELEASE ORAL at 05:12

## 2021-06-27 RX ADMIN — SPIRONOLACTONE 100 MILLIGRAM(S): 25 TABLET, FILM COATED ORAL at 05:13

## 2021-06-27 RX ADMIN — INSULIN GLARGINE 15 UNIT(S): 100 INJECTION, SOLUTION SUBCUTANEOUS at 21:34

## 2021-06-27 RX ADMIN — LIDOCAINE 1 PATCH: 4 CREAM TOPICAL at 23:22

## 2021-06-27 RX ADMIN — LIDOCAINE 1 PATCH: 4 CREAM TOPICAL at 10:48

## 2021-06-27 RX ADMIN — Medication 1 MILLIGRAM(S): at 10:48

## 2021-06-27 RX ADMIN — Medication 50 MILLIGRAM(S): at 18:19

## 2021-06-27 RX ADMIN — Medication 1 TABLET(S): at 10:48

## 2021-06-27 RX ADMIN — CEFTRIAXONE 100 MILLIGRAM(S): 500 INJECTION, POWDER, FOR SOLUTION INTRAMUSCULAR; INTRAVENOUS at 18:19

## 2021-06-27 RX ADMIN — Medication 40 MILLIGRAM(S): at 05:13

## 2021-06-27 RX ADMIN — Medication 4: at 09:25

## 2021-06-27 RX ADMIN — Medication 2: at 12:29

## 2021-06-27 RX ADMIN — Medication 100 MILLIGRAM(S): at 10:48

## 2021-06-27 RX ADMIN — Medication 50 MILLIGRAM(S): at 05:12

## 2021-06-27 RX ADMIN — Medication 2: at 18:02

## 2021-06-27 RX ADMIN — LACTULOSE 10 GRAM(S): 10 SOLUTION ORAL at 10:48

## 2021-06-27 RX ADMIN — Medication 50 MICROGRAM(S): at 05:13

## 2021-06-27 RX ADMIN — SODIUM CHLORIDE 2 GRAM(S): 9 INJECTION INTRAMUSCULAR; INTRAVENOUS; SUBCUTANEOUS at 05:12

## 2021-06-27 RX ADMIN — Medication 40 MILLIEQUIVALENT(S): at 10:47

## 2021-06-27 RX ADMIN — SODIUM CHLORIDE 2 GRAM(S): 9 INJECTION INTRAMUSCULAR; INTRAVENOUS; SUBCUTANEOUS at 18:19

## 2021-06-27 RX ADMIN — LIDOCAINE 1 PATCH: 4 CREAM TOPICAL at 19:35

## 2021-06-27 NOTE — PROGRESS NOTE ADULT - ASSESSMENT
65y  Female with h/o alcoholic liver cirrhosis with hepatic encephalopathy, portal HTN, HTN, DM 2, hypothyroidism. Patient initially admitted with epigastric pain, worsening transaminitis, alcoholic cirrhosis and weakness. CT of abdomen and pelvis revealed  cirrhosis with evidence of portal hypertension, mild volume of abdominopelvic ascites, mild left pleural effusion with associated subsegmental atelectasis, possible gastritis and gallstones. Patient was followed by GI, Hematology, nephrology. Patient developed fever to 102.8F 6/16. Also had paracentesis 6/16. Blood cultures with GNR.       Dilutional hyponatremia---> Improved   Serum Na 135--> today  ETOH cirrhosis  Poor nutrition  - cont NaCl tabs and Lasix/ Aldactone   - oral fluid restriction  - increase oral protein intake    L pleural effusion: s/p thoracentesis  - thoracic surgery noted    AM labs, will follow

## 2021-06-27 NOTE — PROGRESS NOTE ADULT - ASSESSMENT
;  small esophageal varices.  Mild PHG.    OK for Discharge from GI POV.  Discharge on PPI and BB and diuretics.  GI office follow up in 1 month.    Reconsult as needed.

## 2021-06-27 NOTE — PROGRESS NOTE ADULT - SUBJECTIVE AND OBJECTIVE BOX
HPI: Patient is a 65y Female seen on consultation for the evaluation and management of anemia; has long term history of cirrhosis; admitted to [facility] with Hb of 5.2 unassociated with bleeding. Received 2 units of PRBC with reultant Hb of 10.6.  Noted TB of 6.5, mostly direct, with elevated transaminases, mildly elevated retic of 4.9.  Awake, alert, appears comfortable.  In no resp distress.     No acute events over night.  No fever.   No bleeding reported. Had EGD; no active bleeding  Currently afebrile, on antibiotics for Klebsiella.  Hb remains stable    MEDICATIONS  (STANDING):  dextrose 40% Gel 15 Gram(s) Oral once  dextrose 5%. 1000 milliLiter(s) (50 mL/Hr) IV Continuous <Continuous>  dextrose 5%. 1000 milliLiter(s) (100 mL/Hr) IV Continuous <Continuous>  dextrose 50% Injectable 25 Gram(s) IV Push once  dextrose 50% Injectable 12.5 Gram(s) IV Push once  dextrose 50% Injectable 25 Gram(s) IV Push once  folic acid 1 milliGRAM(s) Oral daily  glucagon  Injectable 1 milliGRAM(s) IntraMuscular once  insulin glargine Injectable (LANTUS) 10 Unit(s) SubCutaneous every morning  insulin lispro (ADMELOG) corrective regimen sliding scale   SubCutaneous at bedtime  insulin lispro (ADMELOG) corrective regimen sliding scale   SubCutaneous three times a day before meals  lactulose Syrup 10 Gram(s) Oral daily  levothyroxine 50 MICROGram(s) Oral daily  midodrine. 5 milliGRAM(s) Oral three times a day  multivitamin 1 Tablet(s) Oral daily  pantoprazole  Injectable 40 milliGRAM(s) IV Push two times a day  phytonadione   Solution 5 milliGRAM(s) Oral daily  piperacillin/tazobactam IVPB.. 3.375 Gram(s) IV Intermittent every 8 hours  potassium chloride    Tablet ER 30 milliEquivalent(s) Oral two times a day  rifAXIMin 550 milliGRAM(s) Oral two times a day  sodium chloride 2 Gram(s) Oral two times a day  spironolactone 50 milliGRAM(s) Oral daily  thiamine 100 milliGRAM(s) Oral daily    MEDICATIONS  (PRN):  LORazepam   Injectable 1 milliGRAM(s) IV Push every 2 hours PRN Agitation  morphine  - Injectable 1 milliGRAM(s) IV Push every 6 hours PRN Moderate Pain (4 - 6)    ICU Vital Signs Last 24 Hrs  T(C): 36.7 (2021 04:44), Max: 36.8 (2021 16:20)  T(F): 98 (2021 04:44), Max: 98.3 (2021 16:20)  HR: 74 (2021 04:44) (70 - 74)  BP: 110/68 (2021 04:44) (100/55 - 128/73)  BP(mean): --  ABP: --  ABP(mean): --  RR: 18 (2021 04:44) (18 - 18)  SpO2: 98% (2021 04:44) (95% - 98%)        PHYSICAL EXAM:  Constitutional:awake, comfortable  Eyes:icteric  ENMT:no adenopathy  Respiratory:Clear  Cardiovascular:RRR  Gastrointestinal:distended, HSM, distended  Extremities:bilateral LE edema      LABS:  WBC 6, H/H 9.0/26.8, plt ct 145,000  WBC 6.28, H/H 8.7/26.7, plt ct 121,000  WBC 6.25,  H/H 8.5/26, plt ct 108                        8.6    7.35  )-----------( 77       ( 2021 07:30 )             25.4       11.32  H/H 7.1/22.1, plt ct 88 ( )                        7.9    18.11 )-----------( 95       ( 2021 06:05 )             25.1              8.7/26.1             8.3    8.77  )-----------( 131      ( 2021 08:08 )             25.1       06-17    126<L>  |  96<L>  |  11.8  ----------------------------<  250<H>  4.9   |  21.0<L>  |  0.68    Ca    8.3<L>      2021 06:05  Phos  2.4     06-17  Mg     1.8     06-17    TPro  6.0<L>  /  Alb  2.6<L>  /  TBili  4.7<H>  /  DBili  3.0<H>  /  AST  138<H>  /  ALT  64<H>  /  AlkPhos  387<H>      121<L>  |  84<L>  |  15.1  ----------------------------<  240<H>  3.5   |  28.0  |  0.89    Ca    7.8<L>      2021 19:00  Phos  1.2       Mg     1.8         TPro  6.2<L>  /  Alb  2.4<L>  /  TBili  6.5<H>  /  DBili  4.6<H>  /  AST  216<H>  /  ALT  86<H>  /  AlkPhos  476<H>      PT/INR - ( 2021 18:36 )   PT: 19.8 sec;   INR: 1.75 ratio         PTT - ( 2021 18:36 )  PTT:25.3 sec  Urinalysis Basic - ( 2021 22:08 )    Color: Yellow / Appearance: Clear / S.010 / pH: x  Gluc: x / Ketone: Trace  / Bili: Negative / Urobili: 1 mg/dL   Blood: x / Protein: 15 mg/dL / Nitrite: Negative   Leuk Esterase: Moderate / RBC: 3-5 /HPF / WBC 3-5   Sq Epi: x / Non Sq Epi: Occasional / Bacteria: x        RADIOLOGY & ADDITIONAL STUDIES:

## 2021-06-27 NOTE — PROGRESS NOTE ADULT - SUBJECTIVE AND OBJECTIVE BOX
Chief complaint: Anemia    Patient seen and examined at bedside. No acute overnight events reported. Patient doing well, currently with no complaints. Denies fever, chills, cough, chest pain, nausea or vomiting     Vital Signs Last 24 Hrs  T(F): 98.1 (27 Jun 2021 04:23), Max: 98.6 (26 Jun 2021 11:19)  HR: 72 (27 Jun 2021 04:23) (63 - 72)  BP: 128/78 (27 Jun 2021 04:23) (96/58 - 128/78)  RR: 18 (27 Jun 2021 04:23) (18 - 18)  SpO2: 97% (27 Jun 2021 04:23) (96% - 97%)    Physical Exam:  Constitutional: alert and oriented, in no acute distress   Neck: Soft and supple  Respiratory: Clear to auscultation bilaterally, no wheezes or crackles  Cardiovascular: Regular rate and rhyhtm, no murmurs, gallops, rubs  Gastrointestinal: Soft, non-tender to palpation, +bs  Vascular: 2+ peripheral pulses  Neurological: A/O x 3, no focal neurological deficits  Musculoskeletal: 5/5 strength b/l upper and lower extremities, no lower extremity edema bilaterally    Labs:                        9.0    6.06  )-----------( 145      ( 27 Jun 2021 07:28 )             26.8   06-27    135  |  104  |  10.2  ----------------------------<  146<H>  3.6   |  24.0  |  0.56    Ca    8.5<L>      27 Jun 2021 07:28    TPro  6.2<L>  /  Alb  2.4<L>  /  TBili  2.5<H>  /  DBili  x   /  AST  64<H>  /  ALT  30  /  AlkPhos  283<H>  06-27

## 2021-06-27 NOTE — PROGRESS NOTE ADULT - SUBJECTIVE AND OBJECTIVE BOX
NEPHROLOGY INTERVAL HPI/OVERNIGHT EVENTS:    Examined  No acute distress  Lethargic    MEDICATIONS  (STANDING):  cefTRIAXone   IVPB 2000 milliGRAM(s) IV Intermittent every 24 hours  dextrose 40% Gel 15 Gram(s) Oral once  dextrose 5%. 1000 milliLiter(s) (50 mL/Hr) IV Continuous <Continuous>  dextrose 5%. 1000 milliLiter(s) (100 mL/Hr) IV Continuous <Continuous>  dextrose 50% Injectable 25 Gram(s) IV Push once  dextrose 50% Injectable 12.5 Gram(s) IV Push once  dextrose 50% Injectable 25 Gram(s) IV Push once  folic acid 1 milliGRAM(s) Oral daily  furosemide    Tablet 40 milliGRAM(s) Oral daily  glucagon  Injectable 1 milliGRAM(s) IntraMuscular once  insulin glargine Injectable (LANTUS) 15 Unit(s) SubCutaneous at bedtime  insulin lispro (ADMELOG) corrective regimen sliding scale   SubCutaneous three times a day before meals  insulin lispro (ADMELOG) corrective regimen sliding scale   SubCutaneous at bedtime  labetalol 50 milliGRAM(s) Oral two times a day  lactulose Syrup 10 Gram(s) Oral daily  levothyroxine 50 MICROGram(s) Oral daily  lidocaine   Patch 1 Patch Transdermal daily  multivitamin 1 Tablet(s) Oral daily  pantoprazole  Injectable 40 milliGRAM(s) IV Push two times a day  rifAXIMin 550 milliGRAM(s) Oral two times a day  sodium chloride 2 Gram(s) Oral two times a day  spironolactone 100 milliGRAM(s) Oral daily  thiamine 100 milliGRAM(s) Oral daily    MEDICATIONS  (PRN):  acetaminophen   Tablet .. 500 milliGRAM(s) Oral every 12 hours PRN Temp greater or equal to 38C (100.4F), Mild Pain (1 - 3)      Allergies    No Known Allergies    Intolerances        Vital Signs Last 24 Hrs  T(C): 36.7 (27 Jun 2021 04:23), Max: 36.7 (26 Jun 2021 16:44)  T(F): 98.1 (27 Jun 2021 04:23), Max: 98.1 (26 Jun 2021 16:44)  HR: 72 (27 Jun 2021 04:23) (68 - 72)  BP: 128/78 (27 Jun 2021 04:23) (96/58 - 128/78)  BP(mean): --  RR: 18 (27 Jun 2021 04:23) (18 - 18)  SpO2: 97% (27 Jun 2021 04:23) (96% - 97%)  Daily     Daily     PHYSICAL EXAM:  GENERAL: Lethargic  NECK: Supple, no JVD  NERVOUS SYSTEM:  Awake, alert  CHEST/LUNG: Clear with diminished BS at bases (L>R)  HEART: Regular rate and rhythm; no rub  ABDOMEN: Soft, distended +BS  EXTREMITIES:  trace edema B/L LE    LABS:                        9.0    6.06  )-----------( 145      ( 27 Jun 2021 07:28 )             26.8     06-27    135  |  104  |  10.2  ----------------------------<  146<H>  3.6   |  24.0  |  0.56    Ca    8.5<L>      27 Jun 2021 07:28    TPro  6.2<L>  /  Alb  2.4<L>  /  TBili  2.5<H>  /  DBili  x   /  AST  64<H>  /  ALT  30  /  AlkPhos  283<H>  06-27                RADIOLOGY & ADDITIONAL TESTS:

## 2021-06-27 NOTE — PROGRESS NOTE ADULT - ASSESSMENT
Imp:  60 y/o female with PMH of alcoholic liver cirrhosis with hepatic encephalopathy, portal HTN, HTN, DM-2, hypothyroidism came to the ED complaining of epigastric pain x 1 week. Pain was sharp radiating to her chest, associated with nausea and non-bloody emesis. Patient said her symptoms now improved at the time of interview. She endorsed dark stool to ED attending but said no when I asked her. She also noted shortness of breath with exertion and fatigue. She has no chest pain, melena, hematemesis, hematuria, recent travel, fever, chills. Of note patient still drinks alcohol, last drink was 2 weeks ago.   Found to have hb of 5.2 in the ED, occult negative and also hyperglycemic.         Acute anemia   Likely due to underlying alcohol liver dx   Hb: 5.2 on admit;  FOBT negative   Hb improved post transfusion; Elevated TB, mostly direct; unlikely hemolysis.  Low haptoglobin 2 chantelle to underlying liver disease, as is LDH elevation; rec: trend retic, LDH  Monitor CBC; transfuse to keep Hb over 7; monitor coags  Hgb 9.0  , plt count 145,000  monitor for bleeding    Epigastric abdominal pain   Likely due to gastritis PPI 40mg   CT abdomen done, no pancreatitis   Plan for EGD this am    fever and leucocytosis, Bl Cx pos for Klebsielaa , on Ceftriaxone per ID    f/u blood cultures  further plan as per primary team        Will follow

## 2021-06-27 NOTE — PROGRESS NOTE ADULT - ASSESSMENT
66 y/o female with hx of alcoholic liver cirrhosis with hepatic encephalopathy, portal HTN, HTN, DM-2, hypothyroidism came to the ED complaining of epigastric pain x 1 week. She was found to have hb of 5.2 in the ED, occult negative. She was admitted for workup and treatment of anemia. S/p 2 u prbcs.  Hospital course complicated by klebsiella pna bacteremia with repeat B cx negative on 6/18, pt empirically remains on rocephin. Pt also noted with worsening sob with noted large left sided pleural effusion requiring thoracocentesis as well as worsening abd distension requiring paracentesis. Pt remains on diuresis with aldactone and lasix, being followed by ct sx but currently not a candidate for pleurx. Given recurrent generalized abd pain repeat ct abd/pelvis completed 6/22 which showed moderate amount of ascites, plan for repeat paracentesis today by IR but pt had only small amount ascites so procedure was cancelled. PT evaluated the pt and recommended ALISIA.       Alcoholic liver cirrhosis with hepatic encephalopathy, volume overload with ascites and left sided pleural effusion/ portal HTN   -advanced cirrhosis; nearing end stage per GI  -s/p thoracentesis on 6/17 with 1.1 liters removed  - s/p paracentesis with 700ml removed on 6/16  - pt with generalized abd pain so repeat ct abd pelvis ordered 6/22 and noted with Large left pleural effusion with complete compressive collapse of the lingula and left lower lobe. For which ct sx will not proceed with intervention as patient remains asx and on RA. Also noted with cirrhotic liver with moderate ascites. D/w IR and pt planned for repeat paracentesis today but IR reported small ascites during attempt at intervention so procedure cancelled.   - ammonia level WNL   - transaminitis   - thrombocytopenia all sequela of liver failure   - cxr from 6/21 shows left sided effusion unchanged from prior cxr   -c/w lasix, aldactone  - will monitor renal fxn closely on diuretics   -c/w  rifaximin, lactulose to prevent hepatic encephalopathy   -c/w labetalol   -c/w monitoring lfts/ plt/ ammonia if pt becomes more lethargic or altered   -GI f/u noted and appreciated   - CT sx f/u noted and appreciated  -Outpatient surveillance for HCC and f/u with GI/ hepatic specialist   - EGD performed on 6/25: normal duodenum, varices in lower third of the esophagus, ulcer in the antrum  - c/w Protonix 40mg daily  - c/w Inderal 20mg TID    Klebsiella PNA bacteremia unclear source   - afebrile    - no leukocytosis   - repeat B cx negative on 6/18  - pleural cx negative   - SBP ruled out; f/u ascitic fluid culture - no growth to date  - ID recommendations appreciated   - c/w Ceftriaxone through 7/1     Anemia of chronic disease in the setting of cirrhotic liver and bone narrow suppression from alcohol   - Hb 5.2 on admission, s/p 3 u prbcs total inpatient  - H/H stable  - likely related to end stage liver  -no active signs/symptoms of GIB  - FOBT negative   - s/p venofer  - continue PPI  - hematology and GI recommendations appreciated    Hyponatremia  - sodium has remained stable   - c/w lasix and sodium tabs   - continue free water restriction  - monitor I&O's  - nephro f/u noted and appreciated     Lower back and flank pain   - stable   -c/w tylenol prn   -c/w  lidoderm patch   -OOB to chair; mobilize patient    Hyperglycemia with underlying DM-2, not on home insulin   -HbA1c 8.0, goal <7  - fs stable   -continue lantus 15 u sq qam   -Continue ISS  -c/w hypoglycemia protocol   -c/w glucerna supplements      History of alcohol abuse   - no evidence of withdrawal  - continue Thiamine, Folic Acid and MVI  - pt counselled on abstinence from alcohol abuse     HTN   -BP stable  -Continue Labetalol with hold parameters     Hypothyroidism   -TSH elevated  - c/w synthroid 50mcg daily  - repeat TFTS in 4-6 weeks    DVT ppx   - SCDs b/l   - no chemical AC given thrombocytopenia     Dispo: medially stable, pending auth for ALISIA

## 2021-06-27 NOTE — PROGRESS NOTE ADULT - SUBJECTIVE AND OBJECTIVE BOX
Patient seen and examined; Comfortable.  Alert and cooperative.  Tolerated EGD well.  No fever or resp distress,  no bleeding.    Small varices and Small distal antral ulcer.  Path results are pending.  Tolerates diet well. Awaiting transfer to [facility].      PAST MEDICAL & SURGICAL HISTORY:  Alcohol abuse    Alcohol abuse    Liver cirrhosis    ETOH abuse    Urea cycle metabolism disorder    Transitory  hyperthyroidism    Lump in female breast    UTI (urinary tract infection)    Lymphangitis, acute, lower leg    Anemia    Hypothyroidism    Chronic back pain    HTN (hypertension)    GERD (gastroesophageal reflux disease)    Pancreatitis    No significant past surgical history    S/P abdominoplasty        ROS:  No Heartburn, regurgitation, dysphagia, odynophagia.  No dyspepsia  No abdominal pain.    No Nausea, vomiting.  No Bleeding.  No hematemesis.   No diarrhea.    No hematochesia.  No weight loss, anorexia.  No edema.      MEDICATIONS  (STANDING):  cefTRIAXone   IVPB 2000 milliGRAM(s) IV Intermittent every 24 hours  dextrose 40% Gel 15 Gram(s) Oral once  dextrose 5%. 1000 milliLiter(s) (50 mL/Hr) IV Continuous <Continuous>  dextrose 5%. 1000 milliLiter(s) (100 mL/Hr) IV Continuous <Continuous>  dextrose 50% Injectable 25 Gram(s) IV Push once  dextrose 50% Injectable 12.5 Gram(s) IV Push once  dextrose 50% Injectable 25 Gram(s) IV Push once  folic acid 1 milliGRAM(s) Oral daily  furosemide    Tablet 40 milliGRAM(s) Oral daily  glucagon  Injectable 1 milliGRAM(s) IntraMuscular once  insulin glargine Injectable (LANTUS) 15 Unit(s) SubCutaneous at bedtime  insulin lispro (ADMELOG) corrective regimen sliding scale   SubCutaneous three times a day before meals  insulin lispro (ADMELOG) corrective regimen sliding scale   SubCutaneous at bedtime  labetalol 50 milliGRAM(s) Oral two times a day  lactulose Syrup 10 Gram(s) Oral daily  levothyroxine 50 MICROGram(s) Oral daily  lidocaine   Patch 1 Patch Transdermal daily  multivitamin 1 Tablet(s) Oral daily  pantoprazole  Injectable 40 milliGRAM(s) IV Push two times a day  rifAXIMin 550 milliGRAM(s) Oral two times a day  sodium chloride 2 Gram(s) Oral two times a day  spironolactone 100 milliGRAM(s) Oral daily  thiamine 100 milliGRAM(s) Oral daily    MEDICATIONS  (PRN):  acetaminophen   Tablet .. 500 milliGRAM(s) Oral every 12 hours PRN Temp greater or equal to 38C (100.4F), Mild Pain (1 - 3)      Allergies    No Known Allergies    Intolerances        Vital Signs Last 24 Hrs  T(C): 37 (2021 17:16), Max: 37 (2021 17:16)  T(F): 98.6 (2021 17:16), Max: 98.6 (2021 17:16)  HR: 70 (2021 17:16) (70 - 73)  BP: 98/64 (2021 17:16) (98/64 - 128/78)  BP(mean): --  RR: 18 (2021 17:16) (18 - 18)  SpO2: 97% (2021 17:16) (97% - 97%)    PHYSICAL EXAM:    GENERAL: NAD, well-groomed, well-developed  HEAD:  Atraumatic, Normocephalic  EYES: EOMI, PERRLA, conjunctiva and sclera clear  ENMT: No tonsillar erythema, exudates, or enlargement; Moist mucous membranes, Good dentition, No lesions  NECK: Supple, No JVD, Normal thyroid  CHEST/LUNG: Clear to percussion bilaterally; No rales, rhonchi, wheezing, or rubs  HEART: Regular rate and rhythm; No murmurs, rubs, or gallops  ABDOMEN: Soft, Nontender, Nondistended; Bowel sounds present  EXTREMITIES:  2+ Peripheral Pulses, No clubbing, cyanosis, or edema  LYMPH: No lymphadenopathy noted  SKIN: No rashes or lesions      LABS:                        9.0    6.06  )-----------( 145      ( 2021 07:28 )             26.8         135  |  104  |  10.2  ----------------------------<  146<H>  3.6   |  24.0  |  0.56    Ca    8.5<L>      2021 07:28    TPro  6.2<L>  /  Alb  2.4<L>  /  TBili  2.5<H>  /  DBili  x   /  AST  64<H>  /  ALT  30  /  AlkPhos  283<H>               RADIOLOGY & ADDITIONAL STUDIES:

## 2021-06-28 ENCOUNTER — TRANSCRIPTION ENCOUNTER (OUTPATIENT)
Age: 66
End: 2021-06-28

## 2021-06-28 VITALS
OXYGEN SATURATION: 97 % | RESPIRATION RATE: 18 BRPM | DIASTOLIC BLOOD PRESSURE: 77 MMHG | SYSTOLIC BLOOD PRESSURE: 127 MMHG | TEMPERATURE: 98 F | HEART RATE: 70 BPM

## 2021-06-28 LAB
ALBUMIN SERPL ELPH-MCNC: 2.2 G/DL — LOW (ref 3.3–5.2)
ALP SERPL-CCNC: 270 U/L — HIGH (ref 40–120)
ALT FLD-CCNC: 29 U/L — SIGNIFICANT CHANGE UP
ANION GAP SERPL CALC-SCNC: 8 MMOL/L — SIGNIFICANT CHANGE UP (ref 5–17)
AST SERPL-CCNC: 62 U/L — HIGH
BASOPHILS # BLD AUTO: 0.04 K/UL — SIGNIFICANT CHANGE UP (ref 0–0.2)
BASOPHILS NFR BLD AUTO: 0.8 % — SIGNIFICANT CHANGE UP (ref 0–2)
BILIRUB SERPL-MCNC: 2.4 MG/DL — HIGH (ref 0.4–2)
BUN SERPL-MCNC: 7.1 MG/DL — LOW (ref 8–20)
CALCIUM SERPL-MCNC: 8.3 MG/DL — LOW (ref 8.6–10.2)
CHLORIDE SERPL-SCNC: 103 MMOL/L — SIGNIFICANT CHANGE UP (ref 98–107)
CO2 SERPL-SCNC: 22 MMOL/L — SIGNIFICANT CHANGE UP (ref 22–29)
CREAT SERPL-MCNC: 0.51 MG/DL — SIGNIFICANT CHANGE UP (ref 0.5–1.3)
EOSINOPHIL # BLD AUTO: 0.24 K/UL — SIGNIFICANT CHANGE UP (ref 0–0.5)
EOSINOPHIL NFR BLD AUTO: 4.6 % — SIGNIFICANT CHANGE UP (ref 0–6)
GLUCOSE BLDC GLUCOMTR-MCNC: 118 MG/DL — HIGH (ref 70–99)
GLUCOSE BLDC GLUCOMTR-MCNC: 152 MG/DL — HIGH (ref 70–99)
GLUCOSE BLDC GLUCOMTR-MCNC: 175 MG/DL — HIGH (ref 70–99)
GLUCOSE SERPL-MCNC: 119 MG/DL — HIGH (ref 70–99)
HCT VFR BLD CALC: 28.3 % — LOW (ref 34.5–45)
HGB BLD-MCNC: 9.3 G/DL — LOW (ref 11.5–15.5)
IMM GRANULOCYTES NFR BLD AUTO: 0.4 % — SIGNIFICANT CHANGE UP (ref 0–1.5)
LYMPHOCYTES # BLD AUTO: 1.09 K/UL — SIGNIFICANT CHANGE UP (ref 1–3.3)
LYMPHOCYTES # BLD AUTO: 20.7 % — SIGNIFICANT CHANGE UP (ref 13–44)
MCHC RBC-ENTMCNC: 31.4 PG — SIGNIFICANT CHANGE UP (ref 27–34)
MCHC RBC-ENTMCNC: 32.9 GM/DL — SIGNIFICANT CHANGE UP (ref 32–36)
MCV RBC AUTO: 95.6 FL — SIGNIFICANT CHANGE UP (ref 80–100)
MONOCYTES # BLD AUTO: 0.49 K/UL — SIGNIFICANT CHANGE UP (ref 0–0.9)
MONOCYTES NFR BLD AUTO: 9.3 % — SIGNIFICANT CHANGE UP (ref 2–14)
NEUTROPHILS # BLD AUTO: 3.38 K/UL — SIGNIFICANT CHANGE UP (ref 1.8–7.4)
NEUTROPHILS NFR BLD AUTO: 64.2 % — SIGNIFICANT CHANGE UP (ref 43–77)
PLATELET # BLD AUTO: 173 K/UL — SIGNIFICANT CHANGE UP (ref 150–400)
POTASSIUM SERPL-MCNC: 3.9 MMOL/L — SIGNIFICANT CHANGE UP (ref 3.5–5.3)
POTASSIUM SERPL-SCNC: 3.9 MMOL/L — SIGNIFICANT CHANGE UP (ref 3.5–5.3)
PROT SERPL-MCNC: 6.2 G/DL — LOW (ref 6.6–8.7)
RBC # BLD: 2.96 M/UL — LOW (ref 3.8–5.2)
RBC # FLD: 25.4 % — HIGH (ref 10.3–14.5)
SODIUM SERPL-SCNC: 132 MMOL/L — LOW (ref 135–145)
WBC # BLD: 5.26 K/UL — SIGNIFICANT CHANGE UP (ref 3.8–10.5)
WBC # FLD AUTO: 5.26 K/UL — SIGNIFICANT CHANGE UP (ref 3.8–10.5)

## 2021-06-28 PROCEDURE — 87150 DNA/RNA AMPLIFIED PROBE: CPT

## 2021-06-28 PROCEDURE — 0225U NFCT DS DNA&RNA 21 SARSCOV2: CPT

## 2021-06-28 PROCEDURE — 82728 ASSAY OF FERRITIN: CPT

## 2021-06-28 PROCEDURE — 85014 HEMATOCRIT: CPT

## 2021-06-28 PROCEDURE — 82248 BILIRUBIN DIRECT: CPT

## 2021-06-28 PROCEDURE — 87635 SARS-COV-2 COVID-19 AMP PRB: CPT

## 2021-06-28 PROCEDURE — 84100 ASSAY OF PHOSPHORUS: CPT

## 2021-06-28 PROCEDURE — 99285 EMERGENCY DEPT VISIT HI MDM: CPT | Mod: 25

## 2021-06-28 PROCEDURE — 99239 HOSP IP/OBS DSCHRG MGMT >30: CPT

## 2021-06-28 PROCEDURE — U0003: CPT

## 2021-06-28 PROCEDURE — 97530 THERAPEUTIC ACTIVITIES: CPT

## 2021-06-28 PROCEDURE — 82962 GLUCOSE BLOOD TEST: CPT

## 2021-06-28 PROCEDURE — 97163 PT EVAL HIGH COMPLEX 45 MIN: CPT

## 2021-06-28 PROCEDURE — 85045 AUTOMATED RETICULOCYTE COUNT: CPT

## 2021-06-28 PROCEDURE — 83605 ASSAY OF LACTIC ACID: CPT

## 2021-06-28 PROCEDURE — 83540 ASSAY OF IRON: CPT

## 2021-06-28 PROCEDURE — 82330 ASSAY OF CALCIUM: CPT

## 2021-06-28 PROCEDURE — 71045 X-RAY EXAM CHEST 1 VIEW: CPT | Mod: 26

## 2021-06-28 PROCEDURE — 89051 BODY FLUID CELL COUNT: CPT

## 2021-06-28 PROCEDURE — 83036 HEMOGLOBIN GLYCOSYLATED A1C: CPT

## 2021-06-28 PROCEDURE — 87102 FUNGUS ISOLATION CULTURE: CPT

## 2021-06-28 PROCEDURE — 87070 CULTURE OTHR SPECIMN AEROBIC: CPT

## 2021-06-28 PROCEDURE — 74176 CT ABD & PELVIS W/O CONTRAST: CPT

## 2021-06-28 PROCEDURE — 76705 ECHO EXAM OF ABDOMEN: CPT

## 2021-06-28 PROCEDURE — 74177 CT ABD & PELVIS W/CONTRAST: CPT

## 2021-06-28 PROCEDURE — 86038 ANTINUCLEAR ANTIBODIES: CPT

## 2021-06-28 PROCEDURE — 83690 ASSAY OF LIPASE: CPT

## 2021-06-28 PROCEDURE — 86769 SARS-COV-2 COVID-19 ANTIBODY: CPT

## 2021-06-28 PROCEDURE — 88305 TISSUE EXAM BY PATHOLOGIST: CPT

## 2021-06-28 PROCEDURE — 36430 TRANSFUSION BLD/BLD COMPNT: CPT

## 2021-06-28 PROCEDURE — 82945 GLUCOSE OTHER FLUID: CPT

## 2021-06-28 PROCEDURE — 86900 BLOOD TYPING SEROLOGIC ABO: CPT

## 2021-06-28 PROCEDURE — 80076 HEPATIC FUNCTION PANEL: CPT

## 2021-06-28 PROCEDURE — 83935 ASSAY OF URINE OSMOLALITY: CPT

## 2021-06-28 PROCEDURE — 80048 BASIC METABOLIC PNL TOTAL CA: CPT

## 2021-06-28 PROCEDURE — 85610 PROTHROMBIN TIME: CPT

## 2021-06-28 PROCEDURE — 83735 ASSAY OF MAGNESIUM: CPT

## 2021-06-28 PROCEDURE — 82607 VITAMIN B-12: CPT

## 2021-06-28 PROCEDURE — 88112 CYTOPATH CELL ENHANCE TECH: CPT

## 2021-06-28 PROCEDURE — 86704 HEP B CORE ANTIBODY TOTAL: CPT

## 2021-06-28 PROCEDURE — 84466 ASSAY OF TRANSFERRIN: CPT

## 2021-06-28 PROCEDURE — 85730 THROMBOPLASTIN TIME PARTIAL: CPT

## 2021-06-28 PROCEDURE — 84145 PROCALCITONIN (PCT): CPT

## 2021-06-28 PROCEDURE — 83550 IRON BINDING TEST: CPT

## 2021-06-28 PROCEDURE — 85018 HEMOGLOBIN: CPT

## 2021-06-28 PROCEDURE — 86381 MITOCHONDRIAL ANTIBODY EACH: CPT

## 2021-06-28 PROCEDURE — 84439 ASSAY OF FREE THYROXINE: CPT

## 2021-06-28 PROCEDURE — 83615 LACTATE (LD) (LDH) ENZYME: CPT

## 2021-06-28 PROCEDURE — 80053 COMPREHEN METABOLIC PANEL: CPT

## 2021-06-28 PROCEDURE — P9047: CPT

## 2021-06-28 PROCEDURE — 87186 SC STD MICRODIL/AGAR DIL: CPT

## 2021-06-28 PROCEDURE — 86850 RBC ANTIBODY SCREEN: CPT

## 2021-06-28 PROCEDURE — 85025 COMPLETE CBC W/AUTO DIFF WBC: CPT

## 2021-06-28 PROCEDURE — 86706 HEP B SURFACE ANTIBODY: CPT

## 2021-06-28 PROCEDURE — 71045 X-RAY EXAM CHEST 1 VIEW: CPT

## 2021-06-28 PROCEDURE — 84133 ASSAY OF URINE POTASSIUM: CPT

## 2021-06-28 PROCEDURE — 84481 FREE ASSAY (FT-3): CPT

## 2021-06-28 PROCEDURE — 93005 ELECTROCARDIOGRAM TRACING: CPT

## 2021-06-28 PROCEDURE — 82140 ASSAY OF AMMONIA: CPT

## 2021-06-28 PROCEDURE — 82042 OTHER SOURCE ALBUMIN QUAN EA: CPT

## 2021-06-28 PROCEDURE — 83986 ASSAY PH BODY FLUID NOS: CPT

## 2021-06-28 PROCEDURE — 84157 ASSAY OF PROTEIN OTHER: CPT

## 2021-06-28 PROCEDURE — U0005: CPT

## 2021-06-28 PROCEDURE — 81001 URINALYSIS AUTO W/SCOPE: CPT

## 2021-06-28 PROCEDURE — 87086 URINE CULTURE/COLONY COUNT: CPT

## 2021-06-28 PROCEDURE — 86803 HEPATITIS C AB TEST: CPT

## 2021-06-28 PROCEDURE — 84300 ASSAY OF URINE SODIUM: CPT

## 2021-06-28 PROCEDURE — 87340 HEPATITIS B SURFACE AG IA: CPT

## 2021-06-28 PROCEDURE — 84550 ASSAY OF BLOOD/URIC ACID: CPT

## 2021-06-28 PROCEDURE — 87205 SMEAR GRAM STAIN: CPT

## 2021-06-28 PROCEDURE — 86901 BLOOD TYPING SEROLOGIC RH(D): CPT

## 2021-06-28 PROCEDURE — 88342 IMHCHEM/IMCYTCHM 1ST ANTB: CPT

## 2021-06-28 PROCEDURE — 87075 CULTR BACTERIA EXCEPT BLOOD: CPT

## 2021-06-28 PROCEDURE — 87040 BLOOD CULTURE FOR BACTERIA: CPT

## 2021-06-28 PROCEDURE — 97116 GAIT TRAINING THERAPY: CPT

## 2021-06-28 PROCEDURE — 85027 COMPLETE CBC AUTOMATED: CPT

## 2021-06-28 PROCEDURE — P9016: CPT

## 2021-06-28 PROCEDURE — 86708 HEPATITIS A ANTIBODY: CPT

## 2021-06-28 PROCEDURE — 86923 COMPATIBILITY TEST ELECTRIC: CPT

## 2021-06-28 PROCEDURE — 84443 ASSAY THYROID STIM HORMONE: CPT

## 2021-06-28 PROCEDURE — 83930 ASSAY OF BLOOD OSMOLALITY: CPT

## 2021-06-28 PROCEDURE — 82533 TOTAL CORTISOL: CPT

## 2021-06-28 PROCEDURE — 86255 FLUORESCENT ANTIBODY SCREEN: CPT

## 2021-06-28 PROCEDURE — 82746 ASSAY OF FOLIC ACID SERUM: CPT

## 2021-06-28 PROCEDURE — 87077 CULTURE AEROBIC IDENTIFY: CPT

## 2021-06-28 PROCEDURE — 83010 ASSAY OF HAPTOGLOBIN QUANT: CPT

## 2021-06-28 PROCEDURE — P9040: CPT

## 2021-06-28 PROCEDURE — 82272 OCCULT BLD FECES 1-3 TESTS: CPT

## 2021-06-28 PROCEDURE — 36415 COLL VENOUS BLD VENIPUNCTURE: CPT

## 2021-06-28 PROCEDURE — 93306 TTE W/DOPPLER COMPLETE: CPT

## 2021-06-28 RX ORDER — LACTULOSE 10 G/15ML
15 SOLUTION ORAL
Qty: 0 | Refills: 0 | DISCHARGE
Start: 2021-06-28

## 2021-06-28 RX ORDER — ACETAMINOPHEN 500 MG
1 TABLET ORAL
Qty: 0 | Refills: 0 | DISCHARGE
Start: 2021-06-28

## 2021-06-28 RX ORDER — FUROSEMIDE 40 MG
1 TABLET ORAL
Qty: 0 | Refills: 0 | DISCHARGE
Start: 2021-06-28

## 2021-06-28 RX ORDER — INSULIN LISPRO 100/ML
0 VIAL (ML) SUBCUTANEOUS
Qty: 0 | Refills: 0 | DISCHARGE
Start: 2021-06-28

## 2021-06-28 RX ORDER — INSULIN LISPRO 100/ML
1 VIAL (ML) SUBCUTANEOUS
Qty: 0 | Refills: 0 | DISCHARGE
Start: 2021-06-28

## 2021-06-28 RX ORDER — LEVOTHYROXINE SODIUM 125 MCG
1 TABLET ORAL
Qty: 0 | Refills: 0 | DISCHARGE
Start: 2021-06-28

## 2021-06-28 RX ORDER — SPIRONOLACTONE 25 MG/1
4 TABLET, FILM COATED ORAL
Qty: 0 | Refills: 0 | DISCHARGE
Start: 2021-06-28

## 2021-06-28 RX ORDER — LABETALOL HCL 100 MG
50 TABLET ORAL
Qty: 0 | Refills: 0 | DISCHARGE
Start: 2021-06-28

## 2021-06-28 RX ORDER — INSULIN GLARGINE 100 [IU]/ML
20 INJECTION, SOLUTION SUBCUTANEOUS
Qty: 0 | Refills: 0 | DISCHARGE
Start: 2021-06-28

## 2021-06-28 RX ORDER — LIDOCAINE 4 G/100G
1 CREAM TOPICAL
Qty: 0 | Refills: 0 | DISCHARGE
Start: 2021-06-28

## 2021-06-28 RX ORDER — PANTOPRAZOLE SODIUM 20 MG/1
1 TABLET, DELAYED RELEASE ORAL
Qty: 0 | Refills: 0 | DISCHARGE
Start: 2021-06-28

## 2021-06-28 RX ORDER — INSULIN GLARGINE 100 [IU]/ML
15 INJECTION, SOLUTION SUBCUTANEOUS
Qty: 0 | Refills: 0 | DISCHARGE
Start: 2021-06-28

## 2021-06-28 RX ORDER — CEFTRIAXONE 500 MG/1
2000 INJECTION, POWDER, FOR SOLUTION INTRAMUSCULAR; INTRAVENOUS
Qty: 0 | Refills: 0 | DISCHARGE
Start: 2021-06-28

## 2021-06-28 RX ADMIN — SPIRONOLACTONE 100 MILLIGRAM(S): 25 TABLET, FILM COATED ORAL at 05:01

## 2021-06-28 RX ADMIN — Medication 2: at 17:33

## 2021-06-28 RX ADMIN — Medication 100 MILLIGRAM(S): at 11:56

## 2021-06-28 RX ADMIN — PANTOPRAZOLE SODIUM 40 MILLIGRAM(S): 20 TABLET, DELAYED RELEASE ORAL at 17:33

## 2021-06-28 RX ADMIN — Medication 2: at 11:55

## 2021-06-28 RX ADMIN — SODIUM CHLORIDE 2 GRAM(S): 9 INJECTION INTRAMUSCULAR; INTRAVENOUS; SUBCUTANEOUS at 17:31

## 2021-06-28 RX ADMIN — Medication 1 TABLET(S): at 11:56

## 2021-06-28 RX ADMIN — SODIUM CHLORIDE 2 GRAM(S): 9 INJECTION INTRAMUSCULAR; INTRAVENOUS; SUBCUTANEOUS at 05:01

## 2021-06-28 RX ADMIN — PANTOPRAZOLE SODIUM 40 MILLIGRAM(S): 20 TABLET, DELAYED RELEASE ORAL at 05:02

## 2021-06-28 RX ADMIN — Medication 50 MILLIGRAM(S): at 17:31

## 2021-06-28 RX ADMIN — LACTULOSE 10 GRAM(S): 10 SOLUTION ORAL at 11:56

## 2021-06-28 RX ADMIN — Medication 1 MILLIGRAM(S): at 11:56

## 2021-06-28 RX ADMIN — Medication 40 MILLIGRAM(S): at 05:01

## 2021-06-28 RX ADMIN — CEFTRIAXONE 100 MILLIGRAM(S): 500 INJECTION, POWDER, FOR SOLUTION INTRAMUSCULAR; INTRAVENOUS at 17:33

## 2021-06-28 RX ADMIN — Medication 50 MILLIGRAM(S): at 05:01

## 2021-06-28 RX ADMIN — Medication 50 MICROGRAM(S): at 05:01

## 2021-06-28 NOTE — PROGRESS NOTE ADULT - ASSESSMENT
65y  Female with h/o alcoholic liver cirrhosis with hepatic encephalopathy, portal HTN, HTN, DM 2, hypothyroidism. Patient initially admitted with epigastric pain, worsening transaminitis, alcoholic cirrhosis and weakness. CT of abdomen and pelvis revealed  cirrhosis with evidence of portal hypertension, mild volume of abdominopelvic ascites, mild left pleural effusion with associated subsegmental atelectasis, possible gastritis and gallstones. Patient was followed by GI, Hematology, nephrology. Patient developed fever to 102.8F 6/16. Also had paracentesis 6/16. Blood cultures with GNR.     Dilutional hyponatremia---> Improved   Serum Na 135--> 132  ETOH cirrhosis  Poor nutrition  - cont NaCl tabs and Lasix/ Aldactone   - oral fluid restriction  - increase oral protein intake    L pleural effusion: s/p thoracentesis  - thoracic surgery noted    AM labs, will follow

## 2021-06-28 NOTE — PROGRESS NOTE ADULT - REASON FOR ADMISSION
Anemia
Anemia; AUD.  Alc Cirrhosis.
Anemia

## 2021-06-28 NOTE — DISCHARGE NOTE NURSING/CASE MANAGEMENT/SOCIAL WORK - PATIENT PORTAL LINK FT
You can access the FollowMyHealth Patient Portal offered by [facility] by registering at the following website: [email or web address]. By joining [employer]’s FollowMyHealth portal, you will also be able to view your health information using other applications (apps) compatible with our system.

## 2021-06-28 NOTE — PROGRESS NOTE ADULT - SUBJECTIVE AND OBJECTIVE BOX
NEPHROLOGY INTERVAL HPI/OVERNIGHT EVENTS:    Examined  Lethargic  No distress    MEDICATIONS  (STANDING):  cefTRIAXone   IVPB 2000 milliGRAM(s) IV Intermittent every 24 hours  dextrose 40% Gel 15 Gram(s) Oral once  dextrose 5%. 1000 milliLiter(s) (50 mL/Hr) IV Continuous <Continuous>  dextrose 5%. 1000 milliLiter(s) (100 mL/Hr) IV Continuous <Continuous>  dextrose 50% Injectable 25 Gram(s) IV Push once  dextrose 50% Injectable 12.5 Gram(s) IV Push once  dextrose 50% Injectable 25 Gram(s) IV Push once  folic acid 1 milliGRAM(s) Oral daily  furosemide    Tablet 40 milliGRAM(s) Oral daily  glucagon  Injectable 1 milliGRAM(s) IntraMuscular once  insulin glargine Injectable (LANTUS) 15 Unit(s) SubCutaneous at bedtime  insulin lispro (ADMELOG) corrective regimen sliding scale   SubCutaneous three times a day before meals  insulin lispro (ADMELOG) corrective regimen sliding scale   SubCutaneous at bedtime  labetalol 50 milliGRAM(s) Oral two times a day  lactulose Syrup 10 Gram(s) Oral daily  levothyroxine 50 MICROGram(s) Oral daily  lidocaine   Patch 1 Patch Transdermal daily  multivitamin 1 Tablet(s) Oral daily  pantoprazole  Injectable 40 milliGRAM(s) IV Push two times a day  rifAXIMin 550 milliGRAM(s) Oral two times a day  sodium chloride 2 Gram(s) Oral two times a day  spironolactone 100 milliGRAM(s) Oral daily  thiamine 100 milliGRAM(s) Oral daily    MEDICATIONS  (PRN):  acetaminophen   Tablet .. 500 milliGRAM(s) Oral every 12 hours PRN Temp greater or equal to 38C (100.4F), Mild Pain (1 - 3)      Allergies    No Known Allergies    Intolerances        Vital Signs Last 24 Hrs  T(C): 36.9 (28 Jun 2021 07:42), Max: 37.2 (27 Jun 2021 20:41)  T(F): 98.5 (28 Jun 2021 07:42), Max: 99 (27 Jun 2021 20:41)  HR: 65 (28 Jun 2021 07:42) (65 - 76)  BP: 106/68 (28 Jun 2021 07:42) (98/64 - 135/77)  BP(mean): --  RR: 18 (28 Jun 2021 07:42) (18 - 18)  SpO2: 98% (28 Jun 2021 07:42) (96% - 98%)  Daily     Daily     PHYSICAL EXAM:  GENERAL: Lethargic  NECK: Supple, no JVD  NERVOUS SYSTEM:  Awake, alert  CHEST/LUNG: Clear with diminished BS at bases (L>R)  HEART: Regular rate and rhythm; no rub  ABDOMEN: Soft, distended +BS  EXTREMITIES:  trace edema B/L LE    LABS:                        9.3    5.26  )-----------( 173      ( 28 Jun 2021 07:48 )             28.3     06-28    132<L>  |  103  |  7.1<L>  ----------------------------<  119<H>  3.9   |  22.0  |  0.51    Ca    8.3<L>      28 Jun 2021 07:48    TPro  6.2<L>  /  Alb  2.2<L>  /  TBili  2.4<H>  /  DBili  x   /  AST  62<H>  /  ALT  29  /  AlkPhos  270<H>  06-28                RADIOLOGY & ADDITIONAL TESTS:

## 2021-06-28 NOTE — PROGRESS NOTE ADULT - NSICDXPILOT_GEN_ALL_CORE
[place]

## 2021-06-28 NOTE — DISCHARGE NOTE NURSING/CASE MANAGEMENT/SOCIAL WORK - NSDCVIVACCINE_GEN_ALL_CORE_FT
influenza, injectable, quadrivalent, preservative free; 27-Oct-2015 11:00; Roxie Vergara (RN); [employer]; [id number]; IntraMuscular; Deltoid Left.; 0.5 milliLiter(s); VIS (VIS Published: 07-Aug-2015, VIS Presented: 27-Oct-2015);   Tdap; 26-Jun-2015 00:30; Jaison Spicer (RN); [employer]; [id number]; IntraMuscular; Deltoid Left.; 0.5 milliLiter(s); VIS (VIS Published: 09-May-2013, VIS Presented: 26-Jun-2015);

## 2021-06-28 NOTE — PROGRESS NOTE ADULT - PROVIDER SPECIALTY LIST ADULT
Gastroenterology
Hospitalist
Infectious Disease
Infectious Disease
Nephrology
Heme/Onc
Hospitalist
Hospitalist
Infectious Disease
Nephrology
Thoracic Surgery
Heme/Onc
Hospitalist
Infectious Disease
Nephrology
Palliative Care
Gastroenterology
Heme/Onc
Hospitalist
Infectious Disease
Nephrology
Gastroenterology
Heme/Onc
Heme/Onc
Hospitalist
Nephrology
Thoracic Surgery
Palliative Care
Gastroenterology
Thoracic Surgery
Thoracic Surgery
Gastroenterology

## 2021-06-29 ENCOUNTER — DOCTOR'S OFFICE (OUTPATIENT)
Dept: URBAN - METROPOLITAN AREA CLINIC 163 | Facility: CLINIC | Age: 66
Setting detail: OPHTHALMOLOGY
End: 2021-06-29
Payer: COMMERCIAL

## 2021-06-29 PROCEDURE — 92133 CPTRZD OPH DX IMG PST SGM ON: CPT | Performed by: OPHTHALMOLOGY

## 2021-06-29 PROCEDURE — 92020 GONIOSCOPY: CPT | Performed by: OPHTHALMOLOGY

## 2021-06-29 PROCEDURE — 92012 INTRM OPH EXAM EST PATIENT: CPT | Performed by: OPHTHALMOLOGY

## 2021-06-29 ASSESSMENT — TONOMETRY
OS_IOP_MMHG: 12
OD_IOP_MMHG: 18

## 2021-06-29 ASSESSMENT — VISUAL ACUITY
OD_BCVA: 20/20-2
OS_BCVA: 20/20-1

## 2021-06-29 ASSESSMENT — PACHYMETRY
OD_CT_UM: 540
OD_CT_CORRECTION: 0
OS_CT_UM: 540
OS_CT_CORRECTION: 0

## 2021-06-30 LAB — SURGICAL PATHOLOGY STUDY: SIGNIFICANT CHANGE UP

## 2021-07-17 LAB
CULTURE RESULTS: SIGNIFICANT CHANGE UP
SPECIMEN SOURCE: SIGNIFICANT CHANGE UP

## 2021-09-09 ENCOUNTER — DOCTOR'S OFFICE (OUTPATIENT)
Dept: URBAN - METROPOLITAN AREA CLINIC 163 | Facility: CLINIC | Age: 66
Setting detail: OPHTHALMOLOGY
End: 2021-09-09
Payer: COMMERCIAL

## 2021-09-09 PROCEDURE — 92012 INTRM OPH EXAM EST PATIENT: CPT | Performed by: OPTOMETRIST

## 2021-09-09 ASSESSMENT — VISUAL ACUITY
OS_BCVA: 20/20-2
OD_BCVA: 20/20

## 2021-09-09 ASSESSMENT — PACHYMETRY
OD_CT_CORRECTION: 0
OS_CT_CORRECTION: 0
OD_CT_UM: 540
OS_CT_UM: 540

## 2021-09-09 ASSESSMENT — CONFRONTATIONAL VISUAL FIELD TEST (CVF)
OD_FINDINGS: FULL
OS_FINDINGS: FULL

## 2021-09-24 ENCOUNTER — EMERGENCY (EMERGENCY)
Facility: HOSPITAL | Age: 66
LOS: 1 days | Discharge: HOME | End: 2021-09-24
Attending: EMERGENCY MEDICINE
Payer: MEDICAID

## 2021-09-24 VITALS
RESPIRATION RATE: 18 BRPM | SYSTOLIC BLOOD PRESSURE: 144 MMHG | OXYGEN SATURATION: 100 % | HEART RATE: 78 BPM | HEIGHT: 58 IN | WEIGHT: 199.96 LBS | DIASTOLIC BLOOD PRESSURE: 84 MMHG | TEMPERATURE: 98 F

## 2021-09-24 DIAGNOSIS — Z98.89 OTHER SPECIFIED POSTPROCEDURAL STATES: Chronic | ICD-10-CM

## 2021-09-24 PROCEDURE — 99284 EMERGENCY DEPT VISIT MOD MDM: CPT

## 2021-09-24 RX ORDER — SODIUM CHLORIDE 9 MG/ML
1000 INJECTION INTRAMUSCULAR; INTRAVENOUS; SUBCUTANEOUS ONCE
Refills: 0 | Status: COMPLETED | OUTPATIENT
Start: 2021-09-24 | End: 2021-09-24

## 2021-09-24 RX ORDER — SODIUM CHLORIDE 9 MG/ML
3 INJECTION INTRAMUSCULAR; INTRAVENOUS; SUBCUTANEOUS ONCE
Refills: 0 | Status: COMPLETED | OUTPATIENT
Start: 2021-09-24 | End: 2021-09-24
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
## 2021-09-24 NOTE — ED PROVIDER NOTE - PATIENT PORTAL LINK FT
You can access the FollowMyHealth Patient Portal offered by [facility] by registering at the following website: [email or web address]. By joining [employer]’s FollowMyHealth portal, you will also be able to view your health information using other applications (apps) compatible with our system.

## 2021-09-24 NOTE — ED PROVIDER NOTE - PROGRESS NOTE DETAILS
Labs as noted.  Pt improved with IVF and insulin and stable for d.c back to [place].  Spoke with NH supervisor Delphine and advised of d/c back to NH

## 2021-09-24 NOTE — ED PROVIDER NOTE - NSICDXPASTMEDICALHX_GEN_ALL_CORE_FT
PAST MEDICAL HISTORY:  Alcohol abuse     Alcohol abuse     Anemia     Chronic back pain     ETOH abuse     GERD (gastroesophageal reflux disease)     HTN (hypertension)     Hypothyroidism     Liver cirrhosis     Lump in female breast     Lymphangitis, acute, lower leg     Pancreatitis     Transitory  hyperthyroidism     Urea cycle metabolism disorder     UTI (urinary tract infection)

## 2021-09-24 NOTE — ED PROVIDER NOTE - NSFOLLOWUPINSTRUCTIONS_ED_ALL_ED_FT
Continue medications as instructed  Check accuchecks before meals and at bedtime  Follow up with your doctor later today  Return sooner for any problems

## 2021-09-24 NOTE — ED PROVIDER NOTE - OBJECTIVE STATEMENT
66 y/o female with PMHx of Alcohol abuse, Anemia, Chronic back pain, ETOH abuse, GERD, HTN, Hypothyroidism, Liver cirrhosis, Lump in female breast, Lymphangitis, acute, lower leg, Pancreatitis, Transitory  hyperthyroidism, and Urea cycle metabolism disorder on Insulin presents to ED c/o hyperglycemia. Patient reports she was having high blood sugars today, over 500. Also reporting polyuria and polydipsia.     Denies chest pain, fatigue, smoking, drinking, allergies to medications  : Daryn

## 2021-09-24 NOTE — ED ADULT TRIAGE NOTE - CHIEF COMPLAINT QUOTE
BIBEMS from [place] for hyperglycemia. Denies nausea, vomiting, diarrhea, dizziness. Hx of HTN and DMT2

## 2021-09-25 VITALS
OXYGEN SATURATION: 100 % | TEMPERATURE: 98 F | SYSTOLIC BLOOD PRESSURE: 115 MMHG | RESPIRATION RATE: 16 BRPM | DIASTOLIC BLOOD PRESSURE: 71 MMHG | HEART RATE: 82 BPM

## 2021-09-25 LAB
ACETONE SERPL-MCNC: NEGATIVE — SIGNIFICANT CHANGE UP
ALBUMIN SERPL ELPH-MCNC: 3.7 G/DL — SIGNIFICANT CHANGE UP (ref 3.3–5.2)
ALP SERPL-CCNC: 297 U/L — HIGH (ref 40–120)
ALT FLD-CCNC: 32 U/L — SIGNIFICANT CHANGE UP
ANION GAP SERPL CALC-SCNC: 15 MMOL/L — SIGNIFICANT CHANGE UP (ref 5–17)
APPEARANCE UR: CLEAR — SIGNIFICANT CHANGE UP
AST SERPL-CCNC: 44 U/L — HIGH
BACTERIA # UR AUTO: ABNORMAL
BASE EXCESS BLDV CALC-SCNC: -1.3 MMOL/L — SIGNIFICANT CHANGE UP (ref -2–3)
BASOPHILS # BLD AUTO: 0.03 K/UL — SIGNIFICANT CHANGE UP (ref 0–0.2)
BASOPHILS NFR BLD AUTO: 0.4 % — SIGNIFICANT CHANGE UP (ref 0–2)
BILIRUB SERPL-MCNC: 1.1 MG/DL — SIGNIFICANT CHANGE UP (ref 0.4–2)
BILIRUB UR-MCNC: NEGATIVE — SIGNIFICANT CHANGE UP
BUN SERPL-MCNC: 11.1 MG/DL — SIGNIFICANT CHANGE UP (ref 8–20)
CA-I SERPL-SCNC: 1.28 MMOL/L — SIGNIFICANT CHANGE UP (ref 1.15–1.33)
CALCIUM SERPL-MCNC: 9.8 MG/DL — SIGNIFICANT CHANGE UP (ref 8.6–10.2)
CHLORIDE BLDV-SCNC: 101 MMOL/L — SIGNIFICANT CHANGE UP (ref 98–107)
CHLORIDE SERPL-SCNC: 96 MMOL/L — LOW (ref 98–107)
CO2 SERPL-SCNC: 20 MMOL/L — LOW (ref 22–29)
COLOR SPEC: YELLOW — SIGNIFICANT CHANGE UP
CREAT SERPL-MCNC: 0.43 MG/DL — LOW (ref 0.5–1.3)
DIFF PNL FLD: ABNORMAL
EOSINOPHIL # BLD AUTO: 0.14 K/UL — SIGNIFICANT CHANGE UP (ref 0–0.5)
EOSINOPHIL NFR BLD AUTO: 2 % — SIGNIFICANT CHANGE UP (ref 0–6)
EPI CELLS # UR: SIGNIFICANT CHANGE UP
GAS PNL BLDV: 132 MMOL/L — LOW (ref 136–145)
GAS PNL BLDV: SIGNIFICANT CHANGE UP
GLUCOSE BLDV-MCNC: 328 MG/DL — HIGH (ref 70–99)
GLUCOSE SERPL-MCNC: 298 MG/DL — HIGH (ref 70–99)
GLUCOSE UR QL: 1000 MG/DL
HCO3 BLDV-SCNC: 24 MMOL/L — SIGNIFICANT CHANGE UP (ref 22–29)
HCT VFR BLD CALC: 37.5 % — SIGNIFICANT CHANGE UP (ref 34.5–45)
HCT VFR BLDA CALC: 42 % — SIGNIFICANT CHANGE UP (ref 34–46)
HGB BLD CALC-MCNC: 14.1 G/DL — SIGNIFICANT CHANGE UP (ref 11.7–16.1)
HGB BLD-MCNC: 13.1 G/DL — SIGNIFICANT CHANGE UP (ref 11.5–15.5)
IMM GRANULOCYTES NFR BLD AUTO: 0.3 % — SIGNIFICANT CHANGE UP (ref 0–1.5)
KETONES UR-MCNC: NEGATIVE — SIGNIFICANT CHANGE UP
LACTATE BLDV-MCNC: 2.2 MMOL/L — HIGH (ref 0.5–2)
LEUKOCYTE ESTERASE UR-ACNC: NEGATIVE — SIGNIFICANT CHANGE UP
LYMPHOCYTES # BLD AUTO: 1.81 K/UL — SIGNIFICANT CHANGE UP (ref 1–3.3)
LYMPHOCYTES # BLD AUTO: 25.3 % — SIGNIFICANT CHANGE UP (ref 13–44)
MCHC RBC-ENTMCNC: 31.8 PG — SIGNIFICANT CHANGE UP (ref 27–34)
MCHC RBC-ENTMCNC: 34.9 GM/DL — SIGNIFICANT CHANGE UP (ref 32–36)
MCV RBC AUTO: 91 FL — SIGNIFICANT CHANGE UP (ref 80–100)
MONOCYTES # BLD AUTO: 1 K/UL — HIGH (ref 0–0.9)
MONOCYTES NFR BLD AUTO: 14 % — SIGNIFICANT CHANGE UP (ref 2–14)
NEUTROPHILS # BLD AUTO: 4.15 K/UL — SIGNIFICANT CHANGE UP (ref 1.8–7.4)
NEUTROPHILS NFR BLD AUTO: 58 % — SIGNIFICANT CHANGE UP (ref 43–77)
NITRITE UR-MCNC: NEGATIVE — SIGNIFICANT CHANGE UP
PCO2 BLDV: 38 MMHG — LOW (ref 39–42)
PH BLDV: 7.4 — SIGNIFICANT CHANGE UP (ref 7.32–7.43)
PH UR: 6.5 — SIGNIFICANT CHANGE UP (ref 5–8)
PLATELET # BLD AUTO: 128 K/UL — LOW (ref 150–400)
PO2 BLDV: 78 MMHG — HIGH (ref 25–45)
POTASSIUM BLDV-SCNC: 4.1 MMOL/L — SIGNIFICANT CHANGE UP (ref 3.5–5.1)
POTASSIUM SERPL-MCNC: 4.1 MMOL/L — SIGNIFICANT CHANGE UP (ref 3.5–5.3)
POTASSIUM SERPL-SCNC: 4.1 MMOL/L — SIGNIFICANT CHANGE UP (ref 3.5–5.3)
PROT SERPL-MCNC: 7.7 G/DL — SIGNIFICANT CHANGE UP (ref 6.6–8.7)
PROT UR-MCNC: NEGATIVE MG/DL — SIGNIFICANT CHANGE UP
RBC # BLD: 4.12 M/UL — SIGNIFICANT CHANGE UP (ref 3.8–5.2)
RBC # FLD: 12.6 % — SIGNIFICANT CHANGE UP (ref 10.3–14.5)
RBC CASTS # UR COMP ASSIST: SIGNIFICANT CHANGE UP /HPF (ref 0–4)
SAO2 % BLDV: 96.9 % — SIGNIFICANT CHANGE UP
SODIUM SERPL-SCNC: 131 MMOL/L — LOW (ref 135–145)
SP GR SPEC: 1.01 — SIGNIFICANT CHANGE UP (ref 1.01–1.02)
UROBILINOGEN FLD QL: NEGATIVE MG/DL — SIGNIFICANT CHANGE UP
WBC # BLD: 7.15 K/UL — SIGNIFICANT CHANGE UP (ref 3.8–10.5)
WBC # FLD AUTO: 7.15 K/UL — SIGNIFICANT CHANGE UP (ref 3.8–10.5)
WBC UR QL: SIGNIFICANT CHANGE UP

## 2021-09-25 PROCEDURE — T1013: CPT

## 2021-09-25 PROCEDURE — 80053 COMPREHEN METABOLIC PANEL: CPT

## 2021-09-25 PROCEDURE — 84295 ASSAY OF SERUM SODIUM: CPT

## 2021-09-25 PROCEDURE — 84132 ASSAY OF SERUM POTASSIUM: CPT

## 2021-09-25 PROCEDURE — 85014 HEMATOCRIT: CPT

## 2021-09-25 PROCEDURE — 99283 EMERGENCY DEPT VISIT LOW MDM: CPT

## 2021-09-25 PROCEDURE — 85018 HEMOGLOBIN: CPT

## 2021-09-25 PROCEDURE — 36415 COLL VENOUS BLD VENIPUNCTURE: CPT

## 2021-09-25 PROCEDURE — 82009 KETONE BODYS QUAL: CPT

## 2021-09-25 PROCEDURE — 82803 BLOOD GASES ANY COMBINATION: CPT

## 2021-09-25 PROCEDURE — 82962 GLUCOSE BLOOD TEST: CPT

## 2021-09-25 PROCEDURE — 85025 COMPLETE CBC W/AUTO DIFF WBC: CPT

## 2021-09-25 PROCEDURE — 83605 ASSAY OF LACTIC ACID: CPT

## 2021-09-25 PROCEDURE — 82435 ASSAY OF BLOOD CHLORIDE: CPT

## 2021-09-25 PROCEDURE — 82330 ASSAY OF CALCIUM: CPT

## 2021-09-25 PROCEDURE — 82947 ASSAY GLUCOSE BLOOD QUANT: CPT

## 2021-09-25 PROCEDURE — 81001 URINALYSIS AUTO W/SCOPE: CPT

## 2021-09-25 RX ORDER — INSULIN HUMAN 100 [IU]/ML
10 INJECTION, SOLUTION SUBCUTANEOUS ONCE
Refills: 0 | Status: DISCONTINUED | OUTPATIENT
Start: 2021-09-25 | End: 2021-09-25

## 2021-09-25 RX ORDER — INSULIN HUMAN 100 [IU]/ML
4 INJECTION, SOLUTION SUBCUTANEOUS ONCE
Refills: 0 | Status: COMPLETED | OUTPATIENT
Start: 2021-09-25 | End: 2021-09-25

## 2021-09-25 RX ADMIN — SODIUM CHLORIDE 1000 MILLILITER(S): 9 INJECTION INTRAMUSCULAR; INTRAVENOUS; SUBCUTANEOUS at 02:11

## 2021-09-25 RX ADMIN — INSULIN HUMAN 4 UNIT(S): 100 INJECTION, SOLUTION SUBCUTANEOUS at 02:10

## 2021-09-25 RX ADMIN — SODIUM CHLORIDE 3 MILLILITER(S): 9 INJECTION INTRAMUSCULAR; INTRAVENOUS; SUBCUTANEOUS at 02:10

## 2021-09-25 NOTE — ED ADULT NURSE NOTE - OBJECTIVE STATEMENT
Patient A&O presents from [place] for high blood sugar.  Patient reports that she was having high blood sugars in the 400's and increased thirst.  Patient denies fevers and chills.

## 2021-09-25 NOTE — ED ADULT NURSE NOTE - CHIEF COMPLAINT QUOTE
BIBEMS from [place] for hyperglycemia. Denies nausea, vomiting, diarrhea, dizziness. Hx of HTN and DMT2

## 2021-10-18 ENCOUNTER — RX ONLY (RX ONLY)
Age: 66
End: 2021-10-18

## 2021-11-03 NOTE — ED ADULT NURSE NOTE - PRIMARY CARE PROVIDER
unknown Detail Level: Detailed Consent: The risks of atrophy were reviewed with the patient. Validate Note Data When Using Inventory: Yes Total Volume Injected (Ccs- Only Use Numbers And Decimals): 1.0 Include Z78.9 (Other Specified Conditions Influencing Health Status) As An Associated Diagnosis?: No X Size Of Lesion In Cm (Optional): 0 Ndc# For Kenalog Only: [address]-0 Medical Necessity Clause: This procedure was medically necessary because the lesions that were treated were: Kenalog Preparation: Kenalog Concentration Of Solution Injected (Mg/Ml): 3.0

## 2021-11-15 ENCOUNTER — DOCTOR'S OFFICE (OUTPATIENT)
Dept: URBAN - METROPOLITAN AREA CLINIC 163 | Facility: CLINIC | Age: 66
Setting detail: OPHTHALMOLOGY
End: 2021-11-15
Payer: COMMERCIAL

## 2021-11-15 ENCOUNTER — RX ONLY (RX ONLY)
Age: 66
End: 2021-11-15

## 2021-11-15 PROCEDURE — 92012 INTRM OPH EXAM EST PATIENT: CPT | Performed by: OPTOMETRIST

## 2021-11-15 ASSESSMENT — PACHYMETRY
OD_CT_UM: 540
OD_CT_CORRECTION: 0
OS_CT_UM: 540
OS_CT_CORRECTION: 0

## 2021-11-15 ASSESSMENT — CONFRONTATIONAL VISUAL FIELD TEST (CVF)
OD_FINDINGS: FULL
OS_FINDINGS: FULL

## 2021-11-15 ASSESSMENT — VISUAL ACUITY
OS_BCVA: 20/20
OD_BCVA: 20/20

## 2021-11-15 ASSESSMENT — SPHEQUIV_DERIVED
OS_SPHEQUIV: 2.25
OD_SPHEQUIV: 2.5

## 2021-11-15 ASSESSMENT — REFRACTION_MANIFEST
OD_ADD: +2.50
OD_SPHERE: +2.25
OS_ADD: +2.50
OS_VA1: 20/20
OD_VA1: 20/20
OS_AXIS: 160
OD_CYLINDER: +0.50
OD_AXIS: 025
OS_SPHERE: +2.00
OS_CYLINDER: +0.50

## 2021-11-15 ASSESSMENT — TONOMETRY
OD_IOP_MMHG: 19
OS_IOP_MMHG: 20

## 2021-11-23 NOTE — ED ADULT NURSE NOTE - CAS DISCH ACCOMP BY
Self Cheek Interpolation Flap Division And Inset Text: Division and inset of the cheek interpolation flap was performed to achieve optimal aesthetic result, restore normal anatomic appearance and avoid distortion of normal anatomy, expedite and facilitate wound healing, achieve optimal functional result and because linear closure either not possible or would produce suboptimal result. The patient was prepped and draped in the usual manner. The pedicle was infiltrated with local anesthesia. The pedicle was sectioned with a #15 blade. The pedicle was de-bulked and trimmed to match the shape of the defect. Hemostasis was achieved. The flap donor site and free margin of the flap were secured with deep buried sutures and the wound edges were re-approximated.

## 2021-12-02 NOTE — PROGRESS NOTE ADULT - PROBLEM SELECTOR PLAN 2
Pt called discuss blood pressure readings call to advise   Current blood pressure 125/75      per GI and medical team

## 2022-01-13 ENCOUNTER — EMERGENCY (EMERGENCY)
Facility: HOSPITAL | Age: 67
LOS: 1 days | Discharge: HOME | End: 2022-01-13
Attending: STUDENT IN AN ORGANIZED HEALTH CARE EDUCATION/TRAINING PROGRAM
Payer: MEDICAID

## 2022-01-13 VITALS
OXYGEN SATURATION: 100 % | TEMPERATURE: 99 F | RESPIRATION RATE: 20 BRPM | HEART RATE: 101 BPM | HEIGHT: 59 IN | DIASTOLIC BLOOD PRESSURE: 97 MMHG | SYSTOLIC BLOOD PRESSURE: 163 MMHG | WEIGHT: 139.99 LBS

## 2022-01-13 DIAGNOSIS — Z98.89 OTHER SPECIFIED POSTPROCEDURAL STATES: Chronic | ICD-10-CM

## 2022-01-13 LAB
ANION GAP SERPL CALC-SCNC: 15 MMOL/L — SIGNIFICANT CHANGE UP (ref 5–17)
ANISOCYTOSIS BLD QL: SLIGHT — SIGNIFICANT CHANGE UP
APAP SERPL-MCNC: <3 UG/ML — LOW (ref 10–26)
BASOPHILS # BLD AUTO: 0.03 K/UL — SIGNIFICANT CHANGE UP (ref 0–0.2)
BASOPHILS NFR BLD AUTO: 0.6 % — SIGNIFICANT CHANGE UP (ref 0–2)
BUN SERPL-MCNC: 6.7 MG/DL — LOW (ref 8–20)
CALCIUM SERPL-MCNC: 8.6 MG/DL — SIGNIFICANT CHANGE UP (ref 8.6–10.2)
CHLORIDE SERPL-SCNC: 104 MMOL/L — SIGNIFICANT CHANGE UP (ref 98–107)
CO2 SERPL-SCNC: 23 MMOL/L — SIGNIFICANT CHANGE UP (ref 22–29)
CREAT SERPL-MCNC: 0.4 MG/DL — LOW (ref 0.5–1.3)
EOSINOPHIL # BLD AUTO: 0.08 K/UL — SIGNIFICANT CHANGE UP (ref 0–0.5)
EOSINOPHIL NFR BLD AUTO: 1.5 % — SIGNIFICANT CHANGE UP (ref 0–6)
ETHANOL SERPL-MCNC: 419 MG/DL — HIGH (ref 0–9)
GIANT PLATELETS BLD QL SMEAR: PRESENT — SIGNIFICANT CHANGE UP
GLUCOSE SERPL-MCNC: 228 MG/DL — HIGH (ref 70–99)
HCT VFR BLD CALC: 36 % — SIGNIFICANT CHANGE UP (ref 34.5–45)
HGB BLD-MCNC: 12 G/DL — SIGNIFICANT CHANGE UP (ref 11.5–15.5)
HIV 1 & 2 AB SERPL IA.RAPID: SIGNIFICANT CHANGE UP
IMM GRANULOCYTES NFR BLD AUTO: 0.2 % — SIGNIFICANT CHANGE UP (ref 0–1.5)
LYMPHOCYTES # BLD AUTO: 1.93 K/UL — SIGNIFICANT CHANGE UP (ref 1–3.3)
LYMPHOCYTES # BLD AUTO: 36.8 % — SIGNIFICANT CHANGE UP (ref 13–44)
MACROCYTES BLD QL: SLIGHT — SIGNIFICANT CHANGE UP
MANUAL SMEAR VERIFICATION: SIGNIFICANT CHANGE UP
MCHC RBC-ENTMCNC: 31.2 PG — SIGNIFICANT CHANGE UP (ref 27–34)
MCHC RBC-ENTMCNC: 33.3 GM/DL — SIGNIFICANT CHANGE UP (ref 32–36)
MCV RBC AUTO: 93.5 FL — SIGNIFICANT CHANGE UP (ref 80–100)
MONOCYTES # BLD AUTO: 0.75 K/UL — SIGNIFICANT CHANGE UP (ref 0–0.9)
MONOCYTES NFR BLD AUTO: 14.3 % — HIGH (ref 2–14)
MYELOCYTES NFR BLD: 0.9 % — HIGH (ref 0–0)
NEUTROPHILS # BLD AUTO: 2.44 K/UL — SIGNIFICANT CHANGE UP (ref 1.8–7.4)
NEUTROPHILS NFR BLD AUTO: 46.6 % — SIGNIFICANT CHANGE UP (ref 43–77)
OVALOCYTES BLD QL SMEAR: SLIGHT — SIGNIFICANT CHANGE UP
PLAT MORPH BLD: NORMAL — SIGNIFICANT CHANGE UP
PLATELET # BLD AUTO: 100 K/UL — LOW (ref 150–400)
POIKILOCYTOSIS BLD QL AUTO: SLIGHT — SIGNIFICANT CHANGE UP
POTASSIUM SERPL-MCNC: 3.9 MMOL/L — SIGNIFICANT CHANGE UP (ref 3.5–5.3)
POTASSIUM SERPL-SCNC: 3.9 MMOL/L — SIGNIFICANT CHANGE UP (ref 3.5–5.3)
RBC # BLD: 3.85 M/UL — SIGNIFICANT CHANGE UP (ref 3.8–5.2)
RBC # FLD: 15.5 % — HIGH (ref 10.3–14.5)
RBC BLD AUTO: SIGNIFICANT CHANGE UP
SALICYLATES SERPL-MCNC: <0.6 MG/DL — LOW (ref 10–20)
SMUDGE CELLS # BLD: PRESENT — SIGNIFICANT CHANGE UP
SODIUM SERPL-SCNC: 142 MMOL/L — SIGNIFICANT CHANGE UP (ref 135–145)
VARIANT LYMPHS # BLD: 0.9 % — SIGNIFICANT CHANGE UP (ref 0–6)
WBC # BLD: 5.24 K/UL — SIGNIFICANT CHANGE UP (ref 3.8–10.5)
WBC # FLD AUTO: 5.24 K/UL — SIGNIFICANT CHANGE UP (ref 3.8–10.5)

## 2022-01-13 PROCEDURE — 12013 RPR F/E/E/N/L/M 2.6-5.0 CM: CPT

## 2022-01-13 PROCEDURE — 70450 CT HEAD/BRAIN W/O DYE: CPT | Mod: 26,MD

## 2022-01-13 PROCEDURE — 99285 EMERGENCY DEPT VISIT HI MDM: CPT | Mod: 25

## 2022-01-13 PROCEDURE — 72125 CT NECK SPINE W/O DYE: CPT | Mod: 26,MD

## 2022-01-13 RX ORDER — TETANUS TOXOID, REDUCED DIPHTHERIA TOXOID AND ACELLULAR PERTUSSIS VACCINE, ADSORBED 5; 2.5; 8; 8; 2.5 [IU]/.5ML; [IU]/.5ML; UG/.5ML; UG/.5ML; UG/.5ML
0.5 SUSPENSION INTRAMUSCULAR ONCE
Refills: 0 | Status: DISCONTINUED | OUTPATIENT
Start: 2022-01-13 | End: 2022-01-13

## 2022-01-13 RX ORDER — TETANUS TOXOID, REDUCED DIPHTHERIA TOXOID AND ACELLULAR PERTUSSIS VACCINE, ADSORBED 5; 2.5; 8; 8; 2.5 [IU]/.5ML; [IU]/.5ML; UG/.5ML; UG/.5ML; UG/.5ML
0.5 SUSPENSION INTRAMUSCULAR ONCE
Refills: 0 | Status: COMPLETED | OUTPATIENT
Start: 2022-01-13 | End: 2022-01-13

## 2022-01-13 NOTE — ED PROVIDER NOTE - NS ED ROS FT
ROS:  GEN: (-) fevers/chills  NECK: (-) stiffness, (-) swelling  RESP: (-) shortness of breath, (-) cough  CV: (-) chest pain, (-) palpitations  GI: (-) nausea, (-) vomiting, (-) pain, (-) constipation, (-) diarrhea  : (-) hematuria, (-) dysuria  EXT: (-) edema  NEURO: (-) weakness, (-) headache, (-) dizziness, (-) syncope  MSK: (-) muscle pain (+) laceration to left forehead

## 2022-01-13 NOTE — ED ADULT NURSE NOTE - NSIMPLEMENTINTERV_GEN_ALL_ED
Implemented All Fall Risk Interventions:  [place] to call system. Call bell, personal items and telephone within reach. Instruct patient to call for assistance. Room bathroom lighting operational. Non-slip footwear when patient is off stretcher. Physically safe environment: no spills, clutter or unnecessary equipment. Stretcher in lowest position, wheels locked, appropriate side rails in place. Provide visual cue, wrist band, yellow gown, etc. Monitor gait and stability. Monitor for mental status changes and reorient to person, place, and time. Review medications for side effects contributing to fall risk. Reinforce activity limits and safety measures with patient and family.
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
## 2022-01-13 NOTE — ED ADULT TRIAGE NOTE - MEANS OF ARRIVAL
No retinal holes or tears seen on exam. Recommended observation. We reviewed the signs and symptoms of retinal tear/retinal detachment and the importance of prompt evaluation should there be increasing floaters, new flashing lights, or decreasing peripheral vision in either eye at any time. Patient understands condition, prognosis and need for follow up care. stretcher
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
## 2022-01-13 NOTE — ED PROVIDER NOTE - PATIENT PORTAL LINK FT
You can access the FollowMyHealth Patient Portal offered by [facility] by registering at the following website: [email or web address]. By joining [employer]’s FollowMyHealth portal, you will also be able to view your health information using other applications (apps) compatible with our system.

## 2022-01-13 NOTE — ED ADULT NURSE NOTE - OBJECTIVE STATEMENT
Assumed care of patient at 2230.  Patient in yellow gown for safety, belongings secured.  Patient intoxicated, awaiting sobriety at this time.  Patient with laceration noted to right eyebrow.  Safety maintained.

## 2022-01-13 NOTE — ED PROVIDER NOTE - PHYSICAL EXAMINATION
Vital Signs per nursing documentation  Gen: uncooperative with exam, speaking in full sentences  HEENT: NC, MMM,, laceration right forehead, PERRL  Cardiac: regular rate rhythm, normal S1S2  Chest: clear to auscultation bilateral, no wheezes or crackles  Abdomen: soft, non tender non distended  Extremity: no gross deformity, good perfusion,   Skin: no rash  Neuro: nonfocal neuro exam, moving all extremities

## 2022-01-13 NOTE — ED PROVIDER NOTE - DATE/TIME 1
HOSPITALIST PROGRESS NOTE:      Subjective: Admitted with GI bleed  Pt hemoglobin trending down,  Patient tachycardic, will monitor counts and initiate on metoprolol     Vital 24 Hour Range Last Value   Temperature Temp  Min: 98.4 °F (36.9 °C)  Max: 99 °F (37.2 °C) 98.6 °F (37 °C) (10/05/19 0729)   Pulse Pulse  Min: 116  Max: 119 116 (10/05/19 0729)   Respiratory Resp  Min: 16  Max: 18 18 (10/05/19 0729)   Non-Invasive  Blood Pressure BP  Min: 137/74  Max: 158/85 148/80 (10/05/19 0729)   Pulse Oximetry SpO2  Min: 94 %  Max: 99 % 96 % (10/05/19 0729)     Admit Weight:   Weight: 57.6 kg (09/21/19 1550)    BMI:   BMI (Calculated): 22.5 (09/21/19 1550)    Intake/Output:    I/O this shift:  In: -   Out: 400 [Urine:400]    Physical Exam:   General appearance - alert and in no distress  Oropharynx - normal  Neck - supple  and no adenopathy  Lungs - clear to auscultation  Heart - normal, regular rate and rhythm, no murmur noted  Abd - soft and non-tender. BS audible  Ext - no edema. Pulses 2+  Neuro - no gross motor or sensory defecits    Laboratory Results:    Recent Labs   Lab 10/05/19  0640 10/04/19  0630 10/04/19  0105 10/03/19  0300   SODIUM 136 138  --  134*   POTASSIUM 4.2 3.8 4.0 3.6   CHLORIDE 102 105  --  103   CO2 32 29  --  27   BUN 21* 18  --  17   CREATININE 1.19* 1.23*  --  1.32*   GLUCOSE 197* 110*  --  144*   WBC 7.7 8.1  --  10.1   HGB 7.8* 8.3*  --  8.8*   HCT 23.0* 24.3*  --  25.5*    173  --  172       No results found    Medications/Infusions:  Scheduled:   • metoPROLOL succinate  25 mg Oral Daily   • furosemide  40 mg Oral BID   • iron sucrose (VENOFER) injection/IVPB  200 mg Intravenous Daily   • pantoprazole  40 mg Oral 2 times per day   • insulin lispro   Subcutaneous 4x Daily AC & HS   • influenza virus quadrivalent vaccine inactivated injection  0.5 mL Intramuscular Once   • insulin lispro  3 Units Subcutaneous TID AC   • potassium CHLORIDE  20 mEq Intravenous BID   • fluticasone  2 spray  Each Nare Daily   • insulin lispro   Subcutaneous QHS   • loratadine  10 mg Oral QAM AC   • calcium carbonate-vitamin D  1 tablet Oral BID WC   • fluticasone-vilanterol  1 puff Inhalation Daily Resp   • nicotine  1 patch Transdermal Daily   • sucralfate  1 g Oral 4x Daily AC & HS   • buPROPion  150 mg Oral Daily   • gemfibrozil  600 mg Oral BID AC   • cholecalciferol  2,000 Units Oral Daily   • atorvastatin  40 mg Oral Daily   • isosorbide mononitrate  30 mg Oral Daily       Continuous Infusions:   • sodium chloride 0.9% infusion Stopped (10/02/19 1308)   • dextrose 5 % infusion Stopped (09/23/19 1400)       Assessment and Plan:  Patient admitted with upper GI bleed    Upper GI bleed  Large gastric and duodenal ulcers. EGD  Status post embolization on 9/25, rebleeding on 9/29  Continuing with PPI drip today, biopsies. EGD showed gastritis and no H. pylori  Surgery and GI on consult  We'll continue monitor H&H which is relatively stable at this point  No overt bleeding episodes  Patient is transferred out of the ICU, doing well H&H stable  H&H holding, patient refusing subacute rehabilitation  Patient's hemoglobin declining, tachycardic will hold off on discharge today    Hemorrhagic shock  Status post multiple units of blood transfusion  H&H now stable  Monitor on telemetry  Hemoglobin declining we'll monitor closely    Diabetes mellitus  Continue sliding-scale insulin  Blood sugars were elevated during this hospitalization    Acute on chronic renal failure  Relatively stable  Nephrology following    Initially septic/UTI  Completed course of antibiotics  We'll monitor white counts and temperature    History of COPD  Continue outpatient therapy    History of smoking  Patient agreeable to quitting    DVT prophylaxis    Code status: Full Resuscitation      Signed:  Cleve Garcia MD  10/5/2019  10:16 AM       14-Jan-2022 05:15

## 2022-01-13 NOTE — ED PROVIDER NOTE - NSFOLLOWUPINSTRUCTIONS_ED_ALL_ED_FT
Trastorno por consumo de bebidas alcohólicas  Alcohol Use Disorder    El trastorno por consumo de bebidas alcohólicas ocurre cuando el consumo de alcohol altera matias feng cotidiana. Las personas que padecen esta afección beben demasiado alcohol y no pueden controlar el consumo.    Lynn trastorno puede causar problemas graves en la bert física. Puede afectar el cerebro, el corazón, el hígado, el páncreas, el sistema inmunitario, el estómago y los intestinos. El trastorno por consumo de bebidas alcohólicas puede aumentar matias riesgo de contraer ciertos tipos de cáncer y causar problemas con la bert mental, tales diamante depresión, ansiedad, psicosis, delirios y demencia. Las personas que tienen lynn trastorno corren el riesgo de lastimarse a ellas mismas o a otras personas.    ¿Cuáles son las causas?  Esta afección se debe al consumo excesivo de alcohol con el transcurso del tiempo. No es causado por consumir demasiado alcohol solo melanie o dos veces. Algunas personas que sufren esta afección beben alcohol para enfrentarse a situaciones negativas de la feng o escapar de ellas. Otros beben para aliviar el dolor o los síntomas de melanie enfermedad mental.    ¿Qué incrementa el riesgo?  Es más probable que desarrolle esta afección si:    Tiene antecedentes familiares de trastorno por consumo de bebidas alcohólicas.  Matias cultura alienta el consumo de alcohol al punto de la intoxicación o facilita el acceso al alcohol.  Tuvo un trastorno emocional o de conducta en la infancia.  Fue víctima de abuso.  Es adolescente y:  Tiene calificaciones bajas o dificultades en la escuela.  Irena cuidadores no le hablan sobre negarse a moe alcohol ni supervisan irena actividades.  Es impulsivo o tiene problemas con el autocontrol.    ¿Cuáles son los signos o los síntomas?  Los síntomas de esta afección incluyen los siguientes:    Beber más de lo que desea.  Beber por más tiempo del que desea.  Intentar varias veces beber menos o controlar el consumo de alcohol.  Invertir mucho tiempo en conseguir alcohol, beber o recuperarse de mica bebido.  Sentir melanie necesidad imperiosa de beber alcohol.  Tener problemas en el trabajo, la escuela o el hogar debido al consumo de alcohol.  Tener problemas en las relaciones debido al consumo de alcohol.  Beber cuando es peligroso hacerlo, por ejemplo, antes de conducir.  Continuar bebiendo incluso sabiendo que podría tener un problema físico o mental relacionado con el consumo de alcohol.  Necesitar cada vez más alcohol para obtener el mismo efecto que desea de lynn (generar tolerancia).  Tener síntomas de abstinencia al dejar de beber. Entre los síntomas de abstinencia se incluyen los siguientes:  Fatiga.  Pesadillas.  Dificultad para dormir.  Depresión.  Ansiedad.  Fiebre.  Convulsiones.  Confusión grave.  Gabino o sentir cosas que no existen (alucinaciones).  Temblores.  Frecuencia cardíaca acelerada.  Respiración rápida.  Hipertensión arterial.  Consumir para evitar los síntomas de abstinencia.    ¿Cómo se diagnostica?  Esta afección se diagnostica con melanie evaluación. El médico puede comenzar la evaluación con chalo o cuatro preguntas sobre matias consumo de alcohol.    El médico podrá realizar un examen físico o análisis de laboratorio para determinar si tiene problemas físicos diamante resultado del consumo de alcohol. Puede derivarlo a un profesional de bert mental para que realice melanie evaluación.    ¿Cómo se trata?  Algunas personas con trastorno por consumo de bebidas alcohólicas pueden reducir el consumo a niveles de bajo riesgo. Otras necesitan dejar el alcohol por completo. Cuando sea necesario, puede recibir ayuda de profesionales de bert mental capacitados en el tratamiento del abuso de sustancias. El médico puede ayudarlo a determinar la gravedad de matias trastorno por consumo de bebidas alcohólicas y el tipo de tratamiento que necesita. Las formas de tratamiento disponibles son:    Desintoxicación. La desintoxicación implica dejar de beber y moe medicamentos recetados en el plazo de la primera semana para reducir los síntomas de la abstinencia. Lynn tratamiento es importante para las personas que ya monahan tenido síntomas de abstinencia y para los que consumen en exceso, quienes probablemente sufran síntomas de abstinencia. La abstinencia puede ser peligrosa y, en casos graves, puede causar la muerte. La desintoxicación puede realizarse en el hogar, en un ámbito extrahospitalario, en un centro de atención primaria, en un hospital o en un centro de tratamiento para abuso de sustancias.  Asesoramiento psicológico. Lynn tratamiento también se conoce diamante psicoterapia. La realizan terapeutas especializados en el tratamiento de pacientes que abusan de sustancias. Un terapeuta puede abordar los motivos por los que consume alcohol y sugerirle maneras de evitar que vuelva a beber o que tenga un problema con la bebida. Los objetivos de la psicoterapia son los siguientes:  Encontrar actividades saludables y formas de afrontar el estrés.  Identificar y evitar aquello que lo conduzca a beber alcohol.  Aprender a controlar las ansias de beber.  Medicamentos. Los medicamentos pueden ayudar a tratar el trastorno por consumo de bebidas alcohólicas porque:  Controlan las ansias de beber.  Reducen el sentimiento positivo que experimenta al beber alcohol.  Causan melanie reacción física desagradable cuando jarad alcohol (terapia de aversión).  Grupos de apoyo. Los grupos de apoyo son dirigidos por personas que monahan superado matias problema con la bebida. [place] apoyo emocional, consejos y orientación.    Estas formas de tratamiento, generalmente, se combinan. Algunas personas con esta afección se benefician del tratamiento combinado que se ofrece en algunos centros de tratamiento especializados en el abuso de sustancias.     Siga estas indicaciones en matias casa:  [place] los medicamentos de venta jorge y los recetados solamente diamante se lo haya indicado el médico.  Consulte al médico antes de empezar cualquier medicamento nuevo.  Pida a familiares y amigos que no le ofrezcan alcohol.  Evite situaciones en las que se sirva alcohol, incluidas las reuniones en las que otros estén bebiendo alcohol.  Elabore un plan de acción si siente la tentación de beber alcohol.  Busque pasatiempos o actividades que disfrute, jeanette que no incluyan el consumo de alcohol.  Concurra a todas las visitas de control diamante se lo haya indicado el médico. [place] es importante.    ¿Cómo se amna?  Si jarad, limite el consumo de alcohol a no más de 1 medida por día si es gretchen y no está embarazada, y a 2 medidas si es hombre. Melanie medida equivale a 12 oz (355 ml) de cerveza, 5 oz (148 ml) de vino o 1½ oz (44 ml) de bebidas alcohólicas de tammy graduación.  Si tiene melanie afección de bert mental, debe buscar tratamiento y apoyo.  No ofrezca alcohol a adolescentes.  Si es adolescente:  No naveen alcohol.  No tenga miedo de negarse si alguien le ofrece alcohol. Diga en voz tammy por qué no quiere beber alcohol. Puede ser un modelo a seguir positivo para irena amigos y un buen ejemplo para las personas que lo rodean al no consumir alcohol.  Si irena amigos beben alcohol, pase más tiempo con quienes no lo carol. Artem nuevas amistades que no consuman alcohol.  Busque maneras saludables de controlar el estrés y las emociones, beronica diamante meditar, respirar profundo, hacer actividad física, pasar tiempo en la naturaleza, escuchar música o hablar con un amigo o un familiar confiable.    Comuníquese con un médico si:  No puede moe los medicamentos diamante se lo monahan indicado.  Los síntomas empeoran.  Vuelve a consumir alcohol (recaída) y irena síntomas empeoran.    Solicite ayuda de inmediato si:  Tiene pensamientos acerca de lastimarse a usted mismo o a otras personas.    Si alguna vez siente que puede lastimarse a usted mismo o a los demás, o piensa en poner fin a matias feng, busque ayuda de inmediato. Puede dirigirse al servicio de urgencias más cercano o comunicarse con:    Matias servicio de emergencias local (911 en los [place]).  Melnaie línea de asistencia al suicida y atención en crisis, diamante la Línea Nacional de Prevención del Suicidio (National Suicide Prevention Lifeline) al [phone]. Está disponible las 24 horas del día.    Resumen  El trastorno por consumo de bebidas alcohólicas ocurre cuando el consumo de alcohol altera matias feng cotidiana. Las personas que padecen esta afección beben demasiado alcohol y no pueden controlar el consumo.  El tratamiento puede incluir desintoxicación, asesoramiento psicológico, medicamentos y grupos de apoyo.  Pida a familiares y amigos que no le ofrezcan alcohol. Evite situaciones en las que se sirva alcohol.  Busque ayuda de inmediato si tiene pensamientos acerca de lastimarse a usted mismo o a otras personas.    Pare de moe tanto alcol.  Por favor tenga seguimiento con matias doctor primario entre 2 bautista.  Regrese a urgencias por cualquier preocupacion medica.

## 2022-01-14 VITALS
RESPIRATION RATE: 18 BRPM | DIASTOLIC BLOOD PRESSURE: 90 MMHG | SYSTOLIC BLOOD PRESSURE: 143 MMHG | HEART RATE: 82 BPM | OXYGEN SATURATION: 100 % | TEMPERATURE: 98 F

## 2022-01-14 PROCEDURE — 82962 GLUCOSE BLOOD TEST: CPT

## 2022-01-14 PROCEDURE — 85025 COMPLETE CBC W/AUTO DIFF WBC: CPT

## 2022-01-14 PROCEDURE — 36415 COLL VENOUS BLD VENIPUNCTURE: CPT

## 2022-01-14 PROCEDURE — 99284 EMERGENCY DEPT VISIT MOD MDM: CPT | Mod: 25

## 2022-01-14 PROCEDURE — 86703 HIV-1/HIV-2 1 RESULT ANTBDY: CPT

## 2022-01-14 PROCEDURE — T1013: CPT

## 2022-01-14 PROCEDURE — 90715 TDAP VACCINE 7 YRS/> IM: CPT

## 2022-01-14 PROCEDURE — 72125 CT NECK SPINE W/O DYE: CPT | Mod: MD

## 2022-01-14 PROCEDURE — 90471 IMMUNIZATION ADMIN: CPT

## 2022-01-14 PROCEDURE — 80307 DRUG TEST PRSMV CHEM ANLYZR: CPT

## 2022-01-14 PROCEDURE — 80048 BASIC METABOLIC PNL TOTAL CA: CPT

## 2022-01-14 PROCEDURE — 70450 CT HEAD/BRAIN W/O DYE: CPT | Mod: MD

## 2022-01-14 RX ADMIN — TETANUS TOXOID, REDUCED DIPHTHERIA TOXOID AND ACELLULAR PERTUSSIS VACCINE, ADSORBED 0.5 MILLILITER(S): 5; 2.5; 8; 8; 2.5 SUSPENSION INTRAMUSCULAR at 02:31

## 2022-01-14 NOTE — ED ADULT NURSE REASSESSMENT NOTE - NS ED NURSE REASSESS COMMENT FT1
Patient ambulated with steady gait.  MD aware.  Will discharge home.  medicaid cab to be set up as form of transportation.  Safety maintained.

## 2022-01-14 NOTE — ED PROCEDURE NOTE - NUMBER OF SUTURES
4

## 2022-02-23 ENCOUNTER — INPATIENT (INPATIENT)
Facility: HOSPITAL | Age: 67
LOS: 13 days | Discharge: HOME | DRG: 871 | End: 2022-03-09
Attending: GENERAL ACUTE CARE HOSPITAL | Admitting: STUDENT IN AN ORGANIZED HEALTH CARE EDUCATION/TRAINING PROGRAM
Payer: MEDICAID

## 2022-02-23 VITALS
HEIGHT: 59 IN | TEMPERATURE: 99 F | SYSTOLIC BLOOD PRESSURE: 129 MMHG | HEART RATE: 86 BPM | OXYGEN SATURATION: 98 % | RESPIRATION RATE: 18 BRPM | DIASTOLIC BLOOD PRESSURE: 68 MMHG

## 2022-02-23 DIAGNOSIS — Z98.89 OTHER SPECIFIED POSTPROCEDURAL STATES: Chronic | ICD-10-CM

## 2022-02-23 LAB
ALBUMIN SERPL ELPH-MCNC: 2 G/DL — LOW (ref 3.3–5.2)
ALP SERPL-CCNC: 397 U/L — HIGH (ref 40–120)
ALT FLD-CCNC: 68 U/L — HIGH
AMMONIA BLD-MCNC: 113 UMOL/L — HIGH (ref 11–55)
ANION GAP SERPL CALC-SCNC: 13 MMOL/L — SIGNIFICANT CHANGE UP (ref 5–17)
APPEARANCE UR: CLEAR — SIGNIFICANT CHANGE UP
APTT BLD: 34.8 SEC — SIGNIFICANT CHANGE UP (ref 27.5–35.5)
AST SERPL-CCNC: 180 U/L — HIGH
BACTERIA # UR AUTO: ABNORMAL
BASOPHILS # BLD AUTO: 0.19 K/UL — SIGNIFICANT CHANGE UP (ref 0–0.2)
BASOPHILS NFR BLD AUTO: 0.9 % — SIGNIFICANT CHANGE UP (ref 0–2)
BILIRUB DIRECT SERPL-MCNC: 9.9 MG/DL — HIGH (ref 0–0.3)
BILIRUB INDIRECT FLD-MCNC: 3.5 MG/DL — HIGH (ref 0.2–1)
BILIRUB SERPL-MCNC: 13.4 MG/DL — HIGH (ref 0.4–2)
BILIRUB SERPL-MCNC: 13.4 MG/DL — HIGH (ref 0.4–2)
BILIRUB UR-MCNC: ABNORMAL
BLD GP AB SCN SERPL QL: SIGNIFICANT CHANGE UP
BUN SERPL-MCNC: 55.9 MG/DL — HIGH (ref 8–20)
CALCIUM SERPL-MCNC: 8.2 MG/DL — LOW (ref 8.6–10.2)
CHLORIDE SERPL-SCNC: 81 MMOL/L — LOW (ref 98–107)
CO2 SERPL-SCNC: 30 MMOL/L — HIGH (ref 22–29)
COLOR SPEC: YELLOW — SIGNIFICANT CHANGE UP
CREAT SERPL-MCNC: 1.22 MG/DL — SIGNIFICANT CHANGE UP (ref 0.5–1.3)
DIFF PNL FLD: ABNORMAL
EOSINOPHIL # BLD AUTO: 0 K/UL — SIGNIFICANT CHANGE UP (ref 0–0.5)
EOSINOPHIL NFR BLD AUTO: 0 % — SIGNIFICANT CHANGE UP (ref 0–6)
EPI CELLS # UR: SIGNIFICANT CHANGE UP
GLUCOSE SERPL-MCNC: 447 MG/DL — HIGH (ref 70–99)
GLUCOSE UR QL: 100 MG/DL
HCT VFR BLD CALC: 26.5 % — LOW (ref 34.5–45)
HGB BLD-MCNC: 9.6 G/DL — LOW (ref 11.5–15.5)
HIV 1 & 2 AB SERPL IA.RAPID: SIGNIFICANT CHANGE UP
INR BLD: 1.85 RATIO — HIGH (ref 0.88–1.16)
KETONES UR-MCNC: ABNORMAL
LEUKOCYTE ESTERASE UR-ACNC: ABNORMAL
LYMPHOCYTES # BLD AUTO: 0 % — LOW (ref 13–44)
LYMPHOCYTES # BLD AUTO: 0 K/UL — LOW (ref 1–3.3)
MANUAL SMEAR VERIFICATION: SIGNIFICANT CHANGE UP
MCHC RBC-ENTMCNC: 30.9 PG — SIGNIFICANT CHANGE UP (ref 27–34)
MCHC RBC-ENTMCNC: 36.2 GM/DL — HIGH (ref 32–36)
MCV RBC AUTO: 85.2 FL — SIGNIFICANT CHANGE UP (ref 80–100)
MONOCYTES # BLD AUTO: 0.75 K/UL — SIGNIFICANT CHANGE UP (ref 0–0.9)
MONOCYTES NFR BLD AUTO: 3.5 % — SIGNIFICANT CHANGE UP (ref 2–14)
NEUTROPHILS # BLD AUTO: 20.42 K/UL — HIGH (ref 1.8–7.4)
NEUTROPHILS NFR BLD AUTO: 95.6 % — HIGH (ref 43–77)
NITRITE UR-MCNC: NEGATIVE — SIGNIFICANT CHANGE UP
OVALOCYTES BLD QL SMEAR: SLIGHT — SIGNIFICANT CHANGE UP
PH UR: 6 — SIGNIFICANT CHANGE UP (ref 5–8)
PLAT MORPH BLD: NORMAL — SIGNIFICANT CHANGE UP
PLATELET # BLD AUTO: 99 K/UL — LOW (ref 150–400)
POIKILOCYTOSIS BLD QL AUTO: SIGNIFICANT CHANGE UP
POLYCHROMASIA BLD QL SMEAR: SLIGHT — SIGNIFICANT CHANGE UP
POTASSIUM SERPL-MCNC: 3.2 MMOL/L — LOW (ref 3.5–5.3)
POTASSIUM SERPL-SCNC: 3.2 MMOL/L — LOW (ref 3.5–5.3)
PROT SERPL-MCNC: 6.3 G/DL — LOW (ref 6.6–8.7)
PROT UR-MCNC: NEGATIVE — SIGNIFICANT CHANGE UP
PROTHROM AB SERPL-ACNC: 21.6 SEC — HIGH (ref 10.5–13.4)
RBC # BLD: 3.11 M/UL — LOW (ref 3.8–5.2)
RBC # FLD: 17.7 % — HIGH (ref 10.3–14.5)
RBC BLD AUTO: ABNORMAL
RBC CASTS # UR COMP ASSIST: ABNORMAL /HPF (ref 0–4)
SMUDGE CELLS # BLD: PRESENT — SIGNIFICANT CHANGE UP
SODIUM SERPL-SCNC: 124 MMOL/L — LOW (ref 135–145)
SP GR SPEC: 1.01 — SIGNIFICANT CHANGE UP (ref 1.01–1.02)
TARGETS BLD QL SMEAR: SIGNIFICANT CHANGE UP
UROBILINOGEN FLD QL: 12 MG/DL
WBC # BLD: 21.36 K/UL — HIGH (ref 3.8–10.5)
WBC # FLD AUTO: 21.36 K/UL — HIGH (ref 3.8–10.5)
WBC UR QL: >50 /HPF (ref 0–5)

## 2022-02-23 PROCEDURE — 70450 CT HEAD/BRAIN W/O DYE: CPT | Mod: 26,MA

## 2022-02-23 PROCEDURE — 99285 EMERGENCY DEPT VISIT HI MDM: CPT

## 2022-02-23 RX ORDER — CEFTRIAXONE 500 MG/1
1000 INJECTION, POWDER, FOR SOLUTION INTRAMUSCULAR; INTRAVENOUS ONCE
Refills: 0 | Status: COMPLETED | OUTPATIENT
Start: 2022-02-23 | End: 2022-02-23

## 2022-02-23 RX ORDER — LACTULOSE 10 G/15ML
20 SOLUTION ORAL ONCE
Refills: 0 | Status: COMPLETED | OUTPATIENT
Start: 2022-02-23 | End: 2022-02-23

## 2022-02-23 NOTE — ED ADULT NURSE NOTE - OBJECTIVE STATEMENT
Patient is alert, confused. Patient brought in by her son for worsening AMS. Breathing non labored, denies pain, with multiple bruising on abdomen,right leg, right thigh,lower back. No nose bleed noted. Safety maintained.

## 2022-02-23 NOTE — ED PROVIDER NOTE - CARE PLAN
Principal Discharge DX:	Hepatic encephalopathy  Secondary Diagnosis:	Hepatorenal syndrome  Secondary Diagnosis:	Acute UTI   1

## 2022-02-23 NOTE — ED ADULT NURSE NOTE - CHIEF COMPLAINT QUOTE
patient with HX of Cirrhosis of liver, jaundice, brought in by family states that she has been confused not using bathroom properly not eating nose bleed also lower extremity swelling  talking to self getting worse since Monday HAS not seen PMD in months

## 2022-02-23 NOTE — ED PROVIDER NOTE - OBJECTIVE STATEMENT
65 y/o female with PMHx of HTN, DM, anemia, EtOH abuse, hypothyroidism, and liver cirrhosis presents to the ED c/o altered mental status. Patient reports falling and a lump formed on her right forehead. Patient's son reports she has worsening confusion and nose bleed today.    Son: Christine [phone] 67 y/o female with PMHx of HTN, DM, anemia, EtOH abuse, hypothyroidism, and liver cirrhosis presents to the ED c/o altered mental status. Patient reports falling and a lump formed on her right forehead. Patient's son reports she has worsening confusion and nose bleed today.  interpreted by clemencia  Son: Christine [phone]

## 2022-02-23 NOTE — ED PROVIDER NOTE - NSICDXPASTMEDICALHX_GEN_ALL_CORE_FT
PAST MEDICAL HISTORY:  Alcohol abuse     Alcohol abuse     Anemia     Chronic back pain     DM (diabetes mellitus)     ETOH abuse     GERD (gastroesophageal reflux disease)     HTN (hypertension)     HTN (hypertension)     Hypothyroidism     Liver cirrhosis     Lump in female breast     Lymphangitis, acute, lower leg     Pancreatitis     Transitory  hyperthyroidism     Urea cycle metabolism disorder     UTI (urinary tract infection)

## 2022-02-23 NOTE — ED ADULT TRIAGE NOTE - CHIEF COMPLAINT QUOTE
patient with HX of Cirrhosis of liver, jaundice, brought in by family states that she has been confused not using bathroom properly not eating nose bleed also lower extremity swelling  talking to self getting worse since Monday patient with HX of Cirrhosis of liver, jaundice, brought in by family states that she has been confused not using bathroom properly not eating nose bleed also lower extremity swelling  talking to self getting worse since Monday HAS not seen PMD in months

## 2022-02-24 DIAGNOSIS — K72.90 HEPATIC FAILURE, UNSPECIFIED WITHOUT COMA: ICD-10-CM

## 2022-02-24 PROBLEM — I10 ESSENTIAL (PRIMARY) HYPERTENSION: Chronic | Status: ACTIVE | Noted: 2022-01-14

## 2022-02-24 PROBLEM — E11.9 TYPE 2 DIABETES MELLITUS WITHOUT COMPLICATIONS: Chronic | Status: ACTIVE | Noted: 2022-01-14

## 2022-02-24 LAB
ALBUMIN SERPL ELPH-MCNC: 2.4 G/DL — LOW (ref 3.3–5.2)
ALP SERPL-CCNC: 423 U/L — HIGH (ref 40–120)
ALT FLD-CCNC: 80 U/L — HIGH
AMMONIA BLD-MCNC: 83 UMOL/L — HIGH (ref 11–55)
ANION GAP SERPL CALC-SCNC: 14 MMOL/L — SIGNIFICANT CHANGE UP (ref 5–17)
AST SERPL-CCNC: 223 U/L — HIGH
BILIRUB SERPL-MCNC: 15.6 MG/DL — HIGH (ref 0.4–2)
BUN SERPL-MCNC: 45.9 MG/DL — HIGH (ref 8–20)
CALCIUM SERPL-MCNC: 8.8 MG/DL — SIGNIFICANT CHANGE UP (ref 8.6–10.2)
CHLORIDE SERPL-SCNC: 83 MMOL/L — LOW (ref 98–107)
CO2 SERPL-SCNC: 32 MMOL/L — HIGH (ref 22–29)
CREAT SERPL-MCNC: 0.87 MG/DL — SIGNIFICANT CHANGE UP (ref 0.5–1.3)
GLUCOSE BLDC GLUCOMTR-MCNC: 193 MG/DL — HIGH (ref 70–99)
GLUCOSE BLDC GLUCOMTR-MCNC: 205 MG/DL — HIGH (ref 70–99)
GLUCOSE BLDC GLUCOMTR-MCNC: 292 MG/DL — HIGH (ref 70–99)
GLUCOSE BLDC GLUCOMTR-MCNC: 292 MG/DL — HIGH (ref 70–99)
GLUCOSE BLDC GLUCOMTR-MCNC: 302 MG/DL — HIGH (ref 70–99)
GLUCOSE BLDC GLUCOMTR-MCNC: 303 MG/DL — HIGH (ref 70–99)
GLUCOSE BLDC GLUCOMTR-MCNC: 444 MG/DL — HIGH (ref 70–99)
GLUCOSE SERPL-MCNC: 206 MG/DL — HIGH (ref 70–99)
HCT VFR BLD CALC: 30.4 % — LOW (ref 34.5–45)
HGB BLD-MCNC: 11.1 G/DL — LOW (ref 11.5–15.5)
MCHC RBC-ENTMCNC: 31.5 PG — SIGNIFICANT CHANGE UP (ref 27–34)
MCHC RBC-ENTMCNC: 36.5 GM/DL — HIGH (ref 32–36)
MCV RBC AUTO: 86.4 FL — SIGNIFICANT CHANGE UP (ref 80–100)
PLATELET # BLD AUTO: 123 K/UL — LOW (ref 150–400)
POTASSIUM SERPL-MCNC: 2.7 MMOL/L — CRITICAL LOW (ref 3.5–5.3)
POTASSIUM SERPL-SCNC: 2.7 MMOL/L — CRITICAL LOW (ref 3.5–5.3)
PROT SERPL-MCNC: 7.4 G/DL — SIGNIFICANT CHANGE UP (ref 6.6–8.7)
RBC # BLD: 3.52 M/UL — LOW (ref 3.8–5.2)
RBC # FLD: 18.3 % — HIGH (ref 10.3–14.5)
SARS-COV-2 RNA SPEC QL NAA+PROBE: SIGNIFICANT CHANGE UP
SODIUM SERPL-SCNC: 129 MMOL/L — LOW (ref 135–145)
WBC # BLD: 26.3 K/UL — HIGH (ref 3.8–10.5)
WBC # FLD AUTO: 26.3 K/UL — HIGH (ref 3.8–10.5)

## 2022-02-24 PROCEDURE — 99223 1ST HOSP IP/OBS HIGH 75: CPT

## 2022-02-24 RX ORDER — DEXTROSE 50 % IN WATER 50 %
25 SYRINGE (ML) INTRAVENOUS ONCE
Refills: 0 | Status: DISCONTINUED | OUTPATIENT
Start: 2022-02-24 | End: 2022-03-09

## 2022-02-24 RX ORDER — DEXTROSE 50 % IN WATER 50 %
12.5 SYRINGE (ML) INTRAVENOUS ONCE
Refills: 0 | Status: DISCONTINUED | OUTPATIENT
Start: 2022-02-24 | End: 2022-03-09

## 2022-02-24 RX ORDER — POTASSIUM CHLORIDE 20 MEQ
10 PACKET (EA) ORAL
Refills: 0 | Status: DISCONTINUED | OUTPATIENT
Start: 2022-02-24 | End: 2022-02-24

## 2022-02-24 RX ORDER — SODIUM CHLORIDE 9 MG/ML
1000 INJECTION, SOLUTION INTRAVENOUS
Refills: 0 | Status: DISCONTINUED | OUTPATIENT
Start: 2022-02-24 | End: 2022-03-09

## 2022-02-24 RX ORDER — CEFTRIAXONE 500 MG/1
1000 INJECTION, POWDER, FOR SOLUTION INTRAMUSCULAR; INTRAVENOUS EVERY 24 HOURS
Refills: 0 | Status: DISCONTINUED | OUTPATIENT
Start: 2022-02-24 | End: 2022-02-27

## 2022-02-24 RX ORDER — INSULIN LISPRO 100/ML
10 VIAL (ML) SUBCUTANEOUS ONCE
Refills: 0 | Status: COMPLETED | OUTPATIENT
Start: 2022-02-24 | End: 2022-02-24

## 2022-02-24 RX ORDER — INSULIN HUMAN 100 [IU]/ML
10 INJECTION, SOLUTION SUBCUTANEOUS ONCE
Refills: 0 | Status: DISCONTINUED | OUTPATIENT
Start: 2022-02-24 | End: 2022-02-26

## 2022-02-24 RX ORDER — INSULIN GLARGINE 100 [IU]/ML
15 INJECTION, SOLUTION SUBCUTANEOUS AT BEDTIME
Refills: 0 | Status: DISCONTINUED | OUTPATIENT
Start: 2022-02-24 | End: 2022-02-26

## 2022-02-24 RX ORDER — DEXTROSE 50 % IN WATER 50 %
15 SYRINGE (ML) INTRAVENOUS ONCE
Refills: 0 | Status: DISCONTINUED | OUTPATIENT
Start: 2022-02-24 | End: 2022-03-09

## 2022-02-24 RX ORDER — POTASSIUM CHLORIDE 20 MEQ
20 PACKET (EA) ORAL
Refills: 0 | Status: COMPLETED | OUTPATIENT
Start: 2022-02-24 | End: 2022-02-24

## 2022-02-24 RX ORDER — POTASSIUM CHLORIDE 20 MEQ
10 PACKET (EA) ORAL
Refills: 0 | Status: COMPLETED | OUTPATIENT
Start: 2022-02-24 | End: 2022-02-24

## 2022-02-24 RX ORDER — INSULIN LISPRO 100/ML
VIAL (ML) SUBCUTANEOUS
Refills: 0 | Status: DISCONTINUED | OUTPATIENT
Start: 2022-02-24 | End: 2022-03-09

## 2022-02-24 RX ORDER — SODIUM CHLORIDE 9 MG/ML
1000 INJECTION INTRAMUSCULAR; INTRAVENOUS; SUBCUTANEOUS ONCE
Refills: 0 | Status: COMPLETED | OUTPATIENT
Start: 2022-02-24 | End: 2022-02-24

## 2022-02-24 RX ORDER — ACETAMINOPHEN 500 MG
650 TABLET ORAL EVERY 6 HOURS
Refills: 0 | Status: DISCONTINUED | OUTPATIENT
Start: 2022-02-24 | End: 2022-03-04

## 2022-02-24 RX ORDER — FERROUS SULFATE 325(65) MG
325 TABLET ORAL DAILY
Refills: 0 | Status: DISCONTINUED | OUTPATIENT
Start: 2022-02-24 | End: 2022-03-09

## 2022-02-24 RX ORDER — LABETALOL HCL 100 MG
50 TABLET ORAL
Refills: 0 | Status: DISCONTINUED | OUTPATIENT
Start: 2022-02-24 | End: 2022-03-09

## 2022-02-24 RX ORDER — THIAMINE MONONITRATE (VIT B1) 100 MG
100 TABLET ORAL DAILY
Refills: 0 | Status: DISCONTINUED | OUTPATIENT
Start: 2022-02-24 | End: 2022-03-09

## 2022-02-24 RX ORDER — LEVOTHYROXINE SODIUM 125 MCG
50 TABLET ORAL DAILY
Refills: 0 | Status: DISCONTINUED | OUTPATIENT
Start: 2022-02-24 | End: 2022-02-27

## 2022-02-24 RX ORDER — INSULIN LISPRO 100/ML
VIAL (ML) SUBCUTANEOUS AT BEDTIME
Refills: 0 | Status: DISCONTINUED | OUTPATIENT
Start: 2022-02-24 | End: 2022-03-09

## 2022-02-24 RX ORDER — LACTULOSE 10 G/15ML
10 SOLUTION ORAL THREE TIMES A DAY
Refills: 0 | Status: DISCONTINUED | OUTPATIENT
Start: 2022-02-24 | End: 2022-03-09

## 2022-02-24 RX ORDER — PANTOPRAZOLE SODIUM 20 MG/1
40 TABLET, DELAYED RELEASE ORAL
Refills: 0 | Status: DISCONTINUED | OUTPATIENT
Start: 2022-02-24 | End: 2022-03-09

## 2022-02-24 RX ORDER — ONDANSETRON 8 MG/1
4 TABLET, FILM COATED ORAL EVERY 8 HOURS
Refills: 0 | Status: DISCONTINUED | OUTPATIENT
Start: 2022-02-24 | End: 2022-03-09

## 2022-02-24 RX ORDER — ACETAMINOPHEN 500 MG
650 TABLET ORAL ONCE
Refills: 0 | Status: COMPLETED | OUTPATIENT
Start: 2022-02-24 | End: 2022-02-24

## 2022-02-24 RX ORDER — FOLIC ACID 0.8 MG
1 TABLET ORAL DAILY
Refills: 0 | Status: DISCONTINUED | OUTPATIENT
Start: 2022-02-24 | End: 2022-03-09

## 2022-02-24 RX ORDER — LANOLIN ALCOHOL/MO/W.PET/CERES
3 CREAM (GRAM) TOPICAL AT BEDTIME
Refills: 0 | Status: DISCONTINUED | OUTPATIENT
Start: 2022-02-24 | End: 2022-02-28

## 2022-02-24 RX ORDER — GLUCAGON INJECTION, SOLUTION 0.5 MG/.1ML
1 INJECTION, SOLUTION SUBCUTANEOUS ONCE
Refills: 0 | Status: DISCONTINUED | OUTPATIENT
Start: 2022-02-24 | End: 2022-03-09

## 2022-02-24 RX ADMIN — Medication 100 MILLIEQUIVALENT(S): at 10:13

## 2022-02-24 RX ADMIN — Medication 2: at 07:24

## 2022-02-24 RX ADMIN — Medication 50 MILLIGRAM(S): at 17:31

## 2022-02-24 RX ADMIN — Medication 325 MILLIGRAM(S): at 11:00

## 2022-02-24 RX ADMIN — Medication 4: at 22:51

## 2022-02-24 RX ADMIN — LACTULOSE 10 GRAM(S): 10 SOLUTION ORAL at 05:32

## 2022-02-24 RX ADMIN — CEFTRIAXONE 100 MILLIGRAM(S): 500 INJECTION, POWDER, FOR SOLUTION INTRAMUSCULAR; INTRAVENOUS at 01:05

## 2022-02-24 RX ADMIN — Medication 50 MICROGRAM(S): at 05:32

## 2022-02-24 RX ADMIN — Medication 100 MILLIEQUIVALENT(S): at 09:36

## 2022-02-24 RX ADMIN — Medication 4: at 10:59

## 2022-02-24 RX ADMIN — INSULIN GLARGINE 15 UNIT(S): 100 INJECTION, SOLUTION SUBCUTANEOUS at 22:49

## 2022-02-24 RX ADMIN — Medication 20 MILLIEQUIVALENT(S): at 14:54

## 2022-02-24 RX ADMIN — Medication 10 MILLIGRAM(S): at 10:09

## 2022-02-24 RX ADMIN — Medication 1 TABLET(S): at 11:00

## 2022-02-24 RX ADMIN — PANTOPRAZOLE SODIUM 40 MILLIGRAM(S): 20 TABLET, DELAYED RELEASE ORAL at 05:34

## 2022-02-24 RX ADMIN — Medication 20 MILLIEQUIVALENT(S): at 11:00

## 2022-02-24 RX ADMIN — Medication 50 MILLIGRAM(S): at 05:32

## 2022-02-24 RX ADMIN — Medication 10 UNIT(S): at 03:13

## 2022-02-24 RX ADMIN — LACTULOSE 20 GRAM(S): 10 SOLUTION ORAL at 01:38

## 2022-02-24 RX ADMIN — Medication 6: at 17:30

## 2022-02-24 RX ADMIN — Medication 650 MILLIGRAM(S): at 08:55

## 2022-02-24 RX ADMIN — Medication 100 MILLIEQUIVALENT(S): at 08:06

## 2022-02-24 RX ADMIN — LACTULOSE 10 GRAM(S): 10 SOLUTION ORAL at 22:46

## 2022-02-24 RX ADMIN — Medication 1 MILLIGRAM(S): at 11:00

## 2022-02-24 RX ADMIN — SODIUM CHLORIDE 1000 MILLILITER(S): 9 INJECTION INTRAMUSCULAR; INTRAVENOUS; SUBCUTANEOUS at 01:06

## 2022-02-24 RX ADMIN — Medication 100 MILLIGRAM(S): at 11:00

## 2022-02-24 RX ADMIN — Medication 20 MILLIEQUIVALENT(S): at 13:17

## 2022-02-24 RX ADMIN — LACTULOSE 10 GRAM(S): 10 SOLUTION ORAL at 13:17

## 2022-02-24 RX ADMIN — Medication 650 MILLIGRAM(S): at 07:36

## 2022-02-24 RX ADMIN — PANTOPRAZOLE SODIUM 40 MILLIGRAM(S): 20 TABLET, DELAYED RELEASE ORAL at 17:31

## 2022-02-24 RX ADMIN — CEFTRIAXONE 100 MILLIGRAM(S): 500 INJECTION, POWDER, FOR SOLUTION INTRAMUSCULAR; INTRAVENOUS at 22:40

## 2022-02-24 NOTE — H&P ADULT - HISTORY OF PRESENT ILLNESS
65 y/o female with PMH of HTN, DM, anemia, EtOH abuse with liver cirrhosis, esophageal varices, hepatic encephalopathy, hypothyroidism, who was brought to the ED for altered mental status. As per ED, son reported patient has been having worsening confusion and nose bleed yesterday. Patient is a poor historian. She has no chest pain, shortness of breath, nausea, vomiting, abdominal pain.

## 2022-02-24 NOTE — PROVIDER CONTACT NOTE (CHANGE IN STATUS NOTIFICATION) - ASSESSMENT
pt more lethargic than previously. responding only to painful stimuli, not following commands. warm to touch; oral temp 100.9.

## 2022-02-24 NOTE — PATIENT PROFILE ADULT - FALL HARM RISK - HARM RISK INTERVENTIONS

## 2022-02-24 NOTE — H&P ADULT - NSHPPHYSICALEXAM_GEN_ALL_CORE
Vital Signs Last 24 Hrs  T(C): 37.1 (24 Feb 2022 02:34), Max: 37.1 (23 Feb 2022 22:22)  T(F): 98.7 (24 Feb 2022 02:34), Max: 98.8 (23 Feb 2022 22:22)  HR: 86 (24 Feb 2022 02:34) (83 - 90)  BP: 169/79 (24 Feb 2022 02:34) (129/68 - 169/79)  BP(mean): 106 (24 Feb 2022 01:16) (102 - 106)  RR: 18 (24 Feb 2022 02:34) (17 - 18)  SpO2: 97% (24 Feb 2022 02:34) (97% - 100%)

## 2022-02-24 NOTE — PATIENT PROFILE ADULT - NSTRANSFERBELONGINGSRESP_GEN_A_NUR
Addended by: OLIVIA SHAW on: 9/16/2019 08:57 PM     Modules accepted: Orders    
Addended by: OLIVIA SHAW on: 9/17/2019 08:25 AM     Modules accepted: Orders    
yes

## 2022-02-24 NOTE — H&P ADULT - ASSESSMENT
67 y/o female with PMH of HTN, DM, anemia, EtOH abuse with liver cirrhosis, esophageal varices, hepatic encephalopathy, hypothyroidism, who was brought to the ED for altered mental status. As per ED, son reported patient has been having worsening confusion and nose bleed yesterday. Patient is a poor historian.   CT head: A right supraorbital soft tissue hematoma is markedly decreased in size from 01/13/2022. Ammonia: 113; UA grossly positive, leukocytosis    Acute metabolic encephalopathy likely due to UTI   Admit to telemetry   UA noted   Rocephin once given; will continue   CT head noted     Leukocytosis   WBC: 21.36 with left shift   Likely due to underlying UTI   Continue antibiotic   Trend CBC     Hepatic encephalopathy   Ammonia 113  Lactulose once given in the ED, will continue  Rifaximin 550mg bid     JOVANNI   Cr 1.22 (baseline 0.5)   Likely due to dehydration from diuretic use   Will hold Spironolactone 100mg and Lasix 40mg for now   Monitor renal function and restart medications as needed     Hyperglycemia with underlying DM-2   FS: >400   Lispro once given   Continue Lantus 15untis HS   Insulin sliding scale     Liver cirrhosis with esophageal varices duet o EtOH abuse   LFT noted   MVT   Folic acid  Thiamine for possible thiamine deficiency in the setting of EtOH use  Will hold Spironolactone 100mg due to JOVANNI, resume as needed     Thrombocytopenia   Plt: 99   Likely due to alcohol use   As per ED son reported nose bleed yesterday; no epistaxis in the ED   Monitor CBC     Supportive  DVT prophylaxis: CSD   Diet: CHO

## 2022-02-24 NOTE — PROVIDER CONTACT NOTE (CHANGE IN STATUS NOTIFICATION) - ACTION/TREATMENT ORDERED:
provider to assess at bedside, rectal tylenol order provider to assess at bedside, awaiting rectal tylenol order

## 2022-02-25 LAB
ANION GAP SERPL CALC-SCNC: 11 MMOL/L — SIGNIFICANT CHANGE UP (ref 5–17)
BUN SERPL-MCNC: 40 MG/DL — HIGH (ref 8–20)
CALCIUM SERPL-MCNC: 8 MG/DL — LOW (ref 8.6–10.2)
CHLORIDE SERPL-SCNC: 87 MMOL/L — LOW (ref 98–107)
CO2 SERPL-SCNC: 29 MMOL/L — SIGNIFICANT CHANGE UP (ref 22–29)
CREAT SERPL-MCNC: 1.04 MG/DL — SIGNIFICANT CHANGE UP (ref 0.5–1.3)
GLUCOSE BLDC GLUCOMTR-MCNC: 221 MG/DL — HIGH (ref 70–99)
GLUCOSE BLDC GLUCOMTR-MCNC: 288 MG/DL — HIGH (ref 70–99)
GLUCOSE BLDC GLUCOMTR-MCNC: 300 MG/DL — HIGH (ref 70–99)
GLUCOSE BLDC GLUCOMTR-MCNC: 350 MG/DL — HIGH (ref 70–99)
GLUCOSE BLDC GLUCOMTR-MCNC: 352 MG/DL — HIGH (ref 70–99)
GLUCOSE SERPL-MCNC: 257 MG/DL — HIGH (ref 70–99)
HCT VFR BLD CALC: 26.3 % — LOW (ref 34.5–45)
HGB BLD-MCNC: 9.4 G/DL — LOW (ref 11.5–15.5)
MCHC RBC-ENTMCNC: 30.9 PG — SIGNIFICANT CHANGE UP (ref 27–34)
MCHC RBC-ENTMCNC: 35.7 GM/DL — SIGNIFICANT CHANGE UP (ref 32–36)
MCV RBC AUTO: 86.5 FL — SIGNIFICANT CHANGE UP (ref 80–100)
PLATELET # BLD AUTO: 115 K/UL — LOW (ref 150–400)
POTASSIUM SERPL-MCNC: 3.7 MMOL/L — SIGNIFICANT CHANGE UP (ref 3.5–5.3)
POTASSIUM SERPL-SCNC: 3.7 MMOL/L — SIGNIFICANT CHANGE UP (ref 3.5–5.3)
RBC # BLD: 3.04 M/UL — LOW (ref 3.8–5.2)
RBC # FLD: 19.1 % — HIGH (ref 10.3–14.5)
SODIUM SERPL-SCNC: 127 MMOL/L — LOW (ref 135–145)
WBC # BLD: 19.74 K/UL — HIGH (ref 3.8–10.5)
WBC # FLD AUTO: 19.74 K/UL — HIGH (ref 3.8–10.5)

## 2022-02-25 PROCEDURE — 76705 ECHO EXAM OF ABDOMEN: CPT | Mod: 26,RT

## 2022-02-25 PROCEDURE — 99233 SBSQ HOSP IP/OBS HIGH 50: CPT

## 2022-02-25 RX ADMIN — LACTULOSE 10 GRAM(S): 10 SOLUTION ORAL at 12:58

## 2022-02-25 RX ADMIN — Medication 6: at 21:42

## 2022-02-25 RX ADMIN — Medication 1 TABLET(S): at 12:57

## 2022-02-25 RX ADMIN — Medication 50 MICROGRAM(S): at 05:14

## 2022-02-25 RX ADMIN — Medication 1 MILLIGRAM(S): at 12:57

## 2022-02-25 RX ADMIN — INSULIN GLARGINE 15 UNIT(S): 100 INJECTION, SOLUTION SUBCUTANEOUS at 21:43

## 2022-02-25 RX ADMIN — CEFTRIAXONE 100 MILLIGRAM(S): 500 INJECTION, POWDER, FOR SOLUTION INTRAMUSCULAR; INTRAVENOUS at 21:42

## 2022-02-25 RX ADMIN — Medication 650 MILLIGRAM(S): at 13:01

## 2022-02-25 RX ADMIN — Medication 50 MILLIGRAM(S): at 05:29

## 2022-02-25 RX ADMIN — Medication 4: at 08:24

## 2022-02-25 RX ADMIN — LACTULOSE 10 GRAM(S): 10 SOLUTION ORAL at 21:42

## 2022-02-25 RX ADMIN — PANTOPRAZOLE SODIUM 40 MILLIGRAM(S): 20 TABLET, DELAYED RELEASE ORAL at 05:15

## 2022-02-25 RX ADMIN — Medication 325 MILLIGRAM(S): at 12:57

## 2022-02-25 RX ADMIN — PANTOPRAZOLE SODIUM 40 MILLIGRAM(S): 20 TABLET, DELAYED RELEASE ORAL at 18:13

## 2022-02-25 RX ADMIN — Medication 6: at 12:47

## 2022-02-25 RX ADMIN — Medication 50 MILLIGRAM(S): at 18:12

## 2022-02-25 RX ADMIN — Medication 100 MILLIGRAM(S): at 15:54

## 2022-02-25 RX ADMIN — Medication 650 MILLIGRAM(S): at 14:01

## 2022-02-25 RX ADMIN — LACTULOSE 10 GRAM(S): 10 SOLUTION ORAL at 05:15

## 2022-02-25 RX ADMIN — Medication 8: at 18:10

## 2022-02-25 NOTE — PROGRESS NOTE ADULT - SUBJECTIVE AND OBJECTIVE BOX
CC: Acute encephalopathy (2022 03:59)    INTERVAL HPI/OVERNIGHT EVENTS:    Vital Signs Last 24 Hrs  T(C): 36.9 (2022 09:35), Max: 36.9 (2022 11:16)  T(F): 98.5 (2022 09:35), Max: 98.5 (2022 11:16)  HR: 83 (2022 09:35) (75 - 83)  BP: 91/55 (2022 09:35) (91/55 - 141/73)  BP(mean): --  RR: 18 (2022 09:35) (16 - 19)  SpO2: 98% (2022 09:35) (92% - 99%)    PHYSICAL EXAM:  General: in no acute distress  Eyes: PERRLA, EOMI; conjunctiva and sclera clear  Head: Normocephalic; atraumatic  ENMT: No nasal discharge; airway clear  Neck: Supple; non tender; no masses  Respiratory: No wheezes, rales or rhonchi  Cardiovascular: Regular rate and rhythm. S1 and S2 Normal; No murmurs, gallops or rubs  Gastrointestinal: Soft non-tender non-distended; Normal bowel sounds  Genitourinary: No costovertebral angle tenderness  Extremities: Normal range of motion, No clubbing, cyanosis or edema  Vascular: Peripheral pulses palpable 2+ bilaterally  Neurological: Alert and oriented x4  Skin: Warm and dry. No acute rash  Lymph Nodes: No acute cervical adenopathy  Musculoskeletal: Normal gait, tone, without deformities  Psychiatric: Cooperative and appropriate    I&O's Detail    2022 07:01  -  2022 07:00  --------------------------------------------------------  IN:    Oral Fluid: 360 mL  Total IN: 360 mL    OUT:  Total OUT: 0 mL    Total NET: 360 mL    2022 07:01  -  2022 10:59  --------------------------------------------------------  IN:    Oral Fluid: 240 mL  Total IN: 240 mL    OUT:  Total OUT: 0 mL    Total NET: 240 mL                        9.4    19.74 )-----------( 115      ( 2022 08:55 )             26.3     2022 08:55    127    |  87     |  40.0   ----------------------------<  257    3.7     |  29.0   |  1.04     Ca    8.0        2022 08:55    TPro  7.4    /  Alb  2.4    /  TBili  15.6   /  DBili  x      /  AST  223    /  ALT  80     /  AlkPhos  423    2022 06:12    PT/INR - ( 2022 21:37 )   PT: 21.6 sec;   INR: 1.85 ratio      PTT - ( 2022 21:37 )  PTT:34.8 sec    CAPILLARY BLOOD GLUCOSE  POCT Blood Glucose.: 221 mg/dL (2022 08:04)  POCT Blood Glucose.: 302 mg/dL (2022 22:48)  POCT Blood Glucose.: 292 mg/dL (2022 21:36)  POCT Blood Glucose.: 292 mg/dL (2022 17:08)    LIVER FUNCTIONS - ( 2022 06:12 )  Alb: 2.4 g/dL / Pro: 7.4 g/dL / ALK PHOS: 423 U/L / ALT: 80 U/L / AST: 223 U/L / GGT: x           Urinalysis Basic - ( 2022 22:09 )    Color: Yellow / Appearance: Clear / S.010 / pH: x  Gluc: x / Ketone: Trace  / Bili: Large / Urobili: 12 mg/dL   Blood: x / Protein: Negative / Nitrite: Negative   Leuk Esterase: Moderate / RBC: 3-5 /HPF / WBC >50 /HPF   Sq Epi: x / Non Sq Epi: Few / Bacteria: Many    MEDICATIONS  (STANDING):  cefTRIAXone   IVPB 1000 milliGRAM(s) IV Intermittent every 24 hours  dextrose 40% Gel 15 Gram(s) Oral once  dextrose 5%. 1000 milliLiter(s) (50 mL/Hr) IV Continuous <Continuous>  dextrose 5%. 1000 milliLiter(s) (100 mL/Hr) IV Continuous <Continuous>  dextrose 50% Injectable 25 Gram(s) IV Push once  dextrose 50% Injectable 12.5 Gram(s) IV Push once  dextrose 50% Injectable 25 Gram(s) IV Push once  ferrous    sulfate 325 milliGRAM(s) Oral daily  folic acid 1 milliGRAM(s) Oral daily  glucagon  Injectable 1 milliGRAM(s) IntraMuscular once  insulin glargine Injectable (LANTUS) 15 Unit(s) SubCutaneous at bedtime  insulin lispro (ADMELOG) corrective regimen sliding scale   SubCutaneous three times a day before meals  insulin lispro (ADMELOG) corrective regimen sliding scale   SubCutaneous at bedtime  insulin regular  human recombinant. 10 Unit(s) SubCutaneous once  labetalol 50 milliGRAM(s) Oral two times a day  lactulose Syrup 10 Gram(s) Oral three times a day  levothyroxine 50 MICROGram(s) Oral daily  multivitamin 1 Tablet(s) Oral daily  pantoprazole    Tablet 40 milliGRAM(s) Oral two times a day  rifAXIMin 550 milliGRAM(s) Oral two times a day  thiamine 100 milliGRAM(s) Oral daily    MEDICATIONS  (PRN):  acetaminophen     Tablet .. 650 milliGRAM(s) Oral every 6 hours PRN Temp greater or equal to 38C (100.4F), Mild Pain (1 - 3)  aluminum hydroxide/magnesium hydroxide/simethicone Suspension 30 milliLiter(s) Oral every 4 hours PRN Dyspepsia  bisacodyl Suppository 10 milliGRAM(s) Rectal daily PRN Constipation  melatonin 3 milliGRAM(s) Oral at bedtime PRN Insomnia  ondansetron Injectable 4 milliGRAM(s) IV Push every 8 hours PRN Nausea and/or Vomiting      RADIOLOGY & ADDITIONAL TESTS:

## 2022-02-25 NOTE — PROGRESS NOTE ADULT - ASSESSMENT
65 y/o female with PMH of HTN, DM, anemia, EtOH abuse with liver cirrhosis, esophageal varices, hepatic encephalopathy, hypothyroidism, who was brought to the ED for altered mental status. As per ED, son reported patient has been having worsening confusion and nose bleed yesterday. Patient is a poor historian.    Acute metabolic encephalopathy from hepatic encephalopathy triggered by UTI; sepsis present on admission  - transfer to any bed  - continue rocephin  - CT head noted  - trend CBC    Hepatic encephalopathy   - Ammonia 113 on admission  - continue Lactulose   - continue Rifaximin 550mg bid     Hyponatremia  - secondary to liver cirrhosis likely  - trend    JOVANNI   - if Cr improves can restart lasix/spironolactone    Hyperglycemia with underlying DM-2   - Continue Lantus 15untis HS   - Insulin sliding scale     Liver cirrhosis with esophageal varices duet o EtOH abuse   - LFT noted   - MVT   - Folic acid  - Thiamine for possible thiamine deficiency in the setting of EtOH use  - Will hold Spironolactone 100mg due to JOVANNI, resume as needed   - US of the abdomen ordered    Thrombocytopenia   - Plt: 99   - Likely due to alcohol use   - As per ED son reported nose bleed yesterday; no epistaxis in the ED   - Monitor CBC     Supportive  - DVT prophylaxis: CSD   - Diet: CHO

## 2022-02-26 LAB
ANION GAP SERPL CALC-SCNC: 11 MMOL/L — SIGNIFICANT CHANGE UP (ref 5–17)
BILIRUB SERPL-MCNC: 11.9 MG/DL — HIGH (ref 0.4–2)
BUN SERPL-MCNC: 33.1 MG/DL — HIGH (ref 8–20)
CALCIUM SERPL-MCNC: 7.9 MG/DL — LOW (ref 8.6–10.2)
CHLORIDE SERPL-SCNC: 88 MMOL/L — LOW (ref 98–107)
CO2 SERPL-SCNC: 29 MMOL/L — SIGNIFICANT CHANGE UP (ref 22–29)
CREAT SERPL-MCNC: 1.15 MG/DL — SIGNIFICANT CHANGE UP (ref 0.5–1.3)
GLUCOSE BLDC GLUCOMTR-MCNC: 217 MG/DL — HIGH (ref 70–99)
GLUCOSE BLDC GLUCOMTR-MCNC: 224 MG/DL — HIGH (ref 70–99)
GLUCOSE BLDC GLUCOMTR-MCNC: 226 MG/DL — HIGH (ref 70–99)
GLUCOSE BLDC GLUCOMTR-MCNC: 285 MG/DL — HIGH (ref 70–99)
GLUCOSE SERPL-MCNC: 212 MG/DL — HIGH (ref 70–99)
HCT VFR BLD CALC: 27.7 % — LOW (ref 34.5–45)
HGB BLD-MCNC: 9.9 G/DL — LOW (ref 11.5–15.5)
INR BLD: 1.88 RATIO — HIGH (ref 0.88–1.16)
MCHC RBC-ENTMCNC: 30.9 PG — SIGNIFICANT CHANGE UP (ref 27–34)
MCHC RBC-ENTMCNC: 35.7 GM/DL — SIGNIFICANT CHANGE UP (ref 32–36)
MCV RBC AUTO: 86.6 FL — SIGNIFICANT CHANGE UP (ref 80–100)
MELD SCORE WITH DIALYSIS: 37 POINTS — SIGNIFICANT CHANGE UP
MELD SCORE WITHOUT DIALYSIS: 29 POINTS — SIGNIFICANT CHANGE UP
PLATELET # BLD AUTO: 127 K/UL — LOW (ref 150–400)
POTASSIUM SERPL-MCNC: 3.6 MMOL/L — SIGNIFICANT CHANGE UP (ref 3.5–5.3)
POTASSIUM SERPL-SCNC: 3.6 MMOL/L — SIGNIFICANT CHANGE UP (ref 3.5–5.3)
PROTHROM AB SERPL-ACNC: 21.9 SEC — HIGH (ref 10.5–13.4)
RBC # BLD: 3.2 M/UL — LOW (ref 3.8–5.2)
RBC # FLD: 19.4 % — HIGH (ref 10.3–14.5)
SODIUM SERPL-SCNC: 128 MMOL/L — LOW (ref 135–145)
WBC # BLD: 18.05 K/UL — HIGH (ref 3.8–10.5)
WBC # FLD AUTO: 18.05 K/UL — HIGH (ref 3.8–10.5)

## 2022-02-26 PROCEDURE — 99232 SBSQ HOSP IP/OBS MODERATE 35: CPT

## 2022-02-26 PROCEDURE — 93971 EXTREMITY STUDY: CPT | Mod: 26,RT

## 2022-02-26 RX ORDER — INSULIN LISPRO 100/ML
8 VIAL (ML) SUBCUTANEOUS
Refills: 0 | Status: DISCONTINUED | OUTPATIENT
Start: 2022-02-26 | End: 2022-03-02

## 2022-02-26 RX ORDER — POTASSIUM CHLORIDE 20 MEQ
20 PACKET (EA) ORAL ONCE
Refills: 0 | Status: COMPLETED | OUTPATIENT
Start: 2022-02-26 | End: 2022-02-26

## 2022-02-26 RX ORDER — INSULIN GLARGINE 100 [IU]/ML
20 INJECTION, SOLUTION SUBCUTANEOUS AT BEDTIME
Refills: 0 | Status: DISCONTINUED | OUTPATIENT
Start: 2022-02-26 | End: 2022-03-09

## 2022-02-26 RX ADMIN — Medication 50 MICROGRAM(S): at 05:27

## 2022-02-26 RX ADMIN — Medication 1 MILLIGRAM(S): at 12:54

## 2022-02-26 RX ADMIN — Medication 8 UNIT(S): at 17:29

## 2022-02-26 RX ADMIN — Medication 4: at 12:51

## 2022-02-26 RX ADMIN — LACTULOSE 10 GRAM(S): 10 SOLUTION ORAL at 05:27

## 2022-02-26 RX ADMIN — LACTULOSE 10 GRAM(S): 10 SOLUTION ORAL at 14:54

## 2022-02-26 RX ADMIN — Medication 650 MILLIGRAM(S): at 15:18

## 2022-02-26 RX ADMIN — INSULIN GLARGINE 20 UNIT(S): 100 INJECTION, SOLUTION SUBCUTANEOUS at 21:40

## 2022-02-26 RX ADMIN — Medication 20 MILLIEQUIVALENT(S): at 14:55

## 2022-02-26 RX ADMIN — PANTOPRAZOLE SODIUM 40 MILLIGRAM(S): 20 TABLET, DELAYED RELEASE ORAL at 05:27

## 2022-02-26 RX ADMIN — LACTULOSE 10 GRAM(S): 10 SOLUTION ORAL at 21:39

## 2022-02-26 RX ADMIN — CEFTRIAXONE 100 MILLIGRAM(S): 500 INJECTION, POWDER, FOR SOLUTION INTRAMUSCULAR; INTRAVENOUS at 21:40

## 2022-02-26 RX ADMIN — PANTOPRAZOLE SODIUM 40 MILLIGRAM(S): 20 TABLET, DELAYED RELEASE ORAL at 17:28

## 2022-02-26 RX ADMIN — Medication 100 MILLIGRAM(S): at 12:54

## 2022-02-26 RX ADMIN — Medication 50 MILLIGRAM(S): at 05:27

## 2022-02-26 RX ADMIN — Medication 325 MILLIGRAM(S): at 12:54

## 2022-02-26 RX ADMIN — Medication 1 TABLET(S): at 12:54

## 2022-02-26 RX ADMIN — Medication 6: at 17:28

## 2022-02-26 RX ADMIN — Medication 4: at 09:12

## 2022-02-26 RX ADMIN — Medication 50 MILLIGRAM(S): at 17:35

## 2022-02-26 NOTE — PROGRESS NOTE ADULT - SUBJECTIVE AND OBJECTIVE BOX
Patient is a 66y old  Female who presents with a chief complaint of Acute encephalopathy (2022 10:59)      Patient seen and examined at bedside. No overnight events reported.     ALLERGIES:  No Known Allergies    MEDICATIONS  (STANDING):  cefTRIAXone   IVPB 1000 milliGRAM(s) IV Intermittent every 24 hours  dextrose 40% Gel 15 Gram(s) Oral once  dextrose 5%. 1000 milliLiter(s) (50 mL/Hr) IV Continuous <Continuous>  dextrose 5%. 1000 milliLiter(s) (100 mL/Hr) IV Continuous <Continuous>  dextrose 50% Injectable 25 Gram(s) IV Push once  dextrose 50% Injectable 12.5 Gram(s) IV Push once  dextrose 50% Injectable 25 Gram(s) IV Push once  ferrous    sulfate 325 milliGRAM(s) Oral daily  folic acid 1 milliGRAM(s) Oral daily  glucagon  Injectable 1 milliGRAM(s) IntraMuscular once  insulin glargine Injectable (LANTUS) 15 Unit(s) SubCutaneous at bedtime  insulin lispro (ADMELOG) corrective regimen sliding scale   SubCutaneous three times a day before meals  insulin lispro (ADMELOG) corrective regimen sliding scale   SubCutaneous at bedtime  insulin regular  human recombinant. 10 Unit(s) SubCutaneous once  labetalol 50 milliGRAM(s) Oral two times a day  lactulose Syrup 10 Gram(s) Oral three times a day  levothyroxine 50 MICROGram(s) Oral daily  multivitamin 1 Tablet(s) Oral daily  pantoprazole    Tablet 40 milliGRAM(s) Oral two times a day  rifAXIMin 550 milliGRAM(s) Oral two times a day  thiamine 100 milliGRAM(s) Oral daily    MEDICATIONS  (PRN):  acetaminophen     Tablet .. 650 milliGRAM(s) Oral every 6 hours PRN Temp greater or equal to 38C (100.4F), Mild Pain (1 - 3)  aluminum hydroxide/magnesium hydroxide/simethicone Suspension 30 milliLiter(s) Oral every 4 hours PRN Dyspepsia  bisacodyl Suppository 10 milliGRAM(s) Rectal daily PRN Constipation  melatonin 3 milliGRAM(s) Oral at bedtime PRN Insomnia  ondansetron Injectable 4 milliGRAM(s) IV Push every 8 hours PRN Nausea and/or Vomiting    Vital Signs Last 24 Hrs  T(F): 98.6 (2022 05:25), Max: 99.3 (2022 15:25)  HR: 76 (2022 05:25) (73 - 92)  BP: 116/70 (2022 05:25) (96/55 - 116/70)  RR: 19 (2022 05:25) (17 - 19)  SpO2: 96% (2022 05:25) (94% - 98%)  I&O's Summary    2022 07:01  -  2022 07:00  --------------------------------------------------------  IN: 1030 mL / OUT: 0 mL / NET: 1030 mL        PHYSICAL EXAM:  General:   ENT:   Neck:   Lungs:   Cardio: RRR, S1/S2, No murmur  Abdomen: Soft, Nontender, Nondistended; Bowel sounds present  Extremities: No calf tenderness, No pitting edema    LABS:                        9.9    18.05 )-----------( 127      ( 2022 06:12 )             27.7         128  |  88  |  33.1  ----------------------------<  212  3.6   |  29.0  |  1.15    Ca    7.9      2022 06:12    TPro  x   /  Alb  x   /  TBili  11.9  /  DBili  x   /  AST  x   /  ALT  x   /  AlkPhos  x           eGFR if Non African American: 50 mL/min/1.73M2 (22 @ 06:12)  eGFR if African American: 58 mL/min/1.73M2 (22 @ 06:12)    PT/INR - ( 2022 06:12 )   PT: 21.9 sec;   INR: 1.88 ratio         PTT - ( 2022 21:37 )  PTT:34.8 sec                        POCT Blood Glucose.: 224 mg/dL (2022 08:13)  POCT Blood Glucose.: 352 mg/dL (2022 21:11)  POCT Blood Glucose.: 350 mg/dL (2022 17:00)  POCT Blood Glucose.: 288 mg/dL (2022 12:15)  POCT Blood Glucose.: 300 mg/dL (2022 12:14)      Urinalysis Basic - ( 2022 22:09 )    Color: Yellow / Appearance: Clear / S.010 / pH: x  Gluc: x / Ketone: Trace  / Bili: Large / Urobili: 12 mg/dL   Blood: x / Protein: Negative / Nitrite: Negative   Leuk Esterase: Moderate / RBC: 3-5 /HPF / WBC >50 /HPF   Sq Epi: x / Non Sq Epi: Few / Bacteria: Many        COVID-19 PCR: NotDetec (22 @ 01:02)    RADIOLOGY & ADDITIONAL TESTS:       Patient is a 66y old  Female admitted for  Acute encephalopathy     she is seen in am with phone  Roxie ID [id number]  Pt is awake alert , oriented to place and self   denies SOB , c/o right leg pain       Patient seen and examined at bedside. No overnight events reported.     ALLERGIES:  No Known Allergies    MEDICATIONS  (STANDING):  cefTRIAXone   IVPB 1000 milliGRAM(s) IV Intermittent every 24 hours  dextrose 40% Gel 15 Gram(s) Oral once  dextrose 5%. 1000 milliLiter(s) (50 mL/Hr) IV Continuous <Continuous>  dextrose 5%. 1000 milliLiter(s) (100 mL/Hr) IV Continuous <Continuous>  dextrose 50% Injectable 25 Gram(s) IV Push once  dextrose 50% Injectable 12.5 Gram(s) IV Push once  dextrose 50% Injectable 25 Gram(s) IV Push once  ferrous    sulfate 325 milliGRAM(s) Oral daily  folic acid 1 milliGRAM(s) Oral daily  glucagon  Injectable 1 milliGRAM(s) IntraMuscular once  insulin glargine Injectable (LANTUS) 15 Unit(s) SubCutaneous at bedtime  insulin lispro (ADMELOG) corrective regimen sliding scale   SubCutaneous three times a day before meals  insulin lispro (ADMELOG) corrective regimen sliding scale   SubCutaneous at bedtime  insulin regular  human recombinant. 10 Unit(s) SubCutaneous once  labetalol 50 milliGRAM(s) Oral two times a day  lactulose Syrup 10 Gram(s) Oral three times a day  levothyroxine 50 MICROGram(s) Oral daily  multivitamin 1 Tablet(s) Oral daily  pantoprazole    Tablet 40 milliGRAM(s) Oral two times a day  rifAXIMin 550 milliGRAM(s) Oral two times a day  thiamine 100 milliGRAM(s) Oral daily    MEDICATIONS  (PRN):  acetaminophen     Tablet .. 650 milliGRAM(s) Oral every 6 hours PRN Temp greater or equal to 38C (100.4F), Mild Pain (1 - 3)  aluminum hydroxide/magnesium hydroxide/simethicone Suspension 30 milliLiter(s) Oral every 4 hours PRN Dyspepsia  bisacodyl Suppository 10 milliGRAM(s) Rectal daily PRN Constipation  melatonin 3 milliGRAM(s) Oral at bedtime PRN Insomnia  ondansetron Injectable 4 milliGRAM(s) IV Push every 8 hours PRN Nausea and/or Vomiting    Vital Signs Last 24 Hrs  T(F): 98.6 (2022 05:25), Max: 99.3 (2022 15:25)  HR: 76 (2022 05:25) (73 - 92)  BP: 116/70 (2022 05:25) (96/55 - 116/70)  RR: 19 (2022 05:25) (17 - 19)  SpO2: 96% (2022 05:25) (94% - 98%)  I&O's Summary    2022 07:01  -  2022 07:00  --------------------------------------------------------  IN: 1030 mL / OUT: 0 mL / NET: 1030 mL        PHYSICAL EXAM:  General: jaundice , no distress  Neck: supple, no JVD   Lungs: CTA bilateral   Cardio: RRR, S1/S2, No murmur  Abdomen: Soft, Nontender, Nondistended; Bowel sounds present  Extremities: No calf tenderness, No pitting edema  RLE skin ecyhmoses on the lateral lower and above knee       LABS:                        9.9    18.05 )-----------( 127      ( 2022 06:12 )             27.7         128  |  88  |  33.1  ----------------------------<  212  3.6   |  29.0  |  1.15    Ca    7.9      2022 06:12    TPro  x   /  Alb  x   /  TBili  11.9  /  DBili  x   /  AST  x   /  ALT  x   /  AlkPhos  x           eGFR if Non African American: 50 mL/min/1.73M2 (22 @ 06:12)  eGFR if African American: 58 mL/min/1.73M2 (22 @ 06:12)    PT/INR - ( 2022 06:12 )   PT: 21.9 sec;   INR: 1.88 ratio         PTT - ( 2022 21:37 )  PTT:34.8 sec            POCT Blood Glucose.: 224 mg/dL (2022 08:13)  POCT Blood Glucose.: 352 mg/dL (2022 21:11)  POCT Blood Glucose.: 350 mg/dL (2022 17:00)  POCT Blood Glucose.: 288 mg/dL (2022 12:15)  POCT Blood Glucose.: 300 mg/dL (2022 12:14)      Urinalysis Basic - ( 2022 22:09 )    Color: Yellow / Appearance: Clear / S.010 / pH: x  Gluc: x / Ketone: Trace  / Bili: Large / Urobili: 12 mg/dL   Blood: x / Protein: Negative / Nitrite: Negative   Leuk Esterase: Moderate / RBC: 3-5 /HPF / WBC >50 /HPF   Sq Epi: x / Non Sq Epi: Few / Bacteria: Many        COVID-19 PCR: NotDetec (22 @ 01:02)    RADIOLOGY & ADDITIONAL TESTS:

## 2022-02-26 NOTE — PROGRESS NOTE ADULT - ASSESSMENT
65 y/o female with PMH of HTN, DM, anemia, EtOH abuse with liver cirrhosis, esophageal varices, hepatic encephalopathy, hypothyroidism, who was brought to the ED for altered mental status. As per ED, son reported patient has been having worsening confusion and nose bleed yesterday. Patient is a poor historian.     65 y/o female with PMH of HTN, DM, anemia, EtOH abuse with liver cirrhosis, esophageal varices, hepatic encephalopathy, hypothyroidism, who was brought to the ED for altered mental status. As per ED, son reported patient has been having worsening confusion and nose bleed  Patient is a poor historian.    1- Acute metabolic encephalopathy due to hepatic encephalopathy /  UTI and sepsis present on admission  cont rocephin   check ammonia in am   cont lactulose    cont  Rifaximin 550mg bid     2-Hyponatremia  likely due to liver cirrhosis   - trend    3- Prerenal renal failure   improving   will resume diuretics in 24 hours     4- Right leg pain , bruises   reports fall at home   Us of lower ext r/o DVT     5-Hyperglycemia with underlying DM-2   will increase lantus to 20 unit qhs   add pre meals insulin 8 units TID   - Insulin sliding scale     6-Liver cirrhosis with esophageal varices due to  EtOH abuse   - LFT noted   Gi follow up appreciated     Dvt prophylaxis INR is high due to liver disease     cont to monitor   OOB to chair   PT

## 2022-02-27 DIAGNOSIS — M79.604 PAIN IN RIGHT LEG: ICD-10-CM

## 2022-02-27 DIAGNOSIS — K72.90 HEPATIC FAILURE, UNSPECIFIED WITHOUT COMA: ICD-10-CM

## 2022-02-27 DIAGNOSIS — L03.90 CELLULITIS, UNSPECIFIED: ICD-10-CM

## 2022-02-27 DIAGNOSIS — R79.89 OTHER SPECIFIED ABNORMAL FINDINGS OF BLOOD CHEMISTRY: ICD-10-CM

## 2022-02-27 DIAGNOSIS — E87.1 HYPO-OSMOLALITY AND HYPONATREMIA: ICD-10-CM

## 2022-02-27 DIAGNOSIS — K74.60 UNSPECIFIED CIRRHOSIS OF LIVER: ICD-10-CM

## 2022-02-27 DIAGNOSIS — E03.9 HYPOTHYROIDISM, UNSPECIFIED: ICD-10-CM

## 2022-02-27 LAB
24R-OH-CALCIDIOL SERPL-MCNC: 39.5 NG/ML — SIGNIFICANT CHANGE UP (ref 30–80)
A1C WITH ESTIMATED AVERAGE GLUCOSE RESULT: 9.9 % — HIGH (ref 4–5.6)
ALBUMIN SERPL ELPH-MCNC: 1.8 G/DL — LOW (ref 3.3–5.2)
ALP SERPL-CCNC: 400 U/L — HIGH (ref 40–120)
ALT FLD-CCNC: 62 U/L — HIGH
AMMONIA BLD-MCNC: 21 UMOL/L — SIGNIFICANT CHANGE UP (ref 11–55)
ANION GAP SERPL CALC-SCNC: 10 MMOL/L — SIGNIFICANT CHANGE UP (ref 5–17)
ANION GAP SERPL CALC-SCNC: 9 MMOL/L — SIGNIFICANT CHANGE UP (ref 5–17)
APPEARANCE UR: CLEAR — SIGNIFICANT CHANGE UP
APTT BLD: 32.9 SEC — SIGNIFICANT CHANGE UP (ref 27.5–35.5)
AST SERPL-CCNC: 167 U/L — HIGH
BACTERIA # UR AUTO: ABNORMAL
BILIRUB SERPL-MCNC: 11.8 MG/DL — HIGH (ref 0.4–2)
BILIRUB UR-MCNC: ABNORMAL
BUN SERPL-MCNC: 31.9 MG/DL — HIGH (ref 8–20)
BUN SERPL-MCNC: 33.4 MG/DL — HIGH (ref 8–20)
CALCIUM SERPL-MCNC: 7.8 MG/DL — LOW (ref 8.6–10.2)
CALCIUM SERPL-MCNC: 7.9 MG/DL — LOW (ref 8.6–10.2)
CHLORIDE SERPL-SCNC: 85 MMOL/L — LOW (ref 98–107)
CHLORIDE SERPL-SCNC: 85 MMOL/L — LOW (ref 98–107)
CHLORIDE UR-SCNC: <27 MMOL/L — SIGNIFICANT CHANGE UP
CO2 SERPL-SCNC: 26 MMOL/L — SIGNIFICANT CHANGE UP (ref 22–29)
CO2 SERPL-SCNC: 28 MMOL/L — SIGNIFICANT CHANGE UP (ref 22–29)
COLOR SPEC: ABNORMAL
CREAT SERPL-MCNC: 1.11 MG/DL — SIGNIFICANT CHANGE UP (ref 0.5–1.3)
CREAT SERPL-MCNC: 1.13 MG/DL — SIGNIFICANT CHANGE UP (ref 0.5–1.3)
DIFF PNL FLD: ABNORMAL
EPI CELLS # UR: SIGNIFICANT CHANGE UP
ESTIMATED AVERAGE GLUCOSE: 237 MG/DL — HIGH (ref 68–114)
FERRITIN SERPL-MCNC: 970 NG/ML — HIGH (ref 15–150)
FOLATE SERPL-MCNC: >20 NG/ML — SIGNIFICANT CHANGE UP
GLUCOSE BLDC GLUCOMTR-MCNC: 157 MG/DL — HIGH (ref 70–99)
GLUCOSE BLDC GLUCOMTR-MCNC: 229 MG/DL — HIGH (ref 70–99)
GLUCOSE BLDC GLUCOMTR-MCNC: 244 MG/DL — HIGH (ref 70–99)
GLUCOSE BLDC GLUCOMTR-MCNC: 272 MG/DL — HIGH (ref 70–99)
GLUCOSE SERPL-MCNC: 213 MG/DL — HIGH (ref 70–99)
GLUCOSE SERPL-MCNC: 249 MG/DL — HIGH (ref 70–99)
GLUCOSE UR QL: NEGATIVE — SIGNIFICANT CHANGE UP
INR BLD: 1.7 RATIO — HIGH (ref 0.88–1.16)
IRON SATN MFR SERPL: 49 % — SIGNIFICANT CHANGE UP (ref 14–50)
IRON SATN MFR SERPL: 75 UG/DL — SIGNIFICANT CHANGE UP (ref 37–145)
KETONES UR-MCNC: ABNORMAL
LEUKOCYTE ESTERASE UR-ACNC: ABNORMAL
MAGNESIUM SERPL-MCNC: 1.9 MG/DL — SIGNIFICANT CHANGE UP (ref 1.6–2.6)
NITRITE UR-MCNC: POSITIVE
OSMOLALITY SERPL: 284 MOSMOL/KG — SIGNIFICANT CHANGE UP (ref 280–301)
OSMOLALITY UR: 431 MOSM/KG — SIGNIFICANT CHANGE UP (ref 300–1000)
PH UR: 5 — SIGNIFICANT CHANGE UP (ref 5–8)
PHOSPHATE SERPL-MCNC: 2.6 MG/DL — SIGNIFICANT CHANGE UP (ref 2.4–4.7)
POTASSIUM SERPL-MCNC: 3.9 MMOL/L — SIGNIFICANT CHANGE UP (ref 3.5–5.3)
POTASSIUM SERPL-MCNC: 4.1 MMOL/L — SIGNIFICANT CHANGE UP (ref 3.5–5.3)
POTASSIUM SERPL-SCNC: 3.9 MMOL/L — SIGNIFICANT CHANGE UP (ref 3.5–5.3)
POTASSIUM SERPL-SCNC: 4.1 MMOL/L — SIGNIFICANT CHANGE UP (ref 3.5–5.3)
PROCALCITONIN SERPL-MCNC: 1.33 NG/ML — HIGH (ref 0.02–0.1)
PROT SERPL-MCNC: 6.9 G/DL — SIGNIFICANT CHANGE UP (ref 6.6–8.7)
PROT UR-MCNC: NEGATIVE — SIGNIFICANT CHANGE UP
PROTHROM AB SERPL-ACNC: 19.8 SEC — HIGH (ref 10.5–13.4)
RBC CASTS # UR COMP ASSIST: ABNORMAL /HPF (ref 0–4)
SODIUM SERPL-SCNC: 121 MMOL/L — LOW (ref 135–145)
SODIUM SERPL-SCNC: 122 MMOL/L — LOW (ref 135–145)
SODIUM UR-SCNC: <30 MMOL/L — SIGNIFICANT CHANGE UP
SP GR SPEC: 1.02 — SIGNIFICANT CHANGE UP (ref 1.01–1.02)
TIBC SERPL-MCNC: 152 UG/DL — LOW (ref 220–430)
TRANSFERRIN SERPL-MCNC: 106 MG/DL — LOW (ref 192–382)
TSH SERPL-MCNC: 6.23 UIU/ML — HIGH (ref 0.27–4.2)
UROBILINOGEN FLD QL: 8
VIT B12 SERPL-MCNC: >2000 PG/ML — HIGH (ref 232–1245)
WBC UR QL: ABNORMAL /HPF (ref 0–5)

## 2022-02-27 PROCEDURE — 99223 1ST HOSP IP/OBS HIGH 75: CPT

## 2022-02-27 PROCEDURE — 73700 CT LOWER EXTREMITY W/O DYE: CPT | Mod: 26,RT

## 2022-02-27 PROCEDURE — 73590 X-RAY EXAM OF LOWER LEG: CPT | Mod: 26,RT

## 2022-02-27 PROCEDURE — 99233 SBSQ HOSP IP/OBS HIGH 50: CPT

## 2022-02-27 RX ORDER — TRAMADOL HYDROCHLORIDE 50 MG/1
25 TABLET ORAL EVERY 6 HOURS
Refills: 0 | Status: DISCONTINUED | OUTPATIENT
Start: 2022-02-27 | End: 2022-02-27

## 2022-02-27 RX ORDER — CEFAZOLIN SODIUM 1 G
VIAL (EA) INJECTION
Refills: 0 | Status: DISCONTINUED | OUTPATIENT
Start: 2022-02-27 | End: 2022-02-27

## 2022-02-27 RX ORDER — KETOROLAC TROMETHAMINE 30 MG/ML
15 SYRINGE (ML) INJECTION ONCE
Refills: 0 | Status: DISCONTINUED | OUTPATIENT
Start: 2022-02-27 | End: 2022-02-27

## 2022-02-27 RX ORDER — LEVOTHYROXINE SODIUM 125 MCG
75 TABLET ORAL DAILY
Refills: 0 | Status: DISCONTINUED | OUTPATIENT
Start: 2022-02-27 | End: 2022-03-04

## 2022-02-27 RX ORDER — TRAMADOL HYDROCHLORIDE 50 MG/1
25 TABLET ORAL EVERY 6 HOURS
Refills: 0 | Status: DISCONTINUED | OUTPATIENT
Start: 2022-02-27 | End: 2022-03-04

## 2022-02-27 RX ORDER — SPIRONOLACTONE 25 MG/1
50 TABLET, FILM COATED ORAL DAILY
Refills: 0 | Status: DISCONTINUED | OUTPATIENT
Start: 2022-02-27 | End: 2022-02-27

## 2022-02-27 RX ORDER — CEFAZOLIN SODIUM 1 G
VIAL (EA) INJECTION
Refills: 0 | Status: DISCONTINUED | OUTPATIENT
Start: 2022-02-27 | End: 2022-03-01

## 2022-02-27 RX ORDER — FUROSEMIDE 40 MG
40 TABLET ORAL DAILY
Refills: 0 | Status: DISCONTINUED | OUTPATIENT
Start: 2022-02-27 | End: 2022-02-28

## 2022-02-27 RX ORDER — CEFAZOLIN SODIUM 1 G
1000 VIAL (EA) INJECTION ONCE
Refills: 0 | Status: COMPLETED | OUTPATIENT
Start: 2022-02-27 | End: 2022-02-27

## 2022-02-27 RX ORDER — VANCOMYCIN HCL 1 G
750 VIAL (EA) INTRAVENOUS ONCE
Refills: 0 | Status: COMPLETED | OUTPATIENT
Start: 2022-02-27 | End: 2022-02-27

## 2022-02-27 RX ORDER — CEFAZOLIN SODIUM 1 G
1000 VIAL (EA) INJECTION EVERY 8 HOURS
Refills: 0 | Status: DISCONTINUED | OUTPATIENT
Start: 2022-02-28 | End: 2022-03-01

## 2022-02-27 RX ADMIN — PANTOPRAZOLE SODIUM 40 MILLIGRAM(S): 20 TABLET, DELAYED RELEASE ORAL at 17:45

## 2022-02-27 RX ADMIN — Medication 650 MILLIGRAM(S): at 06:10

## 2022-02-27 RX ADMIN — Medication 15 MILLIGRAM(S): at 17:44

## 2022-02-27 RX ADMIN — PANTOPRAZOLE SODIUM 40 MILLIGRAM(S): 20 TABLET, DELAYED RELEASE ORAL at 06:11

## 2022-02-27 RX ADMIN — Medication 1 TABLET(S): at 12:48

## 2022-02-27 RX ADMIN — INSULIN GLARGINE 20 UNIT(S): 100 INJECTION, SOLUTION SUBCUTANEOUS at 21:58

## 2022-02-27 RX ADMIN — Medication 8 UNIT(S): at 17:47

## 2022-02-27 RX ADMIN — Medication 50 MILLIGRAM(S): at 17:45

## 2022-02-27 RX ADMIN — LACTULOSE 10 GRAM(S): 10 SOLUTION ORAL at 15:02

## 2022-02-27 RX ADMIN — Medication 250 MILLIGRAM(S): at 22:37

## 2022-02-27 RX ADMIN — Medication 8 UNIT(S): at 12:42

## 2022-02-27 RX ADMIN — Medication 4: at 12:41

## 2022-02-27 RX ADMIN — Medication 8 UNIT(S): at 08:37

## 2022-02-27 RX ADMIN — LACTULOSE 10 GRAM(S): 10 SOLUTION ORAL at 06:10

## 2022-02-27 RX ADMIN — Medication 2: at 17:47

## 2022-02-27 RX ADMIN — Medication 6: at 08:35

## 2022-02-27 RX ADMIN — Medication 100 MILLIGRAM(S): at 12:49

## 2022-02-27 RX ADMIN — LACTULOSE 10 GRAM(S): 10 SOLUTION ORAL at 21:58

## 2022-02-27 RX ADMIN — Medication 100 MILLIGRAM(S): at 21:59

## 2022-02-27 RX ADMIN — Medication 325 MILLIGRAM(S): at 12:43

## 2022-02-27 RX ADMIN — TRAMADOL HYDROCHLORIDE 25 MILLIGRAM(S): 50 TABLET ORAL at 17:54

## 2022-02-27 RX ADMIN — Medication 50 MILLIGRAM(S): at 06:11

## 2022-02-27 RX ADMIN — Medication 50 MICROGRAM(S): at 06:11

## 2022-02-27 RX ADMIN — Medication 1 MILLIGRAM(S): at 12:43

## 2022-02-27 NOTE — CONSULT NOTE ADULT - SUBJECTIVE AND OBJECTIVE BOX
[facility] DIVISION OF KIDNEY DISEASES AND HYPERTENSION -- INITIAL CONSULT NOTE  --------------------------------------------------------------------------------  HPI:  67 y/o female with PMH of HTN, DM, anemia, EtOH abuse with liver cirrhosis, esophageal varices, hepatic encephalopathy, hypothyroidism, who was brought to the ED for altered mental status. As per ED, son reported patient has been having worsening confusion and nose bleed yesterday.   Pt found to have UTI, elevated ammonia levels.   Ct head showed a right supraorbital soft tissue hematoma is markedly decreased in size from 2022.  Nephrology consulted for hyponatremia,      PAST HISTORY  --------------------------------------------------------------------------------  PAST MEDICAL & SURGICAL HISTORY:  Alcohol abuse    Alcohol abuse    Liver cirrhosis    ETOH abuse    Urea cycle metabolism disorder    Transitory  hyperthyroidism    Lump in female breast    UTI (urinary tract infection)    Lymphangitis, acute, lower leg    Anemia    Hypothyroidism    Chronic back pain    HTN (hypertension)    GERD (gastroesophageal reflux disease)    Pancreatitis    HTN (hypertension)    DM (diabetes mellitus)    No significant past surgical history    S/P abdominoplasty      FAMILY HISTORY:  FH: depression (Child)      PAST SOCIAL HISTORY:    ALLERGIES & MEDICATIONS  --------------------------------------------------------------------------------  Allergies    No Known Allergies    Intolerances      Standing Inpatient Medications  cefTRIAXone   IVPB 1000 milliGRAM(s) IV Intermittent every 24 hours  dextrose 40% Gel 15 Gram(s) Oral once  dextrose 5%. 1000 milliLiter(s) IV Continuous <Continuous>  dextrose 5%. 1000 milliLiter(s) IV Continuous <Continuous>  dextrose 50% Injectable 25 Gram(s) IV Push once  dextrose 50% Injectable 12.5 Gram(s) IV Push once  dextrose 50% Injectable 25 Gram(s) IV Push once  ferrous    sulfate 325 milliGRAM(s) Oral daily  folic acid 1 milliGRAM(s) Oral daily  furosemide    Tablet 40 milliGRAM(s) Oral daily  glucagon  Injectable 1 milliGRAM(s) IntraMuscular once  insulin glargine Injectable (LANTUS) 20 Unit(s) SubCutaneous at bedtime  insulin lispro (ADMELOG) corrective regimen sliding scale   SubCutaneous three times a day before meals  insulin lispro (ADMELOG) corrective regimen sliding scale   SubCutaneous at bedtime  insulin lispro Injectable (ADMELOG) 8 Unit(s) SubCutaneous three times a day before meals  labetalol 50 milliGRAM(s) Oral two times a day  lactulose Syrup 10 Gram(s) Oral three times a day  levothyroxine 50 MICROGram(s) Oral daily  multivitamin 1 Tablet(s) Oral daily  pantoprazole    Tablet 40 milliGRAM(s) Oral two times a day  rifAXIMin 550 milliGRAM(s) Oral two times a day  spironolactone 50 milliGRAM(s) Oral daily  thiamine 100 milliGRAM(s) Oral daily    PRN Inpatient Medications  acetaminophen     Tablet .. 650 milliGRAM(s) Oral every 6 hours PRN  aluminum hydroxide/magnesium hydroxide/simethicone Suspension 30 milliLiter(s) Oral every 4 hours PRN  bisacodyl Suppository 10 milliGRAM(s) Rectal daily PRN  melatonin 3 milliGRAM(s) Oral at bedtime PRN  ondansetron Injectable 4 milliGRAM(s) IV Push every 8 hours PRN      REVIEW OF SYSTEMS  --------------------------------------------------------------------------------  Gen: No weight changes, fatigue, fevers/chills, weakness  Skin: No rashes  Head/Eyes/Ears/Mouth: No headache; Normal hearing; Normal vision w/o blurriness; No sinus pain/discomfort, sore throat  Respiratory: No dyspnea, cough, wheezing, hemoptysis  CV: No chest pain, PND, orthopnea  GI: No abdominal pain, diarrhea, constipation, nausea, vomiting, melena, hematochezia  : No increased frequency, dysuria, hematuria, nocturia  MSK: No joint pain/swelling; no back pain; no edema  Neuro: No dizziness/lightheadedness, weakness, seizures, numbness, tingling  Heme: No easy bruising or bleeding  Endo: No heat/cold intolerance  Psych: No significant nervousness, anxiety, stress, depression    All other systems were reviewed and are negative, except as noted.    VITALS/PHYSICAL EXAM  --------------------------------------------------------------------------------  T(C): 37 (22 @ 08:30), Max: 37 (22 @ 08:30)  HR: 87 (22 @ 08:30) (74 - 87)  BP: 124/80 (22 @ 08:30) (92/56 - 129/78)  RR: 17 (22 @ 08:30) (17 - 18)  SpO2: 95% (22 @ 08:30) (95% - 99%)  Wt(kg): --        22 @ 07:01  -  22 @ 07:00  --------------------------------------------------------  IN: 720 mL / OUT: 400 mL / NET: 320 mL      Physical Exam:  	Gen: NAD, well-appearing  	HEENT: PERRL, supple neck, clear oropharynx  	Pulm: CTA B/L  	CV: RRR, S1S2; no rub  	Back: No spinal or CVA tenderness; no sacral edema  	Abd: +BS, soft, nontender/nondistended  	: No suprapubic tenderness  	UE: Warm, FROM, no clubbing, intact strength; no edema; no asterixis  	LE: Warm, FROM, no clubbing, intact strength; no edema  	Neuro: No focal deficits, intact gait  	Psych: Normal affect and mood  	Skin: Warm, without rashes  	Vascular access:    LABS/STUDIES  --------------------------------------------------------------------------------              9.9    18.05 >-----------<  127      [22 06:12]              27.7     122  |  85  |  31.9  ----------------------------<  249      [22 06:13]  4.1   |  28.0  |  1.13        Ca     7.9     [22 06:13]      Mg     1.9     [22 06:13]      Phos  2.6     [22:13]    TPro  6.9  /  Alb  1.8  /  TBili  11.8  /  DBili  x   /  AST  167  /  ALT  62  /  AlkPhos  400  [22 06:13]    PT/INR: PT 19.8 , INR 1.70       [22:13]  PTT: 32.9       [22 06:13]      Creatinine Trend:  SCr 1.13 [:13]  SCr 1.15 [ 06:12]  SCr 1.04 [ 08:55]  SCr 0.87 [:12]  SCr 1.22 [ 21:37]    Urinalysis - [22 22:09]      Color Yellow / Appearance Clear / SG 1.010 / pH 6.0      Gluc 100 / Ketone Trace  / Bili Large / Urobili 12       Blood Large / Protein Negative / Leuk Est Moderate / Nitrite Negative      RBC 3-5 / WBC >50 / Hyaline  / Gran  / Sq Epi  / Non Sq Epi Few / Bacteria Many      Iron 75, TIBC 152, %sat 49      [22 @ 06:13]  Ferritin 970      [22 @ 06:13]  TSH 6.23      [22 @ 06:13]    HBsAb Nonreact      [21 @ 11:47]  HBsAg Nonreact      [21 @ 11:47]  HBcAb Nonreact      [21 @ 11:47]  HCV 0.27, Nonreact      [21 @ 15:05]  HIV Nonreact      [22 @ 21:44]    LORIE: titer Negative, pattern --      [21 @ 11:47]   [facility] DIVISION OF KIDNEY DISEASES AND HYPERTENSION -- INITIAL CONSULT NOTE  --------------------------------------------------------------------------------  HPI:  67 y/o female with PMH of HTN, DM, anemia, EtOH abuse with liver cirrhosis, esophageal varices, hepatic encephalopathy, hypothyroidism, who was brought to the ED for altered mental status. As per ED, son reported patient has been having worsening confusion and nose bleed.  Pt found to have UTI, elevated ammonia levels.   Ct head showed a right supraorbital soft tissue hematoma is markedly decreased in size from 2022.  Nephrology consulted for hyponatremia.  Pt seen and examined; interviewed via Language line  ( ID # [id number]- Leonardo)  Pt is c/o leg pain  Denies n/v/d, changes in urination  Reports drinking 1 pitcher of water.        PAST HISTORY  --------------------------------------------------------------------------------  PAST MEDICAL & SURGICAL HISTORY:  Alcohol abuse    Alcohol abuse    Liver cirrhosis    ETOH abuse    Urea cycle metabolism disorder    Transitory  hyperthyroidism    Lump in female breast    UTI (urinary tract infection)    Lymphangitis, acute, lower leg    Anemia    Hypothyroidism    Chronic back pain    HTN (hypertension)    GERD (gastroesophageal reflux disease)    Pancreatitis    HTN (hypertension)    DM (diabetes mellitus)    No significant past surgical history    S/P abdominoplasty      FAMILY HISTORY:  FH: depression (Child)      PAST SOCIAL HISTORY: etoh abuse    ALLERGIES & MEDICATIONS  --------------------------------------------------------------------------------  Allergies    No Known Allergies          Standing Inpatient Medications  cefTRIAXone   IVPB 1000 milliGRAM(s) IV Intermittent every 24 hours  dextrose 40% Gel 15 Gram(s) Oral once  dextrose 5%. 1000 milliLiter(s) IV Continuous <Continuous>  dextrose 5%. 1000 milliLiter(s) IV Continuous <Continuous>  dextrose 50% Injectable 25 Gram(s) IV Push once  dextrose 50% Injectable 12.5 Gram(s) IV Push once  dextrose 50% Injectable 25 Gram(s) IV Push once  ferrous    sulfate 325 milliGRAM(s) Oral daily  folic acid 1 milliGRAM(s) Oral daily  furosemide    Tablet 40 milliGRAM(s) Oral daily  glucagon  Injectable 1 milliGRAM(s) IntraMuscular once  insulin glargine Injectable (LANTUS) 20 Unit(s) SubCutaneous at bedtime  insulin lispro (ADMELOG) corrective regimen sliding scale   SubCutaneous three times a day before meals  insulin lispro (ADMELOG) corrective regimen sliding scale   SubCutaneous at bedtime  insulin lispro Injectable (ADMELOG) 8 Unit(s) SubCutaneous three times a day before meals  labetalol 50 milliGRAM(s) Oral two times a day  lactulose Syrup 10 Gram(s) Oral three times a day  levothyroxine 50 MICROGram(s) Oral daily  multivitamin 1 Tablet(s) Oral daily  pantoprazole    Tablet 40 milliGRAM(s) Oral two times a day  rifAXIMin 550 milliGRAM(s) Oral two times a day  spironolactone 50 milliGRAM(s) Oral daily  thiamine 100 milliGRAM(s) Oral daily    PRN Inpatient Medications  acetaminophen     Tablet .. 650 milliGRAM(s) Oral every 6 hours PRN  aluminum hydroxide/magnesium hydroxide/simethicone Suspension 30 milliLiter(s) Oral every 4 hours PRN  bisacodyl Suppository 10 milliGRAM(s) Rectal daily PRN  melatonin 3 milliGRAM(s) Oral at bedtime PRN  ondansetron Injectable 4 milliGRAM(s) IV Push every 8 hours PRN      REVIEW OF SYSTEMS  --------------------------------------------------------------------------------  Gen: No weight changes, fatigue, fevers/chills, weakness  Skin: No rashes  Head/Eyes/Ears/Mouth: No headache; Normal hearing; Normal vision w/o blurriness; No sinus pain/discomfort, sore throat  Respiratory: No dyspnea, cough, wheezing, hemoptysis  CV: No chest pain, PND, orthopnea  GI: No abdominal pain, diarrhea, constipation, nausea, vomiting, melena, hematochezia  : No increased frequency, dysuria, hematuria, nocturia  MSK: leg pain+ no back pain; no edema  Neuro: No dizziness/lightheadedness, weakness, seizures, numbness, tingling  Heme: No easy bruising or bleeding  Endo: No heat/cold intolerance  Psych: No significant nervousness, anxiety, stress, depression    All other systems were reviewed and are negative, except as noted.    VITALS/PHYSICAL EXAM  --------------------------------------------------------------------------------  T(C): 37 (22 @ 08:30), Max: 37 (22 @ 08:30)  HR: 87 (22 @ 08:30) (74 - 87)  BP: 124/80 (22 @ 08:30) (92/56 - 129/78)  RR: 17 (22 @ 08:30) (17 - 18)  SpO2: 95% (22 @ 08:30) (95% - 99%)  Wt(kg): --        22 @ 07:01  -  22 @ 07:00  --------------------------------------------------------  IN: 720 mL / OUT: 400 mL / NET: 320 mL      Physical Exam:  	Gen: nad  	HEENT: scleral icetrus  	Pulm: CTA B/L  	CV: RRR, S1S2; no rub  	Back: No spinal or CVA tenderness; no sacral edema  	Abd: +BS, soft, nontender/nondistended  	: No suprapubic tenderness  	UE: Warm, no edema; no asterixis  	LE: Warm, no edema  	Neuro: No focal deficit  	Psych: Normal affect and mood  	Skin: Warm        LABS/STUDIES  --------------------------------------------------------------------------------              9.9    18.05 >-----------<  127      [22 06:12]              27.7     122  |  85  |  31.9  ----------------------------<  249      [22 06:13]  4.1   |  28.0  |  1.13        Ca     7.9     [22 06:13]      Mg     1.9     [22 06:13]      Phos  2.6     [22 06:13]    TPro  6.9  /  Alb  1.8  /  TBili  11.8  /  DBili  x   /  AST  167  /  ALT  62  /  AlkPhos  400  [22 06:13]    PT/INR: PT 19.8 , INR 1.70       [22 06:13]  PTT: 32.9       [22 06:13]      Creatinine Trend:  SCr 1.13 [:13]  SCr 1.15 [ 06:12]  SCr 1.04 [ 08:55]  SCr 0.87 [ 06:12]  SCr 1.22 [ 21:37]    Urinalysis - [22 22:09]      Color Yellow / Appearance Clear / SG 1.010 / pH 6.0      Gluc 100 / Ketone Trace  / Bili Large / Urobili 12       Blood Large / Protein Negative / Leuk Est Moderate / Nitrite Negative      RBC 3-5 / WBC >50 / Hyaline  / Gran  / Sq Epi  / Non Sq Epi Few / Bacteria Many      Iron 75, TIBC 152, %sat 49      [22 @ 06:13]  Ferritin 970      [22 @ 06:13]  TSH 6.23      [22 @ 06:13]    HBsAb Nonreact      [21 @ 11:47]  HBsAg Nonreact      [21 @ 11:47]  HBcAb Nonreact      [21 @ 11:47]  HCV 0.27, Nonreact      [21 @ 15:05]  HIV Nonreact      [22 @ 21:44]    LORIE: titer Negative, pattern --      [21 @ 11:47]

## 2022-02-27 NOTE — CONSULT NOTE ADULT - ASSESSMENT
Hyponatremia  hepatic encephalopathy  UTI- on Rocephin  DM Hyponatremia- chronic ; systemic vasodilation in setting of liver cirrhosis  hepatic encephalopathy  UTI- on Rocephin  DM  Darrell- scr improving    Pt with chronic hyponatremia- SNa+ range 124-129 during this admission (corrected for hyperglycemia)  SNa+ now slightly worse at 122  Pt with poor solute intake with comparatively high fluid intake  Lasix has been resumed  recommend Fluid restriction ~1.2 /   goal to induce net negative balance    d/w Dr. Marquez

## 2022-02-27 NOTE — CHART NOTE - NSCHARTNOTEFT_GEN_A_CORE
Patient had CT scan of right lower extremity due to reddened area below tibialis anterior. I called Dr Goldberg Stein radiologist @ [phone] asking for official reading of CT scan.  There is no Osteomyelitis, no fracture but + cellulitis. I informed Dr. SILVESTRE of findings and was decided to treat patient with Kefzol, tramadol and Toradol as indicated in med orders.  Right lower extremity examined by myself. There are no open bleeding or oozing wound from right lower reddened extremity. Patient complains of moderate amount of pain at site @ this time. Otherwise she is laying in bed not complains of any other problems.  RN wrapped right lower extremity with Kamlesh and RN will initiate orders just entered into chart as above.   Will pass on to nite PA to re-examine patient later this PM for follow-up. Patient had CT scan of right lower extremity due to reddened area below tibialis anterior. I called Dr Goldberg Stein radiologist @ [phone] asking for official reading of CT scan.  There is no Osteomyelitis, no fracture but + cellulitis. No discreet hematoma seen on CT scan. I informed Dr. SILVESTRE of findings and was decided to treat patient with Kefzol, tramadol and Toradol as indicated in med orders.  Right lower extremity examined by myself. There are no open bleeding or oozing wound from right lower reddened extremity. Patient complains of moderate amount of pain at site @ this time. Otherwise she is laying in bed not complains of any other problems.  RN wrapped right lower extremity with Kamlesh and RN will initiate orders just entered into chart as above.   Will pass on to nite PA to re-examine patient later this PM for follow-up.

## 2022-02-27 NOTE — PROGRESS NOTE ADULT - SUBJECTIVE AND OBJECTIVE BOX
Patient is a 66y old  Female admitted for  Acute encephalopathy     she is seen in am with phone  Magen ID [id number]  Pt is sitting in the chair at the bedside , c/o pain in the right leg lower mid part   xary ordered in am , pt is with fall prior admission , + swelling bruise in the right leg       Vital Signs Last 24 Hrs  T(C): 36.8 (2022 12:38), Max: 37 (2022 08:30)  T(F): 98.3 (2022 12:38), Max: 98.6 (2022 08:30)  HR: 75 (2022 12:38) (74 - 87)  BP: 100/61 (2022 12:38) (92/56 - 129/78)  BP(mean): 95 (2022 06:05) (68 - 95)  RR: 19 (2022 12:38) (17 - 19)  SpO2: 95% (2022 12:38) (95% - 99%)    PHYSICAL EXAM:  General: jaundice , no distress  Neck: supple, no JVD   Lungs: CTA bilateral   Cardio: RRR, S1/S2, No murmur  Abdomen: Soft, Nontender, Nondistended; Bowel sounds present  Extremities: No calf tenderness, No pitting edema  RLE skin echymoses  on the lateral lower and above knee       LABS:                        9.9    18.05 )-----------( 127      ( 2022 06:12 )             27.7         128  |  88  |  33.1  ----------------------------<  212  3.6   |  29.0  |  1.15    Ca    7.9      2022 06:12    TPro  x   /  Alb  x   /  TBili  11.9  /  DBili  x   /  AST  x   /  ALT  x   /  AlkPhos  x           eGFR if Non African American: 50 mL/min/1.73M2 (22 @ 06:12)  eGFR if African American: 58 mL/min/1.73M2 (22 @ 06:12)    PT/INR - ( 2022 06:12 )   PT: 21.9 sec;   INR: 1.88 ratio         PTT - ( 2022 21:37 )  PTT:34.8 sec            POCT Blood Glucose.: 224 mg/dL (2022 08:13)  POCT Blood Glucose.: 352 mg/dL (2022 21:11)  POCT Blood Glucose.: 350 mg/dL (2022 17:00)  POCT Blood Glucose.: 288 mg/dL (2022 12:15)  POCT Blood Glucose.: 300 mg/dL (2022 12:14)      Urinalysis Basic - ( 2022 22:09 )    Color: Yellow / Appearance: Clear / S.010 / pH: x  Gluc: x / Ketone: Trace  / Bili: Large / Urobili: 12 mg/dL   Blood: x / Protein: Negative / Nitrite: Negative   Leuk Esterase: Moderate / RBC: 3-5 /HPF / WBC >50 /HPF   Sq Epi: x / Non Sq Epi: Few / Bacteria: Many        COVID-19 PCR: NotDetec (22 @ 01:02)    RADIOLOGY & ADDITIONAL TESTS:

## 2022-02-27 NOTE — PROGRESS NOTE ADULT - ASSESSMENT
65 y/o female with PMH of HTN, DM, anemia, EtOH abuse with liver cirrhosis, esophageal varices, hepatic encephalopathy, hypothyroidism, who was brought to the ED for altered mental status. As per ED, son reported patient has been having worsening confusion and nose bleed  Patient is a poor historian.    1- Acute metabolic encephalopathy due to hepatic encephalopathy /  UTI and sepsis present on admission  cont rocephin complete course   mental status is better   check ammonia   cont lactulose    cont  Rifaximin 550mg bid     2-Hyponatremia  likely due to liver cirrhosis   worsening Na 122   renal consulted   will resume diuretics       3- Prerenal renal failure   improving   resume diuretics     4- Right leg pain , bruises   reports fall at home   xray of R tibial fibula ordered   I reviewed myself , no fx will confirm with radiology   Us of lower ext   5-Hyperglycemia due to DM-2   cont lantus 20 units , premeals insulin   and  Insulin sliding scale     6-Liver cirrhosis with esophageal varices due to  EtOH abuse / jaundice   GI on board   trending LFTS     7- Anemia of cronic dx   iron studies ordered reviewed   hb stable     Dvt prophylaxis INR is high due to liver disease     cont to monitor   OOB to chair , ambulate with  PT    67 y/o female with PMH of HTN, DM, anemia, EtOH abuse with liver cirrhosis, esophageal varices, hepatic encephalopathy, hypothyroidism, who was brought to the ED for altered mental status. As per ED, son reported patient has been having worsening confusion and nose bleed  Patient is a poor historian.    1- Acute metabolic encephalopathy due to hepatic encephalopathy /  UTI and sepsis present on admission  cont rocephin complete course   mental status is better   check ammonia   cont lactulose    cont  Rifaximin 550mg bid     2-Hyponatremia  likely due to liver cirrhosis   worsening Na 122   renal consulted   will resume diuretics       3- Prerenal renal failure   improving   resume diuretics     4- Right leg pain , bruises   reports fall at home   xray of R tibial fibula ordered   I reviewed myself , no fx will confirm with radiology   Us of lower ext     5- Hypothyroid   TSH is over 6   increase levothyroxine  dose to 75 mcg     6-Hyperglycemia due to DM-2   cont lantus 20 units , premeals insulin   and  Insulin sliding scale     7-Liver cirrhosis with esophageal varices due to  EtOH abuse / jaundice   GI on board   trending LFTS     8- Anemia of cronic dx   iron studies ordered reviewed   hb stable     Dvt prophylaxis INR is high due to liver disease     cont to monitor   OOB to chair , ambulate with  PT

## 2022-02-28 DIAGNOSIS — D64.9 ANEMIA, UNSPECIFIED: ICD-10-CM

## 2022-02-28 LAB
ALBUMIN SERPL ELPH-MCNC: 1.9 G/DL — LOW (ref 3.3–5.2)
ALP SERPL-CCNC: 407 U/L — HIGH (ref 40–120)
ALT FLD-CCNC: 52 U/L — HIGH
ANION GAP SERPL CALC-SCNC: 10 MMOL/L — SIGNIFICANT CHANGE UP (ref 5–17)
ANION GAP SERPL CALC-SCNC: 12 MMOL/L — SIGNIFICANT CHANGE UP (ref 5–17)
ANION GAP SERPL CALC-SCNC: 8 MMOL/L — SIGNIFICANT CHANGE UP (ref 5–17)
ANION GAP SERPL CALC-SCNC: 9 MMOL/L — SIGNIFICANT CHANGE UP (ref 5–17)
APTT BLD: 28.9 SEC — SIGNIFICANT CHANGE UP (ref 27.5–35.5)
AST SERPL-CCNC: 162 U/L — HIGH
BILIRUB DIRECT SERPL-MCNC: 8.1 MG/DL — HIGH (ref 0–0.3)
BILIRUB INDIRECT FLD-MCNC: 2.3 MG/DL — HIGH (ref 0.2–1)
BILIRUB SERPL-MCNC: 10.4 MG/DL — HIGH (ref 0.4–2)
BUN SERPL-MCNC: 38.4 MG/DL — HIGH (ref 8–20)
BUN SERPL-MCNC: 38.7 MG/DL — HIGH (ref 8–20)
BUN SERPL-MCNC: 38.7 MG/DL — HIGH (ref 8–20)
BUN SERPL-MCNC: 39.2 MG/DL — HIGH (ref 8–20)
CALCIUM SERPL-MCNC: 7.3 MG/DL — LOW (ref 8.6–10.2)
CALCIUM SERPL-MCNC: 7.4 MG/DL — LOW (ref 8.6–10.2)
CALCIUM SERPL-MCNC: 7.6 MG/DL — LOW (ref 8.6–10.2)
CALCIUM SERPL-MCNC: 7.8 MG/DL — LOW (ref 8.6–10.2)
CHLORIDE SERPL-SCNC: 82 MMOL/L — LOW (ref 98–107)
CHLORIDE SERPL-SCNC: 85 MMOL/L — LOW (ref 98–107)
CHLORIDE SERPL-SCNC: 87 MMOL/L — LOW (ref 98–107)
CHLORIDE SERPL-SCNC: 87 MMOL/L — LOW (ref 98–107)
CO2 SERPL-SCNC: 25 MMOL/L — SIGNIFICANT CHANGE UP (ref 22–29)
CO2 SERPL-SCNC: 25 MMOL/L — SIGNIFICANT CHANGE UP (ref 22–29)
CO2 SERPL-SCNC: 26 MMOL/L — SIGNIFICANT CHANGE UP (ref 22–29)
CO2 SERPL-SCNC: 27 MMOL/L — SIGNIFICANT CHANGE UP (ref 22–29)
CREAT SERPL-MCNC: 1.41 MG/DL — HIGH (ref 0.5–1.3)
CREAT SERPL-MCNC: 1.43 MG/DL — HIGH (ref 0.5–1.3)
CREAT SERPL-MCNC: 1.55 MG/DL — HIGH (ref 0.5–1.3)
CREAT SERPL-MCNC: 1.57 MG/DL — HIGH (ref 0.5–1.3)
EGFR: 37 ML/MIN/1.73M2 — LOW
EGFR: 37 ML/MIN/1.73M2 — LOW
EGFR: 42 ML/MIN/1.73M2 — LOW
GLUCOSE BLDC GLUCOMTR-MCNC: 181 MG/DL — HIGH (ref 70–99)
GLUCOSE BLDC GLUCOMTR-MCNC: 185 MG/DL — HIGH (ref 70–99)
GLUCOSE BLDC GLUCOMTR-MCNC: 209 MG/DL — HIGH (ref 70–99)
GLUCOSE BLDC GLUCOMTR-MCNC: 231 MG/DL — HIGH (ref 70–99)
GLUCOSE SERPL-MCNC: 121 MG/DL — HIGH (ref 70–99)
GLUCOSE SERPL-MCNC: 148 MG/DL — HIGH (ref 70–99)
GLUCOSE SERPL-MCNC: 210 MG/DL — HIGH (ref 70–99)
GLUCOSE SERPL-MCNC: 216 MG/DL — HIGH (ref 70–99)
HCT VFR BLD CALC: 23.2 % — LOW (ref 34.5–45)
HGB BLD-MCNC: 8.4 G/DL — LOW (ref 11.5–15.5)
INR BLD: 1.75 RATIO — HIGH (ref 0.88–1.16)
MCHC RBC-ENTMCNC: 31.9 PG — SIGNIFICANT CHANGE UP (ref 27–34)
MCHC RBC-ENTMCNC: 36.2 GM/DL — HIGH (ref 32–36)
MCV RBC AUTO: 88.2 FL — SIGNIFICANT CHANGE UP (ref 80–100)
PLATELET # BLD AUTO: 129 K/UL — LOW (ref 150–400)
POTASSIUM SERPL-MCNC: 3.8 MMOL/L — SIGNIFICANT CHANGE UP (ref 3.5–5.3)
POTASSIUM SERPL-MCNC: 4.2 MMOL/L — SIGNIFICANT CHANGE UP (ref 3.5–5.3)
POTASSIUM SERPL-MCNC: 4.3 MMOL/L — SIGNIFICANT CHANGE UP (ref 3.5–5.3)
POTASSIUM SERPL-MCNC: 4.5 MMOL/L — SIGNIFICANT CHANGE UP (ref 3.5–5.3)
POTASSIUM SERPL-SCNC: 3.8 MMOL/L — SIGNIFICANT CHANGE UP (ref 3.5–5.3)
POTASSIUM SERPL-SCNC: 4.2 MMOL/L — SIGNIFICANT CHANGE UP (ref 3.5–5.3)
POTASSIUM SERPL-SCNC: 4.3 MMOL/L — SIGNIFICANT CHANGE UP (ref 3.5–5.3)
POTASSIUM SERPL-SCNC: 4.5 MMOL/L — SIGNIFICANT CHANGE UP (ref 3.5–5.3)
PROT SERPL-MCNC: 6.7 G/DL — SIGNIFICANT CHANGE UP (ref 6.6–8.7)
PROTHROM AB SERPL-ACNC: 20.4 SEC — HIGH (ref 10.5–13.4)
RBC # BLD: 2.63 M/UL — LOW (ref 3.8–5.2)
RBC # FLD: 20.1 % — HIGH (ref 10.3–14.5)
SODIUM SERPL-SCNC: 115 MMOL/L — CRITICAL LOW (ref 135–145)
SODIUM SERPL-SCNC: 121 MMOL/L — LOW (ref 135–145)
SODIUM SERPL-SCNC: 122 MMOL/L — LOW (ref 135–145)
SODIUM SERPL-SCNC: 124 MMOL/L — LOW (ref 135–145)
WBC # BLD: 13.98 K/UL — HIGH (ref 3.8–10.5)
WBC # FLD AUTO: 13.98 K/UL — HIGH (ref 3.8–10.5)

## 2022-02-28 PROCEDURE — 99233 SBSQ HOSP IP/OBS HIGH 50: CPT

## 2022-02-28 PROCEDURE — 99223 1ST HOSP IP/OBS HIGH 75: CPT

## 2022-02-28 RX ORDER — SODIUM CHLORIDE 9 MG/ML
1 INJECTION INTRAMUSCULAR; INTRAVENOUS; SUBCUTANEOUS THREE TIMES A DAY
Refills: 0 | Status: DISCONTINUED | OUTPATIENT
Start: 2022-02-28 | End: 2022-02-28

## 2022-02-28 RX ORDER — IBUPROFEN 200 MG
400 TABLET ORAL EVERY 8 HOURS
Refills: 0 | Status: DISCONTINUED | OUTPATIENT
Start: 2022-02-28 | End: 2022-02-28

## 2022-02-28 RX ORDER — SODIUM CHLORIDE 5 G/100ML
1000 INJECTION, SOLUTION INTRAVENOUS
Refills: 0 | Status: DISCONTINUED | OUTPATIENT
Start: 2022-02-28 | End: 2022-02-28

## 2022-02-28 RX ORDER — ALBUMIN HUMAN 25 %
100 VIAL (ML) INTRAVENOUS EVERY 6 HOURS
Refills: 0 | Status: COMPLETED | OUTPATIENT
Start: 2022-02-28 | End: 2022-03-02

## 2022-02-28 RX ADMIN — INSULIN GLARGINE 20 UNIT(S): 100 INJECTION, SOLUTION SUBCUTANEOUS at 21:23

## 2022-02-28 RX ADMIN — SODIUM CHLORIDE 1 GRAM(S): 9 INJECTION INTRAMUSCULAR; INTRAVENOUS; SUBCUTANEOUS at 06:39

## 2022-02-28 RX ADMIN — Medication 100 MILLIGRAM(S): at 05:44

## 2022-02-28 RX ADMIN — Medication 100 MILLIGRAM(S): at 14:55

## 2022-02-28 RX ADMIN — Medication 2: at 17:16

## 2022-02-28 RX ADMIN — Medication 1 TABLET(S): at 12:32

## 2022-02-28 RX ADMIN — LACTULOSE 10 GRAM(S): 10 SOLUTION ORAL at 05:43

## 2022-02-28 RX ADMIN — Medication 8 UNIT(S): at 17:17

## 2022-02-28 RX ADMIN — Medication 8 UNIT(S): at 08:13

## 2022-02-28 RX ADMIN — TRAMADOL HYDROCHLORIDE 25 MILLIGRAM(S): 50 TABLET ORAL at 08:12

## 2022-02-28 RX ADMIN — LACTULOSE 10 GRAM(S): 10 SOLUTION ORAL at 12:32

## 2022-02-28 RX ADMIN — SODIUM CHLORIDE 50 MILLILITER(S): 5 INJECTION, SOLUTION INTRAVENOUS at 10:17

## 2022-02-28 RX ADMIN — Medication 75 MICROGRAM(S): at 05:45

## 2022-02-28 RX ADMIN — SODIUM CHLORIDE 1 GRAM(S): 9 INJECTION INTRAMUSCULAR; INTRAVENOUS; SUBCUTANEOUS at 12:32

## 2022-02-28 RX ADMIN — Medication 325 MILLIGRAM(S): at 12:32

## 2022-02-28 RX ADMIN — Medication 100 MILLIGRAM(S): at 12:38

## 2022-02-28 RX ADMIN — Medication 40 MILLIGRAM(S): at 05:45

## 2022-02-28 RX ADMIN — Medication 4: at 08:13

## 2022-02-28 RX ADMIN — Medication 400 MILLIGRAM(S): at 14:54

## 2022-02-28 RX ADMIN — Medication 400 MILLIGRAM(S): at 15:54

## 2022-02-28 RX ADMIN — Medication 1 MILLIGRAM(S): at 12:32

## 2022-02-28 RX ADMIN — PANTOPRAZOLE SODIUM 40 MILLIGRAM(S): 20 TABLET, DELAYED RELEASE ORAL at 16:28

## 2022-02-28 RX ADMIN — Medication 2: at 12:33

## 2022-02-28 RX ADMIN — Medication 100 MILLIGRAM(S): at 21:23

## 2022-02-28 RX ADMIN — TRAMADOL HYDROCHLORIDE 25 MILLIGRAM(S): 50 TABLET ORAL at 09:15

## 2022-02-28 RX ADMIN — Medication 8 UNIT(S): at 12:33

## 2022-02-28 RX ADMIN — Medication 50 MILLIGRAM(S): at 05:44

## 2022-02-28 RX ADMIN — Medication 50 MILLILITER(S): at 21:23

## 2022-02-28 RX ADMIN — Medication 50 MILLILITER(S): at 16:27

## 2022-02-28 RX ADMIN — PANTOPRAZOLE SODIUM 40 MILLIGRAM(S): 20 TABLET, DELAYED RELEASE ORAL at 05:44

## 2022-02-28 RX ADMIN — LACTULOSE 10 GRAM(S): 10 SOLUTION ORAL at 21:22

## 2022-02-28 RX ADMIN — Medication 50 MILLIGRAM(S): at 16:28

## 2022-02-28 NOTE — CONSULT NOTE ADULT - ATTENDING COMMENTS
This medication was already sent in today    66 year old  woman with a significant past medical history of ETOH abuse (Last drink 1 month ago), HTN, DM, chronic anemia, cirrhosis, esophageal varices, hepatic encephalopathy, hypothyroidism, who arrived to [facility] by son for changes in mental status. Decompensated cirrhosis (HE+/ ascites+/ VH-), DF >32, has JOVANNI and HE, on abx for cellulitis , ucx, blood cx, diagnostic paracentesis to r/o SBP. C/w albumin, nephrology following. C/w lactulose and rifaximin.

## 2022-02-28 NOTE — PROGRESS NOTE ADULT - SUBJECTIVE AND OBJECTIVE BOX
[facility] DIVISION OF KIDNEY DISEASES AND HYPERTENSION -- FOLLOW UP NOTE  --------------------------------------------------------------------------------  Chief Complaint:  Hyponatremia    24 hour events/subjective:  Worsening hyponatremia  Started on 2% this morning;      PAST HISTORY  --------------------------------------------------------------------------------  No significant changes to PMH, PSH, FHx, SHx, unless otherwise noted    ALLERGIES & MEDICATIONS  --------------------------------------------------------------------------------  Allergies    No Known Allergies    Intolerances      Standing Inpatient Medications  ceFAZolin   IVPB 1000 milliGRAM(s) IV Intermittent every 8 hours  ceFAZolin   IVPB      dextrose 40% Gel 15 Gram(s) Oral once  dextrose 5%. 1000 milliLiter(s) IV Continuous <Continuous>  dextrose 5%. 1000 milliLiter(s) IV Continuous <Continuous>  dextrose 50% Injectable 25 Gram(s) IV Push once  dextrose 50% Injectable 12.5 Gram(s) IV Push once  dextrose 50% Injectable 25 Gram(s) IV Push once  ferrous    sulfate 325 milliGRAM(s) Oral daily  folic acid 1 milliGRAM(s) Oral daily  glucagon  Injectable 1 milliGRAM(s) IntraMuscular once  insulin glargine Injectable (LANTUS) 20 Unit(s) SubCutaneous at bedtime  insulin lispro (ADMELOG) corrective regimen sliding scale   SubCutaneous three times a day before meals  insulin lispro (ADMELOG) corrective regimen sliding scale   SubCutaneous at bedtime  insulin lispro Injectable (ADMELOG) 8 Unit(s) SubCutaneous three times a day before meals  labetalol 50 milliGRAM(s) Oral two times a day  lactulose Syrup 10 Gram(s) Oral three times a day  levothyroxine 75 MICROGram(s) Oral daily  multivitamin 1 Tablet(s) Oral daily  pantoprazole    Tablet 40 milliGRAM(s) Oral two times a day  rifAXIMin 550 milliGRAM(s) Oral two times a day  sodium chloride 1 Gram(s) Oral three times a day  sodium chloride 2% . 1000 milliLiter(s) IV Continuous <Continuous>  thiamine 100 milliGRAM(s) Oral daily    PRN Inpatient Medications  acetaminophen     Tablet .. 650 milliGRAM(s) Oral every 6 hours PRN  aluminum hydroxide/magnesium hydroxide/simethicone Suspension 30 milliLiter(s) Oral every 4 hours PRN  bisacodyl Suppository 10 milliGRAM(s) Rectal daily PRN  melatonin 3 milliGRAM(s) Oral at bedtime PRN  ondansetron Injectable 4 milliGRAM(s) IV Push every 8 hours PRN  traMADol 25 milliGRAM(s) Oral every 6 hours PRN      REVIEW OF SYSTEMS  --------------------------------------------------------------------------------  Gen: No weight changes, fatigue, fevers/chills, weakness  Skin: No rashes  Head/Eyes/Ears/Mouth: No headache; Normal hearing; Normal vision w/o blurriness; No sinus pain/discomfort, sore throat  Respiratory: No dyspnea, cough, wheezing, hemoptysis  CV: No chest pain, PND, orthopnea  GI: No abdominal pain, diarrhea, constipation, nausea, vomiting, melena, hematochezia  : No increased frequency, dysuria, hematuria, nocturia  MSK: leg pain+ no back pain; no edema  Neuro: No dizziness/lightheadedness, weakness, seizures, numbness, tingling  Heme: No easy bruising or bleeding  Endo: No heat/cold intolerance  Psych: No significant nervousness, anxiety, stress, depression    All other systems were reviewed and are negative, except as noted.    VITALS/PHYSICAL EXAM  --------------------------------------------------------------------------------  T(C): 37.1 (02-28-22 @ 10:30), Max: 37.1 (02-28-22 @ 10:30)  HR: 75 (02-28-22 @ 10:30) (67 - 91)  BP: 96/60 (02-28-22 @ 10:30) (96/60 - 130/76)  RR: 16 (02-28-22 @ 10:30) (16 - 18)  SpO2: 98% (02-28-22 @ 10:30) (96% - 98%)  Wt(kg): --        02-27-22 @ 07:01  -  02-28-22 @ 07:00  --------------------------------------------------------  IN: 720 mL / OUT: 300 mL / NET: 420 mL    02-28-22 @ 07:01  -  02-28-22 @ 12:54  --------------------------------------------------------  IN: 150 mL / OUT: 0 mL / NET: 150 mL      Physical Exam:  	Gen: NAD  	HEENT: Scleral icterus  	Pulm: CTA B/L  	CV: RRR, S1S2; no rub  	Back: No spinal or CVA tenderness; no sacral edema  	Abd: +BS, soft, nontender/nondistended  	: No suprapubic tenderness  	UE: Warm, no edema; no asterixis  	LE: Warm, no edema  	Neuro: No focal deficit  	Psych: Normal affect and mood  	Skin: Warm    LABS/STUDIES  --------------------------------------------------------------------------------              8.4    13.98 >-----------<  129      [02-28-22 @ 05:17]              23.2     121  |  85  |  38.4  ----------------------------<  148      [02-28-22 @ 11:42]  4.2   |  27.0  |  1.41        Ca     7.4     [02-28-22 @ 11:42]      Mg     1.9     [02-27-22 @ 06:13]      Phos  2.6     [02-27-22 @ 06:13]    TPro  6.9  /  Alb  1.8  /  TBili  11.8  /  DBili  x   /  AST  167  /  ALT  62  /  AlkPhos  400  [02-27-22 @ 06:13]    PT/INR: PT 20.4 , INR 1.75       [02-28-22 @ 05:17]  PTT: 28.9       [02-28-22 @ 05:17]    Serum Osmolality 284      [02-27-22 @ 14:06]    Creatinine Trend:  SCr 1.41 [02-28 @ 11:42]  SCr 1.43 [02-28 @ 05:17]  SCr 1.11 [02-27 @ 14:06]  SCr 1.13 [02-27 @ 06:13]  SCr 1.15 [02-26 @ 06:12]    Urinalysis - [02-27-22 @ 18:27]      Color Jana / Appearance Clear / SG 1.020 / pH 5.0      Gluc Negative / Ketone Trace  / Bili Large / Urobili 8       Blood Large / Protein Negative / Leuk Est Moderate / Nitrite Positive      RBC 3-5 / WBC 26-50 / Hyaline  / Gran  / Sq Epi  / Non Sq Epi Few / Bacteria Few    Urine Sodium <30      [02-27-22 @ 18:27]  Urine Chloride <27      [02-27-22 @ 18:27]  Urine Osmolality 431      [02-27-22 @ 18:27]    Iron 75, TIBC 152, %sat 49      [02-27-22 @ 06:13]  Ferritin 970      [02-27-22 @ 06:13]  Vitamin D (25OH) 39.5      [02-27-22 @ 10:51]  TSH 6.23      [02-27-22 @ 06:13]    HIV Nonreact      [02-23-22 @ 21:44]

## 2022-02-28 NOTE — CONSULT NOTE ADULT - PROBLEM SELECTOR RECOMMENDATION 4
Most likely multifactorial with chronic disease, cirrhosis, and bone marrow suppression from alcohol use disorder. No evidence of active GI bleeding,  Continue to monitor daily CBC and transfuse as needed.

## 2022-02-28 NOTE — CONSULT NOTE ADULT - SUBJECTIVE AND OBJECTIVE BOX
Patient is a 66y old  Female who presents with a chief complaint of Acute encephalopathy (2022 13:31)      HPI: This is a 66 year old  woman with a significant past medical history of ETOH abuse (Last drink 1 month ago), HTN, DM, chronic anemia, cirrhosis, esophageal varices, hepatic encephalopathy, hypothyroidism, who arrived to [facility] by son for changes in mental status. Patient stating she was admitted to the hospital last month after a fall, not records noted on EMR, patient presenting an ecchymotic/swollen right leg, awake and pleasantly confused. Patient denies history of cirrhosis, but well known by GI team. Today seeing by GI for cirrhosis, transaminitis, and anemia. US revealing  Findings most consistent with hepatic parenchymal cirrhosis with portal hypertension; (evidenced by recanalization of the paraumbilical vein). Ascites. At present denies nausea, vomiting, abdominal pain, chest pain, shortness of breath, hematemesis, hematochezia, melena.    PAST MEDICAL & SURGICAL HISTORY:  Alcohol abuse    Alcohol abuse    Liver cirrhosis    ETOH abuse    Urea cycle metabolism disorder    Transitory  hyperthyroidism    Lump in female breast    UTI (urinary tract infection)    Lymphangitis, acute, lower leg    Anemia    Hypothyroidism    Chronic back pain    HTN (hypertension)    GERD (gastroesophageal reflux disease)    Pancreatitis    HTN (hypertension)    DM (diabetes mellitus)    No significant past surgical history    S/P abdominoplasty        Allergies    No Known Allergies    Intolerances        MEDICATIONS  (STANDING):  albumin human 25% IVPB 100 milliLiter(s) IV Intermittent every 6 hours  ceFAZolin   IVPB 1000 milliGRAM(s) IV Intermittent every 8 hours  ceFAZolin   IVPB      dextrose 40% Gel 15 Gram(s) Oral once  dextrose 5%. 1000 milliLiter(s) (50 mL/Hr) IV Continuous <Continuous>  dextrose 5%. 1000 milliLiter(s) (100 mL/Hr) IV Continuous <Continuous>  dextrose 50% Injectable 25 Gram(s) IV Push once  dextrose 50% Injectable 12.5 Gram(s) IV Push once  dextrose 50% Injectable 25 Gram(s) IV Push once  ferrous    sulfate 325 milliGRAM(s) Oral daily  folic acid 1 milliGRAM(s) Oral daily  glucagon  Injectable 1 milliGRAM(s) IntraMuscular once  insulin glargine Injectable (LANTUS) 20 Unit(s) SubCutaneous at bedtime  insulin lispro (ADMELOG) corrective regimen sliding scale   SubCutaneous three times a day before meals  insulin lispro (ADMELOG) corrective regimen sliding scale   SubCutaneous at bedtime  insulin lispro Injectable (ADMELOG) 8 Unit(s) SubCutaneous three times a day before meals  labetalol 50 milliGRAM(s) Oral two times a day  lactulose Syrup 10 Gram(s) Oral three times a day  levothyroxine 75 MICROGram(s) Oral daily  multivitamin 1 Tablet(s) Oral daily  pantoprazole    Tablet 40 milliGRAM(s) Oral two times a day  rifAXIMin 550 milliGRAM(s) Oral two times a day  thiamine 100 milliGRAM(s) Oral daily    MEDICATIONS  (PRN):  acetaminophen     Tablet .. 650 milliGRAM(s) Oral every 6 hours PRN Temp greater or equal to 38C (100.4F), Mild Pain (1 - 3)  aluminum hydroxide/magnesium hydroxide/simethicone Suspension 30 milliLiter(s) Oral every 4 hours PRN Dyspepsia  bisacodyl Suppository 10 milliGRAM(s) Rectal daily PRN Constipation  ibuprofen  Tablet. 400 milliGRAM(s) Oral every 8 hours PRN Severe Pain (7 - 10)  melatonin 3 milliGRAM(s) Oral at bedtime PRN Insomnia  ondansetron Injectable 4 milliGRAM(s) IV Push every 8 hours PRN Nausea and/or Vomiting  traMADol 25 milliGRAM(s) Oral every 6 hours PRN Moderate Pain (4 - 6)      Social History:    Marital Status:  (   )    (   ) Single    (   )    (  )   Occupation:   Lives with: (  ) alone  (  ) children   (  ) spouse   (  ) parents  (  ) other    Substance Use (street drugs): (  ) never used  (  ) other:  Tobacco Usage:  (   ) never smoked   (   ) former smoker   (   ) current smoker  (     ) pack year  (        ) last cigarette date  Alcohol Usage:  Sexual History:     Family History   IBD (  ) Yes   (  ) No  GI Malignancy (  )  Yes    (  ) No    Health Management     Last Colonoscopy -      (     ) Advanced Directives: (     ) None    (      ) DNR    (     ) DNI    (     ) Health Care Proxy:     Review of Systems:  · ENMT: negative  · Respiratory and Thorax: negative  · Cardiovascular: negative  · Gastrointestinal: see above.  · Genitourinary:	negative  · Musculoskeletal: negative  · Neurological: negative  · Psychiatric: negative  · Hematology/Lymphatics: negative  · Endocrine: negative        Vital Signs Last 24 Hrs  T(C): 37.1 (2022 10:30), Max: 37.1 (2022 10:30)  T(F): 98.8 (2022 10:30), Max: 98.8 (2022 10:30)  HR: 75 (2022 10:30) (67 - 91)  BP: 96/60 (2022 10:30) (96/60 - 130/76)  BP(mean): 94 (2022 05:40) (69 - 94)  RR: 16 (2022 10:30) (16 - 18)  SpO2: 98% (2022 10:30) (96% - 98%)    PHYSICAL EXAM:  · Constitutional: Elderly looking  woman, Jaundice, in no acute distress.   · Eyes: EOMI; PERRL; no drainage or redness. Icteric.  · ENMT: No oral lesions; no gross abnormalities  · Neck:	No bruits; no thyromegaly or nodules  · Back:	No deformity or limitation of movement  · Respiratory: Breath Sounds equal & clear to percussion & auscultation, no accessory muscle use  · Cardiovascular: Regular rate & rhythm, normal S1, S2; no murmurs, gallops or rubs; no S3, S4  · Gastrointestinal: Soft, non-tender, no hepatosplenomegaly, normal bowel sounds        LABS:                        8.4    13.98 )-----------( 129      ( 2022 05:17 )             23.2         121<L>  |  85<L>  |  38.4<H>  ----------------------------<  148<H>  4.2   |  27.0  |  1.41<H>    Ca    7.4<L>      2022 11:42  Phos  2.6       Mg     1.9         TPro  6.9  /  Alb  1.8<L>  /  TBili  11.8<H>  /  DBili  x   /  AST  167<H>  /  ALT  62<H>  /  AlkPhos  400<H>      PT/INR - ( 2022 05:17 )   PT: 20.4 sec;   INR: 1.75 ratio         PTT - ( 2022 05:17 )  PTT:34.6 sec  Urinalysis Basic - ( 2022 18:27 )    Color: Jana / Appearance: Clear / S.020 / pH: x  Gluc: x / Ketone: Trace  / Bili: Large / Urobili: 8   Blood: x / Protein: Negative / Nitrite: Positive   Leuk Esterase: Moderate / RBC: 3-5 /HPF / WBC 26-50 /HPF   Sq Epi: x / Non Sq Epi: Few / Bacteria: Few      LIVER FUNCTIONS - ( 2022 06:13 )  Alb: 1.8 g/dL / Pro: 6.9 g/dL / ALK PHOS: 400 U/L / ALT: 62 U/L / AST: 167 U/L / GGT: x             RADIOLOGY & ADDITIONAL TESTS:  < from: US Abdomen Upper Quadrant Right (22 @ 12:10) >  ACC: [id number] EXAM:  US ABDOMEN RT UPR QUADRANT                          PROCEDURE DATE:  2022          INTERPRETATION:  CLINICAL INFORMATION: History of cirrhosis. Evaluate   liver, biliary tree and presence of ascites.    COMPARISON: 2021.    TECHNIQUE: Sonography of the right upper quadrant.    FINDINGS:    Liver: Coarsened echotexture. No focal hepatic masses. Nodular hepatic   parenchymal contour.  Bile ducts: Normal caliber. Common bile duct measures 5 mm.  Gallbladder: Gallstones. Gallbladder sludge. No gallbladder wall   thickening.  Pancreas: Not well visualized.  Right kidney: 12.9 cm. No hydronephrosis. No renal calculi. No   space-occupying lesions.  Ascites: Small to moderate volume intraperitoneal ascites.  IVC: Notwell visualized.  Patent paraumbilical vein.    IMPRESSION: Findings most consistent with hepatic parenchymal cirrhosis   with portal hypertension; (evidenced by recanalization of the   paraumbilical vein). Ascites.         Patient is a 66y old  Female who presents with a chief complaint of Acute encephalopathy (2022 13:31)      HPI: This is a 66 year old  woman with a significant past medical history of ETOH abuse (Last drink 1 month ago), HTN, DM, chronic anemia, cirrhosis, esophageal varices, hepatic encephalopathy, hypothyroidism, who arrived to [facility] by son for changes in mental status. Patient stating she was admitted to the hospital last month after a fall, not records noted on EMR, patient presenting an ecchymotic/swollen right leg, awake and pleasantly confused. Patient denies history of cirrhosis, but well known by GI team. Today seeing by GI for cirrhosis, transaminitis, and anemia. US revealing  Findings most consistent with hepatic parenchymal cirrhosis with portal hypertension; (evidenced by recanalization of the paraumbilical vein). Ascites. At present denies nausea, vomiting, abdominal pain, chest pain, shortness of breath, hematemesis, hematochezia, melena.    PAST MEDICAL & SURGICAL HISTORY:  Alcohol abuse    Alcohol abuse    Liver cirrhosis    ETOH abuse    Urea cycle metabolism disorder    Transitory  hyperthyroidism    Lump in female breast    UTI (urinary tract infection)    Lymphangitis, acute, lower leg    Anemia    Hypothyroidism    Chronic back pain    HTN (hypertension)    GERD (gastroesophageal reflux disease)    Pancreatitis    HTN (hypertension)    DM (diabetes mellitus)    No significant past surgical history    S/P abdominoplasty        Allergies    No Known Allergies    Intolerances        MEDICATIONS  (STANDING):  albumin human 25% IVPB 100 milliLiter(s) IV Intermittent every 6 hours  ceFAZolin   IVPB 1000 milliGRAM(s) IV Intermittent every 8 hours  ceFAZolin   IVPB      dextrose 40% Gel 15 Gram(s) Oral once  dextrose 5%. 1000 milliLiter(s) (50 mL/Hr) IV Continuous <Continuous>  dextrose 5%. 1000 milliLiter(s) (100 mL/Hr) IV Continuous <Continuous>  dextrose 50% Injectable 25 Gram(s) IV Push once  dextrose 50% Injectable 12.5 Gram(s) IV Push once  dextrose 50% Injectable 25 Gram(s) IV Push once  ferrous    sulfate 325 milliGRAM(s) Oral daily  folic acid 1 milliGRAM(s) Oral daily  glucagon  Injectable 1 milliGRAM(s) IntraMuscular once  insulin glargine Injectable (LANTUS) 20 Unit(s) SubCutaneous at bedtime  insulin lispro (ADMELOG) corrective regimen sliding scale   SubCutaneous three times a day before meals  insulin lispro (ADMELOG) corrective regimen sliding scale   SubCutaneous at bedtime  insulin lispro Injectable (ADMELOG) 8 Unit(s) SubCutaneous three times a day before meals  labetalol 50 milliGRAM(s) Oral two times a day  lactulose Syrup 10 Gram(s) Oral three times a day  levothyroxine 75 MICROGram(s) Oral daily  multivitamin 1 Tablet(s) Oral daily  pantoprazole    Tablet 40 milliGRAM(s) Oral two times a day  rifAXIMin 550 milliGRAM(s) Oral two times a day  thiamine 100 milliGRAM(s) Oral daily    MEDICATIONS  (PRN):  acetaminophen     Tablet .. 650 milliGRAM(s) Oral every 6 hours PRN Temp greater or equal to 38C (100.4F), Mild Pain (1 - 3)  aluminum hydroxide/magnesium hydroxide/simethicone Suspension 30 milliLiter(s) Oral every 4 hours PRN Dyspepsia  bisacodyl Suppository 10 milliGRAM(s) Rectal daily PRN Constipation  ibuprofen  Tablet. 400 milliGRAM(s) Oral every 8 hours PRN Severe Pain (7 - 10)  melatonin 3 milliGRAM(s) Oral at bedtime PRN Insomnia  ondansetron Injectable 4 milliGRAM(s) IV Push every 8 hours PRN Nausea and/or Vomiting  traMADol 25 milliGRAM(s) Oral every 6 hours PRN Moderate Pain (4 - 6)      Social History:    Marital Status:  (   )    (   ) Single    (   )    (  )   Occupation:   Lives with: (  ) alone  (  ) children   (  ) spouse   (  ) parents  (  ) other    Substance Use (street drugs): (  ) never used  (  ) other:  Tobacco Usage:  (   ) never smoked   (   ) former smoker   (   ) current smoker  (     ) pack year  (        ) last cigarette date  Alcohol Usage:  Sexual History:     Family History   IBD (  ) Yes   (  ) No  GI Malignancy (  )  Yes    (  ) No    Health Management     Last Colonoscopy -      (     ) Advanced Directives: (     ) None    (      ) DNR    (     ) DNI    (     ) Health Care Proxy:     Review of Systems:  · ENMT: negative  · Respiratory and Thorax: negative  · Cardiovascular: negative  · Gastrointestinal: see above.  · Genitourinary:	negative  · Musculoskeletal: negative  · Neurological: negative  · Psychiatric: negative  · Hematology/Lymphatics: negative  · Endocrine: negative        Vital Signs Last 24 Hrs  T(C): 37.1 (2022 10:30), Max: 37.1 (2022 10:30)  T(F): 98.8 (2022 10:30), Max: 98.8 (2022 10:30)  HR: 75 (2022 10:30) (67 - 91)  BP: 96/60 (2022 10:30) (96/60 - 130/76)  BP(mean): 94 (2022 05:40) (69 - 94)  RR: 16 (2022 10:30) (16 - 18)  SpO2: 98% (2022 10:30) (96% - 98%)    PHYSICAL EXAM:  · Constitutional: Elderly looking  woman, Jaundice, in no acute distress.   · Eyes: EOMI; PERRL; no drainage or redness. Icteric.  · ENMT: No oral lesions; no gross abnormalities  · Neck:	No bruits; no thyromegaly or nodules  · Back:	No deformity or limitation of movement  · Respiratory: Breath Sounds equal & clear to percussion & auscultation, no accessory muscle use  · Cardiovascular: Regular rate & rhythm, normal S1, S2; no murmurs, gallops or rubs; no S3, S4  · Gastrointestinal: Soft, non-tender, no hepatosplenomegaly, normal bowel sounds  CNS: drowsy, answers some questions         LABS:                        8.4    13.98 )-----------( 129      ( 2022 05:17 )             23.2         121<L>  |  85<L>  |  38.4<H>  ----------------------------<  148<H>  4.2   |  27.0  |  1.41<H>    Ca    7.4<L>      2022 11:42  Phos  2.6       Mg     1.9         TPro  6.9  /  Alb  1.8<L>  /  TBili  11.8<H>  /  DBili  x   /  AST  167<H>  /  ALT  62<H>  /  AlkPhos  400<H>      PT/INR - ( 2022 05:17 )   PT: 20.4 sec;   INR: 1.75 ratio         PTT - ( 2022 05:17 )  PTT:34.6 sec  Urinalysis Basic - ( 2022 18:27 )    Color: Jana / Appearance: Clear / S.020 / pH: x  Gluc: x / Ketone: Trace  / Bili: Large / Urobili: 8   Blood: x / Protein: Negative / Nitrite: Positive   Leuk Esterase: Moderate / RBC: 3-5 /HPF / WBC 26-50 /HPF   Sq Epi: x / Non Sq Epi: Few / Bacteria: Few      LIVER FUNCTIONS - ( 2022 06:13 )  Alb: 1.8 g/dL / Pro: 6.9 g/dL / ALK PHOS: 400 U/L / ALT: 62 U/L / AST: 167 U/L / GGT: x             RADIOLOGY & ADDITIONAL TESTS:  < from: US Abdomen Upper Quadrant Right (22 @ 12:10) >  ACC: [id number] EXAM:  US ABDOMEN RT UPR QUADRANT                          PROCEDURE DATE:  2022          INTERPRETATION:  CLINICAL INFORMATION: History of cirrhosis. Evaluate   liver, biliary tree and presence of ascites.    COMPARISON: 2021.    TECHNIQUE: Sonography of the right upper quadrant.    FINDINGS:    Liver: Coarsened echotexture. No focal hepatic masses. Nodular hepatic   parenchymal contour.  Bile ducts: Normal caliber. Common bile duct measures 5 mm.  Gallbladder: Gallstones. Gallbladder sludge. No gallbladder wall   thickening.  Pancreas: Not well visualized.  Right kidney: 12.9 cm. No hydronephrosis. No renal calculi. No   space-occupying lesions.  Ascites: Small to moderate volume intraperitoneal ascites.  IVC: Notwell visualized.  Patent paraumbilical vein.    IMPRESSION: Findings most consistent with hepatic parenchymal cirrhosis   with portal hypertension; (evidenced by recanalization of the   paraumbilical vein). Ascites.

## 2022-02-28 NOTE — CONSULT NOTE ADULT - PROBLEM SELECTOR RECOMMENDATION 9
Alcoholic cirrhosis with ascites, Decompensated, and very advance and nearing end stage.   Maddreys score 44 indicates poor prognosis and patient may benefit from glucocorticoid therapy.  MELD score 52.6% Alcoholic cirrhosis with ascites, Decompensated  Maddreys score > 32, but currently has JOVANNI, HE, r/o infectious etiologies  MELD score 52.6%

## 2022-02-28 NOTE — PROGRESS NOTE ADULT - ASSESSMENT
Hyponatremia- chronic ; systemic vasodilation in setting of liver cirrhosis  hepatic encephalopathy  UTI- on Rocephin  DM  Darrell- scr improving    Pt with chronic hyponatremia- SNa+ range 124-129 during this admission (corrected for hyperglycemia)  Started on 2% saline this AM for 6 hours  Recheck labs at 1pm and q4-6h  Do not correct more than 4-6meq in 24 hours    d/w Dr. Marquez

## 2022-02-28 NOTE — PROGRESS NOTE ADULT - SUBJECTIVE AND OBJECTIVE BOX
Patient is a 66y old  Female admitted for  Acute encephalopathy     she is seen in am with staff at the bedside   she is still with c/o pain in the right leg   no new complaints   sitting in the chair , no distress awake alert       MEDICATIONS  (STANDING):  albumin human 25% IVPB 100 milliLiter(s) IV Intermittent every 6 hours  ceFAZolin   IVPB 1000 milliGRAM(s) IV Intermittent every 8 hours  ceFAZolin   IVPB      dextrose 40% Gel 15 Gram(s) Oral once  dextrose 5%. 1000 milliLiter(s) (50 mL/Hr) IV Continuous <Continuous>  dextrose 5%. 1000 milliLiter(s) (100 mL/Hr) IV Continuous <Continuous>  dextrose 50% Injectable 25 Gram(s) IV Push once  dextrose 50% Injectable 12.5 Gram(s) IV Push once  dextrose 50% Injectable 25 Gram(s) IV Push once  ferrous    sulfate 325 milliGRAM(s) Oral daily  folic acid 1 milliGRAM(s) Oral daily  glucagon  Injectable 1 milliGRAM(s) IntraMuscular once  insulin glargine Injectable (LANTUS) 20 Unit(s) SubCutaneous at bedtime  insulin lispro (ADMELOG) corrective regimen sliding scale   SubCutaneous three times a day before meals  insulin lispro (ADMELOG) corrective regimen sliding scale   SubCutaneous at bedtime  insulin lispro Injectable (ADMELOG) 8 Unit(s) SubCutaneous three times a day before meals  labetalol 50 milliGRAM(s) Oral two times a day  lactulose Syrup 10 Gram(s) Oral three times a day  levothyroxine 75 MICROGram(s) Oral daily  multivitamin 1 Tablet(s) Oral daily  pantoprazole    Tablet 40 milliGRAM(s) Oral two times a day  rifAXIMin 550 milliGRAM(s) Oral two times a day  thiamine 100 milliGRAM(s) Oral daily      Vital Signs Last 24 Hrs  T(C): 37.1 (28 Feb 2022 10:30), Max: 37.1 (28 Feb 2022 10:30)  T(F): 98.8 (28 Feb 2022 10:30), Max: 98.8 (28 Feb 2022 10:30)  HR: 75 (28 Feb 2022 10:30) (67 - 91)  BP: 96/60 (28 Feb 2022 10:30) (96/60 - 130/76)  BP(mean): 94 (28 Feb 2022 05:40) (69 - 94)  RR: 16 (28 Feb 2022 10:30) (16 - 18)  SpO2: 98% (28 Feb 2022 10:30) (96% - 98%)    PHYSICAL EXAM:  General: jaundice , no distress  Eyes : sclera yellow   Neck: supple, no JVD   Lungs: CTA bilateral , no wheezing no rales   Cardio: RRR, S1/S2, No murmur  Abdomen: Soft, Nontender, Nondistended; Bowel sounds present  Extremities: No calf tenderness, + edema RLE   RLE skin redness mid lower leg anteriorly                           8.4    13.98 )-----------( 129      ( 28 Feb 2022 05:17 )             23.2   02-28    121<L>  |  85<L>  |  38.4<H>  ----------------------------<  148<H>  4.2   |  27.0  |  1.41<H>    Ca    7.4<L>      28 Feb 2022 11:42  Phos  2.6     02-27  Mg     1.9     02-27    TPro  6.9  /  Alb  1.8<L>  /  TBili  11.8<H>  /  DBili  x   /  AST  167<H>  /  ALT  62<H>  /  AlkPhos  400<H>  02-27    CAPILLARY BLOOD GLUCOSE      POCT Blood Glucose.: 185 mg/dL (28 Feb 2022 12:24)  POCT Blood Glucose.: 209 mg/dL (28 Feb 2022 08:02)  POCT Blood Glucose.: 229 mg/dL (27 Feb 2022 21:56)  POCT Blood Glucose.: 157 mg/dL (27 Feb 2022 17:03)

## 2022-02-28 NOTE — CONSULT NOTE ADULT - PROBLEM SELECTOR RECOMMENDATION 2
chronic systemic vasodilatation in setting of liver cirrhosis,   Continue to manage as per Nephrology recommendations.

## 2022-02-28 NOTE — PROGRESS NOTE ADULT - ASSESSMENT
67 y/o female with PMH of HTN, DM, anemia, EtOH abuse with liver cirrhosis, esophageal varices, hepatic encephalopathy, hypothyroidism, who was brought to the ED for altered mental status. As per ED, son reported patient has been having worsening confusion and nose bleed  Patient is a poor historian.      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

## 2022-03-01 LAB
ANION GAP SERPL CALC-SCNC: 11 MMOL/L — SIGNIFICANT CHANGE UP (ref 5–17)
BUN SERPL-MCNC: 38 MG/DL — HIGH (ref 8–20)
CALCIUM SERPL-MCNC: 8 MG/DL — LOW (ref 8.6–10.2)
CHLORIDE SERPL-SCNC: 89 MMOL/L — LOW (ref 98–107)
CK SERPL-CCNC: 56 U/L — SIGNIFICANT CHANGE UP (ref 25–170)
CO2 SERPL-SCNC: 25 MMOL/L — SIGNIFICANT CHANGE UP (ref 22–29)
CREAT SERPL-MCNC: 1.22 MG/DL — SIGNIFICANT CHANGE UP (ref 0.5–1.3)
EGFR: 50 ML/MIN/1.73M2 — LOW
GLUCOSE BLDC GLUCOMTR-MCNC: 160 MG/DL — HIGH (ref 70–99)
GLUCOSE BLDC GLUCOMTR-MCNC: 168 MG/DL — HIGH (ref 70–99)
GLUCOSE BLDC GLUCOMTR-MCNC: 183 MG/DL — HIGH (ref 70–99)
GLUCOSE BLDC GLUCOMTR-MCNC: 212 MG/DL — HIGH (ref 70–99)
GLUCOSE BLDC GLUCOMTR-MCNC: 220 MG/DL — HIGH (ref 70–99)
GLUCOSE SERPL-MCNC: 165 MG/DL — HIGH (ref 70–99)
POTASSIUM SERPL-MCNC: 4.1 MMOL/L — SIGNIFICANT CHANGE UP (ref 3.5–5.3)
POTASSIUM SERPL-SCNC: 4.1 MMOL/L — SIGNIFICANT CHANGE UP (ref 3.5–5.3)
SODIUM SERPL-SCNC: 125 MMOL/L — LOW (ref 135–145)

## 2022-03-01 PROCEDURE — 99233 SBSQ HOSP IP/OBS HIGH 50: CPT

## 2022-03-01 PROCEDURE — 76882 US LMTD JT/FCL EVL NVASC XTR: CPT | Mod: 26,RT

## 2022-03-01 PROCEDURE — 99223 1ST HOSP IP/OBS HIGH 75: CPT

## 2022-03-01 PROCEDURE — 73560 X-RAY EXAM OF KNEE 1 OR 2: CPT | Mod: 26,RT

## 2022-03-01 PROCEDURE — 99232 SBSQ HOSP IP/OBS MODERATE 35: CPT

## 2022-03-01 RX ORDER — HYDROMORPHONE HYDROCHLORIDE 2 MG/ML
0.5 INJECTION INTRAMUSCULAR; INTRAVENOUS; SUBCUTANEOUS
Refills: 0 | Status: DISCONTINUED | OUTPATIENT
Start: 2022-03-01 | End: 2022-03-07

## 2022-03-01 RX ORDER — MEROPENEM 1 G/30ML
1000 INJECTION INTRAVENOUS EVERY 12 HOURS
Refills: 0 | Status: DISCONTINUED | OUTPATIENT
Start: 2022-03-01 | End: 2022-03-02

## 2022-03-01 RX ORDER — VANCOMYCIN HCL 1 G
750 VIAL (EA) INTRAVENOUS EVERY 12 HOURS
Refills: 0 | Status: DISCONTINUED | OUTPATIENT
Start: 2022-03-01 | End: 2022-03-05

## 2022-03-01 RX ADMIN — LACTULOSE 10 GRAM(S): 10 SOLUTION ORAL at 06:09

## 2022-03-01 RX ADMIN — LACTULOSE 10 GRAM(S): 10 SOLUTION ORAL at 13:23

## 2022-03-01 RX ADMIN — LACTULOSE 10 GRAM(S): 10 SOLUTION ORAL at 21:19

## 2022-03-01 RX ADMIN — Medication 100 MILLIGRAM(S): at 13:18

## 2022-03-01 RX ADMIN — Medication 100 MILLIGRAM(S): at 06:09

## 2022-03-01 RX ADMIN — Medication 50 MILLILITER(S): at 14:02

## 2022-03-01 RX ADMIN — Medication 8 UNIT(S): at 13:26

## 2022-03-01 RX ADMIN — Medication 75 MICROGRAM(S): at 06:08

## 2022-03-01 RX ADMIN — Medication 4: at 13:27

## 2022-03-01 RX ADMIN — Medication 2: at 19:08

## 2022-03-01 RX ADMIN — Medication 50 MILLILITER(S): at 06:27

## 2022-03-01 RX ADMIN — Medication 50 MILLILITER(S): at 19:06

## 2022-03-01 RX ADMIN — Medication 8 UNIT(S): at 19:08

## 2022-03-01 RX ADMIN — Medication 50 MILLIGRAM(S): at 06:08

## 2022-03-01 RX ADMIN — Medication 4: at 08:16

## 2022-03-01 RX ADMIN — Medication 250 MILLIGRAM(S): at 19:06

## 2022-03-01 RX ADMIN — PANTOPRAZOLE SODIUM 40 MILLIGRAM(S): 20 TABLET, DELAYED RELEASE ORAL at 06:08

## 2022-03-01 RX ADMIN — Medication 1 TABLET(S): at 12:31

## 2022-03-01 RX ADMIN — PANTOPRAZOLE SODIUM 40 MILLIGRAM(S): 20 TABLET, DELAYED RELEASE ORAL at 19:07

## 2022-03-01 RX ADMIN — Medication 325 MILLIGRAM(S): at 12:31

## 2022-03-01 RX ADMIN — Medication 0: at 21:20

## 2022-03-01 RX ADMIN — Medication 8 UNIT(S): at 08:17

## 2022-03-01 RX ADMIN — Medication 1 MILLIGRAM(S): at 12:34

## 2022-03-01 RX ADMIN — INSULIN GLARGINE 20 UNIT(S): 100 INJECTION, SOLUTION SUBCUTANEOUS at 21:19

## 2022-03-01 RX ADMIN — Medication 50 MILLIGRAM(S): at 19:07

## 2022-03-01 RX ADMIN — MEROPENEM 100 MILLIGRAM(S): 1 INJECTION INTRAVENOUS at 19:10

## 2022-03-01 RX ADMIN — Medication 100 MILLIGRAM(S): at 12:34

## 2022-03-01 NOTE — PROGRESS NOTE ADULT - SUBJECTIVE AND OBJECTIVE BOX
CC : right leg pain     Pt is seen earlier today via phone  Andres ID #[id number]  Pt is with severe right leg pain, can not walk on it stand she said      awake alert oriented , no over night events reported   labs reviewed       Vital Signs Last 24 Hrs  T(C): 36.8 (01 Mar 2022 08:24), Max: 36.8 (28 Feb 2022 15:29)  T(F): 98.3 (01 Mar 2022 08:24), Max: 98.3 (01 Mar 2022 08:24)  HR: 72 (01 Mar 2022 08:24) (72 - 80)  BP: 103/66 (01 Mar 2022 08:24) (93/56 - 108/62)  BP(mean): --  RR: 18 (01 Mar 2022 08:24) (16 - 18)  SpO2: 99% (01 Mar 2022 08:24) (95% - 99%)    PHYSICAL EXAM:  General: jaundice , no distress  Eyes : sclera yellow   Neck: supple, no JVD   Lungs: CTA bilateral , no wheezing no rales   Cardio: RRR, S1/S2, No murmur  Abdomen: Soft, Nontender, Nondistended; Bowel sounds present  Extremities: No calf tenderness, + edema RLE , skin red mid portion below knee to ankle   worse redness , tender on palpation soft skin                             8.4    13.98 )-----------( 129      ( 28 Feb 2022 05:17 )             23.2     03-01    125<L>  |  89<L>  |  38.0<H>  ----------------------------<  165<H>  4.1   |  25.0  |  1.22    Ca    8.0<L>      01 Mar 2022 05:15    TPro  6.7  /  Alb  1.9<L>  /  TBili  10.4<H>  /  DBili  8.1<H>  /  AST  162<H>  /  ALT  52<H>  /  AlkPhos  407<H>  02-28                     CAPILLARY BLOOD GLUCOSE      POCT Blood Glucose.: 212 mg/dL (01 Mar 2022 07:52)  POCT Blood Glucose.: 231 mg/dL (28 Feb 2022 21:05)  POCT Blood Glucose.: 181 mg/dL (28 Feb 2022 17:02)  POCT Blood Glucose.: 185 mg/dL (28 Feb 2022 12:24)

## 2022-03-01 NOTE — PROGRESS NOTE ADULT - PROBLEM SELECTOR PLAN 1
Decompensated cirrhosis (HE+/ ascites+/ VH-), DF >32, MELD score 52.6%., has JOVANNI and HE, on abx for cellulitis , ucx, blood cx, diagnostic paracentesis to r/o SBP  Continue Lactulose and Rifaximin  Continue Albumin infusion  Thiamine, MVI, Folic acid.

## 2022-03-01 NOTE — DIETITIAN INITIAL EVALUATION ADULT. - OTHER INFO
Pt with h/o HTN, DM, anemia, EtOH abuse with liver cirrhosis, esophageal varices, hepatic encephalopathy, hypothyroidism, who was brought to the ED for altered mental status. As per ED, son reported patient has been having worsening confusion and nose bleed.  Patient is a poor historian.

## 2022-03-01 NOTE — PROGRESS NOTE ADULT - ASSESSMENT
67 y/o female with PMH of HTN, DM, anemia, EtOH abuse with liver cirrhosis, esophageal varices, hepatic encephalopathy, hypothyroidism, who was brought to the ED for altered mental status. As per ED, son reported patient has been having worsening confusion and nose bleed  Patient is a poor historian.Pt had CT of head on arrivial showed   A right supraorbital soft tissue hematoma is markedly decreased in size   from 01/13/2022.  No CT evidence of acute intracranial hemorrhage,   subdural collection or calvarial fracture.  Found to have UTi started on iv rocephin , JOVANNI , hepatic encephalopathy , lactulose iv fuid started , diuretics on hold , pt is with right leg pain and swelling lower part . xary performed due to recent fall farcture ruled out day 3 , worsening pain swelling redness start iv abx for possible cellulitis , worsening hyponatremia . ammonia level improving . GI consulted           1- Hyponatremia acute on cronic due to liver cirrhosis   slow improvement   cont fluid restriction , s/p 2% fluid infuison   on albumin iv   cont to monitor   off diuretics     2- RLE cellulitis . 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

## 2022-03-01 NOTE — PROGRESS NOTE ADULT - SUBJECTIVE AND OBJECTIVE BOX
Patient is a 66y old  Female who presents with a chief complaint of Acute encephalopathy (01 Mar 2022 11:02)      INTERVAL HPI/OVERNIGHT EVENTS: Patient seen and evaluated at bedside, reporting right leg pain, tolerating oral intake,  Denies nausea, vomiting, abdominal pain, chest pain, shortness of breath, hematemesis, hematochezia, melena.                                                          MEDICATIONS  (STANDING):  albumin human 25% IVPB 100 milliLiter(s) IV Intermittent every 6 hours  ceFAZolin   IVPB 1000 milliGRAM(s) IV Intermittent every 8 hours  ceFAZolin   IVPB      dextrose 40% Gel 15 Gram(s) Oral once  dextrose 5%. 1000 milliLiter(s) (50 mL/Hr) IV Continuous <Continuous>  dextrose 5%. 1000 milliLiter(s) (100 mL/Hr) IV Continuous <Continuous>  dextrose 50% Injectable 25 Gram(s) IV Push once  dextrose 50% Injectable 12.5 Gram(s) IV Push once  dextrose 50% Injectable 25 Gram(s) IV Push once  ferrous    sulfate 325 milliGRAM(s) Oral daily  folic acid 1 milliGRAM(s) Oral daily  glucagon  Injectable 1 milliGRAM(s) IntraMuscular once  insulin glargine Injectable (LANTUS) 20 Unit(s) SubCutaneous at bedtime  insulin lispro (ADMELOG) corrective regimen sliding scale   SubCutaneous three times a day before meals  insulin lispro (ADMELOG) corrective regimen sliding scale   SubCutaneous at bedtime  insulin lispro Injectable (ADMELOG) 8 Unit(s) SubCutaneous three times a day before meals  labetalol 50 milliGRAM(s) Oral two times a day  lactulose Syrup 10 Gram(s) Oral three times a day  levothyroxine 75 MICROGram(s) Oral daily  multivitamin 1 Tablet(s) Oral daily  pantoprazole    Tablet 40 milliGRAM(s) Oral two times a day  rifAXIMin 550 milliGRAM(s) Oral two times a day  thiamine 100 milliGRAM(s) Oral daily  vancomycin  IVPB 750 milliGRAM(s) IV Intermittent every 12 hours    MEDICATIONS  (PRN):  acetaminophen     Tablet .. 650 milliGRAM(s) Oral every 6 hours PRN Temp greater or equal to 38C (100.4F), Mild Pain (1 - 3)  aluminum hydroxide/magnesium hydroxide/simethicone Suspension 30 milliLiter(s) Oral every 4 hours PRN Dyspepsia  bisacodyl Suppository 10 milliGRAM(s) Rectal daily PRN Constipation  HYDROmorphone  Injectable 0.5 milliGRAM(s) IV Push four times a day PRN Severe Pain (7 - 10)  ondansetron Injectable 4 milliGRAM(s) IV Push every 8 hours PRN Nausea and/or Vomiting  traMADol 25 milliGRAM(s) Oral every 6 hours PRN Moderate Pain (4 - 6)      Allergies    No Known Allergies    Intolerances    Review of Systems:  · ENMT: negative  · Respiratory and Thorax: negative  · Cardiovascular: negative  · Gastrointestinal: see above.  · Genitourinary:	negative  · Musculoskeletal: negative  · Neurological: negative  · Psychiatric: negative  · Hematology/Lymphatics: negative  · Endocrine: negative      Vital Signs Last 24 Hrs  T(C): 36.8 (01 Mar 2022 08:24), Max: 36.8 (2022 15:29)  T(F): 98.3 (01 Mar 2022 08:24), Max: 98.3 (01 Mar 2022 08:24)  HR: 72 (01 Mar 2022 08:24) (72 - 80)  BP: 103/66 (01 Mar 2022 08:24) (93/56 - 108/62)  BP(mean): --  RR: 18 (01 Mar 2022 08:24) (16 - 18)  SpO2: 99% (01 Mar 2022 08:24) (95% - 99%)    PHYSICAL EXAM:  · Constitutional: Elderly looking woman, Jaundice, in no acute distress.   · Eyes: EOMI; PERRL; no drainage or redness  · ENMT: No oral lesions; no gross abnormalities  · Neck:	No bruits; no thyromegaly or nodules  · Back:	No deformity or limitation of movement  · Respiratory: Breath Sounds equal & clear to percussion & auscultation, no accessory muscle use  · Cardiovascular: Regular rate & rhythm, normal S1, S2; no murmurs, gallops or rubs; no S3, S4  · Gastrointestinal: Soft, non-tender, no hepatosplenomegaly, normal bowel sounds      LABS:                        8.4    13.98 )-----------( 129      ( 2022 05:17 )             23.2     03-01    125<L>  |  89<L>  |  38.0<H>  ----------------------------<  165<H>  4.1   |  25.0  |  1.22    Ca    8.0<L>      01 Mar 2022 05:15    TPro  6.7  /  Alb  1.9<L>  /  TBili  10.4<H>  /  DBili  8.1<H>  /  AST  162<H>  /  ALT  52<H>  /  AlkPhos  407<H>      PT/INR - ( 2022 05:17 )   PT: 20.4 sec;   INR: 1.75 ratio         PTT - ( 2022 05:17 )  PTT:34.6 sec  Urinalysis Basic - ( 2022 18:27 )    Color: Jana / Appearance: Clear / S.020 / pH: x  Gluc: x / Ketone: Trace  / Bili: Large / Urobili: 8   Blood: x / Protein: Negative / Nitrite: Positive   Leuk Esterase: Moderate / RBC: 3-5 /HPF / WBC 26-50 /HPF   Sq Epi: x / Non Sq Epi: Few / Bacteria: Few      LIVER FUNCTIONS - ( 2022 15:02 )  Alb: 1.9 g/dL / Pro: 6.7 g/dL / ALK PHOS: 407 U/L / ALT: 52 U/L / AST: 162 U/L / GGT: x              Patient is a 66y old  Female who presents with a chief complaint of Acute encephalopathy (01 Mar 2022 11:02)      INTERVAL HPI/OVERNIGHT EVENTS: Patient seen and evaluated at bedside, reporting right leg pain, tolerating oral intake,  Denies nausea, vomiting, abdominal pain, chest pain, shortness of breath, hematemesis, hematochezia, melena.                                                          MEDICATIONS  (STANDING):  albumin human 25% IVPB 100 milliLiter(s) IV Intermittent every 6 hours  ceFAZolin   IVPB 1000 milliGRAM(s) IV Intermittent every 8 hours  ceFAZolin   IVPB      dextrose 40% Gel 15 Gram(s) Oral once  dextrose 5%. 1000 milliLiter(s) (50 mL/Hr) IV Continuous <Continuous>  dextrose 5%. 1000 milliLiter(s) (100 mL/Hr) IV Continuous <Continuous>  dextrose 50% Injectable 25 Gram(s) IV Push once  dextrose 50% Injectable 12.5 Gram(s) IV Push once  dextrose 50% Injectable 25 Gram(s) IV Push once  ferrous    sulfate 325 milliGRAM(s) Oral daily  folic acid 1 milliGRAM(s) Oral daily  glucagon  Injectable 1 milliGRAM(s) IntraMuscular once  insulin glargine Injectable (LANTUS) 20 Unit(s) SubCutaneous at bedtime  insulin lispro (ADMELOG) corrective regimen sliding scale   SubCutaneous three times a day before meals  insulin lispro (ADMELOG) corrective regimen sliding scale   SubCutaneous at bedtime  insulin lispro Injectable (ADMELOG) 8 Unit(s) SubCutaneous three times a day before meals  labetalol 50 milliGRAM(s) Oral two times a day  lactulose Syrup 10 Gram(s) Oral three times a day  levothyroxine 75 MICROGram(s) Oral daily  multivitamin 1 Tablet(s) Oral daily  pantoprazole    Tablet 40 milliGRAM(s) Oral two times a day  rifAXIMin 550 milliGRAM(s) Oral two times a day  thiamine 100 milliGRAM(s) Oral daily  vancomycin  IVPB 750 milliGRAM(s) IV Intermittent every 12 hours    MEDICATIONS  (PRN):  acetaminophen     Tablet .. 650 milliGRAM(s) Oral every 6 hours PRN Temp greater or equal to 38C (100.4F), Mild Pain (1 - 3)  aluminum hydroxide/magnesium hydroxide/simethicone Suspension 30 milliLiter(s) Oral every 4 hours PRN Dyspepsia  bisacodyl Suppository 10 milliGRAM(s) Rectal daily PRN Constipation  HYDROmorphone  Injectable 0.5 milliGRAM(s) IV Push four times a day PRN Severe Pain (7 - 10)  ondansetron Injectable 4 milliGRAM(s) IV Push every 8 hours PRN Nausea and/or Vomiting  traMADol 25 milliGRAM(s) Oral every 6 hours PRN Moderate Pain (4 - 6)      Allergies    No Known Allergies    Intolerances    Review of Systems:  · ENMT: negative  · Respiratory and Thorax: negative  · Cardiovascular: negative  · Gastrointestinal: see above.  · Genitourinary:	negative  · Musculoskeletal: negative  · Neurological: negative  · Psychiatric: negative  · Hematology/Lymphatics: negative  · Endocrine: negative      Vital Signs Last 24 Hrs  T(C): 36.8 (01 Mar 2022 08:24), Max: 36.8 (2022 15:29)  T(F): 98.3 (01 Mar 2022 08:24), Max: 98.3 (01 Mar 2022 08:24)  HR: 72 (01 Mar 2022 08:24) (72 - 80)  BP: 103/66 (01 Mar 2022 08:24) (93/56 - 108/62)  BP(mean): --  RR: 18 (01 Mar 2022 08:24) (16 - 18)  SpO2: 99% (01 Mar 2022 08:24) (95% - 99%)    PHYSICAL EXAM:  · Constitutional: Elderly looking woman, Jaundice, in no acute distress.   · Eyes: EOMI; PERRL; no drainage or redness  · ENMT: No oral lesions; no gross abnormalities  · Neck:	No bruits; no thyromegaly or nodules  · Back:	No deformity or limitation of movement  · Respiratory: Breath Sounds equal & clear to percussion & auscultation, no accessory muscle use  · Cardiovascular: Regular rate & rhythm, normal S1, S2; no murmurs, gallops or rubs; no S3, S4  · Gastrointestinal: Soft, non-tender, distension +      LABS:                        8.4    13.98 )-----------( 129      ( 2022 05:17 )             23.2     03-01    125<L>  |  89<L>  |  38.0<H>  ----------------------------<  165<H>  4.1   |  25.0  |  1.22    Ca    8.0<L>      01 Mar 2022 05:15    TPro  6.7  /  Alb  1.9<L>  /  TBili  10.4<H>  /  DBili  8.1<H>  /  AST  162<H>  /  ALT  52<H>  /  AlkPhos  407<H>      PT/INR - ( 2022 05:17 )   PT: 20.4 sec;   INR: 1.75 ratio         PTT - ( 2022 05:17 )  PTT:34.6 sec  Urinalysis Basic - ( 2022 18:27 )    Color: Jana / Appearance: Clear / S.020 / pH: x  Gluc: x / Ketone: Trace  / Bili: Large / Urobili: 8   Blood: x / Protein: Negative / Nitrite: Positive   Leuk Esterase: Moderate / RBC: 3-5 /HPF / WBC 26-50 /HPF   Sq Epi: x / Non Sq Epi: Few / Bacteria: Few      LIVER FUNCTIONS - ( 2022 15:02 )  Alb: 1.9 g/dL / Pro: 6.7 g/dL / ALK PHOS: 407 U/L / ALT: 52 U/L / AST: 162 U/L / GGT: x

## 2022-03-01 NOTE — PROGRESS NOTE ADULT - ASSESSMENT
Hyponatremia- chronic ; systemic vasodilation in setting of liver cirrhosis  hepatic encephalopathy  UTI- on Rocephin  DM  Darrell- scr improving    Pt with chronic hyponatremia- SNa+ range 124-129 during this admission (corrected for hyperglycemia)  Acute worsening from ? hypovolemia  sp 2 % NS  SNa+ of 115 was acute, will not worry about overcorrection with SNa+ of 125 this Am since its at baseline now  Continue albumin infusion for 24 hours  continue to hold diuretics  Give antibiotics in NS instead of D5W

## 2022-03-01 NOTE — DIETITIAN INITIAL EVALUATION ADULT. - ORAL INTAKE PTA/DIET HISTORY
Pt currently sleeping.  Spoke with pts nursing assistant who states that pt consumed ~75% at breakfast this morning.  As per previous nutrition assessment in June 2021, pt has not had any wt loss since then.  Also noted that pt was consuming Glucerna during previous hospital stay.  RD will remain available for complete interview when pt is more awake and as time permits.

## 2022-03-01 NOTE — CONSULT NOTE ADULT - SUBJECTIVE AND OBJECTIVE BOX
INFECTIOUS DISEASES AND INTERNAL MEDICINE at [facility]  =======================================================  Bernardo La MD  Diplomates American Board of Internal Medicine and Infectious Diseases  Telephone [phone]  Fax            [phone]  =======================================================    JUSTIN [id number]      HPI:  65 y/o female with PMH of HTN, DM, anemia, EtOH abuse with liver cirrhosis, esophageal varices, hepatic encephalopathy, hypothyroidism, who was brought to the ED for altered mental status. As per ED, son reported patient has been having worsening confusion and nose bleed yesterday. Patient is a poor historian. She has no chest pain, shortness of breath, nausea, vomiting, abdominal pain.  PT REPORTS SHE FELL ABOUT 2 WEEKS AGO  AND HIT HER LEG   AS ABOVE ADMITTED WITH  CHANGE IN MENTAL STATUS  PT NOW WITH CONTINUED PAIN AND REDNESS IN LEFT LEG  ASKED TO EVALUATE FROM ID STANDPOINT        PAST MEDICAL & SURGICAL HISTORY:  Alcohol abuse    Alcohol abuse    Liver cirrhosis    ETOH abuse    Urea cycle metabolism disorder    Transitory  hyperthyroidism    Lump in female breast    UTI (urinary tract infection)    Lymphangitis, acute, lower leg    Anemia    Hypothyroidism    Chronic back pain    HTN (hypertension)    GERD (gastroesophageal reflux disease)    Pancreatitis    HTN (hypertension)    DM (diabetes mellitus)    No significant past surgical history    S/P abdominoplasty        ANTIBIOTICS  ceFAZolin   IVPB 1000 milliGRAM(s) IV Intermittent every 8 hours  ceFAZolin   IVPB      rifAXIMin 550 milliGRAM(s) Oral two times a day  vancomycin  IVPB 750 milliGRAM(s) IV Intermittent every 12 hours      Allergies    No Known Allergies    Intolerances        SOCIAL HISTORY:     FAMILY HX   FAMILY HISTORY:  FH: depression (Child)        Vital Signs Last 24 Hrs  T(C): 37.2 (01 Mar 2022 16:56), Max: 37.2 (01 Mar 2022 16:56)  T(F): 99 (01 Mar 2022 16:56), Max: 99 (01 Mar 2022 16:56)  HR: 79 (01 Mar 2022 16:56) (72 - 79)  BP: 115/63 (01 Mar 2022 16:56) (93/56 - 115/63)  BP(mean): --  RR: 18 (01 Mar 2022 16:56) (17 - 18)  SpO2: 97% (01 Mar 2022 16:56) (95% - 100%)  Drug Dosing Weight  Height (cm): 149.9 (2022 20:05)  Weight (kg): 63.5 (2022 21:38)  BMI (kg/m2): 28.3 (2022 20:05)  BSA (m2): 1.58 (2022 20:05)      REVIEW OF SYSTEMS:    CONSTITUTIONAL:  As per HPI.    HEENT:  Eyes:  No diplopia or blurred vision. ENT:  No earache, sore throat or runny nose.    CARDIOVASCULAR:  No pressure, squeezing, strangling, tightness, heaviness or aching about the chest, neck, axilla or epigastrium.    RESPIRATORY:  No cough, shortness of breath, PND or orthopnea.    GASTROINTESTINAL:  No nausea, vomiting or diarrhea.    GENITOURINARY:  No dysuria, frequency or urgency.    MUSCULOSKELETAL:  As per HPI.    SKIN:  No change in skin, hair or nails.    NEUROLOGIC:  No paresthesias, fasciculations, seizures or weakness.                  PHYSICAL EXAMINATION:    GENERAL: The patient is a _____in no apparent distress. ___     VITAL SIGNS: T(C): 37.2 (22 @ 16:56), Max: 37.2 (22 @ 16:56)  HR: 79 (22 @ 16:56) (72 - 79)  BP: 115/63 (22 @ 16:56) (93/56 - 115/63)  RR: 18 (22 @ 16:56) (17 - 18)  SpO2: 97% (22 @ 16:56) (95% - 100%)  Wt(kg): --    HEENT: Head is normocephalic and atraumatic.  ANICTERIC  NECK: Supple. No carotid bruits.  No lymphadenopathy or thyromegaly.    LUNGS:COARSE BREATH SOUNDS    HEART: Regular rate and rhythm without murmur.    ABDOMEN: Soft, nontender, and nondistended.  Positive bowel sounds.  No hepatosplenomegaly was noted. NO REBOUND NO GUARDING    EXTREMITIES: NO EDEMA NO ERYTHEMA    NEUROLOGIC: NON FOCAL      SKIN: No ulceration or induration present. NO RASH        BLOOD CULTURES       URINE CX          LABS:                        8.4    13.98 )-----------( 129      ( 2022 05:17 )             23.2     03-    125<L>  |  89<L>  |  38.0<H>  ----------------------------<  165<H>  4.1   |  25.0  |  1.22    Ca    8.0<L>      01 Mar 2022 05:15    TPro  6.7  /  Alb  1.9<L>  /  TBili  10.4<H>  /  DBili  8.1<H>  /  AST  162<H>  /  ALT  52<H>  /  AlkPhos  407<H>      PT/INR - ( 2022 05:17 )   PT: 20.4 sec;   INR: 1.75 ratio         PTT - ( 2022 05:17 )  PTT:34.6 sec  Urinalysis Basic - ( 2022 18:27 )    Color: Jana / Appearance: Clear / S.020 / pH: x  Gluc: x / Ketone: Trace  / Bili: Large / Urobili: 8   Blood: x / Protein: Negative / Nitrite: Positive   Leuk Esterase: Moderate / RBC: 3-5 /HPF / WBC 26-50 /HPF   Sq Epi: x / Non Sq Epi: Few / Bacteria: Few        RADIOLOGY & ADDITIONAL STUDIES:      ASSESSMENT/PLAN  65 y/o female with PMH of HTN, DM, anemia, EtOH abuse with liver cirrhosis, esophageal varices, hepatic encephalopathy, hypothyroidism, who was brought to the ED for altered mental status. As per ED, son reported patient has been having worsening confusion and nose bleed yesterday. Patient is a poor historian. She has no chest pain, shortness of breath, nausea, vomiting, abdominal pain.  PT REPORTS SHE FELL ABOUT 2 WEEKS AGO  AND HIT HER LEG   AS ABOVE ADMITTED WITH  CHANGE IN MENTAL STATUS  PT NOW WITH CONTINUED PAIN AND REDNESS IN LEFT LEG  RECOMMEND ELEVATE LEG  WARM COMPRESSES   PT ON VANCO AND ANCEF  WILL BROADEN ABX WILL RX MERREM/VANCO  U/S WITH ? ABSCESS SUGGEST EVAL FOR ASPIRATION ? IR VS SURGERY  WOULD CHECK CPK  WILL ORDER BLOOD CX X2 SETS AS NOT ORDERED Previously  UNFORTUNATELY LOWER YIELD AS PT ALREADY ON AbX   WILL FOLLOW UP WITH FURTHER RECOMMENDATIONS                 FILOMENA HEARD MD INFECTIOUS DISEASES AND INTERNAL MEDICINE at [facility]  =======================================================  Bernardo La MD  Diplomates American Board of Internal Medicine and Infectious Diseases  Telephone [phone]  Fax            [phone]  =======================================================    JUSTIN [id number]      HPI:  65 y/o female with PMH of HTN, DM, anemia, EtOH abuse with liver cirrhosis, esophageal varices, hepatic encephalopathy, hypothyroidism, who was brought to the ED for altered mental status. As per ED, son reported patient has been having worsening confusion and nose bleed yesterday. Patient is a poor historian. She has no chest pain, shortness of breath, nausea, vomiting, abdominal pain.  PT REPORTS SHE FELL ABOUT 2 WEEKS AGO  AND HIT HER LEG   AS ABOVE ADMITTED WITH  CHANGE IN MENTAL STATUS  PT NOW WITH CONTINUED PAIN AND REDNESS IN LEFT LEG  ASKED TO EVALUATE FROM ID STANDPOINT        PAST MEDICAL & SURGICAL HISTORY:  Alcohol abuse    Alcohol abuse    Liver cirrhosis    ETOH abuse    Urea cycle metabolism disorder    Transitory  hyperthyroidism    Lump in female breast    UTI (urinary tract infection)    Lymphangitis, acute, lower leg    Anemia    Hypothyroidism    Chronic back pain    HTN (hypertension)    GERD (gastroesophageal reflux disease)    Pancreatitis    HTN (hypertension)    DM (diabetes mellitus)    No significant past surgical history    S/P abdominoplasty        ANTIBIOTICS  ceFAZolin   IVPB 1000 milliGRAM(s) IV Intermittent every 8 hours  ceFAZolin   IVPB      rifAXIMin 550 milliGRAM(s) Oral two times a day  vancomycin  IVPB 750 milliGRAM(s) IV Intermittent every 12 hours      Allergies    No Known Allergies    Intolerances        SOCIAL HISTORY:     FAMILY HX   FAMILY HISTORY:  FH: depression (Child)        Vital Signs Last 24 Hrs  T(C): 37.2 (01 Mar 2022 16:56), Max: 37.2 (01 Mar 2022 16:56)  T(F): 99 (01 Mar 2022 16:56), Max: 99 (01 Mar 2022 16:56)  HR: 79 (01 Mar 2022 16:56) (72 - 79)  BP: 115/63 (01 Mar 2022 16:56) (93/56 - 115/63)  BP(mean): --  RR: 18 (01 Mar 2022 16:56) (17 - 18)  SpO2: 97% (01 Mar 2022 16:56) (95% - 100%)  Drug Dosing Weight  Height (cm): 149.9 (2022 20:05)  Weight (kg): 63.5 (2022 21:38)  BMI (kg/m2): 28.3 (2022 20:05)  BSA (m2): 1.58 (2022 20:05)      REVIEW OF SYSTEMS:    CONSTITUTIONAL:  As per HPI.    HEENT:  Eyes:  No diplopia or blurred vision. ENT:  No earache, sore throat or runny nose.    CARDIOVASCULAR:  No pressure, squeezing, strangling, tightness, heaviness or aching about the chest, neck, axilla or epigastrium.    RESPIRATORY:  No cough, shortness of breath, PND or orthopnea.    GASTROINTESTINAL:  No nausea, vomiting or diarrhea.    GENITOURINARY:  No dysuria, frequency or urgency.    MUSCULOSKELETAL:  As per HPI.    SKIN:  AS PER HPI     NEUROLOGIC:  No paresthesias, fasciculations, seizures or weakness.                  PHYSICAL EXAMINATION:    GENERAL: The patient is a _____in no apparent distress. ___     VITAL SIGNS: T(C): 37.2 (22 @ 16:56), Max: 37.2 (22 @ 16:56)  HR: 79 (22 @ 16:56) (72 - 79)  BP: 115/63 (22 @ 16:56) (93/56 - 115/63)  RR: 18 (22 @ 16:56) (17 - 18)  SpO2: 97% (22 @ 16:56) (95% - 100%)  Wt(kg): --    HEENT: Head is normocephalic and atraumatic.  ANICTERIC  NECK: Supple. No carotid bruits.  No lymphadenopathy or thyromegaly.    LUNGS:COARSE BREATH SOUNDS    HEART: Regular rate and rhythm without murmur.    ABDOMEN: Soft, nontender, and nondistended.  Positive bowel sounds.  No hepatosplenomegaly was noted. NO REBOUND NO GUARDING    EXTREMITIES: RIGHT LOWER EXT WITH EDEMA AND ERYTHEMA WARMTH TENDERNESS NO CREPITUS     NEUROLOGIC: NON FOCAL      SKIN: No ulceration or induration present. NO RASH        BLOOD CULTURES       URINE CX          LABS:                        8.4    13.98 )-----------( 129      ( 2022 05:17 )             23.2     03-    125<L>  |  89<L>  |  38.0<H>  ----------------------------<  165<H>  4.1   |  25.0  |  1.22    Ca    8.0<L>      01 Mar 2022 05:15    TPro  6.7  /  Alb  1.9<L>  /  TBili  10.4<H>  /  DBili  8.1<H>  /  AST  162<H>  /  ALT  52<H>  /  AlkPhos  407<H>      PT/INR - ( 2022 05:17 )   PT: 20.4 sec;   INR: 1.75 ratio         PTT - ( 2022 05:17 )  PTT:34.6 sec  Urinalysis Basic - ( 2022 18:27 )    Color: Jana / Appearance: Clear / S.020 / pH: x  Gluc: x / Ketone: Trace  / Bili: Large / Urobili: 8   Blood: x / Protein: Negative / Nitrite: Positive   Leuk Esterase: Moderate / RBC: 3-5 /HPF / WBC 26-50 /HPF   Sq Epi: x / Non Sq Epi: Few / Bacteria: Few        RADIOLOGY & ADDITIONAL STUDIES:      ASSESSMENT/PLAN  65 y/o female with PMH of HTN, DM, anemia, EtOH abuse with liver cirrhosis, esophageal varices, hepatic encephalopathy, hypothyroidism, who was brought to the ED for altered mental status. As per ED, son reported patient has been having worsening confusion and nose bleed yesterday. Patient is a poor historian. She has no chest pain, shortness of breath, nausea, vomiting, abdominal pain.  PT REPORTS SHE FELL ABOUT 2 WEEKS AGO  AND HIT HER LEG   AS ABOVE ADMITTED WITH  CHANGE IN MENTAL STATUS  PT NOW WITH CONTINUED PAIN AND REDNESS IN LEFT LEG  RECOMMEND ELEVATE LEG  WARM COMPRESSES   PT ON VANCO AND ANCEF  WILL BROADEN ABX WILL RX MERREM/VANCO  U/S WITH ? ABSCESS SUGGEST EVAL FOR ASPIRATION ? IR VS SURGERY  WOULD CHECK CPK     WILL FOLLOWUP BLOOD CX    WILL FOLLOW UP WITH FURTHER RECOMMENDATIONS                 FILOMENA HEARD MD

## 2022-03-01 NOTE — DIETITIAN INITIAL EVALUATION ADULT. - PERTINENT LABORATORY DATA
03-01 Na125 mmol/L<L> Glu 165 mg/dL<H> K+ 4.1 mmol/L Cr  1.22 mg/dL BUN 38.0 mg/dL<H> Phos n/a   Alb n/a   PAB n/a

## 2022-03-01 NOTE — PROGRESS NOTE ADULT - SUBJECTIVE AND OBJECTIVE BOX
[facility] DIVISION OF KIDNEY DISEASES AND HYPERTENSION -- FOLLOW UP NOTE  --------------------------------------------------------------------------------  Chief Complaint: hyponatremia    24 hour events/subjective:  s/p 2 % NS      PAST HISTORY  --------------------------------------------------------------------------------  No significant changes to PMH, PSH, FHx, SHx, unless otherwise noted    ALLERGIES & MEDICATIONS  --------------------------------------------------------------------------------  Allergies    No Known Allergies    Intolerances      Standing Inpatient Medications  albumin human 25% IVPB 100 milliLiter(s) IV Intermittent every 6 hours  ceFAZolin   IVPB 1000 milliGRAM(s) IV Intermittent every 8 hours  ceFAZolin   IVPB      dextrose 40% Gel 15 Gram(s) Oral once  dextrose 5%. 1000 milliLiter(s) IV Continuous <Continuous>  dextrose 5%. 1000 milliLiter(s) IV Continuous <Continuous>  dextrose 50% Injectable 25 Gram(s) IV Push once  dextrose 50% Injectable 12.5 Gram(s) IV Push once  dextrose 50% Injectable 25 Gram(s) IV Push once  ferrous    sulfate 325 milliGRAM(s) Oral daily  folic acid 1 milliGRAM(s) Oral daily  glucagon  Injectable 1 milliGRAM(s) IntraMuscular once  insulin glargine Injectable (LANTUS) 20 Unit(s) SubCutaneous at bedtime  insulin lispro (ADMELOG) corrective regimen sliding scale   SubCutaneous three times a day before meals  insulin lispro (ADMELOG) corrective regimen sliding scale   SubCutaneous at bedtime  insulin lispro Injectable (ADMELOG) 8 Unit(s) SubCutaneous three times a day before meals  labetalol 50 milliGRAM(s) Oral two times a day  lactulose Syrup 10 Gram(s) Oral three times a day  levothyroxine 75 MICROGram(s) Oral daily  multivitamin 1 Tablet(s) Oral daily  pantoprazole    Tablet 40 milliGRAM(s) Oral two times a day  rifAXIMin 550 milliGRAM(s) Oral two times a day  thiamine 100 milliGRAM(s) Oral daily  vancomycin  IVPB 750 milliGRAM(s) IV Intermittent every 12 hours    PRN Inpatient Medications  acetaminophen     Tablet .. 650 milliGRAM(s) Oral every 6 hours PRN  aluminum hydroxide/magnesium hydroxide/simethicone Suspension 30 milliLiter(s) Oral every 4 hours PRN  bisacodyl Suppository 10 milliGRAM(s) Rectal daily PRN  HYDROmorphone  Injectable 0.5 milliGRAM(s) IV Push four times a day PRN  ondansetron Injectable 4 milliGRAM(s) IV Push every 8 hours PRN  traMADol 25 milliGRAM(s) Oral every 6 hours PRN      REVIEW OF SYSTEMS  --------------------------------------------------------------------------------  Gen: No weight changes, fatigue, fevers/chills, weakness  Skin: No rashes  Head/Eyes/Ears/Mouth: No headache; Normal hearing; Normal vision w/o blurriness; No sinus pain/discomfort, sore throat  Respiratory: No dyspnea, cough, wheezing, hemoptysis  CV: No chest pain, PND, orthopnea  GI: No abdominal pain, diarrhea, constipation, nausea, vomiting, melena, hematochezia  : No increased frequency, dysuria, hematuria, nocturia  MSK: No joint pain/swelling; no back pain; no edema  Neuro: No dizziness/lightheadedness, weakness, seizures, numbness, tingling  Heme: No easy bruising or bleeding  Endo: No heat/cold intolerance  Psych: No significant nervousness, anxiety, stress, depression    All other systems were reviewed and are negative, except as noted.    VITALS/PHYSICAL EXAM  --------------------------------------------------------------------------------  T(C): 36.8 (03-01-22 @ 08:24), Max: 36.8 (02-28-22 @ 15:29)  HR: 72 (03-01-22 @ 08:24) (72 - 80)  BP: 103/66 (03-01-22 @ 08:24) (93/56 - 108/62)  RR: 18 (03-01-22 @ 08:24) (16 - 18)  SpO2: 99% (03-01-22 @ 08:24) (95% - 99%)  Wt(kg): --        02-28-22 @ 07:01  -  03-01-22 @ 07:00  --------------------------------------------------------  IN: 420 mL / OUT: 0 mL / NET: 420 mL    03-01-22 @ 07:01  -  03-01-22 @ 13:15  --------------------------------------------------------  IN: 360 mL / OUT: 0 mL / NET: 360 mL      Physical Exam:  	Gen: NAD  	HEENT: Scleral icterus  	Pulm: CTA B/L  	CV: RRR, S1S2; no rub  	Back: No spinal or CVA tenderness; no sacral edema  	Abd: +BS, soft, nontender/nondistended  	: No suprapubic tenderness  	UE: Warm, no edema; no asterixis  	LE: Warm, no edema  	Neuro: No focal deficit  	Psych: Normal affect and mood  	Skin: Warm    LABS/STUDIES  --------------------------------------------------------------------------------              8.4    13.98 >-----------<  129      [02-28-22 @ 05:17]              23.2     125  |  89  |  38.0  ----------------------------<  165      [03-01-22 @ 05:15]  4.1   |  25.0  |  1.22        Ca     8.0     [03-01-22 @ 05:15]    TPro  6.7  /  Alb  1.9  /  TBili  10.4  /  DBili  8.1  /  AST  162  /  ALT  52  /  AlkPhos  407  [02-28-22 @ 15:02]    PT/INR: PT 20.4 , INR 1.75       [02-28-22 @ 05:17]  PTT: 34.6       [02-28-22 @ 05:17]    Serum Osmolality 284      [02-27-22 @ 14:06]    Creatinine Trend:  SCr 1.22 [03-01 @ 05:15]  SCr 1.57 [02-28 @ 22:54]  SCr 1.55 [02-28 @ 15:02]  SCr 1.41 [02-28 @ 11:42]  SCr 1.43 [02-28 @ 05:17]    Urinalysis - [02-27-22 @ 18:27]      Color Jana / Appearance Clear / SG 1.020 / pH 5.0      Gluc Negative / Ketone Trace  / Bili Large / Urobili 8       Blood Large / Protein Negative / Leuk Est Moderate / Nitrite Positive      RBC 3-5 / WBC 26-50 / Hyaline  / Gran  / Sq Epi  / Non Sq Epi Few / Bacteria Few    Urine Sodium <30      [02-27-22 @ 18:27]  Urine Chloride <27      [02-27-22 @ 18:27]  Urine Osmolality 431      [02-27-22 @ 18:27]    Iron 75, TIBC 152, %sat 49      [02-27-22 @ 06:13]  Ferritin 970      [02-27-22 @ 06:13]  Vitamin D (25OH) 39.5      [02-27-22 @ 10:51]  TSH 6.23      [02-27-22 @ 06:13]    HIV Nonreact      [02-23-22 @ 21:44]

## 2022-03-02 LAB
ALBUMIN SERPL ELPH-MCNC: 2.8 G/DL — LOW (ref 3.3–5.2)
ALP SERPL-CCNC: 277 U/L — HIGH (ref 40–120)
ALT FLD-CCNC: 26 U/L — SIGNIFICANT CHANGE UP
ANION GAP SERPL CALC-SCNC: 11 MMOL/L — SIGNIFICANT CHANGE UP (ref 5–17)
ANISOCYTOSIS BLD QL: SLIGHT — SIGNIFICANT CHANGE UP
AST SERPL-CCNC: 115 U/L — HIGH
BASOPHILS # BLD AUTO: 0.2 K/UL — SIGNIFICANT CHANGE UP (ref 0–0.2)
BASOPHILS NFR BLD AUTO: 1.7 % — SIGNIFICANT CHANGE UP (ref 0–2)
BILIRUB SERPL-MCNC: 8.4 MG/DL — HIGH (ref 0.4–2)
BLD GP AB SCN SERPL QL: SIGNIFICANT CHANGE UP
BUN SERPL-MCNC: 22.4 MG/DL — HIGH (ref 8–20)
CALCIUM SERPL-MCNC: 8.1 MG/DL — LOW (ref 8.6–10.2)
CHLORIDE SERPL-SCNC: 91 MMOL/L — LOW (ref 98–107)
CO2 SERPL-SCNC: 24 MMOL/L — SIGNIFICANT CHANGE UP (ref 22–29)
CREAT SERPL-MCNC: 0.84 MG/DL — SIGNIFICANT CHANGE UP (ref 0.5–1.3)
EGFR: 78 ML/MIN/1.73M2 — SIGNIFICANT CHANGE UP
EOSINOPHIL # BLD AUTO: 0 K/UL — SIGNIFICANT CHANGE UP (ref 0–0.5)
EOSINOPHIL NFR BLD AUTO: 0 % — SIGNIFICANT CHANGE UP (ref 0–6)
GIANT PLATELETS BLD QL SMEAR: PRESENT — SIGNIFICANT CHANGE UP
GLUCOSE BLDC GLUCOMTR-MCNC: 124 MG/DL — HIGH (ref 70–99)
GLUCOSE BLDC GLUCOMTR-MCNC: 146 MG/DL — HIGH (ref 70–99)
GLUCOSE BLDC GLUCOMTR-MCNC: 193 MG/DL — HIGH (ref 70–99)
GLUCOSE BLDC GLUCOMTR-MCNC: 305 MG/DL — HIGH (ref 70–99)
GLUCOSE BLDC GLUCOMTR-MCNC: 314 MG/DL — HIGH (ref 70–99)
GLUCOSE BLDC GLUCOMTR-MCNC: 67 MG/DL — LOW (ref 70–99)
GLUCOSE SERPL-MCNC: 182 MG/DL — HIGH (ref 70–99)
HCT VFR BLD CALC: 19.9 % — CRITICAL LOW (ref 34.5–45)
HGB BLD-MCNC: 7.3 G/DL — LOW (ref 11.5–15.5)
HYPOCHROMIA BLD QL: SLIGHT — SIGNIFICANT CHANGE UP
LYMPHOCYTES # BLD AUTO: 0.51 K/UL — LOW (ref 1–3.3)
LYMPHOCYTES # BLD AUTO: 4.4 % — LOW (ref 13–44)
MACROCYTES BLD QL: SLIGHT — SIGNIFICANT CHANGE UP
MANUAL SMEAR VERIFICATION: SIGNIFICANT CHANGE UP
MCHC RBC-ENTMCNC: 32.4 PG — SIGNIFICANT CHANGE UP (ref 27–34)
MCHC RBC-ENTMCNC: 36.7 GM/DL — HIGH (ref 32–36)
MCV RBC AUTO: 88.4 FL — SIGNIFICANT CHANGE UP (ref 80–100)
MONOCYTES # BLD AUTO: 0.9 K/UL — SIGNIFICANT CHANGE UP (ref 0–0.9)
MONOCYTES NFR BLD AUTO: 7.8 % — SIGNIFICANT CHANGE UP (ref 2–14)
MYELOCYTES NFR BLD: 1.7 % — HIGH (ref 0–0)
NEUTROPHILS # BLD AUTO: 9.62 K/UL — HIGH (ref 1.8–7.4)
NEUTROPHILS NFR BLD AUTO: 83.5 % — HIGH (ref 43–77)
OVALOCYTES BLD QL SMEAR: SLIGHT — SIGNIFICANT CHANGE UP
PLAT MORPH BLD: NORMAL — SIGNIFICANT CHANGE UP
PLATELET # BLD AUTO: 145 K/UL — LOW (ref 150–400)
POIKILOCYTOSIS BLD QL AUTO: SLIGHT — SIGNIFICANT CHANGE UP
POLYCHROMASIA BLD QL SMEAR: SLIGHT — SIGNIFICANT CHANGE UP
POTASSIUM SERPL-MCNC: 4.1 MMOL/L — SIGNIFICANT CHANGE UP (ref 3.5–5.3)
POTASSIUM SERPL-SCNC: 4.1 MMOL/L — SIGNIFICANT CHANGE UP (ref 3.5–5.3)
PROT SERPL-MCNC: 5.9 G/DL — LOW (ref 6.6–8.7)
RBC # BLD: 2.25 M/UL — LOW (ref 3.8–5.2)
RBC # FLD: 21.1 % — HIGH (ref 10.3–14.5)
RBC BLD AUTO: ABNORMAL
SARS-COV-2 RNA SPEC QL NAA+PROBE: SIGNIFICANT CHANGE UP
SODIUM SERPL-SCNC: 126 MMOL/L — LOW (ref 135–145)
TARGETS BLD QL SMEAR: SLIGHT — SIGNIFICANT CHANGE UP
VANCOMYCIN FLD-MCNC: 11.1 UG/ML — SIGNIFICANT CHANGE UP
VARIANT LYMPHS # BLD: 0.9 % — SIGNIFICANT CHANGE UP (ref 0–6)
WBC # BLD: 11.52 K/UL — HIGH (ref 3.8–10.5)
WBC # FLD AUTO: 11.52 K/UL — HIGH (ref 3.8–10.5)

## 2022-03-02 PROCEDURE — 99233 SBSQ HOSP IP/OBS HIGH 50: CPT

## 2022-03-02 PROCEDURE — 99231 SBSQ HOSP IP/OBS SF/LOW 25: CPT

## 2022-03-02 PROCEDURE — 99232 SBSQ HOSP IP/OBS MODERATE 35: CPT

## 2022-03-02 RX ORDER — ALBUMIN HUMAN 25 %
100 VIAL (ML) INTRAVENOUS EVERY 4 HOURS
Refills: 0 | Status: COMPLETED | OUTPATIENT
Start: 2022-03-02 | End: 2022-03-03

## 2022-03-02 RX ORDER — MEROPENEM 1 G/30ML
1000 INJECTION INTRAVENOUS EVERY 8 HOURS
Refills: 0 | Status: DISCONTINUED | OUTPATIENT
Start: 2022-03-02 | End: 2022-03-09

## 2022-03-02 RX ORDER — SODIUM CHLORIDE 5 G/100ML
1000 INJECTION, SOLUTION INTRAVENOUS
Refills: 0 | Status: DISCONTINUED | OUTPATIENT
Start: 2022-03-02 | End: 2022-03-07

## 2022-03-02 RX ORDER — DEXTROSE 50 % IN WATER 50 %
15 SYRINGE (ML) INTRAVENOUS ONCE
Refills: 0 | Status: COMPLETED | OUTPATIENT
Start: 2022-03-02 | End: 2022-03-02

## 2022-03-02 RX ADMIN — PANTOPRAZOLE SODIUM 40 MILLIGRAM(S): 20 TABLET, DELAYED RELEASE ORAL at 05:34

## 2022-03-02 RX ADMIN — Medication 50 MILLILITER(S): at 02:00

## 2022-03-02 RX ADMIN — LACTULOSE 10 GRAM(S): 10 SOLUTION ORAL at 13:13

## 2022-03-02 RX ADMIN — Medication 50 MILLIGRAM(S): at 05:33

## 2022-03-02 RX ADMIN — HYDROMORPHONE HYDROCHLORIDE 0.5 MILLIGRAM(S): 2 INJECTION INTRAMUSCULAR; INTRAVENOUS; SUBCUTANEOUS at 21:41

## 2022-03-02 RX ADMIN — Medication 8: at 18:48

## 2022-03-02 RX ADMIN — Medication 1 MILLIGRAM(S): at 11:39

## 2022-03-02 RX ADMIN — SODIUM CHLORIDE 65 MILLILITER(S): 5 INJECTION, SOLUTION INTRAVENOUS at 21:42

## 2022-03-02 RX ADMIN — Medication 250 MILLIGRAM(S): at 05:32

## 2022-03-02 RX ADMIN — Medication 1 TABLET(S): at 11:39

## 2022-03-02 RX ADMIN — INSULIN GLARGINE 20 UNIT(S): 100 INJECTION, SOLUTION SUBCUTANEOUS at 21:50

## 2022-03-02 RX ADMIN — Medication 50 MILLILITER(S): at 13:14

## 2022-03-02 RX ADMIN — MEROPENEM 100 MILLIGRAM(S): 1 INJECTION INTRAVENOUS at 21:50

## 2022-03-02 RX ADMIN — Medication 650 MILLIGRAM(S): at 07:48

## 2022-03-02 RX ADMIN — Medication 50 MILLILITER(S): at 20:49

## 2022-03-02 RX ADMIN — Medication 325 MILLIGRAM(S): at 11:39

## 2022-03-02 RX ADMIN — MEROPENEM 100 MILLIGRAM(S): 1 INJECTION INTRAVENOUS at 05:31

## 2022-03-02 RX ADMIN — Medication 4: at 21:50

## 2022-03-02 RX ADMIN — HYDROMORPHONE HYDROCHLORIDE 0.5 MILLIGRAM(S): 2 INJECTION INTRAMUSCULAR; INTRAVENOUS; SUBCUTANEOUS at 20:46

## 2022-03-02 RX ADMIN — Medication 250 MILLIGRAM(S): at 18:49

## 2022-03-02 RX ADMIN — PANTOPRAZOLE SODIUM 40 MILLIGRAM(S): 20 TABLET, DELAYED RELEASE ORAL at 18:47

## 2022-03-02 RX ADMIN — LACTULOSE 10 GRAM(S): 10 SOLUTION ORAL at 05:33

## 2022-03-02 RX ADMIN — Medication 75 MICROGRAM(S): at 05:33

## 2022-03-02 RX ADMIN — Medication 50 MILLILITER(S): at 16:52

## 2022-03-02 RX ADMIN — Medication 15 GRAM(S): at 14:22

## 2022-03-02 RX ADMIN — Medication 50 MILLILITER(S): at 05:34

## 2022-03-02 RX ADMIN — Medication 8 UNIT(S): at 11:40

## 2022-03-02 RX ADMIN — Medication 100 MILLIGRAM(S): at 11:42

## 2022-03-02 RX ADMIN — LACTULOSE 10 GRAM(S): 10 SOLUTION ORAL at 20:47

## 2022-03-02 RX ADMIN — Medication 2: at 08:31

## 2022-03-02 RX ADMIN — Medication 8 UNIT(S): at 08:31

## 2022-03-02 RX ADMIN — Medication 50 MILLIGRAM(S): at 19:01

## 2022-03-02 NOTE — PROGRESS NOTE ADULT - ASSESSMENT
67 y/o female with PMH of HTN, DM, anemia, EtOH abuse with liver cirrhosis, esophageal varices, hepatic encephalopathy, hypothyroidism, who was brought to the ED for altered mental status. As per ED, son reported patient has been having worsening confusion and nose bleed  Patient is a poor historian.Pt had CT of head on arrivial showed   A right supraorbital soft tissue hematoma is markedly decreased in size   from 01/13/2022.  No CT evidence of acute intracranial hemorrhage,   subdural collection or calvarial fracture.  Found to have UTi started on iv rocephin , JOVANNI , hepatic encephalopathy , lactulose iv fuid started , diuretics on hold , pt is with right leg pain and swelling lower part . xary performed due to recent fall farcture ruled out day 3 , worsening pain swelling redness start iv abx for possible cellulitis , worsening hyponatremia . ammonia level improving . 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

## 2022-03-02 NOTE — PROGRESS NOTE ADULT - SUBJECTIVE AND OBJECTIVE BOX
CC : right leg pain     Pt is seen earlier today via phone  Hever ID [id number]    pt is awake alert , no distress   + pain in the RLE , US result reviewed in am , surgery consult called for possible I and D     ID , GI nephro follow up appreciated     meds reviewed     Vital Signs Last 24 Hrs  T(C): 38.6 (02 Mar 2022 07:46), Max: 38.6 (02 Mar 2022 07:15)  T(F): 101.4 (02 Mar 2022 07:46), Max: 101.4 (02 Mar 2022 07:15)  HR: 77 (02 Mar 2022 07:15) (74 - 107)  BP: 122/61 (02 Mar 2022 07:15) (111/66 - 122/61)  BP(mean): --  RR: 18 (02 Mar 2022 07:15) (18 - 18)  SpO2: 98% (02 Mar 2022 07:15) (90% - 100%)      PHYSICAL EXAM:  General: jaundice , no distress  Eyes : sclera yellow   Forehead " right small lump hard to touch ( Ct of head hematoma on admission )   Neck: supple, no JVD   Lungs: CTA bilateral , no wheezing no rales   Cardio: RRR, S1/S2, No murmur  Abdomen: Soft, Nontender, Nondistended; Bowel sounds present  Extremities: No calf tenderness, + edema RLE , skin red mid portion below knee to ankle soft swelling mild fluctuates                                     7.3    11.52 )-----------( 145      ( 02 Mar 2022 07:09 )             19.9   03-02    126<L>  |  91<L>  |  22.4<H>  ----------------------------<  182<H>  4.1   |  24.0  |  0.84    Ca    8.1<L>      02 Mar 2022 07:09    TPro  5.9<L>  /  Alb  2.8<L>  /  TBili  8.4<H>  /  DBili  x   /  AST  115<H>  /  ALT  26  /  AlkPhos  277<H>  03-02

## 2022-03-02 NOTE — PROGRESS NOTE ADULT - PROBLEM SELECTOR PLAN 1
Decompensated cirrhosis. DF >32, MELD score 52.6. JOVANNI improving. on abx for cellulitis, s/p incision and drainage of RLE wound w/ cultures sent. ucx, blood cx pending results.   Pending diagnostic paracentesis to r/o SBP  Continue Lactulose and Rifaximin  Continue Albumin infusion  Thiamine, MVI, Folic acid.  Hold off on steroids for now due to  active infection.   Continue to trend CBC, Coags, LFTs

## 2022-03-02 NOTE — CONSULT NOTE ADULT - SUBJECTIVE AND OBJECTIVE BOX
HPI:  67 y/o female with PMH of HTN, DM, anemia, EtOH abuse with liver cirrhosis, esophageal varices, hepatic encephalopathy, hypothyroidism, who was brought to the ED for altered mental status. As per ED, son reported patient has been having worsening confusion and nose bleed yesterday. Patient is a poor historian. She has no chest pain, shortness of breath, nausea, vomiting, abdominal pain.  PT REPORTS SHE FELL ABOUT 2 WEEKS AGO  AND HIT HER LEG   AS ABOVE ADMITTED WITH  CHANGE IN MENTAL STATUS  PT NOW WITH CONTINUED PAIN AND REDNESS IN LEFT LEG  ASKED TO EVALUATE FROM surgery standpoint      PAST MEDICAL & SURGICAL HISTORY:  Alcohol abuse    Alcohol abuse    Liver cirrhosis    ETOH abuse    Urea cycle metabolism disorder    Transitory  hyperthyroidism    Lump in female breast    UTI (urinary tract infection)    Lymphangitis, acute, lower leg    Anemia    Hypothyroidism    Chronic back pain    HTN (hypertension)    GERD (gastroesophageal reflux disease)    Pancreatitis    HTN (hypertension)    DM (diabetes mellitus)    No significant past surgical history    S/P abdominoplasty      MEDICATIONS  (STANDING):  albumin human 25% IVPB 100 milliLiter(s) IV Intermittent every 6 hours  dextrose 40% Gel 15 Gram(s) Oral once  dextrose 5%. 1000 milliLiter(s) (50 mL/Hr) IV Continuous <Continuous>  dextrose 5%. 1000 milliLiter(s) (100 mL/Hr) IV Continuous <Continuous>  dextrose 50% Injectable 25 Gram(s) IV Push once  dextrose 50% Injectable 12.5 Gram(s) IV Push once  dextrose 50% Injectable 25 Gram(s) IV Push once  ferrous    sulfate 325 milliGRAM(s) Oral daily  folic acid 1 milliGRAM(s) Oral daily  glucagon  Injectable 1 milliGRAM(s) IntraMuscular once  insulin glargine Injectable (LANTUS) 20 Unit(s) SubCutaneous at bedtime  insulin lispro (ADMELOG) corrective regimen sliding scale   SubCutaneous three times a day before meals  insulin lispro (ADMELOG) corrective regimen sliding scale   SubCutaneous at bedtime  insulin lispro Injectable (ADMELOG) 8 Unit(s) SubCutaneous three times a day before meals  labetalol 50 milliGRAM(s) Oral two times a day  lactulose Syrup 10 Gram(s) Oral three times a day  levothyroxine 75 MICROGram(s) Oral daily  meropenem  IVPB 1000 milliGRAM(s) IV Intermittent every 12 hours  multivitamin 1 Tablet(s) Oral daily  pantoprazole    Tablet 40 milliGRAM(s) Oral two times a day  rifAXIMin 550 milliGRAM(s) Oral two times a day  thiamine 100 milliGRAM(s) Oral daily  vancomycin  IVPB 750 milliGRAM(s) IV Intermittent every 12 hours    MEDICATIONS  (PRN):  acetaminophen     Tablet .. 650 milliGRAM(s) Oral every 6 hours PRN Temp greater or equal to 38C (100.4F), Mild Pain (1 - 3)  aluminum hydroxide/magnesium hydroxide/simethicone Suspension 30 milliLiter(s) Oral every 4 hours PRN Dyspepsia  bisacodyl Suppository 10 milliGRAM(s) Rectal daily PRN Constipation  HYDROmorphone  Injectable 0.5 milliGRAM(s) IV Push four times a day PRN Severe Pain (7 - 10)  ondansetron Injectable 4 milliGRAM(s) IV Push every 8 hours PRN Nausea and/or Vomiting  traMADol 25 milliGRAM(s) Oral every 6 hours PRN Moderate Pain (4 - 6)      Vital Signs Last 24 Hrs  T(C): 38.6 (02 Mar 2022 07:46), Max: 38.6 (02 Mar 2022 07:15)  T(F): 101.4 (02 Mar 2022 07:46), Max: 101.4 (02 Mar 2022 07:15)  HR: 77 (02 Mar 2022 07:15) (74 - 107)  BP: 122/61 (02 Mar 2022 07:15) (111/66 - 122/61)  BP(mean): --  RR: 18 (02 Mar 2022 07:15) (18 - 18)  SpO2: 98% (02 Mar 2022 07:15) (90% - 100%)        --------------------------------------------------------  IN:    Oral Fluid: 720 mL  Total IN: 720 mL    OUT:  Total OUT: 0 mL    Total NET: 720 mL          Physical Exam:    HEENT: Head is normocephalic and atraumatic.  ANICTERIC  NECK: Supple. No carotid bruits.  No lymphadenopathy or thyromegaly.    LUNGS:COARSE BREATH SOUNDS    HEART: Regular rate and rhythm without murmur.    ABDOMEN: Soft, nontender, and nondistended.  Positive bowel sounds.  No hepatosplenomegaly was noted. NO REBOUND NO GUARDING    EXTREMITIES: RIGHT LOWER EXT WITH EDEMA AND ERYTHEMA WARMTH TENDERNESS NO CREPITUS. Fluctuance is appreciated below the rt knee.     NEUROLOGIC: NON FOCAL    SKIN: No ulceration or induration present. NO RASH      LABS:                        7.3    11.52 )-----------( 145      ( 02 Mar 2022 07:09 )             19.9     03    126<L>  |  91<L>  |  22.4<H>  ----------------------------<  182<H>  4.1   |  24.0  |  0.84    Ca    8.1<L>      02 Mar 2022 07:09    TPro  5.9<L>  /  Alb  2.8<L>  /  TBili  8.4<H>  /  DBili  x   /  AST  115<H>  /  ALT  26  /  AlkPhos  277<H>

## 2022-03-02 NOTE — PROGRESS NOTE ADULT - ASSESSMENT
65 y/o female with PMH of HTN, DM, anemia, EtOH abuse with liver cirrhosis, esophageal varices, hepatic encephalopathy, hypothyroidism, who was brought to the ED for altered mental status. As per ED, son reported patient has been having worsening confusion and nose bleed yesterday. Patient is a poor historian. She has no chest pain, shortness of breath, nausea, vomiting, abdominal pain.  PT REPORTS SHE FELL ABOUT 2 WEEKS AGO  AND HIT HER LEG   AS ABOVE ADMITTED WITH  CHANGE IN MENTAL STATUS  PT NOW WITH CONTINUED PAIN AND REDNESS IN LEFT LEG  RECOMMEND ELEVATE LEG  WARM COMPRESSES    on MERREM/VANCO  U/S WITH ? ABSCESS SUGGEST EVAL FOR ASPIRATION ? IR VS SURGERY        WILL FOLLOWUP BLOOD CX    WILL FOLLOW UP WITH FURTHER RECOMMENDATIONS

## 2022-03-02 NOTE — PROGRESS NOTE ADULT - SUBJECTIVE AND OBJECTIVE BOX
INFECTIOUS DISEASES AND INTERNAL MEDICINE at [place]  =======================================================  Bernardo La MD  Diplomates [employer] Board of Internal Medicine and Infectious Diseases  Telephone [phone]  Fax            [phone]  =======================================================    JUSTIN LOVE [id number]    Follow up:    Allergies:  No Known Allergies      Medications:  acetaminophen     Tablet .. 650 milliGRAM(s) Oral every 6 hours PRN  albumin human 25% IVPB 100 milliLiter(s) IV Intermittent every 6 hours  aluminum hydroxide/magnesium hydroxide/simethicone Suspension 30 milliLiter(s) Oral every 4 hours PRN  bisacodyl Suppository 10 milliGRAM(s) Rectal daily PRN  dextrose 40% Gel 15 Gram(s) Oral once  dextrose 5%. 1000 milliLiter(s) IV Continuous <Continuous>  dextrose 5%. 1000 milliLiter(s) IV Continuous <Continuous>  dextrose 50% Injectable 25 Gram(s) IV Push once  dextrose 50% Injectable 12.5 Gram(s) IV Push once  dextrose 50% Injectable 25 Gram(s) IV Push once  ferrous    sulfate 325 milliGRAM(s) Oral daily  folic acid 1 milliGRAM(s) Oral daily  glucagon  Injectable 1 milliGRAM(s) IntraMuscular once  HYDROmorphone  Injectable 0.5 milliGRAM(s) IV Push four times a day PRN  insulin glargine Injectable (LANTUS) 20 Unit(s) SubCutaneous at bedtime  insulin lispro (ADMELOG) corrective regimen sliding scale   SubCutaneous three times a day before meals  insulin lispro (ADMELOG) corrective regimen sliding scale   SubCutaneous at bedtime  insulin lispro Injectable (ADMELOG) 8 Unit(s) SubCutaneous three times a day before meals  labetalol 50 milliGRAM(s) Oral two times a day  lactulose Syrup 10 Gram(s) Oral three times a day  levothyroxine 75 MICROGram(s) Oral daily  meropenem  IVPB 1000 milliGRAM(s) IV Intermittent every 12 hours  multivitamin 1 Tablet(s) Oral daily  ondansetron Injectable 4 milliGRAM(s) IV Push every 8 hours PRN  pantoprazole    Tablet 40 milliGRAM(s) Oral two times a day  rifAXIMin 550 milliGRAM(s) Oral two times a day  thiamine 100 milliGRAM(s) Oral daily  traMADol 25 milliGRAM(s) Oral every 6 hours PRN  vancomycin  IVPB 750 milliGRAM(s) IV Intermittent every 12 hours    SOCIAL       FAMILY   FAMILY HISTORY:  FH: depression (Child)      REVIEW OF SYSTEMS:  CONSTITUTIONAL:  No Fever or chills  HEENT:   No diplopia or blurred vision.  No earache, sore throat or runny nose.  CARDIOVASCULAR:  No pressure, squeezing, strangling, tightness, heaviness or aching about the chest, neck, axilla or epigastrium.  RESPIRATORY:  No cough, shortness of breath, PND or orthopnea.  GASTROINTESTINAL:  No nausea, vomiting or diarrhea.  GENITOURINARY:  No dysuria, frequency or urgency. No Blood in urine  MUSCULOSKELETAL:   moves all joints  SKIN:  No change in skin, hair or nails.  NEUROLOGIC:  No paresthesias, fasciculations, seizures or weakness.  PSYCHIATRIC:  No disorder of thought or mood.  ENDOCRINE:  No heat or cold intolerance, polyuria or polydipsia.  HEMATOLOGICAL:  No easy bruising or bleeding.            Physical Exam:  ICU Vital Signs Last 24 Hrs  T(C): 38.6 (02 Mar 2022 07:46), Max: 38.6 (02 Mar 2022 07:15)  T(F): 101.4 (02 Mar 2022 07:46), Max: 101.4 (02 Mar 2022 07:15)  HR: 77 (02 Mar 2022 07:15) (74 - 107)  BP: 122/61 (02 Mar 2022 07:15) (111/66 - 122/61)  BP(mean): --  ABP: --  ABP(mean): --  RR: 18 (02 Mar 2022 07:15) (18 - 18)  SpO2: 98% (02 Mar 2022 07:15) (90% - 100%)    GEN: NAD,   HEENT: normocephalic and atraumatic. EOMI. LOWELL.    NECK: Supple. No carotid bruits.  No lymphadenopathy or thyromegaly.  LUNGS: Clear to auscultation.  HEART: Regular rate and rhythm without murmur.  ABDOMEN: Soft, nontender, and nondistended.  Positive bowel sounds.    : No CVA tenderness  EXTREMITIES: Without any cyanosis, clubbing, rash, lesions or edema.  MSK: no joint swelling  NEUROLOGIC: Cranial nerves II through XII are grossly intact.  PSYCHIATRIC: Appropriate affect .  SKIN: No ulceration or induration present.        Labs:  Vitals:  ============  T(F): 101.4 (02 Mar 2022 07:46), Max: 101.4 (02 Mar 2022 07:15)  HR: 77 (02 Mar 2022 07:15)  BP: 122/61 (02 Mar 2022 07:15)  RR: 18 (02 Mar 2022 07:15)  SpO2: 98% (02 Mar 2022 07:15) (90% - 100%)  temp max in last 48H T(F): , Max: 101.4 (03-02-22 @ 07:15)    =======================================================  Current Antibiotics:  meropenem  IVPB 1000 milliGRAM(s) IV Intermittent every 12 hours  rifAXIMin 550 milliGRAM(s) Oral two times a day  vancomycin  IVPB 750 milliGRAM(s) IV Intermittent every 12 hours    Other medications:  albumin human 25% IVPB 100 milliLiter(s) IV Intermittent every 6 hours  dextrose 40% Gel 15 Gram(s) Oral once  dextrose 5%. 1000 milliLiter(s) IV Continuous <Continuous>  dextrose 5%. 1000 milliLiter(s) IV Continuous <Continuous>  dextrose 50% Injectable 25 Gram(s) IV Push once  dextrose 50% Injectable 12.5 Gram(s) IV Push once  dextrose 50% Injectable 25 Gram(s) IV Push once  ferrous    sulfate 325 milliGRAM(s) Oral daily  folic acid 1 milliGRAM(s) Oral daily  glucagon  Injectable 1 milliGRAM(s) IntraMuscular once  insulin glargine Injectable (LANTUS) 20 Unit(s) SubCutaneous at bedtime  insulin lispro (ADMELOG) corrective regimen sliding scale   SubCutaneous three times a day before meals  insulin lispro (ADMELOG) corrective regimen sliding scale   SubCutaneous at bedtime  insulin lispro Injectable (ADMELOG) 8 Unit(s) SubCutaneous three times a day before meals  labetalol 50 milliGRAM(s) Oral two times a day  lactulose Syrup 10 Gram(s) Oral three times a day  levothyroxine 75 MICROGram(s) Oral daily  multivitamin 1 Tablet(s) Oral daily  pantoprazole    Tablet 40 milliGRAM(s) Oral two times a day  thiamine 100 milliGRAM(s) Oral daily      =======================================================  Labs:                        7.3    11.52 )-----------( 145      ( 02 Mar 2022 07:09 )             19.9     03-02    126<L>  |  91<L>  |  22.4<H>  ----------------------------<  182<H>  4.1   |  24.0  |  0.84    Ca    8.1<L>      02 Mar 2022 07:09    TPro  5.9<L>  /  Alb  2.8<L>  /  TBili  8.4<H>  /  DBili  x   /  AST  115<H>  /  ALT  26  /  AlkPhos  277<H>  03-02      Culture - Blood (collected 02-27-22 @ 18:40)  Source: .Blood Blood-Peripheral    Culture - Blood (collected 02-27-22 @ 18:40)  Source: .Blood Blood-Peripheral      Creatinine, Serum: 0.84 mg/dL (03-02-22 @ 07:09)  Creatinine, Serum: 1.22 mg/dL (03-01-22 @ 05:15)  Creatinine, Serum: 1.57 mg/dL (02-28-22 @ 22:54)  Creatinine, Serum: 1.55 mg/dL (02-28-22 @ 15:02)  Creatinine, Serum: 1.41 mg/dL (02-28-22 @ 11:42)  Creatinine, Serum: 1.43 mg/dL (02-28-22 @ 05:17)  Creatinine, Serum: 1.11 mg/dL (02-27-22 @ 14:06)  Creatinine, Serum: 1.13 mg/dL (02-27-22 @ 06:13)  Creatinine, Serum: 1.15 mg/dL (02-26-22 @ 06:12)    Procalcitonin, Serum: 1.33 ng/mL (02-27-22 @ 18:40)      Ferritin, Serum: 970 ng/mL (02-27-22 @ 06:13)      WBC Count: 11.52 K/uL (03-02-22 @ 07:09)  WBC Count: 13.98 K/uL (02-28-22 @ 05:17)  WBC Count: 18.05 K/uL (02-26-22 @ 06:12)    COVID-19 PCR: NotDetec (02-24-22 @ 01:02)      Alkaline Phosphatase, Serum: 277 U/L (03-02-22 @ 07:09)  Alkaline Phosphatase, Serum: 407 U/L (02-28-22 @ 15:02)  Alkaline Phosphatase, Serum: 400 U/L (02-27-22 @ 06:13)  Alanine Aminotransferase (ALT/SGPT): 26 U/L (03-02-22 @ 07:09)  Alanine Aminotransferase (ALT/SGPT): 52 U/L (02-28-22 @ 15:02)  Alanine Aminotransferase (ALT/SGPT): 62 U/L (02-27-22 @ 06:13)  Aspartate Aminotransferase (AST/SGOT): 115 U/L (03-02-22 @ 07:09)  Aspartate Aminotransferase (AST/SGOT): 162 U/L (02-28-22 @ 15:02)  Aspartate Aminotransferase (AST/SGOT): 167 U/L (02-27-22 @ 06:13)  Bilirubin Total, Serum: 8.4 mg/dL (03-02-22 @ 07:09)  Bilirubin Total, Serum: 10.4 mg/dL (02-28-22 @ 15:02)  Bilirubin Total, Serum: 11.8 mg/dL (02-27-22 @ 06:13)  Bilirubin Direct, Serum: 8.1 mg/dL (02-28-22 @ 15:02)

## 2022-03-02 NOTE — PROGRESS NOTE ADULT - SUBJECTIVE AND OBJECTIVE BOX
Chief Complaint:  Patient is a 66y old  Female who presents with a chief complaint of Acute encephalopathy.       Interval Events / Subjective: Patient seen and evaluated at bedside, reporting no complaints, no overnight events. Denies nausea, vomiting, abdominal pain, chest pain, shortness of breath, hematemesis, hematochezia, melena. Abdomen softly distended. Right mid portion below knee to ankle redness, tender on palpation. Patient febrile this AM with leukocytosis Hgb 7.3. Slight down trend in LFTs at AST/ / 26, , TB 8.4.       REVIEW OF SYSTEMS:   General: Negative  HEENT: Negative  CV: Negative  Respiratory: Negative  GI: See HPI  : Negative  MSK: Negative  Hematologic: Negative  Skin: Negative    MEDICATIONS:   MEDICATIONS  (STANDING):  albumin human 25% IVPB 100 milliLiter(s) IV Intermittent every 6 hours  dextrose 40% Gel 15 Gram(s) Oral once  dextrose 5%. 1000 milliLiter(s) (50 mL/Hr) IV Continuous <Continuous>  dextrose 5%. 1000 milliLiter(s) (100 mL/Hr) IV Continuous <Continuous>  dextrose 50% Injectable 25 Gram(s) IV Push once  dextrose 50% Injectable 12.5 Gram(s) IV Push once  dextrose 50% Injectable 25 Gram(s) IV Push once  ferrous    sulfate 325 milliGRAM(s) Oral daily  folic acid 1 milliGRAM(s) Oral daily  glucagon  Injectable 1 milliGRAM(s) IntraMuscular once  insulin glargine Injectable (LANTUS) 20 Unit(s) SubCutaneous at bedtime  insulin lispro (ADMELOG) corrective regimen sliding scale   SubCutaneous three times a day before meals  insulin lispro (ADMELOG) corrective regimen sliding scale   SubCutaneous at bedtime  insulin lispro Injectable (ADMELOG) 8 Unit(s) SubCutaneous three times a day before meals  labetalol 50 milliGRAM(s) Oral two times a day  lactulose Syrup 10 Gram(s) Oral three times a day  levothyroxine 75 MICROGram(s) Oral daily  meropenem  IVPB 1000 milliGRAM(s) IV Intermittent every 12 hours  multivitamin 1 Tablet(s) Oral daily  pantoprazole    Tablet 40 milliGRAM(s) Oral two times a day  rifAXIMin 550 milliGRAM(s) Oral two times a day  thiamine 100 milliGRAM(s) Oral daily  vancomycin  IVPB 750 milliGRAM(s) IV Intermittent every 12 hours    MEDICATIONS  (PRN):  acetaminophen     Tablet .. 650 milliGRAM(s) Oral every 6 hours PRN Temp greater or equal to 38C (100.4F), Mild Pain (1 - 3)  aluminum hydroxide/magnesium hydroxide/simethicone Suspension 30 milliLiter(s) Oral every 4 hours PRN Dyspepsia  bisacodyl Suppository 10 milliGRAM(s) Rectal daily PRN Constipation  HYDROmorphone  Injectable 0.5 milliGRAM(s) IV Push four times a day PRN Severe Pain (7 - 10)  ondansetron Injectable 4 milliGRAM(s) IV Push every 8 hours PRN Nausea and/or Vomiting  traMADol 25 milliGRAM(s) Oral every 6 hours PRN Moderate Pain (4 - 6)      ALLERGIES:   Allergies    No Known Allergies    Intolerances        VITAL SIGNS:   Vital Signs Last 24 Hrs  T(C): 38.6 (02 Mar 2022 07:46), Max: 38.6 (02 Mar 2022 07:15)  T(F): 101.4 (02 Mar 2022 07:46), Max: 101.4 (02 Mar 2022 07:15)  HR: 77 (02 Mar 2022 07:15) (74 - 107)  BP: 122/61 (02 Mar 2022 07:15) (111/66 - 122/61)  BP(mean): --  RR: 18 (02 Mar 2022 07:15) (18 - 18)  SpO2: 98% (02 Mar 2022 07:15) (90% - 100%)  I&O's Summary    01 Mar 2022 07:01  -  02 Mar 2022 07:00  --------------------------------------------------------  IN: 720 mL / OUT: 0 mL / NET: 720 mL        PHYSICAL EXAM:   GENERAL:  Elderly looking  female, Jaundice, in no acute distress  HEENT:  Right forehead bump, NC/AT,  conjunctivae clear, sclera -icteric  CHEST:  Full & symmetric excursion, no increased effort, breath sounds diminished b/l  HEART:  Regular rhythm, S1, S2, no murmur/rub/S3/S4,  no edema  ABDOMEN:  Softly distended, non-tender, normoactive bowel sounds.  EXTREMITIES: No cyanosis, clubbing or edema  SKIN: + edema RLE , Right mid portion below knee to ankle redness, tender on palpation. No rash/erythema/ecchymoses/petechiae/dry  NEURO:  Alert, oriented x 2    LABS:  CBC Full  -  ( 02 Mar 2022 07:09 )  WBC Count : 11.52 K/uL  RBC Count : 2.25 M/uL  Hemoglobin : 7.3 g/dL  Hematocrit : 19.9 %  Platelet Count - Automated : 145 K/uL  Mean Cell Volume : 88.4 fl  Mean Cell Hemoglobin : 32.4 pg  Mean Cell Hemoglobin Concentration : 36.7 gm/dL  Auto Neutrophil # : 9.62 K/uL  Auto Lymphocyte # : 0.51 K/uL  Auto Monocyte # : 0.90 K/uL  Auto Eosinophil # : 0.00 K/uL  Auto Basophil # : 0.20 K/uL  Auto Neutrophil % : 83.5 %  Auto Lymphocyte % : 4.4 %  Auto Monocyte % : 7.8 %  Auto Eosinophil % : 0.0 %  Auto Basophil % : 1.7 %    03-02    126<L>  |  91<L>  |  22.4<H>  ----------------------------<  182<H>  4.1   |  24.0  |  0.84    Ca    8.1<L>      02 Mar 2022 07:09    TPro  5.9<L>  /  Alb  2.8<L>  /  TBili  8.4<H>  /  DBili  x   /  AST  115<H>  /  ALT  26  /  AlkPhos  277<H>  03-02    LIVER FUNCTIONS - ( 02 Mar 2022 07:09 )  Alb: 2.8 g/dL / Pro: 5.9 g/dL / ALK PHOS: 277 U/L / ALT: 26 U/L / AST: 115 U/L / GGT: x                 Culture - Blood (collected 27 Feb 2022 18:40)  Source: .Blood Blood-Peripheral  Preliminary Report (01 Mar 2022 19:00):    No growth at 48 hours    Culture - Blood (collected 27 Feb 2022 18:40)  Source: .Blood Blood-Peripheral  Preliminary Report (01 Mar 2022 19:00):    No growth at 48 hours        RADIOLOGY & ADDITIONAL STUDIES (The following images were personally reviewed):         Chief Complaint:  Patient is a 66y old  Female who presents with a chief complaint of Acute encephalopathy.       Interval Events / Subjective: Patient seen and evaluated at bedside, reporting no complaints, no overnight events. Denies nausea, vomiting, abdominal pain, chest pain, shortness of breath, hematemesis, hematochezia, melena. Abdomen softly distended. Right mid portion below knee to ankle redness, tender on palpationnd fluctuant. . Patient febrile this AM to 102 with leukocytosis Hgb 7.3. Slight down trend in LFTs at AST/ / 26, , TB 8.4.       REVIEW OF SYSTEMS:   General: Negative  HEENT: Negative  CV: Negative  Respiratory: Negative  GI: See HPI  : Negative  MSK: Negative  Hematologic: Negative  Skin: Negative    MEDICATIONS:   MEDICATIONS  (STANDING):  albumin human 25% IVPB 100 milliLiter(s) IV Intermittent every 6 hours  dextrose 40% Gel 15 Gram(s) Oral once  dextrose 5%. 1000 milliLiter(s) (50 mL/Hr) IV Continuous <Continuous>  dextrose 5%. 1000 milliLiter(s) (100 mL/Hr) IV Continuous <Continuous>  dextrose 50% Injectable 25 Gram(s) IV Push once  dextrose 50% Injectable 12.5 Gram(s) IV Push once  dextrose 50% Injectable 25 Gram(s) IV Push once  ferrous    sulfate 325 milliGRAM(s) Oral daily  folic acid 1 milliGRAM(s) Oral daily  glucagon  Injectable 1 milliGRAM(s) IntraMuscular once  insulin glargine Injectable (LANTUS) 20 Unit(s) SubCutaneous at bedtime  insulin lispro (ADMELOG) corrective regimen sliding scale   SubCutaneous three times a day before meals  insulin lispro (ADMELOG) corrective regimen sliding scale   SubCutaneous at bedtime  insulin lispro Injectable (ADMELOG) 8 Unit(s) SubCutaneous three times a day before meals  labetalol 50 milliGRAM(s) Oral two times a day  lactulose Syrup 10 Gram(s) Oral three times a day  levothyroxine 75 MICROGram(s) Oral daily  meropenem  IVPB 1000 milliGRAM(s) IV Intermittent every 12 hours  multivitamin 1 Tablet(s) Oral daily  pantoprazole    Tablet 40 milliGRAM(s) Oral two times a day  rifAXIMin 550 milliGRAM(s) Oral two times a day  thiamine 100 milliGRAM(s) Oral daily  vancomycin  IVPB 750 milliGRAM(s) IV Intermittent every 12 hours    MEDICATIONS  (PRN):  acetaminophen     Tablet .. 650 milliGRAM(s) Oral every 6 hours PRN Temp greater or equal to 38C (100.4F), Mild Pain (1 - 3)  aluminum hydroxide/magnesium hydroxide/simethicone Suspension 30 milliLiter(s) Oral every 4 hours PRN Dyspepsia  bisacodyl Suppository 10 milliGRAM(s) Rectal daily PRN Constipation  HYDROmorphone  Injectable 0.5 milliGRAM(s) IV Push four times a day PRN Severe Pain (7 - 10)  ondansetron Injectable 4 milliGRAM(s) IV Push every 8 hours PRN Nausea and/or Vomiting  traMADol 25 milliGRAM(s) Oral every 6 hours PRN Moderate Pain (4 - 6)      ALLERGIES:   Allergies    No Known Allergies    Intolerances        VITAL SIGNS:   Vital Signs Last 24 Hrs  T(C): 38.6 (02 Mar 2022 07:46), Max: 38.6 (02 Mar 2022 07:15)  T(F): 101.4 (02 Mar 2022 07:46), Max: 101.4 (02 Mar 2022 07:15)  HR: 77 (02 Mar 2022 07:15) (74 - 107)  BP: 122/61 (02 Mar 2022 07:15) (111/66 - 122/61)  BP(mean): --  RR: 18 (02 Mar 2022 07:15) (18 - 18)  SpO2: 98% (02 Mar 2022 07:15) (90% - 100%)  I&O's Summary    01 Mar 2022 07:01  -  02 Mar 2022 07:00  --------------------------------------------------------  IN: 720 mL / OUT: 0 mL / NET: 720 mL        PHYSICAL EXAM:   GENERAL:  Elderly looking  female, Jaundice, in no acute distress  HEENT:  Right forehead bump, NC/AT,  conjunctivae clear, sclera -icteric  CHEST:  Full & symmetric excursion, no increased effort, breath sounds diminished b/l  HEART:  Regular rhythm, S1, S2, no murmur/rub/S3/S4,  no edema  ABDOMEN:  Softly distended, non-tender, normoactive bowel sounds.  EXTREMITIES: No cyanosis, clubbing or edema  SKIN: + edema RLE , Right mid portion below knee to ankle redness, tender on palpation. No rash/erythema/ecchymoses/petechiae/dry  NEURO:  Alert, oriented x 2    LABS:  CBC Full  -  ( 02 Mar 2022 07:09 )  WBC Count : 11.52 K/uL  RBC Count : 2.25 M/uL  Hemoglobin : 7.3 g/dL  Hematocrit : 19.9 %  Platelet Count - Automated : 145 K/uL  Mean Cell Volume : 88.4 fl  Mean Cell Hemoglobin : 32.4 pg  Mean Cell Hemoglobin Concentration : 36.7 gm/dL  Auto Neutrophil # : 9.62 K/uL  Auto Lymphocyte # : 0.51 K/uL  Auto Monocyte # : 0.90 K/uL  Auto Eosinophil # : 0.00 K/uL  Auto Basophil # : 0.20 K/uL  Auto Neutrophil % : 83.5 %  Auto Lymphocyte % : 4.4 %  Auto Monocyte % : 7.8 %  Auto Eosinophil % : 0.0 %  Auto Basophil % : 1.7 %    03-02    126<L>  |  91<L>  |  22.4<H>  ----------------------------<  182<H>  4.1   |  24.0  |  0.84    Ca    8.1<L>      02 Mar 2022 07:09    TPro  5.9<L>  /  Alb  2.8<L>  /  TBili  8.4<H>  /  DBili  x   /  AST  115<H>  /  ALT  26  /  AlkPhos  277<H>  03-02    LIVER FUNCTIONS - ( 02 Mar 2022 07:09 )  Alb: 2.8 g/dL / Pro: 5.9 g/dL / ALK PHOS: 277 U/L / ALT: 26 U/L / AST: 115 U/L / GGT: x                 Culture - Blood (collected 27 Feb 2022 18:40)  Source: .Blood Blood-Peripheral  Preliminary Report (01 Mar 2022 19:00):    No growth at 48 hours    Culture - Blood (collected 27 Feb 2022 18:40)  Source: .Blood Blood-Peripheral  Preliminary Report (01 Mar 2022 19:00):    No growth at 48 hours        RADIOLOGY & ADDITIONAL STUDIES (The following images were personally reviewed):

## 2022-03-02 NOTE — PROGRESS NOTE ADULT - ASSESSMENT
Hyponatremia- chronic ; systemic vasodilation in setting of liver cirrhosis  hepatic encephalopathy  UTI- on Rocephin  DM  Darrell- scr improving    Pt with chronic hyponatremia- SNa+ range 124-129 during this admission (corrected for hyperglycemia)  Acute worsening from ? hypovolemia  Give antibiotics in NS instead of D5W  sp 2 % NS  SNa+ of 115 was acute, will not worry about overcorrection with SNa+ of 125 this Am since its at baseline now  Continue albumin infusion for 24 hours  continue to hold diuretics      dw Dr Marquez

## 2022-03-02 NOTE — CONSULT NOTE ADULT - ASSESSMENT
ASSESSMENT  From Hx:   PT REPORTS SHE FELL ABOUT 2 WEEKS AGO  AND HIT HER LEG  From exam:  RIGHT LOWER EXT WITH EDEMA AND ERYTHEMA WARMTH TENDERNESS NO CREPITUS. Fluctuance is appreciated below the rt knee.   from imaging:  IMPRESSION:  In the anterosuperior aspect of the right leg, just inferior to the knee, there is a collection with low-level internal echoes which measures 8.0 x 0.5 x 4.0 cm. There is surrounding hyperemia and soft tissue edema. Findings are concerning for abscess.      Plan:  Incision and drainage --> done  send cultures --> already sent will FU results  RECOMMEND ELEVATE LEG  Abx perprimary team  WILL FOLLOWUP BLOOD CX    daily dressing changes

## 2022-03-02 NOTE — PROGRESS NOTE ADULT - SUBJECTIVE AND OBJECTIVE BOX
[facility] DIVISION OF KIDNEY DISEASES AND HYPERTENSION -- FOLLOW UP NOTE  --------------------------------------------------------------------------------  Chief Complaint:  Hyponatremia    24 hour events/subjective:  Na improving  On albumin;      PAST HISTORY  --------------------------------------------------------------------------------  No significant changes to PMH, PSH, FHx, SHx, unless otherwise noted    ALLERGIES & MEDICATIONS  --------------------------------------------------------------------------------  Allergies    No Known Allergies    Intolerances      Standing Inpatient Medications  albumin human 25% IVPB 100 milliLiter(s) IV Intermittent every 6 hours  albumin human 25% IVPB 100 milliLiter(s) IV Intermittent every 4 hours  dextrose 40% Gel 15 Gram(s) Oral once  dextrose 5%. 1000 milliLiter(s) IV Continuous <Continuous>  dextrose 5%. 1000 milliLiter(s) IV Continuous <Continuous>  dextrose 50% Injectable 25 Gram(s) IV Push once  dextrose 50% Injectable 12.5 Gram(s) IV Push once  dextrose 50% Injectable 25 Gram(s) IV Push once  ferrous    sulfate 325 milliGRAM(s) Oral daily  folic acid 1 milliGRAM(s) Oral daily  glucagon  Injectable 1 milliGRAM(s) IntraMuscular once  insulin glargine Injectable (LANTUS) 20 Unit(s) SubCutaneous at bedtime  insulin lispro (ADMELOG) corrective regimen sliding scale   SubCutaneous three times a day before meals  insulin lispro (ADMELOG) corrective regimen sliding scale   SubCutaneous at bedtime  insulin lispro Injectable (ADMELOG) 8 Unit(s) SubCutaneous three times a day before meals  labetalol 50 milliGRAM(s) Oral two times a day  lactulose Syrup 10 Gram(s) Oral three times a day  levothyroxine 75 MICROGram(s) Oral daily  meropenem  IVPB 1000 milliGRAM(s) IV Intermittent every 12 hours  multivitamin 1 Tablet(s) Oral daily  pantoprazole    Tablet 40 milliGRAM(s) Oral two times a day  rifAXIMin 550 milliGRAM(s) Oral two times a day  thiamine 100 milliGRAM(s) Oral daily  vancomycin  IVPB 750 milliGRAM(s) IV Intermittent every 12 hours    PRN Inpatient Medications  acetaminophen     Tablet .. 650 milliGRAM(s) Oral every 6 hours PRN  aluminum hydroxide/magnesium hydroxide/simethicone Suspension 30 milliLiter(s) Oral every 4 hours PRN  bisacodyl Suppository 10 milliGRAM(s) Rectal daily PRN  HYDROmorphone  Injectable 0.5 milliGRAM(s) IV Push four times a day PRN  ondansetron Injectable 4 milliGRAM(s) IV Push every 8 hours PRN  traMADol 25 milliGRAM(s) Oral every 6 hours PRN      REVIEW OF SYSTEMS  --------------------------------------------------------------------------------  Gen: No weight changes, fatigue, fevers/chills, weakness  Skin: No rashes  Head/Eyes/Ears/Mouth: No headache; Normal hearing; Normal vision w/o blurriness; No sinus pain/discomfort, sore throat  Respiratory: No dyspnea, cough, wheezing, hemoptysis  CV: No chest pain, PND, orthopnea  GI: No abdominal pain, diarrhea, constipation, nausea, vomiting, melena, hematochezia  : No increased frequency, dysuria, hematuria, nocturia  MSK: No joint pain/swelling; no back pain; no edema  Neuro: No dizziness/lightheadedness, weakness, seizures, numbness, tingling  Heme: No easy bruising or bleeding  Endo: No heat/cold intolerance  Psych: No significant nervousness, anxiety, stress, depression    All other systems were reviewed and are negative, except as noted.    VITALS/PHYSICAL EXAM  --------------------------------------------------------------------------------  T(C): 38.6 (03-02-22 @ 07:46), Max: 38.6 (03-02-22 @ 07:15)  HR: 77 (03-02-22 @ 07:15) (74 - 107)  BP: 122/61 (03-02-22 @ 07:15) (111/66 - 122/61)  RR: 18 (03-02-22 @ 07:15) (18 - 18)  SpO2: 98% (03-02-22 @ 07:15) (90% - 100%)  Wt(kg): --    Weight (kg): 65 (03-01-22 @ 21:15)      03-01-22 @ 07:01  -  03-02-22 @ 07:00  --------------------------------------------------------  IN: 720 mL / OUT: 0 mL / NET: 720 mL      Physical Exam:  	Gen: NAD   	HEENT: PERRL, supple neck, clear oropharynx  	Pulm: CTA B/L  	CV: RRR, S1S2; no rub  	Back: No spinal or CVA tenderness; no sacral edema  	Abd: +BS, soft, nontender/nondistended  	: No suprapubic tenderness  	UE: Warm, FROM, no clubbing, intact strength; no edema; no asterixis  	LE: Warm, FROM, no clubbing, intact strength; no edema  	Neuro: No focal deficits, intact gait  	Psych: Normal affect and mood  	Skin: Warm, without rashes       LABS/STUDIES  --------------------------------------------------------------------------------              7.3    11.52 >-----------<  145      [03-02-22 @ 07:09]              19.9     126  |  91  |  22.4  ----------------------------<  182      [03-02-22 @ 07:09]  4.1   |  24.0  |  0.84        Ca     8.1     [03-02-22 @ 07:09]    TPro  5.9  /  Alb  2.8  /  TBili  8.4  /  DBili  x   /  AST  115  /  ALT  26  /  AlkPhos  277  [03-02-22 @ 07:09]        CK 56      [03-01-22 @ 18:42]    Creatinine Trend:  SCr 0.84 [03-02 @ 07:09]  SCr 1.22 [03-01 @ 05:15]  SCr 1.57 [02-28 @ 22:54]  SCr 1.55 [02-28 @ 15:02]  SCr 1.41 [02-28 @ 11:42]    Urinalysis - [02-27-22 @ 18:27]      Color Jana / Appearance Clear / SG 1.020 / pH 5.0      Gluc Negative / Ketone Trace  / Bili Large / Urobili 8       Blood Large / Protein Negative / Leuk Est Moderate / Nitrite Positive      RBC 3-5 / WBC 26-50 / Hyaline  / Gran  / Sq Epi  / Non Sq Epi Few / Bacteria Few    Urine Sodium <30      [02-27-22 @ 18:27]  Urine Chloride <27      [02-27-22 @ 18:27]  Urine Osmolality 431      [02-27-22 @ 18:27]    Iron 75, TIBC 152, %sat 49      [02-27-22 @ 06:13]  Ferritin 970      [02-27-22 @ 06:13]  Vitamin D (25OH) 39.5      [02-27-22 @ 10:51]  TSH 6.23      [02-27-22 @ 06:13]    HIV Nonreact      [02-23-22 @ 21:44]

## 2022-03-02 NOTE — CHART NOTE - NSCHARTNOTEFT_GEN_A_CORE
Called by RN to obtain consent for 1 unit PRBC blood transfusion. Patient is [place]ill[place] Speaking,  was bedside to translate. Pt is alert and oriented x3, lying in bed comfortably. Discussed the reasoning and the risks, benefits and alternatives of having a blood transfusion. Pt states understanding of the risks of acute reactions including but not limited to fever, shortness of breath, dyspnea, tachycardia and death. Explained that Pt will be continuously monitored before, during and after transfusion and to ask for assistance if onset of any new symptoms. Questions answered, concerns addressed. Emotional support given. Consent signed, date and timed. RN signed as witness. Consent placed in Pt's chart. Type and screen ordered. No active or acute signs or symptoms of hemorrhage or bleed. RN to continue to monitor, call Provider for worsening condition.

## 2022-03-03 LAB
ANION GAP SERPL CALC-SCNC: 12 MMOL/L — SIGNIFICANT CHANGE UP (ref 5–17)
BUN SERPL-MCNC: 19.1 MG/DL — SIGNIFICANT CHANGE UP (ref 8–20)
CALCIUM SERPL-MCNC: 8.7 MG/DL — SIGNIFICANT CHANGE UP (ref 8.6–10.2)
CHLORIDE SERPL-SCNC: 94 MMOL/L — LOW (ref 98–107)
CO2 SERPL-SCNC: 23 MMOL/L — SIGNIFICANT CHANGE UP (ref 22–29)
CREAT SERPL-MCNC: 0.72 MG/DL — SIGNIFICANT CHANGE UP (ref 0.5–1.3)
EGFR: 93 ML/MIN/1.73M2 — SIGNIFICANT CHANGE UP
GLUCOSE BLDC GLUCOMTR-MCNC: 200 MG/DL — HIGH (ref 70–99)
GLUCOSE BLDC GLUCOMTR-MCNC: 206 MG/DL — HIGH (ref 70–99)
GLUCOSE BLDC GLUCOMTR-MCNC: 218 MG/DL — HIGH (ref 70–99)
GLUCOSE BLDC GLUCOMTR-MCNC: 86 MG/DL — SIGNIFICANT CHANGE UP (ref 70–99)
GLUCOSE SERPL-MCNC: 196 MG/DL — HIGH (ref 70–99)
HCT VFR BLD CALC: 18.6 % — CRITICAL LOW (ref 34.5–45)
HCT VFR BLD CALC: 22.2 % — LOW (ref 34.5–45)
HGB BLD-MCNC: 6.7 G/DL — CRITICAL LOW (ref 11.5–15.5)
HGB BLD-MCNC: 7.9 G/DL — LOW (ref 11.5–15.5)
MCHC RBC-ENTMCNC: 31.7 PG — SIGNIFICANT CHANGE UP (ref 27–34)
MCHC RBC-ENTMCNC: 32.1 PG — SIGNIFICANT CHANGE UP (ref 27–34)
MCHC RBC-ENTMCNC: 35.6 GM/DL — SIGNIFICANT CHANGE UP (ref 32–36)
MCHC RBC-ENTMCNC: 36 GM/DL — SIGNIFICANT CHANGE UP (ref 32–36)
MCV RBC AUTO: 89 FL — SIGNIFICANT CHANGE UP (ref 80–100)
MCV RBC AUTO: 89.2 FL — SIGNIFICANT CHANGE UP (ref 80–100)
PLATELET # BLD AUTO: 128 K/UL — LOW (ref 150–400)
PLATELET # BLD AUTO: 140 K/UL — LOW (ref 150–400)
POTASSIUM SERPL-MCNC: 4.3 MMOL/L — SIGNIFICANT CHANGE UP (ref 3.5–5.3)
POTASSIUM SERPL-SCNC: 4.3 MMOL/L — SIGNIFICANT CHANGE UP (ref 3.5–5.3)
RBC # BLD: 2.09 M/UL — LOW (ref 3.8–5.2)
RBC # BLD: 2.49 M/UL — LOW (ref 3.8–5.2)
RBC # FLD: 21.7 % — HIGH (ref 10.3–14.5)
RBC # FLD: 21.8 % — HIGH (ref 10.3–14.5)
SODIUM SERPL-SCNC: 129 MMOL/L — LOW (ref 135–145)
WBC # BLD: 10.15 K/UL — SIGNIFICANT CHANGE UP (ref 3.8–10.5)
WBC # BLD: 11.06 K/UL — HIGH (ref 3.8–10.5)
WBC # FLD AUTO: 10.15 K/UL — SIGNIFICANT CHANGE UP (ref 3.8–10.5)
WBC # FLD AUTO: 11.06 K/UL — HIGH (ref 3.8–10.5)

## 2022-03-03 PROCEDURE — 99233 SBSQ HOSP IP/OBS HIGH 50: CPT

## 2022-03-03 PROCEDURE — 99231 SBSQ HOSP IP/OBS SF/LOW 25: CPT

## 2022-03-03 PROCEDURE — 99232 SBSQ HOSP IP/OBS MODERATE 35: CPT

## 2022-03-03 RX ORDER — FUROSEMIDE 40 MG
20 TABLET ORAL DAILY
Refills: 0 | Status: DISCONTINUED | OUTPATIENT
Start: 2022-03-03 | End: 2022-03-09

## 2022-03-03 RX ORDER — SPIRONOLACTONE 25 MG/1
50 TABLET, FILM COATED ORAL DAILY
Refills: 0 | Status: DISCONTINUED | OUTPATIENT
Start: 2022-03-03 | End: 2022-03-07

## 2022-03-03 RX ADMIN — MEROPENEM 100 MILLIGRAM(S): 1 INJECTION INTRAVENOUS at 13:21

## 2022-03-03 RX ADMIN — Medication 1 TABLET(S): at 12:29

## 2022-03-03 RX ADMIN — PANTOPRAZOLE SODIUM 40 MILLIGRAM(S): 20 TABLET, DELAYED RELEASE ORAL at 04:32

## 2022-03-03 RX ADMIN — Medication 75 MICROGRAM(S): at 04:32

## 2022-03-03 RX ADMIN — Medication 4: at 08:45

## 2022-03-03 RX ADMIN — Medication 50 MILLIGRAM(S): at 04:55

## 2022-03-03 RX ADMIN — PANTOPRAZOLE SODIUM 40 MILLIGRAM(S): 20 TABLET, DELAYED RELEASE ORAL at 17:03

## 2022-03-03 RX ADMIN — Medication 50 MILLIGRAM(S): at 17:04

## 2022-03-03 RX ADMIN — INSULIN GLARGINE 20 UNIT(S): 100 INJECTION, SOLUTION SUBCUTANEOUS at 21:48

## 2022-03-03 RX ADMIN — Medication 50 MILLILITER(S): at 06:34

## 2022-03-03 RX ADMIN — Medication 1 MILLIGRAM(S): at 12:30

## 2022-03-03 RX ADMIN — LACTULOSE 10 GRAM(S): 10 SOLUTION ORAL at 21:45

## 2022-03-03 RX ADMIN — LACTULOSE 10 GRAM(S): 10 SOLUTION ORAL at 13:23

## 2022-03-03 RX ADMIN — Medication 650 MILLIGRAM(S): at 21:54

## 2022-03-03 RX ADMIN — Medication 20 MILLIGRAM(S): at 12:29

## 2022-03-03 RX ADMIN — Medication 325 MILLIGRAM(S): at 12:30

## 2022-03-03 RX ADMIN — Medication 100 MILLIGRAM(S): at 12:42

## 2022-03-03 RX ADMIN — LACTULOSE 10 GRAM(S): 10 SOLUTION ORAL at 04:32

## 2022-03-03 RX ADMIN — MEROPENEM 100 MILLIGRAM(S): 1 INJECTION INTRAVENOUS at 04:32

## 2022-03-03 RX ADMIN — Medication 50 MILLILITER(S): at 00:38

## 2022-03-03 RX ADMIN — Medication 250 MILLIGRAM(S): at 17:04

## 2022-03-03 RX ADMIN — MEROPENEM 100 MILLIGRAM(S): 1 INJECTION INTRAVENOUS at 21:56

## 2022-03-03 RX ADMIN — Medication 2: at 17:09

## 2022-03-03 RX ADMIN — HYDROMORPHONE HYDROCHLORIDE 0.5 MILLIGRAM(S): 2 INJECTION INTRAMUSCULAR; INTRAVENOUS; SUBCUTANEOUS at 04:32

## 2022-03-03 RX ADMIN — Medication 250 MILLIGRAM(S): at 06:35

## 2022-03-03 RX ADMIN — HYDROMORPHONE HYDROCHLORIDE 0.5 MILLIGRAM(S): 2 INJECTION INTRAMUSCULAR; INTRAVENOUS; SUBCUTANEOUS at 05:05

## 2022-03-03 NOTE — PHYSICAL THERAPY INITIAL EVALUATION ADULT - GAIT PATTERN USED, PT EVAL
requires physical assist to manage rolling walker when ambulating, navigating obstacles, and changing directions

## 2022-03-03 NOTE — PROGRESS NOTE ADULT - SUBJECTIVE AND OBJECTIVE BOX
Subjective:    NO acute events overnight. pt remains hemodynamically stable    MEDICATIONS  (STANDING):  albumin human 25% IVPB 100 milliLiter(s) IV Intermittent every 4 hours  dextrose 40% Gel 15 Gram(s) Oral once  dextrose 5%. 1000 milliLiter(s) (50 mL/Hr) IV Continuous <Continuous>  dextrose 5%. 1000 milliLiter(s) (100 mL/Hr) IV Continuous <Continuous>  dextrose 50% Injectable 25 Gram(s) IV Push once  dextrose 50% Injectable 12.5 Gram(s) IV Push once  dextrose 50% Injectable 25 Gram(s) IV Push once  ferrous    sulfate 325 milliGRAM(s) Oral daily  folic acid 1 milliGRAM(s) Oral daily  glucagon  Injectable 1 milliGRAM(s) IntraMuscular once  insulin glargine Injectable (LANTUS) 20 Unit(s) SubCutaneous at bedtime  insulin lispro (ADMELOG) corrective regimen sliding scale   SubCutaneous three times a day before meals  insulin lispro (ADMELOG) corrective regimen sliding scale   SubCutaneous at bedtime  labetalol 50 milliGRAM(s) Oral two times a day  lactulose Syrup 10 Gram(s) Oral three times a day  levothyroxine 75 MICROGram(s) Oral daily  meropenem  IVPB 1000 milliGRAM(s) IV Intermittent every 8 hours  multivitamin 1 Tablet(s) Oral daily  pantoprazole    Tablet 40 milliGRAM(s) Oral two times a day  rifAXIMin 550 milliGRAM(s) Oral two times a day  sodium chloride 2% . 1000 milliLiter(s) (65 mL/Hr) IV Continuous <Continuous>  thiamine 100 milliGRAM(s) Oral daily  vancomycin  IVPB 750 milliGRAM(s) IV Intermittent every 12 hours    MEDICATIONS  (PRN):  acetaminophen     Tablet .. 650 milliGRAM(s) Oral every 6 hours PRN Temp greater or equal to 38C (100.4F), Mild Pain (1 - 3)  aluminum hydroxide/magnesium hydroxide/simethicone Suspension 30 milliLiter(s) Oral every 4 hours PRN Dyspepsia  bisacodyl Suppository 10 milliGRAM(s) Rectal daily PRN Constipation  HYDROmorphone  Injectable 0.5 milliGRAM(s) IV Push four times a day PRN Severe Pain (7 - 10)  ondansetron Injectable 4 milliGRAM(s) IV Push every 8 hours PRN Nausea and/or Vomiting  traMADol 25 milliGRAM(s) Oral every 6 hours PRN Moderate Pain (4 - 6)      Vital Signs Last 24 Hrs  T(C): 37.3 (03 Mar 2022 04:02), Max: 38.6 (02 Mar 2022 07:15)  T(F): 99.1 (03 Mar 2022 04:02), Max: 101.4 (02 Mar 2022 07:15)  HR: 78 (03 Mar 2022 04:02) (74 - 107)  BP: 118/63 (03 Mar 2022 04:02) (111/64 - 122/61)  BP(mean): --  RR: 18 (03 Mar 2022 04:02) (18 - 18)  SpO2: 97% (03 Mar 2022 04:02) (90% - 100%)      03-01 - 03-02  --------------------------------------------------------  IN:    Oral Fluid: 720 mL  Total IN: 720 mL    OUT:  Total OUT: 0 mL    Total NET: 720 mL          Physical Exam:    General: jaundice , no distress  Eyes : sclera yellow   Forehead " right small lump hard to touch ( Ct of head hematoma on admission )   Neck: supple, no JVD   Lungs: CTA bilateral , no wheezing no rales   Cardio: RRR, S1/S2, No murmur  Abdomen: Soft, Nontender, Nondistended; Bowel sounds present  Extremities: dressing in place, CDI      LABS:                        6.7    10.15 )-----------( 128      ( 03 Mar 2022 00:05 )             18.6     03-02    126<L>  |  91<L>  |  22.4<H>  ----------------------------<  182<H>  4.1   |  24.0  |  0.84    Ca    8.1<L>      02 Mar 2022 07:09    TPro  5.9<L>  /  Alb  2.8<L>  /  TBili  8.4<H>  /  DBili  x   /  AST  115<H>  /  ALT  26  /  AlkPhos  277<H>  03-02

## 2022-03-03 NOTE — PHYSICAL THERAPY INITIAL EVALUATION ADULT - GENERAL OBSERVATIONS, REHAB EVAL
Pt received on 3Tower in bed in a semirecumbent position with (+) primafit, (+) IV, (+) bed alarm, and (+) cardiac monitor. Pt Guatemalan speaking and  #[id number] used throughout session; Pt agreeable to PT reporting 8/10 right LE pain throughout session. Pt received on 3Tower in bed in a semirecumbent position with (+) primafit, (+) IV, (+) bed alarm, and (+) cardiac monitor. Pt Ivorian speaking and  #[id number] used throughout session; Pt agreeable to PT reporting 8/10 right LE pain throughout session, pt was premedicated.

## 2022-03-03 NOTE — PHYSICAL THERAPY INITIAL EVALUATION ADULT - PERTINENT HX OF CURRENT PROBLEM, REHAB EVAL
PMH of HTN, DM, anemia, EtOH abuse with liver cirrhosis, esophageal varices, hepatic encephalopathy, hypothyroidism, who was brought to the ED for altered mental status; Found to have UTI; s/p 1 unit PRBC for anemia; Found to have right LE cellulitis

## 2022-03-03 NOTE — PROGRESS NOTE ADULT - SUBJECTIVE AND OBJECTIVE BOX
[facility] DIVISION OF KIDNEY DISEASES AND HYPERTENSION -- FOLLOW UP NOTE  --------------------------------------------------------------------------------  Chief Complaint: hyponatremia    24 hour events/subjective:  no acute event noted        PAST HISTORY  --------------------------------------------------------------------------------  No significant changes to PMH, PSH, FHx, SHx, unless otherwise noted    ALLERGIES & MEDICATIONS  --------------------------------------------------------------------------------  Allergies    No Known Allergies    Intolerances      Standing Inpatient Medications  dextrose 40% Gel 15 Gram(s) Oral once  dextrose 5%. 1000 milliLiter(s) IV Continuous <Continuous>  dextrose 5%. 1000 milliLiter(s) IV Continuous <Continuous>  dextrose 50% Injectable 25 Gram(s) IV Push once  dextrose 50% Injectable 12.5 Gram(s) IV Push once  dextrose 50% Injectable 25 Gram(s) IV Push once  ferrous    sulfate 325 milliGRAM(s) Oral daily  folic acid 1 milliGRAM(s) Oral daily  furosemide    Tablet 20 milliGRAM(s) Oral daily  glucagon  Injectable 1 milliGRAM(s) IntraMuscular once  insulin glargine Injectable (LANTUS) 20 Unit(s) SubCutaneous at bedtime  insulin lispro (ADMELOG) corrective regimen sliding scale   SubCutaneous three times a day before meals  insulin lispro (ADMELOG) corrective regimen sliding scale   SubCutaneous at bedtime  labetalol 50 milliGRAM(s) Oral two times a day  lactulose Syrup 10 Gram(s) Oral three times a day  levothyroxine 75 MICROGram(s) Oral daily  meropenem  IVPB 1000 milliGRAM(s) IV Intermittent every 8 hours  multivitamin 1 Tablet(s) Oral daily  pantoprazole    Tablet 40 milliGRAM(s) Oral two times a day  rifAXIMin 550 milliGRAM(s) Oral two times a day  sodium chloride 2% . 1000 milliLiter(s) IV Continuous <Continuous>  spironolactone 50 milliGRAM(s) Oral daily  thiamine 100 milliGRAM(s) Oral daily  vancomycin  IVPB 750 milliGRAM(s) IV Intermittent every 12 hours    PRN Inpatient Medications  acetaminophen     Tablet .. 650 milliGRAM(s) Oral every 6 hours PRN  aluminum hydroxide/magnesium hydroxide/simethicone Suspension 30 milliLiter(s) Oral every 4 hours PRN  bisacodyl Suppository 10 milliGRAM(s) Rectal daily PRN  HYDROmorphone  Injectable 0.5 milliGRAM(s) IV Push four times a day PRN  ondansetron Injectable 4 milliGRAM(s) IV Push every 8 hours PRN  traMADol 25 milliGRAM(s) Oral every 6 hours PRN      REVIEW OF SYSTEMS  --------------------------------------------------------------------------------  Gen: No weight changes, fatigue, fevers/chills, weakness  Skin: No rashes  Head/Eyes/Ears/Mouth: No headache; Normal hearing; Normal vision w/o blurriness; No sinus pain/discomfort, sore throat  Respiratory: No dyspnea, cough, wheezing, hemoptysis  CV: No chest pain, PND, orthopnea  GI: No abdominal pain, diarrhea, constipation, nausea, vomiting, melena, hematochezia  : No increased frequency, dysuria, hematuria, nocturia  MSK: No joint pain/swelling; no back pain; no edema  Neuro: No dizziness/lightheadedness, weakness, seizures, numbness, tingling  Heme: No easy bruising or bleeding  Endo: No heat/cold intolerance  Psych: No significant nervousness, anxiety, stress, depression    All other systems were reviewed and are negative, except as noted.    VITALS/PHYSICAL EXAM  --------------------------------------------------------------------------------  T(C): 37.2 (03-03-22 @ 07:32), Max: 37.3 (03-03-22 @ 03:45)  HR: 85 (03-03-22 @ 07:32) (74 - 85)  BP: 137/70 (03-03-22 @ 07:32) (111/64 - 140/56)  RR: 18 (03-03-22 @ 07:32) (18 - 18)  SpO2: 96% (03-03-22 @ 07:32) (94% - 100%)  Wt(kg): --    Weight (kg): 65 (03-01-22 @ 21:15)      Physical Exam:  	Gen: NAD  	HEENT: Scleral icterus  	Pulm: CTA B/L  	CV: RRR, S1S2; no rub  	Back: No spinal or CVA tenderness; no sacral edema  	Abd: +BS, soft, nontender/nondistended  	: No suprapubic tenderness  	UE: Warm, no edema; no asterixis  	LE: Warm, rt leg bandaged  	Neuro: No focal deficit  	Psych: Normal affect and mood  	Skin: Warm      LABS/STUDIES  --------------------------------------------------------------------------------              7.9    11.06 >-----------<  140      [03-03-22 @ 09:26]              22.2     129  |  94  |  19.1  ----------------------------<  196      [03-03-22 @ 09:26]  4.3   |  23.0  |  0.72        Ca     8.7     [03-03-22 @ 09:26]    TPro  5.9  /  Alb  2.8  /  TBili  8.4  /  DBili  x   /  AST  115  /  ALT  26  /  AlkPhos  277  [03-02-22 @ 07:09]        CK 56      [03-01-22 @ 18:42]    Creatinine Trend:  SCr 0.72 [03-03 @ 09:26]  SCr 0.84 [03-02 @ 07:09]  SCr 1.22 [03-01 @ 05:15]  SCr 1.57 [02-28 @ 22:54]  SCr 1.55 [02-28 @ 15:02]    Urinalysis - [02-27-22 @ 18:27]      Color Jana / Appearance Clear / SG 1.020 / pH 5.0      Gluc Negative / Ketone Trace  / Bili Large / Urobili 8       Blood Large / Protein Negative / Leuk Est Moderate / Nitrite Positive      RBC 3-5 / WBC 26-50 / Hyaline  / Gran  / Sq Epi  / Non Sq Epi Few / Bacteria Few    Urine Sodium <30      [02-27-22 @ 18:27]  Urine Chloride <27      [02-27-22 @ 18:27]  Urine Osmolality 431      [02-27-22 @ 18:27]    Iron 75, TIBC 152, %sat 49      [02-27-22 @ 06:13]  Ferritin 970      [02-27-22 @ 06:13]  Vitamin D (25OH) 39.5      [02-27-22 @ 10:51]  TSH 6.23      [02-27-22 @ 06:13]    HIV Nonreact      [02-23-22 @ 21:44]

## 2022-03-03 NOTE — PROGRESS NOTE ADULT - ASSESSMENT
67 y/o female with PMH of HTN, DM, anemia, EtOH abuse with liver cirrhosis, esophageal varices, hepatic encephalopathy, hypothyroidism, who was brought to the ED for altered mental status. As per ED, son reported patient has been having worsening confusion and nose bleed yesterday. Patient is a poor historian. She has no chest pain, shortness of breath, nausea, vomiting, abdominal pain.  PT REPORTS SHE FELL ABOUT 2 WEEKS AGO  AND HIT HER LEG   AS ABOVE ADMITTED WITH  CHANGE IN MENTAL STATUS  PT NOW WITH CONTINUED PAIN AND REDNESS IN LEFT LEG  RECOMMEND ELEVATE LEG  WARM COMPRESSES     on MERREM/VANCO  U/S WITH ? ABSCESS SUGGEST EVAL FOR ASPIRATION   S/P I  AND D AT BEDSIDE  AWAIT CX RESULTS    AND THEN DEESCALATE  WILL FOLLOW UP WITH FURTHER RECOMMENDATIONS

## 2022-03-03 NOTE — PROGRESS NOTE ADULT - SUBJECTIVE AND OBJECTIVE BOX
CC : right leg pain     Pt is seen in am with Thai speaking nurse Selma taking care og her   pt is c/o pain discomfort in the right leg  overnight Pa note reviewed   s/p 1 unit PRBC for anemia       surgery , GI input appreciated     MEDICATIONS  (STANDING):  dextrose 40% Gel 15 Gram(s) Oral once  dextrose 5%. 1000 milliLiter(s) (50 mL/Hr) IV Continuous <Continuous>  dextrose 5%. 1000 milliLiter(s) (100 mL/Hr) IV Continuous <Continuous>  dextrose 50% Injectable 25 Gram(s) IV Push once  dextrose 50% Injectable 12.5 Gram(s) IV Push once  dextrose 50% Injectable 25 Gram(s) IV Push once  ferrous    sulfate 325 milliGRAM(s) Oral daily  folic acid 1 milliGRAM(s) Oral daily  glucagon  Injectable 1 milliGRAM(s) IntraMuscular once  insulin glargine Injectable (LANTUS) 20 Unit(s) SubCutaneous at bedtime  insulin lispro (ADMELOG) corrective regimen sliding scale   SubCutaneous three times a day before meals  insulin lispro (ADMELOG) corrective regimen sliding scale   SubCutaneous at bedtime  labetalol 50 milliGRAM(s) Oral two times a day  lactulose Syrup 10 Gram(s) Oral three times a day  levothyroxine 75 MICROGram(s) Oral daily  meropenem  IVPB 1000 milliGRAM(s) IV Intermittent every 8 hours  multivitamin 1 Tablet(s) Oral daily  pantoprazole    Tablet 40 milliGRAM(s) Oral two times a day  rifAXIMin 550 milliGRAM(s) Oral two times a day  sodium chloride 2% . 1000 milliLiter(s) (65 mL/Hr) IV Continuous <Continuous>  thiamine 100 milliGRAM(s) Oral daily  vancomycin  IVPB 750 milliGRAM(s) IV Intermittent every 12 hours      Vital Signs Last 24 Hrs  T(C): 37.2 (03 Mar 2022 07:32), Max: 37.3 (03 Mar 2022 03:45)  T(F): 99 (03 Mar 2022 07:32), Max: 99.2 (03 Mar 2022 06:41)  HR: 85 (03 Mar 2022 07:32) (74 - 85)  BP: 137/70 (03 Mar 2022 07:32) (111/64 - 140/56)  BP(mean): --  RR: 18 (03 Mar 2022 07:32) (18 - 18)  SpO2: 96% (03 Mar 2022 07:32) (94% - 100%)    SpO2: 98% (02 Mar 2022 07:15) (90% - 100%)      PHYSICAL EXAM:  General: jaundice , no distress  Eyes : sclera icteric , no discharge   Forehead " right small lump hard to touch   Neck: supple, no JVD   Lungs: CTA bilateral , no wheezing no rales   Cardio: RRR, S1/S2, No murmur  Abdomen: Soft, Nontender, Nondistended; Bowel sounds present  Extremities: No calf tenderness, RLE dressing in place   no pretibial edema                                                  7.9    11.06 )-----------( 140      ( 03 Mar 2022 09:26 )             22.2   03-03    129<L>  |  94<L>  |  19.1  ----------------------------<  196<H>  4.3   |  23.0  |  0.72    Ca    8.7      03 Mar 2022 09:26    TPro  5.9<L>  /  Alb  2.8<L>  /  TBili  8.4<H>  /  DBili  x   /  AST  115<H>  /  ALT  26  /  AlkPhos  277<H>  03-02

## 2022-03-03 NOTE — PROGRESS NOTE ADULT - ASSESSMENT
67 y/o female with PMH of HTN, DM, anemia, EtOH abuse with liver cirrhosis, esophageal varices, hepatic encephalopathy, hypothyroidism, who was brought to the ED for altered mental status. As per ED, son reported patient has been having worsening confusion and nose bleed  Patient is a poor historian.Pt had CT of head on arrivial showed   A right supraorbital soft tissue hematoma is markedly decreased in size   from 01/13/2022.  No CT evidence of acute intracranial hemorrhage,   subdural collection or calvarial fracture.  Found to have UTi started on iv rocephin , JOVANNI , hepatic encephalopathy , lactulose iv fuid started , diuretics on hold , pt is with right leg pain and swelling lower part . xary performed due to recent fall farcture ruled out day 3 , worsening pain swelling redness start iv abx for possible cellulitis , worsening hyponatremia . ammonia level improving . 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   s/p paracentesis   overall prognosis is poor   GI follow up appreciated     4-Prerenal azotemia   cr is improving        5- Anemia of cronic dx   with drop   no sign of bleed   s/p blood tx   cont to monitor   Gi on board     6-- Acute metabolic encephalopathy due to hepatic encephalopathy   improved   at baseline     7- Hypothyroid   TSH is over 6   increased levothyroxine  dose to 75 mcg     8-Hyperglycemia / type 2 Diabetes Mellitus   stable   an episode of low normal BG 3/2 premeals insulin stopped   cont lantus   ISS    diet good oral intake     Dvt prophylaxis  high  INR is high due to liver disease     cont to monitor   OOB to chair , ambulate   Pt ordered     she lives with her kids per pt   spoke to son 3/2 states that mom lives alone , they are all working   she needs help  pt wants to go home     d/w  discharge planning

## 2022-03-03 NOTE — PROGRESS NOTE ADULT - SUBJECTIVE AND OBJECTIVE BOX
INFECTIOUS DISEASES AND INTERNAL MEDICINE at [facility]  =======================================================  Bernardo La MD  Diplomates American Board of Internal Medicine and Infectious Diseases  Telephone [phone]  Fax            [phone]  =======================================================    JUSTIN LOVE [id number]    Follow up: left leg cellulitis abscess    Allergies:  No Known Allergies      Medications:  acetaminophen     Tablet .. 650 milliGRAM(s) Oral every 6 hours PRN  aluminum hydroxide/magnesium hydroxide/simethicone Suspension 30 milliLiter(s) Oral every 4 hours PRN  bisacodyl Suppository 10 milliGRAM(s) Rectal daily PRN  dextrose 40% Gel 15 Gram(s) Oral once  dextrose 5%. 1000 milliLiter(s) IV Continuous <Continuous>  dextrose 5%. 1000 milliLiter(s) IV Continuous <Continuous>  dextrose 50% Injectable 25 Gram(s) IV Push once  dextrose 50% Injectable 12.5 Gram(s) IV Push once  dextrose 50% Injectable 25 Gram(s) IV Push once  ferrous    sulfate 325 milliGRAM(s) Oral daily  folic acid 1 milliGRAM(s) Oral daily  furosemide    Tablet 20 milliGRAM(s) Oral daily  glucagon  Injectable 1 milliGRAM(s) IntraMuscular once  HYDROmorphone  Injectable 0.5 milliGRAM(s) IV Push four times a day PRN  insulin glargine Injectable (LANTUS) 20 Unit(s) SubCutaneous at bedtime  insulin lispro (ADMELOG) corrective regimen sliding scale   SubCutaneous three times a day before meals  insulin lispro (ADMELOG) corrective regimen sliding scale   SubCutaneous at bedtime  labetalol 50 milliGRAM(s) Oral two times a day  lactulose Syrup 10 Gram(s) Oral three times a day  levothyroxine 75 MICROGram(s) Oral daily  meropenem  IVPB 1000 milliGRAM(s) IV Intermittent every 8 hours  multivitamin 1 Tablet(s) Oral daily  ondansetron Injectable 4 milliGRAM(s) IV Push every 8 hours PRN  pantoprazole    Tablet 40 milliGRAM(s) Oral two times a day  rifAXIMin 550 milliGRAM(s) Oral two times a day  sodium chloride 2% . 1000 milliLiter(s) IV Continuous <Continuous>  spironolactone 50 milliGRAM(s) Oral daily  thiamine 100 milliGRAM(s) Oral daily  traMADol 25 milliGRAM(s) Oral every 6 hours PRN  vancomycin  IVPB 750 milliGRAM(s) IV Intermittent every 12 hours    SOCIAL       FAMILY   FAMILY HISTORY:  FH: depression (Child)      REVIEW OF SYSTEMS:  CONSTITUTIONAL:  No Fever or chills  HEENT:   No diplopia or blurred vision.  No earache, sore throat or runny nose.  CARDIOVASCULAR:  No pressure, squeezing, strangling, tightness, heaviness or aching about the chest, neck, axilla or epigastrium.  RESPIRATORY:  No cough, shortness of breath, PND or orthopnea.  GASTROINTESTINAL:  No nausea, vomiting or diarrhea.  GENITOURINARY:  No dysuria, frequency or urgency. No Blood in urine  MUSCULOSKELETAL:   moves all joints  SKIN:as per hpi  NEUROLOGIC:  No paresthesias, fasciculations, seizures or weakness.  PSYCHIATRIC:  No disorder of thought or mood.  ENDOCRINE:  No heat or cold intolerance, polyuria or polydipsia.  HEMATOLOGICAL:  No easy bruising or bleeding.            Physical Exam:  ICU Vital Signs Last 24 Hrs  T(C): 37.2 (03 Mar 2022 07:32), Max: 37.3 (03 Mar 2022 03:45)  T(F): 99 (03 Mar 2022 07:32), Max: 99.2 (03 Mar 2022 06:41)  HR: 85 (03 Mar 2022 07:32) (74 - 85)  BP: 137/70 (03 Mar 2022 07:32) (111/64 - 140/56)  BP(mean): --  ABP: --  ABP(mean): --  RR: 18 (03 Mar 2022 07:32) (18 - 18)  SpO2: 96% (03 Mar 2022 07:32) (94% - 100%)    GEN: NAD,   HEENT: normocephalic and atraumatic. EOMI. LOWELL.    NECK: Supple. No carotid bruits.  No lymphadenopathy or thyromegaly.  LUNGS: Clear to auscultation.  HEART: Regular rate and rhythm without murmur.  ABDOMEN: Soft, nontender, and nondistended.  Positive bowel sounds.    : No CVA tenderness  EXTREMITIES: right leg wrapped  MSK: no joint swelling  NEUROLOGIC: Cranial nerves II through XII are grossly intact.         Labs:  Vitals:  ============  T(F): 99 (03 Mar 2022 07:32), Max: 99.2 (03 Mar 2022 06:41)  HR: 85 (03 Mar 2022 07:32)  BP: 137/70 (03 Mar 2022 07:32)  RR: 18 (03 Mar 2022 07:32)  SpO2: 96% (03 Mar 2022 07:32) (94% - 100%)  temp max in last 48H T(F): , Max: 101.4 (03-02-22 @ 07:15)    =======================================================  Current Antibiotics:  meropenem  IVPB 1000 milliGRAM(s) IV Intermittent every 8 hours  rifAXIMin 550 milliGRAM(s) Oral two times a day  vancomycin  IVPB 750 milliGRAM(s) IV Intermittent every 12 hours    Other medications:  dextrose 40% Gel 15 Gram(s) Oral once  dextrose 5%. 1000 milliLiter(s) IV Continuous <Continuous>  dextrose 5%. 1000 milliLiter(s) IV Continuous <Continuous>  dextrose 50% Injectable 25 Gram(s) IV Push once  dextrose 50% Injectable 12.5 Gram(s) IV Push once  dextrose 50% Injectable 25 Gram(s) IV Push once  ferrous    sulfate 325 milliGRAM(s) Oral daily  folic acid 1 milliGRAM(s) Oral daily  furosemide    Tablet 20 milliGRAM(s) Oral daily  glucagon  Injectable 1 milliGRAM(s) IntraMuscular once  insulin glargine Injectable (LANTUS) 20 Unit(s) SubCutaneous at bedtime  insulin lispro (ADMELOG) corrective regimen sliding scale   SubCutaneous three times a day before meals  insulin lispro (ADMELOG) corrective regimen sliding scale   SubCutaneous at bedtime  labetalol 50 milliGRAM(s) Oral two times a day  lactulose Syrup 10 Gram(s) Oral three times a day  levothyroxine 75 MICROGram(s) Oral daily  multivitamin 1 Tablet(s) Oral daily  pantoprazole    Tablet 40 milliGRAM(s) Oral two times a day  sodium chloride 2% . 1000 milliLiter(s) IV Continuous <Continuous>  spironolactone 50 milliGRAM(s) Oral daily  thiamine 100 milliGRAM(s) Oral daily      =======================================================  Labs:                        7.9    11.06 )-----------( 140      ( 03 Mar 2022 09:26 )             22.2     03-03    129<L>  |  94<L>  |  19.1  ----------------------------<  196<H>  4.3   |  23.0  |  0.72    Ca    8.7      03 Mar 2022 09:26    TPro  5.9<L>  /  Alb  2.8<L>  /  TBili  8.4<H>  /  DBili  x   /  AST  115<H>  /  ALT  26  /  AlkPhos  277<H>  03-02      Culture - Blood (collected 02-27-22 @ 18:40)  Source: .Blood Blood-Peripheral    Culture - Blood (collected 02-27-22 @ 18:40)  Source: .Blood Blood-Peripheral      Creatinine, Serum: 0.72 mg/dL (03-03-22 @ 09:26)  Creatinine, Serum: 0.84 mg/dL (03-02-22 @ 07:09)  Creatinine, Serum: 1.22 mg/dL (03-01-22 @ 05:15)  Creatinine, Serum: 1.57 mg/dL (02-28-22 @ 22:54)  Creatinine, Serum: 1.55 mg/dL (02-28-22 @ 15:02)  Creatinine, Serum: 1.41 mg/dL (02-28-22 @ 11:42)  Creatinine, Serum: 1.43 mg/dL (02-28-22 @ 05:17)  Creatinine, Serum: 1.11 mg/dL (02-27-22 @ 14:06)  Creatinine, Serum: 1.13 mg/dL (02-27-22 @ 06:13)    Procalcitonin, Serum: 1.33 ng/mL (02-27-22 @ 18:40)      Ferritin, Serum: 970 ng/mL (02-27-22 @ 06:13)      WBC Count: 11.06 K/uL (03-03-22 @ 09:26)  WBC Count: 10.15 K/uL (03-03-22 @ 00:05)  WBC Count: 11.52 K/uL (03-02-22 @ 07:09)  WBC Count: 13.98 K/uL (02-28-22 @ 05:17)    COVID-19 PCR: NotDetec (03-02-22 @ 06:54)  COVID-19 PCR: NotDetec (02-24-22 @ 01:02)      Alkaline Phosphatase, Serum: 277 U/L (03-02-22 @ 07:09)  Alkaline Phosphatase, Serum: 407 U/L (02-28-22 @ 15:02)  Alanine Aminotransferase (ALT/SGPT): 26 U/L (03-02-22 @ 07:09)  Alanine Aminotransferase (ALT/SGPT): 52 U/L (02-28-22 @ 15:02)  Aspartate Aminotransferase (AST/SGOT): 115 U/L (03-02-22 @ 07:09)  Aspartate Aminotransferase (AST/SGOT): 162 U/L (02-28-22 @ 15:02)  Bilirubin Total, Serum: 8.4 mg/dL (03-02-22 @ 07:09)  Bilirubin Total, Serum: 10.4 mg/dL (02-28-22 @ 15:02)  Bilirubin Direct, Serum: 8.1 mg/dL (02-28-22 @ 15:02)

## 2022-03-03 NOTE — PHYSICAL THERAPY INITIAL EVALUATION ADULT - TRANSFER SAFETY CONCERNS NOTED: SIT/STAND, REHAB EVAL
required 2 attempts to perform successful transfer; lacks adequate anterior weight-shifting in the absence of verbal cues to achieve successful transfer; pulls from walker to stand

## 2022-03-03 NOTE — PROGRESS NOTE ADULT - SUBJECTIVE AND OBJECTIVE BOX
Patient is a 66y old  Female who presents with a chief complaint of Acute encephalopathy (03 Mar 2022 05:56)      INTERVAL HPI/OVERNIGHT EVENTS: Patient seen and evaluated at bedside, reporting no complaints, no overnight events, remains jaundice, awake and alert x 2, tolerating oral intake,  on CIWA protocol. Denies nausea, vomiting, abdominal pain, chest pain, shortness of breath, hematemesis, hematochezia, melena.                                                             MEDICATIONS  (STANDING):  dextrose 40% Gel 15 Gram(s) Oral once  dextrose 5%. 1000 milliLiter(s) (100 mL/Hr) IV Continuous <Continuous>  dextrose 5%. 1000 milliLiter(s) (50 mL/Hr) IV Continuous <Continuous>  dextrose 50% Injectable 25 Gram(s) IV Push once  dextrose 50% Injectable 12.5 Gram(s) IV Push once  dextrose 50% Injectable 25 Gram(s) IV Push once  ferrous    sulfate 325 milliGRAM(s) Oral daily  folic acid 1 milliGRAM(s) Oral daily  glucagon  Injectable 1 milliGRAM(s) IntraMuscular once  insulin glargine Injectable (LANTUS) 20 Unit(s) SubCutaneous at bedtime  insulin lispro (ADMELOG) corrective regimen sliding scale   SubCutaneous three times a day before meals  insulin lispro (ADMELOG) corrective regimen sliding scale   SubCutaneous at bedtime  labetalol 50 milliGRAM(s) Oral two times a day  lactulose Syrup 10 Gram(s) Oral three times a day  levothyroxine 75 MICROGram(s) Oral daily  meropenem  IVPB 1000 milliGRAM(s) IV Intermittent every 8 hours  multivitamin 1 Tablet(s) Oral daily  pantoprazole    Tablet 40 milliGRAM(s) Oral two times a day  rifAXIMin 550 milliGRAM(s) Oral two times a day  sodium chloride 2% . 1000 milliLiter(s) (65 mL/Hr) IV Continuous <Continuous>  thiamine 100 milliGRAM(s) Oral daily  vancomycin  IVPB 750 milliGRAM(s) IV Intermittent every 12 hours    MEDICATIONS  (PRN):  acetaminophen     Tablet .. 650 milliGRAM(s) Oral every 6 hours PRN Temp greater or equal to 38C (100.4F), Mild Pain (1 - 3)  aluminum hydroxide/magnesium hydroxide/simethicone Suspension 30 milliLiter(s) Oral every 4 hours PRN Dyspepsia  bisacodyl Suppository 10 milliGRAM(s) Rectal daily PRN Constipation  HYDROmorphone  Injectable 0.5 milliGRAM(s) IV Push four times a day PRN Severe Pain (7 - 10)  ondansetron Injectable 4 milliGRAM(s) IV Push every 8 hours PRN Nausea and/or Vomiting  traMADol 25 milliGRAM(s) Oral every 6 hours PRN Moderate Pain (4 - 6)      Allergies    No Known Allergies    Intolerances    Review of Systems:  · ENMT: negative  · Respiratory and Thorax: negative  · Cardiovascular: negative  · Gastrointestinal: see above.  · Genitourinary:	negative  · Musculoskeletal: negative  · Neurological: negative  · Psychiatric: negative  · Hematology/Lymphatics: negative  · Endocrine: negative      Vital Signs Last 24 Hrs  T(C): 37.2 (03 Mar 2022 07:32), Max: 37.3 (03 Mar 2022 03:45)  T(F): 99 (03 Mar 2022 07:32), Max: 99.2 (03 Mar 2022 06:41)  HR: 85 (03 Mar 2022 07:32) (74 - 85)  BP: 137/70 (03 Mar 2022 07:32) (111/64 - 140/56)  BP(mean): --  RR: 18 (03 Mar 2022 07:32) (18 - 18)  SpO2: 96% (03 Mar 2022 07:32) (94% - 100%)    PHYSICAL EXAM:  · Constitutional:  woman, Jaundice in no acute distress.   · Eyes: EOMI; PERRL; no drainage or redness. Icteric  · ENMT: No oral lesions; no gross abnormalities  · Neck:	No bruits; no thyromegaly or nodules  · Back:	No deformity or limitation of movement  · Respiratory: Breath Sounds equal & clear to percussion & auscultation, no accessory muscle use  · Cardiovascular: Regular rate & rhythm, normal S1, S2; no murmurs, gallops or rubs; no S3, S4  · Gastrointestinal: Soft, non-tender, no hepatosplenomegaly, normal bowel sounds      LABS:                        7.9    11.06 )-----------( 140      ( 03 Mar 2022 09:26 )             22.2     03-03    129<L>  |  94<L>  |  19.1  ----------------------------<  196<H>  4.3   |  23.0  |  0.72    Ca    8.7      03 Mar 2022 09:26    TPro  5.9<L>  /  Alb  2.8<L>  /  TBili  8.4<H>  /  DBili  x   /  AST  115<H>  /  ALT  26  /  AlkPhos  277<H>  03-02        LIVER FUNCTIONS - ( 02 Mar 2022 07:09 )  Alb: 2.8 g/dL / Pro: 5.9 g/dL / ALK PHOS: 277 U/L / ALT: 26 U/L / AST: 115 U/L / GGT: x             RADIOLOGY & ADDITIONAL TESTS:   Patient is a 66y old  Female who presents with a chief complaint of Acute encephalopathy (03 Mar 2022 05:56)    Follow-up for alcoholic cirrhosis with ascites, altered mental status and complicated cellulitis of the right lower extremity.    INTERVAL HPI/OVERNIGHT EVENTS: Patient seen and evaluated at bedside, reporting no complaints, no overnight events, remains jaundice, awake and alert x 2, tolerating oral intake,  on CIWA protocol. Denies nausea, vomiting, abdominal pain, chest pain, shortness of breath, hematemesis, hematochezia, melena. Patient refused paracentesis yesterday.  Hemoglobin is stable.  Bilirubin was down to 8.5 yesterday.                                                               MEDICATIONS  (STANDING):  dextrose 40% Gel 15 Gram(s) Oral once  dextrose 5%. 1000 milliLiter(s) (100 mL/Hr) IV Continuous <Continuous>  dextrose 5%. 1000 milliLiter(s) (50 mL/Hr) IV Continuous <Continuous>  dextrose 50% Injectable 25 Gram(s) IV Push once  dextrose 50% Injectable 12.5 Gram(s) IV Push once  dextrose 50% Injectable 25 Gram(s) IV Push once  ferrous    sulfate 325 milliGRAM(s) Oral daily  folic acid 1 milliGRAM(s) Oral daily  glucagon  Injectable 1 milliGRAM(s) IntraMuscular once  insulin glargine Injectable (LANTUS) 20 Unit(s) SubCutaneous at bedtime  insulin lispro (ADMELOG) corrective regimen sliding scale   SubCutaneous three times a day before meals  insulin lispro (ADMELOG) corrective regimen sliding scale   SubCutaneous at bedtime  labetalol 50 milliGRAM(s) Oral two times a day  lactulose Syrup 10 Gram(s) Oral three times a day  levothyroxine 75 MICROGram(s) Oral daily  meropenem  IVPB 1000 milliGRAM(s) IV Intermittent every 8 hours  multivitamin 1 Tablet(s) Oral daily  pantoprazole    Tablet 40 milliGRAM(s) Oral two times a day  rifAXIMin 550 milliGRAM(s) Oral two times a day  sodium chloride 2% . 1000 milliLiter(s) (65 mL/Hr) IV Continuous <Continuous>  thiamine 100 milliGRAM(s) Oral daily  vancomycin  IVPB 750 milliGRAM(s) IV Intermittent every 12 hours    MEDICATIONS  (PRN):  acetaminophen     Tablet .. 650 milliGRAM(s) Oral every 6 hours PRN Temp greater or equal to 38C (100.4F), Mild Pain (1 - 3)  aluminum hydroxide/magnesium hydroxide/simethicone Suspension 30 milliLiter(s) Oral every 4 hours PRN Dyspepsia  bisacodyl Suppository 10 milliGRAM(s) Rectal daily PRN Constipation  HYDROmorphone  Injectable 0.5 milliGRAM(s) IV Push four times a day PRN Severe Pain (7 - 10)  ondansetron Injectable 4 milliGRAM(s) IV Push every 8 hours PRN Nausea and/or Vomiting  traMADol 25 milliGRAM(s) Oral every 6 hours PRN Moderate Pain (4 - 6)      Allergies    No Known Allergies    Intolerances    Review of Systems:  · ENMT: negative  · Respiratory and Thorax: negative  · Cardiovascular: negative  · Gastrointestinal: see above.  · Genitourinary:	negative  · Musculoskeletal: negative  · Neurological: negative  · Psychiatric: negative  · Hematology/Lymphatics: negative  · Endocrine: negative      Vital Signs Last 24 Hrs  T(C): 37.2 (03 Mar 2022 07:32), Max: 37.3 (03 Mar 2022 03:45)  T(F): 99 (03 Mar 2022 07:32), Max: 99.2 (03 Mar 2022 06:41)  HR: 85 (03 Mar 2022 07:32) (74 - 85)  BP: 137/70 (03 Mar 2022 07:32) (111/64 - 140/56)  BP(mean): --  RR: 18 (03 Mar 2022 07:32) (18 - 18)  SpO2: 96% (03 Mar 2022 07:32) (94% - 100%)    PHYSICAL EXAM:  · Constitutional:  woman, Jaundice in no acute distress.   · Eyes: EOMI; PERRL; no drainage or redness. Icteric  · ENMT: No oral lesions; no gross abnormalities  · Neck:	No bruits; no thyromegaly or nodules  · Back:	No deformity or limitation of movement  · Respiratory: Breath Sounds equal & clear to percussion & auscultation, no accessory muscle use  · Cardiovascular: Regular rate & rhythm, normal S1, S2; no murmurs, gallops or rubs; no S3, S4  · Gastrointestinal: Soft, non-tender, no hepatosplenomegaly, normal bowel sounds      LABS:                        7.9    11.06 )-----------( 140      ( 03 Mar 2022 09:26 )             22.2     03-03    129<L>  |  94<L>  |  19.1  ----------------------------<  196<H>  4.3   |  23.0  |  0.72    Ca    8.7      03 Mar 2022 09:26    TPro  5.9<L>  /  Alb  2.8<L>  /  TBili  8.4<H>  /  DBili  x   /  AST  115<H>  /  ALT  26  /  AlkPhos  277<H>  03-02        LIVER FUNCTIONS - ( 02 Mar 2022 07:09 )  Alb: 2.8 g/dL / Pro: 5.9 g/dL / ALK PHOS: 277 U/L / ALT: 26 U/L / AST: 115 U/L / GGT: x             RADIOLOGY & ADDITIONAL TESTS:

## 2022-03-03 NOTE — PHARMACOTHERAPY INTERVENTION NOTE - COMMENTS
Vancomycin level ordered
renal function improving, adjusted meropenem to 1g IV q8h, discussed with ID.

## 2022-03-03 NOTE — PROGRESS NOTE ADULT - PROBLEM SELECTOR PLAN 1
Decompensated cirrhosis (HE+/ ascites+/ VH-), DF >32, MELD score 52.6%., has JOVANNI and HE, on abx for cellulitis   Diagnostic paracentesis cancelled as patient refused procedure.   Continue Lactulose and Rifaximin.  WA protocol  Thiamine, MVI, Folic Acid Decompensated cirrhosis (HE+/ ascites+/ VH-), DF >32, MELD score 52.6%., has JOVANNI and HE, on abx for cellulitis   Diagnostic paracentesis cancelled as patient refused procedure.   Continue Lactulose and Rifaximin.  Thiamine, MVI, Folic Acid

## 2022-03-03 NOTE — PHYSICAL THERAPY INITIAL EVALUATION ADULT - ADDITIONAL COMMENTS
Pt reports that she lives in a home with 0 PEYMAN. PT reports that she lives with her children and that one of them is willing and able to assist her during the day. Pt reports that she owns no DME.

## 2022-03-03 NOTE — PROGRESS NOTE ADULT - ASSESSMENT
Hyponatremia- chronic ; systemic vasodilation in setting of liver cirrhosis  hepatic encephalopathy  UTI- on Rocephin  DM  Darrell- resolved    Pt with chronic hyponatremia- SNa+ range 124-129 during this admission (corrected for hyperglycemia)  Acute worsening from ? hypovolemia  sp 2 % NS and albumin  SNa+ improved  Diuretics resumed- monitor BMP  Fluid restriction    nephrology follow up prn

## 2022-03-03 NOTE — PROGRESS NOTE ADULT - ASSESSMENT
ASSESSMENT  65 yo female s/p I and D who tolerated the procedure well.    Plan:  FU blood and abscess cx  RECOMMEND ELEVATE LEG  Abx per primary team  daily dressing changes

## 2022-03-03 NOTE — CHART NOTE - NSCHARTNOTEFT_GEN_A_CORE
Pt with h/o anemia of chronic disease  H/H 6.7/18.6  Type and screen, consent obtained earlier today  Asymptomatic  VSS  No active bleeding at this time  1 unit PRBCs ordered  repeat CBC 9 am  Continue to monitor Pt with h/o anemia of chronic disease  H/H 6.7/18.6 now  H/H 7.3/19.9 this am  Type and screen, consent obtained earlier today  Asymptomatic  VSS  No active bleeding at this time  1 unit PRBCs ordered  repeat CBC 9 am  Continue to monitor Pt with h/o anemia of chronic disease  H/H 6.7/18.6 now  H/H 7.3/19.9 this am  Type and screen, consent obtained earlier today  Asymptomatic  VSS B/P 111/64 P 76 R 18 PO 99% T 98.7  No active bleeding at this time  1 unit PRBCs ordered  repeat CBC 9 am  Hemodynamically stable  Continue to monitor

## 2022-03-04 LAB
-  AMPICILLIN/SULBACTAM: SIGNIFICANT CHANGE UP
-  CEFAZOLIN: SIGNIFICANT CHANGE UP
-  CLINDAMYCIN: SIGNIFICANT CHANGE UP
-  ERYTHROMYCIN: SIGNIFICANT CHANGE UP
-  GENTAMICIN: SIGNIFICANT CHANGE UP
-  OXACILLIN: SIGNIFICANT CHANGE UP
-  RIFAMPIN: SIGNIFICANT CHANGE UP
-  TETRACYCLINE: SIGNIFICANT CHANGE UP
-  TRIMETHOPRIM/SULFAMETHOXAZOLE: SIGNIFICANT CHANGE UP
-  VANCOMYCIN: SIGNIFICANT CHANGE UP
ALBUMIN SERPL ELPH-MCNC: 3.1 G/DL — LOW (ref 3.3–5.2)
ALP SERPL-CCNC: 244 U/L — HIGH (ref 40–120)
ALT FLD-CCNC: 25 U/L — SIGNIFICANT CHANGE UP
ANION GAP SERPL CALC-SCNC: 12 MMOL/L — SIGNIFICANT CHANGE UP (ref 5–17)
AST SERPL-CCNC: 96 U/L — HIGH
BILIRUB DIRECT SERPL-MCNC: 4.4 MG/DL — HIGH (ref 0–0.3)
BILIRUB INDIRECT FLD-MCNC: 3.2 MG/DL — HIGH (ref 0.2–1)
BILIRUB SERPL-MCNC: 7.7 MG/DL — HIGH (ref 0.4–2)
BUN SERPL-MCNC: 18.6 MG/DL — SIGNIFICANT CHANGE UP (ref 8–20)
CALCIUM SERPL-MCNC: 8.5 MG/DL — LOW (ref 8.6–10.2)
CHLORIDE SERPL-SCNC: 94 MMOL/L — LOW (ref 98–107)
CO2 SERPL-SCNC: 21 MMOL/L — LOW (ref 22–29)
CREAT SERPL-MCNC: 0.43 MG/DL — LOW (ref 0.5–1.3)
CULTURE RESULTS: SIGNIFICANT CHANGE UP
EGFR: 109 ML/MIN/1.73M2 — SIGNIFICANT CHANGE UP
GLUCOSE BLDC GLUCOMTR-MCNC: 134 MG/DL — HIGH (ref 70–99)
GLUCOSE BLDC GLUCOMTR-MCNC: 173 MG/DL — HIGH (ref 70–99)
GLUCOSE BLDC GLUCOMTR-MCNC: 182 MG/DL — HIGH (ref 70–99)
GLUCOSE BLDC GLUCOMTR-MCNC: 216 MG/DL — HIGH (ref 70–99)
GLUCOSE SERPL-MCNC: 164 MG/DL — HIGH (ref 70–99)
HCT VFR BLD CALC: 21.8 % — LOW (ref 34.5–45)
HGB BLD-MCNC: 7.7 G/DL — LOW (ref 11.5–15.5)
MCHC RBC-ENTMCNC: 31.6 PG — SIGNIFICANT CHANGE UP (ref 27–34)
MCHC RBC-ENTMCNC: 35.3 GM/DL — SIGNIFICANT CHANGE UP (ref 32–36)
MCV RBC AUTO: 89.3 FL — SIGNIFICANT CHANGE UP (ref 80–100)
METHOD TYPE: SIGNIFICANT CHANGE UP
ORGANISM # SPEC MICROSCOPIC CNT: SIGNIFICANT CHANGE UP
ORGANISM # SPEC MICROSCOPIC CNT: SIGNIFICANT CHANGE UP
PLATELET # BLD AUTO: 137 K/UL — LOW (ref 150–400)
POTASSIUM SERPL-MCNC: 4.8 MMOL/L — SIGNIFICANT CHANGE UP (ref 3.5–5.3)
POTASSIUM SERPL-SCNC: 4.8 MMOL/L — SIGNIFICANT CHANGE UP (ref 3.5–5.3)
PROT SERPL-MCNC: 6 G/DL — LOW (ref 6.6–8.7)
RBC # BLD: 2.44 M/UL — LOW (ref 3.8–5.2)
RBC # FLD: 22.1 % — HIGH (ref 10.3–14.5)
SODIUM SERPL-SCNC: 127 MMOL/L — LOW (ref 135–145)
SPECIMEN SOURCE: SIGNIFICANT CHANGE UP
VANCOMYCIN TROUGH SERPL-MCNC: 16.2 UG/ML — SIGNIFICANT CHANGE UP (ref 10–20)
WBC # BLD: 11.14 K/UL — HIGH (ref 3.8–10.5)
WBC # FLD AUTO: 11.14 K/UL — HIGH (ref 3.8–10.5)

## 2022-03-04 PROCEDURE — 99231 SBSQ HOSP IP/OBS SF/LOW 25: CPT

## 2022-03-04 PROCEDURE — 99232 SBSQ HOSP IP/OBS MODERATE 35: CPT

## 2022-03-04 PROCEDURE — 99233 SBSQ HOSP IP/OBS HIGH 50: CPT

## 2022-03-04 RX ORDER — LEVOTHYROXINE SODIUM 125 MCG
50 TABLET ORAL DAILY
Refills: 0 | Status: DISCONTINUED | OUTPATIENT
Start: 2022-03-05 | End: 2022-03-08

## 2022-03-04 RX ORDER — TRAMADOL HYDROCHLORIDE 50 MG/1
25 TABLET ORAL
Refills: 0 | Status: DISCONTINUED | OUTPATIENT
Start: 2022-03-04 | End: 2022-03-09

## 2022-03-04 RX ORDER — KETOROLAC TROMETHAMINE 30 MG/ML
15 SYRINGE (ML) INJECTION
Refills: 0 | Status: DISCONTINUED | OUTPATIENT
Start: 2022-03-04 | End: 2022-03-07

## 2022-03-04 RX ORDER — MORPHINE SULFATE 50 MG/1
2 CAPSULE, EXTENDED RELEASE ORAL THREE TIMES A DAY
Refills: 0 | Status: DISCONTINUED | OUTPATIENT
Start: 2022-03-04 | End: 2022-03-09

## 2022-03-04 RX ADMIN — Medication 50 MILLIGRAM(S): at 05:27

## 2022-03-04 RX ADMIN — LACTULOSE 10 GRAM(S): 10 SOLUTION ORAL at 13:27

## 2022-03-04 RX ADMIN — MEROPENEM 100 MILLIGRAM(S): 1 INJECTION INTRAVENOUS at 21:36

## 2022-03-04 RX ADMIN — Medication 1 MILLIGRAM(S): at 13:27

## 2022-03-04 RX ADMIN — LACTULOSE 10 GRAM(S): 10 SOLUTION ORAL at 07:01

## 2022-03-04 RX ADMIN — Medication 1 TABLET(S): at 13:27

## 2022-03-04 RX ADMIN — TRAMADOL HYDROCHLORIDE 25 MILLIGRAM(S): 50 TABLET ORAL at 17:54

## 2022-03-04 RX ADMIN — Medication 250 MILLIGRAM(S): at 08:04

## 2022-03-04 RX ADMIN — PANTOPRAZOLE SODIUM 40 MILLIGRAM(S): 20 TABLET, DELAYED RELEASE ORAL at 05:28

## 2022-03-04 RX ADMIN — PANTOPRAZOLE SODIUM 40 MILLIGRAM(S): 20 TABLET, DELAYED RELEASE ORAL at 17:28

## 2022-03-04 RX ADMIN — Medication 75 MICROGRAM(S): at 05:29

## 2022-03-04 RX ADMIN — INSULIN GLARGINE 20 UNIT(S): 100 INJECTION, SOLUTION SUBCUTANEOUS at 21:38

## 2022-03-04 RX ADMIN — Medication 50 MILLIGRAM(S): at 17:27

## 2022-03-04 RX ADMIN — Medication 2: at 12:26

## 2022-03-04 RX ADMIN — Medication 250 MILLIGRAM(S): at 17:29

## 2022-03-04 RX ADMIN — MEROPENEM 100 MILLIGRAM(S): 1 INJECTION INTRAVENOUS at 05:28

## 2022-03-04 RX ADMIN — Medication 20 MILLIGRAM(S): at 05:31

## 2022-03-04 RX ADMIN — SPIRONOLACTONE 50 MILLIGRAM(S): 25 TABLET, FILM COATED ORAL at 05:29

## 2022-03-04 RX ADMIN — Medication 2: at 09:14

## 2022-03-04 RX ADMIN — LACTULOSE 10 GRAM(S): 10 SOLUTION ORAL at 21:36

## 2022-03-04 RX ADMIN — Medication 650 MILLIGRAM(S): at 05:30

## 2022-03-04 RX ADMIN — Medication 325 MILLIGRAM(S): at 13:27

## 2022-03-04 RX ADMIN — MEROPENEM 100 MILLIGRAM(S): 1 INJECTION INTRAVENOUS at 13:27

## 2022-03-04 RX ADMIN — Medication 100 MILLIGRAM(S): at 13:27

## 2022-03-04 NOTE — PROGRESS NOTE ADULT - SUBJECTIVE AND OBJECTIVE BOX
INFECTIOUS DISEASES AND INTERNAL MEDICINE at [facility]  =======================================================  Bernardo La MD  Diplomates American Board of Internal Medicine and Infectious Diseases  Telephone [phone]  Fax            [phone]  =======================================================    JUSTIN LOVE [id number]    Follow up:  right leg infeciton    Allergies:  No Known Allergies      Medications:  acetaminophen     Tablet .. 650 milliGRAM(s) Oral every 6 hours PRN  aluminum hydroxide/magnesium hydroxide/simethicone Suspension 30 milliLiter(s) Oral every 4 hours PRN  bisacodyl Suppository 10 milliGRAM(s) Rectal daily PRN  dextrose 40% Gel 15 Gram(s) Oral once  dextrose 5%. 1000 milliLiter(s) IV Continuous <Continuous>  dextrose 5%. 1000 milliLiter(s) IV Continuous <Continuous>  dextrose 50% Injectable 12.5 Gram(s) IV Push once  dextrose 50% Injectable 25 Gram(s) IV Push once  dextrose 50% Injectable 25 Gram(s) IV Push once  ferrous    sulfate 325 milliGRAM(s) Oral daily  folic acid 1 milliGRAM(s) Oral daily  furosemide    Tablet 20 milliGRAM(s) Oral daily  glucagon  Injectable 1 milliGRAM(s) IntraMuscular once  HYDROmorphone  Injectable 0.5 milliGRAM(s) IV Push four times a day PRN  insulin glargine Injectable (LANTUS) 20 Unit(s) SubCutaneous at bedtime  insulin lispro (ADMELOG) corrective regimen sliding scale   SubCutaneous three times a day before meals  insulin lispro (ADMELOG) corrective regimen sliding scale   SubCutaneous at bedtime  labetalol 50 milliGRAM(s) Oral two times a day  lactulose Syrup 10 Gram(s) Oral three times a day  levothyroxine 75 MICROGram(s) Oral daily  meropenem  IVPB 1000 milliGRAM(s) IV Intermittent every 8 hours  multivitamin 1 Tablet(s) Oral daily  ondansetron Injectable 4 milliGRAM(s) IV Push every 8 hours PRN  pantoprazole    Tablet 40 milliGRAM(s) Oral two times a day  rifAXIMin 550 milliGRAM(s) Oral two times a day  sodium chloride 2% . 1000 milliLiter(s) IV Continuous <Continuous>  spironolactone 50 milliGRAM(s) Oral daily  thiamine 100 milliGRAM(s) Oral daily  traMADol 25 milliGRAM(s) Oral every 6 hours PRN  vancomycin  IVPB 750 milliGRAM(s) IV Intermittent every 12 hours    SOCIAL       FAMILY   FAMILY HISTORY:  FH: depression (Child)      REVIEW OF SYSTEMS:  CONSTITUTIONAL:  No Fever or chills  HEENT:   No diplopia or blurred vision.  No earache, sore throat or runny nose.  CARDIOVASCULAR:  No pressure, squeezing, strangling, tightness, heaviness or aching about the chest, neck, axilla or epigastrium.  RESPIRATORY:  No cough, shortness of breath, PND or orthopnea.  GASTROINTESTINAL:  No nausea, vomiting or diarrhea.  GENITOURINARY:  No dysuria, frequency or urgency. No Blood in urine  MUSCULOSKELETAL:   moves all joints  SKIN:  No change in skin, hair or nails.  NEUROLOGIC:  No paresthesias, fasciculations, seizures or weakness.  PSYCHIATRIC:  No disorder of thought or mood.  ENDOCRINE:  No heat or cold intolerance, polyuria or polydipsia.  HEMATOLOGICAL:  No easy bruising or bleeding.            Physical Exam:  ICU Vital Signs Last 24 Hrs  T(C): 36.2 (04 Mar 2022 07:58), Max: 37.9 (04 Mar 2022 01:18)  T(F): 97.1 (04 Mar 2022 07:58), Max: 100.3 (04 Mar 2022 01:18)  HR: 70 (04 Mar 2022 07:58) (70 - 86)  BP: 133/53 (04 Mar 2022 07:58) (120/72 - 148/79)  BP(mean): --  ABP: --  ABP(mean): --  RR: 18 (04 Mar 2022 01:18) (18 - 18)  SpO2: 100% (04 Mar 2022 07:58) (96% - 100%)    GEN: NAD,   HEENT: normocephalic and atraumatic. EOMI. LOWELL.    NECK: Supple. No carotid bruits.  No lymphadenopathy or thyromegaly.  LUNGS: Clear to auscultation.  HEART: Regular rate and rhythm without murmur.  ABDOMEN: Soft, nontender, and nondistended.  Positive bowel sounds.    : No CVA tenderness  EXTREMITIES RIGHT LEG WRAPPED  MSK: no joint swelling  NEUROLOGIC: Cranial nerves II through XII are grossly intact.  PSYCHIATRIC: Appropriate affect .  SKIN: No ulceration or induration present.        Labs:  Vitals:  ============  T(F): 97.1 (04 Mar 2022 07:58), Max: 100.3 (04 Mar 2022 01:18)  HR: 70 (04 Mar 2022 07:58)  BP: 133/53 (04 Mar 2022 07:58)  RR: 18 (04 Mar 2022 01:18)  SpO2: 100% (04 Mar 2022 07:58) (96% - 100%)  temp max in last 48H T(F): , Max: 100.3 (03-04-22 @ 01:18)    =======================================================  Current Antibiotics:  meropenem  IVPB 1000 milliGRAM(s) IV Intermittent every 8 hours  rifAXIMin 550 milliGRAM(s) Oral two times a day  vancomycin  IVPB 750 milliGRAM(s) IV Intermittent every 12 hours    Other medications:  dextrose 40% Gel 15 Gram(s) Oral once  dextrose 5%. 1000 milliLiter(s) IV Continuous <Continuous>  dextrose 5%. 1000 milliLiter(s) IV Continuous <Continuous>  dextrose 50% Injectable 12.5 Gram(s) IV Push once  dextrose 50% Injectable 25 Gram(s) IV Push once  dextrose 50% Injectable 25 Gram(s) IV Push once  ferrous    sulfate 325 milliGRAM(s) Oral daily  folic acid 1 milliGRAM(s) Oral daily  furosemide    Tablet 20 milliGRAM(s) Oral daily  glucagon  Injectable 1 milliGRAM(s) IntraMuscular once  insulin glargine Injectable (LANTUS) 20 Unit(s) SubCutaneous at bedtime  insulin lispro (ADMELOG) corrective regimen sliding scale   SubCutaneous three times a day before meals  insulin lispro (ADMELOG) corrective regimen sliding scale   SubCutaneous at bedtime  labetalol 50 milliGRAM(s) Oral two times a day  lactulose Syrup 10 Gram(s) Oral three times a day  levothyroxine 75 MICROGram(s) Oral daily  multivitamin 1 Tablet(s) Oral daily  pantoprazole    Tablet 40 milliGRAM(s) Oral two times a day  sodium chloride 2% . 1000 milliLiter(s) IV Continuous <Continuous>  spironolactone 50 milliGRAM(s) Oral daily  thiamine 100 milliGRAM(s) Oral daily      =======================================================  Labs:                        7.7    11.14 )-----------( 137      ( 04 Mar 2022 06:14 )             21.8     03-04    127<L>  |  94<L>  |  18.6  ----------------------------<  164<H>  4.8   |  21.0<L>  |  0.43<L>    Ca    8.5<L>      04 Mar 2022 06:14    TPro  6.0<L>  /  Alb  3.1<L>  /  TBili  7.7<H>  /  DBili  4.4<H>  /  AST  96<H>  /  ALT  25  /  AlkPhos  244<H>  03-04      Culture - Abscess with Gram Stain (collected 03-02-22 @ 16:34)  Source: .Abscess below rt knee abscess    Culture - Blood (collected 02-27-22 @ 18:40)  Source: .Blood Blood-Peripheral    Culture - Blood (collected 02-27-22 @ 18:40)  Source: .Blood Blood-Peripheral      Creatinine, Serum: 0.43 mg/dL (03-04-22 @ 06:14)  Creatinine, Serum: 0.72 mg/dL (03-03-22 @ 09:26)  Creatinine, Serum: 0.84 mg/dL (03-02-22 @ 07:09)  Creatinine, Serum: 1.22 mg/dL (03-01-22 @ 05:15)  Creatinine, Serum: 1.57 mg/dL (02-28-22 @ 22:54)  Creatinine, Serum: 1.55 mg/dL (02-28-22 @ 15:02)  Creatinine, Serum: 1.41 mg/dL (02-28-22 @ 11:42)  Creatinine, Serum: 1.43 mg/dL (02-28-22 @ 05:17)  Creatinine, Serum: 1.11 mg/dL (02-27-22 @ 14:06)    Procalcitonin, Serum: 1.33 ng/mL (02-27-22 @ 18:40)      Ferritin, Serum: 970 ng/mL (02-27-22 @ 06:13)      WBC Count: 11.14 K/uL (03-04-22 @ 06:14)  WBC Count: 11.06 K/uL (03-03-22 @ 09:26)  WBC Count: 10.15 K/uL (03-03-22 @ 00:05)  WBC Count: 11.52 K/uL (03-02-22 @ 07:09)  WBC Count: 13.98 K/uL (02-28-22 @ 05:17)    COVID-19 PCR: NotDetec (03-02-22 @ 06:54)  COVID-19 PCR: NotDetec (02-24-22 @ 01:02)      Alkaline Phosphatase, Serum: 244 U/L (03-04-22 @ 06:14)  Alkaline Phosphatase, Serum: 277 U/L (03-02-22 @ 07:09)  Alkaline Phosphatase, Serum: 407 U/L (02-28-22 @ 15:02)  Alanine Aminotransferase (ALT/SGPT): 25 U/L (03-04-22 @ 06:14)  Alanine Aminotransferase (ALT/SGPT): 26 U/L (03-02-22 @ 07:09)  Alanine Aminotransferase (ALT/SGPT): 52 U/L (02-28-22 @ 15:02)  Aspartate Aminotransferase (AST/SGOT): 96 U/L (03-04-22 @ 06:14)  Aspartate Aminotransferase (AST/SGOT): 115 U/L (03-02-22 @ 07:09)  Aspartate Aminotransferase (AST/SGOT): 162 U/L (02-28-22 @ 15:02)  Bilirubin Total, Serum: 7.7 mg/dL (03-04-22 @ 06:14)  Bilirubin Total, Serum: 8.4 mg/dL (03-02-22 @ 07:09)  Bilirubin Total, Serum: 10.4 mg/dL (02-28-22 @ 15:02)  Bilirubin Direct, Serum: 4.4 mg/dL (03-04-22 @ 06:14)  Bilirubin Direct, Serum: 8.1 mg/dL (02-28-22 @ 15:02)

## 2022-03-04 NOTE — PROGRESS NOTE ADULT - PROBLEM SELECTOR PLAN 2
Chronic hyponatremia secondary to systemic vasodilatation in setting of liver cirrhosis.  Continue to follow Nephrology team recommendations.
Chronic systemic vasodilatation in setting of liver cirrhosis,   Continue to manage as per Nephrology recommendations.
Chronic hyponatremia secondary to systemic vasodilatation in setting of liver cirrhosis.  Continue to follow Nephrology team recommendations.

## 2022-03-04 NOTE — PROGRESS NOTE ADULT - ASSESSMENT
67 y/o female with PMH of HTN, DM, anemia, EtOH abuse with liver cirrhosis, esophageal varices, hepatic encephalopathy, hypothyroidism, who was brought to the ED for altered mental status. As per ED, son reported patient has been having worsening confusion and nose bleed yesterday. Patient is a poor historian. She has no chest pain, shortness of breath, nausea, vomiting, abdominal pain.  PT REPORTS SHE FELL ABOUT 2 WEEKS AGO  AND HIT HER LEG   AS ABOVE ADMITTED WITH  CHANGE IN MENTAL STATUS  PT  WITH CONTINUED PAIN AND REDNESS IN LEFT LEG        on MERREM/VANCO  U/S WITH ? ABSCESS SUGGEST EVAL FOR ASPIRATION   S/P I  AND D AT BEDSIDE BY SURGERY   AWAIT CX RESULTS   PRELIM POLYMICROBIAL        DEESCALATE IF NOT MRSA CAN D/C TIANA  WILL FOLLOW UP WITH FURTHER RECOMMENDATIONS

## 2022-03-04 NOTE — PROGRESS NOTE ADULT - SUBJECTIVE AND OBJECTIVE BOX
Pt offers no acute complaints at this time. Pain well controlled on current regiment. Denies chest pain, SOB, palpitations. Left leg wound erythema improved in the context of trauma induced abscess and  no purulent output from wound, base clean and packing removed 3/3/22 due to a bleeder after incision and drainage.    MEDICATIONS  (STANDING):  dextrose 40% Gel 15 Gram(s) Oral once  dextrose 5%. 1000 milliLiter(s) (50 mL/Hr) IV Continuous <Continuous>  dextrose 5%. 1000 milliLiter(s) (100 mL/Hr) IV Continuous <Continuous>  dextrose 50% Injectable 25 Gram(s) IV Push once  dextrose 50% Injectable 12.5 Gram(s) IV Push once  dextrose 50% Injectable 25 Gram(s) IV Push once  ferrous    sulfate 325 milliGRAM(s) Oral daily  folic acid 1 milliGRAM(s) Oral daily  furosemide    Tablet 20 milliGRAM(s) Oral daily  glucagon  Injectable 1 milliGRAM(s) IntraMuscular once  insulin glargine Injectable (LANTUS) 20 Unit(s) SubCutaneous at bedtime  insulin lispro (ADMELOG) corrective regimen sliding scale   SubCutaneous three times a day before meals  insulin lispro (ADMELOG) corrective regimen sliding scale   SubCutaneous at bedtime  labetalol 50 milliGRAM(s) Oral two times a day  lactulose Syrup 10 Gram(s) Oral three times a day  levothyroxine 75 MICROGram(s) Oral daily  meropenem  IVPB 1000 milliGRAM(s) IV Intermittent every 8 hours  multivitamin 1 Tablet(s) Oral daily  pantoprazole    Tablet 40 milliGRAM(s) Oral two times a day  rifAXIMin 550 milliGRAM(s) Oral two times a day  sodium chloride 2% . 1000 milliLiter(s) (65 mL/Hr) IV Continuous <Continuous>  spironolactone 50 milliGRAM(s) Oral daily  thiamine 100 milliGRAM(s) Oral daily  vancomycin  IVPB 750 milliGRAM(s) IV Intermittent every 12 hours    MEDICATIONS  (PRN):  acetaminophen     Tablet .. 650 milliGRAM(s) Oral every 6 hours PRN Temp greater or equal to 38C (100.4F), Mild Pain (1 - 3)  aluminum hydroxide/magnesium hydroxide/simethicone Suspension 30 milliLiter(s) Oral every 4 hours PRN Dyspepsia  bisacodyl Suppository 10 milliGRAM(s) Rectal daily PRN Constipation  HYDROmorphone  Injectable 0.5 milliGRAM(s) IV Push four times a day PRN Severe Pain (7 - 10)  ondansetron Injectable 4 milliGRAM(s) IV Push every 8 hours PRN Nausea and/or Vomiting  traMADol 25 milliGRAM(s) Oral every 6 hours PRN Moderate Pain (4 - 6)      Vital Signs Last 24 Hrs  T(C): 37.9 (04 Mar 2022 01:18), Max: 37.9 (04 Mar 2022 01:18)  T(F): 100.3 (04 Mar 2022 01:18), Max: 100.3 (04 Mar 2022 01:18)  HR: 84 (04 Mar 2022 01:18) (76 - 86)  BP: 120/72 (04 Mar 2022 01:18) (111/64 - 148/79)  BP(mean): --  RR: 18 (04 Mar 2022 01:18) (18 - 18)  SpO2: 99% (04 Mar 2022 01:18) (94% - 99%)    Physical Exam:    Constitutional: NAD  HEENT: PERRL, EOMI  Neck: No JVD, FROM without pain  Respiratory: no accessory muscle use, respirations non-labored  GI:   Neurological: A&O x 3; without gross deficit    A:     P:  Continue current care  Pain control  OOB as tolerated  Encourage IS  DVT ppx

## 2022-03-04 NOTE — PROGRESS NOTE ADULT - PROBLEM SELECTOR PLAN 3
Possibly a combination of factor with anemia of chronic disease and  alcoholic liver disease, with no evidence of bleeding  Hemoglobin stable s/p 1 unit of PRBC's  Continue to monitor CBC and transfuse as needed.
possibly a combination of factor with anemia of chronic disease and  alcoholic liver disease.  Hemoglobin uptrend s/p 1 unit of PRBC's  Continue to monitor CBC and transfuse as needed.

## 2022-03-04 NOTE — PROGRESS NOTE ADULT - SUBJECTIVE AND OBJECTIVE BOX
[facility] Division of Hospital Medicine Progress Note    Lenny Velazquez M.D.    Patient is a 66y old  Female who presents with a chief complaint of Acute encephalopathy (04 Mar 2022 10:45)      SUBJECTIVE / OVERNIGHT EVENTS: ENZO Zhang helped with translation. Continues to reports RLE pain. Denies other concern.     Patient denies chest pain, SOB, abd pain, N/V, fever, chills, dysuria or any other complaints. All remainder ROS negative.     MEDICATIONS  (STANDING):  dextrose 40% Gel 15 Gram(s) Oral once  dextrose 5%. 1000 milliLiter(s) (50 mL/Hr) IV Continuous <Continuous>  dextrose 5%. 1000 milliLiter(s) (100 mL/Hr) IV Continuous <Continuous>  dextrose 50% Injectable 25 Gram(s) IV Push once  dextrose 50% Injectable 12.5 Gram(s) IV Push once  dextrose 50% Injectable 25 Gram(s) IV Push once  ferrous    sulfate 325 milliGRAM(s) Oral daily  folic acid 1 milliGRAM(s) Oral daily  furosemide    Tablet 20 milliGRAM(s) Oral daily  glucagon  Injectable 1 milliGRAM(s) IntraMuscular once  insulin glargine Injectable (LANTUS) 20 Unit(s) SubCutaneous at bedtime  insulin lispro (ADMELOG) corrective regimen sliding scale   SubCutaneous three times a day before meals  insulin lispro (ADMELOG) corrective regimen sliding scale   SubCutaneous at bedtime  labetalol 50 milliGRAM(s) Oral two times a day  lactulose Syrup 10 Gram(s) Oral three times a day  levothyroxine 75 MICROGram(s) Oral daily  meropenem  IVPB 1000 milliGRAM(s) IV Intermittent every 8 hours  multivitamin 1 Tablet(s) Oral daily  pantoprazole    Tablet 40 milliGRAM(s) Oral two times a day  rifAXIMin 550 milliGRAM(s) Oral two times a day  sodium chloride 2% . 1000 milliLiter(s) (65 mL/Hr) IV Continuous <Continuous>  spironolactone 50 milliGRAM(s) Oral daily  thiamine 100 milliGRAM(s) Oral daily  traMADol 25 milliGRAM(s) Oral two times a day  vancomycin  IVPB 750 milliGRAM(s) IV Intermittent every 12 hours    MEDICATIONS  (PRN):  aluminum hydroxide/magnesium hydroxide/simethicone Suspension 30 milliLiter(s) Oral every 4 hours PRN Dyspepsia  bisacodyl Suppository 10 milliGRAM(s) Rectal daily PRN Constipation  HYDROmorphone  Injectable 0.5 milliGRAM(s) IV Push four times a day PRN Severe Pain (7 - 10)  ketorolac   Injectable 15 milliGRAM(s) IV Push four times a day PRN Mild Pain (1 - 3) and moderate pain (4-6)  morphine  - Injectable 2 milliGRAM(s) IV Push three times a day PRN Severe Pain (7 - 10)  ondansetron Injectable 4 milliGRAM(s) IV Push every 8 hours PRN Nausea and/or Vomiting      I&O's Summary      PHYSICAL EXAM:  Vital Signs Last 24 Hrs  T(C): 36.2 (04 Mar 2022 07:58), Max: 37.9 (04 Mar 2022 01:18)  T(F): 97.1 (04 Mar 2022 07:58), Max: 100.3 (04 Mar 2022 01:18)  HR: 70 (04 Mar 2022 07:58) (70 - 86)  BP: 133/53 (04 Mar 2022 07:58) (120/72 - 148/79)  BP(mean): --  RR: 18 (04 Mar 2022 01:18) (18 - 18)  SpO2: 100% (04 Mar 2022 07:58) (96% - 100%)    CONSTITUTIONAL: NAD, well-groomed  ENMT: Moist oral mucosa, no pharyngeal injection or exudates; +severe sclera icterus  RESPIRATORY: Normal respiratory effort; lungs are clear to auscultation bilaterally  CARDIOVASCULAR: Regular rate and rhythm, normal S1 and S2, no murmur/rub/gallop; No lower extremity edema;  ABDOMEN: Nontender to palpation, normoactive bowel sounds  PSYCH: A+O x3; affect appropriate  NEUROLOGY: CN 2-12 are intact and symmetric; no gross sensory deficits;   SKIN: No rashes; no palpable lesions    LABS:                        7.7    11.14 )-----------( 137      ( 04 Mar 2022 06:14 )             21.8     03-04    127<L>  |  94<L>  |  18.6  ----------------------------<  164<H>  4.8   |  21.0<L>  |  0.43<L>    Ca    8.5<L>      04 Mar 2022 06:14    TPro  6.0<L>  /  Alb  3.1<L>  /  TBili  7.7<H>  /  DBili  4.4<H>  /  AST  96<H>  /  ALT  25  /  AlkPhos  244<H>  03-04              Culture - Abscess with Gram Stain (collected 02 Mar 2022 16:34)  Source: .Abscess below rt knee abscess  Preliminary Report (03 Mar 2022 19:21):    Moderate Neisseria flavescens "Susceptibilities not performed"    Few Staphylococcus species    Few Alpha hemolytic strep "Susceptibilities not performed"      CAPILLARY BLOOD GLUCOSE      POCT Blood Glucose.: 173 mg/dL (04 Mar 2022 12:25)  POCT Blood Glucose.: 182 mg/dL (04 Mar 2022 09:09)  POCT Blood Glucose.: 218 mg/dL (03 Mar 2022 21:47)  POCT Blood Glucose.: 200 mg/dL (03 Mar 2022 17:06)      RADIOLOGY & ADDITIONAL TESTS:  Results Reviewed:   Imaging Personally Reviewed:  Electrocardiogram Personally Reviewed:

## 2022-03-04 NOTE — CHART NOTE - NSCHARTNOTEFT_GEN_A_CORE
Source: Patient [x ]  Family [ ]   other [x ] EMR    Current Diet: Diet, Consistent Carbohydrate/No Snacks:   1200mL Fluid Restriction ([id number])  Low Sodium  Supplement Feeding Modality:  Oral  Glucerna Shake Cans or Servings Per Day:  1       Frequency:  Three Times a day (03-01-22 @ 11:01)    Current Weight:   (3/1): 147.9#  No new weight at this time    % Weight Change: 2+ right leg edema noted.      Pertinent Medications: MEDICATIONS  (STANDING):  dextrose 40% Gel 15 Gram(s) Oral once  dextrose 5%. 1000 milliLiter(s) (50 mL/Hr) IV Continuous <Continuous>  dextrose 5%. 1000 milliLiter(s) (100 mL/Hr) IV Continuous <Continuous>  dextrose 50% Injectable 12.5 Gram(s) IV Push once  dextrose 50% Injectable 25 Gram(s) IV Push once  dextrose 50% Injectable 25 Gram(s) IV Push once  ferrous    sulfate 325 milliGRAM(s) Oral daily  folic acid 1 milliGRAM(s) Oral daily  furosemide    Tablet 20 milliGRAM(s) Oral daily  glucagon  Injectable 1 milliGRAM(s) IntraMuscular once  insulin glargine Injectable (LANTUS) 20 Unit(s) SubCutaneous at bedtime  insulin lispro (ADMELOG) corrective regimen sliding scale   SubCutaneous three times a day before meals  insulin lispro (ADMELOG) corrective regimen sliding scale   SubCutaneous at bedtime  labetalol 50 milliGRAM(s) Oral two times a day  lactulose Syrup 10 Gram(s) Oral three times a day  levothyroxine 75 MICROGram(s) Oral daily  meropenem  IVPB 1000 milliGRAM(s) IV Intermittent every 8 hours  multivitamin 1 Tablet(s) Oral daily  pantoprazole    Tablet 40 milliGRAM(s) Oral two times a day  rifAXIMin 550 milliGRAM(s) Oral two times a day  sodium chloride 2% . 1000 milliLiter(s) (65 mL/Hr) IV Continuous <Continuous>  spironolactone 50 milliGRAM(s) Oral daily  thiamine 100 milliGRAM(s) Oral daily  vancomycin  IVPB 750 milliGRAM(s) IV Intermittent every 12 hours    MEDICATIONS  (PRN):  acetaminophen     Tablet .. 650 milliGRAM(s) Oral every 6 hours PRN Temp greater or equal to 38C (100.4F), Mild Pain (1 - 3)  aluminum hydroxide/magnesium hydroxide/simethicone Suspension 30 milliLiter(s) Oral every 4 hours PRN Dyspepsia  bisacodyl Suppository 10 milliGRAM(s) Rectal daily PRN Constipation  HYDROmorphone  Injectable 0.5 milliGRAM(s) IV Push four times a day PRN Severe Pain (7 - 10)  ondansetron Injectable 4 milliGRAM(s) IV Push every 8 hours PRN Nausea and/or Vomiting  traMADol 25 milliGRAM(s) Oral every 6 hours PRN Moderate Pain (4 - 6)    Pertinent Labs: CBC Full  -  ( 04 Mar 2022 06:14 )  WBC Count : 11.14 K/uL  RBC Count : 2.44 M/uL  Hemoglobin : 7.7 g/dL  Hematocrit : 21.8 %  Platelet Count - Automated : 137 K/uL  Mean Cell Volume : 89.3 fl  Mean Cell Hemoglobin : 31.6 pg  Mean Cell Hemoglobin Concentration : 35.3 gm/dL  Auto Neutrophil # : x  Auto Lymphocyte # : x  Auto Monocyte # : x  Auto Eosinophil # : x  Auto Basophil # : x  Auto Neutrophil % : x  Auto Lymphocyte % : x  Auto Monocyte % : x  Auto Eosinophil % : x  Auto Basophil % : x  03-04 Na127 mmol/L<L> Glu 164 mg/dL<H> K+ 4.8 mmol/L Cr  0.43 mg/dL<L> BUN 18.6 mg/dL Phos n/a   Alb 3.1 g/dL<L> PAB n/a       Skin: Right forehead wound, left leg wound, right shin abscess noted.    Nutrition focused physical exam conducted - found signs of malnutrition [ ]absent [ x]present    Subcutaneous fat loss: [x ] Orbital fat pads region, [x ]Buccal fat region, [ ]Triceps region,  [ ]Ribs region    Muscle wasting: [x ]Temples region, [ ]Clavicle region, [x ]Shoulder region, [ ]Scapula region, [ ]Interosseous region,  [ ]thigh region, [ ]Calf region    Estimated Needs:   [x ] no change since previous assessment  [ ] recalculated:     Current Nutrition Diagnosis: Pt presents at high nutrition risk secondary to severe acute malnutrition related to inability to meet sufficient protein-energy in setting of persistent lack of appetite, acute metabolic encephalopathy, liver failure as evidence by meeting <50% of estimated energy needs >5days, sev fat/mod muscle wasting.  line used to conduct interview,  #[id number]. Pt reports poor PO intake. Pt unaware of UBW. Educated pt on HBV protein. Pt was receptive and had no further questions at this time. Last documented BM 3/4. RD to remain available.     Recommendations:   1) Rx vitamin C (500mg) daily  2) Continue Glucerna TID  3) Continue MVI, thiamine, folic acid  4) Continue to monitor daily weights    Monitoring and Evaluation:   [x ] PO intake [x ] Tolerance to diet prescription [X] Weights  [X] Follow up per protocol [X] Labs: Chem 8, H/H Source: Patient [x ]  Family [ ]   other [x ] EMR    Current Diet: Diet, Consistent Carbohydrate/No Snacks:   1200mL Fluid Restriction ([id number])  Low Sodium  Supplement Feeding Modality:  Oral  Glucerna Shake Cans or Servings Per Day:  1       Frequency:  Three Times a day (03-01-22 @ 11:01)    Current Weight:   (3/1): 147.9#  No new weight at this time    % Weight Change: 2+ right leg edema noted.      Pertinent Medications: MEDICATIONS  (STANDING):  dextrose 40% Gel 15 Gram(s) Oral once  dextrose 5%. 1000 milliLiter(s) (50 mL/Hr) IV Continuous <Continuous>  dextrose 5%. 1000 milliLiter(s) (100 mL/Hr) IV Continuous <Continuous>  dextrose 50% Injectable 12.5 Gram(s) IV Push once  dextrose 50% Injectable 25 Gram(s) IV Push once  dextrose 50% Injectable 25 Gram(s) IV Push once  ferrous    sulfate 325 milliGRAM(s) Oral daily  folic acid 1 milliGRAM(s) Oral daily  furosemide    Tablet 20 milliGRAM(s) Oral daily  glucagon  Injectable 1 milliGRAM(s) IntraMuscular once  insulin glargine Injectable (LANTUS) 20 Unit(s) SubCutaneous at bedtime  insulin lispro (ADMELOG) corrective regimen sliding scale   SubCutaneous three times a day before meals  insulin lispro (ADMELOG) corrective regimen sliding scale   SubCutaneous at bedtime  labetalol 50 milliGRAM(s) Oral two times a day  lactulose Syrup 10 Gram(s) Oral three times a day  levothyroxine 75 MICROGram(s) Oral daily  meropenem  IVPB 1000 milliGRAM(s) IV Intermittent every 8 hours  multivitamin 1 Tablet(s) Oral daily  pantoprazole    Tablet 40 milliGRAM(s) Oral two times a day  rifAXIMin 550 milliGRAM(s) Oral two times a day  sodium chloride 2% . 1000 milliLiter(s) (65 mL/Hr) IV Continuous <Continuous>  spironolactone 50 milliGRAM(s) Oral daily  thiamine 100 milliGRAM(s) Oral daily  vancomycin  IVPB 750 milliGRAM(s) IV Intermittent every 12 hours    MEDICATIONS  (PRN):  acetaminophen     Tablet .. 650 milliGRAM(s) Oral every 6 hours PRN Temp greater or equal to 38C (100.4F), Mild Pain (1 - 3)  aluminum hydroxide/magnesium hydroxide/simethicone Suspension 30 milliLiter(s) Oral every 4 hours PRN Dyspepsia  bisacodyl Suppository 10 milliGRAM(s) Rectal daily PRN Constipation  HYDROmorphone  Injectable 0.5 milliGRAM(s) IV Push four times a day PRN Severe Pain (7 - 10)  ondansetron Injectable 4 milliGRAM(s) IV Push every 8 hours PRN Nausea and/or Vomiting  traMADol 25 milliGRAM(s) Oral every 6 hours PRN Moderate Pain (4 - 6)    Pertinent Labs: CBC Full  -  ( 04 Mar 2022 06:14 )  WBC Count : 11.14 K/uL  RBC Count : 2.44 M/uL  Hemoglobin : 7.7 g/dL  Hematocrit : 21.8 %  Platelet Count - Automated : 137 K/uL  Mean Cell Volume : 89.3 fl  Mean Cell Hemoglobin : 31.6 pg  Mean Cell Hemoglobin Concentration : 35.3 gm/dL  Auto Neutrophil # : x  Auto Lymphocyte # : x  Auto Monocyte # : x  Auto Eosinophil # : x  Auto Basophil # : x  Auto Neutrophil % : x  Auto Lymphocyte % : x  Auto Monocyte % : x  Auto Eosinophil % : x  Auto Basophil % : x  03-04 Na127 mmol/L<L> Glu 164 mg/dL<H> K+ 4.8 mmol/L Cr  0.43 mg/dL<L> BUN 18.6 mg/dL Phos n/a   Alb 3.1 g/dL<L> PAB n/a       Skin: Right forehead wound, left leg wound, right shin abscess noted.    Nutrition focused physical exam conducted - found signs of malnutrition [ ]absent [ x]present    Subcutaneous fat loss: [x ] Orbital fat pads region, [x ]Buccal fat region, [ ]Triceps region,  [ ]Ribs region    Muscle wasting: [x ]Temples region, [ ]Clavicle region, [x ]Shoulder region, [ ]Scapula region, [ ]Interosseous region,  [ ]thigh region, [ ]Calf region    Estimated Needs:   [x ] no change since previous assessment  [ ] recalculated:     Current Nutrition Diagnosis: Pt presents at high nutrition risk secondary to severe acute malnutrition related to inability to meet sufficient protein-energy in setting of persistent lack of appetite, acute metabolic encephalopathy, liver failure as evidence by meeting <50% of estimated energy needs >5days, severe fat/moderate muscle wasting.  line used to conduct interview,  #[id number]. Pt reports poor PO intake. Pt unaware of UBW. Educated pt on HBV protein. Pt was receptive and had no further questions at this time. Last documented BM 3/4. RD to remain available.     Recommendations:   1) Rx vitamin C (500mg) daily  2) Continue Glucerna TID  3) Continue MVI, thiamine, folic acid  4) Continue to monitor daily weights    Monitoring and Evaluation:   [x ] PO intake [x ] Tolerance to diet prescription [X] Weights  [X] Follow up per protocol [X] Labs: Chem 8, H/H Source: Patient [x ]  Family [ ]   other [x ] EMR    Current Diet: Diet, Consistent Carbohydrate/No Snacks:   1200mL Fluid Restriction ([id number])  Low Sodium  Supplement Feeding Modality:  Oral  Glucerna Shake Cans or Servings Per Day:  1       Frequency:  Three Times a day (03-01-22 @ 11:01)    Current Weight:   (3/1): 147.9#  No new weight at this time    % Weight Change: 2+ right leg edema noted.      Pertinent Medications: MEDICATIONS  (STANDING):  dextrose 40% Gel 15 Gram(s) Oral once  dextrose 5%. 1000 milliLiter(s) (50 mL/Hr) IV Continuous <Continuous>  dextrose 5%. 1000 milliLiter(s) (100 mL/Hr) IV Continuous <Continuous>  dextrose 50% Injectable 12.5 Gram(s) IV Push once  dextrose 50% Injectable 25 Gram(s) IV Push once  dextrose 50% Injectable 25 Gram(s) IV Push once  ferrous    sulfate 325 milliGRAM(s) Oral daily  folic acid 1 milliGRAM(s) Oral daily  furosemide    Tablet 20 milliGRAM(s) Oral daily  glucagon  Injectable 1 milliGRAM(s) IntraMuscular once  insulin glargine Injectable (LANTUS) 20 Unit(s) SubCutaneous at bedtime  insulin lispro (ADMELOG) corrective regimen sliding scale   SubCutaneous three times a day before meals  insulin lispro (ADMELOG) corrective regimen sliding scale   SubCutaneous at bedtime  labetalol 50 milliGRAM(s) Oral two times a day  lactulose Syrup 10 Gram(s) Oral three times a day  levothyroxine 75 MICROGram(s) Oral daily  meropenem  IVPB 1000 milliGRAM(s) IV Intermittent every 8 hours  multivitamin 1 Tablet(s) Oral daily  pantoprazole    Tablet 40 milliGRAM(s) Oral two times a day  rifAXIMin 550 milliGRAM(s) Oral two times a day  sodium chloride 2% . 1000 milliLiter(s) (65 mL/Hr) IV Continuous <Continuous>  spironolactone 50 milliGRAM(s) Oral daily  thiamine 100 milliGRAM(s) Oral daily  vancomycin  IVPB 750 milliGRAM(s) IV Intermittent every 12 hours    MEDICATIONS  (PRN):  acetaminophen     Tablet .. 650 milliGRAM(s) Oral every 6 hours PRN Temp greater or equal to 38C (100.4F), Mild Pain (1 - 3)  aluminum hydroxide/magnesium hydroxide/simethicone Suspension 30 milliLiter(s) Oral every 4 hours PRN Dyspepsia  bisacodyl Suppository 10 milliGRAM(s) Rectal daily PRN Constipation  HYDROmorphone  Injectable 0.5 milliGRAM(s) IV Push four times a day PRN Severe Pain (7 - 10)  ondansetron Injectable 4 milliGRAM(s) IV Push every 8 hours PRN Nausea and/or Vomiting  traMADol 25 milliGRAM(s) Oral every 6 hours PRN Moderate Pain (4 - 6)    Pertinent Labs: CBC Full  -  ( 04 Mar 2022 06:14 )  WBC Count : 11.14 K/uL  RBC Count : 2.44 M/uL  Hemoglobin : 7.7 g/dL  Hematocrit : 21.8 %  Platelet Count - Automated : 137 K/uL  Mean Cell Volume : 89.3 fl  Mean Cell Hemoglobin : 31.6 pg  Mean Cell Hemoglobin Concentration : 35.3 gm/dL  Auto Neutrophil # : x  Auto Lymphocyte # : x  Auto Monocyte # : x  Auto Eosinophil # : x  Auto Basophil # : x  Auto Neutrophil % : x  Auto Lymphocyte % : x  Auto Monocyte % : x  Auto Eosinophil % : x  Auto Basophil % : x  03-04 Na127 mmol/L<L> Glu 164 mg/dL<H> K+ 4.8 mmol/L Cr  0.43 mg/dL<L> BUN 18.6 mg/dL Phos n/a   Alb 3.1 g/dL<L> PAB n/a       Skin: Right forehead wound, left leg wound, right shin abscess noted.    Nutrition focused physical exam conducted - found signs of malnutrition [ ]absent [ x]present    Subcutaneous fat loss: [x ] Orbital fat pads region, [x ]Buccal fat region, [ ]Triceps region,  [ ]Ribs region    Muscle wasting: [x ]Temples region, [ ]Clavicle region, [x ]Shoulder region, [ ]Scapula region, [ ]Interosseous region,  [ ]thigh region, [ ]Calf region    Estimated Needs:   [x ] no change since previous assessment  [ ] recalculated:     Current Nutrition Diagnosis: Pt presents at high nutrition risk secondary to severe acute malnutrition related to inability to meet sufficient protein-energy in setting of persistent lack of appetite, acute metabolic encephalopathy, liver failure as evidence by meeting <50% of estimated energy needs >5days, severe fat wasting, moderate muscle wasting. Pt Congolese speaking.  line used to conduct interview,  #[id number]. Pt reports poor PO intake. Pt unaware of UBW. Educated pt on HBV protein. Pt was receptive and had no further questions at this time. Last documented BM 3/4. RD to remain available.     Recommendations:   1) Rx vitamin C (500mg) daily  2) Continue Glucerna TID  3) Continue MVI, thiamine, folic acid  4) Continue to monitor daily weights    Monitoring and Evaluation:   [x ] PO intake [x ] Tolerance to diet prescription [X] Weights  [X] Follow up per protocol [X] Labs: Chem 8, H/H

## 2022-03-04 NOTE — PROGRESS NOTE ADULT - ASSESSMENT
This is a 66F On [facility] due to right shin abscess induced by local trauma. Underwent incision and drainage at bedside. Left leg wound erythema improved  no purulent output from wound, base clean and packing removed 3/3/22 due to a bleeder after incision and drainage.    Plan  Monitor wound healing.  FU culture  Rest of care by primary team.   This is a 66F On [facility] due to right shin abscess induced by local trauma. Underwent incision and drainage at bedside. Left leg wound erythema improved  no purulent output from wound, base clean and packing removed 3/3/22 due to a bleeder after incision and drainage.    Plan  Monitor wound healing.  Local wound care: Dry gauze (4X4) then Kerlix then Ace, do not pack  FU culture  Rest of care by primary team.  Please reconsult surgery as needed

## 2022-03-04 NOTE — DIETITIAN NUTRITION RISK NOTIFICATION - TREATMENT: THE FOLLOWING DIET HAS BEEN RECOMMENDED
Diet, Consistent Carbohydrate/No Snacks:   1200mL Fluid Restriction ([id number])  Low Sodium  Supplement Feeding Modality:  Oral  Glucerna Shake Cans or Servings Per Day:  1       Frequency:  Three Times a day (03-01-22 @ 11:01) [Active]

## 2022-03-04 NOTE — PROGRESS NOTE ADULT - SUBJECTIVE AND OBJECTIVE BOX
Patient is a 66y old  Female who presents with a chief complaint of Acute encephalopathy (04 Mar 2022 01:29)      INTERVAL HPI/OVERNIGHT EVENTS: Patient seen and evaluated at bedside, tolerating oral intake, reporting right leg discomfort, hemoglobin stable, Bili and LFT's slowly downtrend    MEDICATIONS  (STANDING):  dextrose 40% Gel 15 Gram(s) Oral once  dextrose 5%. 1000 milliLiter(s) (50 mL/Hr) IV Continuous <Continuous>  dextrose 5%. 1000 milliLiter(s) (100 mL/Hr) IV Continuous <Continuous>  dextrose 50% Injectable 12.5 Gram(s) IV Push once  dextrose 50% Injectable 25 Gram(s) IV Push once  dextrose 50% Injectable 25 Gram(s) IV Push once  ferrous    sulfate 325 milliGRAM(s) Oral daily  folic acid 1 milliGRAM(s) Oral daily  furosemide    Tablet 20 milliGRAM(s) Oral daily  glucagon  Injectable 1 milliGRAM(s) IntraMuscular once  insulin glargine Injectable (LANTUS) 20 Unit(s) SubCutaneous at bedtime  insulin lispro (ADMELOG) corrective regimen sliding scale   SubCutaneous three times a day before meals  insulin lispro (ADMELOG) corrective regimen sliding scale   SubCutaneous at bedtime  labetalol 50 milliGRAM(s) Oral two times a day  lactulose Syrup 10 Gram(s) Oral three times a day  levothyroxine 75 MICROGram(s) Oral daily  meropenem  IVPB 1000 milliGRAM(s) IV Intermittent every 8 hours  multivitamin 1 Tablet(s) Oral daily  pantoprazole    Tablet 40 milliGRAM(s) Oral two times a day  rifAXIMin 550 milliGRAM(s) Oral two times a day  sodium chloride 2% . 1000 milliLiter(s) (65 mL/Hr) IV Continuous <Continuous>  spironolactone 50 milliGRAM(s) Oral daily  thiamine 100 milliGRAM(s) Oral daily  vancomycin  IVPB 750 milliGRAM(s) IV Intermittent every 12 hours    MEDICATIONS  (PRN):  acetaminophen     Tablet .. 650 milliGRAM(s) Oral every 6 hours PRN Temp greater or equal to 38C (100.4F), Mild Pain (1 - 3)  aluminum hydroxide/magnesium hydroxide/simethicone Suspension 30 milliLiter(s) Oral every 4 hours PRN Dyspepsia  bisacodyl Suppository 10 milliGRAM(s) Rectal daily PRN Constipation  HYDROmorphone  Injectable 0.5 milliGRAM(s) IV Push four times a day PRN Severe Pain (7 - 10)  ondansetron Injectable 4 milliGRAM(s) IV Push every 8 hours PRN Nausea and/or Vomiting  traMADol 25 milliGRAM(s) Oral every 6 hours PRN Moderate Pain (4 - 6)      Allergies    No Known Allergies    Intolerances    Review of Systems:  · ENMT: negative  · Respiratory and Thorax: negative  · Cardiovascular: negative  · Gastrointestinal: see above.  · Genitourinary:	negative  · Musculoskeletal: negative  · Neurological: negative  · Psychiatric: negative  · Hematology/Lymphatics: negative  · Endocrine: negative        Vital Signs Last 24 Hrs  T(C): 36.2 (04 Mar 2022 07:58), Max: 37.9 (04 Mar 2022 01:18)  T(F): 97.1 (04 Mar 2022 07:58), Max: 100.3 (04 Mar 2022 01:18)  HR: 70 (04 Mar 2022 07:58) (70 - 86)  BP: 133/53 (04 Mar 2022 07:58) (120/72 - 148/79)  BP(mean): --  RR: 18 (04 Mar 2022 01:18) (18 - 18)  SpO2: 100% (04 Mar 2022 07:58) (96% - 100%)    PHYSICAL EXAM:  · Constitutional: Elderly looking woman, , in no acute distress.   · Eyes: EOMI; PERRL; no drainage or redness  · ENMT: No oral lesions; no gross abnormalities  · Neck:	No bruits; no thyromegaly or nodules  · Back:	No deformity or limitation of movement  · Respiratory: Breath Sounds equal & clear to percussion & auscultation, no accessory muscle use  · Cardiovascular: Regular rate & rhythm, normal S1, S2; no murmurs, gallops or rubs; no S3, S4  · Gastrointestinal: Soft, non-tender, no hepatosplenomegaly, normal bowel sounds      LABS:                        7.7    11.14 )-----------( 137      ( 04 Mar 2022 06:14 )             21.8     03-04    127<L>  |  94<L>  |  18.6  ----------------------------<  164<H>  4.8   |  21.0<L>  |  0.43<L>    Ca    8.5<L>      04 Mar 2022 06:14    TPro  6.0<L>  /  Alb  3.1<L>  /  TBili  7.7<H>  /  DBili  4.4<H>  /  AST  96<H>  /  ALT  25  /  AlkPhos  244<H>  03-04      LIVER FUNCTIONS - ( 04 Mar 2022 06:14 )  Alb: 3.1 g/dL / Pro: 6.0 g/dL / ALK PHOS: 244 U/L / ALT: 25 U/L / AST: 96 U/L / GGT: x                Patient is a 66y old  Female who presents with a chief complaint of Acute encephalopathy (04 Mar 2022 01:29)      INTERVAL HPI/OVERNIGHT EVENTS: Patient seen and evaluated at bedside, tolerating oral intake, reporting right leg discomfort, hemoglobin stable, Bili and LFT's slowly downtrend. Denies nausea, vomiting, abdominal pain, chest pain, shortness of breath, hematemesis, hematochezia, melena.      MEDICATIONS  (STANDING):  dextrose 40% Gel 15 Gram(s) Oral once  dextrose 5%. 1000 milliLiter(s) (50 mL/Hr) IV Continuous <Continuous>  dextrose 5%. 1000 milliLiter(s) (100 mL/Hr) IV Continuous <Continuous>  dextrose 50% Injectable 12.5 Gram(s) IV Push once  dextrose 50% Injectable 25 Gram(s) IV Push once  dextrose 50% Injectable 25 Gram(s) IV Push once  ferrous    sulfate 325 milliGRAM(s) Oral daily  folic acid 1 milliGRAM(s) Oral daily  furosemide    Tablet 20 milliGRAM(s) Oral daily  glucagon  Injectable 1 milliGRAM(s) IntraMuscular once  insulin glargine Injectable (LANTUS) 20 Unit(s) SubCutaneous at bedtime  insulin lispro (ADMELOG) corrective regimen sliding scale   SubCutaneous three times a day before meals  insulin lispro (ADMELOG) corrective regimen sliding scale   SubCutaneous at bedtime  labetalol 50 milliGRAM(s) Oral two times a day  lactulose Syrup 10 Gram(s) Oral three times a day  levothyroxine 75 MICROGram(s) Oral daily  meropenem  IVPB 1000 milliGRAM(s) IV Intermittent every 8 hours  multivitamin 1 Tablet(s) Oral daily  pantoprazole    Tablet 40 milliGRAM(s) Oral two times a day  rifAXIMin 550 milliGRAM(s) Oral two times a day  sodium chloride 2% . 1000 milliLiter(s) (65 mL/Hr) IV Continuous <Continuous>  spironolactone 50 milliGRAM(s) Oral daily  thiamine 100 milliGRAM(s) Oral daily  vancomycin  IVPB 750 milliGRAM(s) IV Intermittent every 12 hours    MEDICATIONS  (PRN):  acetaminophen     Tablet .. 650 milliGRAM(s) Oral every 6 hours PRN Temp greater or equal to 38C (100.4F), Mild Pain (1 - 3)  aluminum hydroxide/magnesium hydroxide/simethicone Suspension 30 milliLiter(s) Oral every 4 hours PRN Dyspepsia  bisacodyl Suppository 10 milliGRAM(s) Rectal daily PRN Constipation  HYDROmorphone  Injectable 0.5 milliGRAM(s) IV Push four times a day PRN Severe Pain (7 - 10)  ondansetron Injectable 4 milliGRAM(s) IV Push every 8 hours PRN Nausea and/or Vomiting  traMADol 25 milliGRAM(s) Oral every 6 hours PRN Moderate Pain (4 - 6)      Allergies    No Known Allergies    Intolerances    Review of Systems:  · ENMT: negative  · Respiratory and Thorax: negative  · Cardiovascular: negative  · Gastrointestinal: see above.  · Genitourinary:	negative  · Musculoskeletal: negative  · Neurological: negative  · Psychiatric: negative  · Hematology/Lymphatics: negative  · Endocrine: negative        Vital Signs Last 24 Hrs  T(C): 36.2 (04 Mar 2022 07:58), Max: 37.9 (04 Mar 2022 01:18)  T(F): 97.1 (04 Mar 2022 07:58), Max: 100.3 (04 Mar 2022 01:18)  HR: 70 (04 Mar 2022 07:58) (70 - 86)  BP: 133/53 (04 Mar 2022 07:58) (120/72 - 148/79)  BP(mean): --  RR: 18 (04 Mar 2022 01:18) (18 - 18)  SpO2: 100% (04 Mar 2022 07:58) (96% - 100%)    PHYSICAL EXAM:  · Constitutional: Elderly looking woman, , in no acute distress.   · Eyes: EOMI; PERRL; no drainage or redness  · ENMT: No oral lesions; no gross abnormalities  · Neck:	No bruits; no thyromegaly or nodules  · Back:	No deformity or limitation of movement  · Respiratory: Breath Sounds equal & clear to percussion & auscultation, no accessory muscle use  · Cardiovascular: Regular rate & rhythm, normal S1, S2; no murmurs, gallops or rubs; no S3, S4  · Gastrointestinal: Soft, non-tender, no hepatosplenomegaly, normal bowel sounds      LABS:                        7.7    11.14 )-----------( 137      ( 04 Mar 2022 06:14 )             21.8     03-04    127<L>  |  94<L>  |  18.6  ----------------------------<  164<H>  4.8   |  21.0<L>  |  0.43<L>    Ca    8.5<L>      04 Mar 2022 06:14    TPro  6.0<L>  /  Alb  3.1<L>  /  TBili  7.7<H>  /  DBili  4.4<H>  /  AST  96<H>  /  ALT  25  /  AlkPhos  244<H>  03-04      LIVER FUNCTIONS - ( 04 Mar 2022 06:14 )  Alb: 3.1 g/dL / Pro: 6.0 g/dL / ALK PHOS: 244 U/L / ALT: 25 U/L / AST: 96 U/L / GGT: x

## 2022-03-04 NOTE — PROGRESS NOTE ADULT - PROBLEM SELECTOR PLAN 1
Decompensated cirrhosis (HE+/ ascites+/ VH-), DF >32, MELD score 52.6%., has JOVANNI and HE, on abx for cellulitis Initially patient presented with some element of hepatic encephalopathy possible precipitated by the right lower leg sever cellulitis.   Continue Lactulose and Rifaximin.  Thiamine, MVI, Folic Acid.

## 2022-03-04 NOTE — PROGRESS NOTE ADULT - ASSESSMENT
65 y/o female with PMH of HTN, DM, anemia, EtOH abuse with liver cirrhosis, esophageal varices, hepatic encephalopathy, hypothyroidism, who was brought to the ED for altered mental status. admitted for metabolic encephalopathy. c/b UTI and cellulitis.     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   -s/p paracentesis   -overall prognosis is poor   -GI follow up appreciated   -given active infection, will hold off steroid    # Prerenal azotemia   -Improved  -daily BMP    # Anemia of cronic dx   -no sign of bleed   -daily CBC  -s/p 1u prbc  -GI recs rudolph    # Acute metabolic encephalopathy due to hepatic encephalopathy   -mental status at baseline    # Hypothyroid   -TSH is over 6, repeat outpatient  -c/w home synthroid 50 mcg     # Hyperglycemia / type 2 Diabetes Mellitus   -stable   -an episode of low normal BG 3/2 premeals insulin stopped   -cont lantus   -ISS    -diet good oral intake     DVT: SCD given elevated INR from liver failure  Diet:  Dispo: pending clinical course, mom lives alone - patient wants to go home     PT eval pending              65 y/o female with PMH of HTN, DM, anemia, EtOH abuse with liver cirrhosis, esophageal varices, hepatic encephalopathy, hypothyroidism, who was brought to the ED for altered mental status. admitted for metabolic encephalopathy. c/b UTI and cellulitis.     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   -s/p paracentesis   -overall prognosis is poor   -GI follow up appreciated   -given active infection, will hold off steroid  -c/w aldactone and Lasix     # Prerenal azotemia   -Improved  -daily BMP    # Anemia of cronic dx   -no sign of bleed   -daily CBC  -s/p 1u prbc  -GI recs rudolph    # Acute metabolic encephalopathy due to hepatic encephalopathy   -mental status at baseline    # Hypothyroid   -TSH is over 6, repeat outpatient  -c/w home synthroid 50 mcg     # Hyperglycemia / type 2 Diabetes Mellitus   -stable   -an episode of low normal BG 3/2 premeals insulin stopped   -cont lantus   -ISS    -diet good oral intake     DVT: SCD given elevated INR from liver failure  Diet:  Dispo: pending clinical course, mom lives alone - patient wants to go home     PT eval pending

## 2022-03-05 LAB
ALBUMIN SERPL ELPH-MCNC: 3.1 G/DL — LOW (ref 3.3–5.2)
ALP SERPL-CCNC: 285 U/L — HIGH (ref 40–120)
ALT FLD-CCNC: 31 U/L — SIGNIFICANT CHANGE UP
ANION GAP SERPL CALC-SCNC: 9 MMOL/L — SIGNIFICANT CHANGE UP (ref 5–17)
AST SERPL-CCNC: 101 U/L — HIGH
BILIRUB SERPL-MCNC: 6.8 MG/DL — HIGH (ref 0.4–2)
BUN SERPL-MCNC: 20.6 MG/DL — HIGH (ref 8–20)
CALCIUM SERPL-MCNC: 8.7 MG/DL — SIGNIFICANT CHANGE UP (ref 8.6–10.2)
CHLORIDE SERPL-SCNC: 97 MMOL/L — LOW (ref 98–107)
CO2 SERPL-SCNC: 22 MMOL/L — SIGNIFICANT CHANGE UP (ref 22–29)
CREAT SERPL-MCNC: 0.8 MG/DL — SIGNIFICANT CHANGE UP (ref 0.5–1.3)
EGFR: 82 ML/MIN/1.73M2 — SIGNIFICANT CHANGE UP
GLUCOSE BLDC GLUCOMTR-MCNC: 182 MG/DL — HIGH (ref 70–99)
GLUCOSE BLDC GLUCOMTR-MCNC: 187 MG/DL — HIGH (ref 70–99)
GLUCOSE BLDC GLUCOMTR-MCNC: 202 MG/DL — HIGH (ref 70–99)
GLUCOSE BLDC GLUCOMTR-MCNC: 220 MG/DL — HIGH (ref 70–99)
GLUCOSE SERPL-MCNC: 208 MG/DL — HIGH (ref 70–99)
HCT VFR BLD CALC: 22.4 % — LOW (ref 34.5–45)
HGB BLD-MCNC: 7.8 G/DL — LOW (ref 11.5–15.5)
INR BLD: 1.76 RATIO — HIGH (ref 0.88–1.16)
MCHC RBC-ENTMCNC: 31.8 PG — SIGNIFICANT CHANGE UP (ref 27–34)
MCHC RBC-ENTMCNC: 34.8 GM/DL — SIGNIFICANT CHANGE UP (ref 32–36)
MCV RBC AUTO: 91.4 FL — SIGNIFICANT CHANGE UP (ref 80–100)
PLATELET # BLD AUTO: 146 K/UL — LOW (ref 150–400)
POTASSIUM SERPL-MCNC: 4.8 MMOL/L — SIGNIFICANT CHANGE UP (ref 3.5–5.3)
POTASSIUM SERPL-SCNC: 4.8 MMOL/L — SIGNIFICANT CHANGE UP (ref 3.5–5.3)
PROT SERPL-MCNC: 6.5 G/DL — LOW (ref 6.6–8.7)
PROTHROM AB SERPL-ACNC: 20.5 SEC — HIGH (ref 10.5–13.4)
RBC # BLD: 2.45 M/UL — LOW (ref 3.8–5.2)
RBC # FLD: 23 % — HIGH (ref 10.3–14.5)
SODIUM SERPL-SCNC: 127 MMOL/L — LOW (ref 135–145)
WBC # BLD: 9.07 K/UL — SIGNIFICANT CHANGE UP (ref 3.8–10.5)
WBC # FLD AUTO: 9.07 K/UL — SIGNIFICANT CHANGE UP (ref 3.8–10.5)

## 2022-03-05 PROCEDURE — 93971 EXTREMITY STUDY: CPT | Mod: 26,LT

## 2022-03-05 PROCEDURE — 99233 SBSQ HOSP IP/OBS HIGH 50: CPT

## 2022-03-05 RX ADMIN — Medication 15 MILLIGRAM(S): at 16:40

## 2022-03-05 RX ADMIN — Medication 50 MICROGRAM(S): at 05:25

## 2022-03-05 RX ADMIN — INSULIN GLARGINE 20 UNIT(S): 100 INJECTION, SOLUTION SUBCUTANEOUS at 21:41

## 2022-03-05 RX ADMIN — PANTOPRAZOLE SODIUM 40 MILLIGRAM(S): 20 TABLET, DELAYED RELEASE ORAL at 18:37

## 2022-03-05 RX ADMIN — MEROPENEM 100 MILLIGRAM(S): 1 INJECTION INTRAVENOUS at 05:25

## 2022-03-05 RX ADMIN — Medication 50 MILLIGRAM(S): at 05:24

## 2022-03-05 RX ADMIN — LACTULOSE 10 GRAM(S): 10 SOLUTION ORAL at 21:40

## 2022-03-05 RX ADMIN — Medication 15 MILLIGRAM(S): at 15:40

## 2022-03-05 RX ADMIN — PANTOPRAZOLE SODIUM 40 MILLIGRAM(S): 20 TABLET, DELAYED RELEASE ORAL at 05:25

## 2022-03-05 RX ADMIN — Medication 2: at 12:46

## 2022-03-05 RX ADMIN — Medication 1 MILLIGRAM(S): at 11:07

## 2022-03-05 RX ADMIN — MORPHINE SULFATE 2 MILLIGRAM(S): 50 CAPSULE, EXTENDED RELEASE ORAL at 15:10

## 2022-03-05 RX ADMIN — LACTULOSE 10 GRAM(S): 10 SOLUTION ORAL at 14:10

## 2022-03-05 RX ADMIN — Medication 325 MILLIGRAM(S): at 11:07

## 2022-03-05 RX ADMIN — MORPHINE SULFATE 2 MILLIGRAM(S): 50 CAPSULE, EXTENDED RELEASE ORAL at 14:10

## 2022-03-05 RX ADMIN — MEROPENEM 100 MILLIGRAM(S): 1 INJECTION INTRAVENOUS at 21:41

## 2022-03-05 RX ADMIN — SPIRONOLACTONE 50 MILLIGRAM(S): 25 TABLET, FILM COATED ORAL at 05:25

## 2022-03-05 RX ADMIN — MEROPENEM 100 MILLIGRAM(S): 1 INJECTION INTRAVENOUS at 14:10

## 2022-03-05 RX ADMIN — Medication 20 MILLIGRAM(S): at 05:24

## 2022-03-05 RX ADMIN — Medication 4: at 08:37

## 2022-03-05 RX ADMIN — Medication 1 TABLET(S): at 11:07

## 2022-03-05 RX ADMIN — Medication 100 MILLIGRAM(S): at 11:07

## 2022-03-05 RX ADMIN — TRAMADOL HYDROCHLORIDE 25 MILLIGRAM(S): 50 TABLET ORAL at 05:23

## 2022-03-05 RX ADMIN — LACTULOSE 10 GRAM(S): 10 SOLUTION ORAL at 05:26

## 2022-03-05 RX ADMIN — Medication 50 MILLIGRAM(S): at 18:38

## 2022-03-05 RX ADMIN — TRAMADOL HYDROCHLORIDE 25 MILLIGRAM(S): 50 TABLET ORAL at 18:38

## 2022-03-05 RX ADMIN — Medication 4: at 18:06

## 2022-03-05 RX ADMIN — TRAMADOL HYDROCHLORIDE 25 MILLIGRAM(S): 50 TABLET ORAL at 19:38

## 2022-03-05 NOTE — PROGRESS NOTE ADULT - SUBJECTIVE AND OBJECTIVE BOX
[facility] Division of Hospital Medicine Progress Note    Lenny Velazquez M.D.    Patient is a 66y old  Female who presents with a chief complaint of Acute encephalopathy (05 Mar 2022 11:02)      SUBJECTIVE / OVERNIGHT EVENTS: GI NP helped with translation. Patient reports left arm pain since yesterday. No other concerns.     Patient denies chest pain, SOB, abd pain, N/V, fever, chills, dysuria or any other complaints. All remainder ROS negative.     MEDICATIONS  (STANDING):  dextrose 40% Gel 15 Gram(s) Oral once  dextrose 5%. 1000 milliLiter(s) (50 mL/Hr) IV Continuous <Continuous>  dextrose 5%. 1000 milliLiter(s) (100 mL/Hr) IV Continuous <Continuous>  dextrose 50% Injectable 25 Gram(s) IV Push once  dextrose 50% Injectable 25 Gram(s) IV Push once  dextrose 50% Injectable 12.5 Gram(s) IV Push once  ferrous    sulfate 325 milliGRAM(s) Oral daily  folic acid 1 milliGRAM(s) Oral daily  furosemide    Tablet 20 milliGRAM(s) Oral daily  glucagon  Injectable 1 milliGRAM(s) IntraMuscular once  insulin glargine Injectable (LANTUS) 20 Unit(s) SubCutaneous at bedtime  insulin lispro (ADMELOG) corrective regimen sliding scale   SubCutaneous three times a day before meals  insulin lispro (ADMELOG) corrective regimen sliding scale   SubCutaneous at bedtime  labetalol 50 milliGRAM(s) Oral two times a day  lactulose Syrup 10 Gram(s) Oral three times a day  levothyroxine 50 MICROGram(s) Oral daily  meropenem  IVPB 1000 milliGRAM(s) IV Intermittent every 8 hours  multivitamin 1 Tablet(s) Oral daily  pantoprazole    Tablet 40 milliGRAM(s) Oral two times a day  rifAXIMin 550 milliGRAM(s) Oral two times a day  sodium chloride 2% . 1000 milliLiter(s) (65 mL/Hr) IV Continuous <Continuous>  spironolactone 50 milliGRAM(s) Oral daily  thiamine 100 milliGRAM(s) Oral daily  traMADol 25 milliGRAM(s) Oral two times a day    MEDICATIONS  (PRN):  aluminum hydroxide/magnesium hydroxide/simethicone Suspension 30 milliLiter(s) Oral every 4 hours PRN Dyspepsia  bisacodyl Suppository 10 milliGRAM(s) Rectal daily PRN Constipation  HYDROmorphone  Injectable 0.5 milliGRAM(s) IV Push four times a day PRN Severe Pain (7 - 10)  ketorolac   Injectable 15 milliGRAM(s) IV Push four times a day PRN Mild Pain (1 - 3) and moderate pain (4-6)  morphine  - Injectable 2 milliGRAM(s) IV Push three times a day PRN Severe Pain (7 - 10)  ondansetron Injectable 4 milliGRAM(s) IV Push every 8 hours PRN Nausea and/or Vomiting      I&O's Summary      PHYSICAL EXAM:  Vital Signs Last 24 Hrs  T(C): 37.2 (05 Mar 2022 07:19), Max: 37.3 (05 Mar 2022 04:11)  T(F): 98.9 (05 Mar 2022 07:19), Max: 99.2 (05 Mar 2022 04:11)  HR: 75 (05 Mar 2022 07:19) (74 - 76)  BP: 142/81 (05 Mar 2022 07:19) (116/66 - 142/81)  BP(mean): --  RR: 18 (05 Mar 2022 07:19) (18 - 20)  SpO2: 100% (05 Mar 2022 07:19) (98% - 100%)    CONSTITUTIONAL: NAD, well-groomed  ENMT: Moist oral mucosa, no pharyngeal injection or exudates; +severe sclera icterus  RESPIRATORY: Normal respiratory effort; lungs are clear to auscultation bilaterally  CARDIOVASCULAR: Regular rate and rhythm, normal S1 and S2, no murmur/rub/gallop; No lower extremity edema;  ABDOMEN: Nontender to palpation, normoactive bowel sounds  PSYCH: A+O x3; affect appropriate  NEUROLOGY: CN 2-12 are intact and symmetric; no gross sensory deficits;   SKIN: No rashes; no palpable lesions. Left cubital ecchymosis and TTP    LABS:                        7.8    9.07  )-----------( 146      ( 05 Mar 2022 08:45 )             22.4     03-05    127<L>  |  97<L>  |  20.6<H>  ----------------------------<  208<H>  4.8   |  22.0  |  0.80    Ca    8.7      05 Mar 2022 08:45    TPro  6.5<L>  /  Alb  3.1<L>  /  TBili  6.8<H>  /  DBili  x   /  AST  101<H>  /  ALT  31  /  AlkPhos  285<H>  03-05    PT/INR - ( 05 Mar 2022 08:45 )   PT: 20.5 sec;   INR: 1.76 ratio                   Culture - Abscess with Gram Stain (collected 02 Mar 2022 16:34)  Source: .Abscess below rt knee abscess  Final Report (04 Mar 2022 19:13):    After additional incubation time, Culture yields >4 types of aerobic    and/or anaerobic bacteria    Call client services within 7 days if further workup is clinically    indicated. Culture includes    Moderate Neisseria flavescens "Susceptibilities not performed"    Few Coag Negative Staphylococcus    Few Alpha hemolytic strep "Susceptibilities not performed"    Rare Haemophilus species, not influenzae "Susceptibilities not performed"    Rare Haemophilus haemolyticus "Susceptibilities not performed"  Organism: Coag Negative Staphylococcus (04 Mar 2022 19:13)  Organism: Coag Negative Staphylococcus (04 Mar 2022 19:13)      CAPILLARY BLOOD GLUCOSE      POCT Blood Glucose.: 220 mg/dL (05 Mar 2022 08:35)  POCT Blood Glucose.: 216 mg/dL (04 Mar 2022 21:37)  POCT Blood Glucose.: 134 mg/dL (04 Mar 2022 17:32)  POCT Blood Glucose.: 173 mg/dL (04 Mar 2022 12:25)      RADIOLOGY & ADDITIONAL TESTS:  Results Reviewed:   Imaging Personally Reviewed:  Electrocardiogram Personally Reviewed:

## 2022-03-05 NOTE — PROGRESS NOTE ADULT - SUBJECTIVE AND OBJECTIVE BOX
[facility] DIVISION OF KIDNEY DISEASES AND HYPERTENSION -- FOLLOW UP NOTE  --------------------------------------------------------------------------------  Chief Complaint:  Hyponatremia    24 hour events/subjective:  Na 127 today      PAST HISTORY  --------------------------------------------------------------------------------  No significant changes to PMH, PSH, FHx, SHx, unless otherwise noted    ALLERGIES & MEDICATIONS  --------------------------------------------------------------------------------  Allergies    No Known Allergies    Intolerances      Standing Inpatient Medications  dextrose 40% Gel 15 Gram(s) Oral once  dextrose 5%. 1000 milliLiter(s) IV Continuous <Continuous>  dextrose 5%. 1000 milliLiter(s) IV Continuous <Continuous>  dextrose 50% Injectable 25 Gram(s) IV Push once  dextrose 50% Injectable 12.5 Gram(s) IV Push once  dextrose 50% Injectable 25 Gram(s) IV Push once  ferrous    sulfate 325 milliGRAM(s) Oral daily  folic acid 1 milliGRAM(s) Oral daily  furosemide    Tablet 20 milliGRAM(s) Oral daily  glucagon  Injectable 1 milliGRAM(s) IntraMuscular once  insulin glargine Injectable (LANTUS) 20 Unit(s) SubCutaneous at bedtime  insulin lispro (ADMELOG) corrective regimen sliding scale   SubCutaneous three times a day before meals  insulin lispro (ADMELOG) corrective regimen sliding scale   SubCutaneous at bedtime  labetalol 50 milliGRAM(s) Oral two times a day  lactulose Syrup 10 Gram(s) Oral three times a day  levothyroxine 50 MICROGram(s) Oral daily  meropenem  IVPB 1000 milliGRAM(s) IV Intermittent every 8 hours  multivitamin 1 Tablet(s) Oral daily  pantoprazole    Tablet 40 milliGRAM(s) Oral two times a day  rifAXIMin 550 milliGRAM(s) Oral two times a day  sodium chloride 2% . 1000 milliLiter(s) IV Continuous <Continuous>  spironolactone 50 milliGRAM(s) Oral daily  thiamine 100 milliGRAM(s) Oral daily  traMADol 25 milliGRAM(s) Oral two times a day    PRN Inpatient Medications  aluminum hydroxide/magnesium hydroxide/simethicone Suspension 30 milliLiter(s) Oral every 4 hours PRN  bisacodyl Suppository 10 milliGRAM(s) Rectal daily PRN  HYDROmorphone  Injectable 0.5 milliGRAM(s) IV Push four times a day PRN  ketorolac   Injectable 15 milliGRAM(s) IV Push four times a day PRN  morphine  - Injectable 2 milliGRAM(s) IV Push three times a day PRN  ondansetron Injectable 4 milliGRAM(s) IV Push every 8 hours PRN      REVIEW OF SYSTEMS  --------------------------------------------------------------------------------  Gen: No weight changes, fatigue, fevers/chills, weakness  Skin: No rashes  Head/Eyes/Ears/Mouth: No headache; Normal hearing; Normal vision w/o blurriness; No sinus pain/discomfort, sore throat  Respiratory: No dyspnea, cough, wheezing, hemoptysis  CV: No chest pain, PND, orthopnea  GI: No abdominal pain, diarrhea, constipation, nausea, vomiting, melena, hematochezia  : No increased frequency, dysuria, hematuria, nocturia  MSK: No joint pain/swelling; no back pain; no edema  Neuro: No dizziness/lightheadedness, weakness, seizures, numbness, tingling  Heme: No easy bruising or bleeding  Endo: No heat/cold intolerance  Psych: No significant nervousness, anxiety, stress, depression    All other systems were reviewed and are negative, except as noted.    VITALS/PHYSICAL EXAM  --------------------------------------------------------------------------------  T(C): 37.2 (03-05-22 @ 07:19), Max: 37.3 (03-05-22 @ 04:11)  HR: 75 (03-05-22 @ 07:19) (74 - 76)  BP: 142/81 (03-05-22 @ 07:19) (116/66 - 142/81)  RR: 18 (03-05-22 @ 07:19) (18 - 20)  SpO2: 100% (03-05-22 @ 07:19) (98% - 100%)  Wt(kg): --        Physical Exam:  	Gen: NAD   	HEENT: PERRL, supple neck, clear oropharynx  	Pulm: CTA B/L  	CV: RRR, S1S2; no rub  	Back: No spinal or CVA tenderness; no sacral edema  	Abd: +BS, soft, nontender/nondistended  	: No suprapubic tenderness  	UE: Warm, FROM, no clubbing, intact strength; no edema; no asterixis  	LE: Warm, FROM, no clubbing, intact strength; no edema  	Neuro: No focal deficits, intact gait  	Psych: Normal affect and mood  	Skin: Warm, without rashes  	Vascular access:    LABS/STUDIES  --------------------------------------------------------------------------------              7.8    9.07  >-----------<  146      [03-05-22 @ 08:45]              22.4     127  |  97  |  20.6  ----------------------------<  208      [03-05-22 @ 08:45]  4.8   |  22.0  |  0.80        Ca     8.7     [03-05-22 @ 08:45]    TPro  6.5  /  Alb  3.1  /  TBili  6.8  /  DBili  x   /  AST  101  /  ALT  31  /  AlkPhos  285  [03-05-22 @ 08:45]    PT/INR: PT 20.5 , INR 1.76       [03-05-22 @ 08:45]      Creatinine Trend:  SCr 0.80 [03-05 @ 08:45]  SCr 0.43 [03-04 @ 06:14]  SCr 0.72 [03-03 @ 09:26]  SCr 0.84 [03-02 @ 07:09]  SCr 1.22 [03-01 @ 05:15]    Urinalysis - [02-27-22 @ 18:27]      Color Jana / Appearance Clear / SG 1.020 / pH 5.0      Gluc Negative / Ketone Trace  / Bili Large / Urobili 8       Blood Large / Protein Negative / Leuk Est Moderate / Nitrite Positive      RBC 3-5 / WBC 26-50 / Hyaline  / Gran  / Sq Epi  / Non Sq Epi Few / Bacteria Few    Urine Sodium <30      [02-27-22 @ 18:27]  Urine Chloride <27      [02-27-22 @ 18:27]  Urine Osmolality 431      [02-27-22 @ 18:27]    Iron 75, TIBC 152, %sat 49      [02-27-22 @ 06:13]  Ferritin 970      [02-27-22 @ 06:13]  Vitamin D (25OH) 39.5      [02-27-22 @ 10:51]  TSH 6.23      [02-27-22 @ 06:13]    HIV Nonreact      [02-23-22 @ 21:44]

## 2022-03-05 NOTE — PROGRESS NOTE ADULT - PROBLEM SELECTOR PLAN 1
Decompensated cirrhosis (HE+/ ascites+/ VH-), DF >32, MELD score 27.. No longer encephalopathic.  On abx for cellulitis Initially patient presented with some element of hepatic encephalopathy possible precipitated by the right lower leg cellulitis.   Continue Lactulose and Rifaximin.  Thiamine, MVI, Folic Acid.  Continue to trend CBC, Coags, LFTs.  Hold off on steroids for now due to  active infection.  Will recommend LUE duplex for eval of left arm pain. Decompensated cirrhosis (HE+/ ascites+/ VH-), DF >32, MELD score 27.. No longer encephalopathic.  On abx for cellulitis Initially patient presented with some element of hepatic encephalopathy possible precipitated by the right lower leg cellulitis.   Continue Lactulose and Rifaximin.  Thiamine, MVI, Folic Acid.  Continue to trend CBC, Coags, LFTs.  Continue spironolactone 50mg, Lasix 20mg daily.   Hold off on steroids for now due to  active infection.  Will recommend LUE duplex for eval of left arm pain.

## 2022-03-05 NOTE — PROGRESS NOTE ADULT - ASSESSMENT
65 y/o female with PMH of HTN, DM, anemia, EtOH abuse with liver cirrhosis, esophageal varices, hepatic encephalopathy, hypothyroidism, who was brought to the ED for altered mental status. admitted for metabolic encephalopathy. c/b UTI and cellulitis.     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   -s/p paracentesis   -overall prognosis is poor   -GI follow up appreciated   -given active infection, will hold off steroid  -c/w aldactone and Lasix     # Prerenal azotemia   -Improved  -daily BMP    # Anemia of cronic dx   -no sign of bleed   -daily CBC  -s/p 1u prbc  -GI recs rudolph    # Acute metabolic encephalopathy due to hepatic encephalopathy   -mental status at baseline    # Hypothyroid   -TSH is over 6, repeat outpatient  -c/w home synthroid 50 mcg     # Hyperglycemia / type 2 Diabetes Mellitus   -stable   -an episode of low normal BG 3/2 premeals insulin stopped   -cont lantus   -ISS    -diet good oral intake     DVT: SCD given elevated INR from liver failure  Diet: consistent carb   Dispo: pending arm ultrasound and ID final recs     PT eval pending

## 2022-03-05 NOTE — PROCEDURE NOTE - ADDITIONAL PROCEDURE DETAILS
Asked by nursing to place IV for 66yFemale for IV access. Nursing tried first.  20 g IV placed with utilizing ultrasound guidance. 20G angio inserted right arm, good blood return, secured well, easily flushed with 5cc normal saline flushes multiple times. Nursing aware. Patient tolerated procedure well. Sharps disposed of in a safe ,manner.
Wound was copiously irrigated with normal saline. Wound was packed then dressed with sterile 4X4 gauze then Kerlix
Asked by nursing to place IV for 66 y.o. Female for IV. Nursing tried first.  20 g IV placed with utilizing ultrasound guidance. angio inserted       arm, right basilic vein,  good blood return, secured well, easily flushed with 5cc normal saline flushes multiple times. Nursing aware. Patient tolerated procedure well. Sharps disposed of in a safe manner.

## 2022-03-05 NOTE — PROCEDURE NOTE - NSPROCDETAILS_GEN_ALL_CORE
location identified, draped/prepped, sterile technique used/blood seen on insertion/dressing applied/flushes easily/secured in place
location identified, draped/prepped, sterile technique used/blood seen on insertion/dressing applied/flushes easily/secured in place
incision left open, packing placed

## 2022-03-05 NOTE — PROCEDURE NOTE - NSINDICATIONS_GEN_A_CORE
abscess
antibiotic therapy/emergency venous access/other
emergency venous access/fluid administration/other

## 2022-03-05 NOTE — PROGRESS NOTE ADULT - ASSESSMENT
Hyponatremia- chronic ; systemic vasodilation in setting of liver cirrhosis  hepatic encephalopathy  UTI- on Rocephin  DM  Darrell- resolved    Pt with chronic hyponatremia- SNa+ range 124-129 during this admission (corrected for hyperglycemia)  Na at baseline for pt with cirrhosis  Will sign off  Please recall if needed    dw Dr Velazquez

## 2022-03-05 NOTE — PROCEDURE NOTE - NSPOSTCAREGUIDE_GEN_A_CORE
Verbal/written post procedure instructions were given to patient/caregiver/Instructed patient/caregiver regarding signs and symptoms of infection/Keep the cast/splint/dressing clean and dry
Verbal/written post procedure instructions were given to patient/caregiver/Instructed patient/caregiver to follow-up with primary care physician/Instructed patient/caregiver regarding signs and symptoms of infection/Keep the cast/splint/dressing clean and dry
Verbal/written post procedure instructions were given to patient/caregiver/Instructed patient/caregiver to follow-up with primary care physician/Instructed patient/caregiver regarding signs and symptoms of infection

## 2022-03-05 NOTE — PROGRESS NOTE ADULT - SUBJECTIVE AND OBJECTIVE BOX
Chief Complaint:  Patient is a 66y old  Female who presents with a chief complaint of Acute encephalopathy (04 Mar 2022 13:59)      Interval Events / Subjective: Patient seen and evaluated at bedside, reporting no complaints, no overnight events. A&O x3. Reporting LUE pain around old IV site. Area with some ecchymosis and swelling noted. Denies nausea, vomiting, abdominal pain, chest pain, shortness of breath, hematemesis, hematochezia, melena. Abdomen softly distended. Tolerating diet. Slight elevation in LFTs. Bili downtrend at 6.8. Hemoglobin stable at 7.8.        REVIEW OF SYSTEMS:   General: Negative  HEENT: Negative  CV: Negative  Respiratory: Negative  GI: See HPI  : Negative  MSK: Negative  Hematologic: Negative  Skin: Negative    MEDICATIONS:   MEDICATIONS  (STANDING):  dextrose 40% Gel 15 Gram(s) Oral once  dextrose 5%. 1000 milliLiter(s) (100 mL/Hr) IV Continuous <Continuous>  dextrose 5%. 1000 milliLiter(s) (50 mL/Hr) IV Continuous <Continuous>  dextrose 50% Injectable 25 Gram(s) IV Push once  dextrose 50% Injectable 12.5 Gram(s) IV Push once  dextrose 50% Injectable 25 Gram(s) IV Push once  ferrous    sulfate 325 milliGRAM(s) Oral daily  folic acid 1 milliGRAM(s) Oral daily  furosemide    Tablet 20 milliGRAM(s) Oral daily  glucagon  Injectable 1 milliGRAM(s) IntraMuscular once  insulin glargine Injectable (LANTUS) 20 Unit(s) SubCutaneous at bedtime  insulin lispro (ADMELOG) corrective regimen sliding scale   SubCutaneous three times a day before meals  insulin lispro (ADMELOG) corrective regimen sliding scale   SubCutaneous at bedtime  labetalol 50 milliGRAM(s) Oral two times a day  lactulose Syrup 10 Gram(s) Oral three times a day  levothyroxine 50 MICROGram(s) Oral daily  meropenem  IVPB 1000 milliGRAM(s) IV Intermittent every 8 hours  multivitamin 1 Tablet(s) Oral daily  pantoprazole    Tablet 40 milliGRAM(s) Oral two times a day  rifAXIMin 550 milliGRAM(s) Oral two times a day  sodium chloride 2% . 1000 milliLiter(s) (65 mL/Hr) IV Continuous <Continuous>  spironolactone 50 milliGRAM(s) Oral daily  thiamine 100 milliGRAM(s) Oral daily  traMADol 25 milliGRAM(s) Oral two times a day    MEDICATIONS  (PRN):  aluminum hydroxide/magnesium hydroxide/simethicone Suspension 30 milliLiter(s) Oral every 4 hours PRN Dyspepsia  bisacodyl Suppository 10 milliGRAM(s) Rectal daily PRN Constipation  HYDROmorphone  Injectable 0.5 milliGRAM(s) IV Push four times a day PRN Severe Pain (7 - 10)  ketorolac   Injectable 15 milliGRAM(s) IV Push four times a day PRN Mild Pain (1 - 3) and moderate pain (4-6)  morphine  - Injectable 2 milliGRAM(s) IV Push three times a day PRN Severe Pain (7 - 10)  ondansetron Injectable 4 milliGRAM(s) IV Push every 8 hours PRN Nausea and/or Vomiting      ALLERGIES:   Allergies    No Known Allergies    Intolerances        VITAL SIGNS:   Vital Signs Last 24 Hrs  T(C): 37.2 (05 Mar 2022 07:19), Max: 37.3 (05 Mar 2022 04:11)  T(F): 98.9 (05 Mar 2022 07:19), Max: 99.2 (05 Mar 2022 04:11)  HR: 75 (05 Mar 2022 07:19) (74 - 76)  BP: 142/81 (05 Mar 2022 07:19) (116/66 - 142/81)  BP(mean): --  RR: 18 (05 Mar 2022 07:19) (18 - 20)  SpO2: 100% (05 Mar 2022 07:19) (98% - 100%)  I&O's Summary      PHYSICAL EXAM:   GENERAL:   jaundice appearing female, in no acute distress  HEENT:  NC/AT,  conjunctivae clear, sclera -icteric  CHEST:  Full & symmetric excursion, no increased effort, breath sounds clear  HEART:  Regular rhythm, S1, S2, no murmur/rub/S3/S4,  no edema  ABDOMEN:  Softly distended, non-tender, normoactive bowel sounds.  EXTREMITIES: No cyanosis, clubbing or edema  SKIN:  No rash/erythema/ecchymoses/petechiae/wounds/abscess/warm/dry  NEURO:  Alert, oriented    LABS:  CBC Full  -  ( 05 Mar 2022 08:45 )  WBC Count : 9.07 K/uL  RBC Count : 2.45 M/uL  Hemoglobin : 7.8 g/dL  Hematocrit : 22.4 %  Platelet Count - Automated : 146 K/uL  Mean Cell Volume : 91.4 fl  Mean Cell Hemoglobin : 31.8 pg  Mean Cell Hemoglobin Concentration : 34.8 gm/dL  Auto Neutrophil # : x  Auto Lymphocyte # : x  Auto Monocyte # : x  Auto Eosinophil # : x  Auto Basophil # : x  Auto Neutrophil % : x  Auto Lymphocyte % : x  Auto Monocyte % : x  Auto Eosinophil % : x  Auto Basophil % : x    03-05    127<L>  |  97<L>  |  20.6<H>  ----------------------------<  208<H>  4.8   |  22.0  |  0.80    Ca    8.7      05 Mar 2022 08:45    TPro  6.5<L>  /  Alb  3.1<L>  /  TBili  6.8<H>  /  DBili  x   /  AST  101<H>  /  ALT  31  /  AlkPhos  285<H>  03-05    LIVER FUNCTIONS - ( 05 Mar 2022 08:45 )  Alb: 3.1 g/dL / Pro: 6.5 g/dL / ALK PHOS: 285 U/L / ALT: 31 U/L / AST: 101 U/L / GGT: x           PT/INR - ( 05 Mar 2022 08:45 )   PT: 20.5 sec;   INR: 1.76 ratio               Culture - Abscess with Gram Stain (collected 02 Mar 2022 16:34)  Source: .Abscess below rt knee abscess  Final Report (04 Mar 2022 19:13):    After additional incubation time, Culture yields >4 types of aerobic    and/or anaerobic bacteria    Call client services within 7 days if further workup is clinically    indicated. Culture includes    Moderate Neisseria flavescens "Susceptibilities not performed"    Few Coag Negative Staphylococcus    Few Alpha hemolytic strep "Susceptibilities not performed"    Rare Haemophilus species, not influenzae "Susceptibilities not performed"    Rare Haemophilus haemolyticus "Susceptibilities not performed"  Organism: Coag Negative Staphylococcus (04 Mar 2022 19:13)  Organism: Coag Negative Staphylococcus (04 Mar 2022 19:13)        RADIOLOGY & ADDITIONAL STUDIES (The following images were personally reviewed):    < from: US Abdomen Upper Quadrant Right (02.25.22 @ 12:10) >    ACC: [id number] EXAM:  US ABDOMEN RT UPR QUADRANT                          PROCEDURE DATE:  02/25/2022          INTERPRETATION:  CLINICAL INFORMATION: History of cirrhosis. Evaluate   liver, biliary tree and presence of ascites.    COMPARISON: 6/22/2021.    TECHNIQUE: Sonography of the right upper quadrant.    FINDINGS:    Liver: Coarsened echotexture. No focal hepatic masses. Nodular hepatic   parenchymal contour.  Bile ducts: Normal caliber. Common bile duct measures 5 mm.  Gallbladder: Gallstones. Gallbladder sludge. No gallbladder wall   thickening.  Pancreas: Not well visualized.  Right kidney: 12.9 cm. No hydronephrosis. No renal calculi. No   space-occupying lesions.  Ascites: Small to moderate volume intraperitoneal ascites.  IVC: Notwell visualized.  Patent paraumbilical vein.    IMPRESSION: Findings most consistent with hepatic parenchymal cirrhosis   with portal hypertension; (evidenced by recanalization of the   paraumbilical vein). Ascites.    --- End of Report ---            ANGELIQUE ALVARADO MD; Attending Radiologist  This document has been electronically signed. Feb 25 2022  1:13PM    < end of copied text >

## 2022-03-06 LAB
ALBUMIN SERPL ELPH-MCNC: 2.9 G/DL — LOW (ref 3.3–5.2)
ALP SERPL-CCNC: 255 U/L — HIGH (ref 40–120)
ALT FLD-CCNC: 31 U/L — SIGNIFICANT CHANGE UP
ANION GAP SERPL CALC-SCNC: 11 MMOL/L — SIGNIFICANT CHANGE UP (ref 5–17)
AST SERPL-CCNC: 99 U/L — HIGH
BILIRUB SERPL-MCNC: 5.5 MG/DL — HIGH (ref 0.4–2)
BUN SERPL-MCNC: 24.4 MG/DL — HIGH (ref 8–20)
CALCIUM SERPL-MCNC: 8.8 MG/DL — SIGNIFICANT CHANGE UP (ref 8.6–10.2)
CHLORIDE SERPL-SCNC: 98 MMOL/L — SIGNIFICANT CHANGE UP (ref 98–107)
CO2 SERPL-SCNC: 20 MMOL/L — LOW (ref 22–29)
CREAT SERPL-MCNC: 0.73 MG/DL — SIGNIFICANT CHANGE UP (ref 0.5–1.3)
EGFR: 92 ML/MIN/1.73M2 — SIGNIFICANT CHANGE UP
GLUCOSE BLDC GLUCOMTR-MCNC: 144 MG/DL — HIGH (ref 70–99)
GLUCOSE BLDC GLUCOMTR-MCNC: 152 MG/DL — HIGH (ref 70–99)
GLUCOSE BLDC GLUCOMTR-MCNC: 163 MG/DL — HIGH (ref 70–99)
GLUCOSE BLDC GLUCOMTR-MCNC: 191 MG/DL — HIGH (ref 70–99)
GLUCOSE SERPL-MCNC: 152 MG/DL — HIGH (ref 70–99)
HCT VFR BLD CALC: 21.4 % — LOW (ref 34.5–45)
HGB BLD-MCNC: 7.5 G/DL — LOW (ref 11.5–15.5)
MCHC RBC-ENTMCNC: 32.5 PG — SIGNIFICANT CHANGE UP (ref 27–34)
MCHC RBC-ENTMCNC: 35 GM/DL — SIGNIFICANT CHANGE UP (ref 32–36)
MCV RBC AUTO: 92.6 FL — SIGNIFICANT CHANGE UP (ref 80–100)
PLATELET # BLD AUTO: 139 K/UL — LOW (ref 150–400)
POTASSIUM SERPL-MCNC: 5.2 MMOL/L — SIGNIFICANT CHANGE UP (ref 3.5–5.3)
POTASSIUM SERPL-SCNC: 5.2 MMOL/L — SIGNIFICANT CHANGE UP (ref 3.5–5.3)
PROT SERPL-MCNC: 6.2 G/DL — LOW (ref 6.6–8.7)
RBC # BLD: 2.31 M/UL — LOW (ref 3.8–5.2)
RBC # FLD: 23.1 % — HIGH (ref 10.3–14.5)
SODIUM SERPL-SCNC: 129 MMOL/L — LOW (ref 135–145)
WBC # BLD: 7.48 K/UL — SIGNIFICANT CHANGE UP (ref 3.8–10.5)
WBC # FLD AUTO: 7.48 K/UL — SIGNIFICANT CHANGE UP (ref 3.8–10.5)

## 2022-03-06 PROCEDURE — 76705 ECHO EXAM OF ABDOMEN: CPT | Mod: 26

## 2022-03-06 PROCEDURE — 99233 SBSQ HOSP IP/OBS HIGH 50: CPT

## 2022-03-06 RX ADMIN — SPIRONOLACTONE 50 MILLIGRAM(S): 25 TABLET, FILM COATED ORAL at 05:19

## 2022-03-06 RX ADMIN — MEROPENEM 100 MILLIGRAM(S): 1 INJECTION INTRAVENOUS at 05:18

## 2022-03-06 RX ADMIN — Medication 15 MILLIGRAM(S): at 09:19

## 2022-03-06 RX ADMIN — Medication 1 MILLIGRAM(S): at 11:08

## 2022-03-06 RX ADMIN — Medication 50 MILLIGRAM(S): at 17:22

## 2022-03-06 RX ADMIN — LACTULOSE 10 GRAM(S): 10 SOLUTION ORAL at 14:10

## 2022-03-06 RX ADMIN — PANTOPRAZOLE SODIUM 40 MILLIGRAM(S): 20 TABLET, DELAYED RELEASE ORAL at 17:22

## 2022-03-06 RX ADMIN — Medication 325 MILLIGRAM(S): at 11:08

## 2022-03-06 RX ADMIN — TRAMADOL HYDROCHLORIDE 25 MILLIGRAM(S): 50 TABLET ORAL at 05:19

## 2022-03-06 RX ADMIN — Medication 2: at 12:16

## 2022-03-06 RX ADMIN — Medication 100 MILLIGRAM(S): at 11:08

## 2022-03-06 RX ADMIN — LACTULOSE 10 GRAM(S): 10 SOLUTION ORAL at 21:09

## 2022-03-06 RX ADMIN — TRAMADOL HYDROCHLORIDE 25 MILLIGRAM(S): 50 TABLET ORAL at 17:25

## 2022-03-06 RX ADMIN — Medication 50 MILLIGRAM(S): at 05:18

## 2022-03-06 RX ADMIN — Medication 1 TABLET(S): at 11:08

## 2022-03-06 RX ADMIN — Medication 20 MILLIGRAM(S): at 05:19

## 2022-03-06 RX ADMIN — Medication 2: at 08:28

## 2022-03-06 RX ADMIN — Medication 50 MICROGRAM(S): at 05:19

## 2022-03-06 RX ADMIN — MEROPENEM 100 MILLIGRAM(S): 1 INJECTION INTRAVENOUS at 14:10

## 2022-03-06 RX ADMIN — INSULIN GLARGINE 20 UNIT(S): 100 INJECTION, SOLUTION SUBCUTANEOUS at 21:09

## 2022-03-06 RX ADMIN — TRAMADOL HYDROCHLORIDE 25 MILLIGRAM(S): 50 TABLET ORAL at 18:25

## 2022-03-06 RX ADMIN — PANTOPRAZOLE SODIUM 40 MILLIGRAM(S): 20 TABLET, DELAYED RELEASE ORAL at 05:20

## 2022-03-06 RX ADMIN — MEROPENEM 100 MILLIGRAM(S): 1 INJECTION INTRAVENOUS at 21:09

## 2022-03-06 RX ADMIN — Medication 15 MILLIGRAM(S): at 10:19

## 2022-03-06 RX ADMIN — LACTULOSE 10 GRAM(S): 10 SOLUTION ORAL at 05:18

## 2022-03-06 NOTE — PROGRESS NOTE ADULT - ASSESSMENT
65 y/o female with PMH of HTN, DM, anemia, EtOH abuse with liver cirrhosis, esophageal varices, hepatic encephalopathy, hypothyroidism, who was brought to the ED for altered mental status. admitted for metabolic encephalopathy. c/b UTI and cellulitis.     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   -s/p paracentesis   -overall prognosis is poor   -GI follow up appreciated   -given active infection, will hold off steroid  -c/w aldactone and Lasix   -daily CMP    # Prerenal azotemia   -Improved  -daily BMP    # Anemia of cronic dx   -no sign of bleed   -daily CBC  -s/p 1u prbc  -GI recs rudolph    # Acute metabolic encephalopathy due to hepatic encephalopathy   -mental status at baseline    # Hypothyroid   -TSH is over 6, repeat outpatient  -c/w home synthroid 50 mcg     # Hyperglycemia / type 2 Diabetes Mellitus   -stable   -an episode of low normal BG 3/2 premeals insulin stopped   -cont lantus   -ISS    -diet good oral intake     DVT: SCD given elevated INR from liver failure  Diet: consistent carb   Dispo: pending PT re-eval and ID for final abx recs, Likely in 1-2 days, will need home care services

## 2022-03-06 NOTE — PROGRESS NOTE ADULT - SUBJECTIVE AND OBJECTIVE BOX
INTERVAL HPI/OVERNIGHT EVENTS:Patient seen and examined.  Patient still complaining of left upper arm pain.  Ultrasound is negative.  No other complaints.  On antibiotics for cellulitis.    MEDICATIONS  (STANDING):  dextrose 40% Gel 15 Gram(s) Oral once  dextrose 5%. 1000 milliLiter(s) (50 mL/Hr) IV Continuous <Continuous>  dextrose 5%. 1000 milliLiter(s) (100 mL/Hr) IV Continuous <Continuous>  dextrose 50% Injectable 25 Gram(s) IV Push once  dextrose 50% Injectable 12.5 Gram(s) IV Push once  dextrose 50% Injectable 25 Gram(s) IV Push once  ferrous    sulfate 325 milliGRAM(s) Oral daily  folic acid 1 milliGRAM(s) Oral daily  furosemide    Tablet 20 milliGRAM(s) Oral daily  glucagon  Injectable 1 milliGRAM(s) IntraMuscular once  insulin glargine Injectable (LANTUS) 20 Unit(s) SubCutaneous at bedtime  insulin lispro (ADMELOG) corrective regimen sliding scale   SubCutaneous three times a day before meals  insulin lispro (ADMELOG) corrective regimen sliding scale   SubCutaneous at bedtime  labetalol 50 milliGRAM(s) Oral two times a day  lactulose Syrup 10 Gram(s) Oral three times a day  levothyroxine 50 MICROGram(s) Oral daily  meropenem  IVPB 1000 milliGRAM(s) IV Intermittent every 8 hours  multivitamin 1 Tablet(s) Oral daily  pantoprazole    Tablet 40 milliGRAM(s) Oral two times a day  rifAXIMin 550 milliGRAM(s) Oral two times a day  sodium chloride 2% . 1000 milliLiter(s) (65 mL/Hr) IV Continuous <Continuous>  spironolactone 50 milliGRAM(s) Oral daily  thiamine 100 milliGRAM(s) Oral daily  traMADol 25 milliGRAM(s) Oral two times a day    MEDICATIONS  (PRN):  aluminum hydroxide/magnesium hydroxide/simethicone Suspension 30 milliLiter(s) Oral every 4 hours PRN Dyspepsia  bisacodyl Suppository 10 milliGRAM(s) Rectal daily PRN Constipation  HYDROmorphone  Injectable 0.5 milliGRAM(s) IV Push four times a day PRN Severe Pain (7 - 10)  ketorolac   Injectable 15 milliGRAM(s) IV Push four times a day PRN Mild Pain (1 - 3) and moderate pain (4-6)  morphine  - Injectable 2 milliGRAM(s) IV Push three times a day PRN Severe Pain (7 - 10)  ondansetron Injectable 4 milliGRAM(s) IV Push every 8 hours PRN Nausea and/or Vomiting      Allergies    No Known Allergies    Intolerances        Vital Signs Last 24 Hrs  T(C): 37.2 (06 Mar 2022 07:50), Max: 37.4 (06 Mar 2022 04:29)  T(F): 99 (06 Mar 2022 07:50), Max: 99.3 (06 Mar 2022 04:29)  HR: 71 (06 Mar 2022 07:50) (68 - 75)  BP: 133/76 (06 Mar 2022 07:50) (106/64 - 153/79)  BP(mean): --  RR: 19 (06 Mar 2022 07:50) (18 - 19)  SpO2: 99% (06 Mar 2022 07:50) (98% - 99%)    LABS:                        7.5    7.48  )-----------( 139      ( 06 Mar 2022 07:39 )             21.4     03-06    129<L>  |  98  |  24.4<H>  ----------------------------<  152<H>  5.2   |  20.0<L>  |  0.73    Ca    8.8      06 Mar 2022 07:39    TPro  6.2<L>  /  Alb  2.9<L>  /  TBili  5.5<H>  /  DBili  x   /  AST  99<H>  /  ALT  31  /  AlkPhos  255<H>  03-06    PT/INR - ( 05 Mar 2022 08:45 )   PT: 20.5 sec;   INR: 1.76 ratio               RADIOLOGY & ADDITIONAL TESTS:

## 2022-03-06 NOTE — PROGRESS NOTE ADULT - ASSESSMENT
Patient with alcohol-related cirrhosis.  We will order ultrasound abdominal to follow-up on the ascites.  Continue current management.  Elevate the arm.  Monitor lytes.  No need for steroids.

## 2022-03-07 LAB
ALBUMIN SERPL ELPH-MCNC: 2.9 G/DL — LOW (ref 3.3–5.2)
ALP SERPL-CCNC: 288 U/L — HIGH (ref 40–120)
ALT FLD-CCNC: 33 U/L — HIGH
ANION GAP SERPL CALC-SCNC: 10 MMOL/L — SIGNIFICANT CHANGE UP (ref 5–17)
ANION GAP SERPL CALC-SCNC: 11 MMOL/L — SIGNIFICANT CHANGE UP (ref 5–17)
APPEARANCE UR: CLEAR — SIGNIFICANT CHANGE UP
AST SERPL-CCNC: 97 U/L — HIGH
BILIRUB SERPL-MCNC: 5.3 MG/DL — HIGH (ref 0.4–2)
BILIRUB UR-MCNC: NEGATIVE — SIGNIFICANT CHANGE UP
BUN SERPL-MCNC: 25.2 MG/DL — HIGH (ref 8–20)
BUN SERPL-MCNC: 27.4 MG/DL — HIGH (ref 8–20)
CALCIUM SERPL-MCNC: 8.6 MG/DL — SIGNIFICANT CHANGE UP (ref 8.6–10.2)
CALCIUM SERPL-MCNC: 8.9 MG/DL — SIGNIFICANT CHANGE UP (ref 8.6–10.2)
CHLORIDE SERPL-SCNC: 94 MMOL/L — LOW (ref 98–107)
CHLORIDE SERPL-SCNC: 98 MMOL/L — SIGNIFICANT CHANGE UP (ref 98–107)
CHLORIDE UR-SCNC: 59 MMOL/L — SIGNIFICANT CHANGE UP
CO2 SERPL-SCNC: 20 MMOL/L — LOW (ref 22–29)
CO2 SERPL-SCNC: 22 MMOL/L — SIGNIFICANT CHANGE UP (ref 22–29)
COLOR SPEC: YELLOW — SIGNIFICANT CHANGE UP
CREAT SERPL-MCNC: 0.8 MG/DL — SIGNIFICANT CHANGE UP (ref 0.5–1.3)
CREAT SERPL-MCNC: 1.04 MG/DL — SIGNIFICANT CHANGE UP (ref 0.5–1.3)
DIFF PNL FLD: ABNORMAL
EGFR: 60 ML/MIN/1.73M2 — SIGNIFICANT CHANGE UP
EGFR: 82 ML/MIN/1.73M2 — SIGNIFICANT CHANGE UP
EPI CELLS # UR: SIGNIFICANT CHANGE UP
GLUCOSE BLDC GLUCOMTR-MCNC: 136 MG/DL — HIGH (ref 70–99)
GLUCOSE BLDC GLUCOMTR-MCNC: 140 MG/DL — HIGH (ref 70–99)
GLUCOSE BLDC GLUCOMTR-MCNC: 151 MG/DL — HIGH (ref 70–99)
GLUCOSE BLDC GLUCOMTR-MCNC: 193 MG/DL — HIGH (ref 70–99)
GLUCOSE SERPL-MCNC: 136 MG/DL — HIGH (ref 70–99)
GLUCOSE SERPL-MCNC: 149 MG/DL — HIGH (ref 70–99)
GLUCOSE UR QL: NEGATIVE MG/DL — SIGNIFICANT CHANGE UP
HCT VFR BLD CALC: 21.7 % — LOW (ref 34.5–45)
HGB BLD-MCNC: 7.5 G/DL — LOW (ref 11.5–15.5)
KETONES UR-MCNC: NEGATIVE — SIGNIFICANT CHANGE UP
LEUKOCYTE ESTERASE UR-ACNC: ABNORMAL
MAGNESIUM SERPL-MCNC: 1.8 MG/DL — SIGNIFICANT CHANGE UP (ref 1.8–2.6)
MCHC RBC-ENTMCNC: 32.8 PG — SIGNIFICANT CHANGE UP (ref 27–34)
MCHC RBC-ENTMCNC: 34.6 GM/DL — SIGNIFICANT CHANGE UP (ref 32–36)
MCV RBC AUTO: 94.8 FL — SIGNIFICANT CHANGE UP (ref 80–100)
NITRITE UR-MCNC: NEGATIVE — SIGNIFICANT CHANGE UP
OSMOLALITY UR: 412 MOSM/KG — SIGNIFICANT CHANGE UP (ref 300–1000)
PH UR: 6.5 — SIGNIFICANT CHANGE UP (ref 5–8)
PHOSPHATE SERPL-MCNC: 3.7 MG/DL — SIGNIFICANT CHANGE UP (ref 2.4–4.7)
PLATELET # BLD AUTO: 138 K/UL — LOW (ref 150–400)
POTASSIUM SERPL-MCNC: 5.4 MMOL/L — HIGH (ref 3.5–5.3)
POTASSIUM SERPL-MCNC: 5.4 MMOL/L — HIGH (ref 3.5–5.3)
POTASSIUM SERPL-SCNC: 5.4 MMOL/L — HIGH (ref 3.5–5.3)
POTASSIUM SERPL-SCNC: 5.4 MMOL/L — HIGH (ref 3.5–5.3)
PROT SERPL-MCNC: 6.5 G/DL — LOW (ref 6.6–8.7)
PROT UR-MCNC: NEGATIVE — SIGNIFICANT CHANGE UP
RBC # BLD: 2.29 M/UL — LOW (ref 3.8–5.2)
RBC # FLD: 23.4 % — HIGH (ref 10.3–14.5)
RBC CASTS # UR COMP ASSIST: ABNORMAL /HPF (ref 0–4)
SODIUM SERPL-SCNC: 125 MMOL/L — LOW (ref 135–145)
SODIUM SERPL-SCNC: 130 MMOL/L — LOW (ref 135–145)
SODIUM UR-SCNC: 79 MMOL/L — SIGNIFICANT CHANGE UP
SP GR SPEC: 1.01 — SIGNIFICANT CHANGE UP (ref 1.01–1.02)
TSH SERPL-MCNC: 13.06 UIU/ML — HIGH (ref 0.27–4.2)
URATE SERPL-MCNC: 4.4 MG/DL — SIGNIFICANT CHANGE UP (ref 2.4–5.7)
UROBILINOGEN FLD QL: NEGATIVE MG/DL — SIGNIFICANT CHANGE UP
WBC # BLD: 6.81 K/UL — SIGNIFICANT CHANGE UP (ref 3.8–10.5)
WBC # FLD AUTO: 6.81 K/UL — SIGNIFICANT CHANGE UP (ref 3.8–10.5)
WBC UR QL: ABNORMAL /HPF (ref 0–5)

## 2022-03-07 PROCEDURE — 99233 SBSQ HOSP IP/OBS HIGH 50: CPT

## 2022-03-07 PROCEDURE — 73080 X-RAY EXAM OF ELBOW: CPT | Mod: 26,LT

## 2022-03-07 RX ORDER — SODIUM ZIRCONIUM CYCLOSILICATE 10 G/10G
5 POWDER, FOR SUSPENSION ORAL ONCE
Refills: 0 | Status: COMPLETED | OUTPATIENT
Start: 2022-03-07 | End: 2022-03-07

## 2022-03-07 RX ADMIN — Medication 1 MILLIGRAM(S): at 08:24

## 2022-03-07 RX ADMIN — TRAMADOL HYDROCHLORIDE 25 MILLIGRAM(S): 50 TABLET ORAL at 17:17

## 2022-03-07 RX ADMIN — PANTOPRAZOLE SODIUM 40 MILLIGRAM(S): 20 TABLET, DELAYED RELEASE ORAL at 05:16

## 2022-03-07 RX ADMIN — HYDROMORPHONE HYDROCHLORIDE 0.5 MILLIGRAM(S): 2 INJECTION INTRAMUSCULAR; INTRAVENOUS; SUBCUTANEOUS at 08:25

## 2022-03-07 RX ADMIN — HYDROMORPHONE HYDROCHLORIDE 0.5 MILLIGRAM(S): 2 INJECTION INTRAMUSCULAR; INTRAVENOUS; SUBCUTANEOUS at 08:40

## 2022-03-07 RX ADMIN — Medication 2: at 13:30

## 2022-03-07 RX ADMIN — MEROPENEM 100 MILLIGRAM(S): 1 INJECTION INTRAVENOUS at 05:15

## 2022-03-07 RX ADMIN — HYDROMORPHONE HYDROCHLORIDE 0.5 MILLIGRAM(S): 2 INJECTION INTRAMUSCULAR; INTRAVENOUS; SUBCUTANEOUS at 14:09

## 2022-03-07 RX ADMIN — Medication 15 MILLIGRAM(S): at 02:59

## 2022-03-07 RX ADMIN — Medication 100 MILLIGRAM(S): at 08:25

## 2022-03-07 RX ADMIN — TRAMADOL HYDROCHLORIDE 25 MILLIGRAM(S): 50 TABLET ORAL at 18:17

## 2022-03-07 RX ADMIN — MEROPENEM 100 MILLIGRAM(S): 1 INJECTION INTRAVENOUS at 21:39

## 2022-03-07 RX ADMIN — Medication 1 TABLET(S): at 08:25

## 2022-03-07 RX ADMIN — TRAMADOL HYDROCHLORIDE 25 MILLIGRAM(S): 50 TABLET ORAL at 05:16

## 2022-03-07 RX ADMIN — HYDROMORPHONE HYDROCHLORIDE 0.5 MILLIGRAM(S): 2 INJECTION INTRAMUSCULAR; INTRAVENOUS; SUBCUTANEOUS at 14:23

## 2022-03-07 RX ADMIN — MEROPENEM 100 MILLIGRAM(S): 1 INJECTION INTRAVENOUS at 13:06

## 2022-03-07 RX ADMIN — Medication 325 MILLIGRAM(S): at 08:25

## 2022-03-07 RX ADMIN — Medication 50 MILLIGRAM(S): at 17:16

## 2022-03-07 RX ADMIN — HYDROMORPHONE HYDROCHLORIDE 0.5 MILLIGRAM(S): 2 INJECTION INTRAMUSCULAR; INTRAVENOUS; SUBCUTANEOUS at 11:24

## 2022-03-07 RX ADMIN — SPIRONOLACTONE 50 MILLIGRAM(S): 25 TABLET, FILM COATED ORAL at 05:16

## 2022-03-07 RX ADMIN — LACTULOSE 10 GRAM(S): 10 SOLUTION ORAL at 05:15

## 2022-03-07 RX ADMIN — Medication 20 MILLIGRAM(S): at 05:15

## 2022-03-07 RX ADMIN — INSULIN GLARGINE 20 UNIT(S): 100 INJECTION, SOLUTION SUBCUTANEOUS at 21:38

## 2022-03-07 RX ADMIN — LACTULOSE 10 GRAM(S): 10 SOLUTION ORAL at 17:17

## 2022-03-07 RX ADMIN — Medication 50 MICROGRAM(S): at 05:16

## 2022-03-07 RX ADMIN — LACTULOSE 10 GRAM(S): 10 SOLUTION ORAL at 21:39

## 2022-03-07 RX ADMIN — HYDROMORPHONE HYDROCHLORIDE 0.5 MILLIGRAM(S): 2 INJECTION INTRAMUSCULAR; INTRAVENOUS; SUBCUTANEOUS at 11:09

## 2022-03-07 RX ADMIN — PANTOPRAZOLE SODIUM 40 MILLIGRAM(S): 20 TABLET, DELAYED RELEASE ORAL at 17:17

## 2022-03-07 RX ADMIN — Medication 50 MILLIGRAM(S): at 05:16

## 2022-03-07 NOTE — PROGRESS NOTE ADULT - ASSESSMENT
Hyponatremia- chronic ; systemic vasodilation in setting of liver cirrhosis  hepatic encephalopathy in setting of UTI- s/p Rocephin  DM  Darrell- resolved    Pt with chronic hyponatremia- SNa+ range 124-129 during this admission  SNa+ now trending down, SNa+ has been resistant to treatment  Previously with high Uosm and low aBrry+; s/p albumin, 2 % saline  Recommend repeating urine studies  d/c SPLT(more sodium wasting than lasix )- may up titrate lasix as needed  Unclear if she is compliant with fluid regimen  Needs strict I/O's- Fluid restriction less than UOP    d/w Medicine PA

## 2022-03-07 NOTE — SBIRT NOTE ADULT - NSSBIRTALCPOSREINDET_GEN_A_CORE
Pt reports she stopped drinking 1 1/2 months ago on her own and plans to remain sober. Support and encouragements provided.

## 2022-03-07 NOTE — PROGRESS NOTE ADULT - ASSESSMENT
Patient is a 66 year old female with PMH of HTN, DM, Anemia, ETOH abuse with liver cirrhosis, esophageal varices, hepatic encephalopathy, hypothyroidism, who was brought to the ED for altered mental status. Admitted for metabolic encephalopathy due to hepatic encephalopathy and RLE cellulitis.    #Left Antecubital Pain/Swelling  -likely due to infiltrated IV  -duplex negative for DVT; palpable pulses  -check XRAY  -arm elevation and cold compresses  -analgesia as needed    # Hyponatremia   -serum sodium normal in Jan 2022  -sodium downtrending to 125  -s/p iv albumin and 2% iv during hospitalization  -check orthostatics  -hold aldactone, continue lasix  -liberalize sodium in diet  -repeat urine lytes  -check serum uric acid, TSH, AM cortisol  -strict I/Os  -continue fluid restriction but decrease to 1000 mL/d  -renal follow up appreciated  -repeat BMP now    Mild Hyperkalemia  -repeat BMP now  -aldactone held as above  -low K+ diet  -if potassium remains elevated; will medically manage    # Right Lower Extremity Abscess and Cellulitis   -s/p bedside I&D at the bedside    -abscess culture with MSSA  -continue on merrem, can likely de-escalate  -blood cx result NGTD  -wound care per surgery  -ID following, follow up requested for final abx regimen    # Alcoholic Cirrhosis decompensated with ascites, hepatic encephalopathy, esophageal varices  -s/p paracentesis   -continue lasix and hold aldactone as above  -continue rifaxamin/lactulose  -continue MVI/thiamine/folic acid  -continue beta-blocker  -abdominal US with small/mod ascites  -no indication for steroids  -trend LFTs  -GI recs appreciated    # Anemia likely of chronic disease  -no overt signs/symptoms of bleeding  -Hb 7.5 s/p 1 unit of PRBC  -continue ferrous sulfate and folic acid  -iron studies reviewed; repeat iron studies ordered  -continue PPI  -repeat CBC in AM   -transfuse if HB < 7      # Hypothyroidism  -repeat TFTs  -continue synthroid 50 mcg     # Diabetes Mellitus   -HbA1c 9.9; sugars controlled   -cont lantus 20 units at bedtime and adjust as needed  -ISS    -ADA diet    DVT ppx -SCDs    Dispo: Discharge held for worsening hyponatremia; renal recalled. Work up as above. PT - home with PT    Plan discussed with patient, RN, medicine PA    Patient is a 66 year old female with PMH of HTN, DM, Anemia, ETOH abuse with liver cirrhosis, esophageal varices, hepatic encephalopathy, hypothyroidism, who was brought to the ED for altered mental status. Admitted for metabolic encephalopathy due to hepatic encephalopathy and RLE cellulitis.    #Left Antecubital Pain/Swelling  -likely due to infiltrated IV  -duplex negative for DVT; palpable pulses  -check XRAY  -arm elevation and cold compresses  -analgesia as needed    # Hyponatremia   -serum sodium normal in Jan 2022  -sodium downtrending to 125  -s/p iv albumin and 2% iv during hospitalization  -check orthostatics  -hold aldactone, continue lasix  -liberalize sodium in diet  -repeat urine lytes  -check serum uric acid, TSH, AM cortisol  -strict I/Os  -continue fluid restriction but decrease to 1000 mL/d  -renal follow up appreciated  -repeat BMP now    Mild Hyperkalemia  -repeat BMP now  -aldactone held as above  -low K+ diet  -if potassium remains elevated; will medically manage    # Right Lower Extremity Abscess and Cellulitis   -s/p bedside I&D at the bedside    -abscess culture with MSSA  -continue on merrem, can likely de-escalate  -blood cx result NGTD  -wound care per surgery  -ID following, follow up requested for final abx regimen    # Alcoholic Cirrhosis decompensated with ascites, hepatic encephalopathy, esophageal varices  -s/p paracentesis   -continue lasix and hold aldactone as above  -continue rifaxamin/lactulose  -continue MVI/thiamine/folic acid  -continue beta-blocker  -abdominal US with small/mod ascites  -no indication for steroids  -trend LFTs  -GI recs appreciated    # Anemia likely of chronic disease  -no overt signs/symptoms of bleeding  -Hb 7.5 s/p 1 unit of PRBC  -continue ferrous sulfate and folic acid  -iron studies reviewed; repeat iron studies ordered  -continue PPI  -repeat CBC in AM   -transfuse if HB < 7      # Hypothyroidism  -repeat TFTs  -continue synthroid 50 mcg     # Diabetes Mellitus   -HbA1c 9.9; sugars controlled   -cont lantus 20 units at bedtime and adjust as needed  -ISS    -ADA diet    #Left Pleural Effusion  -saturating well on RA  -seen on abdominal sono  -obtain dedicated CXR    DVT ppx -SCDs    Dispo: Discharge held for worsening hyponatremia; renal recalled. Work up as above. CXR and left arm XRAY. PT - home with PT once stable.    Plan discussed with patient, RN, medicine PA

## 2022-03-07 NOTE — PROGRESS NOTE ADULT - PROBLEM SELECTOR PLAN 1
Alcohol related cirrhosis. No longer encephalopathic.  On abx for cellulitis. US of abd 3/06 showed small to moderate volume ascites. Note made of left pleural effusion.  Continue Lactulose and Rifaximin.  Thiamine, MVI, Folic Acid.  Continue to trend CBC, Coags, LFTs.  Continue spironolactone 50mg, Lasix 20mg daily.

## 2022-03-07 NOTE — PROGRESS NOTE ADULT - SUBJECTIVE AND OBJECTIVE BOX
Chief Complaint:  Patient is a 66y old  Female who presents with a chief complaint of Acute encephalopathy.       Interval Events / Subjective: Patient seen and evaluated at bedside, reporting no complaints, no overnight events. Complaining of left upper arm pain. Denies nausea, vomiting, abdominal pain, chest pain, shortness of breath. On antibiotics for cellulitis. US of abd 3/06 showed small to moderate volume ascites. Note made of left pleural effusion.      REVIEW OF SYSTEMS:   General: Negative  HEENT: Negative  CV: Negative  Respiratory: Negative  GI: See HPI  : Negative  MSK: Negative  Hematologic: Negative  Skin: Negative    MEDICATIONS:   MEDICATIONS  (STANDING):  dextrose 40% Gel 15 Gram(s) Oral once  dextrose 5%. 1000 milliLiter(s) (50 mL/Hr) IV Continuous <Continuous>  dextrose 5%. 1000 milliLiter(s) (100 mL/Hr) IV Continuous <Continuous>  dextrose 50% Injectable 25 Gram(s) IV Push once  dextrose 50% Injectable 12.5 Gram(s) IV Push once  dextrose 50% Injectable 25 Gram(s) IV Push once  ferrous    sulfate 325 milliGRAM(s) Oral daily  folic acid 1 milliGRAM(s) Oral daily  furosemide    Tablet 20 milliGRAM(s) Oral daily  glucagon  Injectable 1 milliGRAM(s) IntraMuscular once  insulin glargine Injectable (LANTUS) 20 Unit(s) SubCutaneous at bedtime  insulin lispro (ADMELOG) corrective regimen sliding scale   SubCutaneous at bedtime  insulin lispro (ADMELOG) corrective regimen sliding scale   SubCutaneous three times a day before meals  labetalol 50 milliGRAM(s) Oral two times a day  lactulose Syrup 10 Gram(s) Oral three times a day  levothyroxine 50 MICROGram(s) Oral daily  meropenem  IVPB 1000 milliGRAM(s) IV Intermittent every 8 hours  multivitamin 1 Tablet(s) Oral daily  pantoprazole    Tablet 40 milliGRAM(s) Oral two times a day  rifAXIMin 550 milliGRAM(s) Oral two times a day  sodium chloride 2% . 1000 milliLiter(s) (65 mL/Hr) IV Continuous <Continuous>  spironolactone 50 milliGRAM(s) Oral daily  thiamine 100 milliGRAM(s) Oral daily  traMADol 25 milliGRAM(s) Oral two times a day    MEDICATIONS  (PRN):  aluminum hydroxide/magnesium hydroxide/simethicone Suspension 30 milliLiter(s) Oral every 4 hours PRN Dyspepsia  bisacodyl Suppository 10 milliGRAM(s) Rectal daily PRN Constipation  HYDROmorphone  Injectable 0.5 milliGRAM(s) IV Push four times a day PRN Severe Pain (7 - 10)  ketorolac   Injectable 15 milliGRAM(s) IV Push four times a day PRN Mild Pain (1 - 3) and moderate pain (4-6)  morphine  - Injectable 2 milliGRAM(s) IV Push three times a day PRN Severe Pain (7 - 10)  ondansetron Injectable 4 milliGRAM(s) IV Push every 8 hours PRN Nausea and/or Vomiting      ALLERGIES:   Allergies    No Known Allergies    Intolerances        VITAL SIGNS:   Vital Signs Last 24 Hrs  T(C): 36.3 (07 Mar 2022 10:15), Max: 36.9 (06 Mar 2022 21:11)  T(F): 97.3 (07 Mar 2022 10:15), Max: 98.5 (07 Mar 2022 03:15)  HR: 71 (07 Mar 2022 10:15) (71 - 75)  BP: 144/72 (07 Mar 2022 10:15) (112/63 - 144/72)  BP(mean): --  RR: 18 (07 Mar 2022 10:15) (16 - 19)  SpO2: 94% (07 Mar 2022 10:15) (94% - 100%)  I&O's Summary      PHYSICAL EXAM:   GENERAL:  Elderly appearing  female, in no acute distress  HEENT:  NC/AT,  conjunctivae clear, sclera -icteric  CHEST:  Full & symmetric excursion, no increased effort, breath sounds clear  HEART:  Regular rhythm, S1, S2, no murmur/rub/S3/S4,  no edema  ABDOMEN:  Softly distended, non-tender, normoactive bowel sounds.  EXTREMITIES: No cyanosis, clubbing or edema  SKIN:  Jaundice, No rash/erythema/ecchymoses/petechiae/wounds/abscess/warm/dry  NEURO:  Alert, oriented    LABS:  CBC Full  -  ( 07 Mar 2022 06:48 )  WBC Count : 6.81 K/uL  RBC Count : 2.29 M/uL  Hemoglobin : 7.5 g/dL  Hematocrit : 21.7 %  Platelet Count - Automated : 138 K/uL  Mean Cell Volume : 94.8 fl  Mean Cell Hemoglobin : 32.8 pg  Mean Cell Hemoglobin Concentration : 34.6 gm/dL  Auto Neutrophil # : x  Auto Lymphocyte # : x  Auto Monocyte # : x  Auto Eosinophil # : x  Auto Basophil # : x  Auto Neutrophil % : x  Auto Lymphocyte % : x  Auto Monocyte % : x  Auto Eosinophil % : x  Auto Basophil % : x    03-07    125<L>  |  94<L>  |  25.2<H>  ----------------------------<  149<H>  5.4<H>   |  20.0<L>  |  0.80    Ca    8.6      07 Mar 2022 06:48  Phos  3.7     03-07  Mg     1.8     03-07    TPro  6.5<L>  /  Alb  2.9<L>  /  TBili  5.3<H>  /  DBili  x   /  AST  97<H>  /  ALT  33<H>  /  AlkPhos  288<H>  03-07    LIVER FUNCTIONS - ( 07 Mar 2022 06:48 )  Alb: 2.9 g/dL / Pro: 6.5 g/dL / ALK PHOS: 288 U/L / ALT: 33 U/L / AST: 97 U/L / GGT: x                   RADIOLOGY & ADDITIONAL STUDIES (The following images were personally reviewed):    CC: [id number] EXAM:  US ABDOMEN LIMITED                          PROCEDURE DATE:  03/06/2022          INTERPRETATION:  CLINICAL INFORMATION: Assess for ascites.    COMPARISON: No pertinent priors.    TECHNIQUE: Limited ultrasound of the abdomen to evaluate for ascites.    FINDINGS:    Small to moderate volume ascites. Note made of left pleural effusion.    IMPRESSION:  As above.    --- End of Report ---            ANGELIQUE ALVARADO MD; Attending Radiologist  This document has been electronically signed. Mar  6 2022  3:01PM

## 2022-03-07 NOTE — PROGRESS NOTE ADULT - SUBJECTIVE AND OBJECTIVE BOX
CHIEF COMPLAINT/INTERVAL HISTORY:    Patient is a 66y old  Female who presents with a chief complaint of Acute encephalopathy (07 Mar 2022 14:44)    SUBJECTIVE & OBJECTIVE: Pt seen and examined at bedside. No overnight events. Patient complaining of left arm pain and swelling. Duplex negative for DVT. XRAY ordered. Drop in sodium also noted; aldactone held.    ROS: No chest pain, palpitations, SOB, light headedness, dizziness, headache, nausea/vomiting, fevers/chills, abdominal pain, dysuria or increased urinary frequency.    ICU Vital Signs Last 24 Hrs  T(C): 36.3 (07 Mar 2022 10:15), Max: 36.9 (06 Mar 2022 21:11)  T(F): 97.3 (07 Mar 2022 10:15), Max: 98.5 (07 Mar 2022 03:15)  HR: 71 (07 Mar 2022 10:15) (71 - 75)  BP: 144/72 (07 Mar 2022 10:15) (128/71 - 144/72)  RR: 18 (07 Mar 2022 10:15) (16 - 18)  SpO2: 94% (07 Mar 2022 10:15) (94% - 100%)    MEDICATIONS  (STANDING):  dextrose 40% Gel 15 Gram(s) Oral once  dextrose 5%. 1000 milliLiter(s) (50 mL/Hr) IV Continuous <Continuous>  dextrose 5%. 1000 milliLiter(s) (100 mL/Hr) IV Continuous <Continuous>  dextrose 50% Injectable 25 Gram(s) IV Push once  dextrose 50% Injectable 12.5 Gram(s) IV Push once  dextrose 50% Injectable 25 Gram(s) IV Push once  ferrous    sulfate 325 milliGRAM(s) Oral daily  folic acid 1 milliGRAM(s) Oral daily  furosemide    Tablet 20 milliGRAM(s) Oral daily  glucagon  Injectable 1 milliGRAM(s) IntraMuscular once  insulin glargine Injectable (LANTUS) 20 Unit(s) SubCutaneous at bedtime  insulin lispro (ADMELOG) corrective regimen sliding scale   SubCutaneous three times a day before meals  insulin lispro (ADMELOG) corrective regimen sliding scale   SubCutaneous at bedtime  labetalol 50 milliGRAM(s) Oral two times a day  lactulose Syrup 10 Gram(s) Oral three times a day  levothyroxine 50 MICROGram(s) Oral daily  meropenem  IVPB 1000 milliGRAM(s) IV Intermittent every 8 hours  multivitamin 1 Tablet(s) Oral daily  pantoprazole    Tablet 40 milliGRAM(s) Oral two times a day  rifAXIMin 550 milliGRAM(s) Oral two times a day  thiamine 100 milliGRAM(s) Oral daily  traMADol 25 milliGRAM(s) Oral two times a day    MEDICATIONS  (PRN):  aluminum hydroxide/magnesium hydroxide/simethicone Suspension 30 milliLiter(s) Oral every 4 hours PRN Dyspepsia  bisacodyl Suppository 10 milliGRAM(s) Rectal daily PRN Constipation  HYDROmorphone  Injectable 0.5 milliGRAM(s) IV Push four times a day PRN Severe Pain (7 - 10)  ketorolac   Injectable 15 milliGRAM(s) IV Push four times a day PRN Mild Pain (1 - 3) and moderate pain (4-6)  morphine  - Injectable 2 milliGRAM(s) IV Push three times a day PRN Severe Pain (7 - 10)  ondansetron Injectable 4 milliGRAM(s) IV Push every 8 hours PRN Nausea and/or Vomiting      LABS:                        7.5    6.81  )-----------( 138      ( 07 Mar 2022 06:48 )             21.7     03-07    125<L>  |  94<L>  |  25.2<H>  ----------------------------<  149<H>  5.4<H>   |  20.0<L>  |  0.80    Ca    8.6      07 Mar 2022 06:48  Phos  3.7     03-07  Mg     1.8     03-07    TPro  6.5<L>  /  Alb  2.9<L>  /  TBili  5.3<H>  /  DBili  x   /  AST  97<H>  /  ALT  33<H>  /  AlkPhos  288<H>  03-07    CAPILLARY BLOOD GLUCOSE      POCT Blood Glucose.: 140 mg/dL (07 Mar 2022 17:20)  POCT Blood Glucose.: 193 mg/dL (07 Mar 2022 13:09)  POCT Blood Glucose.: 136 mg/dL (07 Mar 2022 08:29)  POCT Blood Glucose.: 163 mg/dL (06 Mar 2022 21:07)    PHYSICAL EXAM  CONSTITUTIONAL: elderly female, laying in bed, NAD  HEENT: MMM, + scleral icterus  CARDIOVASCULAR: Regular rate and rhythm, + S1 and S2   RESPIRATORY: coarse breath sounds  ABDOMEN: soft, nontender, distended   NEUROLOGY: AAO x 3  EXT: no pedal edema, right leg ulcer noted, left anticubital swelling and ecchymosis

## 2022-03-07 NOTE — PROGRESS NOTE ADULT - SUBJECTIVE AND OBJECTIVE BOX
[facility] DIVISION OF KIDNEY DISEASES AND HYPERTENSION -- FOLLOW UP NOTE  --------------------------------------------------------------------------------  Chief Complaint:   hyponatremia  24 hour events/subjective:  nephrology reconsulted for hyponatremia      PAST HISTORY  --------------------------------------------------------------------------------  No significant changes to PMH, PSH, FHx, SHx, unless otherwise noted    ALLERGIES & MEDICATIONS  --------------------------------------------------------------------------------  Allergies    No Known Allergies    Intolerances      Standing Inpatient Medications  dextrose 40% Gel 15 Gram(s) Oral once  dextrose 5%. 1000 milliLiter(s) IV Continuous <Continuous>  dextrose 5%. 1000 milliLiter(s) IV Continuous <Continuous>  dextrose 50% Injectable 25 Gram(s) IV Push once  dextrose 50% Injectable 12.5 Gram(s) IV Push once  dextrose 50% Injectable 25 Gram(s) IV Push once  ferrous    sulfate 325 milliGRAM(s) Oral daily  folic acid 1 milliGRAM(s) Oral daily  furosemide    Tablet 20 milliGRAM(s) Oral daily  glucagon  Injectable 1 milliGRAM(s) IntraMuscular once  insulin glargine Injectable (LANTUS) 20 Unit(s) SubCutaneous at bedtime  insulin lispro (ADMELOG) corrective regimen sliding scale   SubCutaneous at bedtime  insulin lispro (ADMELOG) corrective regimen sliding scale   SubCutaneous three times a day before meals  labetalol 50 milliGRAM(s) Oral two times a day  lactulose Syrup 10 Gram(s) Oral three times a day  levothyroxine 50 MICROGram(s) Oral daily  meropenem  IVPB 1000 milliGRAM(s) IV Intermittent every 8 hours  multivitamin 1 Tablet(s) Oral daily  pantoprazole    Tablet 40 milliGRAM(s) Oral two times a day  rifAXIMin 550 milliGRAM(s) Oral two times a day  sodium chloride 2% . 1000 milliLiter(s) IV Continuous <Continuous>  spironolactone 50 milliGRAM(s) Oral daily  thiamine 100 milliGRAM(s) Oral daily  traMADol 25 milliGRAM(s) Oral two times a day    PRN Inpatient Medications  aluminum hydroxide/magnesium hydroxide/simethicone Suspension 30 milliLiter(s) Oral every 4 hours PRN  bisacodyl Suppository 10 milliGRAM(s) Rectal daily PRN  HYDROmorphone  Injectable 0.5 milliGRAM(s) IV Push four times a day PRN  ketorolac   Injectable 15 milliGRAM(s) IV Push four times a day PRN  morphine  - Injectable 2 milliGRAM(s) IV Push three times a day PRN  ondansetron Injectable 4 milliGRAM(s) IV Push every 8 hours PRN      REVIEW OF SYSTEMS  --------------------------------------------------------------------------------  Gen: No weight changes, fatigue, fevers/chills, weakness  Skin: No rashes  Head/Eyes/Ears/Mouth: No headache; Normal hearing; Normal vision w/o blurriness; No sinus pain/discomfort, sore throat  Respiratory: No dyspnea, cough, wheezing, hemoptysis  CV: No chest pain, PND, orthopnea  GI: No abdominal pain, diarrhea, constipation, nausea, vomiting, melena, hematochezia  : No increased frequency, dysuria, hematuria, nocturia  MSK: No joint pain/swelling; no back pain; no edema  Neuro: No dizziness/lightheadedness, weakness, seizures, numbness, tingling  Heme: No easy bruising or bleeding  Endo: No heat/cold intolerance  Psych: No significant nervousness, anxiety, stress, depression    All other systems were reviewed and are negative, except as noted.    VITALS/PHYSICAL EXAM  --------------------------------------------------------------------------------  T(C): 36.3 (03-07-22 @ 10:15), Max: 36.9 (03-06-22 @ 21:11)  HR: 71 (03-07-22 @ 10:15) (71 - 75)  BP: 144/72 (03-07-22 @ 10:15) (112/63 - 144/72)  RR: 18 (03-07-22 @ 10:15) (16 - 19)  SpO2: 94% (03-07-22 @ 10:15) (94% - 100%)  Wt(kg): --  Height (cm): 121.9 (03-07-22 @ 03:15)  Weight (kg): 64.9 (03-07-22 @ 03:15)  BMI (kg/m2): 43.7 (03-07-22 @ 03:15)  BSA (m2): 1.38 (03-07-22 @ 03:15)      Physical Exam:  	Gen: NAD  	HEENT: scleral icetrus  	Pulm: CTA B/L  	CV: RRR, S1S2; no rub  	Back: No spinal or CVA tenderness; no sacral edema  	Abd: +BS, soft, nontender/ mildy distended  	: No suprapubic tenderness  	UE: Warm,no edema; no asterixis  	LE: Warm,; no edema  	Neuro: No focal deficits  	Psych: Normal affect and mood  	Skin: Warm      LABS/STUDIES  --------------------------------------------------------------------------------              7.5    6.81  >-----------<  138      [03-07-22 @ 06:48]              21.7     125  |  94  |  25.2  ----------------------------<  149      [03-07-22 @ 06:48]  5.4   |  20.0  |  0.80        Ca     8.6     [03-07-22 @ 06:48]      Mg     1.8     [03-07-22 @ 06:48]      Phos  3.7     [03-07-22 @ 06:48]    TPro  6.5  /  Alb  2.9  /  TBili  5.3  /  DBili  x   /  AST  97  /  ALT  33  /  AlkPhos  288  [03-07-22 @ 06:48]          Creatinine Trend:  SCr 0.80 [03-07 @ 06:48]  SCr 0.73 [03-06 @ 07:39]  SCr 0.80 [03-05 @ 08:45]  SCr 0.43 [03-04 @ 06:14]  SCr 0.72 [03-03 @ 09:26]    Urinalysis - [02-27-22 @ 18:27]      Color Jana / Appearance Clear / SG 1.020 / pH 5.0      Gluc Negative / Ketone Trace  / Bili Large / Urobili 8       Blood Large / Protein Negative / Leuk Est Moderate / Nitrite Positive      RBC 3-5 / WBC 26-50 / Hyaline  / Gran  / Sq Epi  / Non Sq Epi Few / Bacteria Few      Iron 75, TIBC 152, %sat 49      [02-27-22 @ 06:13]  Ferritin 970      [02-27-22 @ 06:13]  Vitamin D (25OH) 39.5      [02-27-22 @ 10:51]  TSH 6.23      [02-27-22 @ 06:13]    HIV Nonreact      [02-23-22 @ 21:44]

## 2022-03-08 LAB
ALBUMIN SERPL ELPH-MCNC: 3.2 G/DL — LOW (ref 3.3–5.2)
ALP SERPL-CCNC: 295 U/L — HIGH (ref 40–120)
ALT FLD-CCNC: 36 U/L — HIGH
ANION GAP SERPL CALC-SCNC: 11 MMOL/L — SIGNIFICANT CHANGE UP (ref 5–17)
ANISOCYTOSIS BLD QL: SLIGHT — SIGNIFICANT CHANGE UP
APTT BLD: 37 SEC — HIGH (ref 27.5–35.5)
AST SERPL-CCNC: 103 U/L — HIGH
BASOPHILS # BLD AUTO: 0 K/UL — SIGNIFICANT CHANGE UP (ref 0–0.2)
BASOPHILS NFR BLD AUTO: 0 % — SIGNIFICANT CHANGE UP (ref 0–2)
BILIRUB SERPL-MCNC: 5.1 MG/DL — HIGH (ref 0.4–2)
BUN SERPL-MCNC: 26.7 MG/DL — HIGH (ref 8–20)
CALCIUM SERPL-MCNC: 8.9 MG/DL — SIGNIFICANT CHANGE UP (ref 8.6–10.2)
CHLORIDE SERPL-SCNC: 98 MMOL/L — SIGNIFICANT CHANGE UP (ref 98–107)
CO2 SERPL-SCNC: 21 MMOL/L — LOW (ref 22–29)
CORTIS AM PEAK SERPL-MCNC: 5.8 UG/DL — LOW (ref 6–18.4)
CREAT SERPL-MCNC: 1.02 MG/DL — SIGNIFICANT CHANGE UP (ref 0.5–1.3)
EGFR: 61 ML/MIN/1.73M2 — SIGNIFICANT CHANGE UP
EOSINOPHIL # BLD AUTO: 0.32 K/UL — SIGNIFICANT CHANGE UP (ref 0–0.5)
EOSINOPHIL NFR BLD AUTO: 4.5 % — SIGNIFICANT CHANGE UP (ref 0–6)
FERRITIN SERPL-MCNC: 470 NG/ML — HIGH (ref 15–150)
GIANT PLATELETS BLD QL SMEAR: PRESENT — SIGNIFICANT CHANGE UP
GLUCOSE BLDC GLUCOMTR-MCNC: 163 MG/DL — HIGH (ref 70–99)
GLUCOSE BLDC GLUCOMTR-MCNC: 164 MG/DL — HIGH (ref 70–99)
GLUCOSE BLDC GLUCOMTR-MCNC: 168 MG/DL — HIGH (ref 70–99)
GLUCOSE BLDC GLUCOMTR-MCNC: 187 MG/DL — HIGH (ref 70–99)
GLUCOSE BLDC GLUCOMTR-MCNC: 202 MG/DL — HIGH (ref 70–99)
GLUCOSE SERPL-MCNC: 110 MG/DL — HIGH (ref 70–99)
HCT VFR BLD CALC: 20.4 % — CRITICAL LOW (ref 34.5–45)
HCT VFR BLD CALC: 20.5 % — CRITICAL LOW (ref 34.5–45)
HGB BLD-MCNC: 7 G/DL — CRITICAL LOW (ref 11.5–15.5)
HGB BLD-MCNC: 7.1 G/DL — LOW (ref 11.5–15.5)
HYPOCHROMIA BLD QL: SLIGHT — SIGNIFICANT CHANGE UP
INR BLD: 1.54 RATIO — HIGH (ref 0.88–1.16)
IRON SATN MFR SERPL: 42 % — SIGNIFICANT CHANGE UP (ref 14–50)
IRON SATN MFR SERPL: 66 UG/DL — SIGNIFICANT CHANGE UP (ref 37–145)
LYMPHOCYTES # BLD AUTO: 0.57 K/UL — LOW (ref 1–3.3)
LYMPHOCYTES # BLD AUTO: 8 % — LOW (ref 13–44)
MACROCYTES BLD QL: SLIGHT — SIGNIFICANT CHANGE UP
MAGNESIUM SERPL-MCNC: 1.7 MG/DL — SIGNIFICANT CHANGE UP (ref 1.6–2.6)
MANUAL SMEAR VERIFICATION: SIGNIFICANT CHANGE UP
MCHC RBC-ENTMCNC: 32.3 PG — SIGNIFICANT CHANGE UP (ref 27–34)
MCHC RBC-ENTMCNC: 32.7 PG — SIGNIFICANT CHANGE UP (ref 27–34)
MCHC RBC-ENTMCNC: 34.3 GM/DL — SIGNIFICANT CHANGE UP (ref 32–36)
MCHC RBC-ENTMCNC: 34.6 GM/DL — SIGNIFICANT CHANGE UP (ref 32–36)
MCV RBC AUTO: 94 FL — SIGNIFICANT CHANGE UP (ref 80–100)
MCV RBC AUTO: 94.5 FL — SIGNIFICANT CHANGE UP (ref 80–100)
MONOCYTES # BLD AUTO: 0.83 K/UL — SIGNIFICANT CHANGE UP (ref 0–0.9)
MONOCYTES NFR BLD AUTO: 11.6 % — SIGNIFICANT CHANGE UP (ref 2–14)
NEUTROPHILS # BLD AUTO: 5.43 K/UL — SIGNIFICANT CHANGE UP (ref 1.8–7.4)
NEUTROPHILS NFR BLD AUTO: 75.9 % — SIGNIFICANT CHANGE UP (ref 43–77)
NRBC # BLD: 1 /100 — HIGH (ref 0–0)
OVALOCYTES BLD QL SMEAR: SLIGHT — SIGNIFICANT CHANGE UP
PHOSPHATE SERPL-MCNC: 4.2 MG/DL — SIGNIFICANT CHANGE UP (ref 2.4–4.7)
PLAT MORPH BLD: NORMAL — SIGNIFICANT CHANGE UP
PLATELET # BLD AUTO: 128 K/UL — LOW (ref 150–400)
PLATELET # BLD AUTO: 133 K/UL — LOW (ref 150–400)
POIKILOCYTOSIS BLD QL AUTO: SLIGHT — SIGNIFICANT CHANGE UP
POLYCHROMASIA BLD QL SMEAR: SLIGHT — SIGNIFICANT CHANGE UP
POTASSIUM SERPL-MCNC: 5.2 MMOL/L — SIGNIFICANT CHANGE UP (ref 3.5–5.3)
POTASSIUM SERPL-SCNC: 5.2 MMOL/L — SIGNIFICANT CHANGE UP (ref 3.5–5.3)
PROT SERPL-MCNC: 7 G/DL — SIGNIFICANT CHANGE UP (ref 6.6–8.7)
PROTHROM AB SERPL-ACNC: 17.9 SEC — HIGH (ref 10.5–13.4)
RBC # BLD: 2.17 M/UL — LOW (ref 3.8–5.2)
RBC # BLD: 2.17 M/UL — LOW (ref 3.8–5.2)
RBC # FLD: 23.5 % — HIGH (ref 10.3–14.5)
RBC # FLD: 23.8 % — HIGH (ref 10.3–14.5)
RBC BLD AUTO: ABNORMAL
SARS-COV-2 RNA SPEC QL NAA+PROBE: SIGNIFICANT CHANGE UP
SMUDGE CELLS # BLD: PRESENT — SIGNIFICANT CHANGE UP
SODIUM SERPL-SCNC: 130 MMOL/L — LOW (ref 135–145)
T3FREE SERPL-MCNC: 1.64 PG/ML — LOW (ref 1.8–4.6)
T4 FREE SERPL-MCNC: 1.1 NG/DL — SIGNIFICANT CHANGE UP (ref 0.9–1.8)
TARGETS BLD QL SMEAR: SLIGHT — SIGNIFICANT CHANGE UP
TIBC SERPL-MCNC: 157 UG/DL — LOW (ref 220–430)
TRANSFERRIN SERPL-MCNC: 110 MG/DL — LOW (ref 192–382)
WBC # BLD: 6.97 K/UL — SIGNIFICANT CHANGE UP (ref 3.8–10.5)
WBC # BLD: 7.15 K/UL — SIGNIFICANT CHANGE UP (ref 3.8–10.5)
WBC # FLD AUTO: 6.97 K/UL — SIGNIFICANT CHANGE UP (ref 3.8–10.5)
WBC # FLD AUTO: 7.15 K/UL — SIGNIFICANT CHANGE UP (ref 3.8–10.5)

## 2022-03-08 PROCEDURE — 71045 X-RAY EXAM CHEST 1 VIEW: CPT | Mod: 26

## 2022-03-08 PROCEDURE — 99232 SBSQ HOSP IP/OBS MODERATE 35: CPT

## 2022-03-08 PROCEDURE — 99233 SBSQ HOSP IP/OBS HIGH 50: CPT

## 2022-03-08 RX ORDER — ASCORBIC ACID 60 MG
500 TABLET,CHEWABLE ORAL DAILY
Refills: 0 | Status: DISCONTINUED | OUTPATIENT
Start: 2022-03-08 | End: 2022-03-09

## 2022-03-08 RX ORDER — LEVOTHYROXINE SODIUM 125 MCG
75 TABLET ORAL DAILY
Refills: 0 | Status: DISCONTINUED | OUTPATIENT
Start: 2022-03-09 | End: 2022-03-09

## 2022-03-08 RX ORDER — HYDRALAZINE HCL 50 MG
10 TABLET ORAL EVERY 6 HOURS
Refills: 0 | Status: DISCONTINUED | OUTPATIENT
Start: 2022-03-08 | End: 2022-03-09

## 2022-03-08 RX ADMIN — Medication 50 MILLIGRAM(S): at 05:08

## 2022-03-08 RX ADMIN — Medication 325 MILLIGRAM(S): at 11:02

## 2022-03-08 RX ADMIN — TRAMADOL HYDROCHLORIDE 25 MILLIGRAM(S): 50 TABLET ORAL at 05:13

## 2022-03-08 RX ADMIN — MORPHINE SULFATE 2 MILLIGRAM(S): 50 CAPSULE, EXTENDED RELEASE ORAL at 20:19

## 2022-03-08 RX ADMIN — SODIUM ZIRCONIUM CYCLOSILICATE 5 GRAM(S): 10 POWDER, FOR SUSPENSION ORAL at 00:24

## 2022-03-08 RX ADMIN — Medication 2: at 18:02

## 2022-03-08 RX ADMIN — LACTULOSE 10 GRAM(S): 10 SOLUTION ORAL at 22:18

## 2022-03-08 RX ADMIN — MEROPENEM 100 MILLIGRAM(S): 1 INJECTION INTRAVENOUS at 05:08

## 2022-03-08 RX ADMIN — Medication 20 MILLIGRAM(S): at 05:08

## 2022-03-08 RX ADMIN — Medication 1 TABLET(S): at 11:02

## 2022-03-08 RX ADMIN — MEROPENEM 100 MILLIGRAM(S): 1 INJECTION INTRAVENOUS at 17:52

## 2022-03-08 RX ADMIN — LACTULOSE 10 GRAM(S): 10 SOLUTION ORAL at 17:52

## 2022-03-08 RX ADMIN — Medication 1 MILLIGRAM(S): at 11:02

## 2022-03-08 RX ADMIN — TRAMADOL HYDROCHLORIDE 25 MILLIGRAM(S): 50 TABLET ORAL at 17:58

## 2022-03-08 RX ADMIN — MORPHINE SULFATE 2 MILLIGRAM(S): 50 CAPSULE, EXTENDED RELEASE ORAL at 04:28

## 2022-03-08 RX ADMIN — INSULIN GLARGINE 20 UNIT(S): 100 INJECTION, SOLUTION SUBCUTANEOUS at 22:18

## 2022-03-08 RX ADMIN — PANTOPRAZOLE SODIUM 40 MILLIGRAM(S): 20 TABLET, DELAYED RELEASE ORAL at 05:07

## 2022-03-08 RX ADMIN — MEROPENEM 100 MILLIGRAM(S): 1 INJECTION INTRAVENOUS at 22:18

## 2022-03-08 RX ADMIN — Medication 100 MILLIGRAM(S): at 11:02

## 2022-03-08 RX ADMIN — LACTULOSE 10 GRAM(S): 10 SOLUTION ORAL at 05:08

## 2022-03-08 RX ADMIN — PANTOPRAZOLE SODIUM 40 MILLIGRAM(S): 20 TABLET, DELAYED RELEASE ORAL at 17:55

## 2022-03-08 RX ADMIN — MORPHINE SULFATE 2 MILLIGRAM(S): 50 CAPSULE, EXTENDED RELEASE ORAL at 05:00

## 2022-03-08 RX ADMIN — Medication 2: at 08:32

## 2022-03-08 RX ADMIN — TRAMADOL HYDROCHLORIDE 25 MILLIGRAM(S): 50 TABLET ORAL at 06:00

## 2022-03-08 RX ADMIN — Medication 50 MICROGRAM(S): at 05:07

## 2022-03-08 RX ADMIN — Medication 50 MILLIGRAM(S): at 17:55

## 2022-03-08 RX ADMIN — Medication 2: at 11:49

## 2022-03-08 NOTE — PROVIDER CONTACT NOTE (CRITICAL VALUE NOTIFICATION) - ACTION/TREATMENT ORDERED:
Possible salt tab/will continue to monitor
No new orders. PA will review chart/ labs
PA made aware and looking through chart to follow up with orders.
awaiting further orders

## 2022-03-08 NOTE — PROGRESS NOTE ADULT - ASSESSMENT
Hyponatremia- chronic ; systemic vasodilation in setting of liver cirrhosis  hepatic encephalopathy in setting of UTI- s/p Rocephin  DM  Darrell- resolved    Pt with chronic hyponatremia- SNa+ range 124-129 during this admission  SNa+ now improved to 130; keep off SPLT   May uptitrate lasix as needed  Fluid restriction ~ 1 L    nephrology follow up outpt  shadia Jackson

## 2022-03-08 NOTE — PROGRESS NOTE ADULT - SUBJECTIVE AND OBJECTIVE BOX
[facility] DIVISION OF KIDNEY DISEASES AND HYPERTENSION -- FOLLOW UP NOTE  --------------------------------------------------------------------------------  Chief Complaint: hyponatremia    24 hour events/subjective:  no acute event noted  pt seen and examined; c/o arm pain        PAST HISTORY  --------------------------------------------------------------------------------  No significant changes to PMH, PSH, FHx, SHx, unless otherwise noted    ALLERGIES & MEDICATIONS  --------------------------------------------------------------------------------  Allergies    No Known Allergies      Standing Inpatient Medications  dextrose 40% Gel 15 Gram(s) Oral once  dextrose 5%. 1000 milliLiter(s) IV Continuous <Continuous>  dextrose 5%. 1000 milliLiter(s) IV Continuous <Continuous>  dextrose 50% Injectable 25 Gram(s) IV Push once  dextrose 50% Injectable 12.5 Gram(s) IV Push once  dextrose 50% Injectable 25 Gram(s) IV Push once  ferrous    sulfate 325 milliGRAM(s) Oral daily  folic acid 1 milliGRAM(s) Oral daily  furosemide    Tablet 20 milliGRAM(s) Oral daily  glucagon  Injectable 1 milliGRAM(s) IntraMuscular once  insulin glargine Injectable (LANTUS) 20 Unit(s) SubCutaneous at bedtime  insulin lispro (ADMELOG) corrective regimen sliding scale   SubCutaneous three times a day before meals  insulin lispro (ADMELOG) corrective regimen sliding scale   SubCutaneous at bedtime  labetalol 50 milliGRAM(s) Oral two times a day  lactulose Syrup 10 Gram(s) Oral three times a day  levothyroxine 50 MICROGram(s) Oral daily  meropenem  IVPB 1000 milliGRAM(s) IV Intermittent every 8 hours  multivitamin 1 Tablet(s) Oral daily  pantoprazole    Tablet 40 milliGRAM(s) Oral two times a day  rifAXIMin 550 milliGRAM(s) Oral two times a day  thiamine 100 milliGRAM(s) Oral daily  traMADol 25 milliGRAM(s) Oral two times a day    PRN Inpatient Medications  aluminum hydroxide/magnesium hydroxide/simethicone Suspension 30 milliLiter(s) Oral every 4 hours PRN  bisacodyl Suppository 10 milliGRAM(s) Rectal daily PRN  hydrALAZINE Injectable 10 milliGRAM(s) IV Push every 6 hours PRN  morphine  - Injectable 2 milliGRAM(s) IV Push three times a day PRN  ondansetron Injectable 4 milliGRAM(s) IV Push every 8 hours PRN      REVIEW OF SYSTEMS  --------------------------------------------------------------------------------  Gen: No weight changes, fatigue, fevers/chills, weakness  Skin: No rashes  Head/Eyes/Ears/Mouth: No headache; Normal hearing; Normal vision w/o blurriness; No sinus pain/discomfort, sore throat  Respiratory: No dyspnea, cough, wheezing, hemoptysis  CV: No chest pain, PND, orthopnea  GI: No abdominal pain, diarrhea, constipation, nausea, vomiting, melena, hematochezia  : No increased frequency, dysuria, hematuria, nocturia  MSK: No joint pain/swelling; no back pain; no edema  Neuro: No dizziness/lightheadedness, weakness, seizures, numbness, tingling  Heme: No easy bruising or bleeding  Endo: No heat/cold intolerance  Psych: No significant nervousness, anxiety, stress, depression    All other systems were reviewed and are negative, except as noted.    VITALS/PHYSICAL EXAM  --------------------------------------------------------------------------------  T(C): 36.8 (03-08-22 @ 14:09), Max: 37.2 (03-08-22 @ 10:56)  HR: 72 (03-08-22 @ 14:09) (69 - 76)  BP: 150/81 (03-08-22 @ 14:09) (119/70 - 178/80)  RR: 18 (03-08-22 @ 14:09) (18 - 18)  SpO2: 99% (03-08-22 @ 14:09) (94% - 100%)  Wt(kg): --  Height (cm): 121.9 (03-07-22 @ 03:15)  Weight (kg): 64.9 (03-07-22 @ 03:15)  BMI (kg/m2): 43.7 (03-07-22 @ 03:15)  BSA (m2): 1.38 (03-07-22 @ 03:15)      Physical Exam:  	Gen: NAD  	HEENT: supple neck, clear oropharynx  	Pulm: CTA B/L  	CV: RRR, S1S2; no rub  	Back: No spinal or CVA tenderness; no sacral edema  	Abd: +BS, soft, nontender/nondistended  	: No suprapubic tenderness  	UE: Warm, ; no edema  	LE: Warm, no edema  	Neuro: No focal deficit  	Psych: Normal affect and mood  	Skin: Warm      LABS/STUDIES  --------------------------------------------------------------------------------              7.1    6.97  >-----------<  133      [03-08-22 @ 06:53]              20.5     130  |  98  |  26.7  ----------------------------<  110      [03-08-22 @ 05:38]  5.2   |  21.0  |  1.02        Ca     8.9     [03-08-22 @ 05:38]      Mg     1.7     [03-08-22 @ 05:38]      Phos  4.2     [03-08-22 @ 05:38]    TPro  7.0  /  Alb  3.2  /  TBili  5.1  /  DBili  x   /  AST  103  /  ALT  36  /  AlkPhos  295  [03-08-22 @ 05:38]    PT/INR: PT 17.9 , INR 1.54       [03-08-22 @ 05:38]  PTT: 37.0       [03-08-22 @ 05:38]    Uric acid 4.4      [03-07-22 @ 06:48]    Creatinine Trend:  SCr 1.02 [03-08 @ 05:38]  SCr 1.04 [03-07 @ 21:13]  SCr 0.80 [03-07 @ 06:48]  SCr 0.73 [03-06 @ 07:39]  SCr 0.80 [03-05 @ 08:45]    Urinalysis - [03-07-22 @ 20:43]      Color Yellow / Appearance Clear / SG 1.015 / pH 6.5      Gluc Negative / Ketone Negative  / Bili Negative / Urobili Negative       Blood Moderate / Protein Negative / Leuk Est Moderate / Nitrite Negative      RBC 3-5 / WBC 6-10 / Hyaline  / Gran  / Sq Epi  / Non Sq Epi Few / Bacteria     Urine Sodium 79      [03-07-22 @ 20:42]  Urine Chloride 59      [03-07-22 @ 20:42]  Urine Osmolality 412      [03-07-22 @ 20:43]    Iron 66, TIBC 157, %sat 42      [03-08-22 @ 05:38]  Ferritin 470      [03-08-22 @ 05:38]  Vitamin D (25OH) 39.5      [02-27-22 @ 10:51]  TSH 13.06      [03-07-22 @ 06:48]    HIV Nonreact      [02-23-22 @ 21:44]

## 2022-03-08 NOTE — CHART NOTE - NSCHARTNOTEFT_GEN_A_CORE
Source: Patient [x ]  Family [ ]   other [x ] EMR and staff     Current Diet: Diet, Consistent Carbohydrate/No Snacks:   1000mL Fluid Restriction ([id number])  No Concentrated Potassium  Supplement Feeding Modality:  Oral  Glucerna Shake Cans or Servings Per Day:  1       Frequency:  Three Times a day (03-07-22 @ 18:00)    PO intake:  < 50% [x ]   50-75%  [ ]   %  [ ]  other :    Source for PO intake [ ] Patient [ ] family [x ] chart [ x] staff [ ] other    Current Weight:   3/7: 64.9 kg   3/1: 65 kg     % Weight Change: N/A (Trace edema to B/L legs, 2+ edema to L arm)     Pertinent Medications: MEDICATIONS  (STANDING):  dextrose 40% Gel 15 Gram(s) Oral once  dextrose 5%. 1000 milliLiter(s) (50 mL/Hr) IV Continuous <Continuous>  dextrose 5%. 1000 milliLiter(s) (100 mL/Hr) IV Continuous <Continuous>  dextrose 50% Injectable 12.5 Gram(s) IV Push once  dextrose 50% Injectable 25 Gram(s) IV Push once  dextrose 50% Injectable 25 Gram(s) IV Push once  ferrous    sulfate 325 milliGRAM(s) Oral daily  folic acid 1 milliGRAM(s) Oral daily  furosemide    Tablet 20 milliGRAM(s) Oral daily  glucagon  Injectable 1 milliGRAM(s) IntraMuscular once  insulin glargine Injectable (LANTUS) 20 Unit(s) SubCutaneous at bedtime  insulin lispro (ADMELOG) corrective regimen sliding scale   SubCutaneous at bedtime  insulin lispro (ADMELOG) corrective regimen sliding scale   SubCutaneous three times a day before meals  labetalol 50 milliGRAM(s) Oral two times a day  lactulose Syrup 10 Gram(s) Oral three times a day  levothyroxine 50 MICROGram(s) Oral daily  meropenem  IVPB 1000 milliGRAM(s) IV Intermittent every 8 hours  multivitamin 1 Tablet(s) Oral daily  pantoprazole    Tablet 40 milliGRAM(s) Oral two times a day  rifAXIMin 550 milliGRAM(s) Oral two times a day  thiamine 100 milliGRAM(s) Oral daily  traMADol 25 milliGRAM(s) Oral two times a day    MEDICATIONS  (PRN):  aluminum hydroxide/magnesium hydroxide/simethicone Suspension 30 milliLiter(s) Oral every 4 hours PRN Dyspepsia  bisacodyl Suppository 10 milliGRAM(s) Rectal daily PRN Constipation  morphine  - Injectable 2 milliGRAM(s) IV Push three times a day PRN Severe Pain (7 - 10)  ondansetron Injectable 4 milliGRAM(s) IV Push every 8 hours PRN Nausea and/or Vomiting    Pertinent Labs: CBC Full  -  ( 08 Mar 2022 06:53 )  WBC Count : 6.97 K/uL  RBC Count : 2.17 M/uL  Hemoglobin : 7.1 g/dL  Hematocrit : 20.5 %  Platelet Count - Automated : 133 K/uL  Mean Cell Volume : 94.5 fl  Mean Cell Hemoglobin : 32.7 pg  Mean Cell Hemoglobin Concentration : 34.6 gm/dL  Auto Neutrophil # : x  Auto Lymphocyte # : x  Auto Monocyte # : x  Auto Eosinophil # : x  Auto Basophil # : x  Auto Neutrophil % : x  Auto Lymphocyte % : x  Auto Monocyte % : x  Auto Eosinophil % : x  Auto Basophil % : x        Skin: Surgical site to R shin, wound to Forehead     Nutrition focused physical exam conducted - found signs of malnutrition [ ]absent [ x]present    Subcutaneous fat loss: [x ] Orbital fat pads region, [x ]Buccal fat region, [ ]Triceps region,  [ ]Ribs region    Muscle wasting: [x ]Temples region, [ ]Clavicle region, [x ]Shoulder region, [ ]Scapula region, [ ]Interosseous region,  [ ]thigh region, [ ]Calf region    Estimated Needs:   [x ] no change since previous assessment  [ ] recalculated:     Hospital Course:   Patient is a 66 year old female with PMH of HTN, DM, Anemia, ETOH abuse with liver cirrhosis, esophageal varices, hepatic encephalopathy, hypothyroidism, who was brought to the ED for altered mental status. Admitted for metabolic encephalopathy due to hepatic encephalopathy and RLE cellulitis.    Current Nutrition Diagnosis:  Pt remains at high nutrition risk secondary to severe (acute) protein calorie malnutrition related to inability to meet sufficient protein-energy requirements in setting of persistent lack of appetite, acute metabolic encephalopathy, liver failure as evidence by meeting <50% of estimated energy needs >5days and physical signs of severe fat wasting, moderate muscle wasting. Pt continues to have fair to poor PO intake noted per documentation pt consuming less then 50% at meals. Recommendations below:     Recommendations:   1) Rx vitamin C (500mg) daily  2) Continue Glucerna TID  3) Continue MVI, thiamine, folic acid  4) Continue to monitor daily weights  5) Encourage with meals.     Monitoring and Evaluation:   [ x] PO intake [ x] Tolerance to diet prescription [X] Weights  [X] Follow up per protocol [X] Labs:

## 2022-03-08 NOTE — PROVIDER CONTACT NOTE (CRITICAL VALUE NOTIFICATION) - SITUATION
Critical lab value of hgb 7.0 and hct 20.4
critical low potassium 2.7
Detail Level: Generalized
Detail Level: Zone
Detail Level: Detailed

## 2022-03-08 NOTE — PROGRESS NOTE ADULT - ASSESSMENT
Patient is a 66 year old female with PMH of HTN, DM, Anemia, ETOH abuse with liver cirrhosis, esophageal varices, hepatic encephalopathy, hypothyroidism, who was brought to the ED for altered mental status. Admitted for metabolic encephalopathy due to hepatic encephalopathy and RLE cellulitis.    #Left Antecubital Pain/Swelling  -likely due to infiltrated IV, although more swollen, tender and warm to touch  -duplex negative for DVT; palpable pulses  -XRAY negative for osseous changes  -arm elevation and cold compresses without much improvement  -order urgent CT upper extremity  -ACE wrap LUE  -analgesia as needed    # Hyponatremia - improved  -serum sodium normal in Jan 2022  -sodium improved to 130  -s/p iv albumin and 2% iv during hospitalization  -orthostaticsnegative  -hold aldactone, continue lasix  -liberalize sodium in diet  -repeat urine lytes reivewed  -TSH elevated; low free T3  -uric acid WNL  -AM cortisol low/normal  -strict I/Os  -free water restriction  -renal recs appreciated    Mild Hyperkalemia - resolved  -aldactone held as above  -low K+ diet  -s/p lokelma    # Right Lower Extremity Abscess and Cellulitis - improved  -s/p bedside I&D at the bedside    -abscess culture with MSSA  -abx to be changed to keflex on discharge  -blood cx result NGTD  -wound care per surgery  -ID follow up appreciated    # Alcoholic Cirrhosis decompensated with ascites, hepatic encephalopathy, esophageal varices  -s/p paracentesis   -continue lasix and hold aldactone as above  -continue rifaxamin/lactulose  -continue MVI/thiamine/folic acid  -continue beta-blocker  -abdominal US with small/mod ascites  -no indication for steroids  -trend LFTs  -GI recs appreciated    # Anemia likely of chronic disease  -no overt signs/symptoms of bleeding  -Hb 7.0 s/p 1 unit of PRBC  -transfuse additional unit of PRBC today  -continue ferrous sulfate and folic acid  -iron studies reviewed  -continue PPI  -GI on board; no evidence of bleeding   -repeat CBC in AM      # Hypothyroidism  -TSH 13  -increase synthroid to 75 mcg    # Diabetes Mellitus   -HbA1c 9.9; sugars controlled   -cont lantus 20 units at bedtime and adjust as needed  -ISS    -ADA diet    #Left Pleural Effusion  -saturating well on RA  -seen on abdominal sono  -CXR reviewed    DVT ppx -SCDs    Dispo: Worsening LUE swelling/tenderness. CT ordered. 1 unit of PRBCs. If CT is negative then anticipate discharge home on 3/9.    Plan discussed with patient, Dr. Olivo, Dr. Hernandez, RN, CCM

## 2022-03-08 NOTE — PROGRESS NOTE ADULT - SUBJECTIVE AND OBJECTIVE BOX
INFECTIOUS DISEASES AND INTERNAL MEDICINE at [place]  =======================================================  Bernardo La MD  Diplomates American Board of Internal Medicine and Infectious Diseases  Telephone [phone]  Fax            [phone]  =======================================================    JUSTIN LOVE [id number]    Follow up:  right leg infeciton    Allergies:  No Known Allergies      Medications:  acetaminophen     Tablet .. 650 milliGRAM(s) Oral every 6 hours PRN  aluminum hydroxide/magnesium hydroxide/simethicone Suspension 30 milliLiter(s) Oral every 4 hours PRN  bisacodyl Suppository 10 milliGRAM(s) Rectal daily PRN  dextrose 40% Gel 15 Gram(s) Oral once  dextrose 5%. 1000 milliLiter(s) IV Continuous <Continuous>  dextrose 5%. 1000 milliLiter(s) IV Continuous <Continuous>  dextrose 50% Injectable 12.5 Gram(s) IV Push once  dextrose 50% Injectable 25 Gram(s) IV Push once  dextrose 50% Injectable 25 Gram(s) IV Push once  ferrous    sulfate 325 milliGRAM(s) Oral daily  folic acid 1 milliGRAM(s) Oral daily  furosemide    Tablet 20 milliGRAM(s) Oral daily  glucagon  Injectable 1 milliGRAM(s) IntraMuscular once  HYDROmorphone  Injectable 0.5 milliGRAM(s) IV Push four times a day PRN  insulin glargine Injectable (LANTUS) 20 Unit(s) SubCutaneous at bedtime  insulin lispro (ADMELOG) corrective regimen sliding scale   SubCutaneous three times a day before meals  insulin lispro (ADMELOG) corrective regimen sliding scale   SubCutaneous at bedtime  labetalol 50 milliGRAM(s) Oral two times a day  lactulose Syrup 10 Gram(s) Oral three times a day  levothyroxine 75 MICROGram(s) Oral daily  meropenem  IVPB 1000 milliGRAM(s) IV Intermittent every 8 hours  multivitamin 1 Tablet(s) Oral daily  ondansetron Injectable 4 milliGRAM(s) IV Push every 8 hours PRN  pantoprazole    Tablet 40 milliGRAM(s) Oral two times a day  rifAXIMin 550 milliGRAM(s) Oral two times a day  sodium chloride 2% . 1000 milliLiter(s) IV Continuous <Continuous>  spironolactone 50 milliGRAM(s) Oral daily  thiamine 100 milliGRAM(s) Oral daily  traMADol 25 milliGRAM(s) Oral every 6 hours PRN  vancomycin  IVPB 750 milliGRAM(s) IV Intermittent every 12 hours    SOCIAL       FAMILY   FAMILY HISTORY:  FH: depression (Child)      REVIEW OF SYSTEMS:  CONSTITUTIONAL:  No Fever or chills  HEENT:   No diplopia or blurred vision.  No earache, sore throat or runny nose.  CARDIOVASCULAR:  No pressure, squeezing, strangling, tightness, heaviness or aching about the chest, neck, axilla or epigastrium.  RESPIRATORY:  No cough, shortness of breath, PND or orthopnea.  GASTROINTESTINAL:  No nausea, vomiting or diarrhea.  GENITOURINARY:  No dysuria, frequency or urgency. No Blood in urine  MUSCULOSKELETAL:   moves all joints  SKIN:  No change in skin, hair or nails.  NEUROLOGIC:  No paresthesias, fasciculations, seizures or weakness.  PSYCHIATRIC:  No disorder of thought or mood.  ENDOCRINE:  No heat or cold intolerance, polyuria or polydipsia.  HEMATOLOGICAL:  No easy bruising or bleeding.            Physical Exam:  I Vital Signs Last 24 Hrs  T(C): 36.8 (08 Mar 2022 04:59), Max: 37 (07 Mar 2022 20:15)  T(F): 98.3 (08 Mar 2022 04:59), Max: 98.6 (07 Mar 2022 20:15)  HR: 76 (08 Mar 2022 04:59) (69 - 76)  BP: 152/76 (08 Mar 2022 04:59) (119/70 - 152/76)  BP(mean): --  RR: 18 (08 Mar 2022 04:59) (18 - 18)  SpO2: 95% (08 Mar 2022 04:59) (94% - 98%)    GEN: NAD,   HEENT: normocephalic and atraumatic. EOMI. LOWELL.    NECK: Supple. No carotid bruits.  No lymphadenopathy or thyromegaly.  LUNGS: Clear to auscultation.  HEART: Regular rate and rhythm without murmur.  ABDOMEN: Soft, nontender, and nondistended.  Positive bowel sounds.    : No CVA tenderness  EXTREMITIES RIGHT LEG WRAPPED  MSK: no joint swelling  NEUROLOGIC: Cranial nerves II through XII are grossly intact.  PSYCHIATRIC: Appropriate affect .  SKIN: No ulceration or induration present.        Labs:  Vitals:  ====   =======================================================  Current Antibiotics:  meropenem  IVPB 1000 milliGRAM(s) IV Intermittent every 8 hours  rifAXIMin 550 milliGRAM(s) Oral two times a day  vancomycin  IVPB 750 milliGRAM(s) IV Intermittent every 12 hours    Other medications:  dextrose 40% Gel 15 Gram(s) Oral once  dextrose 5%. 1000 milliLiter(s) IV Continuous <Continuous>  dextrose 5%. 1000 milliLiter(s) IV Continuous <Continuous>  dextrose 50% Injectable 12.5 Gram(s) IV Push once  dextrose 50% Injectable 25 Gram(s) IV Push once  dextrose 50% Injectable 25 Gram(s) IV Push once  ferrous    sulfate 325 milliGRAM(s) Oral daily  folic acid 1 milliGRAM(s) Oral daily  furosemide    Tablet 20 milliGRAM(s) Oral daily  glucagon  Injectable 1 milliGRAM(s) IntraMuscular once  insulin glargine Injectable (LANTUS) 20 Unit(s) SubCutaneous at bedtime  insulin lispro (ADMELOG) corrective regimen sliding scale   SubCutaneous three times a day before meals  insulin lispro (ADMELOG) corrective regimen sliding scale   SubCutaneous at bedtime  labetalol 50 milliGRAM(s) Oral two times a day  lactulose Syrup 10 Gram(s) Oral three times a day  levothyroxine 75 MICROGram(s) Oral daily  multivitamin 1 Tablet(s) Oral daily  pantoprazole    Tablet 40 milliGRAM(s) Oral two times a day  sodium chloride 2% . 1000 milliLiter(s) IV Continuous <Continuous>  spironolactone 50 milliGRAM(s) Oral daily  thiamine 100 milliGRAM(s) Oral daily      =======================================================  Labs:                                               7.1    6.97  )-----------( 133      ( 08 Mar 2022 06:53 )             20.5   03-08    130<L>  |  98  |  26.7<H>  ----------------------------<  110<H>  5.2   |  21.0<L>  |  1.02    Ca    8.9      08 Mar 2022 05:38  Phos  4.2     03-08  Mg     1.7     03-08    TPro  7.0  /  Alb  3.2<L>  /  TBili  5.1<H>  /  DBili  x   /  AST  103<H>  /  ALT  36<H>  /  AlkPhos  295<H>  03-08        Culture - Abscess with Gram Stain (collected 03-02-22 @ 16:34)  Source: .Abscess below rt knee abscess    Culture - Blood (collected 02-27-22 @ 18:40)  Source: .Blood Blood-Peripheral    Culture - Blood (collected 02-27-22 @ 18:40)  Source: .Blood Blood-Peripheral      Creatinine, Serum: 0.43 mg/dL (03-04-22 @ 06:14)  Creatinine, Serum: 0.72 mg/dL (03-03-22 @ 09:26)  Creatinine, Serum: 0.84 mg/dL (03-02-22 @ 07:09)  Creatinine, Serum: 1.22 mg/dL (03-01-22 @ 05:15)  Creatinine, Serum: 1.57 mg/dL (02-28-22 @ 22:54)  Creatinine, Serum: 1.55 mg/dL (02-28-22 @ 15:02)  Creatinine, Serum: 1.41 mg/dL (02-28-22 @ 11:42)  Creatinine, Serum: 1.43 mg/dL (02-28-22 @ 05:17)  Creatinine, Serum: 1.11 mg/dL (02-27-22 @ 14:06)    Procalcitonin, Serum: 1.33 ng/mL (02-27-22 @ 18:40)      Ferritin, Serum: 970 ng/mL (02-27-22 @ 06:13)      WBC Count: 11.14 K/uL (03-04-22 @ 06:14)  WBC Count: 11.06 K/uL (03-03-22 @ 09:26)  WBC Count: 10.15 K/uL (03-03-22 @ 00:05)  WBC Count: 11.52 K/uL (03-02-22 @ 07:09)  WBC Count: 13.98 K/uL (02-28-22 @ 05:17)    COVID-19 PCR: NotDetec (03-02-22 @ 06:54)  COVID-19 PCR: NotDetec (02-24-22 @ 01:02)      Alkaline Phosphatase, Serum: 244 U/L (03-04-22 @ 06:14)  Alkaline Phosphatase, Serum: 277 U/L (03-02-22 @ 07:09)  Alkaline Phosphatase, Serum: 407 U/L (02-28-22 @ 15:02)  Alanine Aminotransferase (ALT/SGPT): 25 U/L (03-04-22 @ 06:14)  Alanine Aminotransferase (ALT/SGPT): 26 U/L (03-02-22 @ 07:09)  Alanine Aminotransferase (ALT/SGPT): 52 U/L (02-28-22 @ 15:02)  Aspartate Aminotransferase (AST/SGOT): 96 U/L (03-04-22 @ 06:14)  Aspartate Aminotransferase (AST/SGOT): 115 U/L (03-02-22 @ 07:09)  Aspartate Aminotransferase (AST/SGOT): 162 U/L (02-28-22 @ 15:02)  Bilirubin Total, Serum: 7.7 mg/dL (03-04-22 @ 06:14)  Bilirubin Total, Serum: 8.4 mg/dL (03-02-22 @ 07:09)  Bilirubin Total, Serum: 10.4 mg/dL (02-28-22 @ 15:02)  Bilirubin Direct, Serum: 4.4 mg/dL (03-04-22 @ 06:14)  Bilirubin Direct, Serum: 8.1 mg/dL (02-28-22 @ 15:02)

## 2022-03-08 NOTE — PROGRESS NOTE ADULT - NUTRITIONAL ASSESSMENT
This patient has been assessed with a concern for Malnutrition and has been determined to have a diagnosis/diagnoses of Severe protein-calorie malnutrition.    This patient is being managed with:   Diet Consistent Carbohydrate/No Snacks-  1000mL Fluid Restriction ([id number])  No Concentrated Potassium  Supplement Feeding Modality:  Oral  Glucerna Shake Cans or Servings Per Day:  1       Frequency:  Three Times a day  Entered: Mar  7 2022  6:00PM    
This patient has been assessed with a concern for Malnutrition and has been determined to have a diagnosis/diagnoses of Severe protein-calorie malnutrition.    This patient is being managed with:   Diet Consistent Carbohydrate/No Snacks-  1200mL Fluid Restriction ([id number])  Low Sodium  Supplement Feeding Modality:  Oral  Glucerna Shake Cans or Servings Per Day:  1       Frequency:  Three Times a day  Entered: Mar  1 2022 11:01AM    
This patient has been assessed with a concern for Malnutrition and has been determined to have a diagnosis/diagnoses of Severe protein-calorie malnutrition.    This patient is being managed with:   Diet Consistent Carbohydrate/No Snacks-  1200mL Fluid Restriction ([id number])  Low Sodium  Supplement Feeding Modality:  Oral  Glucerna Shake Cans or Servings Per Day:  1       Frequency:  Three Times a day  Entered: Mar  1 2022 11:01AM    
This patient has been assessed with a concern for Malnutrition and has been determined to have a diagnosis/diagnoses of Severe protein-calorie malnutrition.    This patient is being managed with:   Diet Consistent Carbohydrate/No Snacks-  1200mL Fluid Restriction ([id number])  Low Sodium  Supplement Feeding Modality:  Oral  Glucerna Shake Cans or Servings Per Day:  1       Frequency:  Three Times a day  Entered: Mar  1 2022 11:01AM    
This patient has been assessed with a concern for Malnutrition and has been determined to have a diagnosis/diagnoses of Severe protein-calorie malnutrition.    This patient is being managed with:   Diet Consistent Carbohydrate/No Snacks-  1200mL Fluid Restriction ([id number])  Low Sodium  Supplement Feeding Modality:  Oral  Glucerna Shake Cans or Servings Per Day:  1       Frequency:  Three Times a day  Entered: Mar  1 2022 11:01AM

## 2022-03-08 NOTE — PROGRESS NOTE ADULT - ASSESSMENT
65 y/o female with PMH of HTN, DM, anemia, EtOH abuse with liver cirrhosis, esophageal varices, hepatic encephalopathy, hypothyroidism, who was brought to the ED for altered mental status. As per ED, son reported patient has been having worsening confusion and nose bleed yesterday. Patient is a poor historian. She has no chest pain, shortness of breath, nausea, vomiting, abdominal pain.  PT REPORTS SHE FELL ABOUT 2 WEEKS AGO  AND HIT HER LEG   AS ABOVE ADMITTED WITH  CHANGE IN MENTAL STATUS  PT  WITH CONTINUED PAIN AND REDNESS IN LEFT LEG      on MERREM   U/S WITH ? ABSCESS     S/P I  AND D AT BEDSIDE BY SURGERY on 3/2      CX RESULTS    POLYMICROBIAL        HAS BEEN ON MERREM FOR A WEEK AND IS SIGNIFICANTLY IMPROVED   OK TO CHANGE TO KEFLEX 500 TID FOR ONE MORE WEEK   WILL FOLLOW UP  AS NEEDED PLEASE CALL IF QUESTIONS  SPOKE TO HOSPITALIST

## 2022-03-08 NOTE — PROGRESS NOTE ADULT - PROBLEM SELECTOR PLAN 1
Alcohol related cirrhosis. Mental status now improved.  On abx for cellulitis. US of abd 3/06 showed small to moderate volume ascites. Note made of left pleural effusion.  Continue Lactulose and Rifaximin.  Thiamine, MVI, Folic Acid.  Continue to trend CBC, Coags, LFTs, renal function.   Spirolactone d/c due to hyponatremia, today her serum sodium is 130. Renal following.   Continue Lasix 20mg daily. Alcohol related cirrhosis. Mental status now improved.  On abx for cellulitis. US of abd 3/06 showed small to moderate volume ascites. Note made of left pleural effusion.  Today her MELS score 22, Maddrey discriminant factor is 32.2.   Patient qualify for steroid therapy once SBP ruled out.   Patient remain afebrile, no leucocytosis. CXR from today with no evidence od pneumonia, pulmonary infiltration. UA noted.   Continue Lactulose and Rifaximin.  Thiamine, MVI, Folic Acid.  Continue to trend CBC, Coags, LFTs, renal function.   Spirolactone d/c due to hyponatremia, today her serum sodium is 130. Renal following.   Continue Lasix 20mg daily. Alcohol related cirrhosis. Mental status now improved.  On abx for cellulitis. US of abd 3/06 showed small to moderate volume ascites. Note made of left pleural effusion.  Today her MELD score 22, Maddrey discriminant factor is 32.2.   Patient remain afebrile, no leucocytosis. CXR from today with no evidence od pneumonia, pulmonary infiltration. UA noted.   Continue Rifaximin.  Thiamine, MVI, Folic Acid.  Continue to trend CBC, Coags, LFTs, renal function.   Spirolactone d/c by nephrology due to hyponatremia, today her serum sodium is 130. Renal following.   Continue Lasix 20mg daily.

## 2022-03-08 NOTE — PROGRESS NOTE ADULT - SUBJECTIVE AND OBJECTIVE BOX
Chief Complaint:  Patient is a 66y old  Female who presents with a chief complaint of Acute encephalopathy.     Interval Events / Subjective: Patient seen and evaluated at bedside, reporting no complaints, no overnight events. Her hemoglobin 7.1 gm today, no evidence of overt GI bleed. Total bili 5.1 today. LFTs essentially unchanged. Patient reports left upper arm pain ans swelling. LUE ecchymoses noted, elevated with pillow. LUE doppler on  negative for DVT.  Denies nausea, vomiting, abdominal pain, chest pain, shortness of breath. On antibiotics for cellulitis.      REVIEW OF SYSTEMS:   General: Negative  HEENT: Negative  CV: Negative  Respiratory: Negative  GI: See HPI  : Negative  MSK: Negative  Hematologic: Negative  Skin: Negative      PAST MEDICAL/SURGICAL HISTORY:  Alcohol abuse    Alcohol abuse    Liver cirrhosis    ETOH abuse    Urea cycle metabolism disorder    Transitory  hyperthyroidism    Lump in female breast    UTI (urinary tract infection)    Lymphangitis, acute, lower leg    Anemia    Hypothyroidism    Chronic back pain    HTN (hypertension)    GERD (gastroesophageal reflux disease)    Pancreatitis    HTN (hypertension)    DM (diabetes mellitus)    No significant past surgical history    S/P abdominoplasty      MEDICATIONS  (STANDING):  dextrose 40% Gel 15 Gram(s) Oral once  dextrose 5%. 1000 milliLiter(s) (50 mL/Hr) IV Continuous <Continuous>  dextrose 5%. 1000 milliLiter(s) (100 mL/Hr) IV Continuous <Continuous>  dextrose 50% Injectable 25 Gram(s) IV Push once  dextrose 50% Injectable 12.5 Gram(s) IV Push once  dextrose 50% Injectable 25 Gram(s) IV Push once  ferrous    sulfate 325 milliGRAM(s) Oral daily  folic acid 1 milliGRAM(s) Oral daily  furosemide    Tablet 20 milliGRAM(s) Oral daily  glucagon  Injectable 1 milliGRAM(s) IntraMuscular once  insulin glargine Injectable (LANTUS) 20 Unit(s) SubCutaneous at bedtime  insulin lispro (ADMELOG) corrective regimen sliding scale   SubCutaneous three times a day before meals  insulin lispro (ADMELOG) corrective regimen sliding scale   SubCutaneous at bedtime  labetalol 50 milliGRAM(s) Oral two times a day  lactulose Syrup 10 Gram(s) Oral three times a day  levothyroxine 50 MICROGram(s) Oral daily  meropenem  IVPB 1000 milliGRAM(s) IV Intermittent every 8 hours  multivitamin 1 Tablet(s) Oral daily  pantoprazole    Tablet 40 milliGRAM(s) Oral two times a day  rifAXIMin 550 milliGRAM(s) Oral two times a day  thiamine 100 milliGRAM(s) Oral daily  traMADol 25 milliGRAM(s) Oral two times a day    MEDICATIONS  (PRN):  aluminum hydroxide/magnesium hydroxide/simethicone Suspension 30 milliLiter(s) Oral every 4 hours PRN Dyspepsia  bisacodyl Suppository 10 milliGRAM(s) Rectal daily PRN Constipation  hydrALAZINE Injectable 10 milliGRAM(s) IV Push every 6 hours PRN if SBP > 170  morphine  - Injectable 2 milliGRAM(s) IV Push three times a day PRN Severe Pain (7 - 10)  ondansetron Injectable 4 milliGRAM(s) IV Push every 8 hours PRN Nausea and/or Vomiting    No Known Allergies    T(C): 37.2 (22 @ 10:56), Max: 37.2 (22 @ 10:56)  HR: 72 (22 @ 10:56) (69 - 76)  BP: 130/75 (22 @ 10:56) (119/70 - 178/80)  RR: 18 (22 @ 10:56) (18 - 18)  SpO2: 100% (22 @ 10:56) (94% - 100%)    PHYSICAL EXAM:    Constitutional: No acute distress  Neuro: Awake alert, oriented   HEENT: PERRL, anicteric sclerae  Neck: supple, no JVD  CV: regular rate, regular rhythm, +S1S2,   Pulm/chest: lung sounds CTA and equal bilaterally, no accessory muscle use noted  Abd: soft, NT, ND, +BS  Ext: OMER x 4, no Cyanosis, clubbing or edema  Skin: warm, well perfused, no jaundice   Psych: calm, appropriate affect        LABS:               7.1    6.97  )-----------( 133      (  @ 06:53 )             20.5                7.0    7.15  )-----------( 128      ( 08 @ 05:38 )             20.4                7.5    6.81  )-----------( 138      (  @ 06:48 )             21.7                7.5    7.48  )-----------( 139      (  @ 07:39 )             21.4           130<L>  |  98  |  26.7<H>  ----------------------------<  110<H>  5.2   |  21.0<L>  |  1.02    Ca    8.9      08 Mar 2022 05:38  Phos  4.2       Mg     1.7         TPro  7.0  /  Alb  3.2<L>  /  TBili  5.1<H>  /  DBili  x   /  AST  103<H>  /  ALT  36<H>  /  AlkPhos  295<H>      LIVER FUNCTIONS - ( 08 Mar 2022 05:38 )  Alb: 3.2 g/dL / Pro: 7.0 g/dL / ALK PHOS: 295 U/L / ALT: 36 U/L / AST: 103 U/L / GGT: x               PT/INR - ( 08 Mar 2022 05:38 )   PT: 17.9 sec;   INR: 1.54 ratio         PTT - ( 08 Mar 2022 05:38 )  PTT:37.0 sec  Ferritin, Serum: 470 ng/mL *H* (22 @ 05:38)  Iron Total, Serum: 66 ug/dL (22 @ 05:38)  Iron - Total Binding Capacity.: 157 ug/dL *L* (22 @ 05:38)  % Saturation, Iron: 42 % (22 @ 05:38)      < from: US Abdomen Limited (22 @ 14:56) >  FINDINGS:    Small to moderate volume ascites. Note made of left pleural effusion.    IMPRESSION:  As above.    < end of copied text >       Chief Complaint:  Patient is a 66y old  Female who presents with a chief complaint of Acute encephalopathy.     Interval Events / Subjective: Patient seen and evaluated at bedside, reporting no complaints, no overnight events. Her hemoglobin 7.1 gm today, no evidence of overt GI bleed. Total bili 5.1 today. LFTs essentially unchanged. Patient reports left upper arm pain ans swelling. LUE ecchymoses noted, elevated with pillow. LUE doppler on  negative for DVT.  Denies nausea, vomiting, abdominal pain, chest pain, shortness of breath.       REVIEW OF SYSTEMS:   General: Negative  HEENT: Negative  CV: Negative  Respiratory: Negative  GI: See HPI  : Negative  MSK: Negative  Hematologic: Negative  Skin: Negative      PAST MEDICAL/SURGICAL HISTORY:  Alcohol abuse    Alcohol abuse    Liver cirrhosis    ETOH abuse    Urea cycle metabolism disorder    Transitory  hyperthyroidism    Lump in female breast    UTI (urinary tract infection)    Lymphangitis, acute, lower leg    Anemia    Hypothyroidism    Chronic back pain    HTN (hypertension)    GERD (gastroesophageal reflux disease)    Pancreatitis    HTN (hypertension)    DM (diabetes mellitus)    No significant past surgical history    S/P abdominoplasty      MEDICATIONS  (STANDING):  dextrose 40% Gel 15 Gram(s) Oral once  dextrose 5%. 1000 milliLiter(s) (50 mL/Hr) IV Continuous <Continuous>  dextrose 5%. 1000 milliLiter(s) (100 mL/Hr) IV Continuous <Continuous>  dextrose 50% Injectable 25 Gram(s) IV Push once  dextrose 50% Injectable 12.5 Gram(s) IV Push once  dextrose 50% Injectable 25 Gram(s) IV Push once  ferrous    sulfate 325 milliGRAM(s) Oral daily  folic acid 1 milliGRAM(s) Oral daily  furosemide    Tablet 20 milliGRAM(s) Oral daily  glucagon  Injectable 1 milliGRAM(s) IntraMuscular once  insulin glargine Injectable (LANTUS) 20 Unit(s) SubCutaneous at bedtime  insulin lispro (ADMELOG) corrective regimen sliding scale   SubCutaneous three times a day before meals  insulin lispro (ADMELOG) corrective regimen sliding scale   SubCutaneous at bedtime  labetalol 50 milliGRAM(s) Oral two times a day  lactulose Syrup 10 Gram(s) Oral three times a day  levothyroxine 50 MICROGram(s) Oral daily  meropenem  IVPB 1000 milliGRAM(s) IV Intermittent every 8 hours  multivitamin 1 Tablet(s) Oral daily  pantoprazole    Tablet 40 milliGRAM(s) Oral two times a day  rifAXIMin 550 milliGRAM(s) Oral two times a day  thiamine 100 milliGRAM(s) Oral daily  traMADol 25 milliGRAM(s) Oral two times a day    MEDICATIONS  (PRN):  aluminum hydroxide/magnesium hydroxide/simethicone Suspension 30 milliLiter(s) Oral every 4 hours PRN Dyspepsia  bisacodyl Suppository 10 milliGRAM(s) Rectal daily PRN Constipation  hydrALAZINE Injectable 10 milliGRAM(s) IV Push every 6 hours PRN if SBP > 170  morphine  - Injectable 2 milliGRAM(s) IV Push three times a day PRN Severe Pain (7 - 10)  ondansetron Injectable 4 milliGRAM(s) IV Push every 8 hours PRN Nausea and/or Vomiting    No Known Allergies    T(C): 37.2 (22 @ 10:56), Max: 37.2 (22 @ 10:56)  HR: 72 (22 @ 10:56) (69 - 76)  BP: 130/75 (22 @ 10:56) (119/70 - 178/80)  RR: 18 (22 @ 10:56) (18 - 18)  SpO2: 100% (22 @ 10:56) (94% - 100%)    PHYSICAL EXAM:    Constitutional: No acute distress  Neuro: Awake alert, oriented   HEENT: PERRL, anicteric sclerae  Neck: supple, no JVD  CV: regular rate, regular rhythm, +S1S2,   Pulm/chest: lung sounds CTA and equal bilaterally, no accessory muscle use noted  Abd: soft, NT, ND, +BS  Ext: OMER x 4, no Cyanosis, clubbing or edema  Skin: warm, well perfused, no jaundice   Psych: calm, appropriate affect        LABS:               7.1    6.97  )-----------( 133      (  @ 06:53 )             20.5                7.0    7.15  )-----------( 128      (  @ 05:38 )             20.4                7.5    6.81  )-----------( 138      (  @ 06:48 )             21.7                7.5    7.48  )-----------( 139      (  @ 07:39 )             21.4           130<L>  |  98  |  26.7<H>  ----------------------------<  110<H>  5.2   |  21.0<L>  |  1.02    Ca    8.9      08 Mar 2022 05:38  Phos  4.2       Mg     1.7         TPro  7.0  /  Alb  3.2<L>  /  TBili  5.1<H>  /  DBili  x   /  AST  103<H>  /  ALT  36<H>  /  AlkPhos  295<H>      LIVER FUNCTIONS - ( 08 Mar 2022 05:38 )  Alb: 3.2 g/dL / Pro: 7.0 g/dL / ALK PHOS: 295 U/L / ALT: 36 U/L / AST: 103 U/L / GGT: x               PT/INR - ( 08 Mar 2022 05:38 )   PT: 17.9 sec;   INR: 1.54 ratio         PTT - ( 08 Mar 2022 05:38 )  PTT:37.0 sec  Ferritin, Serum: 470 ng/mL *H* (22 @ 05:38)  Iron Total, Serum: 66 ug/dL (22 @ 05:38)  Iron - Total Binding Capacity.: 157 ug/dL *L* (22 @ 05:38)  % Saturation, Iron: 42 % (22 @ 05:38)      < from: US Abdomen Limited (22 @ 14:56) >  FINDINGS:    Small to moderate volume ascites. Note made of left pleural effusion.    IMPRESSION:  As above.    < end of copied text >       Chief Complaint:  Patient is a 66y old  Female who presents with a chief complaint of Acute encephalopathy.     Interval Events / Subjective: Patient seen and evaluated at bedside, reporting no complaints, no overnight events. Her hemoglobin 7.1 gm today, no evidence of overt GI bleed. Total bili 5.1 today. LFTs essentially unchanged. Patient reports left upper arm pain ans swelling. LUE ecchymoses noted, elevated with pillow. LUE doppler on  negative for DVT.  Denies nausea, vomiting, abdominal pain, chest pain, shortness of breath.       REVIEW OF SYSTEMS:   General: Negative  HEENT: Negative  CV: Negative  Respiratory: Negative  GI: See HPI  : Negative  MSK: Negative  Hematologic: Negative  Skin: Negative      PAST MEDICAL/SURGICAL HISTORY:  Alcohol abuse    Alcohol abuse    Liver cirrhosis    ETOH abuse    Urea cycle metabolism disorder    Transitory  hyperthyroidism    Lump in female breast    UTI (urinary tract infection)    Lymphangitis, acute, lower leg    Anemia    Hypothyroidism    Chronic back pain    HTN (hypertension)    GERD (gastroesophageal reflux disease)    Pancreatitis    HTN (hypertension)    DM (diabetes mellitus)    No significant past surgical history    S/P abdominoplasty      MEDICATIONS  (STANDING):  dextrose 40% Gel 15 Gram(s) Oral once  dextrose 5%. 1000 milliLiter(s) (50 mL/Hr) IV Continuous <Continuous>  dextrose 5%. 1000 milliLiter(s) (100 mL/Hr) IV Continuous <Continuous>  dextrose 50% Injectable 25 Gram(s) IV Push once  dextrose 50% Injectable 12.5 Gram(s) IV Push once  dextrose 50% Injectable 25 Gram(s) IV Push once  ferrous    sulfate 325 milliGRAM(s) Oral daily  folic acid 1 milliGRAM(s) Oral daily  furosemide    Tablet 20 milliGRAM(s) Oral daily  glucagon  Injectable 1 milliGRAM(s) IntraMuscular once  insulin glargine Injectable (LANTUS) 20 Unit(s) SubCutaneous at bedtime  insulin lispro (ADMELOG) corrective regimen sliding scale   SubCutaneous three times a day before meals  insulin lispro (ADMELOG) corrective regimen sliding scale   SubCutaneous at bedtime  labetalol 50 milliGRAM(s) Oral two times a day  lactulose Syrup 10 Gram(s) Oral three times a day  levothyroxine 50 MICROGram(s) Oral daily  meropenem  IVPB 1000 milliGRAM(s) IV Intermittent every 8 hours  multivitamin 1 Tablet(s) Oral daily  pantoprazole    Tablet 40 milliGRAM(s) Oral two times a day  rifAXIMin 550 milliGRAM(s) Oral two times a day  thiamine 100 milliGRAM(s) Oral daily  traMADol 25 milliGRAM(s) Oral two times a day    MEDICATIONS  (PRN):  aluminum hydroxide/magnesium hydroxide/simethicone Suspension 30 milliLiter(s) Oral every 4 hours PRN Dyspepsia  bisacodyl Suppository 10 milliGRAM(s) Rectal daily PRN Constipation  hydrALAZINE Injectable 10 milliGRAM(s) IV Push every 6 hours PRN if SBP > 170  morphine  - Injectable 2 milliGRAM(s) IV Push three times a day PRN Severe Pain (7 - 10)  ondansetron Injectable 4 milliGRAM(s) IV Push every 8 hours PRN Nausea and/or Vomiting    No Known Allergies    T(C): 37.2 (22 @ 10:56), Max: 37.2 (22 @ 10:56)  HR: 72 (22 @ 10:56) (69 - 76)  BP: 130/75 (22 @ 10:56) (119/70 - 178/80)  RR: 18 (22 @ 10:56) (18 - 18)  SpO2: 100% (22 @ 10:56) (94% - 100%)    PHYSICAL EXAM:    Constitutional: No acute distress  Neuro: Awake alert, oriented   HEENT: PERRL, anicteric sclerae  Neck: supple, no JVD  CV: regular rate, regular rhythm, +S1S2,   Pulm/chest: lung sounds CTA and equal bilaterally, no accessory muscle use noted  Abd: soft, NT, +BS  Ext: OMER x 4, no Cyanosis, clubbing or edema  Skin: warm, well perfused, no jaundice   Psych: calm, appropriate affect        LABS:               7.1    6.97  )-----------( 133      (  @ 06:53 )             20.5                7.0    7.15  )-----------( 128      (  @ 05:38 )             20.4                7.5    6.81  )-----------( 138      (  @ 06:48 )             21.7                7.5    7.48  )-----------( 139      (  @ 07:39 )             21.4       -    130<L>  |  98  |  26.7<H>  ----------------------------<  110<H>  5.2   |  21.0<L>  |  1.02    Ca    8.9      08 Mar 2022 05:38  Phos  4.2       Mg     1.7         TPro  7.0  /  Alb  3.2<L>  /  TBili  5.1<H>  /  DBili  x   /  AST  103<H>  /  ALT  36<H>  /  AlkPhos  295<H>      LIVER FUNCTIONS - ( 08 Mar 2022 05:38 )  Alb: 3.2 g/dL / Pro: 7.0 g/dL / ALK PHOS: 295 U/L / ALT: 36 U/L / AST: 103 U/L / GGT: x               PT/INR - ( 08 Mar 2022 05:38 )   PT: 17.9 sec;   INR: 1.54 ratio         PTT - ( 08 Mar 2022 05:38 )  PTT:37.0 sec  Ferritin, Serum: 470 ng/mL *H* (22 @ 05:38)  Iron Total, Serum: 66 ug/dL (22 @ 05:38)  Iron - Total Binding Capacity.: 157 ug/dL *L* (22 @ 05:38)  % Saturation, Iron: 42 % (22 @ 05:38)      < from: US Abdomen Limited (22 @ 14:56) >  FINDINGS:    Small to moderate volume ascites. Note made of left pleural effusion.    IMPRESSION:  As above.    < end of copied text >

## 2022-03-08 NOTE — PROGRESS NOTE ADULT - SUBJECTIVE AND OBJECTIVE BOX
CHIEF COMPLAINT/INTERVAL HISTORY:    Patient is a 66y old  Female who presents with a chief complaint of Acute encephalopathy (08 Mar 2022 14:46)    SUBJECTIVE & OBJECTIVE: Pt seen and examined at bedside. No overnight events. However, drop in Hb noted. No evidence of bleeding. LUE with increasing tenderness, swelling and warmth. CT ordered.    ROS: No chest pain, palpitations, SOB, light headedness, dizziness, headache, nausea/vomiting, fevers/chills, abdominal pain, dysuria.    ICU Vital Signs Last 24 Hrs  T(C): 36.8 (08 Mar 2022 14:09), Max: 37.2 (08 Mar 2022 10:56)  T(F): 98.2 (08 Mar 2022 14:09), Max: 98.9 (08 Mar 2022 10:56)  HR: 72 (08 Mar 2022 14:09) (69 - 76)  BP: 150/81 (08 Mar 2022 14:09) (119/70 - 178/80)  RR: 18 (08 Mar 2022 14:09) (18 - 18)  SpO2: 99% (08 Mar 2022 14:09) (94% - 100%)    MEDICATIONS  (STANDING):  ascorbic acid 500 milliGRAM(s) Oral daily  dextrose 40% Gel 15 Gram(s) Oral once  dextrose 5%. 1000 milliLiter(s) (50 mL/Hr) IV Continuous <Continuous>  dextrose 5%. 1000 milliLiter(s) (100 mL/Hr) IV Continuous <Continuous>  dextrose 50% Injectable 25 Gram(s) IV Push once  dextrose 50% Injectable 12.5 Gram(s) IV Push once  dextrose 50% Injectable 25 Gram(s) IV Push once  ferrous    sulfate 325 milliGRAM(s) Oral daily  folic acid 1 milliGRAM(s) Oral daily  furosemide    Tablet 20 milliGRAM(s) Oral daily  glucagon  Injectable 1 milliGRAM(s) IntraMuscular once  insulin glargine Injectable (LANTUS) 20 Unit(s) SubCutaneous at bedtime  insulin lispro (ADMELOG) corrective regimen sliding scale   SubCutaneous three times a day before meals  insulin lispro (ADMELOG) corrective regimen sliding scale   SubCutaneous at bedtime  labetalol 50 milliGRAM(s) Oral two times a day  lactulose Syrup 10 Gram(s) Oral three times a day  levothyroxine 50 MICROGram(s) Oral daily  meropenem  IVPB 1000 milliGRAM(s) IV Intermittent every 8 hours  multivitamin 1 Tablet(s) Oral daily  pantoprazole    Tablet 40 milliGRAM(s) Oral two times a day  rifAXIMin 550 milliGRAM(s) Oral two times a day  thiamine 100 milliGRAM(s) Oral daily  traMADol 25 milliGRAM(s) Oral two times a day    MEDICATIONS  (PRN):  aluminum hydroxide/magnesium hydroxide/simethicone Suspension 30 milliLiter(s) Oral every 4 hours PRN Dyspepsia  bisacodyl Suppository 10 milliGRAM(s) Rectal daily PRN Constipation  hydrALAZINE Injectable 10 milliGRAM(s) IV Push every 6 hours PRN if SBP > 170  morphine  - Injectable 2 milliGRAM(s) IV Push three times a day PRN Severe Pain (7 - 10)  ondansetron Injectable 4 milliGRAM(s) IV Push every 8 hours PRN Nausea and/or Vomiting      LABS:                        7.1    6.97  )-----------( 133      ( 08 Mar 2022 06:53 )             20.5     03-08    130<L>  |  98  |  26.7<H>  ----------------------------<  110<H>  5.2   |  21.0<L>  |  1.02    Ca    8.9      08 Mar 2022 05:38  Phos  4.2     03-08  Mg     1.7     03-08    TPro  7.0  /  Alb  3.2<L>  /  TBili  5.1<H>  /  DBili  x   /  AST  103<H>  /  ALT  36<H>  /  AlkPhos  295<H>  03-08    PT/INR - ( 08 Mar 2022 05:38 )   PT: 17.9 sec;   INR: 1.54 ratio         PTT - ( 08 Mar 2022 05:38 )  PTT:37.0 sec  Urinalysis Basic - ( 07 Mar 2022 20:43 )    Color: Yellow / Appearance: Clear / S.015 / pH: x  Gluc: x / Ketone: Negative  / Bili: Negative / Urobili: Negative mg/dL   Blood: x / Protein: Negative / Nitrite: Negative   Leuk Esterase: Moderate / RBC: 3-5 /HPF / WBC 6-10 /HPF   Sq Epi: x / Non Sq Epi: Few / Bacteria: x    CAPILLARY BLOOD GLUCOSE      POCT Blood Glucose.: 202 mg/dL (08 Mar 2022 16:42)  POCT Blood Glucose.: 164 mg/dL (08 Mar 2022 11:42)  POCT Blood Glucose.: 163 mg/dL (08 Mar 2022 08:05)  POCT Blood Glucose.: 151 mg/dL (07 Mar 2022 21:37)  POCT Blood Glucose.: 140 mg/dL (07 Mar 2022 17:20)    PHYSICAL EXAM  CONSTITUTIONAL: elderly female, laying in bed, NAD  HEENT: MMM, + scleral icterus  CARDIOVASCULAR: Regular rate and rhythm, + S1 and S2   RESPIRATORY: coarse breath sounds  ABDOMEN: soft, nontender, distended   NEUROLOGY: AAO x 3  EXT: no pedal edema, right leg ulcer noted, left anticubital swelling and ecchymosis and warm to touch

## 2022-03-09 ENCOUNTER — TRANSCRIPTION ENCOUNTER (OUTPATIENT)
Age: 67
End: 2022-03-09

## 2022-03-09 VITALS
SYSTOLIC BLOOD PRESSURE: 122 MMHG | OXYGEN SATURATION: 100 % | RESPIRATION RATE: 18 BRPM | DIASTOLIC BLOOD PRESSURE: 76 MMHG | HEART RATE: 83 BPM | TEMPERATURE: 98 F

## 2022-03-09 DIAGNOSIS — D63.8 ANEMIA IN OTHER CHRONIC DISEASES CLASSIFIED ELSEWHERE: ICD-10-CM

## 2022-03-09 DIAGNOSIS — E11.9 TYPE 2 DIABETES MELLITUS WITHOUT COMPLICATIONS: ICD-10-CM

## 2022-03-09 LAB
ANION GAP SERPL CALC-SCNC: 13 MMOL/L — SIGNIFICANT CHANGE UP (ref 5–17)
BASOPHILS # BLD AUTO: 0.03 K/UL — SIGNIFICANT CHANGE UP (ref 0–0.2)
BASOPHILS NFR BLD AUTO: 0.4 % — SIGNIFICANT CHANGE UP (ref 0–2)
BUN SERPL-MCNC: 23.4 MG/DL — HIGH (ref 8–20)
CALCIUM SERPL-MCNC: 8.8 MG/DL — SIGNIFICANT CHANGE UP (ref 8.6–10.2)
CHLORIDE SERPL-SCNC: 99 MMOL/L — SIGNIFICANT CHANGE UP (ref 98–107)
CO2 SERPL-SCNC: 20 MMOL/L — LOW (ref 22–29)
CREAT SERPL-MCNC: 0.9 MG/DL — SIGNIFICANT CHANGE UP (ref 0.5–1.3)
EGFR: 71 ML/MIN/1.73M2 — SIGNIFICANT CHANGE UP
EOSINOPHIL # BLD AUTO: 0.16 K/UL — SIGNIFICANT CHANGE UP (ref 0–0.5)
EOSINOPHIL NFR BLD AUTO: 2.4 % — SIGNIFICANT CHANGE UP (ref 0–6)
GLUCOSE BLDC GLUCOMTR-MCNC: 151 MG/DL — HIGH (ref 70–99)
GLUCOSE BLDC GLUCOMTR-MCNC: 196 MG/DL — HIGH (ref 70–99)
GLUCOSE SERPL-MCNC: 118 MG/DL — HIGH (ref 70–99)
HCT VFR BLD CALC: 22.5 % — LOW (ref 34.5–45)
HGB BLD-MCNC: 7.8 G/DL — LOW (ref 11.5–15.5)
IMM GRANULOCYTES NFR BLD AUTO: 1 % — SIGNIFICANT CHANGE UP (ref 0–1.5)
LYMPHOCYTES # BLD AUTO: 1.44 K/UL — SIGNIFICANT CHANGE UP (ref 1–3.3)
LYMPHOCYTES # BLD AUTO: 21.3 % — SIGNIFICANT CHANGE UP (ref 13–44)
MAGNESIUM SERPL-MCNC: 1.6 MG/DL — SIGNIFICANT CHANGE UP (ref 1.6–2.6)
MCHC RBC-ENTMCNC: 32.5 PG — SIGNIFICANT CHANGE UP (ref 27–34)
MCHC RBC-ENTMCNC: 34.7 GM/DL — SIGNIFICANT CHANGE UP (ref 32–36)
MCV RBC AUTO: 93.8 FL — SIGNIFICANT CHANGE UP (ref 80–100)
MONOCYTES # BLD AUTO: 0.89 K/UL — SIGNIFICANT CHANGE UP (ref 0–0.9)
MONOCYTES NFR BLD AUTO: 13.2 % — SIGNIFICANT CHANGE UP (ref 2–14)
NEUTROPHILS # BLD AUTO: 4.16 K/UL — SIGNIFICANT CHANGE UP (ref 1.8–7.4)
NEUTROPHILS NFR BLD AUTO: 61.7 % — SIGNIFICANT CHANGE UP (ref 43–77)
PHOSPHATE SERPL-MCNC: 3.8 MG/DL — SIGNIFICANT CHANGE UP (ref 2.4–4.7)
PLATELET # BLD AUTO: 124 K/UL — LOW (ref 150–400)
POTASSIUM SERPL-MCNC: 4.6 MMOL/L — SIGNIFICANT CHANGE UP (ref 3.5–5.3)
POTASSIUM SERPL-SCNC: 4.6 MMOL/L — SIGNIFICANT CHANGE UP (ref 3.5–5.3)
RBC # BLD: 2.4 M/UL — LOW (ref 3.8–5.2)
RBC # FLD: 22.9 % — HIGH (ref 10.3–14.5)
SODIUM SERPL-SCNC: 132 MMOL/L — LOW (ref 135–145)
WBC # BLD: 6.75 K/UL — SIGNIFICANT CHANGE UP (ref 3.8–10.5)
WBC # FLD AUTO: 6.75 K/UL — SIGNIFICANT CHANGE UP (ref 3.8–10.5)

## 2022-03-09 PROCEDURE — 73590 X-RAY EXAM OF LOWER LEG: CPT

## 2022-03-09 PROCEDURE — 73200 CT UPPER EXTREMITY W/O DYE: CPT

## 2022-03-09 PROCEDURE — 82248 BILIRUBIN DIRECT: CPT

## 2022-03-09 PROCEDURE — 99239 HOSP IP/OBS DSCHRG MGMT >30: CPT

## 2022-03-09 PROCEDURE — 86923 COMPATIBILITY TEST ELECTRIC: CPT

## 2022-03-09 PROCEDURE — 85610 PROTHROMBIN TIME: CPT

## 2022-03-09 PROCEDURE — U0005: CPT

## 2022-03-09 PROCEDURE — 87077 CULTURE AEROBIC IDENTIFY: CPT

## 2022-03-09 PROCEDURE — 82607 VITAMIN B-12: CPT

## 2022-03-09 PROCEDURE — 84481 FREE ASSAY (FT-3): CPT

## 2022-03-09 PROCEDURE — 84443 ASSAY THYROID STIM HORMONE: CPT

## 2022-03-09 PROCEDURE — 84100 ASSAY OF PHOSPHORUS: CPT

## 2022-03-09 PROCEDURE — 85025 COMPLETE CBC W/AUTO DIFF WBC: CPT

## 2022-03-09 PROCEDURE — 93971 EXTREMITY STUDY: CPT

## 2022-03-09 PROCEDURE — 85730 THROMBOPLASTIN TIME PARTIAL: CPT

## 2022-03-09 PROCEDURE — 83036 HEMOGLOBIN GLYCOSYLATED A1C: CPT

## 2022-03-09 PROCEDURE — 85027 COMPLETE CBC AUTOMATED: CPT

## 2022-03-09 PROCEDURE — 97530 THERAPEUTIC ACTIVITIES: CPT

## 2022-03-09 PROCEDURE — 73200 CT UPPER EXTREMITY W/O DYE: CPT | Mod: 26,LT

## 2022-03-09 PROCEDURE — 36430 TRANSFUSION BLD/BLD COMPNT: CPT

## 2022-03-09 PROCEDURE — 80048 BASIC METABOLIC PNL TOTAL CA: CPT

## 2022-03-09 PROCEDURE — 82140 ASSAY OF AMMONIA: CPT

## 2022-03-09 PROCEDURE — 83935 ASSAY OF URINE OSMOLALITY: CPT

## 2022-03-09 PROCEDURE — 82962 GLUCOSE BLOOD TEST: CPT

## 2022-03-09 PROCEDURE — 86850 RBC ANTIBODY SCREEN: CPT

## 2022-03-09 PROCEDURE — 97163 PT EVAL HIGH COMPLEX 45 MIN: CPT

## 2022-03-09 PROCEDURE — 80076 HEPATIC FUNCTION PANEL: CPT

## 2022-03-09 PROCEDURE — 80053 COMPREHEN METABOLIC PANEL: CPT

## 2022-03-09 PROCEDURE — 82533 TOTAL CORTISOL: CPT

## 2022-03-09 PROCEDURE — 73080 X-RAY EXAM OF ELBOW: CPT

## 2022-03-09 PROCEDURE — 84439 ASSAY OF FREE THYROXINE: CPT

## 2022-03-09 PROCEDURE — P9016: CPT

## 2022-03-09 PROCEDURE — 84145 PROCALCITONIN (PCT): CPT

## 2022-03-09 PROCEDURE — 86901 BLOOD TYPING SEROLOGIC RH(D): CPT

## 2022-03-09 PROCEDURE — 86900 BLOOD TYPING SEROLOGIC ABO: CPT

## 2022-03-09 PROCEDURE — 82728 ASSAY OF FERRITIN: CPT

## 2022-03-09 PROCEDURE — 81001 URINALYSIS AUTO W/SCOPE: CPT

## 2022-03-09 PROCEDURE — 87040 BLOOD CULTURE FOR BACTERIA: CPT

## 2022-03-09 PROCEDURE — 87070 CULTURE OTHR SPECIMN AEROBIC: CPT

## 2022-03-09 PROCEDURE — 86703 HIV-1/HIV-2 1 RESULT ANTBDY: CPT

## 2022-03-09 PROCEDURE — 82550 ASSAY OF CK (CPK): CPT

## 2022-03-09 PROCEDURE — 99232 SBSQ HOSP IP/OBS MODERATE 35: CPT

## 2022-03-09 PROCEDURE — U0003: CPT

## 2022-03-09 PROCEDURE — 73700 CT LOWER EXTREMITY W/O DYE: CPT

## 2022-03-09 PROCEDURE — 84466 ASSAY OF TRANSFERRIN: CPT

## 2022-03-09 PROCEDURE — 84300 ASSAY OF URINE SODIUM: CPT

## 2022-03-09 PROCEDURE — 73560 X-RAY EXAM OF KNEE 1 OR 2: CPT

## 2022-03-09 PROCEDURE — 83930 ASSAY OF BLOOD OSMOLALITY: CPT

## 2022-03-09 PROCEDURE — 82306 VITAMIN D 25 HYDROXY: CPT

## 2022-03-09 PROCEDURE — 97116 GAIT TRAINING THERAPY: CPT

## 2022-03-09 PROCEDURE — 83735 ASSAY OF MAGNESIUM: CPT

## 2022-03-09 PROCEDURE — 87186 SC STD MICRODIL/AGAR DIL: CPT

## 2022-03-09 PROCEDURE — 84550 ASSAY OF BLOOD/URIC ACID: CPT

## 2022-03-09 PROCEDURE — 82247 BILIRUBIN TOTAL: CPT

## 2022-03-09 PROCEDURE — 82436 ASSAY OF URINE CHLORIDE: CPT

## 2022-03-09 PROCEDURE — 82746 ASSAY OF FOLIC ACID SERUM: CPT

## 2022-03-09 PROCEDURE — 83540 ASSAY OF IRON: CPT

## 2022-03-09 PROCEDURE — 76882 US LMTD JT/FCL EVL NVASC XTR: CPT

## 2022-03-09 PROCEDURE — 83550 IRON BINDING TEST: CPT

## 2022-03-09 PROCEDURE — 80202 ASSAY OF VANCOMYCIN: CPT

## 2022-03-09 PROCEDURE — 71045 X-RAY EXAM CHEST 1 VIEW: CPT

## 2022-03-09 PROCEDURE — 99285 EMERGENCY DEPT VISIT HI MDM: CPT | Mod: 25

## 2022-03-09 PROCEDURE — 87205 SMEAR GRAM STAIN: CPT

## 2022-03-09 PROCEDURE — 76705 ECHO EXAM OF ABDOMEN: CPT

## 2022-03-09 PROCEDURE — P9047: CPT

## 2022-03-09 PROCEDURE — 36415 COLL VENOUS BLD VENIPUNCTURE: CPT

## 2022-03-09 PROCEDURE — 70450 CT HEAD/BRAIN W/O DYE: CPT | Mod: MA

## 2022-03-09 RX ORDER — LEVOTHYROXINE SODIUM 125 MCG
1 TABLET ORAL
Qty: 30 | Refills: 0
Start: 2022-03-09 | End: 2022-04-07

## 2022-03-09 RX ORDER — CEPHALEXIN 500 MG
1 CAPSULE ORAL
Qty: 18 | Refills: 0
Start: 2022-03-09 | End: 2022-03-14

## 2022-03-09 RX ORDER — SACCHAROMYCES BOULARDII 250 MG
1 POWDER IN PACKET (EA) ORAL
Qty: 12 | Refills: 0
Start: 2022-03-09 | End: 2022-03-14

## 2022-03-09 RX ADMIN — MORPHINE SULFATE 2 MILLIGRAM(S): 50 CAPSULE, EXTENDED RELEASE ORAL at 12:08

## 2022-03-09 RX ADMIN — Medication 500 MILLIGRAM(S): at 11:59

## 2022-03-09 RX ADMIN — TRAMADOL HYDROCHLORIDE 25 MILLIGRAM(S): 50 TABLET ORAL at 05:19

## 2022-03-09 RX ADMIN — PANTOPRAZOLE SODIUM 40 MILLIGRAM(S): 20 TABLET, DELAYED RELEASE ORAL at 05:19

## 2022-03-09 RX ADMIN — LACTULOSE 10 GRAM(S): 10 SOLUTION ORAL at 05:20

## 2022-03-09 RX ADMIN — Medication 325 MILLIGRAM(S): at 12:00

## 2022-03-09 RX ADMIN — Medication 1 MILLIGRAM(S): at 12:01

## 2022-03-09 RX ADMIN — Medication 1 TABLET(S): at 12:00

## 2022-03-09 RX ADMIN — MEROPENEM 100 MILLIGRAM(S): 1 INJECTION INTRAVENOUS at 05:19

## 2022-03-09 RX ADMIN — Medication 2: at 08:45

## 2022-03-09 RX ADMIN — Medication 75 MICROGRAM(S): at 05:20

## 2022-03-09 RX ADMIN — Medication 2: at 12:06

## 2022-03-09 RX ADMIN — Medication 100 MILLIGRAM(S): at 12:08

## 2022-03-09 RX ADMIN — Medication 50 MILLIGRAM(S): at 05:19

## 2022-03-09 RX ADMIN — MORPHINE SULFATE 2 MILLIGRAM(S): 50 CAPSULE, EXTENDED RELEASE ORAL at 09:07

## 2022-03-09 RX ADMIN — Medication 20 MILLIGRAM(S): at 05:20

## 2022-03-09 NOTE — DISCHARGE NOTE NURSING/CASE MANAGEMENT/SOCIAL WORK - NSDCPEFALRISK_GEN_ALL_CORE
For information on Fall & Injury Prevention, visit: [email or web address] OR  [email or web address] OR  https://www.cdc.gov/steadi/patient.html

## 2022-03-09 NOTE — PROGRESS NOTE ADULT - REASON FOR ADMISSION
Acute encephalopathy

## 2022-03-09 NOTE — PROGRESS NOTE ADULT - ASSESSMENT
66 year old F with a significant past medical history of ETOH abuse, HTN, DM, chronic anemia, cirrhosis, who arrived to [facility] for change in mental status admitted with decompensated cirrhosis.

## 2022-03-09 NOTE — DISCHARGE NOTE PROVIDER - PROVIDER TOKENS
FREE:[LAST:[PCP],PHONE:[(   )    -],FAX:[(   )    -]] FREE:[LAST:[PCP],PHONE:[(   )    -],FAX:[(   )    -]],PROVIDER:[TOKEN:[[id number]]],PROVIDER:[TOKEN:[[id number]]]

## 2022-03-09 NOTE — DISCHARGE NOTE PROVIDER - NSDCMRMEDTOKEN_GEN_ALL_CORE_FT
acetaminophen 500 mg oral tablet: 1 tab(s) orally every 12 hours, As needed, Temp greater or equal to 38C (100.4F), Mild Pain (1 - 3)  Colace 100 mg oral capsule:  orally 3 times a day  ferrous sulfate 324 mg (65 mg elemental iron) oral delayed release tablet:  orally once a day  folic acid 1 mg oral tablet: 1 tab(s) orally once a day  furosemide 40 mg oral tablet: 1 tab(s) orally once a day  insulin glargine: 15 unit(s) subcutaneous once a day (at bedtime)  insulin lispro 100 units/mL [facility]able [facility]: [facility] with [facility]  labetalol: 50 milligram(s) orally 2 times a day  lactulose 10 g/15 mL oral syrup: 15 milliliter(s) orally once a day  levothyroxine 50 mcg (0.05 mg) oral tablet: 1 tab(s) orally once a day  multivitamin: 1 tab(s)  once a day  pantoprazole 40 mg oral granule, delayed release: 1 each orally 2 times a day  rifaximin 550 mg oral tablet: 1 tab(s) orally 2 times a day  spironolactone 25 mg oral tablet: 4 tab(s) orally once a day  Vitamin B1 100 mg oral tablet: 1 tab(s) orally once a day   cephalexin 500 mg oral tablet: 1 tab(s) orally 3 times a day   Colace 100 mg oral capsule:  orally 3 times a day  ferrous sulfate 324 mg (65 mg elemental iron) oral delayed release tablet:  orally once a day  Florastor 250 mg oral capsule: 1 cap(s) orally 2 times a day   folic acid 1 mg oral tablet: 1 tab(s) orally once a day  furosemide 40 mg oral tablet: 1 tab(s) orally once a day  insulin glargine: 20 unit(s) subcutaneous once a day (at bedtime)  insulin lispro 100 units/mL injectable solution: please continue home sliding scale dose  labetalol: 50 milligram(s) orally 2 times a day  lactulose 10 g/15 mL oral syrup: 15 milliliter(s) orally 3 times a day  multivitamin: 1 tab(s)  once a day  pantoprazole 40 mg oral granule, delayed release: 1 each orally 2 times a day  rifaximin 550 mg oral tablet: 1 tab(s) orally 2 times a day  Synthroid 75 mcg (0.075 mg) oral tablet: 1 tab(s) orally once a day   Vitamin B1 100 mg oral tablet: 1 tab(s) orally once a day

## 2022-03-09 NOTE — PROGRESS NOTE ADULT - PROBLEM SELECTOR PROBLEM 1
Liver cirrhosis

## 2022-03-09 NOTE — DISCHARGE NOTE NURSING/CASE MANAGEMENT/SOCIAL WORK - PATIENT PORTAL LINK FT
You can access the FollowMyHealth Patient Portal offered by [facility] by registering at the following website: [email or web address]. By joining [facility]’s FollowMyHealth portal, you will also be able to view your health information using other applications (apps) compatible with our system.

## 2022-03-09 NOTE — DISCHARGE NOTE PROVIDER - CARE PROVIDER_API CALL
PCP,   Phone: (   )    -  Fax: (   )    -  Follow Up Time:    PCP,   Phone: (   )    -  Fax: (   )    -  Follow Up Time:     Suha Craven)  Internal Medicine  [address] [place] [address]  Phone: [phone]  Fax: [phone]  Follow Up Time:     Agustin Olivo)  Infectious Disease; Internal Medicine  [address] [address]  Phone: [phone]  Fax: [phone]  Follow Up Time:

## 2022-03-09 NOTE — PROGRESS NOTE ADULT - PROVIDER SPECIALTY LIST ADULT
Trauma Surgery
Gastroenterology
Gastroenterology
Hospitalist
Intervent Radiology
Nephrology
Nephrology
Gastroenterology
Hospitalist
Infectious Disease
Nephrology
Hospitalist
Infectious Disease
Nephrology
Nephrology
Trauma Surgery
Gastroenterology

## 2022-03-09 NOTE — DISCHARGE NOTE PROVIDER - HOSPITAL COURSE
Patient is a 66 year old female with PMH of HTN, DM, Anemia, ETOH abuse with liver cirrhosis, esophageal varices, hepatic encephalopathy, hypothyroidism, who was brought to [facility] ED for altered mental status. In the ED, found to be hyponatremic and noted to have cellulitis of her RLE.  Admitted for metabolic encephalopathy due to hepatic encephalopathy and RLE cellulitis. Patient was seen by GI due to hepatic encephalopathy started on rifaxamin/lactulose. abdominal ultrasound noted for small/mod ascites. patient refused paracentesis 3/2.  Nephrology was consulted, patient started on hypertonic saline. Orthostatics BP were negative. Patient had a bedside I&D which was cultured and + MSSA. Iv antibiotics with be transitioned to keflex on discharge. Patient is medically stable for discharge home.   Patient is a 66 year old female with PMH of HTN, DM, Anemia, ETOH abuse with liver cirrhosis, esophageal varices, hepatic encephalopathy, hypothyroidism, who was brought to [facility] ED for altered mental status. In the ED, found to be hyponatremic and noted to have cellulitis of her RLE.  Admitted for metabolic encephalopathy due to hepatic encephalopathy and RLE cellulitis. Patient had a bedside I&D which was cultured and + MSSA. Iv antibiotics with be transitioned to keflex until 3/15. Patient was seen by GI due to hepatic encephalopathy started on rifaxamin/lactulose. abdominal ultrasound noted for small/mod ascites. patient refused paracentesis 3/2.  Nephrology was consulted, patient started on hypertonic saline. Sodium improved but then decreased; aldactone was held and free water restriction was adjusted to 1 liter. Orthostatics BP were negative. Sodium improved to 132. Patient also developed an infiltrated LUE IV. Duplex was negative for a DVT and XRAY was negative for any osseous deformity. CT with fluid collection possible hematoma vs infiltrated medicine; likely the latter. Pain improved today with hot packs and elevation. Will ACE wrap arm and recommended outpatient repeat ultrasound early next week. Lastly, patient also had acute on chromic anemia and required 2 units of PRBCs during her hospitalization. Baseline hb 7-8 with no concerns for bleeding per GI. Patient is medically stable for discharge home.

## 2022-03-09 NOTE — PROGRESS NOTE ADULT - SUBJECTIVE AND OBJECTIVE BOX
Chief Complaint:  Patient is a 66y old  Female who presents with a chief complaint of acute encephalopathy.     Interval Events / Subjective: Patient seen and evaluated at bedside, reporting no complaints, no overnight events. Her hemoglobin 7.8 gm today, no evidence of overt GI bleed. Her total bili 5.1 yesterday, no LFT repots from today.  Patient reports left upper arm pain ans swelling. LUE ecchymoses noted, elevated with pillow. LUE doppler on  negative for DVT.  Denies nausea, vomiting, abdominal pain, chest pain, shortness of breath.       REVIEW OF SYSTEMS:   General: Negative  HEENT: Negative  CV: Negative  Respiratory: Negative  GI: See HPI  : Negative  MSK: Negative  Hematologic: Negative  Skin: Negative    PAST MEDICAL/SURGICAL HISTORY:  Alcohol abuse    Alcohol abuse    Liver cirrhosis    ETOH abuse    Urea cycle metabolism disorder    Transitory  hyperthyroidism    Lump in female breast    UTI (urinary tract infection)    Lymphangitis, acute, lower leg    Anemia    Hypothyroidism    Chronic back pain    HTN (hypertension)    GERD (gastroesophageal reflux disease)    Pancreatitis    HTN (hypertension)    DM (diabetes mellitus)    No significant past surgical history    S/P abdominoplasty      MEDICATIONS  (STANDING):  ascorbic acid 500 milliGRAM(s) Oral daily  dextrose 40% Gel 15 Gram(s) Oral once  dextrose 5%. 1000 milliLiter(s) (50 mL/Hr) IV Continuous <Continuous>  dextrose 5%. 1000 milliLiter(s) (100 mL/Hr) IV Continuous <Continuous>  dextrose 50% Injectable 25 Gram(s) IV Push once  dextrose 50% Injectable 12.5 Gram(s) IV Push once  dextrose 50% Injectable 25 Gram(s) IV Push once  ferrous    sulfate 325 milliGRAM(s) Oral daily  folic acid 1 milliGRAM(s) Oral daily  furosemide    Tablet 20 milliGRAM(s) Oral daily  glucagon  Injectable 1 milliGRAM(s) IntraMuscular once  insulin glargine Injectable (LANTUS) 20 Unit(s) SubCutaneous at bedtime  insulin lispro (ADMELOG) corrective regimen sliding scale   SubCutaneous three times a day before meals  insulin lispro (ADMELOG) corrective regimen sliding scale   SubCutaneous at bedtime  labetalol 50 milliGRAM(s) Oral two times a day  lactulose Syrup 10 Gram(s) Oral three times a day  levothyroxine 75 MICROGram(s) Oral daily  meropenem  IVPB 1000 milliGRAM(s) IV Intermittent every 8 hours  multivitamin 1 Tablet(s) Oral daily  pantoprazole    Tablet 40 milliGRAM(s) Oral two times a day  rifAXIMin 550 milliGRAM(s) Oral two times a day  thiamine 100 milliGRAM(s) Oral daily  traMADol 25 milliGRAM(s) Oral two times a day    MEDICATIONS  (PRN):  aluminum hydroxide/magnesium hydroxide/simethicone Suspension 30 milliLiter(s) Oral every 4 hours PRN Dyspepsia  bisacodyl Suppository 10 milliGRAM(s) Rectal daily PRN Constipation  hydrALAZINE Injectable 10 milliGRAM(s) IV Push every 6 hours PRN if SBP > 170  morphine  - Injectable 2 milliGRAM(s) IV Push three times a day PRN Severe Pain (7 - 10)  ondansetron Injectable 4 milliGRAM(s) IV Push every 8 hours PRN Nausea and/or Vomiting    No Known Allergies    T(C): 36.7 (22 @ 10:12), Max: 37.1 (22 @ 13:39)  HR: 66 (22 @ 10:12) (66 - 83)  BP: 143/74 (22 @ 10:12) (138/76 - 167/83)  RR: 18 (22 @ 10:12) (18 - 18)  SpO2: 97% (22 @ 10:12) (95% - 100%)    I&O's Summary    PHYSICAL EXAM:      Constitutional:  Eyes:  ENMT:  Neck:  Breasts:  Back:  Respiratory:  Cardiovascular:  Gastrointestinal:  Genitourinary:  Rectal:  Extremities:  Vascular:  Neurological:  Skin:  Lymph Nodes:  Musculoskeletal:  Psychiatric:      LABS:               7.8    6.75  )-----------( 124      (  @ 05:16 )             22.5                7.1    6.97  )-----------( 133      (  @ 06:53 )             20.5                7.0    7.15  )-----------( 128      (  @ 05:38 )             20.4                7.5    6.81  )-----------( 138      (  @ 06:48 )             21.7           132<L>  |  99  |  23.4<H>  ----------------------------<  118<H>  4.6   |  20.0<L>  |  0.90    Ca    8.8      09 Mar 2022 05:16  Phos  3.8       Mg     1.6         TPro  7.0  /  Alb  3.2<L>  /  TBili  5.1<H>  /  DBili  x   /  AST  103<H>  /  ALT  36<H>  /  AlkPhos  295<H>      LIVER FUNCTIONS - ( 08 Mar 2022 05:38 )  Alb: 3.2 g/dL / Pro: 7.0 g/dL / ALK PHOS: 295 U/L / ALT: 36 U/L / AST: 103 U/L / GGT: x               PT/INR - ( 08 Mar 2022 05:38 )   PT: 17.9 sec;   INR: 1.54 ratio         PTT - ( 08 Mar 2022 05:38 )  PTT:37.0 sec  Ferritin, Serum: 470 ng/mL *H* (22 @ 05:38)  Iron Total, Serum: 66 ug/dL (22 @ 05:38)  Iron - Total Binding Capacity.: 157 ug/dL *L* (22 @ 05:38)  % Saturation, Iron: 42 % (22 @ 05:38)    MELD-Na  Dialysis at least twice in past week: Y/N?  Creatinine:  0.90  Total Bili:  --  INR: --  Sodium: 132      IMAGING: I personally reviewed the [XXXXXX], and I agree with the radiologist's interpretation as described below:   Chief Complaint:  Patient is a 66y old  Female who presents with a chief complaint of acute encephalopathy.     Interval Events / Subjective: Patient seen and evaluated at bedside, reporting no complaints, no overnight events. Her hemoglobin 7.8 gm today, no evidence of overt GI bleed. Her total bili 5.1 yesterday, no LFT repots from today.  Patient reports left upper arm pain ans swelling. LUE ecchymoses noted, elevated with pillow. LUE doppler on  negative for DVT.  Denies nausea, vomiting, abdominal pain, chest pain, shortness of breath.       REVIEW OF SYSTEMS:   General: Negative  HEENT: Negative  CV: Negative  Respiratory: Negative  GI: See HPI  : Negative  MSK: Negative  Hematologic: Negative  Skin: Negative    PAST MEDICAL/SURGICAL HISTORY:  Alcohol abuse    Alcohol abuse    Liver cirrhosis    ETOH abuse    Urea cycle metabolism disorder    Transitory  hyperthyroidism    Lump in female breast    UTI (urinary tract infection)    Lymphangitis, acute, lower leg    Anemia    Hypothyroidism    Chronic back pain    HTN (hypertension)    GERD (gastroesophageal reflux disease)    Pancreatitis    HTN (hypertension)    DM (diabetes mellitus)    No significant past surgical history    S/P abdominoplasty      MEDICATIONS  (STANDING):  ascorbic acid 500 milliGRAM(s) Oral daily  dextrose 40% Gel 15 Gram(s) Oral once  dextrose 5%. 1000 milliLiter(s) (50 mL/Hr) IV Continuous <Continuous>  dextrose 5%. 1000 milliLiter(s) (100 mL/Hr) IV Continuous <Continuous>  dextrose 50% Injectable 25 Gram(s) IV Push once  dextrose 50% Injectable 12.5 Gram(s) IV Push once  dextrose 50% Injectable 25 Gram(s) IV Push once  ferrous    sulfate 325 milliGRAM(s) Oral daily  folic acid 1 milliGRAM(s) Oral daily  furosemide    Tablet 20 milliGRAM(s) Oral daily  glucagon  Injectable 1 milliGRAM(s) IntraMuscular once  insulin glargine Injectable (LANTUS) 20 Unit(s) SubCutaneous at bedtime  insulin lispro (ADMELOG) corrective regimen sliding scale   SubCutaneous three times a day before meals  insulin lispro (ADMELOG) corrective regimen sliding scale   SubCutaneous at bedtime  labetalol 50 milliGRAM(s) Oral two times a day  lactulose Syrup 10 Gram(s) Oral three times a day  levothyroxine 75 MICROGram(s) Oral daily  meropenem  IVPB 1000 milliGRAM(s) IV Intermittent every 8 hours  multivitamin 1 Tablet(s) Oral daily  pantoprazole    Tablet 40 milliGRAM(s) Oral two times a day  rifAXIMin 550 milliGRAM(s) Oral two times a day  thiamine 100 milliGRAM(s) Oral daily  traMADol 25 milliGRAM(s) Oral two times a day    MEDICATIONS  (PRN):  aluminum hydroxide/magnesium hydroxide/simethicone Suspension 30 milliLiter(s) Oral every 4 hours PRN Dyspepsia  bisacodyl Suppository 10 milliGRAM(s) Rectal daily PRN Constipation  hydrALAZINE Injectable 10 milliGRAM(s) IV Push every 6 hours PRN if SBP > 170  morphine  - Injectable 2 milliGRAM(s) IV Push three times a day PRN Severe Pain (7 - 10)  ondansetron Injectable 4 milliGRAM(s) IV Push every 8 hours PRN Nausea and/or Vomiting    No Known Allergies    T(C): 36.7 (22 @ 10:12), Max: 37.1 (22 @ 13:39)  HR: 66 (22 @ 10:12) (66 - 83)  BP: 143/74 (22 @ 10:12) (138/76 - 167/83)  RR: 18 (22 @ 10:12) (18 - 18)  SpO2: 97% (22 @ 10:12) (95% - 100%)      PHYSICAL EXAM:    Constitutional: No acute distress  Neuro: Awake alert, oriented   HEENT: PERRL, anicteric sclerae  Neck: supple, no JVD  CV: regular rate, regular rhythm, +S1S2,   Pulm/chest: lung sounds CTA and equal bilaterally, no accessory muscle use noted  Abd: soft, NT, +BS  Ext: OMER x 4, no Cyanosis, clubbing or edema  Skin: warm, well perfused, no jaundice   Psych: calm, appropriate affect      LABS:               7.8    6.75  )-----------( 124      (  @ 05:16 )             22.5                7.1    6.97  )-----------( 133      (  @ 06:53 )             20.5                7.0    7.15  )-----------( 128      (  @ 05:38 )             20.4                7.5    6.81  )-----------( 138      (  @ 06:48 )             21.7           132<L>  |  99  |  23.4<H>  ----------------------------<  118<H>  4.6   |  20.0<L>  |  0.90    Ca    8.8      09 Mar 2022 05:16  Phos  3.8       Mg     1.6         TPro  7.0  /  Alb  3.2<L>  /  TBili  5.1<H>  /  DBili  x   /  AST  103<H>  /  ALT  36<H>  /  AlkPhos  295<H>      LIVER FUNCTIONS - ( 08 Mar 2022 05:38 )  Alb: 3.2 g/dL / Pro: 7.0 g/dL / ALK PHOS: 295 U/L / ALT: 36 U/L / AST: 103 U/L / GGT: x               PT/INR - ( 08 Mar 2022 05:38 )   PT: 17.9 sec;   INR: 1.54 ratio         PTT - ( 08 Mar 2022 05:38 )  PTT:37.0 sec  Ferritin, Serum: 470 ng/mL *H* (22 @ 05:38)  Iron Total, Serum: 66 ug/dL (22 @ 05:38)  Iron - Total Binding Capacity.: 157 ug/dL *L* (22 @ 05:38)  % Saturation, Iron: 42 % (22 @ 05:38)

## 2022-03-09 NOTE — DISCHARGE NOTE PROVIDER - CARE PROVIDERS DIRECT ADDRESSES
,DirectAddress_Unknown ,DirectAddress_Unknown,DirectAddress_Unknown,[email or web address]

## 2022-03-09 NOTE — PROGRESS NOTE ADULT - ATTENDING COMMENTS
I have seen and examined the patient during rounds form 930a-3pm  events noted  packing removed  erythema improved. no purulent output from wound, base clean.  packing removed  Follow cultures
I have seen and examined the patient during rounds form 0730-0900hrs  events noted  no new issues.  wound clean base, erythema resolved, as has the edema  Dry dressing on wound with ace wrap.  alfredito with questions.
I evaluated this pt. with my NP and agree with the above assessment and management plan. Pt is much more alert and her only complaint today is LUE pain from cellulitis from indurated IV insertion site. Continue current Lactulose and Rifaximin and Lasix and Spironolactone therapies. Trend labs and LFT's while here. Pt. may be ready for discharge soon.
Patient seen and examined today.  Complaining of left arm pain.  Otherwise tolerating diet.  LFTs are spontaneously improving.  Discussed with the hospitalist team regarding the left arm discomfort.  No additional GI recommendations.  Continue the same medical therapy.
Patient has no fever. NO abdominal pain. NA improving  alert and oriented X 3.     ETOH  cirrhosis  needs ETOH counseling  continue Lasix,   HE improved. COntinue lactulose and xifaxan  May D/ C home from GI standpoint.   F/U with [facility] hepatology in 1-2 weeks- Dr Craven  will sign off
Patient with alcoholic cirrhosis and small to moderate amount of ascites on sonogram.  She has just undergone an incision and drainage of the cellulitis with subcutaneous abscess which is the probable source of her fever.  We had recommended a diagnostic paracentesis but none was ordered and therefore I have just put through the order today.  It is unclear if there is enough ascitic fluid to be tapped however.  Otherwise she is awake alert oriented in no acute distress and without any particular gastrointestinal complaints.  She is no longer encephalopathic and would simply continue with the current medical regimen.
66 year old F with a significant past medical history of ETOH abuse, HTN, DM, chronic anemia, cirrhosis, who arrived to [facility] for change in mental status. Decompensated cirrhosis (HE+/ ascites+/ VH-), on abx for cellulitis .nephrology following, on lasix, spironolactone d/c ed per nephrology due to concern for sodium wasting? . C/w rifaximin.     notes/labs/imaging reviewed, plan discussed with GI NP .
66 year old  woman with a significant past medical history of ETOH abuse, HTN, DM, chronic anemia, cirrhosis, who arrived to [facility] by son for changes in mental status. Decompensated cirrhosis (HE+/ ascites+/ VH-), DF >32, has JOVANNI and HE, on abx for cellulitis , ucx, blood cx, diagnostic paracentesis . C/w albumin, nephrology following. C/w lactulose and rifaximin. Hold off on steroids for now due to  active infection.     notes/labs/imaging reviewed, plan discussed with GI NP
Patient essentially presents with some element of hepatic encephalopathy possibly precipitated by the severe cellulitis of the right lower extremity for which he underwent incision and drainage yesterday.  She has not been afebrile since yesterday morning.  He denies any abdominal pain, nausea or vomiting.  There is no evidence of overt bleeding.  She has small to moderate ascites on sonogram and refused paracentesis yesterday.  Would recommend continuing his Afaxin and lactulose at the current dose and will add Lasix 20 mg daily and Aldactone 50 mg daily for underlying ascites.
Patient seen and examined.  Doing okay.  Complains of leg pain.  Otherwise seems stable.Continue lactulose, rifaximin.

## 2022-03-09 NOTE — PROGRESS NOTE ADULT - PROBLEM SELECTOR PLAN 1
Decompensated cirrhosis (HE+/ ascites+/ VH-), her mental status now improved.  On abx for cellulitis.   US abd 3/06 showed small to moderate volume ascites. Note made of left pleural effusion.  Her last MELD score 22, Maddrey discriminant factor is 32.2 ( on 03/08, no INR or total bili from today)  Continue Rifaximin.  Thiamine, MVI, Folic Acid.  Continue to trend CBC, Coags, LFTs, renal function.   Spirolactone d/c by nephrology due to hyponatremia. Serum sodium slowly improving , today 132. Renal following.   Continue Lasix 20mg daily.  Continue Rifaximin  Continue betablocker

## 2022-03-09 NOTE — DISCHARGE NOTE PROVIDER - NSDCCPCAREPLAN_GEN_ALL_CORE_FT
PRINCIPAL DISCHARGE DIAGNOSIS  Diagnosis: Hepatic encephalopathy  Assessment and Plan of Treatment: continue rifaxamin/lactulose      SECONDARY DISCHARGE DIAGNOSES  Diagnosis: Cellulitis  Assessment and Plan of Treatment: you were treated with IV antibiotics and will continue with oral keflex until 3/15     PRINCIPAL DISCHARGE DIAGNOSIS  Diagnosis: Hepatic encephalopathy  Assessment and Plan of Treatment: continue rifaxamin/lactulose      SECONDARY DISCHARGE DIAGNOSES  Diagnosis: Cellulitis  Assessment and Plan of Treatment: you were treated with IV antibiotics and will continue with oral keflex until 3/15    Diagnosis: Anemia of chronic disease  Assessment and Plan of Treatment: you recieved 2 unit of blood during your stay. you did not have any evidence of active bleeding. please follow up with your primary physician upon discharge in 1 week to have repeat CBC done.    Diagnosis: Left arm swelling  Assessment and Plan of Treatment: CT note for small 7x4x4 cm hematoma vs infiltrate. please follow up with PCP in 1 week to have repeat imaging done. keep arm elevated, apply cold compresses and ace wrap your arm.     PRINCIPAL DISCHARGE DIAGNOSIS  Diagnosis: Hepatic encephalopathy  Assessment and Plan of Treatment: continue rifaxamin/lactulose at increase dose as provided  follow up with Hepatologist Dr. Craven within 1 -2 weeks      SECONDARY DISCHARGE DIAGNOSES  Diagnosis: Cellulitis  Assessment and Plan of Treatment: RLE cellulitis was treated treated with IV antibiotics and will continue with oral keflex until 3/15 as prescribed  Please follow up with Dr. Olivo within 2 weeks    Diagnosis: Left arm swelling  Assessment and Plan of Treatment: CT note for small 7x4x4 cm fluid collection likely due to infiltrated medicine. please keep arm elevated, apply cold compresses and ace wrap your arm for compression for a few hours each day. please follow up with PCP in 1 week to have repeat imaging done to ensure fluid collection is resolving.    Diagnosis: Hypothyroid  Assessment and Plan of Treatment: your synthroid was increased to 75mcg. please follow up in 4 weeks for repeat Thyroid function testing with your primary physician    Diagnosis: Anemia of chronic disease  Assessment and Plan of Treatment: you recieved 2 unit of blood during your stay. you did not have any evidence of active bleeding. please follow up with your primary physician upon discharge in 1 week to have repeat CBC done. avoid blood thinners and NSAIDS. continue ferrous sulfate.    Diagnosis: Hepatic cirrhosis  Assessment and Plan of Treatment: please stop drinking alcohol as directed  please adhere to all medications as prescribed  please follow up with Dr. Craven within 2 weeks    Diagnosis: Hyponatremia  Assessment and Plan of Treatment: your sodium has improved  please continue to hold aldactone and resume home dose of lasix  adhere to free water restriction of 1 liter daily  repeat labs with your PCP within 1 week    Diagnosis: Small pleural effusion  Assessment and Plan of Treatment: incidentally noted  please continue lasix and follow up with your PCP within 1 week for repeat imaging to ensure resolution of effusion

## 2022-03-09 NOTE — DISCHARGE NOTE NURSING/CASE MANAGEMENT/SOCIAL WORK - NSDCVIVACCINE_GEN_ALL_CORE_FT
influenza, injectable, quadrivalent, preservative free; 27-Oct-2015 11:00; Roxie Vergara (RN); [employer]; [id number]; IntraMuscular; Deltoid Left.; 0.5 milliLiter(s); VIS (VIS Published: 07-Aug-2015, VIS Presented: 27-Oct-2015);   Tdap; 26-Jun-2015 00:30; Jaison Spicer (RN); [employer]; [id number]; IntraMuscular; Deltoid Left.; 0.5 milliLiter(s); VIS (VIS Published: 09-May-2013, VIS Presented: 26-Jun-2015);   Tdap; 14-Jan-2022 02:31; Christal Wolf (RN); [employer]; [id number] (Exp. Date: 09-Sep-2023); IntraMuscular; Deltoid Right.; 0.5 milliLiter(s); VIS (VIS Published: 09-May-2013, VIS Presented: 14-Jan-2022);

## 2022-03-09 NOTE — DISCHARGE NOTE PROVIDER - ATTENDING DISCHARGE PHYSICAL EXAMINATION:
CONSTITUTIONAL: elderly female, laying in bed, NAD  HEENT: MMM, + scleral icterus  CARDIOVASCULAR: Regular rate and rhythm, + S1 and S2   RESPIRATORY: coarse breath sounds  ABDOMEN: soft, nontender, distended   NEUROLOGY: AAO x 3  EXT: no pedal edema, right leg ulcer improved, left anticubital swelling and ecchymosis (less tender today)

## 2022-04-11 NOTE — ED PROVIDER NOTE - NSTIMEPROVIDERCAREINITIATE_GEN_ER
Here for f/u and recheck of URI sx, chest congestion and cough. Pt has had sx for about 4-5 weeks, started with nasal congestion and congestion but has some mild lingering cough. Cough is productive with clear sputum. No hemoptysis. Pt has had some exposure to similar sx at work. Pt did test for COVID and that was negative. No COVID exposure    Pt here for follow up of blood pressure. Pt states doing great with adherence to therapy and feels well. No issues of chest pain, shortness of breath. No vision changes, headache, swelling in legs. Here for follow up of ADD. Pt states that they are doing very well with current therapy and feels that control of symptoms are adequate at this time. No breakthru symptoms and no need/indication for adjustment in therapy. Taking meds as prescribed and denies any illicit medication usage of misuse of their stimulant thearpy. Mood is stable and denies any issues of depression or anxiety associated with treatment of ADD. Pt was down in [facility] with his family and a family friend, and weather was great, did some boating. Except as noted above in the history of present illness, the review of systems is  negative for headache, vision changes, chest pain, shortness of breath, abdominal pain, urinary sx, bowel changes. Past medical, surgical, and social history reviewed and updated  Medications and allergies reviewed and updated        O: /72   Pulse 90   Temp 98.1 °F (36.7 °C) (Temporal)   Resp 14   Wt 206 lb 3.2 oz (93.5 kg)   SpO2 99%   BMI 28.36 kg/m²   GEN: No acute distress, cooperative, well nourished, alert. HEENT: PEERLA, EOMI , normocephalic/atraumatic, nares and oropharynx clear. Mucous membranes normal, Tympanic membranes clear bilaterally. Neck: soft, supple, no thyromegaly, mass, no Lymphadenopathy  CV: Regular rate and rhythm, no murmur, rubs, gallops. No edema.   Resp: Clear to auscultation bilaterally good air entry bilaterally  No crackles, wheeze. Breathing comfortably. Psych: mood stable, No suicidal thoughts or ideation         Current Outpatient Medications   Medication Sig Dispense Refill    [START ON 6/10/2022] methylphenidate (RITALIN) 20 MG tablet Take 1 tablet by mouth 3 times daily for 30 days. 90 tablet 0    [START ON 5/11/2022] methylphenidate (RITALIN) 20 MG tablet Take 1 tablet by mouth 3 times daily for 30 days. 90 tablet 0    methylphenidate (RITALIN) 20 MG tablet Take 1 tablet by mouth 3 times daily for 30 days. 90 tablet 0    azithromycin (ZITHROMAX Z-LION) 250 MG tablet Take 1 tablet by mouth daily Sig 2 tabs po QD x 1d, then 1 tab po QD x 4d 6 tablet 0    methylPREDNISolone (MEDROL DOSEPACK) 4 MG tablet Take by mouth as directed in pack 21 tablet 0    buPROPion (WELLBUTRIN XL) 150 MG extended release tablet TAKE 1 TABLET BY MOUTH ONCE DAILY IN THE MORNING 30 tablet 5    lisinopril (PRINIVIL;ZESTRIL) 40 MG tablet Take 1 tablet by mouth once daily 90 tablet 1    methylphenidate (RITALIN) 20 MG tablet TAKE 1 TABLET BY MOUTH THREE TIMES DAILY       No current facility-administered medications for this visit. Hospital Outpatient Visit on 01/21/2022   Component Date Value Ref Range Status    24hr Urine Volume (ml) 01/21/2022 2000  mL Final    Creatinine, 24H Ur 01/21/2022 1.6  0.6 - 2.5 g/24hr Final    Protein, 24H Urine 01/21/2022 0.360* <0.149 g/24hr Final      Lab Results   Component Value Date    CREATININE 1.6 (H) 01/11/2022    BUN 21 (H) 01/11/2022     01/11/2022    K 4.6 01/11/2022     01/11/2022    CO2 24 01/11/2022       Lab Results   Component Value Date    WBC 9.2 01/11/2022    HGB 16.1 01/11/2022    HCT 47.9 01/11/2022    MCV 90.0 01/11/2022     01/11/2022       ASSESSMENT / PLAN:    1. Mild episode of recurrent major depressive disorder (HCC)  Mood stable, doing well w/ current therapy    2.  Attention deficit disorder (ADD) without hyperactivity  Stable with ritalin  Use chronic  See CSM  Pt aware of need for every 3 month medication followup appointments, and that medication refills for benzodiazepines, narcotics and/or stimulants will only be given at appointment. - methylphenidate (RITALIN) 20 MG tablet; Take 1 tablet by mouth 3 times daily for 30 days. Dispense: 90 tablet; Refill: 0  - methylphenidate (RITALIN) 20 MG tablet; Take 1 tablet by mouth 3 times daily for 30 days. Dispense: 90 tablet; Refill: 0  - methylphenidate (RITALIN) 20 MG tablet; Take 1 tablet by mouth 3 times daily for 30 days. Dispense: 90 tablet; Refill: 0    3. Acute URI  Persistent sx c/w acute sinusitis, with persistent chest congestion and cough  Exam normal  tx with zpack x 1 and over the counter/supportive therapy  Medrol dosepak x 1  - azithromycin (ZITHROMAX Z-LION) 250 MG tablet; Take 1 tablet by mouth daily Sig 2 tabs po QD x 1d, then 1 tab po QD x 4d  Dispense: 6 tablet; Refill: 0    4. Chest congestion  As above  tx with zpack and medrol pack as discussed  Cont over the counter/symptomatic treatment. Follow up for persistent symptoms in 7 to 10 days or sooner for worsening symptomatology     5. Essential hypertension, benign  stable    6. IgA nephropathy  Refer dr. Radha Gonzáles for eval  Creatinine mildly increased at 1.6  Avoid NSAIDS  - AFL(Epic) - Mark Tena MD, Nephrology, [facility]    7. CRI (chronic renal insufficiency), stage 3 (moderate) ([facility])  As above  - AFL(Epic) - Mark Tena MD, Nephrology, [facility]           Follow-up appointment:   3 mos/prn    Discussed use, benefit, and side effects of all prescribed medications. Barriers to medication compliance addressed. All patient questions answered. Pt voiced understanding. When applicable, patient's outside records were reviewed through [facility]. The patient has signed appropriate paperworks/consents. 13-Jan-2022 21:40

## 2022-05-31 ENCOUNTER — INPATIENT (INPATIENT)
Facility: HOSPITAL | Age: 67
LOS: 9 days | Discharge: HOME | DRG: 638 | End: 2022-06-10
Attending: INTERNAL MEDICINE | Admitting: HOSPITALIST
Payer: MEDICAID

## 2022-05-31 VITALS
DIASTOLIC BLOOD PRESSURE: 69 MMHG | TEMPERATURE: 98 F | OXYGEN SATURATION: 99 % | HEART RATE: 118 BPM | RESPIRATION RATE: 20 BRPM | SYSTOLIC BLOOD PRESSURE: 112 MMHG

## 2022-05-31 DIAGNOSIS — E11.10 TYPE 2 DIABETES MELLITUS WITH KETOACIDOSIS WITHOUT COMA: ICD-10-CM

## 2022-05-31 DIAGNOSIS — Z98.89 OTHER SPECIFIED POSTPROCEDURAL STATES: Chronic | ICD-10-CM

## 2022-05-31 LAB
A1C WITH ESTIMATED AVERAGE GLUCOSE RESULT: 13.7 % — HIGH (ref 4–5.6)
ACETONE SERPL-MCNC: ABNORMAL
ALBUMIN SERPL ELPH-MCNC: 2.4 G/DL — LOW (ref 3.3–5.2)
ALP SERPL-CCNC: 433 U/L — HIGH (ref 40–120)
ALT FLD-CCNC: 71 U/L — HIGH
ANION GAP SERPL CALC-SCNC: 27 MMOL/L — HIGH (ref 5–17)
ANISOCYTOSIS BLD QL: SIGNIFICANT CHANGE UP
APTT BLD: 27 SEC — LOW (ref 27.5–35.5)
AST SERPL-CCNC: 144 U/L — HIGH
BASE EXCESS BLDV CALC-SCNC: -10.6 MMOL/L — LOW (ref -2–3)
BASOPHILS # BLD AUTO: 0 K/UL — SIGNIFICANT CHANGE UP (ref 0–0.2)
BASOPHILS NFR BLD AUTO: 0 % — SIGNIFICANT CHANGE UP (ref 0–2)
BILIRUB SERPL-MCNC: 1.6 MG/DL — SIGNIFICANT CHANGE UP (ref 0.4–2)
BLD GP AB SCN SERPL QL: SIGNIFICANT CHANGE UP
BUN SERPL-MCNC: 17.9 MG/DL — SIGNIFICANT CHANGE UP (ref 8–20)
CA-I SERPL-SCNC: 1.18 MMOL/L — SIGNIFICANT CHANGE UP (ref 1.15–1.33)
CALCIUM SERPL-MCNC: 8.5 MG/DL — LOW (ref 8.6–10.2)
CHLORIDE BLDV-SCNC: 90 MMOL/L — LOW (ref 98–107)
CHLORIDE SERPL-SCNC: 81 MMOL/L — LOW (ref 98–107)
CO2 SERPL-SCNC: 13 MMOL/L — LOW (ref 22–29)
CREAT SERPL-MCNC: 0.86 MG/DL — SIGNIFICANT CHANGE UP (ref 0.5–1.3)
EGFR: 75 ML/MIN/1.73M2 — SIGNIFICANT CHANGE UP
EOSINOPHIL # BLD AUTO: 0 K/UL — SIGNIFICANT CHANGE UP (ref 0–0.5)
EOSINOPHIL NFR BLD AUTO: 0 % — SIGNIFICANT CHANGE UP (ref 0–6)
ESTIMATED AVERAGE GLUCOSE: 346 MG/DL — HIGH (ref 68–114)
GAS PNL BLDV: 123 MMOL/L — LOW (ref 136–145)
GAS PNL BLDV: SIGNIFICANT CHANGE UP
GIANT PLATELETS BLD QL SMEAR: PRESENT — SIGNIFICANT CHANGE UP
GLUCOSE BLDC GLUCOMTR-MCNC: 406 MG/DL — HIGH (ref 70–99)
GLUCOSE BLDV-MCNC: 647 MG/DL — CRITICAL HIGH (ref 70–99)
GLUCOSE SERPL-MCNC: 674 MG/DL — CRITICAL HIGH (ref 70–99)
HCO3 BLDV-SCNC: 16 MMOL/L — LOW (ref 22–29)
HCT VFR BLD CALC: 29.1 % — LOW (ref 34.5–45)
HGB BLD-MCNC: 9.8 G/DL — LOW (ref 11.5–15.5)
INR BLD: 1.25 RATIO — HIGH (ref 0.88–1.16)
LACTATE BLDV-MCNC: 3.2 MMOL/L — HIGH (ref 0.5–2)
LACTATE BLDV-MCNC: 3.9 MMOL/L — HIGH (ref 0.5–2)
LYMPHOCYTES # BLD AUTO: 0.34 K/UL — LOW (ref 1–3.3)
LYMPHOCYTES # BLD AUTO: 7.8 % — LOW (ref 13–44)
MACROCYTES BLD QL: SIGNIFICANT CHANGE UP
MAGNESIUM SERPL-MCNC: 2 MG/DL — SIGNIFICANT CHANGE UP (ref 1.6–2.6)
MANUAL SMEAR VERIFICATION: SIGNIFICANT CHANGE UP
MCHC RBC-ENTMCNC: 33.7 GM/DL — SIGNIFICANT CHANGE UP (ref 32–36)
MCHC RBC-ENTMCNC: 34.4 PG — HIGH (ref 27–34)
MCV RBC AUTO: 102.1 FL — HIGH (ref 80–100)
MONOCYTES # BLD AUTO: 0.23 K/UL — SIGNIFICANT CHANGE UP (ref 0–0.9)
MONOCYTES NFR BLD AUTO: 5.2 % — SIGNIFICANT CHANGE UP (ref 2–14)
NEUTROPHILS # BLD AUTO: 3.81 K/UL — SIGNIFICANT CHANGE UP (ref 1.8–7.4)
NEUTROPHILS NFR BLD AUTO: 87 % — HIGH (ref 43–77)
NT-PROBNP SERPL-SCNC: 437 PG/ML — HIGH (ref 0–300)
PCO2 BLDV: 30 MMHG — LOW (ref 39–42)
PH BLDV: 7.32 — SIGNIFICANT CHANGE UP (ref 7.32–7.43)
PHOSPHATE SERPL-MCNC: 2.5 MG/DL — SIGNIFICANT CHANGE UP (ref 2.4–4.7)
PLAT MORPH BLD: NORMAL — SIGNIFICANT CHANGE UP
PLATELET # BLD AUTO: 77 K/UL — LOW (ref 150–400)
PO2 BLDV: 63 MMHG — HIGH (ref 25–45)
POLYCHROMASIA BLD QL SMEAR: SLIGHT — SIGNIFICANT CHANGE UP
POTASSIUM BLDV-SCNC: 5 MMOL/L — SIGNIFICANT CHANGE UP (ref 3.5–5.1)
POTASSIUM SERPL-MCNC: 5 MMOL/L — SIGNIFICANT CHANGE UP (ref 3.5–5.3)
POTASSIUM SERPL-SCNC: 5 MMOL/L — SIGNIFICANT CHANGE UP (ref 3.5–5.3)
PROT SERPL-MCNC: 6.9 G/DL — SIGNIFICANT CHANGE UP (ref 6.6–8.7)
PROTHROM AB SERPL-ACNC: 14.5 SEC — HIGH (ref 10.5–13.4)
RAPID RVP RESULT: SIGNIFICANT CHANGE UP
RBC # BLD: 2.85 M/UL — LOW (ref 3.8–5.2)
RBC # FLD: 16.3 % — HIGH (ref 10.3–14.5)
RBC BLD AUTO: ABNORMAL
SAO2 % BLDV: 92.2 % — SIGNIFICANT CHANGE UP
SARS-COV-2 RNA SPEC QL NAA+PROBE: SIGNIFICANT CHANGE UP
SMUDGE CELLS # BLD: PRESENT — SIGNIFICANT CHANGE UP
SODIUM SERPL-SCNC: 120 MMOL/L — CRITICAL LOW (ref 135–145)
TROPONIN T SERPL-MCNC: <0.01 NG/ML — SIGNIFICANT CHANGE UP (ref 0–0.06)
TSH SERPL-MCNC: 5.3 UIU/ML — HIGH (ref 0.27–4.2)
WBC # BLD: 4.38 K/UL — SIGNIFICANT CHANGE UP (ref 3.8–10.5)
WBC # FLD AUTO: 4.38 K/UL — SIGNIFICANT CHANGE UP (ref 3.8–10.5)

## 2022-05-31 PROCEDURE — 71045 X-RAY EXAM CHEST 1 VIEW: CPT | Mod: 26

## 2022-05-31 PROCEDURE — 99292 CRITICAL CARE ADDL 30 MIN: CPT

## 2022-05-31 PROCEDURE — 93010 ELECTROCARDIOGRAM REPORT: CPT

## 2022-05-31 PROCEDURE — 99291 CRITICAL CARE FIRST HOUR: CPT | Mod: 25

## 2022-05-31 RX ORDER — INSULIN HUMAN 100 [IU]/ML
6 INJECTION, SOLUTION SUBCUTANEOUS ONCE
Refills: 0 | Status: COMPLETED | OUTPATIENT
Start: 2022-05-31 | End: 2022-05-31

## 2022-05-31 RX ORDER — PANTOPRAZOLE SODIUM 20 MG/1
40 TABLET, DELAYED RELEASE ORAL ONCE
Refills: 0 | Status: COMPLETED | OUTPATIENT
Start: 2022-05-31 | End: 2022-05-31

## 2022-05-31 RX ORDER — SODIUM CHLORIDE 9 MG/ML
2000 INJECTION, SOLUTION INTRAVENOUS ONCE
Refills: 0 | Status: COMPLETED | OUTPATIENT
Start: 2022-05-31 | End: 2022-05-31

## 2022-05-31 RX ORDER — THIAMINE MONONITRATE (VIT B1) 100 MG
100 TABLET ORAL DAILY
Refills: 0 | Status: DISCONTINUED | OUTPATIENT
Start: 2022-05-31 | End: 2022-06-10

## 2022-05-31 RX ORDER — INSULIN HUMAN 100 [IU]/ML
6 INJECTION, SOLUTION SUBCUTANEOUS
Qty: 100 | Refills: 0 | Status: DISCONTINUED | OUTPATIENT
Start: 2022-05-31 | End: 2022-06-01

## 2022-05-31 RX ORDER — LACTULOSE 10 G/15ML
10 SOLUTION ORAL EVERY 8 HOURS
Refills: 0 | Status: DISCONTINUED | OUTPATIENT
Start: 2022-05-31 | End: 2022-06-10

## 2022-05-31 RX ORDER — DEXTROSE 50 % IN WATER 50 %
50 SYRINGE (ML) INTRAVENOUS
Refills: 0 | Status: DISCONTINUED | OUTPATIENT
Start: 2022-05-31 | End: 2022-06-10

## 2022-05-31 RX ORDER — FOLIC ACID 0.8 MG
1 TABLET ORAL DAILY
Refills: 0 | Status: DISCONTINUED | OUTPATIENT
Start: 2022-05-31 | End: 2022-06-10

## 2022-05-31 RX ADMIN — INSULIN HUMAN 6 UNIT(S)/HR: 100 INJECTION, SOLUTION SUBCUTANEOUS at 22:33

## 2022-05-31 RX ADMIN — PANTOPRAZOLE SODIUM 40 MILLIGRAM(S): 20 TABLET, DELAYED RELEASE ORAL at 18:55

## 2022-05-31 RX ADMIN — INSULIN HUMAN 6 UNIT(S): 100 INJECTION, SOLUTION SUBCUTANEOUS at 21:01

## 2022-05-31 RX ADMIN — SODIUM CHLORIDE 1000 MILLILITER(S): 9 INJECTION, SOLUTION INTRAVENOUS at 21:01

## 2022-05-31 NOTE — ED PROCEDURE NOTE - ATTENDING CONTRIBUTION TO CARE
I, Adelfo Patel, performed the initial face to face bedside interview with this patient regarding history of present illness, review of symptoms and relevant past medical, social and family history.  I completed an independent physical examination.  I was the initial provider who evaluated this patient. I have signed out the follow up of any pending tests (i.e. labs, radiological studies) to the resident.  I have communicated the patient’s plan of care and disposition with the resident
I, Adelfo Patel, performed the initial face to face bedside interview with this patient regarding history of present illness, review of symptoms and relevant past medical, social and family history.  I completed an independent physical examination.  I was the initial provider who evaluated this patient. I have signed out the follow up of any pending tests (i.e. labs, radiological studies) to the resident.  I have communicated the patient’s plan of care and disposition with the resident

## 2022-05-31 NOTE — ED PROVIDER NOTE - OBJECTIVE STATEMENT
66 year old female with hx of HTN, DM, Anemia, ETOH abuse with liver cirrhosis, esophageal varices, hepatic encephalopathy, hypothyroidism who presents with weakness. For 4 days the patient has had limited PO intake; states she has only been drinking juice. She says she has not been eating because she is not hungry. Also notes having generalized weakness and a rash in her groin. No chest pain, shortness of breath, nausea, vomiting, or diarrhea. Vaccinated for COVID x 4. Never smoker. No history of cancer.

## 2022-05-31 NOTE — H&P ADULT - HISTORY OF PRESENT ILLNESS
67 yo f pmhx HTN, DM2, Anemia, ETOH abuse, ETOH cirrhosis, esophageal varices, hepatic encephalopathy, hypothyroidism presented with  65 yo f pmhx HTN, DM2, Anemia, ETOH abuse, ETOH cirrhosis, esophageal varices, hepatic encephalopathy, hypothyroidism presented with weakness.  Patient with poor po intake for the past few days, only able to partially tolerate juice.  Patient also endorses that taking her medications at home.  Patient labs revealed elevated AG, low serum bicarb, serum acetone, patient started on insulin gtt and given crystalloid ivf. Admit to MICU.

## 2022-05-31 NOTE — ED PROVIDER NOTE - NS ED ROS FT
Constitutional: no fever, no chills, +anorexia   Head: NC, AT   Eyes: no redness   ENMT: no nasal congestion/drainage, no sore throat   CV: no chest pain, no edema  Resp: no cough, no dyspnea  GI: no abdominal pain, no nausea, no vomiting, no diarrhea  : no dysuria, no hematuria, +rash in groin   Skin: no lesions, no rashes   Neuro: no LOC, no headache, no sensory deficits, +weakness

## 2022-05-31 NOTE — ED ADULT NURSE NOTE - OBJECTIVE STATEMENT
66 y female c/o weakness, decreased PO intake for approx 5 days.  also c/o itching and rash to groin.  denies abdominal pain, denies fever, denies chills, denies nausea, denies vomiting, denies numbness, denies tingling, denies vomiting, denies diarrhea.  family at bedside.

## 2022-05-31 NOTE — ED PROVIDER NOTE - ATTENDING CONTRIBUTION TO CARE
The patient seen and examined immediately due to critical conditions     DKA  Dehydration    I, Adelfo Patel, performed the initial face to face bedside interview with this patient regarding history of present illness, review of symptoms and relevant past medical, social and family history.  I completed an independent physical examination.  I was the initial provider who evaluated this patient. I have signed out the follow up of any pending tests (i.e. labs, radiological studies) to the resident.  I have communicated the patient’s plan of care and disposition with the resident.

## 2022-05-31 NOTE — H&P ADULT - ASSESSMENT
1. DKA  2. Thrombocytopenia   65 yo f pmhx HTN, DM2, Anemia, ETOH abuse, ETOH cirrhosis, esophageal varices, hepatic encephalopathy, hypothyroidism    1. DKA  2. Thrombocytopenia    NEURO: No active issues  CV: No active issues  RESP: No active issues  RENAL: Monitor lytes, replace as needed. Hyponatremia 120 corrected to 129  GI: NPO except meds.  ETOH cirrhosis and metabolic encephalopathy, restarted xifaxin and lactulose  ENDO: DKA, insulin gtt, poct q1hr, q6hr bmp, repeat levels for 0200  ID: No active infectious process  HEME: Thrombocytopenia, holding off on vte ppx at this time  DISPO: Full code.

## 2022-05-31 NOTE — H&P ADULT - NSHPPHYSICALEXAM_GEN_ALL_CORE
GENERAL: adult female, lying in bed, nad  HEENT: nc/at  CV: nsr  RESP: Symmetrical thorax expansion upon respiration.  cta b/l  ABD: soft, nontender, nondistended, normoactive bs, no masses appreciated  MSK: wnl muscle bulk  EXT: no edema, nontender  SKIN: warm  NEURO: Alert, interactive

## 2022-05-31 NOTE — ED PROVIDER NOTE - PROGRESS NOTE DETAILS
Chet: Pt warm to touch but rectal temp on arrival of 97.6. Complaining of rash - noted to have white rash to vulva. Rectal temp/gu exam chaperoned by ENZO Florian.

## 2022-05-31 NOTE — ED PROVIDER NOTE - PHYSICAL EXAMINATION
General: warm to touch, elderly female, lying supine   Head: NC, AT  EENT: EOMI, no scleral icterus  Cardiac: tachycardic, no apparent murmurs, no lower extremity edema  Respiratory: CTA anteriorly, no respiratory distress   Abdomen: soft, ND, NT, nonperitonitic  MSK/Vascular: full ROM, soft compartments, warm extremities  Neuro: AAOx3, sensation to light touch intact  Psych: calm, cooperative General: warm to touch, elderly female, lying supine   Head: NC, AT  EENT: EOMI, no scleral icterus  Cardiac: tachycardic, no apparent murmurs, no lower extremity edema  Respiratory: CTA anteriorly, no respiratory distress   Abdomen: soft, ND, NT, nonperitonitic  : CANDIDAL APPEARING RASH to vulva   MSK/Vascular: full ROM, soft compartments, warm extremities  Neuro: AAOx3, sensation to light touch intact  Psych: calm, cooperative

## 2022-05-31 NOTE — H&P ADULT - NS PANP COMMENT GEN_ALL_CORE FT
66-year-old  female with past medical history of essential hypertension uncontrolled type 2 diabetes, anemia, alcohol use disorder with alcohol cirrhosis esophageal varices hypothyroidism presented with decreased p.o. intake and admitted with diabetic ketoacidosis and thrombocytopenia treated with IV insulin infusion with plan for IV bolus with lactated Ringer that infiltrated left upper extremity requiring further ultrasound-guided IV access subsequently IV crystalloid infusion with improvement in blood sugar, metabolic acidosis.  Advance diet as tolerated.  Will transition to basal insulin with sliding scale and Premeal once able to tolerate p.o.

## 2022-05-31 NOTE — ED ADULT NURSE NOTE - NSIMPLEMENTINTERV_GEN_ALL_ED
Implemented All Fall with Harm Risk Interventions:  [place] to call system. Call bell, personal items and telephone within reach. Instruct patient to call for assistance. Room bathroom lighting operational. Non-slip footwear when patient is off stretcher. Physically safe environment: no spills, clutter or unnecessary equipment. Stretcher in lowest position, wheels locked, appropriate side rails in place. Provide visual cue, wrist band, yellow gown, etc. Monitor gait and stability. Monitor for mental status changes and reorient to person, place, and time. Review medications for side effects contributing to fall risk. Reinforce activity limits and safety measures with patient and family. Provide visual clues: red socks.

## 2022-05-31 NOTE — ED PROVIDER NOTE - CLINICAL SUMMARY MEDICAL DECISION MAKING FREE TEXT BOX
66 year old female with hx of HTN, DM, Anemia, ETOH abuse with liver cirrhosis, esophageal varices, hepatic encephalopathy, hypothyroidism who presents with weakness. Will order labs, EKG, CXR

## 2022-06-01 LAB
ALBUMIN SERPL ELPH-MCNC: 2.6 G/DL — LOW (ref 3.3–5.2)
ALP SERPL-CCNC: 354 U/L — HIGH (ref 40–120)
ALT FLD-CCNC: 63 U/L — HIGH
AMMONIA BLD-MCNC: 32 UMOL/L — SIGNIFICANT CHANGE UP (ref 11–55)
ANION GAP SERPL CALC-SCNC: 12 MMOL/L — SIGNIFICANT CHANGE UP (ref 5–17)
ANION GAP SERPL CALC-SCNC: 14 MMOL/L — SIGNIFICANT CHANGE UP (ref 5–17)
APPEARANCE UR: CLEAR — SIGNIFICANT CHANGE UP
AST SERPL-CCNC: 103 U/L — HIGH
BACTERIA # UR AUTO: ABNORMAL
BILIRUB DIRECT SERPL-MCNC: 1.1 MG/DL — HIGH (ref 0–0.3)
BILIRUB INDIRECT FLD-MCNC: 0.8 MG/DL — SIGNIFICANT CHANGE UP (ref 0.2–1)
BILIRUB SERPL-MCNC: 1.9 MG/DL — SIGNIFICANT CHANGE UP (ref 0.4–2)
BILIRUB UR-MCNC: NEGATIVE — SIGNIFICANT CHANGE UP
BUN SERPL-MCNC: 11.8 MG/DL — SIGNIFICANT CHANGE UP (ref 8–20)
BUN SERPL-MCNC: 12.6 MG/DL — SIGNIFICANT CHANGE UP (ref 8–20)
BUN SERPL-MCNC: 13.6 MG/DL — SIGNIFICANT CHANGE UP (ref 8–20)
BUN SERPL-MCNC: 14.6 MG/DL — SIGNIFICANT CHANGE UP (ref 8–20)
CALCIUM SERPL-MCNC: 7.9 MG/DL — LOW (ref 8.6–10.2)
CALCIUM SERPL-MCNC: 8.1 MG/DL — LOW (ref 8.6–10.2)
CALCIUM SERPL-MCNC: 8.6 MG/DL — SIGNIFICANT CHANGE UP (ref 8.6–10.2)
CALCIUM SERPL-MCNC: 8.8 MG/DL — SIGNIFICANT CHANGE UP (ref 8.6–10.2)
CHLORIDE SERPL-SCNC: 89 MMOL/L — LOW (ref 98–107)
CHLORIDE SERPL-SCNC: 95 MMOL/L — LOW (ref 98–107)
CHLORIDE SERPL-SCNC: 95 MMOL/L — LOW (ref 98–107)
CHLORIDE SERPL-SCNC: 96 MMOL/L — LOW (ref 98–107)
CO2 SERPL-SCNC: 22 MMOL/L — SIGNIFICANT CHANGE UP (ref 22–29)
CO2 SERPL-SCNC: 23 MMOL/L — SIGNIFICANT CHANGE UP (ref 22–29)
CO2 SERPL-SCNC: 23 MMOL/L — SIGNIFICANT CHANGE UP (ref 22–29)
CO2 SERPL-SCNC: 24 MMOL/L — SIGNIFICANT CHANGE UP (ref 22–29)
COLOR SPEC: YELLOW — SIGNIFICANT CHANGE UP
CREAT SERPL-MCNC: 0.68 MG/DL — SIGNIFICANT CHANGE UP (ref 0.5–1.3)
CREAT SERPL-MCNC: 0.69 MG/DL — SIGNIFICANT CHANGE UP (ref 0.5–1.3)
CREAT SERPL-MCNC: 0.83 MG/DL — SIGNIFICANT CHANGE UP (ref 0.5–1.3)
CREAT SERPL-MCNC: 0.96 MG/DL — SIGNIFICANT CHANGE UP (ref 0.5–1.3)
DIFF PNL FLD: ABNORMAL
EGFR: 66 ML/MIN/1.73M2 — SIGNIFICANT CHANGE UP
EGFR: 79 ML/MIN/1.73M2 — SIGNIFICANT CHANGE UP
EGFR: 97 ML/MIN/1.73M2 — SIGNIFICANT CHANGE UP
EGFR: 97 ML/MIN/1.73M2 — SIGNIFICANT CHANGE UP
EPI CELLS # UR: SIGNIFICANT CHANGE UP
ETHANOL SERPL-MCNC: <10 MG/DL — SIGNIFICANT CHANGE UP (ref 0–9)
FERRITIN SERPL-MCNC: 254 NG/ML — HIGH (ref 15–150)
GLUCOSE BLDC GLUCOMTR-MCNC: 130 MG/DL — HIGH (ref 70–99)
GLUCOSE BLDC GLUCOMTR-MCNC: 182 MG/DL — HIGH (ref 70–99)
GLUCOSE BLDC GLUCOMTR-MCNC: 214 MG/DL — HIGH (ref 70–99)
GLUCOSE BLDC GLUCOMTR-MCNC: 257 MG/DL — HIGH (ref 70–99)
GLUCOSE BLDC GLUCOMTR-MCNC: 265 MG/DL — HIGH (ref 70–99)
GLUCOSE BLDC GLUCOMTR-MCNC: 319 MG/DL — HIGH (ref 70–99)
GLUCOSE BLDC GLUCOMTR-MCNC: 331 MG/DL — HIGH (ref 70–99)
GLUCOSE BLDC GLUCOMTR-MCNC: 384 MG/DL — HIGH (ref 70–99)
GLUCOSE BLDC GLUCOMTR-MCNC: >530 MG/DL — CRITICAL HIGH (ref 70–99)
GLUCOSE SERPL-MCNC: 168 MG/DL — HIGH (ref 70–99)
GLUCOSE SERPL-MCNC: 264 MG/DL — HIGH (ref 70–99)
GLUCOSE SERPL-MCNC: 312 MG/DL — HIGH (ref 70–99)
GLUCOSE SERPL-MCNC: 607 MG/DL — CRITICAL HIGH (ref 70–99)
GLUCOSE UR QL: 1000 MG/DL
HCT VFR BLD CALC: 29.8 % — LOW (ref 34.5–45)
HGB BLD-MCNC: 10.3 G/DL — LOW (ref 11.5–15.5)
IRON SATN MFR SERPL: 27 % — SIGNIFICANT CHANGE UP (ref 14–50)
IRON SATN MFR SERPL: 61 UG/DL — SIGNIFICANT CHANGE UP (ref 37–145)
KETONES UR-MCNC: NEGATIVE — SIGNIFICANT CHANGE UP
LEUKOCYTE ESTERASE UR-ACNC: ABNORMAL
MAGNESIUM SERPL-MCNC: 1.7 MG/DL — LOW (ref 1.8–2.6)
MAGNESIUM SERPL-MCNC: 2.1 MG/DL — SIGNIFICANT CHANGE UP (ref 1.6–2.6)
MAGNESIUM SERPL-MCNC: 2.4 MG/DL — SIGNIFICANT CHANGE UP (ref 1.6–2.6)
MCHC RBC-ENTMCNC: 33.7 PG — SIGNIFICANT CHANGE UP (ref 27–34)
MCHC RBC-ENTMCNC: 34.6 GM/DL — SIGNIFICANT CHANGE UP (ref 32–36)
MCV RBC AUTO: 97.4 FL — SIGNIFICANT CHANGE UP (ref 80–100)
NITRITE UR-MCNC: NEGATIVE — SIGNIFICANT CHANGE UP
PH UR: 7 — SIGNIFICANT CHANGE UP (ref 5–8)
PHOSPHATE SERPL-MCNC: 0.8 MG/DL — CRITICAL LOW (ref 2.4–4.7)
PHOSPHATE SERPL-MCNC: 2.3 MG/DL — LOW (ref 2.4–4.7)
PHOSPHATE SERPL-MCNC: 3.7 MG/DL — SIGNIFICANT CHANGE UP (ref 2.4–4.7)
PLATELET # BLD AUTO: 80 K/UL — LOW (ref 150–400)
POTASSIUM SERPL-MCNC: 3.2 MMOL/L — LOW (ref 3.5–5.3)
POTASSIUM SERPL-MCNC: 3.8 MMOL/L — SIGNIFICANT CHANGE UP (ref 3.5–5.3)
POTASSIUM SERPL-MCNC: 3.9 MMOL/L — SIGNIFICANT CHANGE UP (ref 3.5–5.3)
POTASSIUM SERPL-MCNC: 4.5 MMOL/L — SIGNIFICANT CHANGE UP (ref 3.5–5.3)
POTASSIUM SERPL-SCNC: 3.2 MMOL/L — LOW (ref 3.5–5.3)
POTASSIUM SERPL-SCNC: 3.8 MMOL/L — SIGNIFICANT CHANGE UP (ref 3.5–5.3)
POTASSIUM SERPL-SCNC: 3.9 MMOL/L — SIGNIFICANT CHANGE UP (ref 3.5–5.3)
POTASSIUM SERPL-SCNC: 4.5 MMOL/L — SIGNIFICANT CHANGE UP (ref 3.5–5.3)
PROT SERPL-MCNC: 6.8 G/DL — SIGNIFICANT CHANGE UP (ref 6.6–8.7)
PROT UR-MCNC: NEGATIVE — SIGNIFICANT CHANGE UP
RBC # BLD: 3.06 M/UL — LOW (ref 3.8–5.2)
RBC # FLD: 16 % — HIGH (ref 10.3–14.5)
RBC CASTS # UR COMP ASSIST: SIGNIFICANT CHANGE UP /HPF (ref 0–4)
SODIUM SERPL-SCNC: 124 MMOL/L — LOW (ref 135–145)
SODIUM SERPL-SCNC: 129 MMOL/L — LOW (ref 135–145)
SODIUM SERPL-SCNC: 132 MMOL/L — LOW (ref 135–145)
SODIUM SERPL-SCNC: 132 MMOL/L — LOW (ref 135–145)
SP GR SPEC: 1 — LOW (ref 1.01–1.02)
T3FREE SERPL-MCNC: 1.12 PG/ML — LOW (ref 1.8–4.6)
T4 FREE SERPL-MCNC: 0.7 NG/DL — LOW (ref 0.9–1.8)
TIBC SERPL-MCNC: 223 UG/DL — SIGNIFICANT CHANGE UP (ref 220–430)
TRANSFERRIN SERPL-MCNC: 156 MG/DL — LOW (ref 192–382)
TSH SERPL-MCNC: 6.28 UIU/ML — HIGH (ref 0.27–4.2)
UROBILINOGEN FLD QL: NEGATIVE MG/DL — SIGNIFICANT CHANGE UP
WBC # BLD: 5 K/UL — SIGNIFICANT CHANGE UP (ref 3.8–10.5)
WBC # FLD AUTO: 5 K/UL — SIGNIFICANT CHANGE UP (ref 3.8–10.5)
WBC UR QL: SIGNIFICANT CHANGE UP /HPF (ref 0–5)

## 2022-06-01 PROCEDURE — 99223 1ST HOSP IP/OBS HIGH 75: CPT

## 2022-06-01 PROCEDURE — 99222 1ST HOSP IP/OBS MODERATE 55: CPT

## 2022-06-01 RX ORDER — INSULIN GLARGINE 100 [IU]/ML
14 INJECTION, SOLUTION SUBCUTANEOUS EVERY MORNING
Refills: 0 | Status: DISCONTINUED | OUTPATIENT
Start: 2022-06-01 | End: 2022-06-02

## 2022-06-01 RX ORDER — LABETALOL HCL 100 MG
100 TABLET ORAL
Refills: 0 | Status: DISCONTINUED | OUTPATIENT
Start: 2022-06-01 | End: 2022-06-04

## 2022-06-01 RX ORDER — LEVOTHYROXINE SODIUM 125 MCG
75 TABLET ORAL DAILY
Refills: 0 | Status: DISCONTINUED | OUTPATIENT
Start: 2022-06-01 | End: 2022-06-02

## 2022-06-01 RX ORDER — INSULIN GLARGINE 100 [IU]/ML
10 INJECTION, SOLUTION SUBCUTANEOUS ONCE
Refills: 0 | Status: COMPLETED | OUTPATIENT
Start: 2022-06-01 | End: 2022-06-01

## 2022-06-01 RX ORDER — FERROUS SULFATE 325(65) MG
325 TABLET ORAL DAILY
Refills: 0 | Status: DISCONTINUED | OUTPATIENT
Start: 2022-06-01 | End: 2022-06-10

## 2022-06-01 RX ORDER — GLUCAGON INJECTION, SOLUTION 0.5 MG/.1ML
1 INJECTION, SOLUTION SUBCUTANEOUS ONCE
Refills: 0 | Status: DISCONTINUED | OUTPATIENT
Start: 2022-06-01 | End: 2022-06-10

## 2022-06-01 RX ORDER — MAGNESIUM SULFATE 500 MG/ML
2 VIAL (ML) INJECTION ONCE
Refills: 0 | Status: COMPLETED | OUTPATIENT
Start: 2022-06-01 | End: 2022-06-01

## 2022-06-01 RX ORDER — FUROSEMIDE 40 MG
40 TABLET ORAL DAILY
Refills: 0 | Status: DISCONTINUED | OUTPATIENT
Start: 2022-06-01 | End: 2022-06-09

## 2022-06-01 RX ORDER — DEXTROSE 50 % IN WATER 50 %
15 SYRINGE (ML) INTRAVENOUS ONCE
Refills: 0 | Status: DISCONTINUED | OUTPATIENT
Start: 2022-06-01 | End: 2022-06-10

## 2022-06-01 RX ORDER — SODIUM CHLORIDE 9 MG/ML
1000 INJECTION, SOLUTION INTRAVENOUS
Refills: 0 | Status: DISCONTINUED | OUTPATIENT
Start: 2022-06-01 | End: 2022-06-10

## 2022-06-01 RX ORDER — POTASSIUM PHOSPHATE, MONOBASIC POTASSIUM PHOSPHATE, DIBASIC 236; 224 MG/ML; MG/ML
30 INJECTION, SOLUTION INTRAVENOUS ONCE
Refills: 0 | Status: COMPLETED | OUTPATIENT
Start: 2022-06-01 | End: 2022-06-01

## 2022-06-01 RX ORDER — PANTOPRAZOLE SODIUM 20 MG/1
40 TABLET, DELAYED RELEASE ORAL
Refills: 0 | Status: DISCONTINUED | OUTPATIENT
Start: 2022-06-01 | End: 2022-06-10

## 2022-06-01 RX ORDER — INSULIN LISPRO 100/ML
VIAL (ML) SUBCUTANEOUS
Refills: 0 | Status: DISCONTINUED | OUTPATIENT
Start: 2022-06-01 | End: 2022-06-02

## 2022-06-01 RX ORDER — SODIUM CHLORIDE 9 MG/ML
1000 INJECTION, SOLUTION INTRAVENOUS
Refills: 0 | Status: DISCONTINUED | OUTPATIENT
Start: 2022-06-01 | End: 2022-06-01

## 2022-06-01 RX ORDER — SACCHAROMYCES BOULARDII 250 MG
250 POWDER IN PACKET (EA) ORAL
Refills: 0 | Status: DISCONTINUED | OUTPATIENT
Start: 2022-06-01 | End: 2022-06-02

## 2022-06-01 RX ADMIN — Medication 25 GRAM(S): at 04:09

## 2022-06-01 RX ADMIN — Medication 4: at 12:09

## 2022-06-01 RX ADMIN — Medication 40 MILLIGRAM(S): at 13:49

## 2022-06-01 RX ADMIN — Medication 6: at 23:33

## 2022-06-01 RX ADMIN — Medication 1: at 08:18

## 2022-06-01 RX ADMIN — LACTULOSE 10 GRAM(S): 10 SOLUTION ORAL at 06:34

## 2022-06-01 RX ADMIN — Medication 325 MILLIGRAM(S): at 13:49

## 2022-06-01 RX ADMIN — Medication 1 MILLIGRAM(S): at 13:49

## 2022-06-01 RX ADMIN — Medication 1 TABLET(S): at 13:49

## 2022-06-01 RX ADMIN — Medication 100 MILLIGRAM(S): at 13:49

## 2022-06-01 RX ADMIN — Medication 5: at 17:22

## 2022-06-01 RX ADMIN — POTASSIUM PHOSPHATE, MONOBASIC POTASSIUM PHOSPHATE, DIBASIC 83.33 MILLIMOLE(S): 236; 224 INJECTION, SOLUTION INTRAVENOUS at 05:55

## 2022-06-01 RX ADMIN — LACTULOSE 10 GRAM(S): 10 SOLUTION ORAL at 13:50

## 2022-06-01 RX ADMIN — INSULIN GLARGINE 14 UNIT(S): 100 INJECTION, SOLUTION SUBCUTANEOUS at 04:08

## 2022-06-01 RX ADMIN — INSULIN GLARGINE 10 UNIT(S): 100 INJECTION, SOLUTION SUBCUTANEOUS at 23:45

## 2022-06-01 RX ADMIN — Medication 250 MILLIGRAM(S): at 17:23

## 2022-06-01 NOTE — CONSULT NOTE ADULT - TIME BILLING
Review of chart documents, labs, imaging. Direct patient assessment,  formulation of care plan. Discussion with  Interdisciplinary  team

## 2022-06-01 NOTE — CONSULT NOTE ADULT - SUBJECTIVE AND OBJECTIVE BOX
HPI:  67 yo f pmhx HTN, DM2, Anemia, ETOH abuse, ETOH cirrhosis, esophageal varices, hepatic encephalopathy, hypothyroidism presented with weakness.  Patient with poor po intake for the past few days, only able to partially tolerate juice.  Patient also endorses that taking her medications at home.  Patient labs revealed elevated AG, low serum bicarb, serum acetone, patient started on insulin gtt and given crystalloid ivf. pt transiiotned off insulin drip and is onbasla bolus     pt states she takes insulin at home but cannot tell me her  type of isnulin or doses       PAST MEDICAL & SURGICAL HISTORY:  Alcohol abuse      Alcohol abuse      Liver cirrhosis      ETOH abuse      Urea cycle metabolism disorder      Transitory  hyperthyroidism      Lump in female breast      UTI (urinary tract infection)      Lymphangitis, acute, lower leg      Anemia      Hypothyroidism      Chronic back pain      HTN (hypertension)      GERD (gastroesophageal reflux disease)      Pancreatitis      HTN (hypertension)      DM (diabetes mellitus)      No significant past surgical history      S/P abdominoplasty          FAMILY HISTORY:  FH: depression (Child)        SOCIAL HISTORY: + Etoh abuse in past   no smoking     REVIEW OF SYSTEMS:    Constitutional: No fever, no chills, no change in weight.    Eyes: No eye swelling,no  blurry vision, no redness, no loss of vision.    Neck: No neck pain, no change in voice.    Lungs: No shortness of breath, no wheezing, no cough    CV: No chest pain, no palpitations, no pain with walking.    GI: No nausea, no vomiting, no constipation, no diarrhea, no abdominal pain    : No urinary frequency, no blood in urine, no urinary burning, no difficulty voiding.    Musculoskeletal: No muscle pain, no joint pain, no swelling.    Skin: No rash, no infections.    Neurologic: No headaches, no weakness, no burning or pain in feet, no tremor.    Endocrine: No heat intolerance, no cold intolerance, no increased sweating, no shakiness between meals.    Psych: No depression, no anxiety, no trouble concentrating    MEDICATIONS  (STANDING):  dextrose 5%. 1000 milliLiter(s) (50 mL/Hr) IV Continuous <Continuous>  dextrose 5%. 1000 milliLiter(s) (100 mL/Hr) IV Continuous <Continuous>  dextrose 50% Injectable 50 milliLiter(s) IV Push every 15 minutes  ferrous    sulfate 325 milliGRAM(s) Oral daily  folic acid 1 milliGRAM(s) Oral daily  furosemide    Tablet 40 milliGRAM(s) Oral daily  glucagon  Injectable 1 milliGRAM(s) IntraMuscular once  insulin glargine Injectable (LANTUS) 14 Unit(s) SubCutaneous every morning  insulin lispro (ADMELOG) corrective regimen sliding scale   SubCutaneous Before meals and at bedtime  labetalol 100 milliGRAM(s) Oral two times a day  lactulose Syrup 10 Gram(s) Oral every 8 hours  levothyroxine 75 MICROGram(s) Oral daily  multivitamin 1 Tablet(s) Oral daily  pantoprazole    Tablet 40 milliGRAM(s) Oral before breakfast  rifAXIMin 550 milliGRAM(s) Oral two times a day  saccharomyces boulardii 250 milliGRAM(s) Oral two times a day  thiamine 100 milliGRAM(s) Oral daily    MEDICATIONS  (PRN):  dextrose Oral Gel 15 Gram(s) Oral once PRN Blood Glucose LESS THAN 70 milliGRAM(s)/deciliter  LORazepam   Injectable 1 milliGRAM(s) IV Push every 1 hour PRN CIWA-Ar score 8 or greater      Allergies    No Known Allergies    Intolerances          CAPILLARY BLOOD GLUCOSE    CAPILLARY BLOOD GLUCOSE        POCT Blood Glucose.: 384 mg/dL (2022 17:19)  POCT Blood Glucose.: 319 mg/dL (2022 11:54)  POCT Blood Glucose.: 182 mg/dL (2022 08:15)  POCT Blood Glucose.: 130 mg/dL (2022 06:00)  POCT Blood Glucose.: 214 mg/dL (2022 04:06)  POCT Blood Glucose.: 257 mg/dL (2022 02:48)  POCT Blood Glucose.: 265 mg/dL (2022 01:42)  POCT Blood Glucose.: 331 mg/dL (2022 00:37)  POCT Blood Glucose.: 406 mg/dL (31 May 2022 23:42)    POCT Blood Glucose.: >530 mg/dL (2022 21:58)      PHYSICAL EXAM:    Vital Signs Last 24 Hrs  T(C): 36.9 (2022 20:40), Max: 37.2 (2022 16:42)  T(F): 98.5 (2022 20:40), Max: 99 (2022 16:42)  HR: 98 (2022 20:40) (68 - 114)  BP: 108/71 (2022 20:40) (100/65 - 141/72)  BP(mean): --  RR: 18 (2022 20:40) (16 - 18)  SpO2: 96% (2022 20:40) (96% - 99%)    General appearance: Well developed, well nourished.    Eyes: Pupils equal and reactive to light. EOM full. No exophthalmos.    Neck: Trachea midline. No thyroid enlargement.    Lungs: Normal respiratory excursion. Lungs clear.    CV: Regular cardiac rhythm. No murmur. Carotid and pedal pulses intact.    Abdomen: Soft, non tender, no organomegaly or mass.    Musculoskeletal: No cyanosis, clubbing, or edema. No pedal lesions.    Skin: Warm and moist. No rash. No acanthosis.    Neuro: Cranial nerves intact. Normal motor and sensory function. DTR's normal.    Psych: Normal affect, good judgement.            LABS:                        10.3   5.00  )-----------( 80       ( 2022 08:14 )             29.8     06-01    129<L>  |  95<L>  |  12.6  ----------------------------<  312<H>  4.5   |  22.0  |  0.69    Ca    8.1<L>      2022 12:02  Phos  3.7     06-  Mg     2.1     06-    TPro  6.8  /  Alb  2.6<L>  /  TBili  1.9  /  DBili  1.1<H>  /  AST  103<H>  /  ALT  63<H>  /  AlkPhos  354<H>  06-      LIVER FUNCTIONS - ( 2022 08:14 )  Alb: 2.6 g/dL / Pro: 6.8 g/dL / ALK PHOS: 354 U/L / ALT: 63 U/L / AST: 103 U/L / GGT: x                 CAPILLARY BLOOD GLUCOSE      RADIOLOGY & ADDITIONAL STUDIES:

## 2022-06-01 NOTE — ED ADULT NURSE REASSESSMENT NOTE - NS ED NURSE REASSESS COMMENT FT1
pt with no complaints, meal tray provided and insulin coverage given as ordered. pt awake alert and oriented, VSS, offers no complaints.

## 2022-06-01 NOTE — CONSULT NOTE ADULT - ASSESSMENT
Patient is a 66 year old female with PMH of HTN, DM, Anemia, ETOH abuse, ETOH cirrhosis decompensated by esophageal varices and hepatic encephalopathy, hypothyroidism, Anemia, Pancreatitis who presented to the ED with weakness and found to be in DKA.    1. DKA  -downgrade to the hospitalist service  -sugars improved; gap closed  -continue lantus 14 units  -add premeal insulin  -continue ISS  -ADA diet  -Compliance discussed     2. Anemia likely of chronic disease  -Hb stable  -check iron studies, B12, folate  -monitor CBC    3. Alcoholic Cirrhosis with history of HE and varices  -not      DVT ppx -     Medication reconciliation not completed. [facility] is currently closed; will try to contact this afternoon. Patient is a 66 year old female with PMH of HTN, DM, Anemia, ETOH abuse, ETOH cirrhosis decompensated by esophageal varices and hepatic encephalopathy, hypothyroidism, Anemia, Pancreatitis who presented to the ED with weakness and found to be in DKA.    1. DKA  -downgrade to the hospitalist service  -HbA1c 13.7  -sugars improved; gap closed  -continue lantus 14 units  -add premeal insulin  -continue ISS  -ADA diet  -nutrition evaluation  -diabetic educator consult  -Compliance discussed   -endo consulted    2. Anemia likely of chronic disease  -Hb stable  -check iron studies, B12, folate  -continue ferrous sulfate  -monitor CBC    3. Alcoholic Cirrhosis with history of HE and varices  -no evidence of hepatic encephalopathy - continue Rifaximin and lactulose  -no significant ascites; continue lasix  -history of varices, continue beta-blocker  -monitor I/Os  -last drink 1 month ago; cessation discussed  -outpatient follow up with Dr. Craven     4. HTN  -BP stable  -continue lasix and labatelol  -low sodium diet    5. Alcohol Abuse  -denies drinking in the past 1 month  -continue MVI/folic acid/thiamine    6. GERD  -continue PPI    7. Hypothyroidism  -check TSH  -continue synthroid    8. Thrombocytopenia  -due to cirrhosis  -platelet counts stable  -monitor closely    9. Hypomagnesemia  -repleted  -repeat BMP    10. Hypokalemia and Hypophosphatemia  -due to poor PO intake  -phos initially 2.5, now 0.8  -repeat BMP after supplemented    DVT ppx - SCDs    Medication reconciliation not completed. [facility] is currently closed; will try to contact this afternoon.

## 2022-06-01 NOTE — ED ADULT NURSE REASSESSMENT NOTE - NS ED NURSE REASSESS COMMENT FT1
pt received from off going RN a this time, pt is awake and alert, IV site patent with K+ Phos infusing on IV pump as ordered. pt showing NSR on monitor with stable vital signs. pt aware of need for admission, even and unlabored resps present. awaiting bed assignment at this time

## 2022-06-01 NOTE — CONSULT NOTE ADULT - ASSESSMENT
T1DM  s/p DKA     Pt BS ok during day but now at bedtime above 530 - not sure of what her  dose is at home-- iwll ass extra 10 unit Lantus now    and call floor  T1DM  s/p DKA     Pt BS ok during day but now at bedtime above 530 - not sure of what her  dose is at home-- iwll ass extra 10 unit Lantus now   and 8 Admelog   if AG is npot opened again - if reopened  needs inuslin drip again   shadia RN  she will dw PA as well     Hypothyroid    Cont RX

## 2022-06-01 NOTE — CONSULT NOTE ADULT - SUBJECTIVE AND OBJECTIVE BOX
CHIEF COMPLAINT/INTERVAL HISTORY:  Steph.     Patient is a 66y old  Female who presents with a chief complaint of DKA (31 May 2022 23:20)    HPI: Patient is a 66 year old female with PMH of HTN, DM, Anemia, ETOH abuse, ETOH cirrhosis decompensated by esophageal varices and hepatic encephalopathy, hypothyroidism, Anemia, Pancreatitis who presented to the ED with weakness. Patient is a very poor historian with poor medical insight and multiple previous hospitalization. Patient reports being compliant with all her medications and states her son usually gives her insulin twice a day before and after he gets home from work. In the ED, patient found to have hyperglycemia with elevated anion gap, low serum bicarb, positive serum acetone therefore patient was admitted to MICU. Sugars improved while on insulin gtt and eventually patient was titrated off the drip and started on lantus. Patient is now stable for downgrade to the hospitalist service.      SUBJECTIVE & OBJECTIVE: Pt seen and examined at bedside. No overnight events. Titrated off insulin gtt overnight; sugars improved. AAO x 4, but with very poor insight. Complaining of leg itchiness but no visual rashes or dry skin.     PMH: HTN, DM, Anemia, ETOH Abuse, Alcoholic Cirrhosis (complicated by esophageal, hepatic encephalopathy), Anemia, Pancreatitis, and Hypothyroidism  PSH: Abdominoplasty   Social: Denies current tobacco or alcohol use (last drink 1 month ago)  Fam Hx: Non-contributory    ROS: No chest pain, palpitations, SOB, light headedness, dizziness, headache, nausea/vomiting, fevers/chills, abdominal pain, dysuria or increased urinary frequency.    ICU Vital Signs Last 24 Hrs  T(C): 36.8 (01 Jun 2022 04:35), Max: 36.8 (01 Jun 2022 04:35)  T(F): 98.3 (01 Jun 2022 04:35), Max: 98.3 (01 Jun 2022 04:35)  HR: 84 (01 Jun 2022 07:15) (68 - 118)  BP: 122/78 (01 Jun 2022 07:15) (112/69 - 141/72)  RR: 16 (01 Jun 2022 07:15) (16 - 20)  SpO2: 98% (01 Jun 2022 07:15) (98% - 99%)    MEDICATIONS  (STANDING):  dextrose 5%. 1000 milliLiter(s) (50 mL/Hr) IV Continuous <Continuous>  dextrose 5%. 1000 milliLiter(s) (100 mL/Hr) IV Continuous <Continuous>  dextrose 50% Injectable 50 milliLiter(s) IV Push every 15 minutes  ferrous    sulfate 325 milliGRAM(s) Oral daily  folic acid 1 milliGRAM(s) Oral daily  furosemide    Tablet 40 milliGRAM(s) Oral daily  glucagon  Injectable 1 milliGRAM(s) IntraMuscular once  insulin glargine Injectable (LANTUS) 14 Unit(s) SubCutaneous every morning  insulin lispro (ADMELOG) corrective regimen sliding scale   SubCutaneous Before meals and at bedtime  labetalol 100 milliGRAM(s) Oral two times a day  lactulose Syrup 10 Gram(s) Oral every 8 hours  levothyroxine 75 MICROGram(s) Oral daily  multivitamin 1 Tablet(s) Oral daily  pantoprazole    Tablet 40 milliGRAM(s) Oral before breakfast  rifAXIMin 550 milliGRAM(s) Oral two times a day  saccharomyces boulardii 250 milliGRAM(s) Oral two times a day  thiamine 100 milliGRAM(s) Oral daily    MEDICATIONS  (PRN):  dextrose Oral Gel 15 Gram(s) Oral once PRN Blood Glucose LESS THAN 70 milliGRAM(s)/deciliter  LORazepam   Injectable 1 milliGRAM(s) IV Push every 1 hour PRN CIWA-Ar score 8 or greater      LABS:                        9.8    4.38  )-----------( 77       ( 31 May 2022 18:42 )             29.1     06-01    132<L>  |  96<L>  |  13.6  ----------------------------<  264<H>  3.2<L>   |  24.0  |  0.83    Ca    8.6      01 Jun 2022 02:51  Phos  0.8     06-01  Mg     1.7     06-01    TPro  6.9  /  Alb  2.4<L>  /  TBili  1.6  /  DBili  x   /  AST  144<H>  /  ALT  71<H>  /  AlkPhos  433<H>  05-31    PT/INR - ( 31 May 2022 18:42 )   PT: 14.5 sec;   INR: 1.25 ratio         PTT - ( 31 May 2022 18:42 )  PTT:27.0 sec      CAPILLARY BLOOD GLUCOSE      POCT Blood Glucose.: 130 mg/dL (01 Jun 2022 06:00)  POCT Blood Glucose.: 214 mg/dL (01 Jun 2022 04:06)  POCT Blood Glucose.: 257 mg/dL (01 Jun 2022 02:48)  POCT Blood Glucose.: 265 mg/dL (01 Jun 2022 01:42)  POCT Blood Glucose.: 331 mg/dL (01 Jun 2022 00:37)  POCT Blood Glucose.: 406 mg/dL (31 May 2022 23:42)  POCT Blood Glucose.: >530 mg/dL (31 May 2022 20:59)    PHYSICAL EXAM:    GENERAL: elderly female, sitting in bed, NAD  HEAD:  Atraumatic, Normocephalic  EYES: EOMI, PERRLA, conjunctiva and sclera clear  ENMT: Moist mucous membranes  NECK: Supple   NERVOUS SYSTEM:  Alert & Oriented X3, Motor Strength 5/5 B/L upper and lower extremities   CHEST/LUNG: bilateral air entry, coarse breath sounds  HEART: Regular rate and rhythm; + S1/S2  ABDOMEN: Soft, Nontender, obese; Bowel sounds present  EXTREMITIES:  no pedal edema, no arm edema  SKIN: skin in tact, no rashes

## 2022-06-01 NOTE — CONSULT NOTE ADULT - SUBJECTIVE AND OBJECTIVE BOX
HPI:  67 yo f pmhx HTN, DM2, Anemia, ETOH abuse, ETOH cirrhosis, esophageal varices, hepatic encephalopathy, hypothyroidism presented with weakness.  Patient with poor po intake for the past few days, only able to partially tolerate juice.  Patient also endorses that taking her medications at home.  Patient labs revealed elevated AG, low serum bicarb, serum acetone, patient started on insulin gtt and given crystalloid ivf. Admit to MICU.   (31 May 2022 23:20)    Via  ID # [id number]  Patient reports feeling weak and couldn't walk.  States generally compliant with meds but admits occasionally miss a dose of meds.  Last ETOH 1 month ago.  She states she Lives with sister and son and is independent of ADLS      PERTINENT PMH REVIEWED: Yes     PAST MEDICAL & SURGICAL HISTORY:  Alcohol abuse      Alcohol abuse      Liver cirrhosis      ETOH abuse      Urea cycle metabolism disorder      Transitory  hyperthyroidism      Lump in female breast      UTI (urinary tract infection)      Lymphangitis, acute, lower leg      Anemia      Hypothyroidism      Chronic back pain      HTN (hypertension)      GERD (gastroesophageal reflux disease)      Pancreatitis      HTN (hypertension)      DM (diabetes mellitus)      No significant past surgical history      S/P abdominoplasty          SOCIAL HISTORY:                      Substance history: + Etoh, no Tob, drugs                    Admitted from:  home                     Amish/spirituality:                     Cultural concerns:                      Surrogate/HCP/Guardian: Phone#: son Abiodun listed    FAMILY HISTORY:  FH: depression (Child)        Allergies    No Known Allergies    Intolerances        ADVANCE DIRECTIVES/TREATMENT PREFERENCES:  Full code, all aggressive measures desired     Baseline ADLs (prior to admission):  Independent    [email or web address]    Present Symptoms:   Dyspnea:  No   Nausea/Vomiting:  No    Anxiety:   No   Depression: No   Fatigue: Yes   Loss of Appetite No   Constipation:  No    Pain:  No              Character-            Duration-            Factors-            Severity    Pain AD Score:  [email or web address] (press ctrl + left click to view)    Review of Systems: Reviewed as per HPI     All other ROS negative    MEDICATIONS  (STANDING):  dextrose 5%. 1000 milliLiter(s) (50 mL/Hr) IV Continuous <Continuous>  dextrose 5%. 1000 milliLiter(s) (100 mL/Hr) IV Continuous <Continuous>  dextrose 50% Injectable 50 milliLiter(s) IV Push every 15 minutes  ferrous    sulfate 325 milliGRAM(s) Oral daily  folic acid 1 milliGRAM(s) Oral daily  furosemide    Tablet 40 milliGRAM(s) Oral daily  glucagon  Injectable 1 milliGRAM(s) IntraMuscular once  insulin glargine Injectable (LANTUS) 14 Unit(s) SubCutaneous every morning  insulin lispro (ADMELOG) corrective regimen sliding scale   SubCutaneous Before meals and at bedtime  labetalol 100 milliGRAM(s) Oral two times a day  lactulose Syrup 10 Gram(s) Oral every 8 hours  levothyroxine 75 MICROGram(s) Oral daily  multivitamin 1 Tablet(s) Oral daily  pantoprazole    Tablet 40 milliGRAM(s) Oral before breakfast  rifAXIMin 550 milliGRAM(s) Oral two times a day  saccharomyces boulardii 250 milliGRAM(s) Oral two times a day  thiamine 100 milliGRAM(s) Oral daily    MEDICATIONS  (PRN):  dextrose Oral Gel 15 Gram(s) Oral once PRN Blood Glucose LESS THAN 70 milliGRAM(s)/deciliter  LORazepam   Injectable 1 milliGRAM(s) IV Push every 1 hour PRN CIWA-Ar score 8 or greater      PHYSICAL EXAM:    Vital Signs Last 24 Hrs  T(C): 36.8 (2022 04:35), Max: 36.8 (2022 04:35)  T(F): 98.3 (2022 04:35), Max: 98.3 (2022 04:35)  HR: 84 (2022 07:15) (68 - 118)  BP: 122/78 (2022 07:15) (112/69 - 141/72)  BP(mean): --  RR: 16 (2022 07:15) (16 - 20)  SpO2: 98% (2022 07:15) (98% - 99%)    Karnofsky  50   %  General:  F Awake NAD  HEENT: NCAT   non icteric  Lungs: comfortable  CV: RR  GI: soft NTND        : normal  MSK: moves all extremities  Neuro: AOx3  Skin: warm dry    LABS:                        10.3   5.00  )-----------( 80       ( 2022 08:14 )             29.8     06-01    132<L>  |  95<L>  |  11.8  ----------------------------<  168<H>  3.8   |  23.0  |  0.68    Ca    8.8      2022 08:14  Phos  2.3       Mg     2.4         TPro  6.8  /  Alb  2.6<L>  /  TBili  1.9  /  DBili  1.1<H>  /  AST  103<H>  /  ALT  63<H>  /  AlkPhos  354<H>      PT/INR - ( 31 May 2022 18:42 )   PT: 14.5 sec;   INR: 1.25 ratio         PTT - ( 31 May 2022 18:42 )  PTT:27.0 sec    I&O's Summary      RADIOLOGY & ADDITIONAL STUDIES:         HPI:  67 yo f pmhx HTN, DM2, Anemia, ETOH abuse, ETOH cirrhosis, esophageal varices, hepatic encephalopathy, hypothyroidism presented with weakness.  Patient with poor po intake for the past few days, only able to partially tolerate juice.  Patient also endorses that taking her medications at home.  Patient labs revealed elevated AG, low serum bicarb, serum acetone, patient started on insulin gtt and given crystalloid ivf. Admit to MICU.   (31 May 2022 23:20)    Via  ID # [id number]  Patient reports feeling weak and couldn't walk.  States generally compliant with meds but admits occasionally miss a dose of meds.  Last ETOH 1 month ago.  She states she Lives with sister and son and is independent of ADLS      PERTINENT PMH REVIEWED: Yes     PAST MEDICAL & SURGICAL HISTORY:  Alcohol abuse      Alcohol abuse      Liver cirrhosis      ETOH abuse      Urea cycle metabolism disorder      Transitory  hyperthyroidism      Lump in female breast      UTI (urinary tract infection)      Lymphangitis, acute, lower leg      Anemia      Hypothyroidism      Chronic back pain      HTN (hypertension)      GERD (gastroesophageal reflux disease)      Pancreatitis      HTN (hypertension)      DM (diabetes mellitus)      No significant past surgical history      S/P abdominoplasty          SOCIAL HISTORY:                      Substance history: + Etoh, denies Tobacco, drugs                    Admitted from:  home                     Orthodox/spirituality:                     Cultural concerns:                      Surrogate/HCP/Guardian: Phone#: son Abiodun listed    FAMILY HISTORY:  FH: depression (Child)        Allergies    No Known Allergies    Intolerances        ADVANCE DIRECTIVES/TREATMENT PREFERENCES:  Full code, all aggressive measures desired     Baseline ADLs (prior to admission):  Independent    [email or web address]    Present Symptoms:   Dyspnea:  No   Nausea/Vomiting:  No    Anxiety:   No   Depression: No   Fatigue: Yes   Loss of Appetite No   Constipation:  No    Pain:  No              Character-            Duration-            Factors-            Severity    Pain AD Score:  [email or web address] (press ctrl + left click to view)    Review of Systems: Reviewed as per HPI     All other ROS negative    MEDICATIONS  (STANDING):  dextrose 5%. 1000 milliLiter(s) (50 mL/Hr) IV Continuous <Continuous>  dextrose 5%. 1000 milliLiter(s) (100 mL/Hr) IV Continuous <Continuous>  dextrose 50% Injectable 50 milliLiter(s) IV Push every 15 minutes  ferrous    sulfate 325 milliGRAM(s) Oral daily  folic acid 1 milliGRAM(s) Oral daily  furosemide    Tablet 40 milliGRAM(s) Oral daily  glucagon  Injectable 1 milliGRAM(s) IntraMuscular once  insulin glargine Injectable (LANTUS) 14 Unit(s) SubCutaneous every morning  insulin lispro (ADMELOG) corrective regimen sliding scale   SubCutaneous Before meals and at bedtime  labetalol 100 milliGRAM(s) Oral two times a day  lactulose Syrup 10 Gram(s) Oral every 8 hours  levothyroxine 75 MICROGram(s) Oral daily  multivitamin 1 Tablet(s) Oral daily  pantoprazole    Tablet 40 milliGRAM(s) Oral before breakfast  rifAXIMin 550 milliGRAM(s) Oral two times a day  saccharomyces boulardii 250 milliGRAM(s) Oral two times a day  thiamine 100 milliGRAM(s) Oral daily    MEDICATIONS  (PRN):  dextrose Oral Gel 15 Gram(s) Oral once PRN Blood Glucose LESS THAN 70 milliGRAM(s)/deciliter  LORazepam   Injectable 1 milliGRAM(s) IV Push every 1 hour PRN CIWA-Ar score 8 or greater      PHYSICAL EXAM:    Vital Signs Last 24 Hrs  T(C): 36.8 (2022 04:35), Max: 36.8 (2022 04:35)  T(F): 98.3 (2022 04:35), Max: 98.3 (2022 04:35)  HR: 84 (2022 07:15) (68 - 118)  BP: 122/78 (2022 07:15) (112/69 - 141/72)  BP(mean): --  RR: 16 (2022 07:15) (16 - 20)  SpO2: 98% (2022 07:15) (98% - 99%)    Karnofsky  50   %  General:  F Awake NAD  HEENT: NCAT   non icteric  Lungs: comfortable  CV: RR  GI: soft NTND        : normal  MSK: moves all extremities  Neuro: AOx3  Skin: warm dry    LABS:                        10.3   5.00  )-----------( 80       ( 2022 08:14 )             29.8     06-01    132<L>  |  95<L>  |  11.8  ----------------------------<  168<H>  3.8   |  23.0  |  0.68    Ca    8.8      2022 08:14  Phos  2.3       Mg     2.4         TPro  6.8  /  Alb  2.6<L>  /  TBili  1.9  /  DBili  1.1<H>  /  AST  103<H>  /  ALT  63<H>  /  AlkPhos  354<H>      PT/INR - ( 31 May 2022 18:42 )   PT: 14.5 sec;   INR: 1.25 ratio         PTT - ( 31 May 2022 18:42 )  PTT:27.0 sec    I&O's Summary      RADIOLOGY & ADDITIONAL STUDIES:

## 2022-06-01 NOTE — CONSULT NOTE ADULT - ASSESSMENT
66yr woman  PMH of HTN,  ETOH abuse, ETOH cirrhosis decompensated by esophageal varices and hepatic encephalopathy, DM  Anemia, Pancreatitis admitted with DKA    DKA  s/p LR  management per primary team - OTONIEL Vyas    ETOH abuse  States last drink 1month ago  Etoh cessation  cont thiamine folic acid    Cirrhosis  hx of HE, varices          66yr woman  PMH of HTN,  ETOH abuse, ETOH cirrhosis decompensated by esophageal varices and hepatic encephalopathy, DM  Anemia, Pancreatitis admitted with DKA    DKA  s/p LR  management per primary team - OTONIEL Vyas    ETOH abuse  States last drink 1month ago  Etoh cessation  cont thiamine folic acid    Cirrhosis  hx of HE, varices   outpatient follow up     Encounter for Palliative Care  Palliative Care consulted to assist with goals of care  Patient known to our service from previous admission in June 2022.   At the time, patient/family          66yr woman  PMH of HTN,  ETOH abuse, ETOH cirrhosis decompensated by esophageal varices and hepatic encephalopathy, DM  Anemia, Pancreatitis admitted with DKA    DKA  s/p LR  management per primary team - OTONIEL Vyas    ETOH abuse  States last drink 1month ago  Etoh cessation  cont thiamine folic acid    Cirrhosis  hx of HE, varices   outpatient follow up     Encounter for Palliative Care  Palliative Care consulted to assist with goals of care  Patient known to our service from previous admission in June 2022.   At the time, patient/family planned to follow up as outpatient with her GI and pursue transplant  Met with patient using  - [id number]  She was anxious to know when she would be discharged as she is now feeling well. She states that in the past when her blood sugar was high, she would just go to her doctor's office whereupon she would " get a shot and go".   Tried to explain circumstances regarding current hospitalization     Patient appears to have limited insight into her condition and will need further education.   She gives permission to speak to her son Abiodun and her sister, Nicki     Patient with not  and has 5 children.  Plan to complete HCP form.   Palliative Care to follow

## 2022-06-01 NOTE — ED ADULT NURSE REASSESSMENT NOTE - NS ED NURSE REASSESS COMMENT FT1
recvd pt from B4L, Pt in no apparent distress at this time. Airway patent, breathing spontaneous and nonlabored. Pt A&Ox3 resting in stretcher.  ,

## 2022-06-01 NOTE — PROVIDER CONTACT NOTE (CRITICAL VALUE NOTIFICATION) - ACTION/TREATMENT ORDERED:
This RN instructed to give 6units of admelog with the 607 blood glucose result. 10 units of Lantus will be ordered and to be administered with the 607 blood glucose result This RN instructed to give 6units of admelog with the 607 blood glucose result. 10 units of Lantus will be ordered and to be administered for the 607 blood glucose result

## 2022-06-02 LAB
ALBUMIN SERPL ELPH-MCNC: 2.5 G/DL — LOW (ref 3.3–5.2)
ALBUMIN SERPL ELPH-MCNC: 2.7 G/DL — LOW (ref 3.3–5.2)
ALP SERPL-CCNC: 442 U/L — HIGH (ref 40–120)
ALP SERPL-CCNC: 482 U/L — HIGH (ref 40–120)
ALT FLD-CCNC: 74 U/L — HIGH
ALT FLD-CCNC: 83 U/L — HIGH
ANION GAP SERPL CALC-SCNC: 14 MMOL/L — SIGNIFICANT CHANGE UP (ref 5–17)
ANION GAP SERPL CALC-SCNC: 15 MMOL/L — SIGNIFICANT CHANGE UP (ref 5–17)
ANION GAP SERPL CALC-SCNC: 15 MMOL/L — SIGNIFICANT CHANGE UP (ref 5–17)
AST SERPL-CCNC: 181 U/L — HIGH
AST SERPL-CCNC: 222 U/L — HIGH
BILIRUB SERPL-MCNC: 1.7 MG/DL — SIGNIFICANT CHANGE UP (ref 0.4–2)
BILIRUB SERPL-MCNC: 1.8 MG/DL — SIGNIFICANT CHANGE UP (ref 0.4–2)
BUN SERPL-MCNC: 10.6 MG/DL — SIGNIFICANT CHANGE UP (ref 8–20)
BUN SERPL-MCNC: 11.4 MG/DL — SIGNIFICANT CHANGE UP (ref 8–20)
BUN SERPL-MCNC: 12.3 MG/DL — SIGNIFICANT CHANGE UP (ref 8–20)
CALCIUM SERPL-MCNC: 7.7 MG/DL — LOW (ref 8.6–10.2)
CALCIUM SERPL-MCNC: 8.2 MG/DL — LOW (ref 8.6–10.2)
CALCIUM SERPL-MCNC: 8.4 MG/DL — LOW (ref 8.6–10.2)
CHLORIDE SERPL-SCNC: 90 MMOL/L — LOW (ref 98–107)
CHLORIDE SERPL-SCNC: 90 MMOL/L — LOW (ref 98–107)
CHLORIDE SERPL-SCNC: 91 MMOL/L — LOW (ref 98–107)
CO2 SERPL-SCNC: 18 MMOL/L — LOW (ref 22–29)
CO2 SERPL-SCNC: 23 MMOL/L — SIGNIFICANT CHANGE UP (ref 22–29)
CO2 SERPL-SCNC: 23 MMOL/L — SIGNIFICANT CHANGE UP (ref 22–29)
CREAT SERPL-MCNC: 0.77 MG/DL — SIGNIFICANT CHANGE UP (ref 0.5–1.3)
CREAT SERPL-MCNC: 0.82 MG/DL — SIGNIFICANT CHANGE UP (ref 0.5–1.3)
CREAT SERPL-MCNC: 0.99 MG/DL — SIGNIFICANT CHANGE UP (ref 0.5–1.3)
EGFR: 64 ML/MIN/1.73M2 — SIGNIFICANT CHANGE UP
EGFR: 80 ML/MIN/1.73M2 — SIGNIFICANT CHANGE UP
EGFR: 86 ML/MIN/1.73M2 — SIGNIFICANT CHANGE UP
GLUCOSE BLDC GLUCOMTR-MCNC: 357 MG/DL — HIGH (ref 70–99)
GLUCOSE BLDC GLUCOMTR-MCNC: 365 MG/DL — HIGH (ref 70–99)
GLUCOSE BLDC GLUCOMTR-MCNC: 394 MG/DL — HIGH (ref 70–99)
GLUCOSE BLDC GLUCOMTR-MCNC: 404 MG/DL — HIGH (ref 70–99)
GLUCOSE BLDC GLUCOMTR-MCNC: 422 MG/DL — HIGH (ref 70–99)
GLUCOSE BLDC GLUCOMTR-MCNC: 423 MG/DL — HIGH (ref 70–99)
GLUCOSE BLDC GLUCOMTR-MCNC: 516 MG/DL — CRITICAL HIGH (ref 70–99)
GLUCOSE SERPL-MCNC: 420 MG/DL — HIGH (ref 70–99)
GLUCOSE SERPL-MCNC: 427 MG/DL — HIGH (ref 70–99)
GLUCOSE SERPL-MCNC: 450 MG/DL — CRITICAL HIGH (ref 70–99)
MAGNESIUM SERPL-MCNC: 1.6 MG/DL — SIGNIFICANT CHANGE UP (ref 1.6–2.6)
PHOSPHATE SERPL-MCNC: 2.1 MG/DL — LOW (ref 2.4–4.7)
POTASSIUM SERPL-MCNC: 3.5 MMOL/L — SIGNIFICANT CHANGE UP (ref 3.5–5.3)
POTASSIUM SERPL-MCNC: 3.8 MMOL/L — SIGNIFICANT CHANGE UP (ref 3.5–5.3)
POTASSIUM SERPL-MCNC: 3.9 MMOL/L — SIGNIFICANT CHANGE UP (ref 3.5–5.3)
POTASSIUM SERPL-SCNC: 3.5 MMOL/L — SIGNIFICANT CHANGE UP (ref 3.5–5.3)
POTASSIUM SERPL-SCNC: 3.8 MMOL/L — SIGNIFICANT CHANGE UP (ref 3.5–5.3)
POTASSIUM SERPL-SCNC: 3.9 MMOL/L — SIGNIFICANT CHANGE UP (ref 3.5–5.3)
PROT SERPL-MCNC: 7.2 G/DL — SIGNIFICANT CHANGE UP (ref 6.6–8.7)
PROT SERPL-MCNC: 7.4 G/DL — SIGNIFICANT CHANGE UP (ref 6.6–8.7)
SODIUM SERPL-SCNC: 123 MMOL/L — LOW (ref 135–145)
SODIUM SERPL-SCNC: 127 MMOL/L — LOW (ref 135–145)
SODIUM SERPL-SCNC: 128 MMOL/L — LOW (ref 135–145)
TSH SERPL-MCNC: 8.18 UIU/ML — HIGH (ref 0.27–4.2)

## 2022-06-02 PROCEDURE — 99233 SBSQ HOSP IP/OBS HIGH 50: CPT

## 2022-06-02 PROCEDURE — 99232 SBSQ HOSP IP/OBS MODERATE 35: CPT

## 2022-06-02 RX ORDER — INSULIN LISPRO 100/ML
VIAL (ML) SUBCUTANEOUS
Refills: 0 | Status: DISCONTINUED | OUTPATIENT
Start: 2022-06-02 | End: 2022-06-08

## 2022-06-02 RX ORDER — INSULIN LISPRO 100/ML
VIAL (ML) SUBCUTANEOUS
Refills: 0 | Status: DISCONTINUED | OUTPATIENT
Start: 2022-06-02 | End: 2022-06-02

## 2022-06-02 RX ORDER — MAGNESIUM SULFATE 500 MG/ML
2 VIAL (ML) INJECTION ONCE
Refills: 0 | Status: COMPLETED | OUTPATIENT
Start: 2022-06-02 | End: 2022-06-02

## 2022-06-02 RX ORDER — INSULIN LISPRO 100/ML
4 VIAL (ML) SUBCUTANEOUS ONCE
Refills: 0 | Status: COMPLETED | OUTPATIENT
Start: 2022-06-02 | End: 2022-06-02

## 2022-06-02 RX ORDER — INSULIN LISPRO 100/ML
6 VIAL (ML) SUBCUTANEOUS
Refills: 0 | Status: DISCONTINUED | OUTPATIENT
Start: 2022-06-02 | End: 2022-06-03

## 2022-06-02 RX ORDER — SODIUM,POTASSIUM PHOSPHATES 278-250MG
1 POWDER IN PACKET (EA) ORAL ONCE
Refills: 0 | Status: COMPLETED | OUTPATIENT
Start: 2022-06-02 | End: 2022-06-02

## 2022-06-02 RX ORDER — LEVOTHYROXINE SODIUM 125 MCG
88 TABLET ORAL DAILY
Refills: 0 | Status: DISCONTINUED | OUTPATIENT
Start: 2022-06-02 | End: 2022-06-10

## 2022-06-02 RX ORDER — POTASSIUM CHLORIDE 20 MEQ
40 PACKET (EA) ORAL ONCE
Refills: 0 | Status: COMPLETED | OUTPATIENT
Start: 2022-06-02 | End: 2022-06-02

## 2022-06-02 RX ORDER — INSULIN GLARGINE 100 [IU]/ML
20 INJECTION, SOLUTION SUBCUTANEOUS AT BEDTIME
Refills: 0 | Status: DISCONTINUED | OUTPATIENT
Start: 2022-06-02 | End: 2022-06-03

## 2022-06-02 RX ADMIN — LACTULOSE 10 GRAM(S): 10 SOLUTION ORAL at 21:38

## 2022-06-02 RX ADMIN — Medication 1 TABLET(S): at 08:18

## 2022-06-02 RX ADMIN — Medication 100 MILLIGRAM(S): at 08:18

## 2022-06-02 RX ADMIN — Medication 1 MILLIGRAM(S): at 08:18

## 2022-06-02 RX ADMIN — Medication 4 UNIT(S): at 00:50

## 2022-06-02 RX ADMIN — Medication 6: at 08:07

## 2022-06-02 RX ADMIN — INSULIN GLARGINE 20 UNIT(S): 100 INJECTION, SOLUTION SUBCUTANEOUS at 21:38

## 2022-06-02 RX ADMIN — Medication 88 MICROGRAM(S): at 17:37

## 2022-06-02 RX ADMIN — PANTOPRAZOLE SODIUM 40 MILLIGRAM(S): 20 TABLET, DELAYED RELEASE ORAL at 05:25

## 2022-06-02 RX ADMIN — Medication 6 UNIT(S): at 12:41

## 2022-06-02 RX ADMIN — Medication 12: at 12:41

## 2022-06-02 RX ADMIN — Medication 75 MICROGRAM(S): at 05:25

## 2022-06-02 RX ADMIN — Medication 1 PACKET(S): at 10:42

## 2022-06-02 RX ADMIN — Medication 100 MILLIGRAM(S): at 05:25

## 2022-06-02 RX ADMIN — Medication 40 MILLIGRAM(S): at 05:25

## 2022-06-02 RX ADMIN — Medication 250 MILLIGRAM(S): at 05:25

## 2022-06-02 RX ADMIN — Medication 6 UNIT(S): at 17:36

## 2022-06-02 RX ADMIN — LACTULOSE 10 GRAM(S): 10 SOLUTION ORAL at 05:25

## 2022-06-02 RX ADMIN — Medication 325 MILLIGRAM(S): at 08:18

## 2022-06-02 RX ADMIN — Medication 100 MILLIGRAM(S): at 17:36

## 2022-06-02 RX ADMIN — Medication 25 GRAM(S): at 10:42

## 2022-06-02 RX ADMIN — Medication 40 MILLIEQUIVALENT(S): at 10:42

## 2022-06-02 RX ADMIN — Medication 10: at 17:36

## 2022-06-02 RX ADMIN — LACTULOSE 10 GRAM(S): 10 SOLUTION ORAL at 10:41

## 2022-06-02 RX ADMIN — INSULIN GLARGINE 14 UNIT(S): 100 INJECTION, SOLUTION SUBCUTANEOUS at 08:26

## 2022-06-02 NOTE — PROGRESS NOTE ADULT - SUBJECTIVE AND OBJECTIVE BOX
CC:  Follow up GOC , Symptoms    OVERNIGHT EVENTS:  events noted    Present Symptoms:   Dyspnea:  No   Nausea/Vomiting:  No   Anxiety:  No     Depression:  No    Fatigue:    No     Loss of appetite:  No  Constipation: Not Reported      Pain: No               Character-            Duration-            Location-            Severity-    Pain AD Score:  [email or web address] (press ctrl + left click to view)    Review of Systems: Reviewed as above  All others negative    MEDICATIONS  (STANDING):  dextrose 5%. 1000 milliLiter(s) (50 mL/Hr) IV Continuous <Continuous>  dextrose 5%. 1000 milliLiter(s) (100 mL/Hr) IV Continuous <Continuous>  dextrose 50% Injectable 50 milliLiter(s) IV Push every 15 minutes  ferrous    sulfate 325 milliGRAM(s) Oral daily  folic acid 1 milliGRAM(s) Oral daily  furosemide    Tablet 40 milliGRAM(s) Oral daily  glucagon  Injectable 1 milliGRAM(s) IntraMuscular once  insulin glargine Injectable (LANTUS) 20 Unit(s) SubCutaneous at bedtime  insulin lispro (ADMELOG) corrective regimen sliding scale   SubCutaneous three times a day before meals  insulin lispro Injectable (ADMELOG) 6 Unit(s) SubCutaneous three times a day before meals  labetalol 100 milliGRAM(s) Oral two times a day  lactulose Syrup 10 Gram(s) Oral every 8 hours  levothyroxine 88 MICROGram(s) Oral daily  multivitamin 1 Tablet(s) Oral daily  pantoprazole    Tablet 40 milliGRAM(s) Oral before breakfast  rifAXIMin 550 milliGRAM(s) Oral two times a day  thiamine 100 milliGRAM(s) Oral daily    MEDICATIONS  (PRN):  dextrose Oral Gel 15 Gram(s) Oral once PRN Blood Glucose LESS THAN 70 milliGRAM(s)/deciliter  LORazepam   Injectable 1 milliGRAM(s) IV Push every 1 hour PRN CIWA-Ar score 8 or greater      PHYSICAL EXAM:    Vital Signs Last 24 Hrs  T(C): 37 (2022 10:33), Max: 37.2 (2022 16:42)  T(F): 98.6 (2022 10:33), Max: 99 (2022 16:42)  HR: 87 (2022 10:33) (87 - 114)  BP: 98/56 (2022 10:33) (98/56 - 114/70)  BP(mean): --  RR: 18 (2022 10:33) (18 - 18)  SpO2: 97% (2022 10:33) (96% - 97%)    Karnofsky: 50 %  General:   F awake alert NAD      HEENT:  NCAT    mmm  Lungs: comfortable  non labored  CV:  RR  GI:   soft NTND         :    normal           MSK: moves all extremities  Skin:  warm/dry  Neuro  no focal deficits    LABS:                          10.3   5.00  )-----------( 80       ( 2022 08:14 )             29.8     06-02    127<L>  |  91<L>  |  11.4  ----------------------------<  427<H>  3.8   |  23.0  |  0.82    Ca    8.2<L>      2022 12:38  Phos  2.1     06-02  Mg     1.6     06-02    TPro  7.2  /  Alb  2.7<L>  /  TBili  1.8  /  DBili  x   /  AST  181<H>  /  ALT  74<H>  /  AlkPhos  442<H>  06-02    PT/INR - ( 31 May 2022 18:42 )   PT: 14.5 sec;   INR: 1.25 ratio         PTT - ( 31 May 2022 18:42 )  PTT:27.0 sec  Urinalysis Basic - ( 2022 23:24 )    Color: Yellow / Appearance: Clear / S.005 / pH: x  Gluc: x / Ketone: Negative  / Bili: Negative / Urobili: Negative mg/dL   Blood: x / Protein: Negative / Nitrite: Negative   Leuk Esterase: Trace / RBC: 0-2 /HPF / WBC 0-2 /HPF   Sq Epi: x / Non Sq Epi: Occasional / Bacteria: Occasional      I&O's Summary    RADIOLOGY & ADDITIONAL STUDIES:  < from: Xray Chest 1 View- PORTABLE-Urgent (22 @ 18:55) >    ACC: [id number] EXAM:  XR CHEST PORTABLE URGENT 1V                          PROCEDURE DATE:  2022          INTERPRETATION:  AP chest on May 31, 2022 at 6:38 PM. Patient has sepsis.    Heart normal for projection.    Lungs are clear.    On  of this year there was a slight left base effusion no longer   evident.    IMPRESSION: Clear lungs at this time.    --- End of Report ---    < end of copied text >            ADVANCE DIRECTIVES/TREATMENT PREFERENCES:   CC:  Follow up GOC , Symptoms    OVERNIGHT EVENTS:  events noted    Present Symptoms:   Dyspnea:  No   Nausea/Vomiting:  No   Anxiety:  No     Depression:  No    Fatigue:    No     Loss of appetite:  No  Constipation: Not Reported      Pain: No               Character-            Duration-            Location-            Severity-    Pain AD Score:  [email or web address] (press ctrl + left click to view)    Review of Systems: Reviewed as above  All others negative    MEDICATIONS  (STANDING):  dextrose 5%. 1000 milliLiter(s) (50 mL/Hr) IV Continuous <Continuous>  dextrose 5%. 1000 milliLiter(s) (100 mL/Hr) IV Continuous <Continuous>  dextrose 50% Injectable 50 milliLiter(s) IV Push every 15 minutes  ferrous    sulfate 325 milliGRAM(s) Oral daily  folic acid 1 milliGRAM(s) Oral daily  furosemide    Tablet 40 milliGRAM(s) Oral daily  glucagon  Injectable 1 milliGRAM(s) IntraMuscular once  insulin glargine Injectable (LANTUS) 20 Unit(s) SubCutaneous at bedtime  insulin lispro (ADMELOG) corrective regimen sliding scale   SubCutaneous three times a day before meals  insulin lispro Injectable (ADMELOG) 6 Unit(s) SubCutaneous three times a day before meals  labetalol 100 milliGRAM(s) Oral two times a day  lactulose Syrup 10 Gram(s) Oral every 8 hours  levothyroxine 88 MICROGram(s) Oral daily  multivitamin 1 Tablet(s) Oral daily  pantoprazole    Tablet 40 milliGRAM(s) Oral before breakfast  rifAXIMin 550 milliGRAM(s) Oral two times a day  thiamine 100 milliGRAM(s) Oral daily    MEDICATIONS  (PRN):  dextrose Oral Gel 15 Gram(s) Oral once PRN Blood Glucose LESS THAN 70 milliGRAM(s)/deciliter  LORazepam   Injectable 1 milliGRAM(s) IV Push every 1 hour PRN CIWA-Ar score 8 or greater      PHYSICAL EXAM:    Vital Signs Last 24 Hrs  T(C): 37 (2022 10:33), Max: 37.2 (2022 16:42)  T(F): 98.6 (2022 10:33), Max: 99 (2022 16:42)  HR: 87 (2022 10:33) (87 - 114)  BP: 98/56 (2022 10:33) (98/56 - 114/70)  BP(mean): --  RR: 18 (2022 10:33) (18 - 18)  SpO2: 97% (2022 10:33) (96% - 97%)    Karnofsky: 50 %  General:   F awake alert NAD      HEENT:  NCAT    mmm  Lungs: comfortable  non labored  CV:  RR  GI:   soft NTND         :    normal           MSK: moves all extremities  Skin:  warm/dry  Neuro  no focal deficits    LABS:                          10.3   5.00  )-----------( 80       ( 2022 08:14 )             29.8     06-02    127<L>  |  91<L>  |  11.4  ----------------------------<  427<H>  3.8   |  23.0  |  0.82    Ca    8.2<L>      2022 12:38  Phos  2.1     06-02  Mg     1.6     06-02    TPro  7.2  /  Alb  2.7<L>  /  TBili  1.8  /  DBili  x   /  AST  181<H>  /  ALT  74<H>  /  AlkPhos  442<H>  06-02    PT/INR - ( 31 May 2022 18:42 )   PT: 14.5 sec;   INR: 1.25 ratio         PTT - ( 31 May 2022 18:42 )  PTT:27.0 sec  Urinalysis Basic - ( 2022 23:24 )    Color: Yellow / Appearance: Clear / S.005 / pH: x  Gluc: x / Ketone: Negative  / Bili: Negative / Urobili: Negative mg/dL   Blood: x / Protein: Negative / Nitrite: Negative   Leuk Esterase: Trace / RBC: 0-2 /HPF / WBC 0-2 /HPF   Sq Epi: x / Non Sq Epi: Occasional / Bacteria: Occasional      I&O's Summary    RADIOLOGY & ADDITIONAL STUDIES:  < from: Xray Chest 1 View- PORTABLE-Urgent (22 @ 18:55) >    ACC: [id number] EXAM:  XR CHEST PORTABLE URGENT 1V                          PROCEDURE DATE:  2022          INTERPRETATION:  AP chest on May 31, 2022 at 6:38 PM. Patient has sepsis.    Heart normal for projection.    Lungs are clear.    On  of this year there was a slight left base effusion no longer   evident.    IMPRESSION: Clear lungs at this time.    --- End of Report ---    < end of copied text >      ADVANCE DIRECTIVES/TREATMENT PREFERENCES:

## 2022-06-02 NOTE — PROGRESS NOTE ADULT - SUBJECTIVE AND OBJECTIVE BOX
INTERVAL HPI/OVERNIGHT EVENTS:  follow up !DM   BS went very high  yesterday  pt apparently drinking a lot of regualr juice    Feels ok   No n/v/ no abd pain   Pt given extra 10 units LAntus last night  and  increased Admelog this AM   MEDICATIONS  (STANDING):  dextrose 5%. 1000 milliLiter(s) (100 mL/Hr) IV Continuous <Continuous>  dextrose 5%. 1000 milliLiter(s) (50 mL/Hr) IV Continuous <Continuous>  dextrose 50% Injectable 50 milliLiter(s) IV Push every 15 minutes  ferrous    sulfate 325 milliGRAM(s) Oral daily  folic acid 1 milliGRAM(s) Oral daily  furosemide    Tablet 40 milliGRAM(s) Oral daily  glucagon  Injectable 1 milliGRAM(s) IntraMuscular once  insulin glargine Injectable (LANTUS) 20 Unit(s) SubCutaneous at bedtime  insulin lispro (ADMELOG) corrective regimen sliding scale   SubCutaneous three times a day before meals  insulin lispro Injectable (ADMELOG) 6 Unit(s) SubCutaneous three times a day before meals  labetalol 100 milliGRAM(s) Oral two times a day  lactulose Syrup 10 Gram(s) Oral every 8 hours  levothyroxine 88 MICROGram(s) Oral daily  multivitamin 1 Tablet(s) Oral daily  pantoprazole    Tablet 40 milliGRAM(s) Oral before breakfast  rifAXIMin 550 milliGRAM(s) Oral two times a day  thiamine 100 milliGRAM(s) Oral daily    MEDICATIONS  (PRN):  dextrose Oral Gel 15 Gram(s) Oral once PRN Blood Glucose LESS THAN 70 milliGRAM(s)/deciliter  LORazepam   Injectable 1 milliGRAM(s) IV Push every 1 hour PRN CIWA-Ar score 8 or greater      Allergies    No Known Allergies    Intolerances        Review of systems:  as above   Vital Signs Last 24 Hrs  T(C): 36.9 (2022 21:52), Max: 37.1 (2022 04:32)  T(F): 98.5 (2022 21:52), Max: 98.8 (2022 04:32)  HR: 86 (2022 21:52) (86 - 95)  BP: 113/70 (2022 21:52) (98/56 - 114/70)  BP(mean): --  RR: 18 (2022 21:52) (18 - 18)  SpO2: 96% (2022 21:52) (96% - 97%)    PHYSICAL EXAM:      Constitutional: NAD, well-groomed, well-developed  HEENT: PERRLA, EOMI, no exophalmos    Respiratory: CTAB  Cardiovascular: S1 and S2, RRR, no M/G/R  Gastrointestinal: BS+, soft, no organomeglag or mass  Extremities: No peripheral edema, no pedal lesions  Neurological: A/O x 3, no focal deficits  Skin: No rashes, no acanthosis        LABS:                        10.3   5.00  )-----------( 80       ( 2022 08:14 )             29.8     06-02    128<L>  |  90<L>  |  10.6  ----------------------------<  420<H>  3.9   |  23.0  |  0.99    Ca    8.4<L>      2022 18:19  Phos  2.1     06-02  Mg     1.6     06-02    TPro  7.4  /  Alb  2.5<L>  /  TBili  1.7  /  DBili  x   /  AST  222<H>  /  ALT  83<H>  /  AlkPhos  482<H>  06-02    Urinalysis Basic - ( 2022 23:24 )    Color: Yellow / Appearance: Clear / S.005 / pH: x  Gluc: x / Ketone: Negative  / Bili: Negative / Urobili: Negative mg/dL   Blood: x / Protein: Negative / Nitrite: Negative   Leuk Esterase: Trace / RBC: 0-2 /HPF / WBC 0-2 /HPF   Sq Epi: x / Non Sq Epi: Occasional / Bacteria: Occasional          CAPILLARY BLOOD GLUCOSE    CAPILLARY BLOOD GLUCOSE      POCT Blood Glucose.: 357 mg/dL (2022 21:37)  POCT Blood Glucose.: 394 mg/dL (2022 17:32)  POCT Blood Glucose.: 404 mg/dL (2022 12:36)  POCT Blood Glucose.: 423 mg/dL (2022 07:46)  POCT Blood Glucose.: 365 mg/dL (2022 05:23)    RADIOLOGY & ADDITIONAL TESTS:

## 2022-06-02 NOTE — PROGRESS NOTE ADULT - ASSESSMENT
T1DM   s/p DKA   hyperlgcmei persisited but pt was apprent;y drinking a lot of juice     spoke with pt- only to have juices if BS are low     Lantus changed to 20 untis tonight  and ADmelog increased   COnt to monitor       Hypothryoid COnt LT4 0.088 mg qd

## 2022-06-02 NOTE — PROGRESS NOTE ADULT - SUBJECTIVE AND OBJECTIVE BOX
Chief Complaint:  DKA    SUBJECTIVE / OVERNIGHT EVENTS:  Uncontrolled BG overnight.  pt offers no acute complaints at this time.  She denies chest pain, SOB, abd pain, N/V, fever, chills, dysuria or any other complaints. All remainder ROS negative.       I&O's Summary        PHYSICAL EXAM:  Vital Signs Last 24 Hrs  T(C): 37.1 (2022 04:32), Max: 37.2 (2022 16:42)  T(F): 98.8 (2022 04:32), Max: 99 (2022 16:42)  HR: 94 (2022 05:24) (84 - 114)  BP: 114/70 (2022 05:24) (100/65 - 124/76)  BP(mean): --  RR: 18 (2022 04:32) (18 - 18)  SpO2: 96% (2022 04:32) (96% - 97%)      GENERAL: pt examined bedside, laying comfortably in bed in NAD  HEENT: NC/AT, dry oral mucosa, clear conjunctiva, sclera nonicteric  RESPIRATORY: Normal respiratory effort, no wheezing, rhonchi, rales  CARDIOVASCULAR: RRR, normal S1 and S2  ABDOMEN: soft, NT/ND, normoactive bowel sounds, no rebound/guarding  EXTREMITIES: No cynaosis, no clubbing, no lower extremity edema, pulses are 2+ bilaterally  NEUROLOGY: A+O x2, no focal neurologic deficits appreciated   SKIN: No rashes or no palpable lesions        LABS:                        10.3   5.00  )-----------( 80       ( 2022 08:14 )             29.8     06-02    123<L>  |  90<L>  |  12.3  ----------------------------<  450<HH>  3.5   |  18.0<L>  |  0.77    Ca    7.7<L>      2022 07:39  Phos  2.1     06-  Mg     1.6     06-    TPro  6.8  /  Alb  2.6<L>  /  TBili  1.9  /  DBili  1.1<H>  /  AST  103<H>  /  ALT  63<H>  /  AlkPhos  354<H>      PT/INR - ( 31 May 2022 18:42 )   PT: 14.5 sec;   INR: 1.25 ratio         PTT - ( 31 May 2022 18:42 )  PTT:27.0 sec  CARDIAC MARKERS ( 31 May 2022 18:43 )  x     / <0.01 ng/mL / x     / x     / x          Urinalysis Basic - ( 2022 23:24 )    Color: Yellow / Appearance: Clear / S.005 / pH: x  Gluc: x / Ketone: Negative  / Bili: Negative / Urobili: Negative mg/dL   Blood: x / Protein: Negative / Nitrite: Negative   Leuk Esterase: Trace / RBC: 0-2 /HPF / WBC 0-2 /HPF   Sq Epi: x / Non Sq Epi: Occasional / Bacteria: Occasional        CAPILLARY BLOOD GLUCOSE      POCT Blood Glucose.: 423 mg/dL (2022 07:46)  POCT Blood Glucose.: 365 mg/dL (2022 05:23)  POCT Blood Glucose.: 422 mg/dL (2022 02:13)  POCT Blood Glucose.: 516 mg/dL (2022 00:34)  POCT Blood Glucose.: >530 mg/dL (2022 21:58)  POCT Blood Glucose.: >530 mg/dL (2022 21:47)  POCT Blood Glucose.: >530 mg/dL (2022 21:45)  POCT Blood Glucose.: 384 mg/dL (2022 17:19)  POCT Blood Glucose.: 319 mg/dL (2022 11:54)        RADIOLOGY & ADDITIONAL TESTS:          MEDICATIONS  (STANDING):  dextrose 5%. 1000 milliLiter(s) (50 mL/Hr) IV Continuous <Continuous>  dextrose 5%. 1000 milliLiter(s) (100 mL/Hr) IV Continuous <Continuous>  dextrose 50% Injectable 50 milliLiter(s) IV Push every 15 minutes  ferrous    sulfate 325 milliGRAM(s) Oral daily  folic acid 1 milliGRAM(s) Oral daily  furosemide    Tablet 40 milliGRAM(s) Oral daily  glucagon  Injectable 1 milliGRAM(s) IntraMuscular once  insulin glargine Injectable (LANTUS) 20 Unit(s) SubCutaneous at bedtime  insulin lispro (ADMELOG) corrective regimen sliding scale   SubCutaneous three times a day before meals  insulin lispro (ADMELOG) corrective regimen sliding scale   SubCutaneous three times a day before meals  insulin lispro Injectable (ADMELOG) 6 Unit(s) SubCutaneous three times a day before meals  labetalol 100 milliGRAM(s) Oral two times a day  lactulose Syrup 10 Gram(s) Oral every 8 hours  levothyroxine 75 MICROGram(s) Oral daily  multivitamin 1 Tablet(s) Oral daily  pantoprazole    Tablet 40 milliGRAM(s) Oral before breakfast  potassium chloride    Tablet ER 40 milliEquivalent(s) Oral once  potassium phosphate / sodium phosphate Powder (PHOS-NaK) 1 Packet(s) Oral once  rifAXIMin 550 milliGRAM(s) Oral two times a day  saccharomyces boulardii 250 milliGRAM(s) Oral two times a day  thiamine 100 milliGRAM(s) Oral daily    MEDICATIONS  (PRN):  dextrose Oral Gel 15 Gram(s) Oral once PRN Blood Glucose LESS THAN 70 milliGRAM(s)/deciliter  LORazepam   Injectable 1 milliGRAM(s) IV Push every 1 hour PRN CIWA-Ar score 8 or greater

## 2022-06-02 NOTE — PATIENT PROFILE ADULT - PATIENT/CAREGIVER OFFERED  INTERPRETER SERVICES
SUBJECTIVE / INTERVAL HPI: EMANUEL. Patient seen and examined at bedside. Patient in good spirits without any complaints. She is responding in full sentences today. She denies any pain. She states she had 2 BMs yesterday evening that were not watery. Otherwise: (-) fever, (-) chills, (-) dizziness, (-) headache, (-) neck pain, (-) chest pain, (-) palpitations, (-)SOB, (-) nausea, (-) vomiting, (-) diarrhea, (-) abdominal pain, (-)new weakness, (-) paresthesias.      VITAL SIGNS:  Vital Signs Last 24 Hrs  T(C): 36.7 (17 Dec 2018 05:27), Max: 37.4 (16 Dec 2018 20:53)  T(F): 98 (17 Dec 2018 05:27), Max: 99.4 (16 Dec 2018 20:53)  HR: 92 (17 Dec 2018 05:27) (81 - 99)  BP: 126/63 (17 Dec 2018 05:27) (126/63 - 167/89)  BP(mean): --  RR: 18 (17 Dec 2018 05:27) (18 - 18)  SpO2: 97% (17 Dec 2018 05:27) (94% - 99%)    PHYSICAL EXAM:    General: well appearing, resting comfortably in bed and in no acute distress. answers questions.   SKIN: stage 2 sacral decubitus  HEENT: normocephalic, atraumatic, PERRL, anicteric sclera; no conjunctival pallor, mucus membranes moist  Neck: supple, no masses  Cardiovascular: +S1/S2, regular rate and rhythm; no murmurs, no rub, no gallop.  Respiratory: clear to auscultation bilaterally though poor inspiratory effort, no rhonchi, no wheeze, no rales.,   Gastrointestinal: soft, obese, active bowel sounds, non-tender, non-distended,  PEG in place. Clean dry and intact skin surrounding PEG.   Extremities: Warm, dry; no edema, clubbing or cyanosis  Vascular: 2+ radial, DP pulses bilaterally  Neurological: AAOx1 to person only. no focal deficits    MEDICATIONS:  MEDICATIONS  (STANDING):  acetylcysteine 10%  Inhalation 3 milliLiter(s) Inhalation every 6 hours  ALBUTerol/ipratropium for Nebulization 3 milliLiter(s) Nebulizer every 6 hours  amLODIPine   Tablet 10 milliGRAM(s) Oral every 24 hours  artificial  tears Solution 1 Drop(s) Both EYES two times a day  dextrose 5%. 1000 milliLiter(s) (50 mL/Hr) IV Continuous <Continuous>  dextrose 50% Injectable 12.5 Gram(s) IV Push once  dextrose 50% Injectable 25 Gram(s) IV Push once  dextrose 50% Injectable 25 Gram(s) IV Push once  heparin  Injectable 5000 Unit(s) SubCutaneous every 8 hours  hydrALAZINE 50 milliGRAM(s) Oral every 6 hours  insulin lispro (HumaLOG) corrective regimen sliding scale   SubCutaneous every 6 hours  lactobacillus acidophilus 1 Tablet(s) Oral every 8 hours  levothyroxine 150 MICROGram(s) Oral daily  lisinopril 40 milliGRAM(s) Oral daily  magnesium sulfate  IVPB 2 Gram(s) IV Intermittent once  pantoprazole   Suspension 40 milliGRAM(s) Enteral Tube daily    MEDICATIONS  (PRN):  acetaminophen    Suspension .. 650 milliGRAM(s) Oral every 6 hours PRN Temp greater or equal to 38C (100.4F)  dextrose 40% Gel 15 Gram(s) Oral once PRN Blood Glucose LESS THAN 70 milliGRAM(s)/deciliter  glucagon  Injectable 1 milliGRAM(s) IntraMuscular once PRN Glucose LESS THAN 70 milligrams/deciliter  oxyCODONE    5 mG/acetaminophen 325 mG 1 Tablet(s) Oral every 4 hours PRN Moderate Pain (4 - 6)      ALLERGIES:  Allergies    No Known Allergies    Intolerances        LABS:                        8.3    9.1   )-----------( 294      ( 17 Dec 2018 06:50 )             27.0     12-17    139  |  105  |  35<H>  ----------------------------<  117<H>  4.3   |  24  |  0.96    Ca    10.1      17 Dec 2018 06:50  Phos  3.6     12-17  Mg     1.6     12-17          CAPILLARY BLOOD GLUCOSE      POCT Blood Glucose.: 140 mg/dL (17 Dec 2018 07:13)      RADIOLOGY & ADDITIONAL TESTS: Reviewed. yes

## 2022-06-02 NOTE — PROGRESS NOTE ADULT - ASSESSMENT
66yr woman  PMH of HTN,  ETOH abuse, ETOH cirrhosis decompensated by esophageal varices and hepatic encephalopathy, DM  Anemia, Pancreatitis admitted with DKA    DKA  overnight events noted - BS high  management per primary team, endocrinology  - OTONIEL Vyas    ETOH abuse  States last drink 1month ago  ETOH  cessation  cont thiamine folic acid    Cirrhosis  hx of HE, varices   outpatient follow up     Encounter for Palliative Care  Patient known to our service from previous admission in June 2022.   At the time, patient/family planned to follow up as outpatient with her GI and pursue transplant  Patient states last drink 1 month ago.  Patient needs education and support.    Called shaunna Rascon - voice mail received

## 2022-06-02 NOTE — PROGRESS NOTE ADULT - ASSESSMENT
67 y/o Yi speaking F w/ PMH of HTN, DM, anemia, hypothyroidism, pancreatitis, ETOH abuse, ETOH cirrhosis, esophageal varices w/ prior episodes of hepatic encephalopathy presented to ED c/o weakness and found to be be in DKA.  Pt was admitted to MICU and placed on insulin gtt and subsequently downgraded 6/1.  Pts glucose remains uncontrolled.       HHS / Uncontrolled DM  - On admission BG>600, UA w/out ketones and serum acetone (+)   - s/p MICU for insulin gtt  - BG remains elevated, gap reopening and serum HCO3 trending down   - Will place on basal/bolus insulin regimen and aggressively titrate   - Serial chemistries and if gap continues to open and HCO3 drops further will order stat ABG   - Low threshold to reconsult ICU for possible insulin gtt   - Blood cx pending but no evidence of UTI on UA    - HbA1c 13.7  - Endocrinology consulted       Normocytic anemia / Thrombocytopenia   - Suspect mixed anemia from chronic disease and cirrhosis    - Hb stable and iron studies reviewed   - B12 and folate levels pending   - Plts chronically low 2/2 cirrhosis   - Hemodynamically stable and no active bleeding   - On ferrous sulfate  - Monitor CBC and transfuse as needed       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 Craven on d/c       Hyponatremia   - When corrected for hyperglycemia Na is 131      Hypothyroidism  - TFTs reviewed and will increase synthroid dose   - Will need TFTs repeated in 4-6 weeks as outpt       Hypomagnesemia / Hypophosphatemia   - Likely 2/2 chronic etoh use   - Supplemented   - Replete as needed       HTN  - BP stable  - On lasix and labetolol         VTE ppx: SCDs given thrombocytopenia     Dispo: Pt remains acute

## 2022-06-02 NOTE — PATIENT PROFILE ADULT - FALL HARM RISK - UNIVERSAL INTERVENTIONS
Bed in lowest position, wheels locked, appropriate side rails in place/Call bell, personal items and telephone in reach/Instruct patient to call for assistance before getting out of bed or chair/Non-slip footwear when patient is out of bed/[place] to call system/Physically safe environment - no spills, clutter or unnecessary equipment/Purposeful Proactive Rounding/Room/bathroom lighting operational, light cord in reach

## 2022-06-03 LAB
ALBUMIN SERPL ELPH-MCNC: 2.3 G/DL — LOW (ref 3.3–5.2)
ALBUMIN SERPL ELPH-MCNC: 2.3 G/DL — LOW (ref 3.3–5.2)
ALP SERPL-CCNC: 456 U/L — HIGH (ref 40–120)
ALP SERPL-CCNC: 466 U/L — HIGH (ref 40–120)
ALT FLD-CCNC: 70 U/L — HIGH
ALT FLD-CCNC: 72 U/L — HIGH
ANION GAP SERPL CALC-SCNC: 10 MMOL/L — SIGNIFICANT CHANGE UP (ref 5–17)
ANION GAP SERPL CALC-SCNC: 13 MMOL/L — SIGNIFICANT CHANGE UP (ref 5–17)
AST SERPL-CCNC: 145 U/L — HIGH
AST SERPL-CCNC: 170 U/L — HIGH
BASOPHILS # BLD AUTO: 0.03 K/UL — SIGNIFICANT CHANGE UP (ref 0–0.2)
BASOPHILS NFR BLD AUTO: 0.6 % — SIGNIFICANT CHANGE UP (ref 0–2)
BILIRUB SERPL-MCNC: 1.3 MG/DL — SIGNIFICANT CHANGE UP (ref 0.4–2)
BILIRUB SERPL-MCNC: 1.4 MG/DL — SIGNIFICANT CHANGE UP (ref 0.4–2)
BUN SERPL-MCNC: 12.6 MG/DL — SIGNIFICANT CHANGE UP (ref 8–20)
BUN SERPL-MCNC: 13.1 MG/DL — SIGNIFICANT CHANGE UP (ref 8–20)
CALCIUM SERPL-MCNC: 8 MG/DL — LOW (ref 8.6–10.2)
CALCIUM SERPL-MCNC: 8.2 MG/DL — LOW (ref 8.6–10.2)
CHLORIDE SERPL-SCNC: 91 MMOL/L — LOW (ref 98–107)
CHLORIDE SERPL-SCNC: 93 MMOL/L — LOW (ref 98–107)
CO2 SERPL-SCNC: 21 MMOL/L — LOW (ref 22–29)
CO2 SERPL-SCNC: 25 MMOL/L — SIGNIFICANT CHANGE UP (ref 22–29)
CREAT SERPL-MCNC: 0.83 MG/DL — SIGNIFICANT CHANGE UP (ref 0.5–1.3)
CREAT SERPL-MCNC: 0.97 MG/DL — SIGNIFICANT CHANGE UP (ref 0.5–1.3)
EGFR: 65 ML/MIN/1.73M2 — SIGNIFICANT CHANGE UP
EGFR: 79 ML/MIN/1.73M2 — SIGNIFICANT CHANGE UP
EOSINOPHIL # BLD AUTO: 0.03 K/UL — SIGNIFICANT CHANGE UP (ref 0–0.5)
EOSINOPHIL NFR BLD AUTO: 0.6 % — SIGNIFICANT CHANGE UP (ref 0–6)
GLUCOSE BLDC GLUCOMTR-MCNC: 374 MG/DL — HIGH (ref 70–99)
GLUCOSE BLDC GLUCOMTR-MCNC: 384 MG/DL — HIGH (ref 70–99)
GLUCOSE BLDC GLUCOMTR-MCNC: 400 MG/DL — HIGH (ref 70–99)
GLUCOSE BLDC GLUCOMTR-MCNC: 401 MG/DL — HIGH (ref 70–99)
GLUCOSE BLDC GLUCOMTR-MCNC: 411 MG/DL — HIGH (ref 70–99)
GLUCOSE BLDC GLUCOMTR-MCNC: 417 MG/DL — HIGH (ref 70–99)
GLUCOSE SERPL-MCNC: 407 MG/DL — HIGH (ref 70–99)
GLUCOSE SERPL-MCNC: 499 MG/DL — CRITICAL HIGH (ref 70–99)
HCT VFR BLD CALC: 28.4 % — LOW (ref 34.5–45)
HGB BLD-MCNC: 9.8 G/DL — LOW (ref 11.5–15.5)
IMM GRANULOCYTES NFR BLD AUTO: 0.8 % — SIGNIFICANT CHANGE UP (ref 0–1.5)
LYMPHOCYTES # BLD AUTO: 1.08 K/UL — SIGNIFICANT CHANGE UP (ref 1–3.3)
LYMPHOCYTES # BLD AUTO: 22.5 % — SIGNIFICANT CHANGE UP (ref 13–44)
MAGNESIUM SERPL-MCNC: 2 MG/DL — SIGNIFICANT CHANGE UP (ref 1.6–2.6)
MCHC RBC-ENTMCNC: 33.9 PG — SIGNIFICANT CHANGE UP (ref 27–34)
MCHC RBC-ENTMCNC: 34.5 GM/DL — SIGNIFICANT CHANGE UP (ref 32–36)
MCV RBC AUTO: 98.3 FL — SIGNIFICANT CHANGE UP (ref 80–100)
MONOCYTES # BLD AUTO: 0.79 K/UL — SIGNIFICANT CHANGE UP (ref 0–0.9)
MONOCYTES NFR BLD AUTO: 16.5 % — HIGH (ref 2–14)
NEUTROPHILS # BLD AUTO: 2.83 K/UL — SIGNIFICANT CHANGE UP (ref 1.8–7.4)
NEUTROPHILS NFR BLD AUTO: 59 % — SIGNIFICANT CHANGE UP (ref 43–77)
PHOSPHATE SERPL-MCNC: 2.1 MG/DL — LOW (ref 2.4–4.7)
PLATELET # BLD AUTO: 85 K/UL — LOW (ref 150–400)
POTASSIUM SERPL-MCNC: 3.6 MMOL/L — SIGNIFICANT CHANGE UP (ref 3.5–5.3)
POTASSIUM SERPL-MCNC: 4 MMOL/L — SIGNIFICANT CHANGE UP (ref 3.5–5.3)
POTASSIUM SERPL-SCNC: 3.6 MMOL/L — SIGNIFICANT CHANGE UP (ref 3.5–5.3)
POTASSIUM SERPL-SCNC: 4 MMOL/L — SIGNIFICANT CHANGE UP (ref 3.5–5.3)
PROT SERPL-MCNC: 6.4 G/DL — LOW (ref 6.6–8.7)
PROT SERPL-MCNC: 6.4 G/DL — LOW (ref 6.6–8.7)
RBC # BLD: 2.89 M/UL — LOW (ref 3.8–5.2)
RBC # FLD: 17.2 % — HIGH (ref 10.3–14.5)
SODIUM SERPL-SCNC: 125 MMOL/L — LOW (ref 135–145)
SODIUM SERPL-SCNC: 128 MMOL/L — LOW (ref 135–145)
WBC # BLD: 4.8 K/UL — SIGNIFICANT CHANGE UP (ref 3.8–10.5)
WBC # FLD AUTO: 4.8 K/UL — SIGNIFICANT CHANGE UP (ref 3.8–10.5)

## 2022-06-03 PROCEDURE — 99233 SBSQ HOSP IP/OBS HIGH 50: CPT

## 2022-06-03 PROCEDURE — 99497 ADVNCD CARE PLAN 30 MIN: CPT | Mod: 25

## 2022-06-03 RX ORDER — INSULIN LISPRO 100/ML
10 VIAL (ML) SUBCUTANEOUS
Refills: 0 | Status: DISCONTINUED | OUTPATIENT
Start: 2022-06-03 | End: 2022-06-03

## 2022-06-03 RX ORDER — INSULIN GLARGINE 100 [IU]/ML
30 INJECTION, SOLUTION SUBCUTANEOUS AT BEDTIME
Refills: 0 | Status: DISCONTINUED | OUTPATIENT
Start: 2022-06-03 | End: 2022-06-04

## 2022-06-03 RX ORDER — INSULIN GLARGINE 100 [IU]/ML
30 INJECTION, SOLUTION SUBCUTANEOUS AT BEDTIME
Refills: 0 | Status: DISCONTINUED | OUTPATIENT
Start: 2022-06-03 | End: 2022-06-03

## 2022-06-03 RX ORDER — INSULIN LISPRO 100/ML
6 VIAL (ML) SUBCUTANEOUS ONCE
Refills: 0 | Status: COMPLETED | OUTPATIENT
Start: 2022-06-03 | End: 2022-06-03

## 2022-06-03 RX ORDER — SODIUM CHLORIDE 9 MG/ML
1000 INJECTION INTRAMUSCULAR; INTRAVENOUS; SUBCUTANEOUS
Refills: 0 | Status: COMPLETED | OUTPATIENT
Start: 2022-06-03 | End: 2022-06-04

## 2022-06-03 RX ORDER — INSULIN GLARGINE 100 [IU]/ML
12 INJECTION, SOLUTION SUBCUTANEOUS ONCE
Refills: 0 | Status: DISCONTINUED | OUTPATIENT
Start: 2022-06-03 | End: 2022-06-03

## 2022-06-03 RX ORDER — INSULIN LISPRO 100/ML
8 VIAL (ML) SUBCUTANEOUS
Refills: 0 | Status: DISCONTINUED | OUTPATIENT
Start: 2022-06-03 | End: 2022-06-03

## 2022-06-03 RX ORDER — INSULIN LISPRO 100/ML
12 VIAL (ML) SUBCUTANEOUS
Refills: 0 | Status: DISCONTINUED | OUTPATIENT
Start: 2022-06-03 | End: 2022-06-04

## 2022-06-03 RX ORDER — INSULIN HUMAN 100 [IU]/ML
15 INJECTION, SOLUTION SUBCUTANEOUS ONCE
Refills: 0 | Status: COMPLETED | OUTPATIENT
Start: 2022-06-03 | End: 2022-06-03

## 2022-06-03 RX ORDER — INSULIN HUMAN 100 [IU]/ML
12 INJECTION, SOLUTION SUBCUTANEOUS ONCE
Refills: 0 | Status: COMPLETED | OUTPATIENT
Start: 2022-06-03 | End: 2022-06-03

## 2022-06-03 RX ORDER — INSULIN GLARGINE 100 [IU]/ML
25 INJECTION, SOLUTION SUBCUTANEOUS AT BEDTIME
Refills: 0 | Status: DISCONTINUED | OUTPATIENT
Start: 2022-06-03 | End: 2022-06-03

## 2022-06-03 RX ADMIN — Medication 10: at 17:19

## 2022-06-03 RX ADMIN — PANTOPRAZOLE SODIUM 40 MILLIGRAM(S): 20 TABLET, DELAYED RELEASE ORAL at 05:24

## 2022-06-03 RX ADMIN — Medication 325 MILLIGRAM(S): at 11:31

## 2022-06-03 RX ADMIN — LACTULOSE 10 GRAM(S): 10 SOLUTION ORAL at 05:24

## 2022-06-03 RX ADMIN — Medication 88 MICROGRAM(S): at 05:24

## 2022-06-03 RX ADMIN — Medication 100 MILLIGRAM(S): at 11:31

## 2022-06-03 RX ADMIN — INSULIN HUMAN 15 UNIT(S): 100 INJECTION, SOLUTION SUBCUTANEOUS at 17:16

## 2022-06-03 RX ADMIN — Medication 100 MILLIGRAM(S): at 05:24

## 2022-06-03 RX ADMIN — SODIUM CHLORIDE 100 MILLILITER(S): 9 INJECTION INTRAMUSCULAR; INTRAVENOUS; SUBCUTANEOUS at 17:16

## 2022-06-03 RX ADMIN — Medication 12 UNIT(S): at 17:19

## 2022-06-03 RX ADMIN — Medication 10 UNIT(S): at 12:34

## 2022-06-03 RX ADMIN — Medication 1 TABLET(S): at 11:31

## 2022-06-03 RX ADMIN — Medication 12: at 08:53

## 2022-06-03 RX ADMIN — INSULIN GLARGINE 30 UNIT(S): 100 INJECTION, SOLUTION SUBCUTANEOUS at 22:05

## 2022-06-03 RX ADMIN — LACTULOSE 10 GRAM(S): 10 SOLUTION ORAL at 22:05

## 2022-06-03 RX ADMIN — Medication 40 MILLIGRAM(S): at 05:24

## 2022-06-03 RX ADMIN — INSULIN HUMAN 12 UNIT(S): 100 INJECTION, SOLUTION SUBCUTANEOUS at 12:21

## 2022-06-03 RX ADMIN — Medication 10: at 12:33

## 2022-06-03 RX ADMIN — Medication 6 UNIT(S): at 22:12

## 2022-06-03 RX ADMIN — LACTULOSE 10 GRAM(S): 10 SOLUTION ORAL at 11:31

## 2022-06-03 RX ADMIN — Medication 8 UNIT(S): at 08:54

## 2022-06-03 RX ADMIN — Medication 1 MILLIGRAM(S): at 11:31

## 2022-06-03 NOTE — PROGRESS NOTE ADULT - ASSESSMENT
T1DM  uncontrolled   lng dwpt and son about diet   son is on board but pt does  eat and drink lots of sugary foods    insulin increased by  hopitlaist already - will see how she does and continue to adjust meds      Hyoithyoird TSh mild elevated    Inc to 0.088 mg LT4

## 2022-06-03 NOTE — PROGRESS NOTE ADULT - CONVERSATION DETAILS
Met with patient using  [id number]  Tried to ascertain her understanding of her condition.  Patient states she is unaware of her cirrhosis. She sees her PCP for many years.    Discussed importance of designating a HCP.  She acknowledged her son Abiodun who has been involved in her care, but would also want her other son Marshall and her sister.  She was recommended to designate one person but was non committal.

## 2022-06-03 NOTE — PROGRESS NOTE ADULT - SUBJECTIVE AND OBJECTIVE BOX
Chief Complaint:  DKA    SUBJECTIVE / OVERNIGHT EVENTS:  Uncontrolled BG still.  Pt offers no acute complaints at this time.  She denies chest pain, SOB, abd pain, N/V, fever, chills, dysuria or any other complaints. All remainder ROS negative.       I&O's Summary        PHYSICAL EXAM:  Vital Signs Last 24 Hrs  T(C): 36.8 (2022 04:06), Max: 37 (2022 10:33)  T(F): 98.3 (2022 04:06), Max: 98.6 (2022 10:33)  HR: 106 (2022 04:06) (86 - 106)  BP: 120/78 (2022 04:06) (98/56 - 120/78)  BP(mean): --  RR: 18 (2022 04:06) (18 - 18)  SpO2: 97% (2022 04:06) (96% - 97%)      GENERAL: pt examined bedside, laying comfortably in bed in NAD  HEENT: NC/AT, dry oral mucosa, clear conjunctiva, sclera nonicteric  RESPIRATORY: Normal respiratory effort, no wheezing, rhonchi, rales  CARDIOVASCULAR: RRR, normal S1 and S2  ABDOMEN: soft, NT/ND, normoactive bowel sounds, no rebound/guarding  EXTREMITIES: No cynaosis, no clubbing, no lower extremity edema, pulses are 2+ bilaterally  NEUROLOGY: A+O x2, no focal neurologic deficits appreciated   SKIN: No rashes or no palpable lesions        LABS:                                            9.8    4.80  )-----------( 85       ( 2022 07:18 )             28.4       06-03    125<L>  |  91<L>  |  13.1  ----------------------------<  499<HH>  4.0   |  21.0<L>  |  0.83    Ca    8.0<L>      2022 07:18  Phos  2.1     06-03  Mg     2.0     06-03    TPro  6.4<L>  /  Alb  2.3<L>  /  TBili  1.4  /  DBili  x   /  AST  170<H>  /  ALT  72<H>  /  AlkPhos  466<H>  06-03      Urinalysis Basic - ( 2022 23:24 )    Color: Yellow / Appearance: Clear / S.005 / pH: x  Gluc: x / Ketone: Negative  / Bili: Negative / Urobili: Negative mg/dL   Blood: x / Protein: Negative / Nitrite: Negative   Leuk Esterase: Trace / RBC: 0-2 /HPF / WBC 0-2 /HPF   Sq Epi: x / Non Sq Epi: Occasional / Bacteria: Occasional        CAPILLARY BLOOD GLUCOSE      POCT Blood Glucose.: 423 mg/dL (2022 07:46)  POCT Blood Glucose.: 365 mg/dL (2022 05:23)  POCT Blood Glucose.: 422 mg/dL (2022 02:13)  POCT Blood Glucose.: 516 mg/dL (2022 00:34)  POCT Blood Glucose.: >530 mg/dL (2022 21:58)  POCT Blood Glucose.: >530 mg/dL (2022 21:47)  POCT Blood Glucose.: >530 mg/dL (2022 21:45)  POCT Blood Glucose.: 384 mg/dL (2022 17:19)  POCT Blood Glucose.: 319 mg/dL (2022 11:54)        RADIOLOGY & ADDITIONAL TESTS:          MEDICATIONS  (STANDING):  dextrose 5%. 1000 milliLiter(s) (50 mL/Hr) IV Continuous <Continuous>  dextrose 5%. 1000 milliLiter(s) (100 mL/Hr) IV Continuous <Continuous>  dextrose 50% Injectable 50 milliLiter(s) IV Push every 15 minutes  ferrous    sulfate 325 milliGRAM(s) Oral daily  folic acid 1 milliGRAM(s) Oral daily  furosemide    Tablet 40 milliGRAM(s) Oral daily  glucagon  Injectable 1 milliGRAM(s) IntraMuscular once  insulin glargine Injectable (LANTUS) 20 Unit(s) SubCutaneous at bedtime  insulin lispro (ADMELOG) corrective regimen sliding scale   SubCutaneous three times a day before meals  insulin lispro (ADMELOG) corrective regimen sliding scale   SubCutaneous three times a day before meals  insulin lispro Injectable (ADMELOG) 6 Unit(s) SubCutaneous three times a day before meals  labetalol 100 milliGRAM(s) Oral two times a day  lactulose Syrup 10 Gram(s) Oral every 8 hours  levothyroxine 75 MICROGram(s) Oral daily  multivitamin 1 Tablet(s) Oral daily  pantoprazole    Tablet 40 milliGRAM(s) Oral before breakfast  potassium chloride    Tablet ER 40 milliEquivalent(s) Oral once  potassium phosphate / sodium phosphate Powder (PHOS-NaK) 1 Packet(s) Oral once  rifAXIMin 550 milliGRAM(s) Oral two times a day  saccharomyces boulardii 250 milliGRAM(s) Oral two times a day  thiamine 100 milliGRAM(s) Oral daily    MEDICATIONS  (PRN):  dextrose Oral Gel 15 Gram(s) Oral once PRN Blood Glucose LESS THAN 70 milliGRAM(s)/deciliter  LORazepam   Injectable 1 milliGRAM(s) IV Push every 1 hour PRN CIWA-Ar score 8 or greater

## 2022-06-03 NOTE — PROGRESS NOTE ADULT - ASSESSMENT
67 y/o Persian speaking F w/ PMH of HTN, DM, anemia, hypothyroidism, pancreatitis, ETOH abuse, ETOH cirrhosis, esophageal varices w/ prior episodes of hepatic encephalopathy presented to ED c/o weakness and found to be be in DKA.  Pt was admitted to MICU and placed on insulin gtt and subsequently downgraded 6/1.  Pts glucose remains uncontrolled.       HHS / Uncontrolled DM w/ persistent hyperglycemia  - On admission BG>600, UA w/out ketones and serum acetone (+)   - s/p MICU for insulin gtt  - Gap closed and serum HCO3 nL   - Will continue to aggressively titrate basal/bolus insulin regimen to BG gial <180   - Blood cx pending but no evidence of UTI on UA    - HbA1c 13.7  - Endocrinology consulted and recs noted       Normocytic anemia / Thrombocytopenia   - Suspect mixed anemia from chronic disease and cirrhosis    - Hb stable and iron studies reviewed   - B12 and folate levels pending   - Plts chronically low 2/2 cirrhosis   - Hemodynamically stable and no active bleeding   - On ferrous sulfate  - Monitor CBC and transfuse as needed       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 Craven on d/c       Transaminitis 2/2 cirrhosis   - Pt is asymptomatic w/ benign abd exam and Tbili nL   - Monitor LFTs and if symptoms or worsen consider RUQ US  - Avoid hepatotoxic meds if possible or dose accordingly        Hyponatremia   - When corrected for hyperglycemia Na is 135      Hypothyroidism  - TFTs reviewed and synthroid adjusted    - Will need TFTs repeated in 4-6 weeks as outpt       Hypomagnesemia / Hypophosphatemia   - Likely 2/2 chronic etoh use   - Replete as needed       HTN  - BP stable  - On lasix and labetolol         VTE ppx: SCDs given thrombocytopenia     Dispo: Pt remains acute   65 y/o [place] speaking F w/ PMH of HTN, DM, anemia, hypothyroidism, pancreatitis, ETOH abuse, ETOH cirrhosis, esophageal varices w/ prior episodes of hepatic encephalopathy presented to ED c/o weakness and found to be be in HHS.  Pt was admitted to MICU and placed on insulin gtt and subsequently downgraded 6/1.  Pts glucose remains uncontrolled.       HHS / Uncontrolled DM w/ persistent hyperglycemia  - On admission BG>600, UA w/out ketones and serum acetone (+)   - s/p MICU for insulin gtt  - Gap closed and serum HCO3 nL   - Will continue to aggressively titrate basal/bolus insulin regimen to BG gial <180   - Blood cx pending but no evidence of UTI on UA    - HbA1c 13.7  - Endocrinology consulted and recs noted       Normocytic anemia / Thrombocytopenia   - Suspect mixed anemia from chronic disease and cirrhosis    - Hb stable and iron studies reviewed   - B12 and folate levels pending   - Plts chronically low 2/2 cirrhosis   - Hemodynamically stable and no active bleeding   - On ferrous sulfate  - Monitor CBC and transfuse as needed       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 Craven on d/c       Transaminitis 2/2 cirrhosis   - Pt is asymptomatic w/ benign abd exam and Tbili nL   - Monitor LFTs and if symptoms or worsen consider RUQ US  - Avoid hepatotoxic meds if possible or dose accordingly        Hyponatremia   - When corrected for hyperglycemia Na is 135      Hypothyroidism  - TFTs reviewed and synthroid adjusted    - Will need TFTs repeated in 4-6 weeks as outpt       Hypomagnesemia / Hypophosphatemia   - Likely 2/2 chronic etoh use   - Replete as needed       HTN  - BP stable  - On lasix and labetolol         VTE ppx: SCDs given thrombocytopenia     Dispo: Pt remains acute

## 2022-06-03 NOTE — PROVIDER CONTACT NOTE (CRITICAL VALUE NOTIFICATION) - SITUATION
Patient has a PMH of uncontrolled DM2, admitted for DKA. Rechecked BG and got 417
patient with Blood glucose of 607

## 2022-06-03 NOTE — PROGRESS NOTE ADULT - SUBJECTIVE AND OBJECTIVE BOX
CC:  Follow up GOC , Symptoms    OVERNIGHT EVENTS:  none reported    Present Symptoms:   Dyspnea:  No   Nausea/Vomiting:  No    Anxiety:  No     Depression:  No    Fatigue:   No   U  Loss of appetite:  No    Constipation: No    Pain: No               Character-            Duration-            Location-            Severity-    Pain AD Score:  [email or web address] (press ctrl + left click to view)    Review of Systems: Reviewed as above  All others negative    MEDICATIONS  (STANDING):  dextrose 5%. 1000 milliLiter(s) (50 mL/Hr) IV Continuous <Continuous>  dextrose 5%. 1000 milliLiter(s) (100 mL/Hr) IV Continuous <Continuous>  dextrose 50% Injectable 50 milliLiter(s) IV Push every 15 minutes  ferrous    sulfate 325 milliGRAM(s) Oral daily  folic acid 1 milliGRAM(s) Oral daily  furosemide    Tablet 40 milliGRAM(s) Oral daily  glucagon  Injectable 1 milliGRAM(s) IntraMuscular once  insulin glargine Injectable (LANTUS) 30 Unit(s) SubCutaneous at bedtime  insulin lispro (ADMELOG) corrective regimen sliding scale   SubCutaneous three times a day before meals  insulin lispro Injectable (ADMELOG) 10 Unit(s) SubCutaneous three times a day before meals  labetalol 100 milliGRAM(s) Oral two times a day  lactulose Syrup 10 Gram(s) Oral every 8 hours  levothyroxine 88 MICROGram(s) Oral daily  multivitamin 1 Tablet(s) Oral daily  pantoprazole    Tablet 40 milliGRAM(s) Oral before breakfast  rifAXIMin 550 milliGRAM(s) Oral two times a day  thiamine 100 milliGRAM(s) Oral daily    MEDICATIONS  (PRN):  dextrose Oral Gel 15 Gram(s) Oral once PRN Blood Glucose LESS THAN 70 milliGRAM(s)/deciliter  LORazepam   Injectable 1 milliGRAM(s) IV Push every 1 hour PRN CIWA-Ar score 8 or greater      PHYSICAL EXAM:    Vital Signs Last 24 Hrs  T(C): 36.9 (2022 10:40), Max: 36.9 (2022 21:52)  T(F): 98.4 (2022 10:40), Max: 98.5 (2022 21:52)  HR: 88 (2022 10:40) (86 - 106)  BP: 100/56 (2022 10:40) (100/56 - 120/78)  BP(mean): --  RR: 18 (2022 10:40) (18 - 18)  SpO2: 98% (2022 10:40) (96% - 98%)    Karnofsky: 60 %  General:    F awake NAD     HEENT:  NCAT  mmm  Lungs: comfortable  non labored  CV:  RR    GI: soft NTND   :  normal    MSK: moves all extremities  Skin:  warm/dry  Neuro      LABS:                          9.8    4.80  )-----------( 85       ( 2022 07:18 )             28.4     06-03    125<L>  |  91<L>  |  13.1  ----------------------------<  499<HH>  4.0   |  21.0<L>  |  0.83    Ca    8.0<L>      2022 07:18  Phos  2.1     06-03  Mg     2.0     06-03    TPro  6.4<L>  /  Alb  2.3<L>  /  TBili  1.4  /  DBili  x   /  AST  170<H>  /  ALT  72<H>  /  AlkPhos  466<H>  06-03      Urinalysis Basic - ( 2022 23:24 )    Color: Yellow / Appearance: Clear / S.005 / pH: x  Gluc: x / Ketone: Negative  / Bili: Negative / Urobili: Negative mg/dL   Blood: x / Protein: Negative / Nitrite: Negative   Leuk Esterase: Trace / RBC: 0-2 /HPF / WBC 0-2 /HPF   Sq Epi: x / Non Sq Epi: Occasional / Bacteria: Occasional      I&O's Summary      RADIOLOGY & ADDITIONAL STUDIES:        ADVANCE DIRECTIVES/TREATMENT PREFERENCES:

## 2022-06-03 NOTE — PROGRESS NOTE ADULT - ASSESSMENT
66yr woman  PMH of HTN,  ETOH abuse, ETOH cirrhosis decompensated by esophageal varices and hepatic encephalopathy, DM  Anemia, Pancreatitis admitted with DKA    DKA  management per primary team, endocrinology  - OTONIEL Vyas    ETOH abuse  States last drink 1month ago  ETOH  cessation  cont thiamine folic acid    Cirrhosis  hx of HE, varices   outpatient follow up     Encounter for Palliative Care  Patient known to our service from previous admission in June 2022.   Patient/family planned to follow up as outpatient with her GI and pursue transplant    See [facility] note above for details. In summary  Patient non specific as to whom she wants to designate as her HCP.  Refers to her son Abiodun, but also want other son Marshall and sister to be included.  She seems to have limited understanding about her condition.  Recommend family follow up wither with her GI and PCP for continued care.    Called shaunna Rascon - voicemail received

## 2022-06-03 NOTE — PROGRESS NOTE ADULT - SUBJECTIVE AND OBJECTIVE BOX
INTERVAL HPI/OVERNIGHT EVENTS:  followup uncontrolled DM   pt seen at [facility] with her son   reviewd diet extensively   pt son states she drinks lots of sweet benverages and eats lots of sweets at home    dw pt importance of  avoiding high sugary foods here and at home   otherwise she feels fine   pt cannot tell me her insulin regimen at home   MEDICATIONS  (STANDING):  dextrose 5%. 1000 milliLiter(s) (50 mL/Hr) IV Continuous <Continuous>  dextrose 5%. 1000 milliLiter(s) (100 mL/Hr) IV Continuous <Continuous>  dextrose 50% Injectable 50 milliLiter(s) IV Push every 15 minutes  ferrous    sulfate 325 milliGRAM(s) Oral daily  folic acid 1 milliGRAM(s) Oral daily  furosemide    Tablet 40 milliGRAM(s) Oral daily  glucagon  Injectable 1 milliGRAM(s) IntraMuscular once  insulin glargine Injectable (LANTUS) 30 Unit(s) SubCutaneous at bedtime  insulin lispro (ADMELOG) corrective regimen sliding scale   SubCutaneous three times a day before meals  insulin lispro Injectable (ADMELOG) 12 Unit(s) SubCutaneous three times a day before meals  labetalol 100 milliGRAM(s) Oral two times a day  lactulose Syrup 10 Gram(s) Oral every 8 hours  levothyroxine 88 MICROGram(s) Oral daily  multivitamin 1 Tablet(s) Oral daily  pantoprazole    Tablet 40 milliGRAM(s) Oral before breakfast  rifAXIMin 550 milliGRAM(s) Oral two times a day  sodium chloride 0.9%. 1000 milliLiter(s) (100 mL/Hr) IV Continuous <Continuous>  thiamine 100 milliGRAM(s) Oral daily    MEDICATIONS  (PRN):  dextrose Oral Gel 15 Gram(s) Oral once PRN Blood Glucose LESS THAN 70 milliGRAM(s)/deciliter  LORazepam   Injectable 1 milliGRAM(s) IV Push every 1 hour PRN CIWA-Ar score 8 or greater      Allergies    No Known Allergies    Intolerances        Review of systems:  N CP no pressure no dyspena n o n/v no abd pain     Vital Signs Last 24 Hrs    T(C): 36.9 (03 Jun 2022 20:08), Max: 36.9 (03 Jun 2022 10:40)  T(F): 98.4 (03 Jun 2022 20:08), Max: 98.4 (03 Jun 2022 10:40)  HR: 89 (03 Jun 2022 20:08) (85 - 89)  BP: 101/60 (03 Jun 2022 20:08) (100/56 - 101/60)  BP(mean): --  RR: 18 (03 Jun 2022 20:08) (18 - 18)  SpO2: 99% (03 Jun 2022 20:08) (98% - 99%)    PHYSICAL EXAM:      Constitutional: NAD, well-groomed, well-developed  HEENT: PERRLA, EOMI, no exophalmos    Respiratory: CTAB  Cardiovascular: S1 and S2, RRR, no M/G/R    Extremities: No peripheral edema, no pedal lesions  Vascular: 2+ peripheral pulses  Neurological: A/O x 3, no focal deficits  Psychiatric: Normal mood, normal affect  Musculoskeletal: 5/5 strength b/l upper and lower extremities  Skin: No rashes, no acanthosis        LABS:                        9.8    4.80  )-----------( 85       ( 03 Jun 2022 07:18 )             28.4     06-03    128<L>  |  93<L>  |  12.6  ----------------------------<  407<H>  3.6   |  25.0  |  0.97    Ca    8.2<L>      03 Jun 2022 14:31  Phos  2.1     06-03  Mg     2.0     06-03    TPro  6.4<L>  /  Alb  2.3<L>  /  TBili  1.3  /  DBili  x   /  AST  145<H>  /  ALT  70<H>  /  AlkPhos  456<H>  06-03          CAPILLARY BLOOD GLUCOSE  CAPILLARY BLOOD GLUCOSE      POCT Blood Glucose.: 384 mg/dL (03 Jun 2022 22:02)  POCT Blood Glucose.: 374 mg/dL (03 Jun 2022 17:12)  POCT Blood Glucose.: 411 mg/dL (03 Jun 2022 14:01)  POCT Blood Glucose.: 400 mg/dL (03 Jun 2022 12:32)  POCT Blood Glucose.: 401 mg/dL (03 Jun 2022 12:11)  POCT Blood Glucose.: 417 mg/dL (03 Jun 2022 08:23)      RADIOLOGY & ADDITIONAL TESTS:

## 2022-06-03 NOTE — PROGRESS NOTE ADULT - NS ATTEST RISK PROBLEM GEN_ALL_CORE FT
HHS, uncontrolled hyperglycemia, electrolyte abnormalities
HHS, uncontrolled hyperglycemia, electrolyte abnormalities

## 2022-06-04 LAB
-  AMIKACIN: SIGNIFICANT CHANGE UP
-  AMOXICILLIN/CLAVULANIC ACID: SIGNIFICANT CHANGE UP
-  AMPICILLIN/SULBACTAM: SIGNIFICANT CHANGE UP
-  AMPICILLIN: SIGNIFICANT CHANGE UP
-  AZTREONAM: SIGNIFICANT CHANGE UP
-  CEFAZOLIN: SIGNIFICANT CHANGE UP
-  CEFEPIME: SIGNIFICANT CHANGE UP
-  CEFOXITIN: SIGNIFICANT CHANGE UP
-  CEFTRIAXONE: SIGNIFICANT CHANGE UP
-  CIPROFLOXACIN: SIGNIFICANT CHANGE UP
-  ERTAPENEM: SIGNIFICANT CHANGE UP
-  GENTAMICIN: SIGNIFICANT CHANGE UP
-  IMIPENEM: SIGNIFICANT CHANGE UP
-  LEVOFLOXACIN: SIGNIFICANT CHANGE UP
-  MEROPENEM: SIGNIFICANT CHANGE UP
-  NITROFURANTOIN: SIGNIFICANT CHANGE UP
-  PIPERACILLIN/TAZOBACTAM: SIGNIFICANT CHANGE UP
-  TIGECYCLINE: SIGNIFICANT CHANGE UP
-  TOBRAMYCIN: SIGNIFICANT CHANGE UP
-  TRIMETHOPRIM/SULFAMETHOXAZOLE: SIGNIFICANT CHANGE UP
ALBUMIN SERPL ELPH-MCNC: 2.1 G/DL — LOW (ref 3.3–5.2)
ALP SERPL-CCNC: 435 U/L — HIGH (ref 40–120)
ALT FLD-CCNC: 61 U/L — HIGH
ANION GAP SERPL CALC-SCNC: 13 MMOL/L — SIGNIFICANT CHANGE UP (ref 5–17)
AST SERPL-CCNC: 132 U/L — HIGH
BILIRUB SERPL-MCNC: 1.4 MG/DL — SIGNIFICANT CHANGE UP (ref 0.4–2)
BUN SERPL-MCNC: 12.1 MG/DL — SIGNIFICANT CHANGE UP (ref 8–20)
CALCIUM SERPL-MCNC: 7.9 MG/DL — LOW (ref 8.6–10.2)
CHLORIDE SERPL-SCNC: 95 MMOL/L — LOW (ref 98–107)
CO2 SERPL-SCNC: 20 MMOL/L — LOW (ref 22–29)
CREAT SERPL-MCNC: 0.66 MG/DL — SIGNIFICANT CHANGE UP (ref 0.5–1.3)
CULTURE RESULTS: SIGNIFICANT CHANGE UP
EGFR: 98 ML/MIN/1.73M2 — SIGNIFICANT CHANGE UP
GLUCOSE BLDC GLUCOMTR-MCNC: 295 MG/DL — HIGH (ref 70–99)
GLUCOSE BLDC GLUCOMTR-MCNC: 300 MG/DL — HIGH (ref 70–99)
GLUCOSE BLDC GLUCOMTR-MCNC: 356 MG/DL — HIGH (ref 70–99)
GLUCOSE BLDC GLUCOMTR-MCNC: 380 MG/DL — HIGH (ref 70–99)
GLUCOSE SERPL-MCNC: 297 MG/DL — HIGH (ref 70–99)
HCT VFR BLD CALC: 27 % — LOW (ref 34.5–45)
HGB BLD-MCNC: 9.2 G/DL — LOW (ref 11.5–15.5)
MAGNESIUM SERPL-MCNC: 1.8 MG/DL — SIGNIFICANT CHANGE UP (ref 1.8–2.6)
MCHC RBC-ENTMCNC: 33.7 PG — SIGNIFICANT CHANGE UP (ref 27–34)
MCHC RBC-ENTMCNC: 34.1 GM/DL — SIGNIFICANT CHANGE UP (ref 32–36)
MCV RBC AUTO: 98.9 FL — SIGNIFICANT CHANGE UP (ref 80–100)
METHOD TYPE: SIGNIFICANT CHANGE UP
ORGANISM # SPEC MICROSCOPIC CNT: SIGNIFICANT CHANGE UP
ORGANISM # SPEC MICROSCOPIC CNT: SIGNIFICANT CHANGE UP
PHOSPHATE SERPL-MCNC: 1.9 MG/DL — LOW (ref 2.4–4.7)
PLATELET # BLD AUTO: 97 K/UL — LOW (ref 150–400)
POTASSIUM SERPL-MCNC: 3.8 MMOL/L — SIGNIFICANT CHANGE UP (ref 3.5–5.3)
POTASSIUM SERPL-SCNC: 3.8 MMOL/L — SIGNIFICANT CHANGE UP (ref 3.5–5.3)
PROT SERPL-MCNC: 6 G/DL — LOW (ref 6.6–8.7)
RBC # BLD: 2.73 M/UL — LOW (ref 3.8–5.2)
RBC # FLD: 17.2 % — HIGH (ref 10.3–14.5)
SODIUM SERPL-SCNC: 128 MMOL/L — LOW (ref 135–145)
SPECIMEN SOURCE: SIGNIFICANT CHANGE UP
WBC # BLD: 4.5 K/UL — SIGNIFICANT CHANGE UP (ref 3.8–10.5)
WBC # FLD AUTO: 4.5 K/UL — SIGNIFICANT CHANGE UP (ref 3.8–10.5)

## 2022-06-04 PROCEDURE — 99233 SBSQ HOSP IP/OBS HIGH 50: CPT

## 2022-06-04 RX ORDER — INSULIN GLARGINE 100 [IU]/ML
35 INJECTION, SOLUTION SUBCUTANEOUS AT BEDTIME
Refills: 0 | Status: DISCONTINUED | OUTPATIENT
Start: 2022-06-04 | End: 2022-06-06

## 2022-06-04 RX ORDER — CEPHALEXIN 500 MG
500 CAPSULE ORAL EVERY 12 HOURS
Refills: 0 | Status: DISCONTINUED | OUTPATIENT
Start: 2022-06-04 | End: 2022-06-05

## 2022-06-04 RX ORDER — FLUCONAZOLE 150 MG/1
150 TABLET ORAL ONCE
Refills: 0 | Status: COMPLETED | OUTPATIENT
Start: 2022-06-04 | End: 2022-06-04

## 2022-06-04 RX ORDER — INSULIN LISPRO 100/ML
15 VIAL (ML) SUBCUTANEOUS
Refills: 0 | Status: DISCONTINUED | OUTPATIENT
Start: 2022-06-04 | End: 2022-06-08

## 2022-06-04 RX ORDER — SPIRONOLACTONE 25 MG/1
50 TABLET, FILM COATED ORAL DAILY
Refills: 0 | Status: DISCONTINUED | OUTPATIENT
Start: 2022-06-05 | End: 2022-06-10

## 2022-06-04 RX ADMIN — FLUCONAZOLE 150 MILLIGRAM(S): 150 TABLET ORAL at 13:26

## 2022-06-04 RX ADMIN — Medication 1 APPLICATORFUL: at 22:05

## 2022-06-04 RX ADMIN — Medication 12 UNIT(S): at 12:33

## 2022-06-04 RX ADMIN — Medication 6: at 08:12

## 2022-06-04 RX ADMIN — LACTULOSE 10 GRAM(S): 10 SOLUTION ORAL at 13:25

## 2022-06-04 RX ADMIN — Medication 10: at 12:32

## 2022-06-04 RX ADMIN — Medication 100 MILLIGRAM(S): at 13:23

## 2022-06-04 RX ADMIN — Medication 40 MILLIGRAM(S): at 05:16

## 2022-06-04 RX ADMIN — Medication 100 MILLIGRAM(S): at 05:16

## 2022-06-04 RX ADMIN — Medication 1 MILLIGRAM(S): at 13:25

## 2022-06-04 RX ADMIN — Medication 12 UNIT(S): at 08:13

## 2022-06-04 RX ADMIN — PANTOPRAZOLE SODIUM 40 MILLIGRAM(S): 20 TABLET, DELAYED RELEASE ORAL at 05:16

## 2022-06-04 RX ADMIN — Medication 15 UNIT(S): at 17:46

## 2022-06-04 RX ADMIN — Medication 10: at 17:45

## 2022-06-04 RX ADMIN — LACTULOSE 10 GRAM(S): 10 SOLUTION ORAL at 05:16

## 2022-06-04 RX ADMIN — Medication 1 TABLET(S): at 13:24

## 2022-06-04 RX ADMIN — Medication 88 MICROGRAM(S): at 05:16

## 2022-06-04 RX ADMIN — Medication 325 MILLIGRAM(S): at 13:23

## 2022-06-04 RX ADMIN — Medication 500 MILLIGRAM(S): at 15:04

## 2022-06-04 RX ADMIN — SODIUM CHLORIDE 100 MILLILITER(S): 9 INJECTION INTRAMUSCULAR; INTRAVENOUS; SUBCUTANEOUS at 08:11

## 2022-06-04 RX ADMIN — LACTULOSE 10 GRAM(S): 10 SOLUTION ORAL at 22:05

## 2022-06-04 RX ADMIN — INSULIN GLARGINE 35 UNIT(S): 100 INJECTION, SOLUTION SUBCUTANEOUS at 22:05

## 2022-06-04 NOTE — PROGRESS NOTE ADULT - SUBJECTIVE AND OBJECTIVE BOX
[facility] Division of Hospital Medicine    Chief Complaint:  [facility]    SUBJECTIVE: c/o of vaginal itching and milky discharge    OVERNIGHT EVENTS: none reported    Patient denies chest pain, SOB, abd pain, N/V, fever, chills, dysuria or any other complaints. All remainder ROS negative.     MEDICATIONS  (STANDING):  clotrimazole 2% Vaginal Cream 1 Applicatorful Vaginal at bedtime  dextrose 5%. 1000 milliLiter(s) (50 mL/Hr) IV Continuous <Continuous>  dextrose 5%. 1000 milliLiter(s) (100 mL/Hr) IV Continuous <Continuous>  dextrose 50% Injectable 50 milliLiter(s) IV Push every 15 minutes  ferrous    sulfate 325 milliGRAM(s) Oral daily  folic acid 1 milliGRAM(s) Oral daily  furosemide    Tablet 40 milliGRAM(s) Oral daily  glucagon  Injectable 1 milliGRAM(s) IntraMuscular once  insulin glargine Injectable (LANTUS) 35 Unit(s) SubCutaneous at bedtime  insulin lispro (ADMELOG) corrective regimen sliding scale   SubCutaneous three times a day before meals  insulin lispro Injectable (ADMELOG) 15 Unit(s) SubCutaneous three times a day before meals  lactulose Syrup 10 Gram(s) Oral every 8 hours  levothyroxine 88 MICROGram(s) Oral daily  multivitamin 1 Tablet(s) Oral daily  pantoprazole    Tablet 40 milliGRAM(s) Oral before breakfast  propranolol 10 milliGRAM(s) Oral every 8 hours  rifAXIMin 550 milliGRAM(s) Oral two times a day  thiamine 100 milliGRAM(s) Oral daily    MEDICATIONS  (PRN):  dextrose Oral Gel 15 Gram(s) Oral once PRN Blood Glucose LESS THAN 70 milliGRAM(s)/deciliter  LORazepam   Injectable 1 milliGRAM(s) IV Push every 1 hour PRN CIWA-Ar score 8 or greater        I&O's Summary      PHYSICAL EXAM:  Vital Signs Last 24 Hrs  T(C): 37 (04 Jun 2022 10:46), Max: 37 (04 Jun 2022 10:46)  T(F): 98.6 (04 Jun 2022 10:46), Max: 98.6 (04 Jun 2022 10:46)  HR: 84 (04 Jun 2022 10:46) (83 - 89)  BP: 115/69 (04 Jun 2022 10:46) (100/60 - 115/69)  BP(mean): --  RR: 18 (04 Jun 2022 10:46) (18 - 18)  SpO2: 97% (04 Jun 2022 10:46) (95% - 99%)        CONSTITUTIONAL: NAD  ENMT: Moist oral mucosa, no pharyngeal injection or exudates; normal dentition; No JVD  RESPIRATORY: Normal respiratory effort; lungs are clear to auscultation bilaterally  CARDIOVASCULAR: Regular rate and rhythm, normal S1 and S2, no murmur/rub/gallop; No lower extremity edema; Peripheral pulses are 2+ bilaterally  ABDOMEN: Nontender to palpation, normoactive bowel sounds, no rebound/guarding; No hepatosplenomegaly  MUSCLOSKELETAL:  no clubbing or cyanosis of digits; no joint swelling or tenderness to palpation  PSYCH: A+O to person, place, and time; affect appropriate  NEUROLOGY: CN 2-12 are intact and symmetric; no gross sensory deficits; was observed moving all 4 ext against gravity cooperating with exam.   SKIN: No rashes; no palpable lesions    LABS:                        9.2    4.50  )-----------( 97       ( 04 Jun 2022 07:38 )             27.0     06-04    128<L>  |  95<L>  |  12.1  ----------------------------<  297<H>  3.8   |  20.0<L>  |  0.66    Ca    7.9<L>      04 Jun 2022 07:38  Phos  1.9     06-04  Mg     1.8     06-04    TPro  6.0<L>  /  Alb  2.1<L>  /  TBili  1.4  /  DBili  x   /  AST  132<H>  /  ALT  61<H>  /  AlkPhos  435<H>  06-04              Culture - Urine (collected 02 Jun 2022 03:05)  Source: Clean Catch Clean Catch (Midstream)  Final Report (04 Jun 2022 06:40):    >100,000 CFU/ml Escherichia coli  Organism: Escherichia coli (04 Jun 2022 06:40)  Organism: Escherichia coli (04 Jun 2022 06:40)      CAPILLARY BLOOD GLUCOSE      POCT Blood Glucose.: 380 mg/dL (04 Jun 2022 12:31)  POCT Blood Glucose.: 295 mg/dL (04 Jun 2022 08:10)  POCT Blood Glucose.: 384 mg/dL (03 Jun 2022 22:02)  POCT Blood Glucose.: 374 mg/dL (03 Jun 2022 17:12)  POCT Blood Glucose.: 411 mg/dL (03 Jun 2022 14:01)        RADIOLOGY & ADDITIONAL TESTS:  Results Reviewed:   Imaging Personally Reviewed:  Electrocardiogram Personally Reviewed: [facility] Division of Hospital Medicine    Chief Complaint:  [facility]    SUBJECTIVE: c/o of vaginal itching and milky discharge ( use language line # [id number])    OVERNIGHT EVENTS: none reported    Patient denies chest pain, SOB, abd pain, N/V, fever, chills, dysuria or any other complaints. All remainder ROS negative.     MEDICATIONS  (STANDING):  clotrimazole 2% Vaginal Cream 1 Applicatorful Vaginal at bedtime  dextrose 5%. 1000 milliLiter(s) (50 mL/Hr) IV Continuous <Continuous>  dextrose 5%. 1000 milliLiter(s) (100 mL/Hr) IV Continuous <Continuous>  dextrose 50% Injectable 50 milliLiter(s) IV Push every 15 minutes  ferrous    sulfate 325 milliGRAM(s) Oral daily  folic acid 1 milliGRAM(s) Oral daily  furosemide    Tablet 40 milliGRAM(s) Oral daily  glucagon  Injectable 1 milliGRAM(s) IntraMuscular once  insulin glargine Injectable (LANTUS) 35 Unit(s) SubCutaneous at bedtime  insulin lispro (ADMELOG) corrective regimen sliding scale   SubCutaneous three times a day before meals  insulin lispro Injectable (ADMELOG) 15 Unit(s) SubCutaneous three times a day before meals  lactulose Syrup 10 Gram(s) Oral every 8 hours  levothyroxine 88 MICROGram(s) Oral daily  multivitamin 1 Tablet(s) Oral daily  pantoprazole    Tablet 40 milliGRAM(s) Oral before breakfast  propranolol 10 milliGRAM(s) Oral every 8 hours  rifAXIMin 550 milliGRAM(s) Oral two times a day  thiamine 100 milliGRAM(s) Oral daily    MEDICATIONS  (PRN):  dextrose Oral Gel 15 Gram(s) Oral once PRN Blood Glucose LESS THAN 70 milliGRAM(s)/deciliter  LORazepam   Injectable 1 milliGRAM(s) IV Push every 1 hour PRN CIWA-Ar score 8 or greater        I&O's Summary      PHYSICAL EXAM:  Vital Signs Last 24 Hrs  T(C): 37 (04 Jun 2022 10:46), Max: 37 (04 Jun 2022 10:46)  T(F): 98.6 (04 Jun 2022 10:46), Max: 98.6 (04 Jun 2022 10:46)  HR: 84 (04 Jun 2022 10:46) (83 - 89)  BP: 115/69 (04 Jun 2022 10:46) (100/60 - 115/69)  BP(mean): --  RR: 18 (04 Jun 2022 10:46) (18 - 18)  SpO2: 97% (04 Jun 2022 10:46) (95% - 99%)        CONSTITUTIONAL: NAD  ENMT: Moist oral mucosa, no pharyngeal injection or exudates; normal dentition; No JVD  RESPIRATORY: Normal respiratory effort; lungs are clear to auscultation bilaterally  CARDIOVASCULAR: Regular rate and rhythm, normal S1 and S2, no murmur/rub/gallop; No lower extremity edema; Peripheral pulses are 2+ bilaterally  ABDOMEN: Nontender to palpation, normoactive bowel sounds, no rebound/guarding; No hepatosplenomegaly  MUSCLOSKELETAL:  no clubbing or cyanosis of digits; no joint swelling or tenderness to palpation  PSYCH: A+O to person, place, and time; affect appropriate  NEUROLOGY: CN 2-12 are intact and symmetric; no gross sensory deficits; was observed moving all 4 ext against gravity cooperating with exam.   SKIN: No rashes; no palpable lesions    LABS:                        9.2    4.50  )-----------( 97       ( 04 Jun 2022 07:38 )             27.0     06-04    128<L>  |  95<L>  |  12.1  ----------------------------<  297<H>  3.8   |  20.0<L>  |  0.66    Ca    7.9<L>      04 Jun 2022 07:38  Phos  1.9     06-04  Mg     1.8     06-04    TPro  6.0<L>  /  Alb  2.1<L>  /  TBili  1.4  /  DBili  x   /  AST  132<H>  /  ALT  61<H>  /  AlkPhos  435<H>  06-04              Culture - Urine (collected 02 Jun 2022 03:05)  Source: Clean Catch Clean Catch (Midstream)  Final Report (04 Jun 2022 06:40):    >100,000 CFU/ml Escherichia coli  Organism: Escherichia coli (04 Jun 2022 06:40)  Organism: Escherichia coli (04 Jun 2022 06:40)      CAPILLARY BLOOD GLUCOSE      POCT Blood Glucose.: 380 mg/dL (04 Jun 2022 12:31)  POCT Blood Glucose.: 295 mg/dL (04 Jun 2022 08:10)  POCT Blood Glucose.: 384 mg/dL (03 Jun 2022 22:02)  POCT Blood Glucose.: 374 mg/dL (03 Jun 2022 17:12)  POCT Blood Glucose.: 411 mg/dL (03 Jun 2022 14:01)        RADIOLOGY & ADDITIONAL TESTS:  Results Reviewed:   Imaging Personally Reviewed:  Electrocardiogram Personally Reviewed:

## 2022-06-04 NOTE — PROGRESS NOTE ADULT - SUBJECTIVE AND OBJECTIVE BOX
INTERVAL HPI/OVERNIGHT EVENTS:  follow up T2DM    BS improving  slowly   pt trying better with diet today     MEDICATIONS  (STANDING):  cephalexin 500 milliGRAM(s) Oral every 12 hours  clotrimazole 2% Vaginal Cream 1 Applicatorful Vaginal at bedtime  dextrose 5%. 1000 milliLiter(s) (50 mL/Hr) IV Continuous <Continuous>  dextrose 5%. 1000 milliLiter(s) (100 mL/Hr) IV Continuous <Continuous>  dextrose 50% Injectable 50 milliLiter(s) IV Push every 15 minutes  ferrous    sulfate 325 milliGRAM(s) Oral daily  folic acid 1 milliGRAM(s) Oral daily  furosemide    Tablet 40 milliGRAM(s) Oral daily  glucagon  Injectable 1 milliGRAM(s) IntraMuscular once  insulin glargine Injectable (LANTUS) 35 Unit(s) SubCutaneous at bedtime  insulin lispro (ADMELOG) corrective regimen sliding scale   SubCutaneous three times a day before meals  insulin lispro Injectable (ADMELOG) 15 Unit(s) SubCutaneous three times a day before meals  lactulose Syrup 10 Gram(s) Oral every 8 hours  levothyroxine 88 MICROGram(s) Oral daily  multivitamin 1 Tablet(s) Oral daily  pantoprazole    Tablet 40 milliGRAM(s) Oral before breakfast  propranolol 10 milliGRAM(s) Oral every 8 hours  rifAXIMin 550 milliGRAM(s) Oral two times a day  thiamine 100 milliGRAM(s) Oral daily    MEDICATIONS  (PRN):  dextrose Oral Gel 15 Gram(s) Oral once PRN Blood Glucose LESS THAN 70 milliGRAM(s)/deciliter  LORazepam   Injectable 1 milliGRAM(s) IV Push every 1 hour PRN CIWA-Ar score 8 or greater      Allergies    No Known Allergies    Intolerances        Review of systems:   No CP no rpessure  no dyspnea     Vital Signs Last 24 Hrs  T(C): 37 (04 Jun 2022 20:34), Max: 37.1 (04 Jun 2022 16:18)  T(F): 98.6 (04 Jun 2022 20:34), Max: 98.8 (04 Jun 2022 16:18)  HR: 83 (04 Jun 2022 20:34) (83 - 88)  BP: 115/74 (04 Jun 2022 20:34) (107/64 - 115/74)  BP(mean): --  RR: 18 (04 Jun 2022 20:34) (18 - 18)  SpO2: 96% (04 Jun 2022 20:34) (95% - 97%)    PHYSICAL EXAM:      Constitutional: NAD, well-groomed, well-developed    Back: Normal spine flexure, No CVA tenderness  Respiratory: CTAB  Cardiovascular: S1 and S2, RRR, no M/G/R  Extremities: No peripheral edema, no pedal lesions  Vascular: 2+ peripheral pulses    Skin: No rashes, no acanthosis        LABS:                        9.2    4.50  )-----------( 97       ( 04 Jun 2022 07:38 )             27.0     06-04    128<L>  |  95<L>  |  12.1  ----------------------------<  297<H>  3.8   |  20.0<L>  |  0.66    Ca    7.9<L>      04 Jun 2022 07:38  Phos  1.9     06-04  Mg     1.8     06-04    TPro  6.0<L>  /  Alb  2.1<L>  /  TBili  1.4  /  DBili  x   /  AST  132<H>  /  ALT  61<H>  /  AlkPhos  435<H>  06-04          CAPILLARY BLOOD GLUCOSE  CAPILLARY BLOOD GLUCOSE      POCT Blood Glucose.: 300 mg/dL (04 Jun 2022 22:00)  POCT Blood Glucose.: 356 mg/dL (04 Jun 2022 17:44)  POCT Blood Glucose.: 380 mg/dL (04 Jun 2022 12:31)  POCT Blood Glucose.: 295 mg/dL (04 Jun 2022 08:10)      RADIOLOGY & ADDITIONAL TESTS:

## 2022-06-04 NOTE — PROGRESS NOTE ADULT - ASSESSMENT
66yr woman  PMH of HTN,  ETOH abuse, ETOH cirrhosis decompensated by esophageal varices and hepatic encephalopathy, DM  Anemia, Pancreatitis admitted with HHS. Pt was downgraded to medicine team on 06/01, currently hyperglycemia is poorly controlled and Insulin is titrated.     Poorly controlled DMT2 c/b HHS, still poorly controlled hyperglycemia   - increased insulin to Lantus 35 qhs, ac 15, mdss  - A1C  - 13.7  - diabetic teaching, pt was not aware how much insuine she takes at home, shows poor insight  - endocrinology team follows       UTI  - today c/o vaginal itching and burning with white vaginal discharge  - urine cx noted  - will treat as UTI with Keflex 500 bid x 7 days  - Diflucan 150 mg x 1    Alcoholic liver cirrhosis c/b hx of esophagel varcies, hepatic encephalopathy, anemia and thrombocytopenia, stable   - h/h noted, no apparent active bleeding, c/w iron suppplement  - cbc daily   - c/w Furosemide 40, added spironolactone 50 mg in am, switched Labetalol to propranolol 10 tid, c/w lactulose, rifaximin and ppi  -  Will refer to Dr. Craven on d/c     #Hyponatremia, most likely due to liver chirrhosis   - management as above    #Hypothyroidism  - c/w levothyroxine   - Will need TFTs repeated in 4-6 weeks as outpt       Hypomagnesemia / Hypophosphatemia   - Likely 2/2 chronic etoh use   - Supplemented   - Replete as needed       HTN- c/w propranolol as above    VTE ppx: SCDs given thrombocytopenia   Code/social - full code  Dispo: continue to adjust insulin

## 2022-06-04 NOTE — PROGRESS NOTE ADULT - ASSESSMENT
T2DM   Insulin already inc by PMD   Pt ahs been trying today to watch diet       Hypothryoid Cont RX       Cirrhosis     on Rifaximin

## 2022-06-05 LAB
ANION GAP SERPL CALC-SCNC: 9 MMOL/L — SIGNIFICANT CHANGE UP (ref 5–17)
BUN SERPL-MCNC: 11.8 MG/DL — SIGNIFICANT CHANGE UP (ref 8–20)
CALCIUM SERPL-MCNC: 8.1 MG/DL — LOW (ref 8.6–10.2)
CHLORIDE SERPL-SCNC: 100 MMOL/L — SIGNIFICANT CHANGE UP (ref 98–107)
CO2 SERPL-SCNC: 21 MMOL/L — LOW (ref 22–29)
CREAT SERPL-MCNC: 0.86 MG/DL — SIGNIFICANT CHANGE UP (ref 0.5–1.3)
CULTURE RESULTS: SIGNIFICANT CHANGE UP
EGFR: 75 ML/MIN/1.73M2 — SIGNIFICANT CHANGE UP
GLUCOSE BLDC GLUCOMTR-MCNC: 150 MG/DL — HIGH (ref 70–99)
GLUCOSE BLDC GLUCOMTR-MCNC: 226 MG/DL — HIGH (ref 70–99)
GLUCOSE BLDC GLUCOMTR-MCNC: 280 MG/DL — HIGH (ref 70–99)
GLUCOSE BLDC GLUCOMTR-MCNC: 380 MG/DL — HIGH (ref 70–99)
GLUCOSE BLDC GLUCOMTR-MCNC: 438 MG/DL — HIGH (ref 70–99)
GLUCOSE SERPL-MCNC: 316 MG/DL — HIGH (ref 70–99)
GRAM STN FLD: SIGNIFICANT CHANGE UP
HCT VFR BLD CALC: 27.7 % — LOW (ref 34.5–45)
HGB BLD-MCNC: 9.7 G/DL — LOW (ref 11.5–15.5)
MCHC RBC-ENTMCNC: 34.4 PG — HIGH (ref 27–34)
MCHC RBC-ENTMCNC: 35 GM/DL — SIGNIFICANT CHANGE UP (ref 32–36)
MCV RBC AUTO: 98.2 FL — SIGNIFICANT CHANGE UP (ref 80–100)
PLATELET # BLD AUTO: 131 K/UL — LOW (ref 150–400)
POTASSIUM SERPL-MCNC: 3.9 MMOL/L — SIGNIFICANT CHANGE UP (ref 3.5–5.3)
POTASSIUM SERPL-SCNC: 3.9 MMOL/L — SIGNIFICANT CHANGE UP (ref 3.5–5.3)
RBC # BLD: 2.82 M/UL — LOW (ref 3.8–5.2)
RBC # FLD: 17.5 % — HIGH (ref 10.3–14.5)
SODIUM SERPL-SCNC: 130 MMOL/L — LOW (ref 135–145)
SPECIMEN SOURCE: SIGNIFICANT CHANGE UP
WBC # BLD: 5.6 K/UL — SIGNIFICANT CHANGE UP (ref 3.8–10.5)
WBC # FLD AUTO: 5.6 K/UL — SIGNIFICANT CHANGE UP (ref 3.8–10.5)

## 2022-06-05 PROCEDURE — 99233 SBSQ HOSP IP/OBS HIGH 50: CPT

## 2022-06-05 RX ORDER — TRAMADOL HYDROCHLORIDE 50 MG/1
25 TABLET ORAL EVERY 6 HOURS
Refills: 0 | Status: DISCONTINUED | OUTPATIENT
Start: 2022-06-05 | End: 2022-06-10

## 2022-06-05 RX ORDER — CEFTRIAXONE 500 MG/1
1000 INJECTION, POWDER, FOR SOLUTION INTRAMUSCULAR; INTRAVENOUS EVERY 24 HOURS
Refills: 0 | Status: COMPLETED | OUTPATIENT
Start: 2022-06-05 | End: 2022-06-06

## 2022-06-05 RX ORDER — ACETAMINOPHEN 500 MG
650 TABLET ORAL EVERY 6 HOURS
Refills: 0 | Status: DISCONTINUED | OUTPATIENT
Start: 2022-06-05 | End: 2022-06-10

## 2022-06-05 RX ORDER — INSULIN LISPRO 100/ML
10 VIAL (ML) SUBCUTANEOUS ONCE
Refills: 0 | Status: COMPLETED | OUTPATIENT
Start: 2022-06-05 | End: 2022-06-05

## 2022-06-05 RX ORDER — INSULIN LISPRO 100/ML
2 VIAL (ML) SUBCUTANEOUS ONCE
Refills: 0 | Status: COMPLETED | OUTPATIENT
Start: 2022-06-05 | End: 2022-06-05

## 2022-06-05 RX ADMIN — SPIRONOLACTONE 50 MILLIGRAM(S): 25 TABLET, FILM COATED ORAL at 05:45

## 2022-06-05 RX ADMIN — Medication 4: at 17:11

## 2022-06-05 RX ADMIN — Medication 650 MILLIGRAM(S): at 12:30

## 2022-06-05 RX ADMIN — CEFTRIAXONE 100 MILLIGRAM(S): 500 INJECTION, POWDER, FOR SOLUTION INTRAMUSCULAR; INTRAVENOUS at 11:04

## 2022-06-05 RX ADMIN — Medication 15 UNIT(S): at 17:11

## 2022-06-05 RX ADMIN — Medication 15 UNIT(S): at 11:03

## 2022-06-05 RX ADMIN — TRAMADOL HYDROCHLORIDE 25 MILLIGRAM(S): 50 TABLET ORAL at 12:06

## 2022-06-05 RX ADMIN — Medication 40 MILLIGRAM(S): at 05:45

## 2022-06-05 RX ADMIN — LACTULOSE 10 GRAM(S): 10 SOLUTION ORAL at 05:44

## 2022-06-05 RX ADMIN — Medication 88 MICROGRAM(S): at 05:45

## 2022-06-05 RX ADMIN — Medication 325 MILLIGRAM(S): at 11:08

## 2022-06-05 RX ADMIN — Medication 10 UNIT(S): at 23:25

## 2022-06-05 RX ADMIN — Medication 1 APPLICATORFUL: at 21:52

## 2022-06-05 RX ADMIN — Medication 500 MILLIGRAM(S): at 01:30

## 2022-06-05 RX ADMIN — Medication 2 UNIT(S): at 01:31

## 2022-06-05 RX ADMIN — Medication 15 UNIT(S): at 09:14

## 2022-06-05 RX ADMIN — Medication 1 MILLIGRAM(S): at 11:08

## 2022-06-05 RX ADMIN — Medication 650 MILLIGRAM(S): at 11:37

## 2022-06-05 RX ADMIN — Medication 6: at 09:14

## 2022-06-05 RX ADMIN — PANTOPRAZOLE SODIUM 40 MILLIGRAM(S): 20 TABLET, DELAYED RELEASE ORAL at 05:45

## 2022-06-05 RX ADMIN — Medication 100 MILLIGRAM(S): at 11:08

## 2022-06-05 RX ADMIN — Medication 1 TABLET(S): at 11:08

## 2022-06-05 RX ADMIN — LACTULOSE 10 GRAM(S): 10 SOLUTION ORAL at 21:52

## 2022-06-05 RX ADMIN — INSULIN GLARGINE 35 UNIT(S): 100 INJECTION, SOLUTION SUBCUTANEOUS at 21:52

## 2022-06-05 RX ADMIN — TRAMADOL HYDROCHLORIDE 25 MILLIGRAM(S): 50 TABLET ORAL at 11:06

## 2022-06-05 NOTE — DIETITIAN INITIAL EVALUATION ADULT - PERTINENT LABORATORY DATA
06-05    130<L>  |  100  |  11.8  ----------------------------<  316<H>  3.9   |  21.0<L>  |  0.86    Ca    8.1<L>      05 Jun 2022 07:06  Phos  1.9     06-04  Mg     1.8     06-04    TPro  6.0<L>  /  Alb  2.1<L>  /  TBili  1.4  /  DBili  x   /  AST  132<H>  /  ALT  61<H>  /  AlkPhos  435<H>  06-04  POCT Blood Glucose.: 280 mg/dL (06-05-22 @ 08:29)  A1C with Estimated Average Glucose Result: 13.7 % (05-31-22 @ 22:03)  A1C with Estimated Average Glucose Result: 9.9 % (02-27-22 @ 06:13)  A1C with Estimated Average Glucose Result: 8.0 % (06-13-21 @ 09:51)    06-05 Na130 mmol/L<L> Glu 316 mg/dL<H> K+ 3.9 mmol/L Cr  0.86 mg/dL BUN 11.8 mg/dL Phos n/a   Alb n/a   PAB n/a

## 2022-06-05 NOTE — DIETITIAN INITIAL EVALUATION ADULT - NUTRITIONGOAL OUTCOME1
Patient will adhere to nutrition recommendations; Patient will meet >75% estimated nutrition needs via tolerated route.

## 2022-06-05 NOTE — DIETITIAN INITIAL EVALUATION ADULT - OTHER INFO
66yr woman  PMH of HTN,  ETOH abuse, ETOH cirrhosis decompensated by esophageal varices and hepatic encephalopathy, DM  Anemia, Pancreatitis admitted with HHS. Pt was downgraded to medicine team on 06/01, currently hyperglycemia is poorly controlled and Insulin is titrated.     Poorly controlled DMT2 c/b HHS, still poorly controlled hyperglycemia   - increased insulin to Lantus 35 qhs, ac 15, mdss  - A1C  - 13.7

## 2022-06-05 NOTE — DIETITIAN INITIAL EVALUATION ADULT - NS FNS DIET ORDER
Diet, Consistent Carbohydrate w/Evening Snack:   DASH/TLC {Sodium & Cholesterol Restricted} (DASH) (06-01-22 @ 08:34)

## 2022-06-05 NOTE — DIETITIAN INITIAL EVALUATION ADULT - PERTINENT MEDS FT
MEDICATIONS  (STANDING):  cefTRIAXone   IVPB 1000 milliGRAM(s) IV Intermittent every 24 hours  clotrimazole 2% Vaginal Cream 1 Applicatorful Vaginal at bedtime  dextrose 5%. 1000 milliLiter(s) (50 mL/Hr) IV Continuous <Continuous>  dextrose 5%. 1000 milliLiter(s) (100 mL/Hr) IV Continuous <Continuous>  dextrose 50% Injectable 50 milliLiter(s) IV Push every 15 minutes  ferrous    sulfate 325 milliGRAM(s) Oral daily  folic acid 1 milliGRAM(s) Oral daily  furosemide    Tablet 40 milliGRAM(s) Oral daily  glucagon  Injectable 1 milliGRAM(s) IntraMuscular once  insulin glargine Injectable (LANTUS) 35 Unit(s) SubCutaneous at bedtime  insulin lispro (ADMELOG) corrective regimen sliding scale   SubCutaneous three times a day before meals  insulin lispro Injectable (ADMELOG) 15 Unit(s) SubCutaneous three times a day before meals  lactulose Syrup 10 Gram(s) Oral every 8 hours  levothyroxine 88 MICROGram(s) Oral daily  multivitamin 1 Tablet(s) Oral daily  pantoprazole    Tablet 40 milliGRAM(s) Oral before breakfast  propranolol 10 milliGRAM(s) Oral every 8 hours  rifAXIMin 550 milliGRAM(s) Oral two times a day  spironolactone 50 milliGRAM(s) Oral daily  thiamine 100 milliGRAM(s) Oral daily    MEDICATIONS  (PRN):  acetaminophen     Tablet .. 650 milliGRAM(s) Oral every 6 hours PRN Temp greater or equal to 38C (100.4F), Moderate Pain (4 - 6)  dextrose Oral Gel 15 Gram(s) Oral once PRN Blood Glucose LESS THAN 70 milliGRAM(s)/deciliter  LORazepam   Injectable 1 milliGRAM(s) IV Push every 1 hour PRN CIWA-Ar score 8 or greater  traMADol 25 milliGRAM(s) Oral every 6 hours PRN Severe Pain (7 - 10)

## 2022-06-05 NOTE — PROGRESS NOTE ADULT - ASSESSMENT
T1DM   keep RX same   glucoese levesl much better today   Hpnatremia  choroic  liely due to cirrhoiss   UTI on antibitoc     alcoholic liver disease - to see GI as outpt

## 2022-06-05 NOTE — DIETITIAN INITIAL EVALUATION ADULT - ENTER FROM (CAL/KG)
Prior Authorization has been faxed down to the DAG's.  Waiting approval  Lynne Hodge LPN.......8/20/2020 9:55 AM    
Referral was placed for urge incontinence. Patient has had botox in the past, last one 08/15/19. She would like to get botox again.Please request prior auth.    Yasmeen Ortez on 8/20/2020 at 8:30 AM    
25

## 2022-06-05 NOTE — PROGRESS NOTE ADULT - ASSESSMENT
66yr woman  PMH of HTN,  ETOH abuse, ETOH cirrhosis decompensated by esophageal varices and hepatic encephalopathy, DM  Anemia, Pancreatitis admitted with HHS. Pt was downgraded to medicine team on 06/01, currently hyperglycemia is poorly controlled and Insulin is titrated. Later during hospital stay pt developed dysuria, therefore she was started on IV ceftriaxon given E.irene growing in urine.     UTI  - Ceftriaxon 1 g IV while in house, will switch to Keflex on d/c with total 7 days  - urine cx noted  - Diflucan 150 mg x 1    Poorly controlled DMT2 c/b HHS, still poorly controlled hyperglycemia   - c/w Lantus 35 qhs, ac 15, mdss  - A1C  - 13.7  - diabetic teaching, pt was not aware how much insuine she takes at home, shows poor insight  - endocrinology team follows    Back pain  - msk in nature, reproducible on palpation  - Tylenol and tramadol base on pain scale      Alcoholic liver cirrhosis c/b hx of esophagel varcies, hepatic encephalopathy, anemia and thrombocytopenia, stable   - h/h noted, no apparent active bleeding, c/w iron supplement  - cbc daily   - c/w Furosemide 40,  spironolactone 50 mg in am, propranolol 10 tid, c/w lactulose, rifaximin and ppi  -  Will refer to Dr. Craven on d/c     #Hyponatremia, most likely due to liver chirrhosis   - management as above    #Hypothyroidism  - c/w levothyroxine   - Will need TFTs repeated in 4-6 weeks as outpt       Hypomagnesemia / Hypophosphatemia   - Likely 2/2 chronic etoh use   - Supplemented   - Replete as needed       HTN- c/w propranolol as above    VTE ppx: SCDs given thrombocytopenia   Code/social - full code  Dispo: may be d/c home w/in next 24-48 hr if hyperglycemia is better controlled

## 2022-06-05 NOTE — PROGRESS NOTE ADULT - SUBJECTIVE AND OBJECTIVE BOX
INTERVAL HPI/OVERNIGHT EVENTS:  FOllow up T1DM   pt report to be feelign better   trying to eat better- having grilled chicken and salad as snack   MEDICATIONS  (STANDING):  cefTRIAXone   IVPB 1000 milliGRAM(s) IV Intermittent every 24 hours  clotrimazole 2% Vaginal Cream 1 Applicatorful Vaginal at bedtime  dextrose 5%. 1000 milliLiter(s) (100 mL/Hr) IV Continuous <Continuous>  dextrose 5%. 1000 milliLiter(s) (50 mL/Hr) IV Continuous <Continuous>  dextrose 50% Injectable 50 milliLiter(s) IV Push every 15 minutes  ferrous    sulfate 325 milliGRAM(s) Oral daily  folic acid 1 milliGRAM(s) Oral daily  furosemide    Tablet 40 milliGRAM(s) Oral daily  glucagon  Injectable 1 milliGRAM(s) IntraMuscular once  insulin glargine Injectable (LANTUS) 35 Unit(s) SubCutaneous at bedtime  insulin lispro (ADMELOG) corrective regimen sliding scale   SubCutaneous three times a day before meals  insulin lispro Injectable (ADMELOG) 15 Unit(s) SubCutaneous three times a day before meals  lactulose Syrup 10 Gram(s) Oral every 8 hours  levothyroxine 88 MICROGram(s) Oral daily  multivitamin 1 Tablet(s) Oral daily  pantoprazole    Tablet 40 milliGRAM(s) Oral before breakfast  propranolol 10 milliGRAM(s) Oral every 8 hours  rifAXIMin 550 milliGRAM(s) Oral two times a day  spironolactone 50 milliGRAM(s) Oral daily  thiamine 100 milliGRAM(s) Oral daily    MEDICATIONS  (PRN):  acetaminophen     Tablet .. 650 milliGRAM(s) Oral every 6 hours PRN Temp greater or equal to 38C (100.4F), Moderate Pain (4 - 6)  dextrose Oral Gel 15 Gram(s) Oral once PRN Blood Glucose LESS THAN 70 milliGRAM(s)/deciliter  LORazepam   Injectable 1 milliGRAM(s) IV Push every 1 hour PRN CIWA-Ar score 8 or greater  traMADol 25 milliGRAM(s) Oral every 6 hours PRN Severe Pain (7 - 10)      Allergies    No Known Allergies    Intolerances        Review of systems:  NO CP no pressure  NO dyspnea   Vital Signs Last 24 Hrs  T(C): 36.8 (05 Jun 2022 20:14), Max: 38.4 (05 Jun 2022 11:30)  T(F): 98.2 (05 Jun 2022 20:14), Max: 101.1 (05 Jun 2022 11:30)  HR: 77 (05 Jun 2022 20:14) (77 - 100)  BP: 107/66 (05 Jun 2022 20:14) (107/66 - 123/71)  BP(mean): --  RR: 18 (05 Jun 2022 20:14) (18 - 18)  SpO2: 98% (05 Jun 2022 20:14) (95% - 98%)    PHYSICAL EXAM:      Constitutional: NAD, well-groomed, well-developed  Neck: No LAD, No JVD, trachea midline, no thyroid enlargement  Respiratory: CTAB  Cardiovascular: S1 and S2, RRR, no M/G/R  Neurological: A/O x 3, no focal deficits  Psychiatric: Normal mood, normal affect  Musculoskeletal: 5/5 strength b/l upper and lower extremities  Skin: No rashes, no acanthosis        LABS:                        9.7    5.60  )-----------( 131      ( 05 Jun 2022 07:06 )             27.7     06-05    130<L>  |  100  |  11.8  ----------------------------<  316<H>  3.9   |  21.0<L>  |  0.86    Ca    8.1<L>      05 Jun 2022 07:06  Phos  1.9     06-04  Mg     1.8     06-04    TPro  6.0<L>  /  Alb  2.1<L>  /  TBili  1.4  /  DBili  x   /  AST  132<H>  /  ALT  61<H>  /  AlkPhos  435<H>  06-04          CAPILLARY BLOOD GLUCOSE  CAPILLARY BLOOD GLUCOSE      POCT Blood Glucose.: 226 mg/dL (05 Jun 2022 17:09)  POCT Blood Glucose.: 150 mg/dL (05 Jun 2022 11:02)  POCT Blood Glucose.: 280 mg/dL (05 Jun 2022 08:29)  POCT Blood Glucose.: 380 mg/dL (05 Jun 2022 01:29)  POCT Blood Glucose.: 300 mg/dL (04 Jun 2022 22:00)      RADIOLOGY & ADDITIONAL TESTS:

## 2022-06-05 NOTE — DIETITIAN INITIAL EVALUATION ADULT - ORAL INTAKE PTA/DIET HISTORY
Patient sleeping under covers at multiple attempts of interview. Breakfast tray ~50-75% consumed. Daily blood glucose levels >250-30mg/dL. CDE Dietitian met with patient 6/3/22 and provided DM education. Unable to provide additional education at this time. DIANA MOORE. RD to followup prn. See recommendations below.

## 2022-06-05 NOTE — PROGRESS NOTE ADULT - SUBJECTIVE AND OBJECTIVE BOX
[facility] Division of Hospital Medicine    Chief Complaint:  [facility]    SUBJECTIVE: c/o of 7/10 dull back pain on R side, no irradiation to groin, no n/v, no fevers     OVERNIGHT EVENTS: none reported     Patient denies chest pain, SOB, abd pain, N/V, fever, chills, dysuria or any other complaints. All remainder ROS negative.     MEDICATIONS  (STANDING):  cefTRIAXone   IVPB 1000 milliGRAM(s) IV Intermittent every 24 hours  clotrimazole 2% Vaginal Cream 1 Applicatorful Vaginal at bedtime  dextrose 5%. 1000 milliLiter(s) (100 mL/Hr) IV Continuous <Continuous>  dextrose 5%. 1000 milliLiter(s) (50 mL/Hr) IV Continuous <Continuous>  dextrose 50% Injectable 50 milliLiter(s) IV Push every 15 minutes  ferrous    sulfate 325 milliGRAM(s) Oral daily  folic acid 1 milliGRAM(s) Oral daily  furosemide    Tablet 40 milliGRAM(s) Oral daily  glucagon  Injectable 1 milliGRAM(s) IntraMuscular once  insulin glargine Injectable (LANTUS) 35 Unit(s) SubCutaneous at bedtime  insulin lispro (ADMELOG) corrective regimen sliding scale   SubCutaneous three times a day before meals  insulin lispro Injectable (ADMELOG) 15 Unit(s) SubCutaneous three times a day before meals  lactulose Syrup 10 Gram(s) Oral every 8 hours  levothyroxine 88 MICROGram(s) Oral daily  multivitamin 1 Tablet(s) Oral daily  pantoprazole    Tablet 40 milliGRAM(s) Oral before breakfast  propranolol 10 milliGRAM(s) Oral every 8 hours  rifAXIMin 550 milliGRAM(s) Oral two times a day  spironolactone 50 milliGRAM(s) Oral daily  thiamine 100 milliGRAM(s) Oral daily    MEDICATIONS  (PRN):  acetaminophen     Tablet .. 650 milliGRAM(s) Oral every 6 hours PRN Temp greater or equal to 38C (100.4F), Moderate Pain (4 - 6)  dextrose Oral Gel 15 Gram(s) Oral once PRN Blood Glucose LESS THAN 70 milliGRAM(s)/deciliter  LORazepam   Injectable 1 milliGRAM(s) IV Push every 1 hour PRN CIWA-Ar score 8 or greater  traMADol 25 milliGRAM(s) Oral every 6 hours PRN Severe Pain (7 - 10)        I&O's Summary      PHYSICAL EXAM:  Vital Signs Last 24 Hrs  T(C): 38.4 (05 Jun 2022 11:30), Max: 38.4 (05 Jun 2022 11:30)  T(F): 101.1 (05 Jun 2022 11:30), Max: 101.1 (05 Jun 2022 11:30)  HR: 100 (05 Jun 2022 11:30) (83 - 100)  BP: 122/78 (05 Jun 2022 11:30) (107/64 - 123/71)  BP(mean): --  RR: 18 (05 Jun 2022 11:30) (18 - 18)  SpO2: 96% (05 Jun 2022 11:30) (95% - 96%)        CONSTITUTIONAL: NAD  ENMT: Moist oral mucosa, no pharyngeal injection or exudates; normal dentition; No JVD  RESPIRATORY: Normal respiratory effort; lungs are clear to auscultation bilaterally  CARDIOVASCULAR: Regular rate and rhythm, normal S1 and S2, no murmur/rub/gallop; No lower extremity edema; Peripheral pulses are 2+ bilaterally  ABDOMEN: Nontender to palpation, normoactive bowel sounds, no rebound/guarding; No hepatosplenomegaly, no CVA tenderness  MUSCLOSKELETAL:  no clubbing or cyanosis of digits; no joint swelling or tenderness to palpation  PSYCH: A+O to person, place, and time; affect appropriate but with poor isight in her conditon  NEUROLOGY: CN 2-12 are intact and symmetric; no gross sensory deficits; was observed moving all 4 ext against gravity cooperating with exam.   SKIN: No rashes; no palpable lesions    LABS:                        9.7    5.60  )-----------( 131      ( 05 Jun 2022 07:06 )             27.7     06-05    130<L>  |  100  |  11.8  ----------------------------<  316<H>  3.9   |  21.0<L>  |  0.86    Ca    8.1<L>      05 Jun 2022 07:06  Phos  1.9     06-04  Mg     1.8     06-04    TPro  6.0<L>  /  Alb  2.1<L>  /  TBili  1.4  /  DBili  x   /  AST  132<H>  /  ALT  61<H>  /  AlkPhos  435<H>  06-04              CAPILLARY BLOOD GLUCOSE      POCT Blood Glucose.: 150 mg/dL (05 Jun 2022 11:02)  POCT Blood Glucose.: 280 mg/dL (05 Jun 2022 08:29)  POCT Blood Glucose.: 380 mg/dL (05 Jun 2022 01:29)  POCT Blood Glucose.: 300 mg/dL (04 Jun 2022 22:00)  POCT Blood Glucose.: 356 mg/dL (04 Jun 2022 17:44)        RADIOLOGY & ADDITIONAL TESTS:  Results Reviewed:   Imaging Personally Reviewed:  Electrocardiogram Personally Reviewed:

## 2022-06-05 NOTE — DIETITIAN INITIAL EVALUATION ADULT - ADD RECOMMEND
1. Continue to provide encouragement to adhere to DM recommendations  2. Continue MVI, Thiamin, Ferrous Sulfate, Folic Acid Daily.  3. Monitor weights; Monitor daily glucose levels

## 2022-06-06 LAB
-  CUTIBACTERIUM ACNES: SIGNIFICANT CHANGE UP
GLUCOSE BLDC GLUCOMTR-MCNC: 194 MG/DL — HIGH (ref 70–99)
GLUCOSE BLDC GLUCOMTR-MCNC: 260 MG/DL — HIGH (ref 70–99)
GLUCOSE BLDC GLUCOMTR-MCNC: 377 MG/DL — HIGH (ref 70–99)
GLUCOSE BLDC GLUCOMTR-MCNC: 379 MG/DL — HIGH (ref 70–99)
METHOD TYPE: SIGNIFICANT CHANGE UP

## 2022-06-06 PROCEDURE — 99233 SBSQ HOSP IP/OBS HIGH 50: CPT

## 2022-06-06 PROCEDURE — 99232 SBSQ HOSP IP/OBS MODERATE 35: CPT

## 2022-06-06 RX ORDER — INSULIN GLARGINE 100 [IU]/ML
40 INJECTION, SOLUTION SUBCUTANEOUS AT BEDTIME
Refills: 0 | Status: DISCONTINUED | OUTPATIENT
Start: 2022-06-06 | End: 2022-06-08

## 2022-06-06 RX ORDER — CEFTRIAXONE 500 MG/1
2000 INJECTION, POWDER, FOR SOLUTION INTRAMUSCULAR; INTRAVENOUS EVERY 24 HOURS
Refills: 0 | Status: DISCONTINUED | OUTPATIENT
Start: 2022-06-07 | End: 2022-06-08

## 2022-06-06 RX ADMIN — Medication 10: at 18:27

## 2022-06-06 RX ADMIN — Medication 100 MILLIGRAM(S): at 15:13

## 2022-06-06 RX ADMIN — INSULIN GLARGINE 40 UNIT(S): 100 INJECTION, SOLUTION SUBCUTANEOUS at 21:34

## 2022-06-06 RX ADMIN — Medication 10: at 08:59

## 2022-06-06 RX ADMIN — Medication 88 MICROGRAM(S): at 06:04

## 2022-06-06 RX ADMIN — LACTULOSE 10 GRAM(S): 10 SOLUTION ORAL at 06:04

## 2022-06-06 RX ADMIN — Medication 15 UNIT(S): at 09:00

## 2022-06-06 RX ADMIN — LACTULOSE 10 GRAM(S): 10 SOLUTION ORAL at 15:14

## 2022-06-06 RX ADMIN — Medication 1 MILLIGRAM(S): at 15:13

## 2022-06-06 RX ADMIN — Medication 1 APPLICATORFUL: at 21:34

## 2022-06-06 RX ADMIN — SPIRONOLACTONE 50 MILLIGRAM(S): 25 TABLET, FILM COATED ORAL at 06:05

## 2022-06-06 RX ADMIN — Medication 6: at 13:37

## 2022-06-06 RX ADMIN — PANTOPRAZOLE SODIUM 40 MILLIGRAM(S): 20 TABLET, DELAYED RELEASE ORAL at 06:05

## 2022-06-06 RX ADMIN — Medication 1 TABLET(S): at 15:13

## 2022-06-06 RX ADMIN — Medication 325 MILLIGRAM(S): at 15:12

## 2022-06-06 RX ADMIN — CEFTRIAXONE 100 MILLIGRAM(S): 500 INJECTION, POWDER, FOR SOLUTION INTRAMUSCULAR; INTRAVENOUS at 09:00

## 2022-06-06 RX ADMIN — Medication 15 UNIT(S): at 13:38

## 2022-06-06 RX ADMIN — LACTULOSE 10 GRAM(S): 10 SOLUTION ORAL at 21:34

## 2022-06-06 RX ADMIN — Medication 15 UNIT(S): at 18:27

## 2022-06-06 RX ADMIN — Medication 40 MILLIGRAM(S): at 06:05

## 2022-06-06 NOTE — PROGRESS NOTE ADULT - SUBJECTIVE AND OBJECTIVE BOX
INTERVAL HPI/OVERNIGHT EVENTS: follow up of diabetes, hypothyroidism, cirrhosis    MEDICATIONS  (STANDING):  clotrimazole 2% Vaginal Cream 1 Applicatorful Vaginal at bedtime  dextrose 5%. 1000 milliLiter(s) (50 mL/Hr) IV Continuous <Continuous>  dextrose 5%. 1000 milliLiter(s) (100 mL/Hr) IV Continuous <Continuous>  dextrose 50% Injectable 50 milliLiter(s) IV Push every 15 minutes  ferrous    sulfate 325 milliGRAM(s) Oral daily  folic acid 1 milliGRAM(s) Oral daily  furosemide    Tablet 40 milliGRAM(s) Oral daily  glucagon  Injectable 1 milliGRAM(s) IntraMuscular once  insulin glargine Injectable (LANTUS) 35 Unit(s) SubCutaneous at bedtime  insulin lispro (ADMELOG) corrective regimen sliding scale   SubCutaneous three times a day before meals  insulin lispro Injectable (ADMELOG) 15 Unit(s) SubCutaneous three times a day before meals  lactulose Syrup 10 Gram(s) Oral every 8 hours  levothyroxine 88 MICROGram(s) Oral daily  multivitamin 1 Tablet(s) Oral daily  pantoprazole    Tablet 40 milliGRAM(s) Oral before breakfast  propranolol 10 milliGRAM(s) Oral every 8 hours  rifAXIMin 550 milliGRAM(s) Oral two times a day  spironolactone 50 milliGRAM(s) Oral daily  thiamine 100 milliGRAM(s) Oral daily    MEDICATIONS  (PRN):  acetaminophen     Tablet .. 650 milliGRAM(s) Oral every 6 hours PRN Temp greater or equal to 38C (100.4F), Moderate Pain (4 - 6)  dextrose Oral Gel 15 Gram(s) Oral once PRN Blood Glucose LESS THAN 70 milliGRAM(s)/deciliter  LORazepam   Injectable 1 milliGRAM(s) IV Push every 1 hour PRN CIWA-Ar score 8 or greater  traMADol 25 milliGRAM(s) Oral every 6 hours PRN Severe Pain (7 - 10)      Allergies    No Known Allergies    Intolerances        Review of systems: no complaints    Vital Signs Last 24 Hrs  T(C): 36.7 (06 Jun 2022 11:59), Max: 36.8 (05 Jun 2022 20:14)  T(F): 98.1 (06 Jun 2022 11:59), Max: 98.2 (05 Jun 2022 20:14)  HR: 74 (06 Jun 2022 11:59) (65 - 77)  BP: 102/63 (06 Jun 2022 11:59) (102/63 - 112/67)  BP(mean): --  RR: 18 (06 Jun 2022 11:59) (18 - 18)  SpO2: 98% (06 Jun 2022 11:59) (96% - 98%)        LABS:                        9.7    5.60  )-----------( 131      ( 05 Jun 2022 07:06 )             27.7     06-05    130<L>  |  100  |  11.8  ----------------------------<  316<H>  3.9   |  21.0<L>  |  0.86    Ca    8.1<L>      05 Jun 2022 07:06            POCT Blood Glucose.: 377 mg/dL (06-06-22 @ 08:57)  POCT Blood Glucose.: 438 mg/dL (06-05-22 @ 21:50)  POCT Blood Glucose.: 226 mg/dL (06-05-22 @ 17:09)  POCT Blood Glucose.: 150 mg/dL (06-05-22 @ 11:02)  POCT Blood Glucose.: 280 mg/dL (06-05-22 @ 08:29)  POCT Blood Glucose.: 380 mg/dL (06-05-22 @ 01:29)  POCT Blood Glucose.: 300 mg/dL (06-04-22 @ 22:00)  POCT Blood Glucose.: 356 mg/dL (06-04-22 @ 17:44)  POCT Blood Glucose.: 380 mg/dL (06-04-22 @ 12:31)  POCT Blood Glucose.: 295 mg/dL (06-04-22 @ 08:10)  POCT Blood Glucose.: 384 mg/dL (06-03-22 @ 22:02)  POCT Blood Glucose.: 374 mg/dL (06-03-22 @ 17:12)  POCT Blood Glucose.: 411 mg/dL (06-03-22 @ 14:01)

## 2022-06-06 NOTE — PROGRESS NOTE ADULT - SUBJECTIVE AND OBJECTIVE BOX
[facility] Division of Hospital Medicine    Chief Complaint:  [facility]    SUBJECTIVE:  now new complains    OVERNIGHT EVENTS: still uncontrolled hyperglycemia    Patient denies chest pain, SOB, abd pain, N/V, fever, chills, dysuria or any other complaints. All remainder ROS negative.     MEDICATIONS  (STANDING):  clotrimazole 2% Vaginal Cream 1 Applicatorful Vaginal at bedtime  dextrose 5%. 1000 milliLiter(s) (50 mL/Hr) IV Continuous <Continuous>  dextrose 5%. 1000 milliLiter(s) (100 mL/Hr) IV Continuous <Continuous>  dextrose 50% Injectable 50 milliLiter(s) IV Push every 15 minutes  ferrous    sulfate 325 milliGRAM(s) Oral daily  folic acid 1 milliGRAM(s) Oral daily  furosemide    Tablet 40 milliGRAM(s) Oral daily  glucagon  Injectable 1 milliGRAM(s) IntraMuscular once  insulin glargine Injectable (LANTUS) 40 Unit(s) SubCutaneous at bedtime  insulin lispro (ADMELOG) corrective regimen sliding scale   SubCutaneous three times a day before meals  insulin lispro Injectable (ADMELOG) 15 Unit(s) SubCutaneous three times a day before meals  lactulose Syrup 10 Gram(s) Oral every 8 hours  levothyroxine 88 MICROGram(s) Oral daily  multivitamin 1 Tablet(s) Oral daily  pantoprazole    Tablet 40 milliGRAM(s) Oral before breakfast  propranolol 10 milliGRAM(s) Oral every 8 hours  rifAXIMin 550 milliGRAM(s) Oral two times a day  spironolactone 50 milliGRAM(s) Oral daily  thiamine 100 milliGRAM(s) Oral daily    MEDICATIONS  (PRN):  acetaminophen     Tablet .. 650 milliGRAM(s) Oral every 6 hours PRN Temp greater or equal to 38C (100.4F), Moderate Pain (4 - 6)  dextrose Oral Gel 15 Gram(s) Oral once PRN Blood Glucose LESS THAN 70 milliGRAM(s)/deciliter  traMADol 25 milliGRAM(s) Oral every 6 hours PRN Severe Pain (7 - 10)        I&O's Summary      PHYSICAL EXAM:  Vital Signs Last 24 Hrs  T(C): 36.9 (06 Jun 2022 17:24), Max: 36.9 (06 Jun 2022 17:24)  T(F): 98.5 (06 Jun 2022 17:24), Max: 98.5 (06 Jun 2022 17:24)  HR: 79 (06 Jun 2022 17:24) (65 - 82)  BP: 108/68 (06 Jun 2022 17:24) (95/51 - 112/67)  BP(mean): --  RR: 19 (06 Jun 2022 17:24) (18 - 19)  SpO2: 97% (06 Jun 2022 17:24) (96% - 98%)        CONSTITUTIONAL: NAD  ENMT: Moist oral mucosa, no pharyngeal injection or exudates; normal dentition; No JVD  RESPIRATORY: Normal respiratory effort; lungs are clear to auscultation bilaterally  CARDIOVASCULAR: Regular rate and rhythm, normal S1 and S2, no murmur/rub/gallop; No lower extremity edema; Peripheral pulses are 2+ bilaterally  ABDOMEN: Nontender to palpation, normoactive bowel sounds, no rebound/guarding; No hepatosplenomegaly, no CVA tenderness  MUSCLOSKELETAL:  no clubbing or cyanosis of digits; no joint swelling or tenderness to palpation  PSYCH: A+O to person, place, and time; affect appropriate but with poor insight in her condition  NEUROLOGY: CN 2-12 are intact and symmetric; no gross sensory deficits; was observed moving all 4 ext against gravity cooperating with exam.   SKIN: No rashes; no palpable lesions    LABS:                        9.7    5.60  )-----------( 131      ( 05 Jun 2022 07:06 )             27.7     06-05    130<L>  |  100  |  11.8  ----------------------------<  316<H>  3.9   |  21.0<L>  |  0.86    Ca    8.1<L>      05 Jun 2022 07:06                CAPILLARY BLOOD GLUCOSE      POCT Blood Glucose.: 260 mg/dL (06 Jun 2022 13:36)  POCT Blood Glucose.: 377 mg/dL (06 Jun 2022 08:57)  POCT Blood Glucose.: 438 mg/dL (05 Jun 2022 21:50)        RADIOLOGY & ADDITIONAL TESTS:  Results Reviewed:   Imaging Personally Reviewed:  Electrocardiogram Personally Reviewed:

## 2022-06-06 NOTE — PROGRESS NOTE ADULT - TIME BILLING
Review of chart documents, labs, imaging. Direct patient assessment,  formulation of care plan. Discussion with  Interdisciplinary  team    including ACP  16_____minutes .
Review of chart documents, labs, imaging. Direct patient assessment,  formulation of care plan. Discussion with  Interdisciplinary  team  hospitalist, shaunna, ANTHONY/CHENCHO
Review of chart documents, labs, imaging. Direct patient assessment,  formulation of care plan. Discussion with  Interdisciplinary  team

## 2022-06-06 NOTE — CHART NOTE - NSCHARTNOTEFT_GEN_A_CORE
Diabetes Note: Aware consult for diabetes education; a1c 13.7%.  Spoke with patient via  #[id number].  Pt reports taking insulin 2x/day (unsure which insulin) and consumes 3 meals daily- small portions.  As per , pt was not monitoring BG levels at home and would take 1/2 syringe of insulin when feeling "funny," but unsure what kind of insulin.    Provided diabetes self management education to patient.    - Discussed a1c results and importance of improving glycemic control  - Discussed cons cho meal plan using the plate method with emphasis on meal timing and appropriate CHO/protein portions (with examples)   - Reinforced importance of BG monitoring and adhering to insulin regimen     Pt verbalized understanding, however question level of concern and understanding.  Pt may benefit from further instruction when/if family is available.  Provided nutrition literature in Ecuadorean.  Discussed with .  KARYNA CDCES to remain available.
contacted by RN for pos bcx from 5/31 - 1/2 bottles pos for c. acnes  ordered repeat bcx, restarted ctx, consulted ID  continue to monitor closely
65 yo f pmhx HTN, DM2, Anemia, ETOH abuse, ETOH cirrhosis, esophageal varices, hepatic encephalopathy, hypothyroidism admitted with DKA (non compliance) and thrombocytopenia.      NEURO: ETOH abuse, patient endorses she hasn't had a while, CIWA monitoring ordered, ativan 1mg iv prn for CIWA >8.    CV: HTN on home meds  RESP: No active issues.   RENAL: Electrolyte abnl, replaced this am.  Repeat labs for 12 pm  GI: Dash/TLC diet. etoh cirrhosis/MA on home meds  ENDO: DKA, now resolved, transitioned to Lantus and iSS  ID: No active infecitous process   HEME: Thrombocytopenia, monitor labs  DISPO: Full code.      Patient seen, stable for downgrade to medicine, endorsed to Dr. Alicea.
RN called at 22:01 and notified of elevated BG on finger stick >530. Pt is asymptomatic at this time.   Ordered STAT bmp to evaluate venous BG.     VBG resulted glucose at 603 at 22:46.   Endocrinologist called RN and gave recommendations to give 10units Lantus in addition to sliding scale orders.     ordered 10 units lantus once now per endo recs  RN to give 6 units admelog per the sliding scale.   continue accuchecks and FS to monitor BG.

## 2022-06-06 NOTE — PROGRESS NOTE ADULT - ASSESSMENT
persistent uncontrolled diabetes despite high doses of insulin. Will increase basal insulin slightly, and add conservative doses of HS insulin for correction of hyperglycemia.

## 2022-06-06 NOTE — PROGRESS NOTE ADULT - ASSESSMENT
66yr woman  PMH of HTN,  ETOH abuse, ETOH cirrhosis decompensated by esophageal varices and hepatic encephalopathy, DM  Anemia, Pancreatitis admitted with DKA    DKA  management per primary team, endocrinology  - OTONIEL Vyas    ETOH abuse  States last drink 1month ago  ETOH  cessation  cont thiamine folic acid    Cirrhosis  hx of HE, varices   outpatient follow up     Encounter for Palliative Care  Patient feeling well, no symptoms to report  Spoke to son Abiodun and updated him on mother's condition.  He is aware of her cirrhosis and states of her non compliance with meds, and denial of her condition.  Recommended to follow up with GI/ Hepatology, and PCP upon discharge.   Recommend [facility] if to be d/c home.

## 2022-06-06 NOTE — PROGRESS NOTE ADULT - SUBJECTIVE AND OBJECTIVE BOX
CC:  Follow up GOC , Symptoms    OVERNIGHT EVENTS:  BS elevated  weekend events reviewed    Present Symptoms:   Dyspnea:  No    Nausea/Vomiting:  No   Anxiety:  No     Depression:  No   Fatigue:  No   Loss of appetite:  No   Constipation: No    Pain: No               Character-            Duration-            Location-            Severity-    Pain AD Score:  [email or web address] (press ctrl + left click to view)    Review of Systems: Reviewed as above  All others negative    MEDICATIONS  (STANDING):  clotrimazole 2% Vaginal Cream 1 Applicatorful Vaginal at bedtime  dextrose 5%. 1000 milliLiter(s) (50 mL/Hr) IV Continuous <Continuous>  dextrose 5%. 1000 milliLiter(s) (100 mL/Hr) IV Continuous <Continuous>  dextrose 50% Injectable 50 milliLiter(s) IV Push every 15 minutes  ferrous    sulfate 325 milliGRAM(s) Oral daily  folic acid 1 milliGRAM(s) Oral daily  furosemide    Tablet 40 milliGRAM(s) Oral daily  glucagon  Injectable 1 milliGRAM(s) IntraMuscular once  insulin glargine Injectable (LANTUS) 40 Unit(s) SubCutaneous at bedtime  insulin lispro (ADMELOG) corrective regimen sliding scale   SubCutaneous three times a day before meals  insulin lispro Injectable (ADMELOG) 15 Unit(s) SubCutaneous three times a day before meals  lactulose Syrup 10 Gram(s) Oral every 8 hours  levothyroxine 88 MICROGram(s) Oral daily  multivitamin 1 Tablet(s) Oral daily  pantoprazole    Tablet 40 milliGRAM(s) Oral before breakfast  propranolol 10 milliGRAM(s) Oral every 8 hours  rifAXIMin 550 milliGRAM(s) Oral two times a day  spironolactone 50 milliGRAM(s) Oral daily  thiamine 100 milliGRAM(s) Oral daily    MEDICATIONS  (PRN):  acetaminophen     Tablet .. 650 milliGRAM(s) Oral every 6 hours PRN Temp greater or equal to 38C (100.4F), Moderate Pain (4 - 6)  dextrose Oral Gel 15 Gram(s) Oral once PRN Blood Glucose LESS THAN 70 milliGRAM(s)/deciliter  LORazepam   Injectable 1 milliGRAM(s) IV Push every 1 hour PRN CIWA-Ar score 8 or greater  traMADol 25 milliGRAM(s) Oral every 6 hours PRN Severe Pain (7 - 10)      PHYSICAL EXAM:    Vital Signs Last 24 Hrs  T(C): 36.7 (06 Jun 2022 11:59), Max: 36.8 (05 Jun 2022 20:14)  T(F): 98.1 (06 Jun 2022 11:59), Max: 98.2 (05 Jun 2022 20:14)  HR: 74 (06 Jun 2022 11:59) (65 - 77)  BP: 102/63 (06 Jun 2022 11:59) (102/63 - 112/67)  BP(mean): --  RR: 18 (06 Jun 2022 11:59) (18 - 18)  SpO2: 98% (06 Jun 2022 11:59) (96% - 98%)    Karnofsky: 60  %  General:   awake alert NAD     HEENT:  NCAT     Lungs: comfortable  non labored  CV:  RR  GI: soft NTND        (  :   normal          MSK: normal  Skin:  warm/dry  Neuro  no focal deficits    LABS:                          9.7    5.60  )-----------( 131      ( 05 Jun 2022 07:06 )             27.7     06-05    130<L>  |  100  |  11.8  ----------------------------<  316<H>  3.9   |  21.0<L>  |  0.86    Ca    8.1<L>      05 Jun 2022 07:06          I&O's Summary      RADIOLOGY & ADDITIONAL STUDIES:        ADVANCE DIRECTIVES/TREATMENT PREFERENCES:

## 2022-06-06 NOTE — PROGRESS NOTE ADULT - ASSESSMENT
66yr woman  PMH of HTN,  ETOH abuse, ETOH cirrhosis decompensated by esophageal varices and hepatic encephalopathy, DM  Anemia, Pancreatitis admitted with HHS. Pt was downgraded to medicine team on 06/01, currently hyperglycemia is poorly controlled and Insulin is titrated. Later during hospital stay pt developed dysuria, therefore she was started on IV ceftriaxon given E.irene growing in urine.     UTI  Abnormal blood cx  -c/w  Ceftriaxon  - will obtain CT abd/pelvis  -  consulted ID    Poorly controlled DMT2 c/b HHS, still poorly controlled hyperglycemia   - c/w Lantus 40 qhs, ac 15, mdss  - A1C  - 13.7  - diabetic teaching, pt was not aware how much insuine she takes at home, shows poor insight  - endocrinology team follows    Back pain  - msk in nature, reproducible on palpation  - Tylenol and tramadol base on pain scale      Alcoholic liver cirrhosis c/b hx of esophagel varcies, hepatic encephalopathy, anemia and thrombocytopenia, stable   - h/h noted, no apparent active bleeding, c/w iron supplement  - cbc daily   - c/w Furosemide 40,  spironolactone 50 mg in am, propranolol 10 tid, c/w lactulose, rifaximin and ppi  -  Will refer to Dr. Craven on d/c     #Hyponatremia, most likely due to liver chirrhosis   - management as above    #Hypothyroidism  - c/w levothyroxine   - Will need TFTs repeated in 4-6 weeks as outpt       Hypomagnesemia / Hypophosphatemia   - Likely 2/2 chronic etoh use   - Supplemented   - Replete as needed       HTN- c/w propranolol as above    VTE ppx: SCDs given thrombocytopenia   Code/social - full code  Dispo: may be d/c home w/in next 24-48 hr if hyperglycemia is better controlled

## 2022-06-07 LAB
ANION GAP SERPL CALC-SCNC: 9 MMOL/L — SIGNIFICANT CHANGE UP (ref 5–17)
APPEARANCE UR: CLEAR — SIGNIFICANT CHANGE UP
BACTERIA # UR AUTO: ABNORMAL
BILIRUB UR-MCNC: NEGATIVE — SIGNIFICANT CHANGE UP
BUN SERPL-MCNC: 11.1 MG/DL — SIGNIFICANT CHANGE UP (ref 8–20)
CALCIUM SERPL-MCNC: 8.3 MG/DL — LOW (ref 8.6–10.2)
CHLORIDE SERPL-SCNC: 96 MMOL/L — LOW (ref 98–107)
CO2 SERPL-SCNC: 21 MMOL/L — LOW (ref 22–29)
COLOR SPEC: YELLOW — SIGNIFICANT CHANGE UP
CREAT SERPL-MCNC: 0.72 MG/DL — SIGNIFICANT CHANGE UP (ref 0.5–1.3)
CULTURE RESULTS: SIGNIFICANT CHANGE UP
DIFF PNL FLD: ABNORMAL
EGFR: 93 ML/MIN/1.73M2 — SIGNIFICANT CHANGE UP
EPI CELLS # UR: SIGNIFICANT CHANGE UP
GLUCOSE BLDC GLUCOMTR-MCNC: 187 MG/DL — HIGH (ref 70–99)
GLUCOSE BLDC GLUCOMTR-MCNC: 226 MG/DL — HIGH (ref 70–99)
GLUCOSE BLDC GLUCOMTR-MCNC: 311 MG/DL — HIGH (ref 70–99)
GLUCOSE BLDC GLUCOMTR-MCNC: 388 MG/DL — HIGH (ref 70–99)
GLUCOSE BLDC GLUCOMTR-MCNC: 405 MG/DL — HIGH (ref 70–99)
GLUCOSE SERPL-MCNC: 365 MG/DL — HIGH (ref 70–99)
GLUCOSE UR QL: NEGATIVE MG/DL — SIGNIFICANT CHANGE UP
HCT VFR BLD CALC: 28.2 % — LOW (ref 34.5–45)
HGB BLD-MCNC: 9.3 G/DL — LOW (ref 11.5–15.5)
KETONES UR-MCNC: NEGATIVE — SIGNIFICANT CHANGE UP
LEUKOCYTE ESTERASE UR-ACNC: ABNORMAL
MCHC RBC-ENTMCNC: 32.9 PG — SIGNIFICANT CHANGE UP (ref 27–34)
MCHC RBC-ENTMCNC: 33 GM/DL — SIGNIFICANT CHANGE UP (ref 32–36)
MCV RBC AUTO: 99.6 FL — SIGNIFICANT CHANGE UP (ref 80–100)
NITRITE UR-MCNC: NEGATIVE — SIGNIFICANT CHANGE UP
ORGANISM # SPEC MICROSCOPIC CNT: SIGNIFICANT CHANGE UP
ORGANISM # SPEC MICROSCOPIC CNT: SIGNIFICANT CHANGE UP
PH UR: 7 — SIGNIFICANT CHANGE UP (ref 5–8)
PLATELET # BLD AUTO: 133 K/UL — LOW (ref 150–400)
POTASSIUM SERPL-MCNC: 4.4 MMOL/L — SIGNIFICANT CHANGE UP (ref 3.5–5.3)
POTASSIUM SERPL-SCNC: 4.4 MMOL/L — SIGNIFICANT CHANGE UP (ref 3.5–5.3)
PROT UR-MCNC: NEGATIVE — SIGNIFICANT CHANGE UP
RBC # BLD: 2.83 M/UL — LOW (ref 3.8–5.2)
RBC # FLD: 17.6 % — HIGH (ref 10.3–14.5)
RBC CASTS # UR COMP ASSIST: SIGNIFICANT CHANGE UP /HPF (ref 0–4)
SARS-COV-2 RNA SPEC QL NAA+PROBE: SIGNIFICANT CHANGE UP
SODIUM SERPL-SCNC: 126 MMOL/L — LOW (ref 135–145)
SP GR SPEC: 1.01 — SIGNIFICANT CHANGE UP (ref 1.01–1.02)
SPECIMEN SOURCE: SIGNIFICANT CHANGE UP
UROBILINOGEN FLD QL: NEGATIVE MG/DL — SIGNIFICANT CHANGE UP
WBC # BLD: 6.69 K/UL — SIGNIFICANT CHANGE UP (ref 3.8–10.5)
WBC # FLD AUTO: 6.69 K/UL — SIGNIFICANT CHANGE UP (ref 3.8–10.5)
WBC UR QL: ABNORMAL /HPF (ref 0–5)

## 2022-06-07 PROCEDURE — 99233 SBSQ HOSP IP/OBS HIGH 50: CPT

## 2022-06-07 PROCEDURE — 99223 1ST HOSP IP/OBS HIGH 75: CPT

## 2022-06-07 PROCEDURE — 74176 CT ABD & PELVIS W/O CONTRAST: CPT | Mod: 26

## 2022-06-07 RX ADMIN — Medication 1 TABLET(S): at 12:07

## 2022-06-07 RX ADMIN — Medication 1 APPLICATORFUL: at 21:12

## 2022-06-07 RX ADMIN — SPIRONOLACTONE 50 MILLIGRAM(S): 25 TABLET, FILM COATED ORAL at 05:33

## 2022-06-07 RX ADMIN — Medication 40 MILLIGRAM(S): at 05:33

## 2022-06-07 RX ADMIN — INSULIN GLARGINE 40 UNIT(S): 100 INJECTION, SOLUTION SUBCUTANEOUS at 21:17

## 2022-06-07 RX ADMIN — Medication 15 UNIT(S): at 08:32

## 2022-06-07 RX ADMIN — Medication 4: at 17:26

## 2022-06-07 RX ADMIN — Medication 325 MILLIGRAM(S): at 12:07

## 2022-06-07 RX ADMIN — LACTULOSE 10 GRAM(S): 10 SOLUTION ORAL at 21:12

## 2022-06-07 RX ADMIN — Medication 15 UNIT(S): at 12:07

## 2022-06-07 RX ADMIN — Medication 2: at 12:07

## 2022-06-07 RX ADMIN — PANTOPRAZOLE SODIUM 40 MILLIGRAM(S): 20 TABLET, DELAYED RELEASE ORAL at 08:31

## 2022-06-07 RX ADMIN — LACTULOSE 10 GRAM(S): 10 SOLUTION ORAL at 05:35

## 2022-06-07 RX ADMIN — Medication 1 MILLIGRAM(S): at 12:07

## 2022-06-07 RX ADMIN — Medication 88 MICROGRAM(S): at 05:33

## 2022-06-07 RX ADMIN — LACTULOSE 10 GRAM(S): 10 SOLUTION ORAL at 13:16

## 2022-06-07 RX ADMIN — Medication 100 MILLIGRAM(S): at 12:06

## 2022-06-07 RX ADMIN — Medication 15 UNIT(S): at 17:26

## 2022-06-07 RX ADMIN — CEFTRIAXONE 100 MILLIGRAM(S): 500 INJECTION, POWDER, FOR SOLUTION INTRAMUSCULAR; INTRAVENOUS at 05:32

## 2022-06-07 RX ADMIN — Medication 10: at 08:31

## 2022-06-07 NOTE — CONSULT NOTE ADULT - SUBJECTIVE AND OBJECTIVE BOX
[facility]  INFECTIOUS DISEASES AND INTERNAL MEDICINE at [place]  =======================================================  Bernardo La MD  Diplomates American Board of Internal Medicine and Infectious Diseases  Tel: [phone]      Fax: [phone]  =======================================================      [facility]-[id number]  JUSTIN LOVE    CC: Patient is a 66y old  Female who presents with a chief complaint of DKA (2022 17:47)      66y  Female       Past Medical & Surgical Hx:  PAST MEDICAL & SURGICAL HISTORY:  Alcohol abuse      Alcohol abuse      Liver cirrhosis      ETOH abuse      Urea cycle metabolism disorder      Transitory  hyperthyroidism      Lump in female breast      UTI (urinary tract infection)      Lymphangitis, acute, lower leg      Anemia      Hypothyroidism      Chronic back pain      HTN (hypertension)      GERD (gastroesophageal reflux disease)      Pancreatitis      HTN (hypertension)      DM (diabetes mellitus)      No significant past surgical history      S/P abdominoplasty              Social Hx:    FAMILY HISTORY:  FH: depression (Child)        Allergies    No Known Allergies    Intolerances             REVIEW OF SYSTEMS:  CONSTITUTIONAL:  No Fever or chills  HEENT:  No diplopia or blurred vision.  No earache, sore throat or runny nose.  CARDIOVASCULAR:  No pressure, squeezing, strangling, tightness, heaviness or aching about the chest, neck, axilla or epigastrium.  RESPIRATORY:  No cough, shortness of breath  GASTROINTESTINAL:  No nausea, vomiting or diarrhea.  GENITOURINARY:  No dysuria, frequency or urgency. No Blood in urine  MUSCULOSKELETAL:  no joint aches, no muscle pain  SKIN:  No change in skin, hair or nails.  NEUROLOGIC:  No Headaches, seizures or weakness.  PSYCHIATRIC:  No disorder of thought or mood.  ENDOCRINE:  No heat or cold intolerance  HEMATOLOGICAL:  No easy bruising or bleeding.       Physical Exam:    GEN: NAD, pleasant  HEENT: normocephalic and atraumatic. EOMI. PERRL.  Anicteric  NECK: Supple.   LUNGS: Clear to auscultation.  HEART: Regular rate and rhythm without murmur.  ABDOMEN: Soft, nontender, and nondistended.  Positive bowel sounds.    : No CVA tenderness  EXTREMITIES: Without any edema.  MSK: No joint swelling  NEUROLOGIC: Cranial nerves II through XII are grossly intact. No Focal Deficits  PSYCHIATRIC: Appropriate affect .  SKIN: No Rash        Vitals:    T(F): 98.7 (2022 10:29), Max: 98.7 (2022 21:32)  HR: 91 (2022 13:17)  BP: 122/72 (2022 13:17)  RR: 18 (2022 10:29)  SpO2: 98% (2022 10:) (97% - 98%)  temp max in last 48H T(F): , Max: 98.7 (22 @ 21:32)    Current Antibiotics:  cefTRIAXone   IVPB 2000 milliGRAM(s) IV Intermittent every 24 hours  rifAXIMin 550 milliGRAM(s) Oral two times a day    Other medications:  clotrimazole 2% Vaginal Cream 1 Applicatorful Vaginal at bedtime  dextrose 5%. 1000 milliLiter(s) IV Continuous <Continuous>  dextrose 5%. 1000 milliLiter(s) IV Continuous <Continuous>  dextrose 50% Injectable 50 milliLiter(s) IV Push every 15 minutes  ferrous    sulfate 325 milliGRAM(s) Oral daily  folic acid 1 milliGRAM(s) Oral daily  furosemide    Tablet 40 milliGRAM(s) Oral daily  glucagon  Injectable 1 milliGRAM(s) IntraMuscular once  insulin glargine Injectable (LANTUS) 40 Unit(s) SubCutaneous at bedtime  insulin lispro (ADMELOG) corrective regimen sliding scale   SubCutaneous three times a day before meals  insulin lispro Injectable (ADMELOG) 15 Unit(s) SubCutaneous three times a day before meals  lactulose Syrup 10 Gram(s) Oral every 8 hours  levothyroxine 88 MICROGram(s) Oral daily  multivitamin 1 Tablet(s) Oral daily  pantoprazole    Tablet 40 milliGRAM(s) Oral before breakfast  propranolol 10 milliGRAM(s) Oral every 8 hours  spironolactone 50 milliGRAM(s) Oral daily  thiamine 100 milliGRAM(s) Oral daily                            9.3    6.69  )-----------( 133      ( 2022 06:29 )             28.2         126<L>  |  96<L>  |  11.1  ----------------------------<  365<H>  4.4   |  21.0<L>  |  0.72    Ca    8.3<L>      2022 06:29      RECENT CULTURES:   @ 03:05 Clean Catch Clean Catch (Midstream) Escherichia coli    >100,000 CFU/ml Escherichia coli         @ 19:45          NotDetec   @ 18:44 .Blood Blood-Peripheral     No growth at 5 days.         @ 18:43 .Blood Blood-Peripheral Blood Culture PCR    Growth in anaerobic bottle: Propionibacterium acnes  "Susceptibilities not performed"    Growth in anaerobic bottle: Gram Positive Rods  Gram Stain performed by:  [facility]  [address] [place] [address]  .  TYPE: (C=Critical, N=Notification, A=Abnormal) C  TESTS:  _ GS  DATE/TIME CALLED: _ 2022 10:43:57  CALLED TO: Verona Schofield RN  READ BACK (2 Patient Identifiers)(Y/N): _ Y  READ BACK VALUES (Y/N): _ Y  CALLED BY: Verona Frazier          WBC Count: 6.69 K/uL (22 @ 06:29)  WBC Count: 5.60 K/uL (22 @ 07:06)  WBC Count: 4.50 K/uL (22 @ 07:38)  WBC Count: 4.80 K/uL (22 @ 07:18)    Creatinine, Serum: 0.72 mg/dL (22 @ 06:29)  Creatinine, Serum: 0.86 mg/dL (22 @ 07:06)  Creatinine, Serum: 0.66 mg/dL (22 @ 07:38)  Creatinine, Serum: 0.97 mg/dL (22 @ 14:31)  Creatinine, Serum: 0.83 mg/dL (22 @ 07:18)  Creatinine, Serum: 0.99 mg/dL (22 @ 18:19)      Ferritin, Serum: 254 ng/mL (22 @ 08:14)         COVID-19 PCR: NotDetec (22 @ 07:45)  SARS-CoV-2: NotDetec (22 @ 19:45)   [facility]  INFECTIOUS DISEASES AND INTERNAL MEDICINE at [place]  =======================================================  Bernardo La MD  Diplomates American Board of Internal Medicine and Infectious Diseases  Tel: [phone]      Fax: [phone]  =======================================================      [facility]-[id number]  JUSTIN LOVE    CC: Patient is a 66y old  Female who presents with a chief complaint of DKA (2022 17:47)      66y  Female      [id number]      Past Medical & Surgical Hx:  PAST MEDICAL & SURGICAL HISTORY:  Alcohol abuse      Alcohol abuse      Liver cirrhosis      ETOH abuse      Urea cycle metabolism disorder      Transitory  hyperthyroidism      Lump in female breast      UTI (urinary tract infection)      Lymphangitis, acute, lower leg      Anemia      Hypothyroidism      Chronic back pain      HTN (hypertension)      GERD (gastroesophageal reflux disease)      Pancreatitis      HTN (hypertension)      DM (diabetes mellitus)      No significant past surgical history      S/P abdominoplasty              Social Hx:    FAMILY HISTORY:  FH: depression (Child)        Allergies    No Known Allergies    Intolerances             REVIEW OF SYSTEMS:  CONSTITUTIONAL:  No Fever or chills  HEENT:  No diplopia or blurred vision.  No earache, sore throat or runny nose.  CARDIOVASCULAR:  No pressure, squeezing, strangling, tightness, heaviness or aching about the chest, neck, axilla or epigastrium.  RESPIRATORY:  No cough, shortness of breath  GASTROINTESTINAL:  No nausea, vomiting or diarrhea.  GENITOURINARY:  No dysuria, frequency or urgency. No Blood in urine  MUSCULOSKELETAL:  no joint aches, no muscle pain  SKIN:  No change in skin, hair or nails.  NEUROLOGIC:  No Headaches, seizures or weakness.  PSYCHIATRIC:  No disorder of thought or mood.  ENDOCRINE:  No heat or cold intolerance  HEMATOLOGICAL:  No easy bruising or bleeding.       Physical Exam:    GEN: NAD, pleasant  HEENT: normocephalic and atraumatic. EOMI. PERRL.  Anicteric  NECK: Supple.   LUNGS: Clear to auscultation.  HEART: Regular rate and rhythm without murmur.  ABDOMEN: Soft, nontender, and nondistended.  Positive bowel sounds.    : No CVA tenderness  EXTREMITIES: Without any edema.  MSK: No joint swelling  NEUROLOGIC: Cranial nerves II through XII are grossly intact. No Focal Deficits  PSYCHIATRIC: Appropriate affect .  SKIN: No Rash        Vitals:    T(F): 98.7 (2022 10:29), Max: 98.7 (2022 21:32)  HR: 91 (2022 13:17)  BP: 122/72 (2022 13:17)  RR: 18 (2022 10:29)  SpO2: 98% (2022 10:) (97% - 98%)  temp max in last 48H T(F): , Max: 98.7 (22 @ 21:32)    Current Antibiotics:  cefTRIAXone   IVPB 2000 milliGRAM(s) IV Intermittent every 24 hours  rifAXIMin 550 milliGRAM(s) Oral two times a day    Other medications:  clotrimazole 2% Vaginal Cream 1 Applicatorful Vaginal at bedtime  dextrose 5%. 1000 milliLiter(s) IV Continuous <Continuous>  dextrose 5%. 1000 milliLiter(s) IV Continuous <Continuous>  dextrose 50% Injectable 50 milliLiter(s) IV Push every 15 minutes  ferrous    sulfate 325 milliGRAM(s) Oral daily  folic acid 1 milliGRAM(s) Oral daily  furosemide    Tablet 40 milliGRAM(s) Oral daily  glucagon  Injectable 1 milliGRAM(s) IntraMuscular once  insulin glargine Injectable (LANTUS) 40 Unit(s) SubCutaneous at bedtime  insulin lispro (ADMELOG) corrective regimen sliding scale   SubCutaneous three times a day before meals  insulin lispro Injectable (ADMELOG) 15 Unit(s) SubCutaneous three times a day before meals  lactulose Syrup 10 Gram(s) Oral every 8 hours  levothyroxine 88 MICROGram(s) Oral daily  multivitamin 1 Tablet(s) Oral daily  pantoprazole    Tablet 40 milliGRAM(s) Oral before breakfast  propranolol 10 milliGRAM(s) Oral every 8 hours  spironolactone 50 milliGRAM(s) Oral daily  thiamine 100 milliGRAM(s) Oral daily                            9.3    6.69  )-----------( 133      ( 2022 06:29 )             28.2         126<L>  |  96<L>  |  11.1  ----------------------------<  365<H>  4.4   |  21.0<L>  |  0.72    Ca    8.3<L>      2022 06:29      RECENT CULTURES:   @ 03:05 Clean Catch Clean Catch (Midstream) Escherichia coli    >100,000 CFU/ml Escherichia coli         @ 19:45          NotDetec   @ 18:44 .Blood Blood-Peripheral     No growth at 5 days.         @ 18:43 .Blood Blood-Peripheral Blood Culture PCR    Growth in anaerobic bottle: Propionibacterium acnes  "Susceptibilities not performed"    Growth in anaerobic bottle: Gram Positive Rods  Gram Stain performed by:  [facility]  [address] [place] [address]  .  TYPE: (C=Critical, N=Notification, A=Abnormal) C  TESTS:  _ GS  DATE/TIME CALLED: _ 2022 10:43:57  CALLED TO: Verona Schofield RN  READ BACK (2 Patient Identifiers)(Y/N): _ Y  READ BACK VALUES (Y/N): _ Y  CALLED BY: Verona Frazier          WBC Count: 6.69 K/uL (22 @ 06:29)  WBC Count: 5.60 K/uL (22 @ 07:06)  WBC Count: 4.50 K/uL (22 @ 07:38)  WBC Count: 4.80 K/uL (22 @ 07:18)    Creatinine, Serum: 0.72 mg/dL (22 @ 06:29)  Creatinine, Serum: 0.86 mg/dL (22 @ 07:06)  Creatinine, Serum: 0.66 mg/dL (22 @ 07:38)  Creatinine, Serum: 0.97 mg/dL (22 @ 14:31)  Creatinine, Serum: 0.83 mg/dL (22 @ 07:18)  Creatinine, Serum: 0.99 mg/dL (22 @ 18:19)      Ferritin, Serum: 254 ng/mL (22 @ 08:14)         COVID-19 PCR: NotDetec (22 @ 07:45)  SARS-CoV-2: NotDetec (22 @ 19:45)   [facility]  INFECTIOUS DISEASES AND INTERNAL MEDICINE at [place]  =======================================================  Bernardo La MD  Diplomates American Board of Internal Medicine and Infectious Diseases  Tel: [phone]      Fax: [phone]  =======================================================      N-[id number]  JUSTIN LOVE    CC: Patient is a 66y old  Female who presents with a chief complaint of DKA (2022 17:47)      66yr woman  PMH of HTN,  ETOH abuse, ETOH cirrhosis decompensated by esophageal varices and hepatic encephalopathy, DM  Anemia, Pancreatitis admitted with [facility]. Pt was downgraded to medicine team on , currently hyperglycemia is poorly controlled and Insulin is titrated. Later during hospital stay pt developed dysuria, therefore she was started on IV ceftriaxon given E.irene growing in urine. Also blood cx came back with grow of bacteria. ID team was consulted.        [id number]      Past Medical & Surgical Hx:  PAST MEDICAL & SURGICAL HISTORY:  Alcohol abuse      Alcohol abuse      Liver cirrhosis      ETOH abuse      Urea cycle metabolism disorder      Transitory  hyperthyroidism      Lump in female breast      UTI (urinary tract infection)      Lymphangitis, acute, lower leg      Anemia      Hypothyroidism      Chronic back pain      HTN (hypertension)      GERD (gastroesophageal reflux disease)      Pancreatitis      HTN (hypertension)      DM (diabetes mellitus)      No significant past surgical history      S/P abdominoplasty              Social Hx: etoh abuse history as above    FAMILY HISTORY:  FH: depression (Child)        Allergies    No Known Allergies    Intolerances             REVIEW OF SYSTEMS:  CONSTITUTIONAL:  No Fever or chills  HEENT:  No diplopia or blurred vision.  No earache, sore throat or runny nose.  CARDIOVASCULAR:  No pressure, squeezing, strangling, tightness, heaviness or aching about the chest, neck, axilla or epigastrium.  RESPIRATORY:  No cough, shortness of breath  GASTROINTESTINAL:  No nausea, vomiting or diarrhea.  GENITOURINARY:  No dysuria, frequency or urgency. No Blood in urine  MUSCULOSKELETAL:  no joint aches, no muscle pain  SKIN:  No change in skin, hair or nails.  NEUROLOGIC:  No Headaches, seizures or weakness.  PSYCHIATRIC:  No disorder of thought or mood.  ENDOCRINE:  No heat or cold intolerance  HEMATOLOGICAL:  No easy bruising or bleeding.       Physical Exam:    GEN: NAD, pleasant  HEENT: normocephalic and atraumatic. EOMI. PERRL.  Anicteric  NECK: Supple.   LUNGS: Clear to auscultation.  HEART: Regular rate and rhythm without murmur.  ABDOMEN: Soft, nontender, and nondistended.  Positive bowel sounds.    : No CVA tenderness  EXTREMITIES: Without any edema.  MSK: No joint swelling  NEUROLOGIC: Cranial nerves II through XII are grossly intact. No Focal Deficits  PSYCHIATRIC: Appropriate affect .  SKIN: No Rash        Vitals:    T(F): 98.7 (2022 10:29), Max: 98.7 (2022 21:32)  HR: 91 (2022 13:17)  BP: 122/72 (2022 13:17)  RR: 18 (2022 10:29)  SpO2: 98% (2022 10:29) (97% - 98%)  temp max in last 48H T(F): , Max: 98.7 (22 @ 21:32)    Current Antibiotics:  cefTRIAXone   IVPB 2000 milliGRAM(s) IV Intermittent every 24 hours  rifAXIMin 550 milliGRAM(s) Oral two times a day    Other medications:  clotrimazole 2% Vaginal Cream 1 Applicatorful Vaginal at bedtime  dextrose 5%. 1000 milliLiter(s) IV Continuous <Continuous>  dextrose 5%. 1000 milliLiter(s) IV Continuous <Continuous>  dextrose 50% Injectable 50 milliLiter(s) IV Push every 15 minutes  ferrous    sulfate 325 milliGRAM(s) Oral daily  folic acid 1 milliGRAM(s) Oral daily  furosemide    Tablet 40 milliGRAM(s) Oral daily  glucagon  Injectable 1 milliGRAM(s) IntraMuscular once  insulin glargine Injectable (LANTUS) 40 Unit(s) SubCutaneous at bedtime  insulin lispro (ADMELOG) corrective regimen sliding scale   SubCutaneous three times a day before meals  insulin lispro Injectable (ADMELOG) 15 Unit(s) SubCutaneous three times a day before meals  lactulose Syrup 10 Gram(s) Oral every 8 hours  levothyroxine 88 MICROGram(s) Oral daily  multivitamin 1 Tablet(s) Oral daily  pantoprazole    Tablet 40 milliGRAM(s) Oral before breakfast  propranolol 10 milliGRAM(s) Oral every 8 hours  spironolactone 50 milliGRAM(s) Oral daily  thiamine 100 milliGRAM(s) Oral daily                            9.3    6.69  )-----------( 133      ( 2022 06:29 )             28.2         126<L>  |  96<L>  |  11.1  ----------------------------<  365<H>  4.4   |  21.0<L>  |  0.72    Ca    8.3<L>      2022 06:29      RECENT CULTURES:   @ 03:05 Clean Catch Clean Catch (Midstream) Escherichia coli    >100,000 CFU/ml Escherichia coli         @ 19:45          NotDetec   @ 18:44 .Blood Blood-Peripheral     No growth at 5 days.         @ 18:43 .Blood Blood-Peripheral Blood Culture PCR    Growth in anaerobic bottle: Propionibacterium acnes  "Susceptibilities not performed"    Growth in anaerobic bottle: Gram Positive Rods  Gram Stain performed by:  [facility]  [address] [place] [id number]  .  TYPE: (C=Critical, N=Notification, A=Abnormal) C  TESTS:  _   DATE/TIME CALLED: _ 2022 10:43:57  CALLED TO: Verona Schofield RN  READ BACK (2 Patient Identifiers)(Y/N): _ Y  READ BACK VALUES (Y/N): _ Y  CALLED BY: Verona Frazier          WBC Count: 6.69 K/uL (22 @ 06:29)  WBC Count: 5.60 K/uL (22 @ 07:06)  WBC Count: 4.50 K/uL (22 @ 07:38)  WBC Count: 4.80 K/uL (22 @ 07:18)    Creatinine, Serum: 0.72 mg/dL (22 @ 06:29)  Creatinine, Serum: 0.86 mg/dL (22 @ 07:06)  Creatinine, Serum: 0.66 mg/dL (22 @ 07:38)  Creatinine, Serum: 0.97 mg/dL (22 @ 14:31)  Creatinine, Serum: 0.83 mg/dL (22 @ 07:18)  Creatinine, Serum: 0.99 mg/dL (22 @ 18:19)      Ferritin, Serum: 254 ng/mL (22 @ 08:14)         COVID-19 PCR: NotDetec (22 @ 07:45)  SARS-CoV-2: NotDetec (22 @ 19:45)      < from: CT Abdomen and Pelvis No Cont (22 @ 08:33) >  IMPRESSION:  *  No urinary tract calculi or hydronephrosis.  *  Mild asymmetric right renal haziness and perinephric stranding is   nonspecific but could be seen in the setting of infection.  *  Cirrhosis and portal hypertension.  *  Prominent central low attenuation in the uterus suggestive of   endometrial thickening. Correlate with ultrasound and/or tissue sampling.      --- End of Report ---      < end of copied text >

## 2022-06-07 NOTE — CONSULT NOTE ADULT - ASSESSMENT
66yr woman  PMH of HTN,  ETOH abuse, ETOH cirrhosis decompensated by esophageal varices and hepatic encephalopathy, DM  Anemia, Pancreatitis admitted with [facility]. Pt was downgraded to medicine team on 06/01, currently hyperglycemia is poorly controlled and Insulin is titrated. Later during hospital stay pt developed dysuria, therefore she was started on IV ceftriaxon given E.irene growing in urine. Also blood cx came back with grow of bacteria. ID team was consulted.     Positive BCX  UTI  Vaginal candidiases  ETOH cirrhosis    - Blood cultures +c.acnes ?skin contaminant  - Repeat blood cultures   - f/u repeat UCX  - Continue ceftriaxone  - Trend Fever  - Trend Leukocytosis    d/w attending  Will Follow

## 2022-06-07 NOTE — PROGRESS NOTE ADULT - SUBJECTIVE AND OBJECTIVE BOX
[facility] Division of Hospital Medicine    Chief Complaint:  hhs    SUBJECTIVE:  now new complains    OVERNIGHT EVENTS: still with poorly  controlled hyperglycemia, appears pt gets food from family    Patient denies chest pain, SOB, abd pain, N/V, fever, chills, dysuria or any other complaints. All remainder ROS negative.     MEDICATIONS  (STANDING):  cefTRIAXone   IVPB 2000 milliGRAM(s) IV Intermittent every 24 hours  clotrimazole 2% Vaginal Cream 1 Applicatorful Vaginal at bedtime  dextrose 5%. 1000 milliLiter(s) (50 mL/Hr) IV Continuous <Continuous>  dextrose 5%. 1000 milliLiter(s) (100 mL/Hr) IV Continuous <Continuous>  dextrose 50% Injectable 50 milliLiter(s) IV Push every 15 minutes  ferrous    sulfate 325 milliGRAM(s) Oral daily  folic acid 1 milliGRAM(s) Oral daily  furosemide    Tablet 40 milliGRAM(s) Oral daily  glucagon  Injectable 1 milliGRAM(s) IntraMuscular once  insulin glargine Injectable (LANTUS) 40 Unit(s) SubCutaneous at bedtime  insulin lispro (ADMELOG) corrective regimen sliding scale   SubCutaneous three times a day before meals  insulin lispro Injectable (ADMELOG) 15 Unit(s) SubCutaneous three times a day before meals  lactulose Syrup 10 Gram(s) Oral every 8 hours  levothyroxine 88 MICROGram(s) Oral daily  multivitamin 1 Tablet(s) Oral daily  pantoprazole    Tablet 40 milliGRAM(s) Oral before breakfast  propranolol 10 milliGRAM(s) Oral every 8 hours  rifAXIMin 550 milliGRAM(s) Oral two times a day  spironolactone 50 milliGRAM(s) Oral daily  thiamine 100 milliGRAM(s) Oral daily    MEDICATIONS  (PRN):  acetaminophen     Tablet .. 650 milliGRAM(s) Oral every 6 hours PRN Temp greater or equal to 38C (100.4F), Moderate Pain (4 - 6)  dextrose Oral Gel 15 Gram(s) Oral once PRN Blood Glucose LESS THAN 70 milliGRAM(s)/deciliter  traMADol 25 milliGRAM(s) Oral every 6 hours PRN Severe Pain (7 - 10)      I&O's Summary      PHYSICAL EXAM:  Vital Signs Last 24 Hrs  T(C): 37.1 (07 Jun 2022 10:29), Max: 37.1 (06 Jun 2022 21:32)  T(F): 98.7 (07 Jun 2022 10:29), Max: 98.7 (06 Jun 2022 21:32)  HR: 91 (07 Jun 2022 13:17) (74 - 91)  BP: 122/72 (07 Jun 2022 13:17) (95/51 - 122/72)  BP(mean): --  RR: 18 (07 Jun 2022 10:29) (18 - 19)  SpO2: 98% (07 Jun 2022 10:29) (97% - 98%)        CONSTITUTIONAL: NAD  ENMT: Moist oral mucosa, no pharyngeal injection or exudates; normal dentition; No JVD  RESPIRATORY: Normal respiratory effort; lungs are clear to auscultation bilaterally  CARDIOVASCULAR: Regular rate and rhythm, normal S1 and S2, no murmur/rub/gallop; No lower extremity edema; Peripheral pulses are 2+ bilaterally  ABDOMEN: Nontender to palpation, normoactive bowel sounds, no rebound/guarding; No hepatosplenomegaly, no CVA tenderness  MUSCLOSKELETAL:  no clubbing or cyanosis of digits; no joint swelling or tenderness to palpation  PSYCH: A+O to person, place, and time; affect appropriate but with poor insight in her condition  NEUROLOGY: CN 2-12 are intact and symmetric; no gross sensory deficits; was observed moving all 4 ext against gravity cooperating with exam.   SKIN: No rashes; no palpable lesions    LABS:                        9.3    6.69  )-----------( 133      ( 07 Jun 2022 06:29 )             28.2     06-07    126<L>  |  96<L>  |  11.1  ----------------------------<  365<H>  4.4   |  21.0<L>  |  0.72    Ca    8.3<L>      07 Jun 2022 06:29                CAPILLARY BLOOD GLUCOSE      POCT Blood Glucose.: 187 mg/dL (07 Jun 2022 12:05)  POCT Blood Glucose.: 388 mg/dL (07 Jun 2022 08:29)  POCT Blood Glucose.: 405 mg/dL (07 Jun 2022 08:28)  POCT Blood Glucose.: 194 mg/dL (06 Jun 2022 21:29)  POCT Blood Glucose.: 379 mg/dL (06 Jun 2022 18:24)        RADIOLOGY & ADDITIONAL TESTS:  Results Reviewed:   Imaging Personally Reviewed:  Electrocardiogram Personally Reviewed: [facility] Division of Hospital Medicine    Chief Complaint:  [facility]    SUBJECTIVE:  now new complains ( language line # [id number])    OVERNIGHT EVENTS: still with poorly  controlled hyperglycemia, appears pt gets food from family    Patient denies chest pain, SOB, abd pain, N/V, fever, chills, dysuria or any other complaints. All remainder ROS negative.     MEDICATIONS  (STANDING):  cefTRIAXone   IVPB 2000 milliGRAM(s) IV Intermittent every 24 hours  clotrimazole 2% Vaginal Cream 1 Applicatorful Vaginal at bedtime  dextrose 5%. 1000 milliLiter(s) (50 mL/Hr) IV Continuous <Continuous>  dextrose 5%. 1000 milliLiter(s) (100 mL/Hr) IV Continuous <Continuous>  dextrose 50% Injectable 50 milliLiter(s) IV Push every 15 minutes  ferrous    sulfate 325 milliGRAM(s) Oral daily  folic acid 1 milliGRAM(s) Oral daily  furosemide    Tablet 40 milliGRAM(s) Oral daily  glucagon  Injectable 1 milliGRAM(s) IntraMuscular once  insulin glargine Injectable (LANTUS) 40 Unit(s) SubCutaneous at bedtime  insulin lispro (ADMELOG) corrective regimen sliding scale   SubCutaneous three times a day before meals  insulin lispro Injectable (ADMELOG) 15 Unit(s) SubCutaneous three times a day before meals  lactulose Syrup 10 Gram(s) Oral every 8 hours  levothyroxine 88 MICROGram(s) Oral daily  multivitamin 1 Tablet(s) Oral daily  pantoprazole    Tablet 40 milliGRAM(s) Oral before breakfast  propranolol 10 milliGRAM(s) Oral every 8 hours  rifAXIMin 550 milliGRAM(s) Oral two times a day  spironolactone 50 milliGRAM(s) Oral daily  thiamine 100 milliGRAM(s) Oral daily    MEDICATIONS  (PRN):  acetaminophen     Tablet .. 650 milliGRAM(s) Oral every 6 hours PRN Temp greater or equal to 38C (100.4F), Moderate Pain (4 - 6)  dextrose Oral Gel 15 Gram(s) Oral once PRN Blood Glucose LESS THAN 70 milliGRAM(s)/deciliter  traMADol 25 milliGRAM(s) Oral every 6 hours PRN Severe Pain (7 - 10)      I&O's Summary      PHYSICAL EXAM:  Vital Signs Last 24 Hrs  T(C): 37.1 (07 Jun 2022 10:29), Max: 37.1 (06 Jun 2022 21:32)  T(F): 98.7 (07 Jun 2022 10:29), Max: 98.7 (06 Jun 2022 21:32)  HR: 91 (07 Jun 2022 13:17) (74 - 91)  BP: 122/72 (07 Jun 2022 13:17) (95/51 - 122/72)  BP(mean): --  RR: 18 (07 Jun 2022 10:29) (18 - 19)  SpO2: 98% (07 Jun 2022 10:29) (97% - 98%)        CONSTITUTIONAL: NAD  ENMT: Moist oral mucosa, no pharyngeal injection or exudates; normal dentition; No JVD  RESPIRATORY: Normal respiratory effort; lungs are clear to auscultation bilaterally  CARDIOVASCULAR: Regular rate and rhythm, normal S1 and S2, no murmur/rub/gallop; No lower extremity edema; Peripheral pulses are 2+ bilaterally  ABDOMEN: Nontender to palpation, normoactive bowel sounds, no rebound/guarding; No hepatosplenomegaly, no CVA tenderness  MUSCLOSKELETAL:  no clubbing or cyanosis of digits; no joint swelling or tenderness to palpation  PSYCH: A+O to person, place, and time; affect appropriate but with poor insight in her condition  NEUROLOGY: CN 2-12 are intact and symmetric; no gross sensory deficits; was observed moving all 4 ext against gravity cooperating with exam.   SKIN: No rashes; no palpable lesions    LABS:                        9.3    6.69  )-----------( 133      ( 07 Jun 2022 06:29 )             28.2     06-07    126<L>  |  96<L>  |  11.1  ----------------------------<  365<H>  4.4   |  21.0<L>  |  0.72    Ca    8.3<L>      07 Jun 2022 06:29                CAPILLARY BLOOD GLUCOSE      POCT Blood Glucose.: 187 mg/dL (07 Jun 2022 12:05)  POCT Blood Glucose.: 388 mg/dL (07 Jun 2022 08:29)  POCT Blood Glucose.: 405 mg/dL (07 Jun 2022 08:28)  POCT Blood Glucose.: 194 mg/dL (06 Jun 2022 21:29)  POCT Blood Glucose.: 379 mg/dL (06 Jun 2022 18:24)        RADIOLOGY & ADDITIONAL TESTS:  Results Reviewed:   Imaging Personally Reviewed:  Electrocardiogram Personally Reviewed:

## 2022-06-07 NOTE — PROGRESS NOTE ADULT - ASSESSMENT
66yr woman  PMH of HTN,  ETOH abuse, ETOH cirrhosis decompensated by esophageal varices and hepatic encephalopathy, DM  Anemia, Pancreatitis admitted with HHS. Pt was downgraded to medicine team on 06/01, currently hyperglycemia is poorly controlled and Insulin is titrated. Later during hospital stay pt developed dysuria, therefore she was started on IV ceftriaxon given E.irene growing in urine. Also blood cx came back with grow of bacteria. ID team was consulted.     UTI  Abnormal blood cx  -c/w  Ceftriaxon 2 g d3/ daily for now  - ct abd/pelvis - no apparent stones, hydro, mild perinephric straining but no apparent pyelonephritis  -  discussed with ID >> will f/u on blood cx from 06/06 and obtain repeat UA, Ucx     Poorly controlled DMT2 c/b HHS, still poorly controlled hyperglycemia  - spent some time with pt with  on the line explainin importance to adhere to diet   - c/w Lantus 40 qhs, ac 15, mdss  - A1C  - 13.7  - endocrinology team follows    Back pain  - msk in nature, reproducible on palpation  - Tylenol and tramadol base on pain scale      Alcoholic liver cirrhosis c/b hx of esophagel varcies, hepatic encephalopathy, anemia and thrombocytopenia, stable   - h/h noted, no apparent active bleeding, c/w iron supplement  - cbc daily   - c/w Furosemide 40,  spironolactone 50 mg in am, propranolol 10 tid, c/w lactulose, rifaximin and ppi  -  Will refer to Dr. Craven on d/c     #Hyponatremia, most likely due to liver chirrhosis   - management as above    #Hypothyroidism  - c/w levothyroxine   - Will need TFTs repeated in 4-6 weeks as outpt     Hypomagnesemia / Hypophosphatemia   - Likely 2/2 chronic etoh use   - Supplemented   - Replete as needed     HTN- c/w propranolol as above    VTE ppx: SCDs given thrombocytopenia   Code/social - full code  Dispo: if blood cx is negative with cleared urine cx>> d/c home w/in next 24-48 hr

## 2022-06-08 LAB
ANION GAP SERPL CALC-SCNC: 13 MMOL/L — SIGNIFICANT CHANGE UP (ref 5–17)
BUN SERPL-MCNC: 11.4 MG/DL — SIGNIFICANT CHANGE UP (ref 8–20)
CALCIUM SERPL-MCNC: 8.5 MG/DL — LOW (ref 8.6–10.2)
CHLORIDE SERPL-SCNC: 93 MMOL/L — LOW (ref 98–107)
CO2 SERPL-SCNC: 20 MMOL/L — LOW (ref 22–29)
CREAT SERPL-MCNC: 0.68 MG/DL — SIGNIFICANT CHANGE UP (ref 0.5–1.3)
EGFR: 97 ML/MIN/1.73M2 — SIGNIFICANT CHANGE UP
GLUCOSE BLDC GLUCOMTR-MCNC: 154 MG/DL — HIGH (ref 70–99)
GLUCOSE BLDC GLUCOMTR-MCNC: 244 MG/DL — HIGH (ref 70–99)
GLUCOSE BLDC GLUCOMTR-MCNC: 278 MG/DL — HIGH (ref 70–99)
GLUCOSE BLDC GLUCOMTR-MCNC: 530 MG/DL — CRITICAL HIGH (ref 70–99)
GLUCOSE BLDC GLUCOMTR-MCNC: >530 MG/DL — CRITICAL HIGH (ref 70–99)
GLUCOSE SERPL-MCNC: 413 MG/DL — HIGH (ref 70–99)
HCT VFR BLD CALC: 30.8 % — LOW (ref 34.5–45)
HGB BLD-MCNC: 10.3 G/DL — LOW (ref 11.5–15.5)
MCHC RBC-ENTMCNC: 33.4 GM/DL — SIGNIFICANT CHANGE UP (ref 32–36)
MCHC RBC-ENTMCNC: 33.6 PG — SIGNIFICANT CHANGE UP (ref 27–34)
MCV RBC AUTO: 100.3 FL — HIGH (ref 80–100)
PLATELET # BLD AUTO: 148 K/UL — LOW (ref 150–400)
POTASSIUM SERPL-MCNC: 4.7 MMOL/L — SIGNIFICANT CHANGE UP (ref 3.5–5.3)
POTASSIUM SERPL-SCNC: 4.7 MMOL/L — SIGNIFICANT CHANGE UP (ref 3.5–5.3)
RBC # BLD: 3.07 M/UL — LOW (ref 3.8–5.2)
RBC # FLD: 17.8 % — HIGH (ref 10.3–14.5)
SODIUM SERPL-SCNC: 126 MMOL/L — LOW (ref 135–145)
WBC # BLD: 5.57 K/UL — SIGNIFICANT CHANGE UP (ref 3.8–10.5)
WBC # FLD AUTO: 5.57 K/UL — SIGNIFICANT CHANGE UP (ref 3.8–10.5)

## 2022-06-08 PROCEDURE — 99232 SBSQ HOSP IP/OBS MODERATE 35: CPT

## 2022-06-08 PROCEDURE — 99233 SBSQ HOSP IP/OBS HIGH 50: CPT

## 2022-06-08 RX ORDER — INSULIN LISPRO 100/ML
10 VIAL (ML) SUBCUTANEOUS ONCE
Refills: 0 | Status: COMPLETED | OUTPATIENT
Start: 2022-06-08 | End: 2022-06-08

## 2022-06-08 RX ORDER — INSULIN GLARGINE 100 [IU]/ML
45 INJECTION, SOLUTION SUBCUTANEOUS AT BEDTIME
Refills: 0 | Status: DISCONTINUED | OUTPATIENT
Start: 2022-06-08 | End: 2022-06-10

## 2022-06-08 RX ORDER — INSULIN LISPRO 100/ML
17 VIAL (ML) SUBCUTANEOUS
Refills: 0 | Status: DISCONTINUED | OUTPATIENT
Start: 2022-06-08 | End: 2022-06-09

## 2022-06-08 RX ORDER — INSULIN LISPRO 100/ML
VIAL (ML) SUBCUTANEOUS
Refills: 0 | Status: DISCONTINUED | OUTPATIENT
Start: 2022-06-08 | End: 2022-06-10

## 2022-06-08 RX ADMIN — Medication 17 UNIT(S): at 17:08

## 2022-06-08 RX ADMIN — Medication 100 MILLIGRAM(S): at 11:39

## 2022-06-08 RX ADMIN — CEFTRIAXONE 100 MILLIGRAM(S): 500 INJECTION, POWDER, FOR SOLUTION INTRAMUSCULAR; INTRAVENOUS at 05:17

## 2022-06-08 RX ADMIN — Medication 1 MILLIGRAM(S): at 11:39

## 2022-06-08 RX ADMIN — Medication 1 TABLET(S): at 11:39

## 2022-06-08 RX ADMIN — Medication 325 MILLIGRAM(S): at 11:39

## 2022-06-08 RX ADMIN — Medication 4: at 21:57

## 2022-06-08 RX ADMIN — Medication 17 UNIT(S): at 09:11

## 2022-06-08 RX ADMIN — Medication 10 UNIT(S): at 09:12

## 2022-06-08 RX ADMIN — LACTULOSE 10 GRAM(S): 10 SOLUTION ORAL at 11:38

## 2022-06-08 RX ADMIN — INSULIN GLARGINE 45 UNIT(S): 100 INJECTION, SOLUTION SUBCUTANEOUS at 21:57

## 2022-06-08 RX ADMIN — Medication 88 MICROGRAM(S): at 05:18

## 2022-06-08 RX ADMIN — Medication 1 APPLICATORFUL: at 21:59

## 2022-06-08 RX ADMIN — SPIRONOLACTONE 50 MILLIGRAM(S): 25 TABLET, FILM COATED ORAL at 05:18

## 2022-06-08 RX ADMIN — PANTOPRAZOLE SODIUM 40 MILLIGRAM(S): 20 TABLET, DELAYED RELEASE ORAL at 06:53

## 2022-06-08 RX ADMIN — Medication 17 UNIT(S): at 12:09

## 2022-06-08 RX ADMIN — Medication 6: at 12:09

## 2022-06-08 RX ADMIN — LACTULOSE 10 GRAM(S): 10 SOLUTION ORAL at 20:11

## 2022-06-08 RX ADMIN — Medication 40 MILLIGRAM(S): at 05:18

## 2022-06-08 NOTE — PROGRESS NOTE ADULT - SUBJECTIVE AND OBJECTIVE BOX
INTERVAL HPI/OVERNIGHT EVENTS: follow up of diabetes    MEDICATIONS  (STANDING):  clotrimazole 2% Vaginal Cream 1 Applicatorful Vaginal at bedtime  dextrose 5%. 1000 milliLiter(s) (50 mL/Hr) IV Continuous <Continuous>  dextrose 5%. 1000 milliLiter(s) (100 mL/Hr) IV Continuous <Continuous>  dextrose 50% Injectable 50 milliLiter(s) IV Push every 15 minutes  ferrous    sulfate 325 milliGRAM(s) Oral daily  folic acid 1 milliGRAM(s) Oral daily  furosemide    Tablet 40 milliGRAM(s) Oral daily  glucagon  Injectable 1 milliGRAM(s) IntraMuscular once  insulin glargine Injectable (LANTUS) 45 Unit(s) SubCutaneous at bedtime  insulin lispro (ADMELOG) corrective regimen sliding scale   SubCutaneous Before meals and at bedtime  insulin lispro Injectable (ADMELOG) 17 Unit(s) SubCutaneous three times a day before meals  lactulose Syrup 10 Gram(s) Oral every 8 hours  levothyroxine 88 MICROGram(s) Oral daily  multivitamin 1 Tablet(s) Oral daily  pantoprazole    Tablet 40 milliGRAM(s) Oral before breakfast  propranolol 10 milliGRAM(s) Oral every 8 hours  rifAXIMin 550 milliGRAM(s) Oral two times a day  spironolactone 50 milliGRAM(s) Oral daily  thiamine 100 milliGRAM(s) Oral daily    MEDICATIONS  (PRN):  acetaminophen     Tablet .. 650 milliGRAM(s) Oral every 6 hours PRN Temp greater or equal to 38C (100.4F), Moderate Pain (4 - 6)  dextrose Oral Gel 15 Gram(s) Oral once PRN Blood Glucose LESS THAN 70 milliGRAM(s)/deciliter  traMADol 25 milliGRAM(s) Oral every 6 hours PRN Severe Pain (7 - 10)      Allergies    No Known Allergies    Intolerances        Review of systems:    Vital Signs Last 24 Hrs  T(C): 36.8 (2022 16:55), Max: 37.1 (2022 05:04)  T(F): 98.2 (2022 16:55), Max: 98.7 (2022 05:04)  HR: 78 (2022 16:55) (73 - 83)  BP: 118/71 (2022 16:55) (104/65 - 144/85)  BP(mean): --  RR: 19 (2022 16:55) (18 - 20)  SpO2: 99% (2022 16:55) (97% - 100%)    PHYSICAL EXAM:      Constitutional: NAD, alert and responsive    LABS:                        10.3   5.57  )-----------( 148      ( 2022 06:13 )             30.8     -08    126<L>  |  93<L>  |  11.4  ----------------------------<  413<H>  4.7   |  20.0<L>  |  0.68    Ca    8.5<L>      2022 06:13      Urinalysis Basic - ( 2022 18:42 )    Color: Yellow / Appearance: Clear / S.010 / pH: x  Gluc: x / Ketone: Negative  / Bili: Negative / Urobili: Negative mg/dL   Blood: x / Protein: Negative / Nitrite: Negative   Leuk Esterase: Small / RBC: 0-2 /HPF / WBC 11-25 /HPF   Sq Epi: x / Non Sq Epi: Occasional / Bacteria: Occasional          POCT Blood Glucose.: 154 mg/dL (22 @ 17:01)  POCT Blood Glucose.: 278 mg/dL (22 @ 12:08)  POCT Blood Glucose.: >530 mg/dL (22 @ 08:44)  POCT Blood Glucose.: 530 mg/dL (22 @ 08:42)  POCT Blood Glucose.: 311 mg/dL (22 @ 21:16)  POCT Blood Glucose.: 226 mg/dL (22 @ 16:29)  POCT Blood Glucose.: 187 mg/dL (22 @ 12:05)  POCT Blood Glucose.: 388 mg/dL (22 @ 08:29)  POCT Blood Glucose.: 405 mg/dL (22 @ 08:28)  POCT Blood Glucose.: 194 mg/dL (22 @ 21:29)  POCT Blood Glucose.: 379 mg/dL (22 @ 18:24)  POCT Blood Glucose.: 260 mg/dL (22 @ 13:36)  POCT Blood Glucose.: 377 mg/dL (22 @ 08:57)  POCT Blood Glucose.: 438 mg/dL (22 @ 21:50)

## 2022-06-08 NOTE — PROGRESS NOTE ADULT - SUBJECTIVE AND OBJECTIVE BOX
[facility]  INFECTIOUS DISEASES AND INTERNAL MEDICINE at [place]  =======================================================  Bernardo La MD  Diplomates American Board of Internal Medicine and Infectious Diseases  Tel: [phone]      Fax: [phone]  =======================================================    JUSTIN LOVE [id number]    Follow up: positive bcx  feels well no complaints  improved vaginal itching   [id number]    Allergies:  No Known Allergies           REVIEW OF SYSTEMS:  CONSTITUTIONAL:  No Fever or chills  HEENT:   No diplopia or blurred vision.  No earache, sore throat or runny nose.  CARDIOVASCULAR:  No pressure, squeezing, strangling, tightness, heaviness or aching about the chest, neck, axilla or epigastrium.  RESPIRATORY:  No cough, shortness of breath  GASTROINTESTINAL:  No nausea, vomiting or diarrhea.  GENITOURINARY:  No dysuria, frequency or urgency. No Blood in urine  MUSCULOSKELETAL:  no joint aches, no muscle pain  SKIN:  No change in skin, hair or nails.  NEUROLOGIC:  No Headaches, seizures or weakness.  PSYCHIATRIC:  No disorder of thought or mood.  ENDOCRINE:  No heat or cold intolerance  HEMATOLOGICAL:  No easy bruising or bleeding.       Physical Exam:  GEN: NAD, pleasant  HEENT: normocephalic and atraumatic. EOMI. PERRL.  Anicteric   NECK: Supple.   LUNGS: Clear to auscultation.  HEART: Regular rate and rhythm without murmur.  ABDOMEN: Soft, nontender, and nondistended.  Positive bowel sounds.    : No CVA tenderness  EXTREMITIES: Without any edema.  MSK: no joint swelling  NEUROLOGIC: Cranial nerves II through XII are grossly intact. No focal deficits  PSYCHIATRIC: Appropriate affect .  SKIN: No Rash      Vitals:    T(F): 98.2 (08 Jun 2022 10:59), Max: 98.7 (08 Jun 2022 05:04)  HR: 81 (08 Jun 2022 14:11)  BP: 104/65 (08 Jun 2022 14:11)  RR: 19 (08 Jun 2022 10:59)  SpO2: 97% (08 Jun 2022 10:59) (97% - 100%)  temp max in last 48H T(F): , Max: 98.7 (06-06-22 @ 21:32)    Current Antibiotics:  cefTRIAXone   IVPB 2000 milliGRAM(s) IV Intermittent every 24 hours  rifAXIMin 550 milliGRAM(s) Oral two times a day    Other medications:  clotrimazole 2% Vaginal Cream 1 Applicatorful Vaginal at bedtime  dextrose 5%. 1000 milliLiter(s) IV Continuous <Continuous>  dextrose 5%. 1000 milliLiter(s) IV Continuous <Continuous>  dextrose 50% Injectable 50 milliLiter(s) IV Push every 15 minutes  ferrous    sulfate 325 milliGRAM(s) Oral daily  folic acid 1 milliGRAM(s) Oral daily  furosemide    Tablet 40 milliGRAM(s) Oral daily  glucagon  Injectable 1 milliGRAM(s) IntraMuscular once  insulin glargine Injectable (LANTUS) 45 Unit(s) SubCutaneous at bedtime  insulin lispro (ADMELOG) corrective regimen sliding scale   SubCutaneous Before meals and at bedtime  insulin lispro Injectable (ADMELOG) 17 Unit(s) SubCutaneous three times a day before meals  lactulose Syrup 10 Gram(s) Oral every 8 hours  levothyroxine 88 MICROGram(s) Oral daily  multivitamin 1 Tablet(s) Oral daily  pantoprazole    Tablet 40 milliGRAM(s) Oral before breakfast  propranolol 10 milliGRAM(s) Oral every 8 hours  spironolactone 50 milliGRAM(s) Oral daily  thiamine 100 milliGRAM(s) Oral daily                            10.3   5.57  )-----------( 148      ( 08 Jun 2022 06:13 )             30.8     06-08    126<L>  |  93<L>  |  11.4  ----------------------------<  413<H>  4.7   |  20.0<L>  |  0.68    Ca    8.5<L>      08 Jun 2022 06:13      RECENT CULTURES:  06-07 @ 02:17 .Blood Blood-Peripheral     No growth to date.        06-02 @ 03:05 Clean Catch Clean Catch (Midstream) Escherichia coli    >100,000 CFU/ml Escherichia coli        05-31 @ 19:45          NotDetec  05-31 @ 18:44 .Blood Blood-Peripheral     No growth at 5 days.        05-31 @ 18:43 .Blood Blood-Peripheral Blood Culture PCR    Growth in anaerobic bottle: Propionibacterium acnes  "Susceptibilities not performed"    Growth in anaerobic bottle: Gram Positive Rods  Gram Stain performed by:  [facility]  [address] [place] [id number]  .  TYPE: (C=Critical, N=Notification, A=Abnormal) C  TESTS:  _ GS  DATE/TIME CALLED: _ 06/05/2022 10:43:57  CALLED TO: Verona Schofield RN  READ BACK (2 Patient Identifiers)(Y/N): _ Y  READ BACK VALUES (Y/N): _ Y  CALLED BY: Verona Frazier          WBC Count: 5.57 K/uL (06-08-22 @ 06:13)  WBC Count: 6.69 K/uL (06-07-22 @ 06:29)  WBC Count: 5.60 K/uL (06-05-22 @ 07:06)  WBC Count: 4.50 K/uL (06-04-22 @ 07:38)    Creatinine, Serum: 0.68 mg/dL (06-08-22 @ 06:13)  Creatinine, Serum: 0.72 mg/dL (06-07-22 @ 06:29)  Creatinine, Serum: 0.86 mg/dL (06-05-22 @ 07:06)  Creatinine, Serum: 0.66 mg/dL (06-04-22 @ 07:38)      Ferritin, Serum: 254 ng/mL (06-01-22 @ 08:14)         COVID-19 PCR: NotDetec (06-07-22 @ 07:45)  SARS-CoV-2: NotDetec (05-31-22 @ 19:45)

## 2022-06-08 NOTE — PROGRESS NOTE ADULT - SUBJECTIVE AND OBJECTIVE BOX
[facility] Division of Hospital Medicine    Chief Complaint:  HHS    SUBJECTIVE: pt reports feeling well, denies taking food or eating extra food, however big bag of Lace chips were found in her bed by the author     OVERNIGHT EVENTS: none reported     Patient denies chest pain, SOB, abd pain, N/V, fever, chills, dysuria or any other complaints. All remainder ROS negative.     MEDICATIONS  (STANDING):  cefTRIAXone   IVPB 2000 milliGRAM(s) IV Intermittent every 24 hours  clotrimazole 2% Vaginal Cream 1 Applicatorful Vaginal at bedtime  dextrose 5%. 1000 milliLiter(s) (50 mL/Hr) IV Continuous <Continuous>  dextrose 5%. 1000 milliLiter(s) (100 mL/Hr) IV Continuous <Continuous>  dextrose 50% Injectable 50 milliLiter(s) IV Push every 15 minutes  ferrous    sulfate 325 milliGRAM(s) Oral daily  folic acid 1 milliGRAM(s) Oral daily  furosemide    Tablet 40 milliGRAM(s) Oral daily  glucagon  Injectable 1 milliGRAM(s) IntraMuscular once  insulin glargine Injectable (LANTUS) 45 Unit(s) SubCutaneous at bedtime  insulin lispro (ADMELOG) corrective regimen sliding scale   SubCutaneous Before meals and at bedtime  insulin lispro Injectable (ADMELOG) 17 Unit(s) SubCutaneous three times a day before meals  lactulose Syrup 10 Gram(s) Oral every 8 hours  levothyroxine 88 MICROGram(s) Oral daily  multivitamin 1 Tablet(s) Oral daily  pantoprazole    Tablet 40 milliGRAM(s) Oral before breakfast  propranolol 10 milliGRAM(s) Oral every 8 hours  rifAXIMin 550 milliGRAM(s) Oral two times a day  spironolactone 50 milliGRAM(s) Oral daily  thiamine 100 milliGRAM(s) Oral daily    MEDICATIONS  (PRN):  acetaminophen     Tablet .. 650 milliGRAM(s) Oral every 6 hours PRN Temp greater or equal to 38C (100.4F), Moderate Pain (4 - 6)  dextrose Oral Gel 15 Gram(s) Oral once PRN Blood Glucose LESS THAN 70 milliGRAM(s)/deciliter  traMADol 25 milliGRAM(s) Oral every 6 hours PRN Severe Pain (7 - 10)        I&O's Summary      PHYSICAL EXAM:  Vital Signs Last 24 Hrs  T(C): 36.8 (2022 10:59), Max: 37.1 (2022 05:04)  T(F): 98.2 (2022 10:59), Max: 98.7 (2022 05:04)  HR: 73 (2022 10:59) (73 - 91)  BP: 107/68 (2022 10:59) (107/68 - 144/85)  BP(mean): --  RR: 19 (2022 10:59) (18 - 20)  SpO2: 97% (2022 10:59) (97% - 100%)        CONSTITUTIONAL: NAD  ENMT: Moist oral mucosa, no pharyngeal injection or exudates; normal dentition; No JVD  RESPIRATORY: Normal respiratory effort; lungs are clear to auscultation bilaterally  CARDIOVASCULAR: Regular rate and rhythm, normal S1 and S2, no murmur/rub/gallop; No lower extremity edema; Peripheral pulses are 2+ bilaterally  ABDOMEN: Nontender to palpation, normoactive bowel sounds, no rebound/guarding; No hepatosplenomegaly, no CVA tenderness  MUSCLOSKELETAL:  no clubbing or cyanosis of digits; no joint swelling or tenderness to palpation  PSYCH: A+O to person, place, and time; affect appropriate but with poor insight in her condition  NEUROLOGY: CN 2-12 are intact and symmetric; no gross sensory deficits; was observed moving all 4 ext against gravity cooperating with exam.   SKIN: No rashes; no palpable lesions    LABS:                        10.3   5.57  )-----------( 148      ( 2022 06:13 )             30.8     06-08    126<L>  |  93<L>  |  11.4  ----------------------------<  413<H>  4.7   |  20.0<L>  |  0.68    Ca    8.5<L>      2022 06:13            Urinalysis Basic - ( 2022 18:42 )    Color: Yellow / Appearance: Clear / S.010 / pH: x  Gluc: x / Ketone: Negative  / Bili: Negative / Urobili: Negative mg/dL   Blood: x / Protein: Negative / Nitrite: Negative   Leuk Esterase: Small / RBC: 0-2 /HPF / WBC 11-25 /HPF   Sq Epi: x / Non Sq Epi: Occasional / Bacteria: Occasional        Culture - Blood (collected 2022 02:17)  Source: .Blood Blood-Peripheral  Preliminary Report (2022 03:01):    No growth to date.    Culture - Blood (collected 2022 02:17)  Source: .Blood Blood-Peripheral  Preliminary Report (2022 03:01):    No growth to date.      CAPILLARY BLOOD GLUCOSE      POCT Blood Glucose.: 278 mg/dL (2022 12:08)  POCT Blood Glucose.: >530 mg/dL (2022 08:44)  POCT Blood Glucose.: 530 mg/dL (2022 08:42)  POCT Blood Glucose.: 311 mg/dL (2022 21:16)  POCT Blood Glucose.: 226 mg/dL (2022 16:29)        RADIOLOGY & ADDITIONAL TESTS:  Results Reviewed:   Imaging Personally Reviewed:  Electrocardiogram Personally Reviewed:

## 2022-06-08 NOTE — PROVIDER CONTACT NOTE (OTHER) - REASON
Blood sugar on glucometer reading >530
Elevated BG
patient blood sugar 516
Patient Blood sugar 422
Abnormal BG

## 2022-06-08 NOTE — PROVIDER CONTACT NOTE (OTHER) - SITUATION
Patient admitted for hyperglycemia, BG elevated this morning and insulin administered as ordered. BG checked for lunch and is still elevated at 401. Dr. Styles made aware
Patient BG at 530, rechecked but unable to get a reading due to out of range results.
Blood sugar on glucometer reading >530
Patient Blood sugar 422
patient blood sugar currently 516
Statement Selected

## 2022-06-08 NOTE — PROGRESS NOTE ADULT - ASSESSMENT
66yr woman  PMH of HTN,  ETOH abuse, ETOH cirrhosis decompensated by esophageal varices and hepatic encephalopathy, DM  Anemia, Pancreatitis admitted with HHS. Pt was downgraded to medicine team on 06/01, currently hyperglycemia is poorly controlled and Insulin is titrated. Later during hospital stay pt developed dysuria, therefore she was started on IV ceftriaxon given E.irene growing in urine. Also blood cx came back with grow of bacteria. ID team was consulted.     UTI  Abnormal blood cx  -c/w  Ceftriaxon 2 g d4/ daily for now  - ct abd/pelvis - no apparent stones, hydro, mild perinephric straining but no apparent pyelonephritis  - ID team follows    Poorly controlled DMT2 c/b HHS, still poorly controlled hyperglycemia  - due to pt poor compliance with diet ( removed big bag of chips out of bed this moring) >> no snacks allowed, also called pt's son who reports pt is not compliant with medication and diet at home, in fact she doesn't have stable home, family requested assist with placement to [facility] or NH.   - increased Lantus 45 qhs, ac 17, mdss  - A1C  - 13.7  - endocrinology team follows    Back pain, has improved  - msk in nature, reproducible on palpation  - Tylenol and tramadol base on pain scale    Alcoholic liver cirrhosis c/b hx of esophageal varices hepatic encephalopathy, anemia and thrombocytopenia, stable   - h/h noted, no apparent active bleeding, c/w iron supplement  - cbc daily   - c/w Furosemide 40,  spironolactone 50 mg in am, propranolol 10 tid, c/w lactulose, rifaximin and ppi  -  Will refer to Dr. Craven on d/c     #Hyponatremia, most likely due to liver chirrhosis   - management as above    #Hypothyroidism  - c/w levothyroxine   - Will need TFTs repeated in 4-6 weeks as outpt     Hypomagnesemia / Hypophosphatemia   - Likely 2/2 chronic etoh use   - Supplemented   - Replete as needed     HTN- c/w propranolol as above    VTE ppx: SCDs given thrombocytopenia   Code/social - full code  Dispo: if blood cx is negative with cleared urine cx>> d/c home w/in next 24-48 hr

## 2022-06-08 NOTE — PROGRESS NOTE ADULT - ASSESSMENT
66yr woman  PMH of HTN,  ETOH abuse, ETOH cirrhosis decompensated by esophageal varices and hepatic encephalopathy, DM  Anemia, Pancreatitis admitted with HHS. Pt was downgraded to medicine team on 06/01, currently hyperglycemia is poorly controlled and Insulin is titrated. Later during hospital stay pt developed dysuria, therefore she was started on IV ceftriaxon given E.irene growing in urine. Also blood cx came back with grow of bacteria. ID team was consulted.     Positive BCX  UTI  Vaginal candidiases  ETOH cirrhosis    - Blood cultures +c.acnes ?skin contaminant  - Repeat blood cultures 6/7 ngtd  - repeat UCX not sent-has been on ceftriaxone since 6/8. Currently reports vaginal itching, if urinary symptoms recur can repeat Ua and UCX  - Dc ceftriaxone and observe  - Trend Fever  - Trend Leukocytosis    d/w attending  please call with ques

## 2022-06-08 NOTE — PROVIDER CONTACT NOTE (OTHER) - ACTION/TREATMENT ORDERED:
Blu Styles ordered Humulin R 12 units, recheck and administer pre-meal and standing scale as ordered.
4 units of admelog to be ordered. Will follow ordered as placed
no new orders placed at this time
PA to speak to MD for further orders

## 2022-06-08 NOTE — PROGRESS NOTE ADULT - ASSESSMENT
Pt. continues to have marked overnight hyperglycemia, surprising given the large amounts of basal insulin and chronic liver disease, which should result in a tendency for overnight hypoglycemia. Insulin dose raised, including HS coverage. GLucose levels improved this afternoon. COntinue to observe.

## 2022-06-08 NOTE — PROVIDER CONTACT NOTE (OTHER) - BACKGROUND
patient admitted with DKA and AMS. Tonight patient had a venous blood sugar of 607. patient given 10 unit Lantus,  6 and 4 units of admelog
patient admitted with DKA. Tonight patient received 10 units of Lantus and 6 units of admelog for previous BS of 607
Patient admitted for DKA
patient admitted with altered mental status and DKA
Patient is non-compliant to diabetic management, admitted to ICU for hyperglycemia

## 2022-06-08 NOTE — PROVIDER CONTACT NOTE (OTHER) - ASSESSMENT
Patient is asymptomatic, alert. Denies headache, pain or discomfort.
patient A&Ox4, /71 HR 98
no s/s of acute distress noted
No s/s of acute distress noted
Patient is AOx4, denies pain or discomfort. Patient denies headache or blurry vision.

## 2022-06-09 ENCOUNTER — TRANSCRIPTION ENCOUNTER (OUTPATIENT)
Age: 67
End: 2022-06-09

## 2022-06-09 LAB
ANION GAP SERPL CALC-SCNC: 13 MMOL/L — SIGNIFICANT CHANGE UP (ref 5–17)
BUN SERPL-MCNC: 18.5 MG/DL — SIGNIFICANT CHANGE UP (ref 8–20)
CALCIUM SERPL-MCNC: 9.1 MG/DL — SIGNIFICANT CHANGE UP (ref 8.6–10.2)
CHLORIDE SERPL-SCNC: 88 MMOL/L — LOW (ref 98–107)
CO2 SERPL-SCNC: 23 MMOL/L — SIGNIFICANT CHANGE UP (ref 22–29)
CREAT SERPL-MCNC: 0.86 MG/DL — SIGNIFICANT CHANGE UP (ref 0.5–1.3)
EGFR: 75 ML/MIN/1.73M2 — SIGNIFICANT CHANGE UP
GLUCOSE BLDC GLUCOMTR-MCNC: 108 MG/DL — HIGH (ref 70–99)
GLUCOSE BLDC GLUCOMTR-MCNC: 159 MG/DL — HIGH (ref 70–99)
GLUCOSE BLDC GLUCOMTR-MCNC: 208 MG/DL — HIGH (ref 70–99)
GLUCOSE BLDC GLUCOMTR-MCNC: 392 MG/DL — HIGH (ref 70–99)
GLUCOSE BLDC GLUCOMTR-MCNC: 435 MG/DL — HIGH (ref 70–99)
GLUCOSE BLDC GLUCOMTR-MCNC: 98 MG/DL — SIGNIFICANT CHANGE UP (ref 70–99)
GLUCOSE SERPL-MCNC: 430 MG/DL — HIGH (ref 70–99)
POTASSIUM SERPL-MCNC: 4.2 MMOL/L — SIGNIFICANT CHANGE UP (ref 3.5–5.3)
POTASSIUM SERPL-SCNC: 4.2 MMOL/L — SIGNIFICANT CHANGE UP (ref 3.5–5.3)
SODIUM SERPL-SCNC: 124 MMOL/L — LOW (ref 135–145)

## 2022-06-09 PROCEDURE — 99232 SBSQ HOSP IP/OBS MODERATE 35: CPT

## 2022-06-09 PROCEDURE — 99233 SBSQ HOSP IP/OBS HIGH 50: CPT

## 2022-06-09 RX ORDER — PANTOPRAZOLE SODIUM 20 MG/1
1 TABLET, DELAYED RELEASE ORAL
Qty: 30 | Refills: 0
Start: 2022-06-09 | End: 2022-07-08

## 2022-06-09 RX ORDER — INSULIN LISPRO 100/ML
14 VIAL (ML) SUBCUTANEOUS
Refills: 0 | Status: DISCONTINUED | OUTPATIENT
Start: 2022-06-09 | End: 2022-06-10

## 2022-06-09 RX ORDER — LACTULOSE 10 G/15ML
15 SOLUTION ORAL
Qty: 1350 | Refills: 0
Start: 2022-06-09 | End: 2022-07-08

## 2022-06-09 RX ORDER — PROPRANOLOL HCL 160 MG
1 CAPSULE, EXTENDED RELEASE 24HR ORAL
Qty: 90 | Refills: 0
Start: 2022-06-09 | End: 2022-07-08

## 2022-06-09 RX ORDER — SPIRONOLACTONE 25 MG/1
2 TABLET, FILM COATED ORAL
Qty: 60 | Refills: 0
Start: 2022-06-09 | End: 2022-07-08

## 2022-06-09 RX ORDER — INSULIN GLARGINE 100 [IU]/ML
45 INJECTION, SOLUTION SUBCUTANEOUS
Qty: 1350 | Refills: 0
Start: 2022-06-09 | End: 2022-07-08

## 2022-06-09 RX ADMIN — Medication 10: at 08:35

## 2022-06-09 RX ADMIN — Medication 40 MILLIGRAM(S): at 05:11

## 2022-06-09 RX ADMIN — Medication 14 UNIT(S): at 17:50

## 2022-06-09 RX ADMIN — LACTULOSE 10 GRAM(S): 10 SOLUTION ORAL at 21:48

## 2022-06-09 RX ADMIN — LACTULOSE 10 GRAM(S): 10 SOLUTION ORAL at 14:00

## 2022-06-09 RX ADMIN — Medication 325 MILLIGRAM(S): at 12:57

## 2022-06-09 RX ADMIN — Medication 100 MILLIGRAM(S): at 12:57

## 2022-06-09 RX ADMIN — Medication 1 APPLICATORFUL: at 21:49

## 2022-06-09 RX ADMIN — Medication 1 TABLET(S): at 12:57

## 2022-06-09 RX ADMIN — SPIRONOLACTONE 50 MILLIGRAM(S): 25 TABLET, FILM COATED ORAL at 05:12

## 2022-06-09 RX ADMIN — Medication 1 MILLIGRAM(S): at 12:57

## 2022-06-09 RX ADMIN — Medication 2: at 17:50

## 2022-06-09 RX ADMIN — Medication 17 UNIT(S): at 12:57

## 2022-06-09 RX ADMIN — Medication 4: at 21:48

## 2022-06-09 RX ADMIN — Medication 88 MICROGRAM(S): at 05:12

## 2022-06-09 RX ADMIN — Medication 17 UNIT(S): at 08:34

## 2022-06-09 RX ADMIN — PANTOPRAZOLE SODIUM 40 MILLIGRAM(S): 20 TABLET, DELAYED RELEASE ORAL at 05:12

## 2022-06-09 RX ADMIN — LACTULOSE 10 GRAM(S): 10 SOLUTION ORAL at 05:11

## 2022-06-09 RX ADMIN — INSULIN GLARGINE 45 UNIT(S): 100 INJECTION, SOLUTION SUBCUTANEOUS at 21:49

## 2022-06-09 NOTE — DISCHARGE NOTE PROVIDER - NSDCCPCAREPLAN_GEN_ALL_CORE_FT
Received fax request from [facility] pharmacy requesting refill(s) for methocarbamol (ROBAXIN) 500 MG tablet    Last refilled on 02/05/21    Pt last seen on 10/26/20  Next appt scheduled for : none    Will facilitate refill.    
PRINCIPAL DISCHARGE DIAGNOSIS  Diagnosis: Hyperosmolar hyperglycemic state (HHS)  Assessment and Plan of Treatment: - due to poor medication and diet adherance  - please keep diabetic diet  - we increased doses of Insulin to Glargin 45 units at bed time, and pre-meal insulin 12 units  - check your glucose level 3 tiems a day   - follow up with your medical doctor adn diabeties doctor      SECONDARY DISCHARGE DIAGNOSES  Diagnosis: Acute UTI  Assessment and Plan of Treatment: - e.coli uti  - completed course of Antibiotics    Diagnosis: Liver cirrhosis  Assessment and Plan of Treatment: - continue Spiranolacton 50 gm daily, Propranolol 10 mg tid    Diagnosis: Esophageal varices in cirrhosis  Assessment and Plan of Treatment: - continue pantoprazol    Diagnosis: Hepatic encephalopathy  Assessment and Plan of Treatment: - continue Rifaximin and lactulose

## 2022-06-09 NOTE — DISCHARGE NOTE PROVIDER - NSDCMRMEDTOKEN_GEN_ALL_CORE_FT
cephalexin 500 mg oral tablet: 1 tab(s) orally 3 times a day   Colace 100 mg oral capsule:  orally 3 times a day  ferrous sulfate 324 mg (65 mg elemental iron) oral delayed release tablet:  orally once a day  Florastor 250 mg oral capsule: 1 cap(s) orally 2 times a day   folic acid 1 mg oral tablet: 1 tab(s) orally once a day  furosemide 40 mg oral tablet: 1 tab(s) orally once a day  insulin glargine: 20 unit(s) subcutaneous once a day (at bedtime)  insulin lispro 100 units/mL injectable solution: please continue home sliding scale dose  labetalol: 50 milligram(s) orally 2 times a day  lactulose 10 g/15 mL oral syrup: 15 milliliter(s) orally 3 times a day  multivitamin: 1 tab(s)  once a day  pantoprazole 40 mg oral granule, delayed release: 1 each orally 2 times a day  rifaximin 550 mg oral tablet: 1 tab(s) orally 2 times a day  Synthroid 75 mcg (0.075 mg) oral tablet: 1 tab(s) orally once a day   Vitamin B1 100 mg oral tablet: 1 tab(s) orally once a day   ferrous sulfate 324 mg (65 mg elemental iron) oral delayed release tablet:  orally once a day  folic acid 1 mg oral tablet: 1 tab(s) orally once a day  insulin glargine (concentrated) 300 units/mL subcutaneous solution: 45 unit(s) subcutaneous once a day (at bedtime)   insulin lispro 100 units/mL injectable solution: 12 unit(s) injectable 3 times a day (before meals)   lactulose 10 g/15 mL oral syrup: 15 milliliter(s) orally 3 times a day, hold if has &gt; 3 bowel movements   multivitamin: 1 tab(s)  once a day  pantoprazole 40 mg oral delayed release tablet: 1 tab(s) orally once a day (before a meal)  propranolol 10 mg oral tablet: 1 tab(s) orally every 8 hours  rifaximin 550 mg oral tablet: 1 tab(s) orally 2 times a day  spironolactone 25 mg oral tablet: 2 tab(s) orally once a day  Synthroid 75 mcg (0.075 mg) oral tablet: 1 tab(s) orally once a day   Vitamin B1 100 mg oral tablet: 1 tab(s) orally once a day

## 2022-06-09 NOTE — DISCHARGE NOTE NURSING/CASE MANAGEMENT/SOCIAL WORK - NSDCPEFALRISK_GEN_ALL_CORE
For information on Fall & Injury Prevention, visit: [email or web address] OR  [email or web address] OR  https://www.cdc.gov/steadi/patient.html

## 2022-06-09 NOTE — DISCHARGE NOTE PROVIDER - ATTENDING DISCHARGE PHYSICAL EXAMINATION:
Vital Signs Last 24 Hrs  T(C): 36.9 (09 Jun 2022 11:07), Max: 36.9 (09 Jun 2022 11:07)  T(F): 98.4 (09 Jun 2022 11:07), Max: 98.4 (09 Jun 2022 11:07)  HR: 68 (09 Jun 2022 11:07) (68 - 103)  BP: 125/76 (09 Jun 2022 11:07) (103/53 - 125/76)  BP(mean): --  RR: 18 (09 Jun 2022 11:07) (18 - 19)  SpO2: 99% (09 Jun 2022 11:07) (98% - 100%)    CONSTITUTIONAL: NAD  ENMT: Moist oral mucosa, no pharyngeal injection or exudates; normal dentition; No JVD  RESPIRATORY: Normal respiratory effort; lungs are clear to auscultation bilaterally  CARDIOVASCULAR: Regular rate and rhythm, normal S1 and S2, no murmur/rub/gallop; No lower extremity edema; Peripheral pulses are 2+ bilaterally  ABDOMEN: Nontender to palpation, normoactive bowel sounds, no rebound/guarding; No hepatosplenomegaly, no CVA tenderness  MUSCLOSKELETAL:  no clubbing or cyanosis of digits; no joint swelling or tenderness to palpation  PSYCH: A+O to person, place, and time; affect appropriate but with poor insight in her condition  NEUROLOGY: CN 2-12 are intact and symmetric; no gross sensory deficits; was observed moving all 4 ext against gravity cooperating with exam.   SKIN: No rashes; no palpable lesions

## 2022-06-09 NOTE — DISCHARGE NOTE NURSING/CASE MANAGEMENT/SOCIAL WORK - NSDCVIVACCINE_GEN_ALL_CORE_FT
influenza, injectable, quadrivalent, preservative free; 27-Oct-2015 11:00; Roxie Vergara (RN); [employer]; [id number]; IntraMuscular; Deltoid Left.; 0.5 milliLiter(s); VIS (VIS Published: 07-Aug-2015, VIS Presented: 27-Oct-2015);   Tdap; 26-Jun-2015 00:30; Jaison Spicer (RN); [employer]; [id number]; IntraMuscular; Deltoid Left.; 0.5 milliLiter(s); VIS (VIS Published: 09-May-2013, VIS Presented: 26-Jun-2015);   Tdap; 14-Jan-2022 02:31; Christal Wolf (RN); [employer]; [id number] (Exp. Date: 09-Sep-2023); IntraMuscular; Deltoid Right.; 0.5 milliLiter(s); VIS (VIS Published: 09-May-2013, VIS Presented: 14-Jan-2022);

## 2022-06-09 NOTE — DISCHARGE NOTE PROVIDER - HOSPITAL COURSE
66yr woman  PMH of HTN,  ETOH abuse, ETOH cirrhosis decompensated by esophageal varices and hepatic encephalopathy, DM, anemia, and pancreatitis was admitted to ICU with HHS and started on IVF and insulin ggt. A1c 13.7 demonstrating poor compliance to DM management regime outpatient.  Palliative consulted. Endocrinology consulted. Pt was downgraded to medicine team on 06/01. Initial BCx resulted positive for Proprionibacterium acnes in one bottle, likely a contaminant as subsequent cultures were negative. During hospital stay pt developed dysuria, with UCx positive for e.coli. CT abd/pelvis revealed no apparent stones, hydro, with mild perinephric straining but no apparent pyelonephritis. Patient was treated with Ceftriaxone. ID team was consulted. Patient remained afebrile without leukocytosis.  Hospital curse complicated by poorly controlled hyperglycemia 2/2 patient noncompliance with adherence to hospital diet. Patient frequently receiving outside food products from visitors. Despite repeat education to both patient and visitors patient continued to receive and consume outside items.  Adjustments to insulin regime was made, repeat education to patient and family provided. Electrolyte abnormality 2/2 to EtOH cirrhosis were treated accordingly. HTN controlled with propanolol. PT eval recommendation for home. Patient hemodynamically stable for discharge home. Patient and family counseled on importance of adhering to diabetic diet and insulin compliance.

## 2022-06-09 NOTE — DISCHARGE NOTE PROVIDER - CARE PROVIDER_API CALL
Primary medical doctor,   Phone: (   )    -  Fax: (   )    -  Established Patient  Follow Up Time:     Stas Michael (MD)  EndocrinologyMetabDiabetes; Internal Medicine  [address] [place] [address]  Phone: [phone]  Fax: [phone]  Established Patient  Follow Up Time: 2 weeks    Suha Craven)  Internal Medicine  [address] [place] [address]  Phone: [phone]  Fax: [phone]  Established Patient  Follow Up Time: Routine

## 2022-06-09 NOTE — DISCHARGE NOTE NURSING/CASE MANAGEMENT/SOCIAL WORK - PATIENT PORTAL LINK FT
You can access the FollowMyHealth Patient Portal offered by [facility] by registering at the following website: [email or web address]. By joining [employer]’s FollowMyHealth portal, you will also be able to view your health information using other applications (apps) compatible with our system.

## 2022-06-09 NOTE — PROGRESS NOTE ADULT - SUBJECTIVE AND OBJECTIVE BOX
INTERVAL HPI/OVERNIGHT EVENTS: follow up of diabetes    MEDICATIONS  (STANDING):  clotrimazole 2% Vaginal Cream 1 Applicatorful Vaginal at bedtime  dextrose 5%. 1000 milliLiter(s) (50 mL/Hr) IV Continuous <Continuous>  dextrose 5%. 1000 milliLiter(s) (100 mL/Hr) IV Continuous <Continuous>  dextrose 50% Injectable 50 milliLiter(s) IV Push every 15 minutes  ferrous    sulfate 325 milliGRAM(s) Oral daily  folic acid 1 milliGRAM(s) Oral daily  glucagon  Injectable 1 milliGRAM(s) IntraMuscular once  insulin glargine Injectable (LANTUS) 45 Unit(s) SubCutaneous at bedtime  insulin lispro (ADMELOG) corrective regimen sliding scale   SubCutaneous Before meals and at bedtime  insulin lispro Injectable (ADMELOG) 14 Unit(s) SubCutaneous three times a day before meals  lactulose Syrup 10 Gram(s) Oral every 8 hours  levothyroxine 88 MICROGram(s) Oral daily  multivitamin 1 Tablet(s) Oral daily  pantoprazole    Tablet 40 milliGRAM(s) Oral before breakfast  propranolol 10 milliGRAM(s) Oral every 8 hours  rifAXIMin 550 milliGRAM(s) Oral two times a day  spironolactone 50 milliGRAM(s) Oral daily  thiamine 100 milliGRAM(s) Oral daily    MEDICATIONS  (PRN):  acetaminophen     Tablet .. 650 milliGRAM(s) Oral every 6 hours PRN Temp greater or equal to 38C (100.4F), Moderate Pain (4 - 6)  dextrose Oral Gel 15 Gram(s) Oral once PRN Blood Glucose LESS THAN 70 milliGRAM(s)/deciliter  traMADol 25 milliGRAM(s) Oral every 6 hours PRN Severe Pain (7 - 10)      Allergies    No Known Allergies    Intolerances    Vital Signs Last 24 Hrs  T(C): 36.9 (2022 11:07), Max: 36.9 (2022 11:07)  T(F): 98.4 (2022 11:07), Max: 98.4 (2022 11:07)  HR: 68 (2022 11:07) (68 - 103)  BP: 125/76 (2022 11:07) (103/53 - 125/76)  BP(mean): --  RR: 18 (2022 11:07) (18 - 19)  SpO2: 99% (2022 11:07) (98% - 100%)        LABS:                        10.3   5.57  )-----------( 148      ( 2022 06:13 )             30.8         124<L>  |  88<L>  |  18.5  ----------------------------<  430<H>  4.2   |  23.0  |  0.86    Ca    9.1      2022 09:13      Urinalysis Basic - ( 2022 18:42 )    Color: Yellow / Appearance: Clear / S.010 / pH: x  Gluc: x / Ketone: Negative  / Bili: Negative / Urobili: Negative mg/dL   Blood: x / Protein: Negative / Nitrite: Negative   Leuk Esterase: Small / RBC: 0-2 /HPF / WBC 11-25 /HPF   Sq Epi: x / Non Sq Epi: Occasional / Bacteria: Occasional          POCT Blood Glucose.: 108 mg/dL (22 @ 12:31)  POCT Blood Glucose.: 98 mg/dL (22 @ 12:30)  POCT Blood Glucose.: 392 mg/dL (22 @ 08:12)  POCT Blood Glucose.: 435 mg/dL (22 @ 08:10)  POCT Blood Glucose.: 244 mg/dL (22 @ 21:55)  POCT Blood Glucose.: 154 mg/dL (22 @ 17:01)  POCT Blood Glucose.: 278 mg/dL (22 @ 12:08)  POCT Blood Glucose.: >530 mg/dL (22 @ 08:44)  POCT Blood Glucose.: 530 mg/dL (22 @ 08:42)  POCT Blood Glucose.: 311 mg/dL (22 @ 21:16)  POCT Blood Glucose.: 226 mg/dL (22 @ 16:29)  POCT Blood Glucose.: 187 mg/dL (22 @ 12:05)  POCT Blood Glucose.: 388 mg/dL (22 @ 08:29)  POCT Blood Glucose.: 405 mg/dL (22 @ 08:28)  POCT Blood Glucose.: 194 mg/dL (22 @ 21:29)  POCT Blood Glucose.: 379 mg/dL (22 @ 18:24)

## 2022-06-09 NOTE — DISCHARGE NOTE PROVIDER - CARE PROVIDERS DIRECT ADDRESSES
,DirectAddress_Unknown,[email or web address],DirectAddress_Unknown

## 2022-06-09 NOTE — PROGRESS NOTE ADULT - ASSESSMENT
Improving glycemic control. Pt. noted to be overeating, accounting for episodes of hyperglycemia. Reasonable to discharge on a bit less prandial insulin.

## 2022-06-09 NOTE — PHYSICAL THERAPY INITIAL EVALUATION ADULT - ADDITIONAL COMMENTS
per patient she does not use or own an AD and is without steps to enter. pt lives with her sister however cares for herself.

## 2022-06-09 NOTE — DISCHARGE NOTE PROVIDER - PROVIDER TOKENS
FREE:[LAST:[Primary medical doctor],PHONE:[(   )    -],FAX:[(   )    -],ESTABLISHEDPATIENT:[T]],PROVIDER:[TOKEN:[[id number]],FOLLOWUP:[2 weeks],ESTABLISHEDPATIENT:[T]],PROVIDER:[TOKEN:[[id number]],FOLLOWUP:[Routine],ESTABLISHEDPATIENT:[T]]

## 2022-06-10 VITALS
OXYGEN SATURATION: 99 % | SYSTOLIC BLOOD PRESSURE: 142 MMHG | TEMPERATURE: 98 F | HEART RATE: 78 BPM | RESPIRATION RATE: 18 BRPM | DIASTOLIC BLOOD PRESSURE: 76 MMHG

## 2022-06-10 LAB
ANION GAP SERPL CALC-SCNC: 9 MMOL/L — SIGNIFICANT CHANGE UP (ref 5–17)
BUN SERPL-MCNC: 24.2 MG/DL — HIGH (ref 8–20)
CALCIUM SERPL-MCNC: 9.1 MG/DL — SIGNIFICANT CHANGE UP (ref 8.6–10.2)
CHLORIDE SERPL-SCNC: 99 MMOL/L — SIGNIFICANT CHANGE UP (ref 98–107)
CO2 SERPL-SCNC: 21 MMOL/L — LOW (ref 22–29)
CREAT SERPL-MCNC: 0.9 MG/DL — SIGNIFICANT CHANGE UP (ref 0.5–1.3)
EGFR: 71 ML/MIN/1.73M2 — SIGNIFICANT CHANGE UP
GLUCOSE BLDC GLUCOMTR-MCNC: 187 MG/DL — HIGH (ref 70–99)
GLUCOSE BLDC GLUCOMTR-MCNC: 270 MG/DL — HIGH (ref 70–99)
GLUCOSE SERPL-MCNC: 198 MG/DL — HIGH (ref 70–99)
POTASSIUM SERPL-MCNC: 4.1 MMOL/L — SIGNIFICANT CHANGE UP (ref 3.5–5.3)
POTASSIUM SERPL-SCNC: 4.1 MMOL/L — SIGNIFICANT CHANGE UP (ref 3.5–5.3)
SODIUM SERPL-SCNC: 129 MMOL/L — LOW (ref 135–145)

## 2022-06-10 PROCEDURE — U0003: CPT

## 2022-06-10 PROCEDURE — 85025 COMPLETE CBC W/AUTO DIFF WBC: CPT

## 2022-06-10 PROCEDURE — 87186 SC STD MICRODIL/AGAR DIL: CPT

## 2022-06-10 PROCEDURE — 84484 ASSAY OF TROPONIN QUANT: CPT

## 2022-06-10 PROCEDURE — 87086 URINE CULTURE/COLONY COUNT: CPT

## 2022-06-10 PROCEDURE — 85610 PROTHROMBIN TIME: CPT

## 2022-06-10 PROCEDURE — 83550 IRON BINDING TEST: CPT

## 2022-06-10 PROCEDURE — 83540 ASSAY OF IRON: CPT

## 2022-06-10 PROCEDURE — 99239 HOSP IP/OBS DSCHRG MGMT >30: CPT

## 2022-06-10 PROCEDURE — 93005 ELECTROCARDIOGRAM TRACING: CPT

## 2022-06-10 PROCEDURE — 82962 GLUCOSE BLOOD TEST: CPT

## 2022-06-10 PROCEDURE — 99291 CRITICAL CARE FIRST HOUR: CPT | Mod: 25

## 2022-06-10 PROCEDURE — 0225U NFCT DS DNA&RNA 21 SARSCOV2: CPT

## 2022-06-10 PROCEDURE — 87150 DNA/RNA AMPLIFIED PROBE: CPT

## 2022-06-10 PROCEDURE — 85730 THROMBOPLASTIN TIME PARTIAL: CPT

## 2022-06-10 PROCEDURE — 87040 BLOOD CULTURE FOR BACTERIA: CPT

## 2022-06-10 PROCEDURE — 84466 ASSAY OF TRANSFERRIN: CPT

## 2022-06-10 PROCEDURE — 84132 ASSAY OF SERUM POTASSIUM: CPT

## 2022-06-10 PROCEDURE — 82140 ASSAY OF AMMONIA: CPT

## 2022-06-10 PROCEDURE — 84439 ASSAY OF FREE THYROXINE: CPT

## 2022-06-10 PROCEDURE — 83605 ASSAY OF LACTIC ACID: CPT

## 2022-06-10 PROCEDURE — 99292 CRITICAL CARE ADDL 30 MIN: CPT

## 2022-06-10 PROCEDURE — 74176 CT ABD & PELVIS W/O CONTRAST: CPT

## 2022-06-10 PROCEDURE — 80053 COMPREHEN METABOLIC PANEL: CPT

## 2022-06-10 PROCEDURE — 82728 ASSAY OF FERRITIN: CPT

## 2022-06-10 PROCEDURE — 86850 RBC ANTIBODY SCREEN: CPT

## 2022-06-10 PROCEDURE — 84443 ASSAY THYROID STIM HORMONE: CPT

## 2022-06-10 PROCEDURE — 85027 COMPLETE CBC AUTOMATED: CPT

## 2022-06-10 PROCEDURE — 83735 ASSAY OF MAGNESIUM: CPT

## 2022-06-10 PROCEDURE — 96374 THER/PROPH/DIAG INJ IV PUSH: CPT

## 2022-06-10 PROCEDURE — 84295 ASSAY OF SERUM SODIUM: CPT

## 2022-06-10 PROCEDURE — 36415 COLL VENOUS BLD VENIPUNCTURE: CPT

## 2022-06-10 PROCEDURE — 81001 URINALYSIS AUTO W/SCOPE: CPT

## 2022-06-10 PROCEDURE — 80307 DRUG TEST PRSMV CHEM ANLYZR: CPT

## 2022-06-10 PROCEDURE — 71045 X-RAY EXAM CHEST 1 VIEW: CPT

## 2022-06-10 PROCEDURE — 84100 ASSAY OF PHOSPHORUS: CPT

## 2022-06-10 PROCEDURE — U0005: CPT

## 2022-06-10 PROCEDURE — 80076 HEPATIC FUNCTION PANEL: CPT

## 2022-06-10 PROCEDURE — 85014 HEMATOCRIT: CPT

## 2022-06-10 PROCEDURE — 83036 HEMOGLOBIN GLYCOSYLATED A1C: CPT

## 2022-06-10 PROCEDURE — 82330 ASSAY OF CALCIUM: CPT

## 2022-06-10 PROCEDURE — 96375 TX/PRO/DX INJ NEW DRUG ADDON: CPT

## 2022-06-10 PROCEDURE — 86900 BLOOD TYPING SEROLOGIC ABO: CPT

## 2022-06-10 PROCEDURE — 86901 BLOOD TYPING SEROLOGIC RH(D): CPT

## 2022-06-10 PROCEDURE — 82435 ASSAY OF BLOOD CHLORIDE: CPT

## 2022-06-10 PROCEDURE — 82803 BLOOD GASES ANY COMBINATION: CPT

## 2022-06-10 PROCEDURE — 84481 FREE ASSAY (FT-3): CPT

## 2022-06-10 PROCEDURE — 82009 KETONE BODYS QUAL: CPT

## 2022-06-10 PROCEDURE — 82947 ASSAY GLUCOSE BLOOD QUANT: CPT

## 2022-06-10 PROCEDURE — 83880 ASSAY OF NATRIURETIC PEPTIDE: CPT

## 2022-06-10 PROCEDURE — 85018 HEMOGLOBIN: CPT

## 2022-06-10 PROCEDURE — 87077 CULTURE AEROBIC IDENTIFY: CPT

## 2022-06-10 PROCEDURE — 80048 BASIC METABOLIC PNL TOTAL CA: CPT

## 2022-06-10 RX ADMIN — LACTULOSE 10 GRAM(S): 10 SOLUTION ORAL at 05:23

## 2022-06-10 RX ADMIN — Medication 14 UNIT(S): at 12:20

## 2022-06-10 RX ADMIN — Medication 1 TABLET(S): at 12:19

## 2022-06-10 RX ADMIN — PANTOPRAZOLE SODIUM 40 MILLIGRAM(S): 20 TABLET, DELAYED RELEASE ORAL at 05:24

## 2022-06-10 RX ADMIN — Medication 100 MILLIGRAM(S): at 12:19

## 2022-06-10 RX ADMIN — SPIRONOLACTONE 50 MILLIGRAM(S): 25 TABLET, FILM COATED ORAL at 05:24

## 2022-06-10 RX ADMIN — Medication 6: at 12:19

## 2022-06-10 RX ADMIN — Medication 88 MICROGRAM(S): at 05:23

## 2022-06-10 RX ADMIN — Medication 2: at 07:58

## 2022-06-10 RX ADMIN — Medication 14 UNIT(S): at 07:58

## 2022-06-10 RX ADMIN — Medication 1 MILLIGRAM(S): at 12:19

## 2022-06-10 RX ADMIN — Medication 325 MILLIGRAM(S): at 12:19

## 2022-06-10 NOTE — PROGRESS NOTE ADULT - ASSESSMENT
66yr woman  PMH of HTN,  ETOH abuse, ETOH cirrhosis decompensated by esophageal varices and hepatic encephalopathy, DM  Anemia, Pancreatitis admitted with HHS. Pt was downgraded to medicine team on 06/01, currently hyperglycemia is poorly controlled and Insulin is titrated. Later during hospital stay pt developed dysuria, therefore she was started on IV ceftriaxon given E.irene growing in urine. Also blood cx came back with grow of bacteria. ID team was consulted.     UTI  Abnormal blood cx - contamination, bacteremia was ruled out  -s/p  Ceftriaxon 2 g 5 days  - ct abd/pelvis - no apparent stones, hydro, mild perinephric straining but no apparent pyelonephritis  - ID team follows    Poorly controlled DMT2 c/b HHS, still poorly controlled hyperglycemia  - due to pt poor compliance with diet ( removed big bag of chips out of bed this moring) >> no snacks allowed, also called pt's son who reports pt is not compliant with medication and diet at home, in fact she doesn't have stable home, family requested assist with placement to [facility] or NH.   - stable on Lantus 45 qhs, ac 14, mdss  - A1C  - 13.7  - endocrinology team follows    Back pain, has improved  - msk in nature, reproducible on palpation  - Tylenol and tramadol base on pain scale    Alcoholic liver cirrhosis c/b hx of esophageal varices hepatic encephalopathy, anemia and thrombocytopenia, stable   - h/h noted, no apparent active bleeding, c/w iron supplement  - cbc daily   - c/w Furosemide 40,  spironolactone 50 mg in am, propranolol 10 tid, c/w lactulose, rifaximin and ppi  -  Will refer to Dr. Craven on d/c     #Hyponatremia, most likely due to liver chirrhosis   - management as above    #Hypothyroidism  - c/w levothyroxine   - Will need TFTs repeated in 4-6 weeks as outpt     Hypomagnesemia / Hypophosphatemia   - Likely 2/2 chronic etoh use   - Supplemented   - Replete as needed     HTN- c/w propranolol as above    VTE ppx: SCDs given thrombocytopenia   Code/social - full code  Dispo: stable for d/c home with home care

## 2022-06-10 NOTE — PROGRESS NOTE ADULT - SUBJECTIVE AND OBJECTIVE BOX
[facility] Division of Hospital Medicine    Chief Complaint:  HHS    SUBJECTIVE: pt reports feeling well    OVERNIGHT EVENTS: none reported     Patient denies chest pain, SOB, abd pain, N/V, fever, chills, dysuria or any other complaints. All remainder ROS negative.     MEDICATIONS  (STANDING):  clotrimazole 2% Vaginal Cream 1 Applicatorful Vaginal at bedtime  dextrose 5%. 1000 milliLiter(s) (50 mL/Hr) IV Continuous <Continuous>  dextrose 5%. 1000 milliLiter(s) (100 mL/Hr) IV Continuous <Continuous>  dextrose 50% Injectable 50 milliLiter(s) IV Push every 15 minutes  ferrous    sulfate 325 milliGRAM(s) Oral daily  folic acid 1 milliGRAM(s) Oral daily  glucagon  Injectable 1 milliGRAM(s) IntraMuscular once  insulin glargine Injectable (LANTUS) 45 Unit(s) SubCutaneous at bedtime  insulin lispro (ADMELOG) corrective regimen sliding scale   SubCutaneous Before meals and at bedtime  insulin lispro Injectable (ADMELOG) 14 Unit(s) SubCutaneous three times a day before meals  lactulose Syrup 10 Gram(s) Oral every 8 hours  levothyroxine 88 MICROGram(s) Oral daily  multivitamin 1 Tablet(s) Oral daily  pantoprazole    Tablet 40 milliGRAM(s) Oral before breakfast  propranolol 10 milliGRAM(s) Oral every 8 hours  rifAXIMin 550 milliGRAM(s) Oral two times a day  spironolactone 50 milliGRAM(s) Oral daily  thiamine 100 milliGRAM(s) Oral daily    MEDICATIONS  (PRN):  acetaminophen     Tablet .. 650 milliGRAM(s) Oral every 6 hours PRN Temp greater or equal to 38C (100.4F), Moderate Pain (4 - 6)  dextrose Oral Gel 15 Gram(s) Oral once PRN Blood Glucose LESS THAN 70 milliGRAM(s)/deciliter  traMADol 25 milliGRAM(s) Oral every 6 hours PRN Severe Pain (7 - 10)          I&O's Summary      PHYSICAL EXAM:  Vital Signs Last 24 Hrs  T(C): 36.8 (10 Claudio 2022 11:29), Max: 36.8 (10 Claudio 2022 05:13)  T(F): 98.3 (10 Claudio 2022 11:29), Max: 98.3 (10 Claudio 2022 11:29)  HR: 79 (10 Claudio 2022 11:29) (75 - 91)  BP: 145/77 (10 Claudio 2022 11:29) (113/73 - 145/77)  BP(mean): --  RR: 19 (10 Claudio 2022 11:29) (18 - 19)  SpO2: 98% (10 Claudio 2022 11:29) (94% - 99%)        CONSTITUTIONAL: NAD  ENMT: Moist oral mucosa, no pharyngeal injection or exudates; normal dentition; No JVD  RESPIRATORY: Normal respiratory effort; lungs are clear to auscultation bilaterally  CARDIOVASCULAR: Regular rate and rhythm, normal S1 and S2, no murmur/rub/gallop; No lower extremity edema; Peripheral pulses are 2+ bilaterally  ABDOMEN: Nontender to palpation, normoactive bowel sounds, no rebound/guarding; No hepatosplenomegaly, no CVA tenderness  MUSCLOSKELETAL:  no clubbing or cyanosis of digits; no joint swelling or tenderness to palpation  PSYCH: A+O to person, place, and time; affect appropriate but with poor insight in her condition  NEUROLOGY: CN 2-12 are intact and symmetric; no gross sensory deficits; was observed moving all 4 ext against gravity cooperating with exam.   SKIN: No rashes; no palpable lesions    LABS:    06-10    129<L>  |  99  |  24.2<H>  ----------------------------<  198<H>  4.1   |  21.0<L>  |  0.90    Ca    9.1      10 Claudio 2022 07:07                CAPILLARY BLOOD GLUCOSE      POCT Blood Glucose.: 270 mg/dL (10 Claudio 2022 12:17)  POCT Blood Glucose.: 187 mg/dL (10 Claudio 2022 07:56)  POCT Blood Glucose.: 208 mg/dL (09 Jun 2022 21:46)  POCT Blood Glucose.: 159 mg/dL (09 Jun 2022 17:44)        RADIOLOGY & ADDITIONAL TESTS:  Results Reviewed:   Imaging Personally Reviewed:  Electrocardiogram Personally Reviewed:

## 2022-06-10 NOTE — PROGRESS NOTE ADULT - PROVIDER SPECIALTY LIST ADULT
Endocrinology
Endocrinology
Hospitalist
Infectious Disease
Palliative Care
Endocrinology
Endocrinology
Hospitalist
Hospitalist
Palliative Care
Endocrinology
Hospitalist
Hospitalist
Palliative Care

## 2022-06-12 LAB
CULTURE RESULTS: SIGNIFICANT CHANGE UP
CULTURE RESULTS: SIGNIFICANT CHANGE UP
SPECIMEN SOURCE: SIGNIFICANT CHANGE UP
SPECIMEN SOURCE: SIGNIFICANT CHANGE UP

## 2022-07-18 ENCOUNTER — DOCTOR'S OFFICE (OUTPATIENT)
Dept: URBAN - METROPOLITAN AREA CLINIC 163 | Facility: CLINIC | Age: 67
Setting detail: OPHTHALMOLOGY
End: 2022-07-18
Payer: COMMERCIAL

## 2022-07-18 PROBLEM — D23.112: Status: ACTIVE | Noted: 2021-11-15

## 2022-07-18 PROBLEM — H40.033 ANATOMICAL NARROW ANGLE; BOTH EYES: Status: ACTIVE | Noted: 2021-05-18

## 2022-07-18 PROBLEM — H00.11 CHALAZION; RIGHT UPPER LID: Status: RESOLVED | Noted: 2021-09-09 | Resolved: 2022-07-18

## 2022-07-18 PROBLEM — H40.013: Status: ACTIVE | Noted: 2021-05-18

## 2022-07-18 PROBLEM — H04.123 DRY EYE SYNDROME LACRIMAL GLAND; BOTH EYES: Status: ACTIVE | Noted: 2022-07-18

## 2022-07-18 PROCEDURE — 92250 FUNDUS PHOTOGRAPHY W/I&R: CPT | Performed by: OPHTHALMOLOGY

## 2022-07-18 PROCEDURE — 92083 EXTENDED VISUAL FIELD XM: CPT | Performed by: OPHTHALMOLOGY

## 2022-07-18 PROCEDURE — 92014 COMPRE OPH EXAM EST PT 1/>: CPT | Performed by: OPHTHALMOLOGY

## 2022-07-18 PROCEDURE — 92145 CORNEAL HYSTERESIS DETER: CPT | Performed by: OPHTHALMOLOGY

## 2022-07-18 ASSESSMENT — SPHEQUIV_DERIVED
OS_SPHEQUIV: 2.25
OD_SPHEQUIV: 2.5

## 2022-07-18 ASSESSMENT — REFRACTION_MANIFEST
OS_SPHERE: +2.00
OS_CYLINDER: +0.50
OD_SPHERE: +2.25
OS_AXIS: 160
OD_AXIS: 025
OD_ADD: +2.50
OD_CYLINDER: +0.50
OD_VA1: 20/20
OS_ADD: +2.50
OS_VA1: 20/20

## 2022-07-18 ASSESSMENT — VISUAL ACUITY
OD_BCVA: 20/20
OS_BCVA: 20/25+

## 2022-07-18 ASSESSMENT — PACHYMETRY
OD_CT_CORRECTION: 0
OS_CT_UM: 540
OD_CT_UM: 540
OS_CT_CORRECTION: 0

## 2022-07-18 ASSESSMENT — TONOMETRY
OS_IOP_MMHG: 16
OD_IOP_MMHG: 13

## 2022-07-24 ENCOUNTER — INPATIENT (INPATIENT)
Facility: HOSPITAL | Age: 67
LOS: 7 days | Discharge: HOME | DRG: 637 | End: 2022-08-01
Attending: STUDENT IN AN ORGANIZED HEALTH CARE EDUCATION/TRAINING PROGRAM | Admitting: STUDENT IN AN ORGANIZED HEALTH CARE EDUCATION/TRAINING PROGRAM
Payer: MEDICAID

## 2022-07-24 VITALS
HEIGHT: 55 IN | TEMPERATURE: 98 F | WEIGHT: 110.89 LBS | OXYGEN SATURATION: 98 % | RESPIRATION RATE: 18 BRPM | HEART RATE: 103 BPM | DIASTOLIC BLOOD PRESSURE: 151 MMHG | SYSTOLIC BLOOD PRESSURE: 217 MMHG

## 2022-07-24 DIAGNOSIS — Z98.89 OTHER SPECIFIED POSTPROCEDURAL STATES: Chronic | ICD-10-CM

## 2022-07-24 LAB
A1C WITH ESTIMATED AVERAGE GLUCOSE RESULT: 16.4 % — HIGH (ref 4–5.6)
ACETONE SERPL-MCNC: NEGATIVE — SIGNIFICANT CHANGE UP
ANISOCYTOSIS BLD QL: SLIGHT — SIGNIFICANT CHANGE UP
BASE EXCESS BLDV CALC-SCNC: -5.8 MMOL/L — LOW (ref -2–3)
BASOPHILS # BLD AUTO: 0 K/UL — SIGNIFICANT CHANGE UP (ref 0–0.2)
BASOPHILS NFR BLD AUTO: 0 % — SIGNIFICANT CHANGE UP (ref 0–2)
CA-I SERPL-SCNC: 1.17 MMOL/L — SIGNIFICANT CHANGE UP (ref 1.15–1.33)
CHLORIDE BLDV-SCNC: 85 MMOL/L — LOW (ref 98–107)
EOSINOPHIL # BLD AUTO: 0.03 K/UL — SIGNIFICANT CHANGE UP (ref 0–0.5)
EOSINOPHIL NFR BLD AUTO: 0.9 % — SIGNIFICANT CHANGE UP (ref 0–6)
ESTIMATED AVERAGE GLUCOSE: 424 MG/DL — HIGH (ref 68–114)
GAS PNL BLDV: 116 MMOL/L — CRITICAL LOW (ref 136–145)
GAS PNL BLDV: SIGNIFICANT CHANGE UP
GLUCOSE BLDV-MCNC: >656 MG/DL — CRITICAL HIGH (ref 70–99)
HCO3 BLDV-SCNC: 20 MMOL/L — LOW (ref 22–29)
HCT VFR BLD CALC: 32.9 % — LOW (ref 34.5–45)
HCT VFR BLDA CALC: 33 % — SIGNIFICANT CHANGE UP
HGB BLD CALC-MCNC: 11.1 G/DL — LOW (ref 11.7–16.1)
HGB BLD-MCNC: 11.1 G/DL — LOW (ref 11.5–15.5)
LACTATE BLDV-MCNC: 4.6 MMOL/L — CRITICAL HIGH (ref 0.5–2)
LYMPHOCYTES # BLD AUTO: 0.53 K/UL — LOW (ref 1–3.3)
LYMPHOCYTES # BLD AUTO: 14 % — SIGNIFICANT CHANGE UP (ref 13–44)
MACROCYTES BLD QL: SLIGHT — SIGNIFICANT CHANGE UP
MANUAL SMEAR VERIFICATION: SIGNIFICANT CHANGE UP
MCHC RBC-ENTMCNC: 33.7 GM/DL — SIGNIFICANT CHANGE UP (ref 32–36)
MCHC RBC-ENTMCNC: 34 PG — SIGNIFICANT CHANGE UP (ref 27–34)
MCV RBC AUTO: 100.9 FL — HIGH (ref 80–100)
MONOCYTES # BLD AUTO: 0.26 K/UL — SIGNIFICANT CHANGE UP (ref 0–0.9)
MONOCYTES NFR BLD AUTO: 7 % — SIGNIFICANT CHANGE UP (ref 2–14)
NEUTROPHILS # BLD AUTO: 2.94 K/UL — SIGNIFICANT CHANGE UP (ref 1.8–7.4)
NEUTROPHILS NFR BLD AUTO: 78.1 % — HIGH (ref 43–77)
PCO2 BLDV: 37 MMHG — LOW (ref 39–42)
PH BLDV: 7.34 — SIGNIFICANT CHANGE UP (ref 7.32–7.43)
PLAT MORPH BLD: NORMAL — SIGNIFICANT CHANGE UP
PLATELET # BLD AUTO: 99 K/UL — LOW (ref 150–400)
PO2 BLDV: 183 MMHG — HIGH (ref 25–45)
POLYCHROMASIA BLD QL SMEAR: SLIGHT — SIGNIFICANT CHANGE UP
POTASSIUM BLDV-SCNC: 5.1 MMOL/L — SIGNIFICANT CHANGE UP (ref 3.5–5.1)
RBC # BLD: 3.26 M/UL — LOW (ref 3.8–5.2)
RBC # FLD: 13.2 % — SIGNIFICANT CHANGE UP (ref 10.3–14.5)
RBC BLD AUTO: SIGNIFICANT CHANGE UP
SAO2 % BLDV: 99.1 % — SIGNIFICANT CHANGE UP
SMUDGE CELLS # BLD: PRESENT — SIGNIFICANT CHANGE UP
WBC # BLD: 3.76 K/UL — LOW (ref 3.8–10.5)
WBC # FLD AUTO: 3.76 K/UL — LOW (ref 3.8–10.5)

## 2022-07-24 PROCEDURE — 99285 EMERGENCY DEPT VISIT HI MDM: CPT

## 2022-07-24 PROCEDURE — 93010 ELECTROCARDIOGRAM REPORT: CPT

## 2022-07-24 RX ORDER — INSULIN HUMAN 100 [IU]/ML
5 INJECTION, SOLUTION SUBCUTANEOUS ONCE
Refills: 0 | Status: COMPLETED | OUTPATIENT
Start: 2022-07-24 | End: 2022-07-24

## 2022-07-24 NOTE — ED ADULT NURSE REASSESSMENT NOTE - NSIMPLEMENTINTERV_GEN_ALL_ED
Implemented All Fall Risk Interventions:  [place] to call system. Call bell, personal items and telephone within reach. Instruct patient to call for assistance. Room bathroom lighting operational. Non-slip footwear when patient is off stretcher. Physically safe environment: no spills, clutter or unnecessary equipment. Stretcher in lowest position, wheels locked, appropriate side rails in place. Provide visual cue, wrist band, yellow gown, etc. Monitor gait and stability. Monitor for mental status changes and reorient to person, place, and time. Review medications for side effects contributing to fall risk. Reinforce activity limits and safety measures with patient and family.

## 2022-07-24 NOTE — ED PROVIDER NOTE - ATTENDING CONTRIBUTION TO CARE
The patient seen and examined    Metabolic Encephalopathy    I, Adelfo Patel, performed the initial face to face bedside interview with this patient regarding history of present illness, review of symptoms and relevant past medical, social and family history.  I completed an independent physical examination.  I was the initial provider who evaluated this patient. I have signed out the follow up of any pending tests (i.e. labs, radiological studies) to the PA student.  I have communicated the patient’s plan of care and disposition with the PA student

## 2022-07-24 NOTE — ED PROVIDER NOTE - IV ALTEPLASE INCLUSION HIDDEN
[[id number]] : Pt is here for lower back pain, has a cough for about a month and feels fatigued all the time.\par More to right lower back.  show

## 2022-07-24 NOTE — ED PROVIDER NOTE - NSICDXPASTMEDICALHX_GEN_ALL_CORE_FT
PAST MEDICAL HISTORY:  Anemia     Chronic back pain     DM (diabetes mellitus)     ETOH abuse     GERD (gastroesophageal reflux disease)     HTN (hypertension)     HTN (hypertension)     Hypothyroidism     Liver cirrhosis     Lump in female breast     Lymphangitis, acute, lower leg     Pancreatitis     Transitory  hyperthyroidism     Urea cycle metabolism disorder     UTI (urinary tract infection)

## 2022-07-24 NOTE — ED PROVIDER NOTE - OBJECTIVE STATEMENT
67 y/o M with PMH alcoholic cirrhosis, IDDM, and HTN presents to ED from home c/o left arm weakness and left hand twitching for 2 days. Son reports she is acting more confused than her baseline. As per son pt went to PCP yesterday and blood glucose elevated at undetectable levels. Pt noncompliant with her medications and does not check blood sugar regularly. Pt denies recent falls or head trauma. Pt denies fever, chills, HA, dizziness, LOC, CP, SOB, N/V, abdominal pain, dysuria.

## 2022-07-24 NOTE — ED ADULT TRIAGE NOTE - CHIEF COMPLAINT QUOTE
pt c/o  weakness, + numbness in arms since friday, bp is very high BP, as per son ,pt is not compliant with bp meds, pt is confused at baseline, went to the doctor with pt yesterday was told labs work done were abnormal, denies chest pain, denies, HA, hx cirrhosis, HTN,

## 2022-07-24 NOTE — ED PROVIDER NOTE - CLINICAL SUMMARY MEDICAL DECISION MAKING FREE TEXT BOX
67 y/o M with PMH alcoholic cirrhosis, IDDM, and HTN presents to ED from home c/o left arm weakness and left hand twitching for 2 days. Pt noncompliant with medications, does not regularly check blood glucose. PE remarkable for confusion, LUE decreased strength and left hand twitching.   AMS - consider hepatic encephalopathy, stroke, DKA, asterixis of hand  CBC, CMP, VBG, ammonia, acetone, a1c, troponin, bnp, ua  CT head no contrast, CXR, ekg  Will monitor and admit pt for further evaluation

## 2022-07-24 NOTE — ED ADULT NURSE NOTE - OBJECTIVE STATEMENT
Pt alert and oriented x 3 with periods of confusion. Pt's son report pt c/o of left arm twitching unable to grab things, increase in confusion with ADLs, started periods of incontinence. Pt sleeps on sister's couch as of now and does not have placement and needs help. Pt alert and orientedx 2 with periods of confusion. Pt's son report pt c/o of left arm twitching unable to grab things, increase in confusion with ADLs, started periods of incontinence. Pt sleeps on sister's couch as of now and does not have placement and needs help.

## 2022-07-24 NOTE — ED ADULT NURSE REASSESSMENT NOTE - NS ED NURSE REASSESS COMMENT FT1
Assumed care of pt at this time. Pt disoriented to time brought over from critical care. As per son at bedside, pt has increased confusion, /105, L hand weakness. No drift. No facial droop. Pt on telemonitor with SpO2 monitoring. Dr. Patel at bedside. Will continue to monitor.

## 2022-07-25 DIAGNOSIS — E11.65 TYPE 2 DIABETES MELLITUS WITH HYPERGLYCEMIA: ICD-10-CM

## 2022-07-25 LAB
ALBUMIN SERPL ELPH-MCNC: 3.6 G/DL — SIGNIFICANT CHANGE UP (ref 3.3–5.2)
ALP SERPL-CCNC: 507 U/L — HIGH (ref 40–120)
ALT FLD-CCNC: 34 U/L — HIGH
AMMONIA BLD-MCNC: 69 UMOL/L — HIGH (ref 11–55)
ANION GAP SERPL CALC-SCNC: 12 MMOL/L — SIGNIFICANT CHANGE UP (ref 5–17)
ANION GAP SERPL CALC-SCNC: 12 MMOL/L — SIGNIFICANT CHANGE UP (ref 5–17)
ANION GAP SERPL CALC-SCNC: 14 MMOL/L — SIGNIFICANT CHANGE UP (ref 5–17)
ANION GAP SERPL CALC-SCNC: 15 MMOL/L — SIGNIFICANT CHANGE UP (ref 5–17)
ANION GAP SERPL CALC-SCNC: 16 MMOL/L — SIGNIFICANT CHANGE UP (ref 5–17)
APPEARANCE UR: CLEAR — SIGNIFICANT CHANGE UP
AST SERPL-CCNC: 74 U/L — HIGH
BACTERIA # UR AUTO: ABNORMAL
BASOPHILS # BLD AUTO: 0.02 K/UL — SIGNIFICANT CHANGE UP (ref 0–0.2)
BASOPHILS NFR BLD AUTO: 0.4 % — SIGNIFICANT CHANGE UP (ref 0–2)
BILIRUB SERPL-MCNC: 0.7 MG/DL — SIGNIFICANT CHANGE UP (ref 0.4–2)
BILIRUB UR-MCNC: NEGATIVE — SIGNIFICANT CHANGE UP
BUN SERPL-MCNC: 10.7 MG/DL — SIGNIFICANT CHANGE UP (ref 8–20)
BUN SERPL-MCNC: 17 MG/DL — SIGNIFICANT CHANGE UP (ref 8–20)
BUN SERPL-MCNC: 20 MG/DL — SIGNIFICANT CHANGE UP (ref 8–20)
BUN SERPL-MCNC: 6 MG/DL — LOW (ref 8–20)
BUN SERPL-MCNC: 7.7 MG/DL — LOW (ref 8–20)
CALCIUM SERPL-MCNC: 8.6 MG/DL — SIGNIFICANT CHANGE UP (ref 8.6–10.2)
CALCIUM SERPL-MCNC: 8.9 MG/DL — SIGNIFICANT CHANGE UP (ref 8.6–10.2)
CALCIUM SERPL-MCNC: 9.1 MG/DL — SIGNIFICANT CHANGE UP (ref 8.6–10.2)
CALCIUM SERPL-MCNC: 9.4 MG/DL — SIGNIFICANT CHANGE UP (ref 8.6–10.2)
CALCIUM SERPL-MCNC: 9.5 MG/DL — SIGNIFICANT CHANGE UP (ref 8.6–10.2)
CHLORIDE SERPL-SCNC: 79 MMOL/L — LOW (ref 98–107)
CHLORIDE SERPL-SCNC: 88 MMOL/L — LOW (ref 98–107)
CHLORIDE SERPL-SCNC: 92 MMOL/L — LOW (ref 98–107)
CHLORIDE SERPL-SCNC: 92 MMOL/L — LOW (ref 98–107)
CHLORIDE SERPL-SCNC: 99 MMOL/L — SIGNIFICANT CHANGE UP (ref 98–107)
CO2 SERPL-SCNC: 19 MMOL/L — LOW (ref 22–29)
CO2 SERPL-SCNC: 20 MMOL/L — LOW (ref 22–29)
CO2 SERPL-SCNC: 22 MMOL/L — SIGNIFICANT CHANGE UP (ref 22–29)
COLOR SPEC: YELLOW — SIGNIFICANT CHANGE UP
CREAT SERPL-MCNC: 0.47 MG/DL — LOW (ref 0.5–1.3)
CREAT SERPL-MCNC: 0.47 MG/DL — LOW (ref 0.5–1.3)
CREAT SERPL-MCNC: 0.54 MG/DL — SIGNIFICANT CHANGE UP (ref 0.5–1.3)
CREAT SERPL-MCNC: 0.7 MG/DL — SIGNIFICANT CHANGE UP (ref 0.5–1.3)
CREAT SERPL-MCNC: 0.87 MG/DL — SIGNIFICANT CHANGE UP (ref 0.5–1.3)
DIFF PNL FLD: ABNORMAL
EGFR: 103 ML/MIN/1.73M2 — SIGNIFICANT CHANGE UP
EGFR: 106 ML/MIN/1.73M2 — SIGNIFICANT CHANGE UP
EGFR: 106 ML/MIN/1.73M2 — SIGNIFICANT CHANGE UP
EGFR: 74 ML/MIN/1.73M2 — SIGNIFICANT CHANGE UP
EGFR: 97 ML/MIN/1.73M2 — SIGNIFICANT CHANGE UP
EOSINOPHIL # BLD AUTO: 0.06 K/UL — SIGNIFICANT CHANGE UP (ref 0–0.5)
EOSINOPHIL NFR BLD AUTO: 1.3 % — SIGNIFICANT CHANGE UP (ref 0–6)
EPI CELLS # UR: SIGNIFICANT CHANGE UP
GLUCOSE BLDC GLUCOMTR-MCNC: 198 MG/DL — HIGH (ref 70–99)
GLUCOSE BLDC GLUCOMTR-MCNC: 224 MG/DL — HIGH (ref 70–99)
GLUCOSE BLDC GLUCOMTR-MCNC: 285 MG/DL — HIGH (ref 70–99)
GLUCOSE BLDC GLUCOMTR-MCNC: 287 MG/DL — HIGH (ref 70–99)
GLUCOSE BLDC GLUCOMTR-MCNC: 288 MG/DL — HIGH (ref 70–99)
GLUCOSE BLDC GLUCOMTR-MCNC: 297 MG/DL — HIGH (ref 70–99)
GLUCOSE BLDC GLUCOMTR-MCNC: 325 MG/DL — HIGH (ref 70–99)
GLUCOSE BLDC GLUCOMTR-MCNC: 362 MG/DL — HIGH (ref 70–99)
GLUCOSE BLDC GLUCOMTR-MCNC: 362 MG/DL — HIGH (ref 70–99)
GLUCOSE BLDC GLUCOMTR-MCNC: 425 MG/DL — HIGH (ref 70–99)
GLUCOSE BLDC GLUCOMTR-MCNC: 485 MG/DL — CRITICAL HIGH (ref 70–99)
GLUCOSE BLDC GLUCOMTR-MCNC: >530 MG/DL — CRITICAL HIGH (ref 70–99)
GLUCOSE SERPL-MCNC: 1083 MG/DL — CRITICAL HIGH (ref 70–99)
GLUCOSE SERPL-MCNC: 177 MG/DL — HIGH (ref 70–99)
GLUCOSE SERPL-MCNC: 292 MG/DL — HIGH (ref 70–99)
GLUCOSE SERPL-MCNC: 325 MG/DL — HIGH (ref 70–99)
GLUCOSE SERPL-MCNC: 603 MG/DL — CRITICAL HIGH (ref 70–99)
GLUCOSE UR QL: 1000 MG/DL
HCT VFR BLD CALC: 31.4 % — LOW (ref 34.5–45)
HGB BLD-MCNC: 11.1 G/DL — LOW (ref 11.5–15.5)
IMM GRANULOCYTES NFR BLD AUTO: 0.2 % — SIGNIFICANT CHANGE UP (ref 0–1.5)
KETONES UR-MCNC: NEGATIVE — SIGNIFICANT CHANGE UP
LACTATE SERPL-SCNC: 2 MMOL/L — SIGNIFICANT CHANGE UP (ref 0.5–2)
LACTATE SERPL-SCNC: 4.1 MMOL/L — CRITICAL HIGH (ref 0.5–2)
LEUKOCYTE ESTERASE UR-ACNC: ABNORMAL
LYMPHOCYTES # BLD AUTO: 1.48 K/UL — SIGNIFICANT CHANGE UP (ref 1–3.3)
LYMPHOCYTES # BLD AUTO: 31.7 % — SIGNIFICANT CHANGE UP (ref 13–44)
MAGNESIUM SERPL-MCNC: 1.4 MG/DL — LOW (ref 1.8–2.6)
MAGNESIUM SERPL-MCNC: 1.5 MG/DL — LOW (ref 1.6–2.6)
MAGNESIUM SERPL-MCNC: 1.7 MG/DL — SIGNIFICANT CHANGE UP (ref 1.6–2.6)
MAGNESIUM SERPL-MCNC: 2 MG/DL — SIGNIFICANT CHANGE UP (ref 1.6–2.6)
MAGNESIUM SERPL-MCNC: 2.2 MG/DL — SIGNIFICANT CHANGE UP (ref 1.6–2.6)
MCHC RBC-ENTMCNC: 33.5 PG — SIGNIFICANT CHANGE UP (ref 27–34)
MCHC RBC-ENTMCNC: 35.4 GM/DL — SIGNIFICANT CHANGE UP (ref 32–36)
MCV RBC AUTO: 94.9 FL — SIGNIFICANT CHANGE UP (ref 80–100)
MONOCYTES # BLD AUTO: 0.64 K/UL — SIGNIFICANT CHANGE UP (ref 0–0.9)
MONOCYTES NFR BLD AUTO: 13.7 % — SIGNIFICANT CHANGE UP (ref 2–14)
NEUTROPHILS # BLD AUTO: 2.46 K/UL — SIGNIFICANT CHANGE UP (ref 1.8–7.4)
NEUTROPHILS NFR BLD AUTO: 52.7 % — SIGNIFICANT CHANGE UP (ref 43–77)
NITRITE UR-MCNC: NEGATIVE — SIGNIFICANT CHANGE UP
NT-PROBNP SERPL-SCNC: 250 PG/ML — SIGNIFICANT CHANGE UP (ref 0–300)
PH UR: 7 — SIGNIFICANT CHANGE UP (ref 5–8)
PHOSPHATE SERPL-MCNC: 1.9 MG/DL — LOW (ref 2.4–4.7)
PHOSPHATE SERPL-MCNC: 2.4 MG/DL — SIGNIFICANT CHANGE UP (ref 2.4–4.7)
PHOSPHATE SERPL-MCNC: 2.4 MG/DL — SIGNIFICANT CHANGE UP (ref 2.4–4.7)
PHOSPHATE SERPL-MCNC: 2.5 MG/DL — SIGNIFICANT CHANGE UP (ref 2.4–4.7)
PLATELET # BLD AUTO: 91 K/UL — LOW (ref 150–400)
POTASSIUM SERPL-MCNC: 3.3 MMOL/L — LOW (ref 3.5–5.3)
POTASSIUM SERPL-MCNC: 3.6 MMOL/L — SIGNIFICANT CHANGE UP (ref 3.5–5.3)
POTASSIUM SERPL-MCNC: 3.9 MMOL/L — SIGNIFICANT CHANGE UP (ref 3.5–5.3)
POTASSIUM SERPL-MCNC: 5.1 MMOL/L — SIGNIFICANT CHANGE UP (ref 3.5–5.3)
POTASSIUM SERPL-MCNC: 5.1 MMOL/L — SIGNIFICANT CHANGE UP (ref 3.5–5.3)
POTASSIUM SERPL-SCNC: 3.3 MMOL/L — LOW (ref 3.5–5.3)
POTASSIUM SERPL-SCNC: 3.6 MMOL/L — SIGNIFICANT CHANGE UP (ref 3.5–5.3)
POTASSIUM SERPL-SCNC: 3.9 MMOL/L — SIGNIFICANT CHANGE UP (ref 3.5–5.3)
POTASSIUM SERPL-SCNC: 5.1 MMOL/L — SIGNIFICANT CHANGE UP (ref 3.5–5.3)
POTASSIUM SERPL-SCNC: 5.1 MMOL/L — SIGNIFICANT CHANGE UP (ref 3.5–5.3)
PROT SERPL-MCNC: 7.7 G/DL — SIGNIFICANT CHANGE UP (ref 6.6–8.7)
PROT UR-MCNC: NEGATIVE — SIGNIFICANT CHANGE UP
RAPID RVP RESULT: SIGNIFICANT CHANGE UP
RBC # BLD: 3.31 M/UL — LOW (ref 3.8–5.2)
RBC # FLD: 12.7 % — SIGNIFICANT CHANGE UP (ref 10.3–14.5)
RBC CASTS # UR COMP ASSIST: SIGNIFICANT CHANGE UP /HPF (ref 0–4)
SARS-COV-2 RNA SPEC QL NAA+PROBE: SIGNIFICANT CHANGE UP
SODIUM SERPL-SCNC: 113 MMOL/L — CRITICAL LOW (ref 135–145)
SODIUM SERPL-SCNC: 122 MMOL/L — LOW (ref 135–145)
SODIUM SERPL-SCNC: 125 MMOL/L — LOW (ref 135–145)
SODIUM SERPL-SCNC: 126 MMOL/L — LOW (ref 135–145)
SODIUM SERPL-SCNC: 130 MMOL/L — LOW (ref 135–145)
SP GR SPEC: 1 — LOW (ref 1.01–1.02)
TROPONIN T SERPL-MCNC: <0.01 NG/ML — SIGNIFICANT CHANGE UP (ref 0–0.06)
UROBILINOGEN FLD QL: NEGATIVE MG/DL — SIGNIFICANT CHANGE UP
WBC # BLD: 4.67 K/UL — SIGNIFICANT CHANGE UP (ref 3.8–10.5)
WBC # FLD AUTO: 4.67 K/UL — SIGNIFICANT CHANGE UP (ref 3.8–10.5)
WBC UR QL: SIGNIFICANT CHANGE UP /HPF (ref 0–5)

## 2022-07-25 PROCEDURE — 93010 ELECTROCARDIOGRAM REPORT: CPT

## 2022-07-25 PROCEDURE — 70450 CT HEAD/BRAIN W/O DYE: CPT | Mod: 26,MA

## 2022-07-25 PROCEDURE — 71045 X-RAY EXAM CHEST 1 VIEW: CPT | Mod: 26

## 2022-07-25 RX ORDER — DEXTROSE 50 % IN WATER 50 %
15 SYRINGE (ML) INTRAVENOUS ONCE
Refills: 0 | Status: DISCONTINUED | OUTPATIENT
Start: 2022-07-25 | End: 2022-07-25

## 2022-07-25 RX ORDER — POTASSIUM CHLORIDE 20 MEQ
10 PACKET (EA) ORAL
Refills: 0 | Status: COMPLETED | OUTPATIENT
Start: 2022-07-25 | End: 2022-07-25

## 2022-07-25 RX ORDER — CHLORHEXIDINE GLUCONATE 213 G/1000ML
1 SOLUTION TOPICAL
Refills: 0 | Status: DISCONTINUED | OUTPATIENT
Start: 2022-07-25 | End: 2022-07-29

## 2022-07-25 RX ORDER — SODIUM CHLORIDE 9 MG/ML
1000 INJECTION, SOLUTION INTRAVENOUS ONCE
Refills: 0 | Status: COMPLETED | OUTPATIENT
Start: 2022-07-25 | End: 2022-07-25

## 2022-07-25 RX ORDER — FERROUS SULFATE 325(65) MG
325 TABLET ORAL DAILY
Refills: 0 | Status: DISCONTINUED | OUTPATIENT
Start: 2022-07-25 | End: 2022-08-01

## 2022-07-25 RX ORDER — POTASSIUM PHOSPHATE, MONOBASIC POTASSIUM PHOSPHATE, DIBASIC 236; 224 MG/ML; MG/ML
30 INJECTION, SOLUTION INTRAVENOUS ONCE
Refills: 0 | Status: COMPLETED | OUTPATIENT
Start: 2022-07-25 | End: 2022-07-25

## 2022-07-25 RX ORDER — FOLIC ACID 0.8 MG
1 TABLET ORAL DAILY
Refills: 0 | Status: DISCONTINUED | OUTPATIENT
Start: 2022-07-25 | End: 2022-08-01

## 2022-07-25 RX ORDER — DEXTROSE 50 % IN WATER 50 %
25 SYRINGE (ML) INTRAVENOUS ONCE
Refills: 0 | Status: DISCONTINUED | OUTPATIENT
Start: 2022-07-25 | End: 2022-07-25

## 2022-07-25 RX ORDER — LACTULOSE 10 G/15ML
15 SOLUTION ORAL EVERY 8 HOURS
Refills: 0 | Status: DISCONTINUED | OUTPATIENT
Start: 2022-07-25 | End: 2022-08-01

## 2022-07-25 RX ORDER — MAGNESIUM SULFATE 500 MG/ML
2 VIAL (ML) INJECTION ONCE
Refills: 0 | Status: COMPLETED | OUTPATIENT
Start: 2022-07-25 | End: 2022-07-25

## 2022-07-25 RX ORDER — SODIUM CHLORIDE 9 MG/ML
1000 INJECTION, SOLUTION INTRAVENOUS
Refills: 0 | Status: DISCONTINUED | OUTPATIENT
Start: 2022-07-25 | End: 2022-07-25

## 2022-07-25 RX ORDER — INSULIN LISPRO 100/ML
VIAL (ML) SUBCUTANEOUS
Refills: 0 | Status: DISCONTINUED | OUTPATIENT
Start: 2022-07-25 | End: 2022-07-28

## 2022-07-25 RX ORDER — DEXTROSE 50 % IN WATER 50 %
15 SYRINGE (ML) INTRAVENOUS ONCE
Refills: 0 | Status: DISCONTINUED | OUTPATIENT
Start: 2022-07-25 | End: 2022-08-01

## 2022-07-25 RX ORDER — INSULIN GLARGINE 100 [IU]/ML
30 INJECTION, SOLUTION SUBCUTANEOUS AT BEDTIME
Refills: 0 | Status: DISCONTINUED | OUTPATIENT
Start: 2022-07-25 | End: 2022-07-25

## 2022-07-25 RX ORDER — ENOXAPARIN SODIUM 100 MG/ML
40 INJECTION SUBCUTANEOUS EVERY 24 HOURS
Refills: 0 | Status: DISCONTINUED | OUTPATIENT
Start: 2022-07-25 | End: 2022-08-01

## 2022-07-25 RX ORDER — GLUCAGON INJECTION, SOLUTION 0.5 MG/.1ML
1 INJECTION, SOLUTION SUBCUTANEOUS ONCE
Refills: 0 | Status: DISCONTINUED | OUTPATIENT
Start: 2022-07-25 | End: 2022-08-01

## 2022-07-25 RX ORDER — PANTOPRAZOLE SODIUM 20 MG/1
40 TABLET, DELAYED RELEASE ORAL
Refills: 0 | Status: DISCONTINUED | OUTPATIENT
Start: 2022-07-25 | End: 2022-08-01

## 2022-07-25 RX ORDER — DEXTROSE 50 % IN WATER 50 %
12.5 SYRINGE (ML) INTRAVENOUS ONCE
Refills: 0 | Status: DISCONTINUED | OUTPATIENT
Start: 2022-07-25 | End: 2022-08-01

## 2022-07-25 RX ORDER — SPIRONOLACTONE 25 MG/1
50 TABLET, FILM COATED ORAL DAILY
Refills: 0 | Status: DISCONTINUED | OUTPATIENT
Start: 2022-07-25 | End: 2022-07-26

## 2022-07-25 RX ORDER — LEVOTHYROXINE SODIUM 125 MCG
75 TABLET ORAL DAILY
Refills: 0 | Status: DISCONTINUED | OUTPATIENT
Start: 2022-07-25 | End: 2022-08-01

## 2022-07-25 RX ORDER — DEXTROSE 50 % IN WATER 50 %
12.5 SYRINGE (ML) INTRAVENOUS ONCE
Refills: 0 | Status: DISCONTINUED | OUTPATIENT
Start: 2022-07-25 | End: 2022-07-25

## 2022-07-25 RX ORDER — FERROUS SULFATE 325(65) MG
1 TABLET ORAL
Qty: 0 | Refills: 0 | DISCHARGE

## 2022-07-25 RX ORDER — SODIUM CHLORIDE 9 MG/ML
1000 INJECTION, SOLUTION INTRAVENOUS
Refills: 0 | Status: DISCONTINUED | OUTPATIENT
Start: 2022-07-25 | End: 2022-07-26

## 2022-07-25 RX ORDER — SODIUM CHLORIDE 9 MG/ML
1000 INJECTION, SOLUTION INTRAVENOUS
Refills: 0 | Status: DISCONTINUED | OUTPATIENT
Start: 2022-07-25 | End: 2022-08-01

## 2022-07-25 RX ORDER — POTASSIUM CHLORIDE 20 MEQ
40 PACKET (EA) ORAL ONCE
Refills: 0 | Status: COMPLETED | OUTPATIENT
Start: 2022-07-25 | End: 2022-07-25

## 2022-07-25 RX ORDER — DEXTROSE 50 % IN WATER 50 %
25 SYRINGE (ML) INTRAVENOUS ONCE
Refills: 0 | Status: DISCONTINUED | OUTPATIENT
Start: 2022-07-25 | End: 2022-08-01

## 2022-07-25 RX ORDER — INSULIN GLARGINE 100 [IU]/ML
25 INJECTION, SOLUTION SUBCUTANEOUS AT BEDTIME
Refills: 0 | Status: DISCONTINUED | OUTPATIENT
Start: 2022-07-25 | End: 2022-07-25

## 2022-07-25 RX ORDER — INSULIN GLARGINE 100 [IU]/ML
30 INJECTION, SOLUTION SUBCUTANEOUS AT BEDTIME
Refills: 0 | Status: DISCONTINUED | OUTPATIENT
Start: 2022-07-25 | End: 2022-07-26

## 2022-07-25 RX ORDER — SODIUM CHLORIDE 9 MG/ML
500 INJECTION INTRAMUSCULAR; INTRAVENOUS; SUBCUTANEOUS ONCE
Refills: 0 | Status: DISCONTINUED | OUTPATIENT
Start: 2022-07-25 | End: 2022-07-25

## 2022-07-25 RX ORDER — THIAMINE MONONITRATE (VIT B1) 100 MG
100 TABLET ORAL DAILY
Refills: 0 | Status: DISCONTINUED | OUTPATIENT
Start: 2022-07-25 | End: 2022-08-01

## 2022-07-25 RX ORDER — INSULIN HUMAN 100 [IU]/ML
4 INJECTION, SOLUTION SUBCUTANEOUS
Qty: 100 | Refills: 0 | Status: DISCONTINUED | OUTPATIENT
Start: 2022-07-25 | End: 2022-07-26

## 2022-07-25 RX ADMIN — LACTULOSE 15 GRAM(S): 10 SOLUTION ORAL at 18:19

## 2022-07-25 RX ADMIN — Medication 1 MILLIGRAM(S): at 18:20

## 2022-07-25 RX ADMIN — SODIUM CHLORIDE 150 MILLILITER(S): 9 INJECTION, SOLUTION INTRAVENOUS at 04:18

## 2022-07-25 RX ADMIN — LACTULOSE 15 GRAM(S): 10 SOLUTION ORAL at 05:12

## 2022-07-25 RX ADMIN — SODIUM CHLORIDE 150 MILLILITER(S): 9 INJECTION, SOLUTION INTRAVENOUS at 18:17

## 2022-07-25 RX ADMIN — ENOXAPARIN SODIUM 40 MILLIGRAM(S): 100 INJECTION SUBCUTANEOUS at 05:09

## 2022-07-25 RX ADMIN — Medication 25 GRAM(S): at 12:17

## 2022-07-25 RX ADMIN — Medication 100 MILLIEQUIVALENT(S): at 10:54

## 2022-07-25 RX ADMIN — SODIUM CHLORIDE 1000 MILLILITER(S): 9 INJECTION, SOLUTION INTRAVENOUS at 01:35

## 2022-07-25 RX ADMIN — INSULIN HUMAN 2 UNIT(S)/HR: 100 INJECTION, SOLUTION SUBCUTANEOUS at 01:35

## 2022-07-25 RX ADMIN — INSULIN HUMAN 5 UNIT(S): 100 INJECTION, SOLUTION SUBCUTANEOUS at 00:13

## 2022-07-25 RX ADMIN — Medication 40 MILLIEQUIVALENT(S): at 13:50

## 2022-07-25 RX ADMIN — SPIRONOLACTONE 50 MILLIGRAM(S): 25 TABLET, FILM COATED ORAL at 05:13

## 2022-07-25 RX ADMIN — Medication 325 MILLIGRAM(S): at 18:20

## 2022-07-25 RX ADMIN — Medication 25 GRAM(S): at 18:20

## 2022-07-25 RX ADMIN — Medication 100 MILLIGRAM(S): at 18:19

## 2022-07-25 RX ADMIN — Medication 40 MILLIEQUIVALENT(S): at 09:58

## 2022-07-25 RX ADMIN — LACTULOSE 15 GRAM(S): 10 SOLUTION ORAL at 21:59

## 2022-07-25 RX ADMIN — Medication 75 MICROGRAM(S): at 05:12

## 2022-07-25 RX ADMIN — INSULIN GLARGINE 30 UNIT(S): 100 INJECTION, SOLUTION SUBCUTANEOUS at 21:53

## 2022-07-25 RX ADMIN — SODIUM CHLORIDE 1000 MILLILITER(S): 9 INJECTION, SOLUTION INTRAVENOUS at 03:14

## 2022-07-25 RX ADMIN — CHLORHEXIDINE GLUCONATE 1 APPLICATION(S): 213 SOLUTION TOPICAL at 05:10

## 2022-07-25 RX ADMIN — Medication 6: at 20:43

## 2022-07-25 NOTE — PROVIDER CONTACT NOTE (CRITICAL VALUE NOTIFICATION) - ACTION/TREATMENT ORDERED:
sugars are downtrending- will keep insulin gtt @2units. finish fluid bolus then start maintenance. will recheck labs @ 8AM

## 2022-07-25 NOTE — H&P ADULT - HISTORY OF PRESENT ILLNESS
65 yo f pmhx HTN, DM2, Anemia, ETOH abuse, ETOH cirrhosis, esophageal varices, hepatic encephalopathy, hypothyroidism presented with left arm and hand weakness and paresthesias. On blood work patient's glucose found to be over 1000.  Patient seen and examined the bedside  used. She is a poor historian states she takes her "pills". When it was explained that she was her about 1 month ago and discharged on insulin and I asked her insulin is not pills its an injection she states "yes I take my pills". States she is eating and drinking. Denies etoh use, drug use, or smoking.

## 2022-07-25 NOTE — H&P ADULT - NSHPPHYSICALEXAM_GEN_ALL_CORE
Physical Examination:    General: Alert, oriented, interactive, nonfocal    HEENT: Pupils equal, reactive to light.  Symmetric, dry mucus membranes     PULM: Clear to auscultation bilaterally, no significant sputum production    CVS: Regular rate and rhythm, no murmurs, rubs, or gallops    ABD: Soft, nondistended, nontender, normoactive bowel sounds    EXT: No edema, nontender    SKIN: Warm     NEURO: A&Ox3, strength 5/5 all extremities, cranial nerves grossly intact, no focal deficits

## 2022-07-25 NOTE — H&P ADULT - ASSESSMENT
65 yo f pmhx HTN, DM2, Anemia, ETOH abuse, ETOH cirrhosis, esophageal varices, hepatic encephalopathy, hypothyroidism presented with left arm and hand weakness and paresthesias. Found to have hyperglycemia with glucose over 1000, negative acetone, most likely HHNS and dehydration.     Plan discussed with ICU attending     Problem List:  1) HHNS  2) Dehydration  3) Cirrhosis   4) high ammonia  5) pseudohyponatremia     Admit to MICU for insulin infusion  Patient poor historian clearly does not take her medications or follow up   Start with IVF bolus of 1L LR, will most likely need further boluses  IVF at 150ml/hr for maintenance  Check labs q4 hours  Hyponatremia from elevated glucose corrected Na 125  Start low dose insulin infusion at 2U/hr for now, patient needs IVF  Home dose medications ordered, lactulose, xifaxin, propranolol, spironolactone, synthroid  DVT-P with lovenox

## 2022-07-25 NOTE — PATIENT PROFILE ADULT - FALL HARM RISK - HARM RISK INTERVENTIONS

## 2022-07-26 LAB
ALBUMIN SERPL ELPH-MCNC: 2.6 G/DL — LOW (ref 3.3–5.2)
ALP SERPL-CCNC: 234 U/L — HIGH (ref 40–120)
ALT FLD-CCNC: 33 U/L — HIGH
ANION GAP SERPL CALC-SCNC: 11 MMOL/L — SIGNIFICANT CHANGE UP (ref 5–17)
ANION GAP SERPL CALC-SCNC: 12 MMOL/L — SIGNIFICANT CHANGE UP (ref 5–17)
AST SERPL-CCNC: 85 U/L — HIGH
BASOPHILS # BLD AUTO: 0.02 K/UL — SIGNIFICANT CHANGE UP (ref 0–0.2)
BASOPHILS NFR BLD AUTO: 0.4 % — SIGNIFICANT CHANGE UP (ref 0–2)
BILIRUB SERPL-MCNC: 0.5 MG/DL — SIGNIFICANT CHANGE UP (ref 0.4–2)
BUN SERPL-MCNC: 7.1 MG/DL — LOW (ref 8–20)
BUN SERPL-MCNC: 9.9 MG/DL — SIGNIFICANT CHANGE UP (ref 8–20)
CALCIUM SERPL-MCNC: 8.8 MG/DL — SIGNIFICANT CHANGE UP (ref 8.6–10.2)
CALCIUM SERPL-MCNC: 8.9 MG/DL — SIGNIFICANT CHANGE UP (ref 8.4–10.5)
CHLORIDE SERPL-SCNC: 103 MMOL/L — SIGNIFICANT CHANGE UP (ref 98–107)
CHLORIDE SERPL-SCNC: 96 MMOL/L — LOW (ref 98–107)
CO2 SERPL-SCNC: 19 MMOL/L — LOW (ref 22–29)
CO2 SERPL-SCNC: 20 MMOL/L — LOW (ref 22–29)
CREAT SERPL-MCNC: 0.56 MG/DL — SIGNIFICANT CHANGE UP (ref 0.5–1.3)
CREAT SERPL-MCNC: 0.95 MG/DL — SIGNIFICANT CHANGE UP (ref 0.5–1.3)
EGFR: 102 ML/MIN/1.73M2 — SIGNIFICANT CHANGE UP
EGFR: 67 ML/MIN/1.73M2 — SIGNIFICANT CHANGE UP
EOSINOPHIL # BLD AUTO: 0.07 K/UL — SIGNIFICANT CHANGE UP (ref 0–0.5)
EOSINOPHIL NFR BLD AUTO: 1.4 % — SIGNIFICANT CHANGE UP (ref 0–6)
GLUCOSE BLDC GLUCOMTR-MCNC: 111 MG/DL — HIGH (ref 70–99)
GLUCOSE BLDC GLUCOMTR-MCNC: 111 MG/DL — HIGH (ref 70–99)
GLUCOSE BLDC GLUCOMTR-MCNC: 182 MG/DL — HIGH (ref 70–99)
GLUCOSE BLDC GLUCOMTR-MCNC: 192 MG/DL — HIGH (ref 70–99)
GLUCOSE BLDC GLUCOMTR-MCNC: 235 MG/DL — HIGH (ref 70–99)
GLUCOSE BLDC GLUCOMTR-MCNC: 290 MG/DL — HIGH (ref 70–99)
GLUCOSE BLDC GLUCOMTR-MCNC: 430 MG/DL — HIGH (ref 70–99)
GLUCOSE BLDC GLUCOMTR-MCNC: 434 MG/DL — HIGH (ref 70–99)
GLUCOSE BLDC GLUCOMTR-MCNC: 76 MG/DL — SIGNIFICANT CHANGE UP (ref 70–99)
GLUCOSE BLDC GLUCOMTR-MCNC: 77 MG/DL — SIGNIFICANT CHANGE UP (ref 70–99)
GLUCOSE SERPL-MCNC: 368 MG/DL — HIGH (ref 70–99)
GLUCOSE SERPL-MCNC: 74 MG/DL — SIGNIFICANT CHANGE UP (ref 70–99)
HCT VFR BLD CALC: 33.3 % — LOW (ref 34.5–45)
HGB BLD-MCNC: 11.2 G/DL — LOW (ref 11.5–15.5)
IMM GRANULOCYTES NFR BLD AUTO: 0.2 % — SIGNIFICANT CHANGE UP (ref 0–1.5)
LYMPHOCYTES # BLD AUTO: 1.7 K/UL — SIGNIFICANT CHANGE UP (ref 1–3.3)
LYMPHOCYTES # BLD AUTO: 33.1 % — SIGNIFICANT CHANGE UP (ref 13–44)
MAGNESIUM SERPL-MCNC: 2.3 MG/DL — SIGNIFICANT CHANGE UP (ref 1.6–2.6)
MCHC RBC-ENTMCNC: 33.2 PG — SIGNIFICANT CHANGE UP (ref 27–34)
MCHC RBC-ENTMCNC: 33.6 GM/DL — SIGNIFICANT CHANGE UP (ref 32–36)
MCV RBC AUTO: 98.8 FL — SIGNIFICANT CHANGE UP (ref 80–100)
MONOCYTES # BLD AUTO: 0.7 K/UL — SIGNIFICANT CHANGE UP (ref 0–0.9)
MONOCYTES NFR BLD AUTO: 13.6 % — SIGNIFICANT CHANGE UP (ref 2–14)
NEUTROPHILS # BLD AUTO: 2.63 K/UL — SIGNIFICANT CHANGE UP (ref 1.8–7.4)
NEUTROPHILS NFR BLD AUTO: 51.3 % — SIGNIFICANT CHANGE UP (ref 43–77)
PHOSPHATE SERPL-MCNC: 4.3 MG/DL — SIGNIFICANT CHANGE UP (ref 2.4–4.7)
PLATELET # BLD AUTO: 99 K/UL — LOW (ref 150–400)
POTASSIUM SERPL-MCNC: 4.3 MMOL/L — SIGNIFICANT CHANGE UP (ref 3.5–5.3)
POTASSIUM SERPL-MCNC: 4.8 MMOL/L — SIGNIFICANT CHANGE UP (ref 3.5–5.3)
POTASSIUM SERPL-SCNC: 4.3 MMOL/L — SIGNIFICANT CHANGE UP (ref 3.5–5.3)
POTASSIUM SERPL-SCNC: 4.8 MMOL/L — SIGNIFICANT CHANGE UP (ref 3.5–5.3)
PROT SERPL-MCNC: 6.2 G/DL — LOW (ref 6.6–8.7)
RBC # BLD: 3.37 M/UL — LOW (ref 3.8–5.2)
RBC # FLD: 12.9 % — SIGNIFICANT CHANGE UP (ref 10.3–14.5)
SODIUM SERPL-SCNC: 127 MMOL/L — LOW (ref 135–145)
SODIUM SERPL-SCNC: 134 MMOL/L — LOW (ref 135–145)
WBC # BLD: 5.13 K/UL — SIGNIFICANT CHANGE UP (ref 3.8–10.5)
WBC # FLD AUTO: 5.13 K/UL — SIGNIFICANT CHANGE UP (ref 3.8–10.5)

## 2022-07-26 PROCEDURE — 99222 1ST HOSP IP/OBS MODERATE 55: CPT

## 2022-07-26 PROCEDURE — 99233 SBSQ HOSP IP/OBS HIGH 50: CPT

## 2022-07-26 RX ORDER — INSULIN LISPRO 100/ML
12 VIAL (ML) SUBCUTANEOUS
Refills: 0 | Status: DISCONTINUED | OUTPATIENT
Start: 2022-07-26 | End: 2022-07-28

## 2022-07-26 RX ORDER — INSULIN GLARGINE 100 [IU]/ML
10 INJECTION, SOLUTION SUBCUTANEOUS ONCE
Refills: 0 | Status: COMPLETED | OUTPATIENT
Start: 2022-07-26 | End: 2022-07-26

## 2022-07-26 RX ORDER — INSULIN LISPRO 100/ML
4 VIAL (ML) SUBCUTANEOUS
Refills: 0 | Status: DISCONTINUED | OUTPATIENT
Start: 2022-07-26 | End: 2022-07-26

## 2022-07-26 RX ORDER — FUROSEMIDE 40 MG
1 TABLET ORAL
Qty: 0 | Refills: 0 | DISCHARGE

## 2022-07-26 RX ORDER — INSULIN GLARGINE 100 [IU]/ML
40 INJECTION, SOLUTION SUBCUTANEOUS AT BEDTIME
Refills: 0 | Status: DISCONTINUED | OUTPATIENT
Start: 2022-07-26 | End: 2022-07-28

## 2022-07-26 RX ORDER — SODIUM CHLORIDE 9 MG/ML
500 INJECTION, SOLUTION INTRAVENOUS ONCE
Refills: 0 | Status: COMPLETED | OUTPATIENT
Start: 2022-07-26 | End: 2022-07-26

## 2022-07-26 RX ADMIN — ENOXAPARIN SODIUM 40 MILLIGRAM(S): 100 INJECTION SUBCUTANEOUS at 05:08

## 2022-07-26 RX ADMIN — SPIRONOLACTONE 50 MILLIGRAM(S): 25 TABLET, FILM COATED ORAL at 05:10

## 2022-07-26 RX ADMIN — INSULIN GLARGINE 10 UNIT(S): 100 INJECTION, SOLUTION SUBCUTANEOUS at 19:12

## 2022-07-26 RX ADMIN — PANTOPRAZOLE SODIUM 40 MILLIGRAM(S): 20 TABLET, DELAYED RELEASE ORAL at 05:10

## 2022-07-26 RX ADMIN — LACTULOSE 15 GRAM(S): 10 SOLUTION ORAL at 16:58

## 2022-07-26 RX ADMIN — Medication 1 MILLIGRAM(S): at 11:16

## 2022-07-26 RX ADMIN — Medication 100 MILLIGRAM(S): at 11:16

## 2022-07-26 RX ADMIN — INSULIN GLARGINE 40 UNIT(S): 100 INJECTION, SOLUTION SUBCUTANEOUS at 22:30

## 2022-07-26 RX ADMIN — LACTULOSE 15 GRAM(S): 10 SOLUTION ORAL at 05:08

## 2022-07-26 RX ADMIN — Medication 1 TABLET(S): at 11:15

## 2022-07-26 RX ADMIN — Medication 12: at 17:14

## 2022-07-26 RX ADMIN — CHLORHEXIDINE GLUCONATE 1 APPLICATION(S): 213 SOLUTION TOPICAL at 05:07

## 2022-07-26 RX ADMIN — LACTULOSE 15 GRAM(S): 10 SOLUTION ORAL at 22:30

## 2022-07-26 RX ADMIN — POTASSIUM PHOSPHATE, MONOBASIC POTASSIUM PHOSPHATE, DIBASIC 83.33 MILLIMOLE(S): 236; 224 INJECTION, SOLUTION INTRAVENOUS at 00:22

## 2022-07-26 RX ADMIN — Medication 6: at 11:14

## 2022-07-26 RX ADMIN — Medication 75 MICROGRAM(S): at 05:09

## 2022-07-26 RX ADMIN — SODIUM CHLORIDE 500 MILLILITER(S): 9 INJECTION, SOLUTION INTRAVENOUS at 17:14

## 2022-07-26 RX ADMIN — Medication 325 MILLIGRAM(S): at 11:15

## 2022-07-26 NOTE — CONSULT NOTE ADULT - SUBJECTIVE AND OBJECTIVE BOX
HPI:  65 yo f pmhx HTN, DM2, Anemia, ETOH abuse, ETOH cirrhosis, esophageal varices, hepatic encephalopathy, hypothyroidism presented with left arm and hand weakness and paresthesias. On blood work patient's glucose found to be over 1000.  Patient seen and examined the bedside  used. She is a poor historian states she takes her "pills". When it was explained that she was her about 1 month ago and discharged on insulin and I asked her insulin is not pills its an injection she states "yes I take my pills". States she is eating and drinking. Denies etoh use, drug use, or smoking.  (2022 01:34)  pt. known to our service from recent hospitalization. Admitted with severely uncontrolled diabetes again likely due to failure to take insulin.      PAST MEDICAL & SURGICAL HISTORY:  Liver cirrhosis      ETOH abuse      Urea cycle metabolism disorder      Transitory  hyperthyroidism      Lump in female breast      UTI (urinary tract infection)      Lymphangitis, acute, lower leg      Anemia      Hypothyroidism      Chronic back pain      HTN (hypertension)      GERD (gastroesophageal reflux disease)      Pancreatitis      HTN (hypertension)      DM (diabetes mellitus)      No significant past surgical history      S/P abdominoplasty          FAMILY HISTORY:  FH: depression (Child)        SOCIAL HISTORY:    REVIEW OF SYSTEMS:    as noted in HPI    MEDICATIONS  (STANDING):  chlorhexidine 2% Cloths 1 Application(s) Topical <User Schedule>  dextrose 5%. 1000 milliLiter(s) (50 mL/Hr) IV Continuous <Continuous>  dextrose 5%. 1000 milliLiter(s) (100 mL/Hr) IV Continuous <Continuous>  dextrose 50% Injectable 25 Gram(s) IV Push once  dextrose 50% Injectable 12.5 Gram(s) IV Push once  dextrose 50% Injectable 25 Gram(s) IV Push once  enoxaparin Injectable 40 milliGRAM(s) SubCutaneous every 24 hours  ferrous    sulfate 325 milliGRAM(s) Oral daily  folic acid 1 milliGRAM(s) Oral daily  glucagon  Injectable 1 milliGRAM(s) IntraMuscular once  glucagon  Injectable 1 milliGRAM(s) IntraMuscular once  insulin glargine Injectable (LANTUS) 30 Unit(s) SubCutaneous at bedtime  insulin lispro (ADMELOG) corrective regimen sliding scale   SubCutaneous three times a day before meals  insulin lispro Injectable (ADMELOG) 4 Unit(s) SubCutaneous three times a day before meals  lactulose Syrup 15 Gram(s) Oral every 8 hours  levothyroxine 75 MICROGram(s) Oral daily  multivitamin 1 Tablet(s) Oral daily  pantoprazole    Tablet 40 milliGRAM(s) Oral before breakfast  propranolol 10 milliGRAM(s) Oral two times a day  rifAXIMin 550 milliGRAM(s) Oral two times a day  thiamine 100 milliGRAM(s) Oral daily    MEDICATIONS  (PRN):  dextrose Oral Gel 15 Gram(s) Oral once PRN Blood Glucose LESS THAN 70 milliGRAM(s)/deciliter      Allergies    No Known Allergies    Intolerances          PHYSICAL EXAM:    Vital Signs Last 24 Hrs  T(C): 36.6 (2022 15:52), Max: 36.8 (2022 19:48)  T(F): 97.9 (2022 15:52), Max: 98.3 (2022 19:48)  HR: 73 (2022 16:00) (62 - 87)  BP: 111/69 (2022 16:00) (94/57 - 156/79)  BP(mean): 82 (2022 16:00) (64 - 125)  RR: 20 (2022 16:00) (12 - 31)  SpO2: 97% (2022 16:00) (96% - 100%)    Parameters below as of 2022 16:00  Patient On (Oxygen Delivery Method): room air        General appearance: THin, poorly nourished    Eyes: Pupils equal     Neck: Trachea midline. No thyroid enlargement.    Lungs: Normal respiratory excursion. Lungs clear.    CV: Regular cardiac rhythm.     Abdomen: Soft, non tender, no organomegaly or mass.    Musculoskeletal: No cyanosis, clubbing, or edema.     Skin: Warm and dry    Neuro: Cranial nerves intact.     Psych: Normal affect, poor judgement.            LABS:                        11.2   5.13  )-----------( 99       ( 2022 04:15 )             33.3     07-26    134<L>  |  103  |  7.1<L>  ----------------------------<  74  4.3   |  19.0<L>  |  0.56    Ca    8.8      2022 04:15  Phos  4.3     07-26  Mg     2.3     07-26    TPro  6.2<L>  /  Alb  2.6<L>  /  TBili  0.5  /  DBili  x   /  AST  85<H>  /  ALT  33<H>  /  AlkPhos  234<H>      Urinalysis Basic - ( 2022 03:00 )    Color: Yellow / Appearance: Clear / S.005 / pH: x  Gluc: x / Ketone: Negative  / Bili: Negative / Urobili: Negative mg/dL   Blood: x / Protein: Negative / Nitrite: Negative   Leuk Esterase: Small / RBC: 0-2 /HPF / WBC 0-2 /HPF   Sq Epi: x / Non Sq Epi: Occasional / Bacteria: Occasional      LIVER FUNCTIONS - ( 2022 04:15 )  Alb: 2.6 g/dL / Pro: 6.2 g/dL / ALK PHOS: 234 U/L / ALT: 33 U/L / AST: 85 U/L / GGT: x                 POCT Blood Glucose.: 290 mg/dL (22 @ 11:11)  POCT Blood Glucose.: 76 mg/dL (22 @ 04:13)  POCT Blood Glucose.: 77 mg/dL (22 @ 04:12)  POCT Blood Glucose.: 111 mg/dL (22 @ 03:13)  POCT Blood Glucose.: 111 mg/dL (22 @ 03:10)  POCT Blood Glucose.: 182 mg/dL (22 @ 02:07)  POCT Blood Glucose.: 235 mg/dL (22 @ 01:23)  POCT Blood Glucose.: 192 mg/dL (22 @ 00:13)  POCT Blood Glucose.: 198 mg/dL (22 @ 21:55)  POCT Blood Glucose.: 224 mg/dL (22 @ 21:14)  POCT Blood Glucose.: 288 mg/dL (22 @ 20:00)  POCT Blood Glucose.: 285 mg/dL (22 @ 19:18)  POCT Blood Glucose.: 287 mg/dL (22 @ 10:04)  POCT Blood Glucose.: 297 mg/dL (22 @ 09:07)  POCT Blood Glucose.: 325 mg/dL (22 @ 08:01)  POCT Blood Glucose.: 362 mg/dL (22 @ 06:49)  POCT Blood Glucose.: 362 mg/dL (22 @ 06:05)  POCT Blood Glucose.: 425 mg/dL (22 @ 05:11)  POCT Blood Glucose.: 485 mg/dL (22 @ 04:04)  POCT Blood Glucose.: >530 mg/dL (22 @ 03:08)  POCT Blood Glucose.: >530 mg/dL (22 @ 03:07)  POCT Blood Glucose.: >530 mg/dL (22 @ 01:40)  POCT Blood Glucose.: >530 mg/dL (22 @ 00:10)          
PMD : Dr. Mccarthy     Patient is a 66y old  Female who presents with a chief complaint of HHNS (2022 12:45)  - medicine called for downgrade       HPI:  67 yo f pmhx HTN, DM2, Anemia, ETOH abuse, ETOH cirrhosis, esophageal varices, hepatic encephalopathy, hypothyroidism presented with left arm and hand weakness and paresthesias. On blood work patient's glucose found to be over 1000.  Patient seen and examined the bedside  used. She is a poor historian states she takes her "pills". When it was explained that she was her about 1 month ago and discharged on insulin and I asked her insulin is not pills its an injection she states "yes I take my pills". States she is eating and drinking. Denies etoh use, drug use, or smoking.  (2022 01:34)    Overnight/ AM events: Inhouse    Patient seen and examined at bedside. No acute events overnight. Patient currently AOX3, understands why she is in the hospital. Reports she was not feeling well and was experiencing paresthesias of her left hand, developed some weakness and felt weakness. Now reports all sx resolved. Had extensive discussion with her counselled her on her diabetes. She states she only takes pills, and takes all of her pills as prescribed even though her sister tells her to stop taking so many medications. She does however eat a lot fruits/ potatoes etc. She reports she needs to speak to someone about her diet. At baseline she is independent in ADLs lives with her son and brother. Pt is agreeable to speak to nutritionist.   Patient denies chest pain, SOB, abd pain, N/V, fever, chills, dysuria or any other complaints. All remainder ROS negative.     PAST MEDICAL HISTORY:  Liver cirrhosis  Alcohol abuse  Urea cycle metabolism disorder  Transitory  hyperthyroidism  Lump in female breast  UTI (urinary tract infection)  Lymphangitis, acute, lower leg  Anemia  Hypothyroidism  Chronic back pain  HTN (hypertension)  GERD (gastroesophageal reflux disease)  Pancreatitis  HTN (hypertension)  DM (diabetes mellitus)    PSHX   - S/P c section   - S/P abdominoplasty    Social History:  Tabacco - denies   ETOH - quit 1 yr ago   Illicit drug abuse - denies    FAMILY HISTORY:  FH: father- CAD       Allergies  No Known Allergies      REVIEW OF SYSTEMS:  CONSTITUTIONAL: No fever, weight loss, or fatigue  EYES: No eye pain, visual disturbances, or discharge  NECK: No pain or stiffness  RESPIRATORY: No cough, wheezing, chills or hemoptysis; No shortness of breath  CARDIOVASCULAR: No chest pain, palpitations, dizziness, or leg swelling  GASTROINTESTINAL: No abdominal or epigastric pain. No nausea, vomiting, or hematemesis; No diarrhea or constipation. No melena or hematochezia.  GENITOURINARY: No dysuria, frequency, hematuria, or incontinence  NEUROLOGICAL: confusion resolved   SKIN: No itching, burning, rashes, or lesions   LYMPH NODES: No enlarged glands  ENDOCRINE: No heat or cold intolerance; No hair loss  MUSCULOSKELETAL:  left hand paresthesias and weakness   PSYCHIATRIC: No depression, anxiety, mood swings, or difficulty sleeping  HEME/LYMPH: No easy bruising, or bleeding gums  ALLERGY AND IMMUNOLOGIC: No hives or eczema    MEDICATIONS  (STANDING):  chlorhexidine 2% Cloths 1 Application(s) Topical <User Schedule>  dextrose 5%. 1000 milliLiter(s) (50 mL/Hr) IV Continuous <Continuous>  dextrose 5%. 1000 milliLiter(s) (100 mL/Hr) IV Continuous <Continuous>  dextrose 50% Injectable 25 Gram(s) IV Push once  dextrose 50% Injectable 12.5 Gram(s) IV Push once  dextrose 50% Injectable 25 Gram(s) IV Push once  enoxaparin Injectable 40 milliGRAM(s) SubCutaneous every 24 hours  ferrous    sulfate 325 milliGRAM(s) Oral daily  folic acid 1 milliGRAM(s) Oral daily  glucagon  Injectable 1 milliGRAM(s) IntraMuscular once  glucagon  Injectable 1 milliGRAM(s) IntraMuscular once  insulin glargine Injectable (LANTUS) 30 Unit(s) SubCutaneous at bedtime  insulin lispro (ADMELOG) corrective regimen sliding scale   SubCutaneous three times a day before meals  lactulose Syrup 15 Gram(s) Oral every 8 hours  levothyroxine 75 MICROGram(s) Oral daily  multivitamin 1 Tablet(s) Oral daily  pantoprazole    Tablet 40 milliGRAM(s) Oral before breakfast  propranolol 10 milliGRAM(s) Oral two times a day  rifAXIMin 550 milliGRAM(s) Oral two times a day  spironolactone 50 milliGRAM(s) Oral daily  thiamine 100 milliGRAM(s) Oral daily    MEDICATIONS  (PRN):  dextrose Oral Gel 15 Gram(s) Oral once PRN Blood Glucose LESS THAN 70 milliGRAM(s)/deciliter      Vital Signs Last 24 Hrs  T(C): 36.8 (2022 12:02), Max: 37 (2022 16:44)  T(F): 98.3 (2022 12:02), Max: 98.6 (2022 16:44)  HR: 78 (2022 11:00) (62 - 94)  BP: 116/82 (2022 11:00) (95/61 - 156/79)  BP(mean): 91 (2022 11:00) (64 - 125)  RR: 20 (2022 11:00) (12 - 31)  SpO2: 99% (2022 11:00) (96% - 100%)    Parameters below as of 2022 11:00  Patient On (Oxygen Delivery Method): room air      PHYSICAL EXAM:  GENERAL: NAD, well-groomed   EYES: EOMI, PERRLA, conjunctiva and sclera clear  NECK: Supple, No JVD   NERVOUS SYSTEM:  Alert & Oriented X3, Good concentration; Motor Strength 5/5 B/L upper and lower extremities   CHEST/LUNG: CTA  b/l,  no rales, rhonchi, wheezing, or rubs  HEART: Regular rate and rhythm; No murmurs, rubs, or gallops  no peripheral edema   ABDOMEN: Soft, Nontender, Nondistended; Bowel sounds present  EXTREMITIES:  No clubbing, cyanosis   SKIN: No rashes or lesions    LABS:                        11.2   5.13  )-----------( 99       ( 2022 04:15 )             33.3     -    134<L>  |  103  |  7.1<L>  ----------------------------<  74  4.3   |  19.0<L>  |  0.56    Ca    8.8      2022 04:15  Phos  4.3       Mg     2.3         TPro  6.2<L>  /  Alb  2.6<L>  /  TBili  0.5  /  DBili  x   /  AST  85<H>  /  ALT  33<H>  /  AlkPhos  234<H>        Urinalysis Basic - ( 2022 03:00 )    Color: Yellow / Appearance: Clear / S.005 / pH: x  Gluc: x / Ketone: Negative  / Bili: Negative / Urobili: Negative mg/dL   Blood: x / Protein: Negative / Nitrite: Negative   Leuk Esterase: Small / RBC: 0-2 /HPF / WBC 0-2 /HPF   Sq Epi: x / Non Sq Epi: Occasional / Bacteria: Occasional      RADIOLOGY & ADDITIONAL STUDIES:  all imaging reviewed

## 2022-07-26 NOTE — CONSULT NOTE ADULT - ASSESSMENT
66 year old female with hx of HTN,  alcohol abuse, alcoholic liver cirrhosis complicated by esophageal varices and hepatic encephalopathy, DM2,  neuropathy, Anemia, Pancreatitis, recently discharged in june 2022 for UTI/ HHS/ uncontrolled DM2, was advised to c/w insulin injectable on discharge, currently presented on 7/25 with AMS, paresthesias of the left hand and admitted to MICU with acute metabolic/hepatic encephalopathy 2/2 HHS/ severe dehydration with glucose>1000, pseudohyponatremia, was given IVF, insulin ggt with improvement of glucose. Transitioned to injectable insulin and started on a diet without any complications. Also with hepatic encephalopathy with hyperammonemia continued on lactulose/ rifaxmin and now with resolution of encephalopathy. Patient is medically stable to be transferred from the MICU to the hospitalist service.        Poorly controlled DMT2 c/b HHS   - due to pt poor compliance with diet and was recommended during recent hospitalization to be on injectable insulin but she has been only taking her po meds. Extensive counselling provided to the patient. She has multiple questions about her diet/ medications.   - A1C - 16   - initial glucose >1000   - fs >200 currently but this am ~ 70s range   - d/c LR IVF  - c/w accuchecks achs tid   - c/w ademelog sliding scale achs tid   - 6u sq given for fs >200   - adding ademelog 4u sq achs tid   -c/w lantus 30 u sq qhs   -c/w hypoglycemia protocol   - nutrition consulted   - DIabetes educator consulted  - Endo consulted     Alcoholic liver cirrhosis   -c/b hx of esophageal varices, anemia and thrombocytopenia, transaminitis   - s/p acute on chronic hepatic encephalopathy - now resolved- mentation at baseline AAOX3   - h/h stable, liver enzymes stable  -c/w propranolol 10 tid  -c/w lactulose titrate to 3-4 bms a day   -c/w rifaximin   -c/w ppi po qd   - d/c IVF   - pt to be restarted on lasix 40mg po qd and aldactone 50mg po qd  in the next 24-48 hrs - will follow labs and pts clinical state   - pt to f/u with GI and hepatologist Dr. Craven on discharge     Hypothyroidism  - c/w levothyroxine po qd   - recent hospitalization tsh 8   - Will need TFTs repeated in 4-6 weeks as outpt     HTN  bp stable  - c/w propranolol   - restarting aldactone/lasix in the next 24-48 hrs   - c/w monitoring bp closely       VTE ppx:   -c/w SCDs given thrombocytopenia     Activity level: increase as tolerated    Independent at baseline     Dispo: Pt currently remains medically acute, transfer to the hospitalist service.    66 year old female with hx of HTN,  alcohol abuse, alcoholic liver cirrhosis complicated by esophageal varices and hepatic encephalopathy, DM2,  neuropathy, Anemia, Pancreatitis, recently discharged in june 2022 for UTI/ HHS/ uncontrolled DM2, was advised to c/w insulin injectable on discharge, currently presented on 7/25 with AMS, paresthesias of the left hand and admitted to MICU with acute metabolic/hepatic encephalopathy 2/2 HHS/ severe dehydration with glucose>1000, pseudohyponatremia, was given IVF, insulin ggt with improvement of glucose. Transitioned to injectable insulin and started on a diet without any complications. Also with hepatic encephalopathy with hyperammonemia continued on lactulose/ rifaxmin and now with resolution of encephalopathy. Patient is medically stable to be transferred from the MICU to the hospitalist service.        Poorly controlled DMT2 c/b HHS   - due to pt poor compliance with diet and was recommended during recent hospitalization to be on injectable insulin but she has been only taking her po meds. Extensive counselling provided to the patient. She has multiple questions about her diet/ medications.   - A1C - 16   - initial glucose >1000   - fs >200 and repeat >400  but this am ~ 70s range   - stat bmp   - d/c LR IVF  - c/w accuchecks achs tid   - c/w ademelog sliding scale achs tid   - 6u sq given for fs >200   - adding ademelog 12u sq achs tid  per endo   -c/w increased lantus 40 u sq qhs   -c/w hypoglycemia protocol   - Diabetic diet   - nutrition consulted   - DIabetes educator consulted  - Endo consulted     Alcoholic liver cirrhosis   -c/b hx of esophageal varices, anemia and thrombocytopenia, transaminitis   - s/p acute on chronic hepatic encephalopathy - now resolved- mentation at baseline AAOX3   - h/h stable, liver enzymes stable  -c/w propranolol 10 tid  -c/w lactulose titrate to 3-4 bms a day   -c/w rifaximin   -c/w ppi po qd   - d/c IVF   - pt to be restarted on lasix 40mg po qd and aldactone 50mg po qd  in the next 24-48 hrs - will follow labs and pts clinical state   - pt to f/u with GI and hepatologist Dr. Craven on discharge     Hypothyroidism  - c/w levothyroxine po qd   - recent hospitalization tsh 8   - Will need TFTs repeated in 4-6 weeks as outpt     HTN  bp stable  - c/w propranolol   - restarting aldactone/lasix in the next 24-48 hrs   - c/w monitoring bp closely       VTE ppx:   -c/w SCDs given thrombocytopenia     Activity level: increase as tolerated    Independent at baseline     Dispo: Pt currently remains medically acute, transfer to the hospitalist service.

## 2022-07-26 NOTE — PROGRESS NOTE ADULT - SUBJECTIVE AND OBJECTIVE BOX
BRIEF HOSPITAL COURSE-   65 y/o F PMHx HTN, DM2, Anemia, ETOH abuse, ETOH cirrhosis, Esophageal varices, Hepatic encephalopathy, Hypothyroidism presented with left arm and hand weakness and paresthesias. On blood work patient's glucose found to be over 1000. Patient seen and examined at the bedside  used. She is a poor historian states she takes her "pills", however, she was discharged from [facility] 1 month prior on insulin. When explained that insulin is an injection and not pills, she repeats "yes I take my pills". States she is eating and drinking. Denies ETOH use, drug use, or smoking.     EVENTS LAST 24 HOURS-   No acute overnight events. Patient seen and examined this AM. No acute complaints. Now off Insulin gtt. FSS on 30 U Lantus, started on Consistent Carb Diet. Repeat Na 134. Endocrine consulted for DM management given non-compliance at home. Stable for downgrade to Medicine.     REVIEW OF SYSTEMS-  CONSTITUTIONAL: No fever, chills, or fatigue.  EYES: No eye pain, visual disturbances, or discharge.  ENMT:  No difficulty hearing, tinnitus, vertigo; No sinus or throat pain.  NECK: No pain or stiffness.  RESPIRATORY: No cough, wheezing, chills or hemoptysis; No shortness of breath.  CARDIOVASCULAR: No chest pain, palpitations, dizziness, or leg swelling.  GASTROINTESTINAL: No abdominal or epigastric pain. No nausea, vomiting, or hematemesis; No diarrhea or constipation. No melena or hematochezia.  GENITOURINARY: No dysuria, frequency, hematuria, or incontinence.  NEUROLOGICAL: No headaches, memory loss, + loss of strength, numbness.  SKIN: No itching, burning, rashes, or lesions.  MUSCULOSKELETAL: No joint pain or swelling; No muscle, back, or extremity pain.  PSYCHIATRIC: No depression, anxiety, mood swings, or difficulty sleeping.    PAST MEDICAL & SURGICAL HISTORY-  Liver cirrhosis  ETOH abuse  Urea cycle metabolism disorder  Transitory  hyperthyroidism  Lump in female breast  UTI (urinary tract infection)  Lymphangitis, acute, lower leg  Anemia  Hypothyroidism  Chronic back pain  HTN (hypertension)  GERD (gastroesophageal reflux disease)  Pancreatitis  HTN (hypertension)  DM (diabetes mellitus)      S/P abdominoplasty    MEDICATIONS  (STANDING)-  chlorhexidine 2% Cloths 1 Application(s) Topical <User Schedule>  dextrose 5%. 1000 milliLiter(s) (50 mL/Hr) IV Continuous <Continuous>  dextrose 5%. 1000 milliLiter(s) (100 mL/Hr) IV Continuous <Continuous>  dextrose 50% Injectable 25 Gram(s) IV Push once  dextrose 50% Injectable 12.5 Gram(s) IV Push once  dextrose 50% Injectable 25 Gram(s) IV Push once  enoxaparin Injectable 40 milliGRAM(s) SubCutaneous every 24 hours  ferrous    sulfate 325 milliGRAM(s) Oral daily  folic acid 1 milliGRAM(s) Oral daily  glucagon  Injectable 1 milliGRAM(s) IntraMuscular once  glucagon  Injectable 1 milliGRAM(s) IntraMuscular once  insulin glargine Injectable (LANTUS) 30 Unit(s) SubCutaneous at bedtime  insulin lispro (ADMELOG) corrective regimen sliding scale   SubCutaneous three times a day before meals  lactulose Syrup 15 Gram(s) Oral every 8 hours  levothyroxine 75 MICROGram(s) Oral daily  multivitamin 1 Tablet(s) Oral daily  pantoprazole    Tablet 40 milliGRAM(s) Oral before breakfast  propranolol 10 milliGRAM(s) Oral two times a day  rifAXIMin 550 milliGRAM(s) Oral two times a day  spironolactone 50 milliGRAM(s) Oral daily  thiamine 100 milliGRAM(s) Oral daily    MEDICATIONS  (PRN)-  dextrose Oral Gel 15 Gram(s) Oral once PRN Blood Glucose LESS THAN 70 milliGRAM(s)/deciliter    ALLERGIES-  No Known Allergies    Intolerances      T(C): 36.8 (22 @ 12:02), Max: 37 (22 @ 16:44)  HR: 78 (22 @ 11:00) (62 - 94)  BP: 116/82 (22 @ 11:00) (95/61 - 156/79)  RR: 20 (22 @ 11:00) (12 - 31)  SpO2: 99% (22 @ 11:00) (96% - 100%)    PHYSICAL EXAM-  CONSTITUTIONAL: Well groomed, no apparent distress.  EYES: PERRLA and symmetric, EOMI, No conjunctival or scleral injection, non-icteric.  ENMT: Oral mucosa with moist membranes. No external nasal lesions; nasal mucosa not inflamed; normal dentition; no pharyngeal injection or exudates. 	  NECK: Supple, symmetric and without tracheal deviation; thyroid gland not enlarged and without palpable masses.  RESPIRATORY: No respiratory distress, no use of accessory muscles; CTA b/l, no wheezes, rales or rhonchi.   CARDIOVASCULAR: RRRR, +S1S2, no murmurs, no rubs, no gallops; no JVD; no peripheral edema.  GASTROINTESTINAL: Soft, non tender, non distended, no rebound, no guarding; No palpable masses; no hepatosplenomegaly; no hernia palpated.   MUSCULOSKELETAL: Moving all extremities, strength 5/5 all extremities.   SKIN: No rashes or ulcers noted; no subcutaneous nodules or induration palpable  NEUROLOGIC: CN intact, no focal deficits.   PSYCHIATRIC: Appropriate insight/judgment; A+O x 3, mood and affect appropriate, recent/remote memory intact. BRIEF HOSPITAL COURSE-   67 y/o F PMHx HTN, DM2, Anemia, ETOH abuse, ETOH cirrhosis, Esophageal varices, Hepatic encephalopathy, Hypothyroidism presented with left arm and hand weakness and paresthesias. On blood work patient's glucose found to be over 1000. Patient seen and examined at the bedside  used. She is a poor historian states she takes her "pills", however, she was discharged from [facility] 1 month prior on insulin. When explained that insulin is an injection and not pills, she repeats "yes I take my pills". States she is eating and drinking. Denies ETOH use, drug use, or smoking.     EVENTS LAST 24 HOURS-   No acute overnight events. Patient seen and examined this AM. No acute complaints. Now off Insulin gtt. FSS on 30 U Lantus, started on Consistent Carb Diet. Repeat Na 134. Endocrine consulted for DM management given non-compliance at home. Stable for downgrade to Medicine.     REVIEW OF SYSTEMS-  CONSTITUTIONAL: No fever, chills, or fatigue.  EYES: No eye pain, visual disturbances, or discharge.  ENMT:  No difficulty hearing, tinnitus, vertigo; No sinus or throat pain.  NECK: No pain or stiffness.  RESPIRATORY: No cough, wheezing, chills or hemoptysis; No shortness of breath.  CARDIOVASCULAR: No chest pain, palpitations, dizziness, or leg swelling.  GASTROINTESTINAL: No abdominal or epigastric pain. No nausea, vomiting, or hematemesis; No diarrhea or constipation. No melena or hematochezia.  GENITOURINARY: No dysuria, frequency, hematuria, or incontinence.  NEUROLOGICAL: No headaches, memory loss, + loss of strength, numbness.  SKIN: No itching, burning, rashes, or lesions.  MUSCULOSKELETAL: No joint pain or swelling; No muscle, back, or extremity pain.  PSYCHIATRIC: No depression, anxiety, mood swings, or difficulty sleeping.    PAST MEDICAL & SURGICAL HISTORY-  Liver cirrhosis  ETOH abuse  Urea cycle metabolism disorder  Transitory  hyperthyroidism  Lump in female breast  UTI (urinary tract infection)  Lymphangitis, acute, lower leg  Anemia  Hypothyroidism  Chronic back pain  HTN (hypertension)  GERD (gastroesophageal reflux disease)  Pancreatitis  HTN (hypertension)  DM (diabetes mellitus)      S/P abdominoplasty    MEDICATIONS  (STANDING)-  chlorhexidine 2% Cloths 1 Application(s) Topical <User Schedule>  dextrose 5%. 1000 milliLiter(s) (50 mL/Hr) IV Continuous <Continuous>  dextrose 5%. 1000 milliLiter(s) (100 mL/Hr) IV Continuous <Continuous>  dextrose 50% Injectable 25 Gram(s) IV Push once  dextrose 50% Injectable 12.5 Gram(s) IV Push once  dextrose 50% Injectable 25 Gram(s) IV Push once  enoxaparin Injectable 40 milliGRAM(s) SubCutaneous every 24 hours  ferrous    sulfate 325 milliGRAM(s) Oral daily  folic acid 1 milliGRAM(s) Oral daily  glucagon  Injectable 1 milliGRAM(s) IntraMuscular once  glucagon  Injectable 1 milliGRAM(s) IntraMuscular once  insulin glargine Injectable (LANTUS) 30 Unit(s) SubCutaneous at bedtime  insulin lispro (ADMELOG) corrective regimen sliding scale   SubCutaneous three times a day before meals  lactulose Syrup 15 Gram(s) Oral every 8 hours  levothyroxine 75 MICROGram(s) Oral daily  multivitamin 1 Tablet(s) Oral daily  pantoprazole    Tablet 40 milliGRAM(s) Oral before breakfast  propranolol 10 milliGRAM(s) Oral two times a day  rifAXIMin 550 milliGRAM(s) Oral two times a day  spironolactone 50 milliGRAM(s) Oral daily  thiamine 100 milliGRAM(s) Oral daily    MEDICATIONS  (PRN)-  dextrose Oral Gel 15 Gram(s) Oral once PRN Blood Glucose LESS THAN 70 milliGRAM(s)/deciliter    ALLERGIES-  No Known Allergies    Intolerances      T(C): 36.8 (22 @ 12:02), Max: 37 (22 @ 16:44)  HR: 78 (22 @ 11:00) (62 - 94)  BP: 116/82 (22 @ 11:00) (95/61 - 156/79)  RR: 20 (22 @ 11:00) (12 - 31)  SpO2: 99% (22 @ 11:00) (96% - 100%)    PHYSICAL EXAM-  CONSTITUTIONAL: Well groomed, no apparent distress.  EYES: PERRLA and symmetric, EOMI, No conjunctival or scleral injection, non-icteric.  ENMT: Oral mucosa with moist membranes. No external nasal lesions; nasal mucosa not inflamed; normal dentition; no pharyngeal injection or exudates. 	  NECK: Supple, symmetric and without tracheal deviation; thyroid gland not enlarged and without palpable masses.  RESPIRATORY: No respiratory distress, no use of accessory muscles; CTA b/l, no wheezes, rales or rhonchi.   CARDIOVASCULAR: RRRR, +S1S2, no murmurs, no rubs, no gallops; no JVD; no peripheral edema.  GASTROINTESTINAL: Soft, non tender, non distended, no rebound, no guarding; No palpable masses; no hepatosplenomegaly; no hernia palpated.   MUSCULOSKELETAL: Moving all extremities, strength 5/5 all extremities.   SKIN: No rashes or ulcers noted; no subcutaneous nodules or induration palpable  NEUROLOGIC: CN intact, no focal deficits.   PSYCHIATRIC: Appropriate insight/judgment; A+O x 3, mood and affect appropriate, recent/remote memory intact.    LABS-                        11.2   5.13  )-----------( 99       ( 2022 04:15 )             33.3   07-26    134<L>  |  103  |  7.1<L>  ----------------------------<  74  4.3   |  19.0<L>  |  0.56    Ca    8.8      2022 04:15  Phos  4.3     -  Mg     2.3     -    TPro  6.2<L>  /  Alb  2.6<L>  /  TBili  0.5  /  DBili  x   /  AST  85<H>  /  ALT  33<H>  /  AlkPhos  234<H>  -    LIVER FUNCTIONS - ( 2022 04:15 )  Alb: 2.6 g/dL / Pro: 6.2 g/dL / ALK PHOS: 234 U/L / ALT: 33 U/L / AST: 85 U/L / GGT: x           CAPILLARY BLOOD GLUCOSE  POCT Blood Glucose.: 290 mg/dL (2022 11:11)  POCT Blood Glucose.: 76 mg/dL (2022 04:13)  POCT Blood Glucose.: 77 mg/dL (2022 04:12)  POCT Blood Glucose.: 111 mg/dL (2022 03:13)  POCT Blood Glucose.: 111 mg/dL (2022 03:10)  POCT Blood Glucose.: 182 mg/dL (2022 02:07)  POCT Blood Glucose.: 235 mg/dL (2022 01:23)  POCT Blood Glucose.: 192 mg/dL (2022 00:13)  POCT Blood Glucose.: 198 mg/dL (2022 21:55)  POCT Blood Glucose.: 224 mg/dL (2022 21:14)  POCT Blood Glucose.: 288 mg/dL (2022 20:00)  POCT Blood Glucose.: 285 mg/dL (2022 19:18)    RADIOLOGY-   < from: CT Head No Cont (22 @ 00:46) >  ACC: [id number] EXAM:  CT BRAIN                          PROCEDURE DATE:  2022      INTERPRETATION:  CT HEAD WITHOUT CONTRAST    INDICATION: 66 years old. Female. Altered mental status.    COMPARISON: 2022.    TECHNIQUE: Noncontrast axial CT head was obtained from the skull base to   vertex.    FINDINGS:  No acute intracranial hemorrhage, mass effect or midline shift.  No CT evidence of acute large vascular territory infarct.  The ventricles and cortical sulci are prominent reflecting parenchymal   volume loss.  Patchy hypodensities in the periventricular white matter are nonspecific,   but likely sequela of small vessel ischemic disease.    The visualized paranasal sinuses and mastoid air cells are well aerated.  No displaced calvarial fracture.    IMPRESSION:  No acute intracranial hemorrhage or mass effect.    --- End of Report ---      < end of copied text >

## 2022-07-26 NOTE — CONSULT NOTE ADULT - NS ATTEST RISK PROBLEM GEN_ALL_CORE FT
pt remains medically acute with hyperglycemia poor understanding of diabetes and complications associated with it. Continues to be aggressively managed for hyperglcyemia.

## 2022-07-26 NOTE — CONSULT NOTE ADULT - ASSESSMENT
severely uncontrolled insulin requiring diabetes, with outpatient adherence problematic. Will follow.

## 2022-07-27 LAB
ALBUMIN SERPL ELPH-MCNC: 2.8 G/DL — LOW (ref 3.3–5.2)
ALP SERPL-CCNC: 301 U/L — HIGH (ref 40–120)
ALT FLD-CCNC: 40 U/L — HIGH
ANION GAP SERPL CALC-SCNC: 10 MMOL/L — SIGNIFICANT CHANGE UP (ref 5–17)
AST SERPL-CCNC: 83 U/L — HIGH
BASOPHILS # BLD AUTO: 0.02 K/UL — SIGNIFICANT CHANGE UP (ref 0–0.2)
BASOPHILS NFR BLD AUTO: 0.5 % — SIGNIFICANT CHANGE UP (ref 0–2)
BILIRUB SERPL-MCNC: 0.6 MG/DL — SIGNIFICANT CHANGE UP (ref 0.4–2)
BUN SERPL-MCNC: 12.9 MG/DL — SIGNIFICANT CHANGE UP (ref 8–20)
CALCIUM SERPL-MCNC: 8.8 MG/DL — SIGNIFICANT CHANGE UP (ref 8.4–10.5)
CHLORIDE SERPL-SCNC: 99 MMOL/L — SIGNIFICANT CHANGE UP (ref 98–107)
CO2 SERPL-SCNC: 21 MMOL/L — LOW (ref 22–29)
CREAT SERPL-MCNC: 0.8 MG/DL — SIGNIFICANT CHANGE UP (ref 0.5–1.3)
EGFR: 82 ML/MIN/1.73M2 — SIGNIFICANT CHANGE UP
EOSINOPHIL # BLD AUTO: 0.07 K/UL — SIGNIFICANT CHANGE UP (ref 0–0.5)
EOSINOPHIL NFR BLD AUTO: 1.6 % — SIGNIFICANT CHANGE UP (ref 0–6)
GLUCOSE BLDC GLUCOMTR-MCNC: 193 MG/DL — HIGH (ref 70–99)
GLUCOSE BLDC GLUCOMTR-MCNC: 251 MG/DL — HIGH (ref 70–99)
GLUCOSE BLDC GLUCOMTR-MCNC: 276 MG/DL — HIGH (ref 70–99)
GLUCOSE BLDC GLUCOMTR-MCNC: 304 MG/DL — HIGH (ref 70–99)
GLUCOSE SERPL-MCNC: 345 MG/DL — HIGH (ref 70–99)
HCT VFR BLD CALC: 29.2 % — LOW (ref 34.5–45)
HGB BLD-MCNC: 10.1 G/DL — LOW (ref 11.5–15.5)
IMM GRANULOCYTES NFR BLD AUTO: 0.5 % — SIGNIFICANT CHANGE UP (ref 0–1.5)
LYMPHOCYTES # BLD AUTO: 1.58 K/UL — SIGNIFICANT CHANGE UP (ref 1–3.3)
LYMPHOCYTES # BLD AUTO: 36.3 % — SIGNIFICANT CHANGE UP (ref 13–44)
MAGNESIUM SERPL-MCNC: 1.9 MG/DL — SIGNIFICANT CHANGE UP (ref 1.6–2.6)
MCHC RBC-ENTMCNC: 33.4 PG — SIGNIFICANT CHANGE UP (ref 27–34)
MCHC RBC-ENTMCNC: 34.6 GM/DL — SIGNIFICANT CHANGE UP (ref 32–36)
MCV RBC AUTO: 96.7 FL — SIGNIFICANT CHANGE UP (ref 80–100)
MONOCYTES # BLD AUTO: 0.6 K/UL — SIGNIFICANT CHANGE UP (ref 0–0.9)
MONOCYTES NFR BLD AUTO: 13.8 % — SIGNIFICANT CHANGE UP (ref 2–14)
NEUTROPHILS # BLD AUTO: 2.06 K/UL — SIGNIFICANT CHANGE UP (ref 1.8–7.4)
NEUTROPHILS NFR BLD AUTO: 47.3 % — SIGNIFICANT CHANGE UP (ref 43–77)
PHOSPHATE SERPL-MCNC: 3.4 MG/DL — SIGNIFICANT CHANGE UP (ref 2.4–4.7)
PLATELET # BLD AUTO: 99 K/UL — LOW (ref 150–400)
POTASSIUM SERPL-MCNC: 4.1 MMOL/L — SIGNIFICANT CHANGE UP (ref 3.5–5.3)
POTASSIUM SERPL-SCNC: 4.1 MMOL/L — SIGNIFICANT CHANGE UP (ref 3.5–5.3)
PROT SERPL-MCNC: 6.2 G/DL — LOW (ref 6.6–8.7)
RBC # BLD: 3.02 M/UL — LOW (ref 3.8–5.2)
RBC # FLD: 13 % — SIGNIFICANT CHANGE UP (ref 10.3–14.5)
SODIUM SERPL-SCNC: 130 MMOL/L — LOW (ref 135–145)
WBC # BLD: 4.35 K/UL — SIGNIFICANT CHANGE UP (ref 3.8–10.5)
WBC # FLD AUTO: 4.35 K/UL — SIGNIFICANT CHANGE UP (ref 3.8–10.5)

## 2022-07-27 PROCEDURE — 99233 SBSQ HOSP IP/OBS HIGH 50: CPT

## 2022-07-27 RX ADMIN — Medication 75 MICROGRAM(S): at 05:58

## 2022-07-27 RX ADMIN — Medication 6: at 12:39

## 2022-07-27 RX ADMIN — CHLORHEXIDINE GLUCONATE 1 APPLICATION(S): 213 SOLUTION TOPICAL at 05:58

## 2022-07-27 RX ADMIN — Medication 100 MILLIGRAM(S): at 12:40

## 2022-07-27 RX ADMIN — Medication 12 UNIT(S): at 17:25

## 2022-07-27 RX ADMIN — Medication 8: at 08:45

## 2022-07-27 RX ADMIN — ENOXAPARIN SODIUM 40 MILLIGRAM(S): 100 INJECTION SUBCUTANEOUS at 05:58

## 2022-07-27 RX ADMIN — LACTULOSE 15 GRAM(S): 10 SOLUTION ORAL at 05:58

## 2022-07-27 RX ADMIN — LACTULOSE 15 GRAM(S): 10 SOLUTION ORAL at 12:40

## 2022-07-27 RX ADMIN — Medication 325 MILLIGRAM(S): at 12:40

## 2022-07-27 RX ADMIN — Medication 2: at 17:25

## 2022-07-27 RX ADMIN — LACTULOSE 15 GRAM(S): 10 SOLUTION ORAL at 22:33

## 2022-07-27 RX ADMIN — Medication 1 TABLET(S): at 12:40

## 2022-07-27 RX ADMIN — Medication 1 MILLIGRAM(S): at 12:40

## 2022-07-27 RX ADMIN — INSULIN GLARGINE 40 UNIT(S): 100 INJECTION, SOLUTION SUBCUTANEOUS at 22:37

## 2022-07-27 RX ADMIN — Medication 12 UNIT(S): at 12:40

## 2022-07-27 RX ADMIN — Medication 12 UNIT(S): at 08:45

## 2022-07-27 RX ADMIN — PANTOPRAZOLE SODIUM 40 MILLIGRAM(S): 20 TABLET, DELAYED RELEASE ORAL at 05:59

## 2022-07-27 NOTE — DIETITIAN NUTRITION RISK NOTIFICATION - ADDITIONAL COMMENTS/DIETITIAN RECOMMENDATIONS
1) Add Glucerna BID   2) Encourage HBV protein sources   3) Continue MVI daily   4) RD to remain available for additional nutrition education   5) Monitor weights daily for trend/accuracy

## 2022-07-27 NOTE — DIETITIAN INITIAL EVALUATION ADULT - OTHER INFO
65 yo f pmhx HTN, DM2, Anemia, ETOH abuse, ETOH cirrhosis, esophageal varices, hepatic encephalopathy, hypothyroidism presented with left arm and hand weakness and paresthesias. On blood work patient's glucose found to be over 1000.  Patient seen and examined the bedside  used. She is a poor historian states she takes her "pills". When it was explained that she was her about 1 month ago and discharged on insulin and I asked her insulin is not pills its an injection she states "yes I take my pills". States she is eating and drinking.

## 2022-07-27 NOTE — PROGRESS NOTE ADULT - SUBJECTIVE AND OBJECTIVE BOX
Chief complaint: HHS    Patient seen and examined at bedside. No acute overnight events reported. No fever, chills, cough, chest pain, nausea or vomiting.     Vital Signs Last 24 Hrs  T(F): 97.7 (27 Jul 2022 04:00), Max: 98.3 (26 Jul 2022 12:02)  HR: 73 (27 Jul 2022 04:00) (73 - 85)  BP: 119/65 (27 Jul 2022 04:00) (94/57 - 129/81)  RR: 18 (27 Jul 2022 04:00) (13 - 21)  SpO2: 97% (27 Jul 2022 04:00) (97% - 100%)    Physical Exam:  Constitutional: alert and oriented, in no acute distress   Neck: Soft and supple  Respiratory: Clear to auscultation bilaterally  Cardiovascular: Regular rate and rhyhtm  Gastrointestinal: Soft, non-tender to palpation, +bs  Vascular: 2+ peripheral pulses  Musculoskeletal: 5/5 strength b/l upper and lower extremities, no lower extremity edema bilaterally    Labs:                        10.1   4.35  )-----------( 99       ( 27 Jul 2022 04:40 )             29.2   07-27    130<L>  |  99  |  12.9  ----------------------------<  345<H>  4.1   |  21.0<L>  |  0.80    Ca    8.8      27 Jul 2022 04:40  Phos  3.4     07-27  Mg     1.9     07-27    TPro  6.2<L>  /  Alb  2.8<L>  /  TBili  0.6  /  DBili  x   /  AST  83<H>  /  ALT  40<H>  /  AlkPhos  301<H>  07-27

## 2022-07-27 NOTE — DIETITIAN INITIAL EVALUATION ADULT - ETIOLOGY
related to inability to meet sufficient protein-energy needs in setting of uncontrolled BG, small portions related to appropriate dietary/lifestlye recommendations to manage T2DM

## 2022-07-27 NOTE — DIETITIAN INITIAL EVALUATION ADULT - ORAL INTAKE PTA/DIET HISTORY
Interview conducted with  ID # [id number]. Pt reports that her sister assists with meals and that she eats small portions at home, eating well in house. Per Pt weight x1 yr ago 135lb, confirms current weight 104lbs, consistent with NFPE. Explained that weight loss is likely in part 2/2 uncontrolled BG (Hgb A1c 16.4%). Pt appears overwhelmed at this time with brief nutrition education provided, RD to follow up for comprehension.

## 2022-07-27 NOTE — PROGRESS NOTE ADULT - SUBJECTIVE AND OBJECTIVE BOX
INTERVAL HPI/OVERNIGHT EVENTS: follow up of diabetes    MEDICATIONS  (STANDING):  chlorhexidine 2% Cloths 1 Application(s) Topical <User Schedule>  dextrose 5%. 1000 milliLiter(s) (50 mL/Hr) IV Continuous <Continuous>  dextrose 5%. 1000 milliLiter(s) (100 mL/Hr) IV Continuous <Continuous>  dextrose 50% Injectable 25 Gram(s) IV Push once  dextrose 50% Injectable 12.5 Gram(s) IV Push once  dextrose 50% Injectable 25 Gram(s) IV Push once  enoxaparin Injectable 40 milliGRAM(s) SubCutaneous every 24 hours  ferrous    sulfate 325 milliGRAM(s) Oral daily  folic acid 1 milliGRAM(s) Oral daily  glucagon  Injectable 1 milliGRAM(s) IntraMuscular once  glucagon  Injectable 1 milliGRAM(s) IntraMuscular once  insulin glargine Injectable (LANTUS) 40 Unit(s) SubCutaneous at bedtime  insulin lispro (ADMELOG) corrective regimen sliding scale   SubCutaneous three times a day before meals  insulin lispro Injectable (ADMELOG) 12 Unit(s) SubCutaneous three times a day before meals  lactulose Syrup 15 Gram(s) Oral every 8 hours  levothyroxine 75 MICROGram(s) Oral daily  multivitamin 1 Tablet(s) Oral daily  pantoprazole    Tablet 40 milliGRAM(s) Oral before breakfast  propranolol 10 milliGRAM(s) Oral two times a day  rifAXIMin 550 milliGRAM(s) Oral two times a day  thiamine 100 milliGRAM(s) Oral daily    MEDICATIONS  (PRN):  dextrose Oral Gel 15 Gram(s) Oral once PRN Blood Glucose LESS THAN 70 milliGRAM(s)/deciliter      Allergies    No Known Allergies    Intolerances        Review of systems: no new complaints    Vital Signs Last 24 Hrs  T(C): 36.5 (27 Jul 2022 04:00), Max: 36.7 (26 Jul 2022 18:31)  T(F): 97.7 (27 Jul 2022 04:00), Max: 98.1 (26 Jul 2022 18:31)  HR: 73 (27 Jul 2022 04:00) (73 - 85)  BP: 119/65 (27 Jul 2022 04:00) (94/57 - 129/81)  BP(mean): 97 (26 Jul 2022 17:00) (69 - 97)  RR: 18 (27 Jul 2022 04:00) (13 - 20)  SpO2: 97% (27 Jul 2022 04:00) (97% - 100%)    Parameters below as of 27 Jul 2022 04:00  Patient On (Oxygen Delivery Method): room air          LABS:                        10.1   4.35  )-----------( 99       ( 27 Jul 2022 04:40 )             29.2     07-27    130<L>  |  99  |  12.9  ----------------------------<  345<H>  4.1   |  21.0<L>  |  0.80    Ca    8.8      27 Jul 2022 04:40  Phos  3.4     07-27  Mg     1.9     07-27    TPro  6.2<L>  /  Alb  2.8<L>  /  TBili  0.6  /  DBili  x   /  AST  83<H>  /  ALT  40<H>  /  AlkPhos  301<H>  07-27          POCT Blood Glucose.: 276 mg/dL (07-27-22 @ 12:38)  POCT Blood Glucose.: 304 mg/dL (07-27-22 @ 08:17)  POCT Blood Glucose.: 434 mg/dL (07-26-22 @ 22:29)  POCT Blood Glucose.: 430 mg/dL (07-26-22 @ 16:54)  POCT Blood Glucose.: 290 mg/dL (07-26-22 @ 11:11)  POCT Blood Glucose.: 76 mg/dL (07-26-22 @ 04:13)  POCT Blood Glucose.: 77 mg/dL (07-26-22 @ 04:12)  POCT Blood Glucose.: 111 mg/dL (07-26-22 @ 03:13)  POCT Blood Glucose.: 111 mg/dL (07-26-22 @ 03:10)  POCT Blood Glucose.: 182 mg/dL (07-26-22 @ 02:07)  POCT Blood Glucose.: 235 mg/dL (07-26-22 @ 01:23)  POCT Blood Glucose.: 192 mg/dL (07-26-22 @ 00:13)  POCT Blood Glucose.: 198 mg/dL (07-25-22 @ 21:55)  POCT Blood Glucose.: 224 mg/dL (07-25-22 @ 21:14)  POCT Blood Glucose.: 288 mg/dL (07-25-22 @ 20:00)  POCT Blood Glucose.: 285 mg/dL (07-25-22 @ 19:18)  POCT Blood Glucose.: 287 mg/dL (07-25-22 @ 10:04)  POCT Blood Glucose.: 297 mg/dL (07-25-22 @ 09:07)  POCT Blood Glucose.: 325 mg/dL (07-25-22 @ 08:01)  POCT Blood Glucose.: 362 mg/dL (07-25-22 @ 06:49)  POCT Blood Glucose.: 362 mg/dL (07-25-22 @ 06:05)  POCT Blood Glucose.: 425 mg/dL (07-25-22 @ 05:11)  POCT Blood Glucose.: 485 mg/dL (07-25-22 @ 04:04)  POCT Blood Glucose.: >530 mg/dL (07-25-22 @ 03:08)  POCT Blood Glucose.: >530 mg/dL (07-25-22 @ 03:07)  POCT Blood Glucose.: >530 mg/dL (07-25-22 @ 01:40)  POCT Blood Glucose.: >530 mg/dL (07-25-22 @ 00:10)

## 2022-07-27 NOTE — DIETITIAN INITIAL EVALUATION ADULT - PERTINENT MEDS FT
MEDICATIONS  (STANDING):  chlorhexidine 2% Cloths 1 Application(s) Topical <User Schedule>  dextrose 5%. 1000 milliLiter(s) (50 mL/Hr) IV Continuous <Continuous>  dextrose 5%. 1000 milliLiter(s) (100 mL/Hr) IV Continuous <Continuous>  dextrose 50% Injectable 25 Gram(s) IV Push once  dextrose 50% Injectable 12.5 Gram(s) IV Push once  dextrose 50% Injectable 25 Gram(s) IV Push once  enoxaparin Injectable 40 milliGRAM(s) SubCutaneous every 24 hours  ferrous    sulfate 325 milliGRAM(s) Oral daily  folic acid 1 milliGRAM(s) Oral daily  glucagon  Injectable 1 milliGRAM(s) IntraMuscular once  glucagon  Injectable 1 milliGRAM(s) IntraMuscular once  insulin glargine Injectable (LANTUS) 40 Unit(s) SubCutaneous at bedtime  insulin lispro (ADMELOG) corrective regimen sliding scale   SubCutaneous three times a day before meals  insulin lispro Injectable (ADMELOG) 12 Unit(s) SubCutaneous three times a day before meals  lactulose Syrup 15 Gram(s) Oral every 8 hours  levothyroxine 75 MICROGram(s) Oral daily  multivitamin 1 Tablet(s) Oral daily  pantoprazole    Tablet 40 milliGRAM(s) Oral before breakfast  propranolol 10 milliGRAM(s) Oral two times a day  rifAXIMin 550 milliGRAM(s) Oral two times a day  thiamine 100 milliGRAM(s) Oral daily    MEDICATIONS  (PRN):  dextrose Oral Gel 15 Gram(s) Oral once PRN Blood Glucose LESS THAN 70 milliGRAM(s)/deciliter

## 2022-07-27 NOTE — DIETITIAN INITIAL EVALUATION ADULT - PERTINENT LABORATORY DATA
07-27    130<L>  |  99  |  12.9  ----------------------------<  345<H>  4.1   |  21.0<L>  |  0.80    Ca    8.8      27 Jul 2022 04:40  Phos  3.4     07-27  Mg     1.9     07-27    TPro  6.2<L>  /  Alb  2.8<L>  /  TBili  0.6  /  DBili  x   /  AST  83<H>  /  ALT  40<H>  /  AlkPhos  301<H>  07-27  POCT Blood Glucose.: 276 mg/dL (07-27-22 @ 12:38)  A1C with Estimated Average Glucose Result: 16.4 % (07-24-22 @ 22:50)  A1C with Estimated Average Glucose Result: 13.7 % (05-31-22 @ 22:03)  A1C with Estimated Average Glucose Result: 9.9 % (02-27-22 @ 06:13)

## 2022-07-27 NOTE — DIETITIAN INITIAL EVALUATION ADULT - NSFNSGIIOFT_GEN_A_CORE
07-27-22 @ 07:01  -  07-27-22 @ 15:34  --------------------------------------------------------  OUT:    Stool (mL): 1 mL  Total OUT: 1 mL    Total NET: -1 mL

## 2022-07-28 LAB
ANION GAP SERPL CALC-SCNC: 12 MMOL/L — SIGNIFICANT CHANGE UP (ref 5–17)
BASOPHILS # BLD AUTO: 0.02 K/UL — SIGNIFICANT CHANGE UP (ref 0–0.2)
BASOPHILS NFR BLD AUTO: 0.5 % — SIGNIFICANT CHANGE UP (ref 0–2)
BUN SERPL-MCNC: 12.4 MG/DL — SIGNIFICANT CHANGE UP (ref 8–20)
CALCIUM SERPL-MCNC: 8.7 MG/DL — SIGNIFICANT CHANGE UP (ref 8.4–10.5)
CHLORIDE SERPL-SCNC: 99 MMOL/L — SIGNIFICANT CHANGE UP (ref 98–107)
CO2 SERPL-SCNC: 19 MMOL/L — LOW (ref 22–29)
CREAT SERPL-MCNC: 0.93 MG/DL — SIGNIFICANT CHANGE UP (ref 0.5–1.3)
EGFR: 69 ML/MIN/1.73M2 — SIGNIFICANT CHANGE UP
EOSINOPHIL # BLD AUTO: 0.09 K/UL — SIGNIFICANT CHANGE UP (ref 0–0.5)
EOSINOPHIL NFR BLD AUTO: 2.3 % — SIGNIFICANT CHANGE UP (ref 0–6)
GLUCOSE BLDC GLUCOMTR-MCNC: 134 MG/DL — HIGH (ref 70–99)
GLUCOSE BLDC GLUCOMTR-MCNC: 207 MG/DL — HIGH (ref 70–99)
GLUCOSE BLDC GLUCOMTR-MCNC: 270 MG/DL — HIGH (ref 70–99)
GLUCOSE BLDC GLUCOMTR-MCNC: >530 MG/DL — CRITICAL HIGH (ref 70–99)
GLUCOSE SERPL-MCNC: 519 MG/DL — CRITICAL HIGH (ref 70–99)
HCT VFR BLD CALC: 29.9 % — LOW (ref 34.5–45)
HGB BLD-MCNC: 10.4 G/DL — LOW (ref 11.5–15.5)
IMM GRANULOCYTES NFR BLD AUTO: 0.3 % — SIGNIFICANT CHANGE UP (ref 0–1.5)
LYMPHOCYTES # BLD AUTO: 1.53 K/UL — SIGNIFICANT CHANGE UP (ref 1–3.3)
LYMPHOCYTES # BLD AUTO: 39 % — SIGNIFICANT CHANGE UP (ref 13–44)
MCHC RBC-ENTMCNC: 34.1 PG — HIGH (ref 27–34)
MCHC RBC-ENTMCNC: 34.8 GM/DL — SIGNIFICANT CHANGE UP (ref 32–36)
MCV RBC AUTO: 98 FL — SIGNIFICANT CHANGE UP (ref 80–100)
MONOCYTES # BLD AUTO: 0.62 K/UL — SIGNIFICANT CHANGE UP (ref 0–0.9)
MONOCYTES NFR BLD AUTO: 15.8 % — HIGH (ref 2–14)
NEUTROPHILS # BLD AUTO: 1.65 K/UL — LOW (ref 1.8–7.4)
NEUTROPHILS NFR BLD AUTO: 42.1 % — LOW (ref 43–77)
PLATELET # BLD AUTO: 100 K/UL — LOW (ref 150–400)
POTASSIUM SERPL-MCNC: 4.2 MMOL/L — SIGNIFICANT CHANGE UP (ref 3.5–5.3)
POTASSIUM SERPL-SCNC: 4.2 MMOL/L — SIGNIFICANT CHANGE UP (ref 3.5–5.3)
RBC # BLD: 3.05 M/UL — LOW (ref 3.8–5.2)
RBC # FLD: 13.2 % — SIGNIFICANT CHANGE UP (ref 10.3–14.5)
SODIUM SERPL-SCNC: 130 MMOL/L — LOW (ref 135–145)
WBC # BLD: 3.92 K/UL — SIGNIFICANT CHANGE UP (ref 3.8–10.5)
WBC # FLD AUTO: 3.92 K/UL — SIGNIFICANT CHANGE UP (ref 3.8–10.5)

## 2022-07-28 PROCEDURE — 99232 SBSQ HOSP IP/OBS MODERATE 35: CPT

## 2022-07-28 RX ORDER — FUROSEMIDE 40 MG
40 TABLET ORAL DAILY
Refills: 0 | Status: DISCONTINUED | OUTPATIENT
Start: 2022-07-28 | End: 2022-08-01

## 2022-07-28 RX ORDER — INSULIN LISPRO 100/ML
VIAL (ML) SUBCUTANEOUS AT BEDTIME
Refills: 0 | Status: DISCONTINUED | OUTPATIENT
Start: 2022-07-28 | End: 2022-08-01

## 2022-07-28 RX ORDER — SPIRONOLACTONE 25 MG/1
50 TABLET, FILM COATED ORAL DAILY
Refills: 0 | Status: DISCONTINUED | OUTPATIENT
Start: 2022-07-28 | End: 2022-08-01

## 2022-07-28 RX ORDER — INSULIN LISPRO 100/ML
VIAL (ML) SUBCUTANEOUS
Refills: 0 | Status: DISCONTINUED | OUTPATIENT
Start: 2022-07-28 | End: 2022-08-01

## 2022-07-28 RX ORDER — INSULIN GLARGINE 100 [IU]/ML
46 INJECTION, SOLUTION SUBCUTANEOUS AT BEDTIME
Refills: 0 | Status: DISCONTINUED | OUTPATIENT
Start: 2022-07-28 | End: 2022-07-30

## 2022-07-28 RX ORDER — INSULIN LISPRO 100/ML
14 VIAL (ML) SUBCUTANEOUS
Refills: 0 | Status: DISCONTINUED | OUTPATIENT
Start: 2022-07-28 | End: 2022-07-30

## 2022-07-28 RX ADMIN — LACTULOSE 15 GRAM(S): 10 SOLUTION ORAL at 06:09

## 2022-07-28 RX ADMIN — Medication 12 UNIT(S): at 12:10

## 2022-07-28 RX ADMIN — Medication 14 UNIT(S): at 17:25

## 2022-07-28 RX ADMIN — ENOXAPARIN SODIUM 40 MILLIGRAM(S): 100 INJECTION SUBCUTANEOUS at 06:07

## 2022-07-28 RX ADMIN — Medication 1 MILLIGRAM(S): at 12:16

## 2022-07-28 RX ADMIN — Medication 100 MILLIGRAM(S): at 12:15

## 2022-07-28 RX ADMIN — LACTULOSE 15 GRAM(S): 10 SOLUTION ORAL at 21:22

## 2022-07-28 RX ADMIN — INSULIN GLARGINE 46 UNIT(S): 100 INJECTION, SOLUTION SUBCUTANEOUS at 21:21

## 2022-07-28 RX ADMIN — Medication 4: at 12:10

## 2022-07-28 RX ADMIN — Medication 325 MILLIGRAM(S): at 12:16

## 2022-07-28 RX ADMIN — Medication 2: at 21:25

## 2022-07-28 RX ADMIN — CHLORHEXIDINE GLUCONATE 1 APPLICATION(S): 213 SOLUTION TOPICAL at 06:08

## 2022-07-28 RX ADMIN — LACTULOSE 15 GRAM(S): 10 SOLUTION ORAL at 12:15

## 2022-07-28 RX ADMIN — Medication 75 MICROGRAM(S): at 06:07

## 2022-07-28 RX ADMIN — Medication 1 TABLET(S): at 12:16

## 2022-07-28 RX ADMIN — Medication 12: at 08:06

## 2022-07-28 RX ADMIN — PANTOPRAZOLE SODIUM 40 MILLIGRAM(S): 20 TABLET, DELAYED RELEASE ORAL at 06:08

## 2022-07-28 RX ADMIN — Medication 12 UNIT(S): at 08:07

## 2022-07-28 NOTE — PROGRESS NOTE ADULT - SUBJECTIVE AND OBJECTIVE BOX
INTERVAL HPI/OVERNIGHT EVENTS: follow up of diabetes    MEDICATIONS  (STANDING):  chlorhexidine 2% Cloths 1 Application(s) Topical <User Schedule>  dextrose 5%. 1000 milliLiter(s) (100 mL/Hr) IV Continuous <Continuous>  dextrose 5%. 1000 milliLiter(s) (50 mL/Hr) IV Continuous <Continuous>  dextrose 50% Injectable 25 Gram(s) IV Push once  dextrose 50% Injectable 12.5 Gram(s) IV Push once  dextrose 50% Injectable 25 Gram(s) IV Push once  enoxaparin Injectable 40 milliGRAM(s) SubCutaneous every 24 hours  ferrous    sulfate 325 milliGRAM(s) Oral daily  folic acid 1 milliGRAM(s) Oral daily  furosemide    Tablet 40 milliGRAM(s) Oral daily  glucagon  Injectable 1 milliGRAM(s) IntraMuscular once  glucagon  Injectable 1 milliGRAM(s) IntraMuscular once  insulin glargine Injectable (LANTUS) 40 Unit(s) SubCutaneous at bedtime  insulin lispro (ADMELOG) corrective regimen sliding scale   SubCutaneous three times a day before meals  insulin lispro (ADMELOG) corrective regimen sliding scale   SubCutaneous at bedtime  insulin lispro Injectable (ADMELOG) 12 Unit(s) SubCutaneous three times a day before meals  lactulose Syrup 15 Gram(s) Oral every 8 hours  levothyroxine 75 MICROGram(s) Oral daily  multivitamin 1 Tablet(s) Oral daily  pantoprazole    Tablet 40 milliGRAM(s) Oral before breakfast  propranolol 10 milliGRAM(s) Oral two times a day  rifAXIMin 550 milliGRAM(s) Oral two times a day  spironolactone 50 milliGRAM(s) Oral daily  thiamine 100 milliGRAM(s) Oral daily    MEDICATIONS  (PRN):  dextrose Oral Gel 15 Gram(s) Oral once PRN Blood Glucose LESS THAN 70 milliGRAM(s)/deciliter      Allergies    No Known Allergies    Intolerances    Vital Signs Last 24 Hrs  T(C): 37.2 (28 Jul 2022 11:00), Max: 37.2 (28 Jul 2022 11:00)  T(F): 98.9 (28 Jul 2022 11:00), Max: 98.9 (28 Jul 2022 11:00)  HR: 70 (28 Jul 2022 11:00) (68 - 76)  BP: 108/71 (28 Jul 2022 11:00) (104/61 - 109/66)  BP(mean): --  RR: 18 (28 Jul 2022 11:00) (18 - 18)  SpO2: 100% (28 Jul 2022 11:00) (98% - 100%)    Parameters below as of 28 Jul 2022 11:00  Patient On (Oxygen Delivery Method): room air            LABS:                        10.4   3.92  )-----------( 100      ( 28 Jul 2022 06:20 )             29.9     07-28    130<L>  |  99  |  12.4  ----------------------------<  519<HH>  4.2   |  19.0<L>  |  0.93    Ca    8.7      28 Jul 2022 06:20  Phos  3.4     07-27  Mg     1.9     07-27    TPro  6.2<L>  /  Alb  2.8<L>  /  TBili  0.6  /  DBili  x   /  AST  83<H>  /  ALT  40<H>  /  AlkPhos  301<H>  07-27          POCT Blood Glucose.: 207 mg/dL (07-28-22 @ 11:24)  POCT Blood Glucose.: >530 mg/dL (07-28-22 @ 07:52)  POCT Blood Glucose.: 251 mg/dL (07-27-22 @ 22:36)  POCT Blood Glucose.: 193 mg/dL (07-27-22 @ 16:48)  POCT Blood Glucose.: 276 mg/dL (07-27-22 @ 12:38)  POCT Blood Glucose.: 304 mg/dL (07-27-22 @ 08:17)  POCT Blood Glucose.: 434 mg/dL (07-26-22 @ 22:29)  POCT Blood Glucose.: 430 mg/dL (07-26-22 @ 16:54)  POCT Blood Glucose.: 290 mg/dL (07-26-22 @ 11:11)  POCT Blood Glucose.: 76 mg/dL (07-26-22 @ 04:13)  POCT Blood Glucose.: 77 mg/dL (07-26-22 @ 04:12)  POCT Blood Glucose.: 111 mg/dL (07-26-22 @ 03:13)  POCT Blood Glucose.: 111 mg/dL (07-26-22 @ 03:10)  POCT Blood Glucose.: 182 mg/dL (07-26-22 @ 02:07)  POCT Blood Glucose.: 235 mg/dL (07-26-22 @ 01:23)  POCT Blood Glucose.: 192 mg/dL (07-26-22 @ 00:13)  POCT Blood Glucose.: 198 mg/dL (07-25-22 @ 21:55)  POCT Blood Glucose.: 224 mg/dL (07-25-22 @ 21:14)  POCT Blood Glucose.: 288 mg/dL (07-25-22 @ 20:00)  POCT Blood Glucose.: 285 mg/dL (07-25-22 @ 19:18)

## 2022-07-28 NOTE — PROGRESS NOTE ADULT - SUBJECTIVE AND OBJECTIVE BOX
Chief complaint: HHS    Patient seen and examined at bedside. No acute overnight events reported. No fever, chills, cough, chest pain, nausea or vomiting.     Vital Signs Last 24 Hrs  T(F): 98.2 (28 Jul 2022 04:44), Max: 98.8 (27 Jul 2022 13:42)  HR: 68 (28 Jul 2022 04:44) (68 - 76)  BP: 104/61 (28 Jul 2022 04:44) (104/61 - 118/68)  RR: 18 (28 Jul 2022 04:44) (18 - 18)  SpO2: 98% (28 Jul 2022 04:44) (98% - 98%)    Physical Exam:  Constitutional: alert, in no acute distress   Neck: Soft and supple  Respiratory: Clear to auscultation bilaterally  Cardiovascular: Regular rate and rhyhtm  Gastrointestinal: Soft, non-tender to palpation, +bs  Musculoskeletal: no lower extremity edema bilaterally    Labs:                        10.4   3.92  )-----------( 100      ( 28 Jul 2022 06:20 )             29.9   07-28    130<L>  |  99  |  12.4  ----------------------------<  519<HH>  4.2   |  19.0<L>  |  0.93    Ca    8.7      28 Jul 2022 06:20  Phos  3.4     07-27  Mg     1.9     07-27    TPro  6.2<L>  /  Alb  2.8<L>  /  TBili  0.6  /  DBili  x   /  AST  83<H>  /  ALT  40<H>  /  AlkPhos  301<H>  07-27

## 2022-07-29 ENCOUNTER — TRANSCRIPTION ENCOUNTER (OUTPATIENT)
Age: 67
End: 2022-07-29

## 2022-07-29 LAB
ANION GAP SERPL CALC-SCNC: 12 MMOL/L — SIGNIFICANT CHANGE UP (ref 5–17)
BUN SERPL-MCNC: 13.6 MG/DL — SIGNIFICANT CHANGE UP (ref 8–20)
CALCIUM SERPL-MCNC: 8.9 MG/DL — SIGNIFICANT CHANGE UP (ref 8.4–10.5)
CHLORIDE SERPL-SCNC: 97 MMOL/L — LOW (ref 98–107)
CO2 SERPL-SCNC: 20 MMOL/L — LOW (ref 22–29)
CREAT SERPL-MCNC: 0.72 MG/DL — SIGNIFICANT CHANGE UP (ref 0.5–1.3)
EGFR: 93 ML/MIN/1.73M2 — SIGNIFICANT CHANGE UP
GLUCOSE BLDC GLUCOMTR-MCNC: 186 MG/DL — HIGH (ref 70–99)
GLUCOSE BLDC GLUCOMTR-MCNC: 249 MG/DL — HIGH (ref 70–99)
GLUCOSE BLDC GLUCOMTR-MCNC: 392 MG/DL — HIGH (ref 70–99)
GLUCOSE BLDC GLUCOMTR-MCNC: 404 MG/DL — HIGH (ref 70–99)
GLUCOSE BLDC GLUCOMTR-MCNC: 458 MG/DL — CRITICAL HIGH (ref 70–99)
GLUCOSE BLDC GLUCOMTR-MCNC: 476 MG/DL — CRITICAL HIGH (ref 70–99)
GLUCOSE SERPL-MCNC: 388 MG/DL — HIGH (ref 70–99)
HCT VFR BLD CALC: 30.9 % — LOW (ref 34.5–45)
HGB BLD-MCNC: 10.4 G/DL — LOW (ref 11.5–15.5)
MCHC RBC-ENTMCNC: 33 PG — SIGNIFICANT CHANGE UP (ref 27–34)
MCHC RBC-ENTMCNC: 33.7 GM/DL — SIGNIFICANT CHANGE UP (ref 32–36)
MCV RBC AUTO: 98.1 FL — SIGNIFICANT CHANGE UP (ref 80–100)
PLATELET # BLD AUTO: 105 K/UL — LOW (ref 150–400)
POTASSIUM SERPL-MCNC: 4.1 MMOL/L — SIGNIFICANT CHANGE UP (ref 3.5–5.3)
POTASSIUM SERPL-SCNC: 4.1 MMOL/L — SIGNIFICANT CHANGE UP (ref 3.5–5.3)
RBC # BLD: 3.15 M/UL — LOW (ref 3.8–5.2)
RBC # FLD: 13.5 % — SIGNIFICANT CHANGE UP (ref 10.3–14.5)
SODIUM SERPL-SCNC: 129 MMOL/L — LOW (ref 135–145)
WBC # BLD: 4.35 K/UL — SIGNIFICANT CHANGE UP (ref 3.8–10.5)
WBC # FLD AUTO: 4.35 K/UL — SIGNIFICANT CHANGE UP (ref 3.8–10.5)

## 2022-07-29 PROCEDURE — 99232 SBSQ HOSP IP/OBS MODERATE 35: CPT

## 2022-07-29 RX ORDER — INSULIN LISPRO 100/ML
4 VIAL (ML) SUBCUTANEOUS ONCE
Refills: 0 | Status: COMPLETED | OUTPATIENT
Start: 2022-07-29 | End: 2022-07-29

## 2022-07-29 RX ADMIN — LACTULOSE 15 GRAM(S): 10 SOLUTION ORAL at 21:50

## 2022-07-29 RX ADMIN — Medication 12: at 08:17

## 2022-07-29 RX ADMIN — PANTOPRAZOLE SODIUM 40 MILLIGRAM(S): 20 TABLET, DELAYED RELEASE ORAL at 05:26

## 2022-07-29 RX ADMIN — CHLORHEXIDINE GLUCONATE 1 APPLICATION(S): 213 SOLUTION TOPICAL at 05:59

## 2022-07-29 RX ADMIN — Medication 8: at 22:11

## 2022-07-29 RX ADMIN — Medication 1 TABLET(S): at 11:06

## 2022-07-29 RX ADMIN — LACTULOSE 15 GRAM(S): 10 SOLUTION ORAL at 13:20

## 2022-07-29 RX ADMIN — Medication 14 UNIT(S): at 11:07

## 2022-07-29 RX ADMIN — SPIRONOLACTONE 50 MILLIGRAM(S): 25 TABLET, FILM COATED ORAL at 05:26

## 2022-07-29 RX ADMIN — Medication 4 UNIT(S): at 23:59

## 2022-07-29 RX ADMIN — Medication 1 MILLIGRAM(S): at 11:06

## 2022-07-29 RX ADMIN — Medication 100 MILLIGRAM(S): at 11:06

## 2022-07-29 RX ADMIN — Medication 325 MILLIGRAM(S): at 11:06

## 2022-07-29 RX ADMIN — Medication 14 UNIT(S): at 08:17

## 2022-07-29 RX ADMIN — Medication 75 MICROGRAM(S): at 05:26

## 2022-07-29 RX ADMIN — ENOXAPARIN SODIUM 40 MILLIGRAM(S): 100 INJECTION SUBCUTANEOUS at 05:26

## 2022-07-29 RX ADMIN — Medication 14 UNIT(S): at 17:05

## 2022-07-29 RX ADMIN — Medication 40 MILLIGRAM(S): at 05:26

## 2022-07-29 RX ADMIN — LACTULOSE 15 GRAM(S): 10 SOLUTION ORAL at 05:27

## 2022-07-29 RX ADMIN — Medication 4: at 17:05

## 2022-07-29 RX ADMIN — Medication 2: at 11:06

## 2022-07-29 RX ADMIN — INSULIN GLARGINE 46 UNIT(S): 100 INJECTION, SOLUTION SUBCUTANEOUS at 22:11

## 2022-07-29 NOTE — DISCHARGE NOTE PROVIDER - PROVIDER TOKENS
PROVIDER:[TOKEN:[[id number]],FOLLOWUP:[1 week]],PROVIDER:[TOKEN:[[id number]],FOLLOWUP:[1 week]]

## 2022-07-29 NOTE — DISCHARGE NOTE PROVIDER - HOSPITAL COURSE
Pt is a 65 y/o F with PMH of HTN, alcohol abuse, alcoholic liver cirrhosis complicated by esophageal varices and hepatic encephalopathy, DM2,  neuropathy, Anemia, Pancreatitis, recently discharged in june 2022 for UTI/ HHS/ uncontrolled DM2, was advised to c/w insulin injectable on discharge, currently presented on 7/25 with AMS, paresthesias of the left hand and admitted to MICU with acute metabolic/hepatic encephalopathy 2/2 HHS/ severe dehydration with glucose>1000, pseudohyponatremia, was given IVF, insulin ggt with improvement of glucose. Transitioned to injectable insulin and started on a diet without any complications. Also with hepatic encephalopathy with hyperammonemia continued on lactulose/ rifaxmin and now with resolution of encephalopathy. Patient is medically stable to be transferred from the MICU to the hospitalist service.  A1c 16.4. Diabetic education done. s/p acute on chronic hepatic encephalopathy - now resolved. Continue Propanolol 10mg TID, Rifaximin, Protonix 40mg daily and Restart Aldactone 50mg daily and Lasix 40mg daily. To follow up with GI and hepatologist Dr. Craven on discharge       Pt is a 65 y/o F with PMH of HTN, alcohol abuse, alcoholic liver cirrhosis complicated by esophageal varices and hepatic encephalopathy, DM2,  neuropathy, Anemia, Pancreatitis, recently discharged in june 2022 for UTI/ HHS/ uncontrolled DM2, was advised to c/w insulin injectable on discharge, currently presented on 7/25 with AMS, paresthesias of the left hand and admitted to MICU with acute metabolic/hepatic encephalopathy 2/2 HHS/ severe dehydration with glucose>1000, pseudohyponatremia, was given IVF, insulin ggt with improvement of glucose. Transitioned to injectable insulin and started on a diet without any complications. Also with hepatic encephalopathy with hyperammonemia continued on lactulose/ rifaxmin and now with resolution of encephalopathy. Patient is medically stable to be transferred from the MICU to the hospitalist service.  A1c 16.4. Endocrinology copnsult called. Insulin doses adjusted. Diabetic education done. s/p acute on chronic hepatic encephalopathy - now resolved. Continue Propanolol 10mg TID, Rifaximin, Protonix 40mg daily and Restart Aldactone 50mg daily and Lasix 40mg daily. To follow up with GI and hepatologist Dr. Craven on discharge

## 2022-07-29 NOTE — PROGRESS NOTE ADULT - SUBJECTIVE AND OBJECTIVE BOX
INTERVAL HPI/OVERNIGHT EVENTS: follow up of diabetes    MEDICATIONS  (STANDING):  chlorhexidine 2% Cloths 1 Application(s) Topical <User Schedule>  dextrose 5%. 1000 milliLiter(s) (50 mL/Hr) IV Continuous <Continuous>  dextrose 5%. 1000 milliLiter(s) (100 mL/Hr) IV Continuous <Continuous>  dextrose 50% Injectable 25 Gram(s) IV Push once  dextrose 50% Injectable 12.5 Gram(s) IV Push once  dextrose 50% Injectable 25 Gram(s) IV Push once  enoxaparin Injectable 40 milliGRAM(s) SubCutaneous every 24 hours  ferrous    sulfate 325 milliGRAM(s) Oral daily  folic acid 1 milliGRAM(s) Oral daily  furosemide    Tablet 40 milliGRAM(s) Oral daily  glucagon  Injectable 1 milliGRAM(s) IntraMuscular once  glucagon  Injectable 1 milliGRAM(s) IntraMuscular once  insulin glargine Injectable (LANTUS) 46 Unit(s) SubCutaneous at bedtime  insulin lispro (ADMELOG) corrective regimen sliding scale   SubCutaneous three times a day before meals  insulin lispro (ADMELOG) corrective regimen sliding scale   SubCutaneous at bedtime  insulin lispro Injectable (ADMELOG) 14 Unit(s) SubCutaneous three times a day before meals  lactulose Syrup 15 Gram(s) Oral every 8 hours  levothyroxine 75 MICROGram(s) Oral daily  multivitamin 1 Tablet(s) Oral daily  pantoprazole    Tablet 40 milliGRAM(s) Oral before breakfast  propranolol 10 milliGRAM(s) Oral two times a day  rifAXIMin 550 milliGRAM(s) Oral two times a day  spironolactone 50 milliGRAM(s) Oral daily  thiamine 100 milliGRAM(s) Oral daily    MEDICATIONS  (PRN):  dextrose Oral Gel 15 Gram(s) Oral once PRN Blood Glucose LESS THAN 70 milliGRAM(s)/deciliter      Allergies    No Known Allergies    Intolerances      Vital Signs Last 24 Hrs  T(C): 36.7 (29 Jul 2022 11:58), Max: 36.9 (28 Jul 2022 17:43)  T(F): 98.1 (29 Jul 2022 11:58), Max: 98.4 (28 Jul 2022 17:43)  HR: 72 (29 Jul 2022 11:58) (72 - 76)  BP: 101/65 (29 Jul 2022 11:58) (101/65 - 106/66)  BP(mean): --  RR: 20 (29 Jul 2022 11:58) (18 - 20)  SpO2: 98% (29 Jul 2022 11:58) (95% - 98%)    Parameters below as of 29 Jul 2022 04:32  Patient On (Oxygen Delivery Method): room air            LABS:                        10.4   4.35  )-----------( 105      ( 29 Jul 2022 05:14 )             30.9     07-29    129<L>  |  97<L>  |  13.6  ----------------------------<  388<H>  4.1   |  20.0<L>  |  0.72    Ca    8.9      29 Jul 2022 05:14            POCT Blood Glucose.: 186 mg/dL (07-29-22 @ 11:04)  POCT Blood Glucose.: 404 mg/dL (07-29-22 @ 08:16)  POCT Blood Glucose.: 270 mg/dL (07-28-22 @ 21:23)  POCT Blood Glucose.: 134 mg/dL (07-28-22 @ 16:45)  POCT Blood Glucose.: 207 mg/dL (07-28-22 @ 11:24)  POCT Blood Glucose.: >530 mg/dL (07-28-22 @ 07:52)  POCT Blood Glucose.: 251 mg/dL (07-27-22 @ 22:36)  POCT Blood Glucose.: 193 mg/dL (07-27-22 @ 16:48)  POCT Blood Glucose.: 276 mg/dL (07-27-22 @ 12:38)  POCT Blood Glucose.: 304 mg/dL (07-27-22 @ 08:17)  POCT Blood Glucose.: 434 mg/dL (07-26-22 @ 22:29)  POCT Blood Glucose.: 430 mg/dL (07-26-22 @ 16:54)

## 2022-07-29 NOTE — DISCHARGE NOTE PROVIDER - NSDCCPCAREPLAN_GEN_ALL_CORE_FT
PRINCIPAL DISCHARGE DIAGNOSIS  Diagnosis: Hyperosmolar hyperglycemic state (HHS)  Assessment and Plan of Treatment: A1c 16.4. Diabetic education done. Please follow up with your primary care physician and endocrinology within 1 week. Continue blood sugar checks and taking insulin      SECONDARY DISCHARGE DIAGNOSES  Diagnosis: Alcoholic cirrhosis  Assessment and Plan of Treatment: Acute on chronic hepatic encephalopathy now resolved. Continue Propanolol 10mg TID, Rifaximin, Protonix 40mg daily and Restart Aldactone 50mg daily and Lasix 40mg daily. To follow up with GI and hepatologist Dr. Craven on discharge    Diagnosis: HTN (hypertension)  Assessment and Plan of Treatment: Continue medications as prescribed. Please follow up with your primary care physician and cardiologist within 1 week.    Diagnosis: Hypothyroidism  Assessment and Plan of Treatment: Will need TFTs repeated in 4-6 weeks as outpt

## 2022-07-29 NOTE — PROGRESS NOTE ADULT - SUBJECTIVE AND OBJECTIVE BOX
Chief complaint: HHS    Patient seen and examined at bedside. No acute overnight events reported. No fever, chills, cough, chest pain, nausea or vomiting.     Vital Signs Last 24 Hrs  T(F): 97.4 (29 Jul 2022 04:32), Max: 98.9 (28 Jul 2022 11:00)  HR: 76 (29 Jul 2022 04:32) (70 - 76)  BP: 106/66 (29 Jul 2022 04:32) (102/61 - 108/71)  RR: 18 (29 Jul 2022 04:32) (18 - 18)  SpO2: 97% (29 Jul 2022 04:32) (95% - 100%)    Physical Exam:  Constitutional: alert and oriented, in no acute distress   Neck: Soft and supple  Respiratory: Clear to auscultation bilaterally, no wheezes or crackles  Cardiovascular: Regular rate and rhyhtm, no murmurs, gallops, rubs  Gastrointestinal: Soft, non-tender to palpation, +bs  Vascular: 2+ peripheral pulses  Neurological: A/O x 3, no focal neurological deficits  Musculoskeletal: 5/5 strength b/l upper and lower extremities, no lower extremity edema bilaterally    Labs:                        10.4   4.35  )-----------( 105      ( 29 Jul 2022 05:14 )             30.9   07-29    129<L>  |  97<L>  |  13.6  ----------------------------<  388<H>  4.1   |  20.0<L>  |  0.72    Ca    8.9      29 Jul 2022 05:14

## 2022-07-29 NOTE — DISCHARGE NOTE PROVIDER - CARE PROVIDER_API CALL
Suha Craven)  Internal Medicine  [address] [place] [address]  Phone: [phone]  Fax: [phone]  Follow Up Time: 1 week    Stas Michael)  EndocrinologyMetabDiabetes; Internal Medicine  [address] [place] [address]  Phone: [phone]  Fax: [phone]  Follow Up Time: 1 week

## 2022-07-29 NOTE — CHART NOTE - NSCHARTNOTEFT_GEN_A_CORE
Pts Accucheck 392   1 1/2 hrs hrs after 8 units Admelog and 46 units Lantus  Asymptomatic  Additional 4 units Admelog given  Continue to monitor

## 2022-07-29 NOTE — DISCHARGE NOTE PROVIDER - CARE PROVIDERS DIRECT ADDRESSES
,[email or web address]

## 2022-07-29 NOTE — DISCHARGE NOTE PROVIDER - ATTENDING DISCHARGE PHYSICAL EXAMINATION:
Pt seen and examined by myself.  services used   No complains. Knows how to give herself insulin.   ROS: negative   PE: general: chronically ill looking lady, sitting on the bed and eating   Head and neck: normocephalic, no JVD, temporal wasting   Cardio: regular rate and rhythm   Pulm: clear breath sounds, no wheezing   GI: abdomen slightly distended, soft, BS (+)   Extr: no edema   Neuro: AOx3, no focal weakness

## 2022-07-29 NOTE — DISCHARGE NOTE PROVIDER - NSDCMRMEDTOKEN_GEN_ALL_CORE_FT
ferrous sulfate 324 mg (65 mg elemental iron) oral delayed release tablet: 1 tab(s) orally once a day  folic acid 1 mg oral tablet: 1 tab(s) orally once a day  furosemide 40 mg oral tablet: 1 tab(s) orally once a day  insulin glargine (concentrated) 300 units/mL subcutaneous solution: 45 unit(s) subcutaneous once a day (at bedtime)   insulin lispro 100 units/mL injectable solution: 12 unit(s) injectable 3 times a day (before meals)  lactulose 10 g/15 mL oral syrup: 15 milliliter(s) orally 3 times a day, hold if has &gt; 3 bowel movements   multivitamin: 1 tab(s)  once a day  pantoprazole 40 mg oral delayed release tablet: 1 tab(s) orally once a day (before a meal)  propranolol 10 mg oral tablet: 1 tab(s) orally every 8 hours  rifaximin 550 mg oral tablet: 1 tab(s) orally 2 times a day  spironolactone 25 mg oral tablet: 2 tab(s) orally once a day  Synthroid 75 mcg (0.075 mg) oral tablet: 1 tab(s) orally once a day   Vitamin B1 100 mg oral tablet: 1 tab(s) orally once a day   ferrous sulfate 324 mg (65 mg elemental iron) oral delayed release tablet: 1 tab(s) orally once a day  furosemide 40 mg oral tablet: 1 tab(s) orally once a day  insulin glargine 100 units/mL subcutaneous solution: 50 unit(s) subcutaneous once a day (at bedtime)  insulin lispro 100 units/mL injectable solution: 16 unit(s) injectable 3 times a day  lactulose 10 g/15 mL oral syrup: 22.5 milliliter(s) orally every 8 hours  pantoprazole 40 mg oral delayed release tablet: 1 tab(s) orally once a day (before a meal)  propranolol 10 mg oral tablet: 1 tab(s) orally every 8 hours  rifaximin 550 mg oral tablet: 1 tab(s) orally 2 times a day  spironolactone 25 mg oral tablet: 2 tab(s) orally once a day  Synthroid 75 mcg (0.075 mg) oral tablet: 1 tab(s) orally once a day

## 2022-07-29 NOTE — DISCHARGE NOTE PROVIDER - DETAILS OF MALNUTRITION DIAGNOSIS/DIAGNOSES
This patient has been assessed with a concern for Malnutrition and was treated during this hospitalization for the following Nutrition diagnosis/diagnoses:     -  07/27/2022: Moderate protein-calorie malnutrition

## 2022-07-30 LAB
ANION GAP SERPL CALC-SCNC: 11 MMOL/L — SIGNIFICANT CHANGE UP (ref 5–17)
ANISOCYTOSIS BLD QL: SLIGHT — SIGNIFICANT CHANGE UP
BASOPHILS # BLD AUTO: 0.05 K/UL — SIGNIFICANT CHANGE UP (ref 0–0.2)
BASOPHILS NFR BLD AUTO: 0.9 % — SIGNIFICANT CHANGE UP (ref 0–2)
BILIRUB SERPL-MCNC: 0.6 MG/DL — SIGNIFICANT CHANGE UP (ref 0.4–2)
BUN SERPL-MCNC: 20.7 MG/DL — HIGH (ref 8–20)
CALCIUM SERPL-MCNC: 8.8 MG/DL — SIGNIFICANT CHANGE UP (ref 8.4–10.5)
CHLORIDE SERPL-SCNC: 100 MMOL/L — SIGNIFICANT CHANGE UP (ref 98–107)
CO2 SERPL-SCNC: 20 MMOL/L — LOW (ref 22–29)
CREAT SERPL-MCNC: 0.86 MG/DL — SIGNIFICANT CHANGE UP (ref 0.5–1.3)
EGFR: 75 ML/MIN/1.73M2 — SIGNIFICANT CHANGE UP
EOSINOPHIL # BLD AUTO: 0 K/UL — SIGNIFICANT CHANGE UP (ref 0–0.5)
EOSINOPHIL NFR BLD AUTO: 0 % — SIGNIFICANT CHANGE UP (ref 0–6)
GIANT PLATELETS BLD QL SMEAR: PRESENT — SIGNIFICANT CHANGE UP
GLUCOSE BLDC GLUCOMTR-MCNC: 256 MG/DL — HIGH (ref 70–99)
GLUCOSE BLDC GLUCOMTR-MCNC: 264 MG/DL — HIGH (ref 70–99)
GLUCOSE BLDC GLUCOMTR-MCNC: 275 MG/DL — HIGH (ref 70–99)
GLUCOSE BLDC GLUCOMTR-MCNC: 449 MG/DL — HIGH (ref 70–99)
GLUCOSE SERPL-MCNC: 254 MG/DL — HIGH (ref 70–99)
HCT VFR BLD CALC: 28.3 % — LOW (ref 34.5–45)
HGB BLD-MCNC: 9.9 G/DL — LOW (ref 11.5–15.5)
INR BLD: 1.05 RATIO — SIGNIFICANT CHANGE UP (ref 0.88–1.16)
LYMPHOCYTES # BLD AUTO: 1.48 K/UL — SIGNIFICANT CHANGE UP (ref 1–3.3)
LYMPHOCYTES # BLD AUTO: 27.4 % — SIGNIFICANT CHANGE UP (ref 13–44)
MACROCYTES BLD QL: SLIGHT — SIGNIFICANT CHANGE UP
MANUAL SMEAR VERIFICATION: SIGNIFICANT CHANGE UP
MCHC RBC-ENTMCNC: 33.8 PG — SIGNIFICANT CHANGE UP (ref 27–34)
MCHC RBC-ENTMCNC: 35 GM/DL — SIGNIFICANT CHANGE UP (ref 32–36)
MCV RBC AUTO: 96.6 FL — SIGNIFICANT CHANGE UP (ref 80–100)
MELD SCORE WITH DIALYSIS: 24 POINTS — SIGNIFICANT CHANGE UP
MELD SCORE WITHOUT DIALYSIS: 7 POINTS — SIGNIFICANT CHANGE UP
MONOCYTES # BLD AUTO: 0.82 K/UL — SIGNIFICANT CHANGE UP (ref 0–0.9)
MONOCYTES NFR BLD AUTO: 15.1 % — HIGH (ref 2–14)
NEUTROPHILS # BLD AUTO: 3.06 K/UL — SIGNIFICANT CHANGE UP (ref 1.8–7.4)
NEUTROPHILS NFR BLD AUTO: 56.6 % — SIGNIFICANT CHANGE UP (ref 43–77)
PLAT MORPH BLD: NORMAL — SIGNIFICANT CHANGE UP
PLATELET # BLD AUTO: 116 K/UL — LOW (ref 150–400)
POLYCHROMASIA BLD QL SMEAR: SIGNIFICANT CHANGE UP
POTASSIUM SERPL-MCNC: 3.9 MMOL/L — SIGNIFICANT CHANGE UP (ref 3.5–5.3)
POTASSIUM SERPL-SCNC: 3.9 MMOL/L — SIGNIFICANT CHANGE UP (ref 3.5–5.3)
PROTHROM AB SERPL-ACNC: 12.2 SEC — SIGNIFICANT CHANGE UP (ref 10.5–13.4)
RBC # BLD: 2.93 M/UL — LOW (ref 3.8–5.2)
RBC # FLD: 13.9 % — SIGNIFICANT CHANGE UP (ref 10.3–14.5)
RBC BLD AUTO: NORMAL — SIGNIFICANT CHANGE UP
SMUDGE CELLS # BLD: PRESENT — SIGNIFICANT CHANGE UP
SODIUM SERPL-SCNC: 131 MMOL/L — LOW (ref 135–145)
WBC # BLD: 5.4 K/UL — SIGNIFICANT CHANGE UP (ref 3.8–10.5)
WBC # FLD AUTO: 5.4 K/UL — SIGNIFICANT CHANGE UP (ref 3.8–10.5)

## 2022-07-30 PROCEDURE — 99233 SBSQ HOSP IP/OBS HIGH 50: CPT

## 2022-07-30 PROCEDURE — 99232 SBSQ HOSP IP/OBS MODERATE 35: CPT

## 2022-07-30 RX ORDER — INSULIN GLARGINE 100 [IU]/ML
50 INJECTION, SOLUTION SUBCUTANEOUS AT BEDTIME
Refills: 0 | Status: DISCONTINUED | OUTPATIENT
Start: 2022-07-30 | End: 2022-08-01

## 2022-07-30 RX ORDER — INSULIN LISPRO 100/ML
16 VIAL (ML) SUBCUTANEOUS
Refills: 0 | Status: DISCONTINUED | OUTPATIENT
Start: 2022-07-30 | End: 2022-08-01

## 2022-07-30 RX ADMIN — Medication 100 MILLIGRAM(S): at 11:55

## 2022-07-30 RX ADMIN — Medication 14 UNIT(S): at 11:53

## 2022-07-30 RX ADMIN — Medication 40 MILLIGRAM(S): at 05:54

## 2022-07-30 RX ADMIN — Medication 75 MICROGRAM(S): at 05:54

## 2022-07-30 RX ADMIN — PANTOPRAZOLE SODIUM 40 MILLIGRAM(S): 20 TABLET, DELAYED RELEASE ORAL at 05:53

## 2022-07-30 RX ADMIN — Medication 1 MILLIGRAM(S): at 11:55

## 2022-07-30 RX ADMIN — Medication 6: at 16:43

## 2022-07-30 RX ADMIN — Medication 14 UNIT(S): at 08:13

## 2022-07-30 RX ADMIN — INSULIN GLARGINE 50 UNIT(S): 100 INJECTION, SOLUTION SUBCUTANEOUS at 22:38

## 2022-07-30 RX ADMIN — Medication 6: at 11:54

## 2022-07-30 RX ADMIN — Medication 1 TABLET(S): at 11:54

## 2022-07-30 RX ADMIN — SPIRONOLACTONE 50 MILLIGRAM(S): 25 TABLET, FILM COATED ORAL at 05:54

## 2022-07-30 RX ADMIN — Medication 325 MILLIGRAM(S): at 11:55

## 2022-07-30 RX ADMIN — LACTULOSE 15 GRAM(S): 10 SOLUTION ORAL at 05:54

## 2022-07-30 RX ADMIN — Medication 8: at 22:38

## 2022-07-30 RX ADMIN — LACTULOSE 15 GRAM(S): 10 SOLUTION ORAL at 22:37

## 2022-07-30 RX ADMIN — ENOXAPARIN SODIUM 40 MILLIGRAM(S): 100 INJECTION SUBCUTANEOUS at 05:54

## 2022-07-30 RX ADMIN — Medication 16 UNIT(S): at 16:42

## 2022-07-30 RX ADMIN — Medication 6: at 08:14

## 2022-07-30 RX ADMIN — LACTULOSE 15 GRAM(S): 10 SOLUTION ORAL at 13:06

## 2022-07-30 NOTE — PROGRESS NOTE ADULT - SUBJECTIVE AND OBJECTIVE BOX
Patient is a 66y old  Female who presents with a chief complaint of HHNS (29 Jul 2022 15:09)      INTERVAL HPI/OVERNIGHT EVENTS: seen and examined. Tongan speaking. NO complains   BG still not optimally  controlled     MEDICATIONS  (STANDING):  dextrose 5%. 1000 milliLiter(s) (100 mL/Hr) IV Continuous <Continuous>  dextrose 5%. 1000 milliLiter(s) (50 mL/Hr) IV Continuous <Continuous>  dextrose 50% Injectable 25 Gram(s) IV Push once  dextrose 50% Injectable 12.5 Gram(s) IV Push once  dextrose 50% Injectable 25 Gram(s) IV Push once  enoxaparin Injectable 40 milliGRAM(s) SubCutaneous every 24 hours  ferrous    sulfate 325 milliGRAM(s) Oral daily  folic acid 1 milliGRAM(s) Oral daily  furosemide    Tablet 40 milliGRAM(s) Oral daily  glucagon  Injectable 1 milliGRAM(s) IntraMuscular once  glucagon  Injectable 1 milliGRAM(s) IntraMuscular once  insulin glargine Injectable (LANTUS) 46 Unit(s) SubCutaneous at bedtime  insulin lispro (ADMELOG) corrective regimen sliding scale   SubCutaneous three times a day before meals  insulin lispro (ADMELOG) corrective regimen sliding scale   SubCutaneous at bedtime  insulin lispro Injectable (ADMELOG) 14 Unit(s) SubCutaneous three times a day before meals  lactulose Syrup 15 Gram(s) Oral every 8 hours  levothyroxine 75 MICROGram(s) Oral daily  multivitamin 1 Tablet(s) Oral daily  pantoprazole    Tablet 40 milliGRAM(s) Oral before breakfast  propranolol 10 milliGRAM(s) Oral two times a day  rifAXIMin 550 milliGRAM(s) Oral two times a day  spironolactone 50 milliGRAM(s) Oral daily  thiamine 100 milliGRAM(s) Oral daily    MEDICATIONS  (PRN):  dextrose Oral Gel 15 Gram(s) Oral once PRN Blood Glucose LESS THAN 70 milliGRAM(s)/deciliter      Allergies    No Known Allergies    Intolerances        REVIEW OF SYSTEMS:  CONSTITUTIONAL: No fever, weight loss, or fatigue  RESPIRATORY: No cough, wheezing, chills or hemoptysis; No shortness of breath  CARDIOVASCULAR: No chest pain, palpitations, dizziness, or leg swelling  GASTROINTESTINAL: No abdominal or epigastric pain. No nausea, vomiting, or hematemesis; No diarrhea or constipation. No melena or hematochezia.  NEUROLOGICAL: No headaches, memory loss, loss of strength, numbness, or tremors  MUSCULOSKELETAL: No joint pain or swelling; No muscle, back, or extremity pain      Vital Signs Last 24 Hrs  T(C): 36.8 (30 Jul 2022 11:18), Max: 37 (29 Jul 2022 16:47)  T(F): 98.2 (30 Jul 2022 11:18), Max: 98.6 (29 Jul 2022 16:47)  HR: 69 (30 Jul 2022 11:18) (67 - 83)  BP: 108/67 (30 Jul 2022 11:18) (103/60 - 138/81)  BP(mean): --  RR: 20 (30 Jul 2022 11:18) (18 - 20)  SpO2: 99% (30 Jul 2022 11:18) (91% - 99%)    Parameters below as of 29 Jul 2022 16:47  Patient On (Oxygen Delivery Method): room air        PHYSICAL EXAM:  GENERAL: NAD,   HEAD:  Atraumatic, Normocephalic  EYES: EOMI, PERRLA, conjunctiva and sclera clear  NECK: Supple, No JVD, Normal thyroid  NERVOUS SYSTEM:  Alert & Oriented X3, No gross focal deficits  CHEST/LUNG: Clear to percussion bilaterally; No rales, rhonchi, wheezing, or rubs  HEART: Regular rate and rhythm; No murmurs, rubs, or gallops  ABDOMEN: Soft, Nontender, Nondistended; Bowel sounds present  EXTREMITIES:  No clubbing, cyanosis, or edema  SKIN: No rashes or lesions    LABS:                        9.9    5.40  )-----------( 116      ( 30 Jul 2022 05:37 )             28.3     07-30    131<L>  |  100  |  20.7<H>  ----------------------------<  254<H>  3.9   |  20.0<L>  |  0.86    Ca    8.8      30 Jul 2022 05:37    TPro  x   /  Alb  x   /  TBili  0.6  /  DBili  x   /  AST  x   /  ALT  x   /  AlkPhos  x   07-30    PT/INR - ( 30 Jul 2022 05:37 )   PT: 12.2 sec;   INR: 1.05 ratio             CAPILLARY BLOOD GLUCOSE      POCT Blood Glucose.: 275 mg/dL (30 Jul 2022 11:52)  POCT Blood Glucose.: 264 mg/dL (30 Jul 2022 08:01)  POCT Blood Glucose.: 392 mg/dL (29 Jul 2022 23:37)  POCT Blood Glucose.: 476 mg/dL (29 Jul 2022 21:48)  POCT Blood Glucose.: 458 mg/dL (29 Jul 2022 21:47)  POCT Blood Glucose.: 249 mg/dL (29 Jul 2022 17:03)      RADIOLOGY & ADDITIONAL TESTS:    Imaging Personally Reviewed:  [ ] YES  [ ] NO    Consultant(s) Notes Reviewed:  [ ] YES  [ ] NO    Care Discussed with Consultants/Other Providers [ ] YES  [ ] NO    Plan of Care discussed with Housestaff [ ]YES [ ] NO

## 2022-07-30 NOTE — PROGRESS NOTE ADULT - SUBJECTIVE AND OBJECTIVE BOX
INTERVAL HPI/OVERNIGHT EVENTS: follow up of diabetes    MEDICATIONS  (STANDING):  dextrose 5%. 1000 milliLiter(s) (100 mL/Hr) IV Continuous <Continuous>  dextrose 5%. 1000 milliLiter(s) (50 mL/Hr) IV Continuous <Continuous>  dextrose 50% Injectable 25 Gram(s) IV Push once  dextrose 50% Injectable 12.5 Gram(s) IV Push once  dextrose 50% Injectable 25 Gram(s) IV Push once  enoxaparin Injectable 40 milliGRAM(s) SubCutaneous every 24 hours  ferrous    sulfate 325 milliGRAM(s) Oral daily  folic acid 1 milliGRAM(s) Oral daily  furosemide    Tablet 40 milliGRAM(s) Oral daily  glucagon  Injectable 1 milliGRAM(s) IntraMuscular once  glucagon  Injectable 1 milliGRAM(s) IntraMuscular once  insulin glargine Injectable (LANTUS) 50 Unit(s) SubCutaneous at bedtime  insulin lispro (ADMELOG) corrective regimen sliding scale   SubCutaneous three times a day before meals  insulin lispro (ADMELOG) corrective regimen sliding scale   SubCutaneous at bedtime  insulin lispro Injectable (ADMELOG) 16 Unit(s) SubCutaneous three times a day before meals  lactulose Syrup 15 Gram(s) Oral every 8 hours  levothyroxine 75 MICROGram(s) Oral daily  multivitamin 1 Tablet(s) Oral daily  pantoprazole    Tablet 40 milliGRAM(s) Oral before breakfast  propranolol 10 milliGRAM(s) Oral two times a day  rifAXIMin 550 milliGRAM(s) Oral two times a day  spironolactone 50 milliGRAM(s) Oral daily  thiamine 100 milliGRAM(s) Oral daily    MEDICATIONS  (PRN):  dextrose Oral Gel 15 Gram(s) Oral once PRN Blood Glucose LESS THAN 70 milliGRAM(s)/deciliter      Allergies    No Known Allergies    Intolerances      Vital Signs Last 24 Hrs  T(C): 36.8 (30 Jul 2022 11:18), Max: 37 (29 Jul 2022 16:47)  T(F): 98.2 (30 Jul 2022 11:18), Max: 98.6 (29 Jul 2022 16:47)  HR: 69 (30 Jul 2022 11:18) (67 - 83)  BP: 108/67 (30 Jul 2022 11:18) (103/60 - 138/81)  BP(mean): --  RR: 20 (30 Jul 2022 11:18) (18 - 20)  SpO2: 99% (30 Jul 2022 11:18) (91% - 99%)    Parameters below as of 29 Jul 2022 16:47  Patient On (Oxygen Delivery Method): room air          LABS:                        9.9    5.40  )-----------( 116      ( 30 Jul 2022 05:37 )             28.3     07-30    131<L>  |  100  |  20.7<H>  ----------------------------<  254<H>  3.9   |  20.0<L>  |  0.86    Ca    8.8      30 Jul 2022 05:37    TPro  x   /  Alb  x   /  TBili  0.6  /  DBili  x   /  AST  x   /  ALT  x   /  AlkPhos  x   07-30          POCT Blood Glucose.: 275 mg/dL (07-30-22 @ 11:52)  POCT Blood Glucose.: 264 mg/dL (07-30-22 @ 08:01)  POCT Blood Glucose.: 392 mg/dL (07-29-22 @ 23:37)  POCT Blood Glucose.: 476 mg/dL (07-29-22 @ 21:48)  POCT Blood Glucose.: 458 mg/dL (07-29-22 @ 21:47)  POCT Blood Glucose.: 249 mg/dL (07-29-22 @ 17:03)  POCT Blood Glucose.: 186 mg/dL (07-29-22 @ 11:04)  POCT Blood Glucose.: 404 mg/dL (07-29-22 @ 08:16)  POCT Blood Glucose.: 270 mg/dL (07-28-22 @ 21:23)  POCT Blood Glucose.: 134 mg/dL (07-28-22 @ 16:45)  POCT Blood Glucose.: 207 mg/dL (07-28-22 @ 11:24)  POCT Blood Glucose.: >530 mg/dL (07-28-22 @ 07:52)  POCT Blood Glucose.: 251 mg/dL (07-27-22 @ 22:36)  POCT Blood Glucose.: 193 mg/dL (07-27-22 @ 16:48)

## 2022-07-31 LAB
GLUCOSE BLDC GLUCOMTR-MCNC: 135 MG/DL — HIGH (ref 70–99)
GLUCOSE BLDC GLUCOMTR-MCNC: 295 MG/DL — HIGH (ref 70–99)
GLUCOSE BLDC GLUCOMTR-MCNC: 359 MG/DL — HIGH (ref 70–99)
GLUCOSE BLDC GLUCOMTR-MCNC: 375 MG/DL — HIGH (ref 70–99)
GLUCOSE BLDC GLUCOMTR-MCNC: 406 MG/DL — HIGH (ref 70–99)

## 2022-07-31 PROCEDURE — 99233 SBSQ HOSP IP/OBS HIGH 50: CPT

## 2022-07-31 RX ADMIN — LACTULOSE 15 GRAM(S): 10 SOLUTION ORAL at 06:08

## 2022-07-31 RX ADMIN — Medication 325 MILLIGRAM(S): at 11:23

## 2022-07-31 RX ADMIN — SPIRONOLACTONE 50 MILLIGRAM(S): 25 TABLET, FILM COATED ORAL at 06:08

## 2022-07-31 RX ADMIN — Medication 1 MILLIGRAM(S): at 11:22

## 2022-07-31 RX ADMIN — Medication 6: at 08:00

## 2022-07-31 RX ADMIN — Medication 100 MILLIGRAM(S): at 11:23

## 2022-07-31 RX ADMIN — Medication 16 UNIT(S): at 11:22

## 2022-07-31 RX ADMIN — PANTOPRAZOLE SODIUM 40 MILLIGRAM(S): 20 TABLET, DELAYED RELEASE ORAL at 06:09

## 2022-07-31 RX ADMIN — INSULIN GLARGINE 50 UNIT(S): 100 INJECTION, SOLUTION SUBCUTANEOUS at 21:52

## 2022-07-31 RX ADMIN — Medication 8: at 21:53

## 2022-07-31 RX ADMIN — LACTULOSE 15 GRAM(S): 10 SOLUTION ORAL at 11:23

## 2022-07-31 RX ADMIN — Medication 40 MILLIGRAM(S): at 06:08

## 2022-07-31 RX ADMIN — Medication 1 TABLET(S): at 11:23

## 2022-07-31 RX ADMIN — Medication 10: at 16:18

## 2022-07-31 RX ADMIN — Medication 16 UNIT(S): at 16:18

## 2022-07-31 RX ADMIN — Medication 75 MICROGRAM(S): at 06:08

## 2022-07-31 RX ADMIN — LACTULOSE 15 GRAM(S): 10 SOLUTION ORAL at 21:52

## 2022-07-31 RX ADMIN — Medication 16 UNIT(S): at 08:00

## 2022-07-31 RX ADMIN — ENOXAPARIN SODIUM 40 MILLIGRAM(S): 100 INJECTION SUBCUTANEOUS at 06:08

## 2022-07-31 NOTE — PROGRESS NOTE ADULT - NUTRITIONAL ASSESSMENT
This patient has been assessed with a concern for Malnutrition and has been determined to have a diagnosis/diagnoses of Moderate protein-calorie malnutrition.    This patient is being managed with:   Diet Consistent Carbohydrate/No Snacks-  Entered: Jul 26 2022  9:10AM    

## 2022-07-31 NOTE — PROGRESS NOTE ADULT - SUBJECTIVE AND OBJECTIVE BOX
Patient is a 66y old  Female who presents with a chief complaint of HHNS (29 Jul 2022 15:09)      INTERVAL HPI/OVERNIGHT EVENTS: seen and examined. [place] speaking.  services used, ref # [id number]. NO complains   BG still not optimally  controlled   Had extensive conversation regarding BG control. Reports being on the pills for DM before     MEDICATIONS  (STANDING):  dextrose 5%. 1000 milliLiter(s) (100 mL/Hr) IV Continuous <Continuous>  dextrose 5%. 1000 milliLiter(s) (50 mL/Hr) IV Continuous <Continuous>  dextrose 50% Injectable 25 Gram(s) IV Push once  dextrose 50% Injectable 12.5 Gram(s) IV Push once  dextrose 50% Injectable 25 Gram(s) IV Push once  enoxaparin Injectable 40 milliGRAM(s) SubCutaneous every 24 hours  ferrous    sulfate 325 milliGRAM(s) Oral daily  folic acid 1 milliGRAM(s) Oral daily  furosemide    Tablet 40 milliGRAM(s) Oral daily  glucagon  Injectable 1 milliGRAM(s) IntraMuscular once  glucagon  Injectable 1 milliGRAM(s) IntraMuscular once  insulin glargine Injectable (LANTUS) 46 Unit(s) SubCutaneous at bedtime  insulin lispro (ADMELOG) corrective regimen sliding scale   SubCutaneous three times a day before meals  insulin lispro (ADMELOG) corrective regimen sliding scale   SubCutaneous at bedtime  insulin lispro Injectable (ADMELOG) 14 Unit(s) SubCutaneous three times a day before meals  lactulose Syrup 15 Gram(s) Oral every 8 hours  levothyroxine 75 MICROGram(s) Oral daily  multivitamin 1 Tablet(s) Oral daily  pantoprazole    Tablet 40 milliGRAM(s) Oral before breakfast  propranolol 10 milliGRAM(s) Oral two times a day  rifAXIMin 550 milliGRAM(s) Oral two times a day  spironolactone 50 milliGRAM(s) Oral daily  thiamine 100 milliGRAM(s) Oral daily    MEDICATIONS  (PRN):  dextrose Oral Gel 15 Gram(s) Oral once PRN Blood Glucose LESS THAN 70 milliGRAM(s)/deciliter      Allergies    No Known Allergies    Intolerances        REVIEW OF SYSTEMS:  CONSTITUTIONAL: No fever, weight loss, or fatigue  RESPIRATORY: No cough, wheezing, chills or hemoptysis; No shortness of breath  CARDIOVASCULAR: No chest pain, palpitations, dizziness, or leg swelling  GASTROINTESTINAL: No abdominal or epigastric pain. No nausea, vomiting, or hematemesis; No diarrhea or constipation. No melena or hematochezia.  NEUROLOGICAL: No headaches, memory loss, loss of strength, numbness, or tremors  MUSCULOSKELETAL: No joint pain or swelling; No muscle, back, or extremity pain      Vital Signs Last 24 Hrs  T(C): 36.8 (30 Jul 2022 11:18), Max: 37 (29 Jul 2022 16:47)  T(F): 98.2 (30 Jul 2022 11:18), Max: 98.6 (29 Jul 2022 16:47)  HR: 69 (30 Jul 2022 11:18) (67 - 83)  BP: 108/67 (30 Jul 2022 11:18) (103/60 - 138/81)  BP(mean): --  RR: 20 (30 Jul 2022 11:18) (18 - 20)  SpO2: 99% (30 Jul 2022 11:18) (91% - 99%)    Parameters below as of 29 Jul 2022 16:47  Patient On (Oxygen Delivery Method): room air        PHYSICAL EXAM:  GENERAL: NAD, thin, chronically ill looking lady   HEAD:  Atraumatic, Normocephalic, temporal wasting   EYES: EOMI, PERRLA, conjunctiva and sclera clear  NECK: Supple, No JVD, Normal thyroid  NERVOUS SYSTEM:  Alert & Oriented X3, No gross focal deficits  CHEST/LUNG: Clear to percussion bilaterally; No rales, rhonchi, wheezing, or rubs  HEART: Regular rate and rhythm; No murmurs, rubs, or gallops  ABDOMEN: Soft, Nontender, Nondistended; Bowel sounds present  EXTREMITIES:  No clubbing, cyanosis, or edema  SKIN: No rashes or lesions    LABS:                        9.9    5.40  )-----------( 116      ( 30 Jul 2022 05:37 )             28.3     07-30    131<L>  |  100  |  20.7<H>  ----------------------------<  254<H>  3.9   |  20.0<L>  |  0.86    Ca    8.8      30 Jul 2022 05:37    TPro  x   /  Alb  x   /  TBili  0.6  /  DBili  x   /  AST  x   /  ALT  x   /  AlkPhos  x   07-30    PT/INR - ( 30 Jul 2022 05:37 )   PT: 12.2 sec;   INR: 1.05 ratio             CAPILLARY BLOOD GLUCOSE      POCT Blood Glucose.: 275 mg/dL (30 Jul 2022 11:52)  POCT Blood Glucose.: 264 mg/dL (30 Jul 2022 08:01)  POCT Blood Glucose.: 392 mg/dL (29 Jul 2022 23:37)  POCT Blood Glucose.: 476 mg/dL (29 Jul 2022 21:48)  POCT Blood Glucose.: 458 mg/dL (29 Jul 2022 21:47)  POCT Blood Glucose.: 249 mg/dL (29 Jul 2022 17:03)      RADIOLOGY & ADDITIONAL TESTS:    Imaging Personally Reviewed:  [ ] YES  [ ] NO    Consultant(s) Notes Reviewed:  [ ] YES  [ ] NO    Care Discussed with Consultants/Other Providers [ ] YES  [ ] NO    Plan of Care discussed with Housestaff [ ]YES [ ] NO

## 2022-08-01 ENCOUNTER — TRANSCRIPTION ENCOUNTER (OUTPATIENT)
Age: 67
End: 2022-08-01

## 2022-08-01 VITALS
TEMPERATURE: 98 F | RESPIRATION RATE: 18 BRPM | HEART RATE: 73 BPM | OXYGEN SATURATION: 98 % | DIASTOLIC BLOOD PRESSURE: 57 MMHG | SYSTOLIC BLOOD PRESSURE: 103 MMHG

## 2022-08-01 LAB
ANION GAP SERPL CALC-SCNC: 9 MMOL/L — SIGNIFICANT CHANGE UP (ref 5–17)
BASOPHILS # BLD AUTO: 0.02 K/UL — SIGNIFICANT CHANGE UP (ref 0–0.2)
BASOPHILS NFR BLD AUTO: 0.4 % — SIGNIFICANT CHANGE UP (ref 0–2)
BUN SERPL-MCNC: 20.9 MG/DL — HIGH (ref 8–20)
CALCIUM SERPL-MCNC: 8.6 MG/DL — SIGNIFICANT CHANGE UP (ref 8.4–10.5)
CHLORIDE SERPL-SCNC: 101 MMOL/L — SIGNIFICANT CHANGE UP (ref 98–107)
CO2 SERPL-SCNC: 21 MMOL/L — LOW (ref 22–29)
CREAT SERPL-MCNC: 0.91 MG/DL — SIGNIFICANT CHANGE UP (ref 0.5–1.3)
EGFR: 70 ML/MIN/1.73M2 — SIGNIFICANT CHANGE UP
EOSINOPHIL # BLD AUTO: 0.08 K/UL — SIGNIFICANT CHANGE UP (ref 0–0.5)
EOSINOPHIL NFR BLD AUTO: 1.7 % — SIGNIFICANT CHANGE UP (ref 0–6)
GLUCOSE BLDC GLUCOMTR-MCNC: 204 MG/DL — HIGH (ref 70–99)
GLUCOSE BLDC GLUCOMTR-MCNC: 275 MG/DL — HIGH (ref 70–99)
GLUCOSE BLDC GLUCOMTR-MCNC: 327 MG/DL — HIGH (ref 70–99)
GLUCOSE SERPL-MCNC: 269 MG/DL — HIGH (ref 70–99)
HCT VFR BLD CALC: 28.8 % — LOW (ref 34.5–45)
HGB BLD-MCNC: 9.8 G/DL — LOW (ref 11.5–15.5)
IMM GRANULOCYTES NFR BLD AUTO: 0.4 % — SIGNIFICANT CHANGE UP (ref 0–1.5)
LYMPHOCYTES # BLD AUTO: 1.41 K/UL — SIGNIFICANT CHANGE UP (ref 1–3.3)
LYMPHOCYTES # BLD AUTO: 29.7 % — SIGNIFICANT CHANGE UP (ref 13–44)
MCHC RBC-ENTMCNC: 33.7 PG — SIGNIFICANT CHANGE UP (ref 27–34)
MCHC RBC-ENTMCNC: 34 GM/DL — SIGNIFICANT CHANGE UP (ref 32–36)
MCV RBC AUTO: 99 FL — SIGNIFICANT CHANGE UP (ref 80–100)
MONOCYTES # BLD AUTO: 1.02 K/UL — HIGH (ref 0–0.9)
MONOCYTES NFR BLD AUTO: 21.5 % — HIGH (ref 2–14)
NEUTROPHILS # BLD AUTO: 2.19 K/UL — SIGNIFICANT CHANGE UP (ref 1.8–7.4)
NEUTROPHILS NFR BLD AUTO: 46.3 % — SIGNIFICANT CHANGE UP (ref 43–77)
PLATELET # BLD AUTO: 121 K/UL — LOW (ref 150–400)
POTASSIUM SERPL-MCNC: 4 MMOL/L — SIGNIFICANT CHANGE UP (ref 3.5–5.3)
POTASSIUM SERPL-SCNC: 4 MMOL/L — SIGNIFICANT CHANGE UP (ref 3.5–5.3)
RBC # BLD: 2.91 M/UL — LOW (ref 3.8–5.2)
RBC # FLD: 14 % — SIGNIFICANT CHANGE UP (ref 10.3–14.5)
SODIUM SERPL-SCNC: 131 MMOL/L — LOW (ref 135–145)
WBC # BLD: 4.74 K/UL — SIGNIFICANT CHANGE UP (ref 3.8–10.5)
WBC # FLD AUTO: 4.74 K/UL — SIGNIFICANT CHANGE UP (ref 3.8–10.5)

## 2022-08-01 PROCEDURE — 99233 SBSQ HOSP IP/OBS HIGH 50: CPT

## 2022-08-01 PROCEDURE — 99239 HOSP IP/OBS DSCHRG MGMT >30: CPT

## 2022-08-01 RX ORDER — INSULIN GLARGINE 100 [IU]/ML
50 INJECTION, SOLUTION SUBCUTANEOUS
Qty: 100 | Refills: 0
Start: 2022-08-01 | End: 2022-08-30

## 2022-08-01 RX ORDER — INSULIN LISPRO 100/ML
16 VIAL (ML) SUBCUTANEOUS
Qty: 100 | Refills: 0
Start: 2022-08-01 | End: 2022-08-30

## 2022-08-01 RX ORDER — LACTULOSE 10 G/15ML
22.5 SOLUTION ORAL
Qty: 0 | Refills: 0 | DISCHARGE
Start: 2022-08-01

## 2022-08-01 RX ORDER — INSULIN LISPRO 100/ML
4 VIAL (ML) SUBCUTANEOUS ONCE
Refills: 0 | Status: COMPLETED | OUTPATIENT
Start: 2022-08-01 | End: 2022-08-01

## 2022-08-01 RX ORDER — INSULIN LISPRO 100/ML
12 VIAL (ML) SUBCUTANEOUS
Qty: 1080 | Refills: 0

## 2022-08-01 RX ADMIN — ENOXAPARIN SODIUM 40 MILLIGRAM(S): 100 INJECTION SUBCUTANEOUS at 06:33

## 2022-08-01 RX ADMIN — Medication 1 MILLIGRAM(S): at 11:11

## 2022-08-01 RX ADMIN — Medication 40 MILLIGRAM(S): at 06:33

## 2022-08-01 RX ADMIN — Medication 6: at 08:16

## 2022-08-01 RX ADMIN — Medication 8: at 11:11

## 2022-08-01 RX ADMIN — Medication 16 UNIT(S): at 11:12

## 2022-08-01 RX ADMIN — Medication 4: at 16:03

## 2022-08-01 RX ADMIN — Medication 16 UNIT(S): at 16:03

## 2022-08-01 RX ADMIN — Medication 1 TABLET(S): at 11:11

## 2022-08-01 RX ADMIN — SPIRONOLACTONE 50 MILLIGRAM(S): 25 TABLET, FILM COATED ORAL at 06:33

## 2022-08-01 RX ADMIN — Medication 325 MILLIGRAM(S): at 11:11

## 2022-08-01 RX ADMIN — LACTULOSE 15 GRAM(S): 10 SOLUTION ORAL at 11:11

## 2022-08-01 RX ADMIN — PANTOPRAZOLE SODIUM 40 MILLIGRAM(S): 20 TABLET, DELAYED RELEASE ORAL at 06:33

## 2022-08-01 RX ADMIN — LACTULOSE 15 GRAM(S): 10 SOLUTION ORAL at 06:33

## 2022-08-01 RX ADMIN — Medication 4 UNIT(S): at 00:27

## 2022-08-01 RX ADMIN — Medication 100 MILLIGRAM(S): at 11:11

## 2022-08-01 RX ADMIN — Medication 16 UNIT(S): at 08:16

## 2022-08-01 RX ADMIN — Medication 75 MICROGRAM(S): at 06:33

## 2022-08-01 NOTE — PROGRESS NOTE ADULT - PROVIDER SPECIALTY LIST ADULT
Hospitalist
Endocrinology
Hospitalist
Endocrinology
Endocrinology
Hospitalist
MICU
Endocrinology
Endocrinology
Hospitalist
Hospitalist

## 2022-08-01 NOTE — DISCHARGE NOTE NURSING/CASE MANAGEMENT/SOCIAL WORK - NSDCVIVACCINE_GEN_ALL_CORE_FT
influenza, injectable, quadrivalent, preservative free; 27-Oct-2015 11:00; Roxie Vergara (RN); [employer]; [id number]; IntraMuscular; Deltoid Left.; 0.5 milliLiter(s); VIS (VIS Published: 07-Aug-2015, VIS Presented: 27-Oct-2015);   Tdap; 26-Jun-2015 00:30; Jaison Spicer (RN); [employer]; [id number]; IntraMuscular; Deltoid Left.; 0.5 milliLiter(s); VIS (VIS Published: 09-May-2013, VIS Presented: 26-Jun-2015);   Tdap; 14-Jan-2022 02:31; Christal Wolf (RN); [employer]; [id number] (Exp. Date: 09-Sep-2023); IntraMuscular; Deltoid Right.; 0.5 milliLiter(s); VIS (VIS Published: 09-May-2013, VIS Presented: 14-Jan-2022);

## 2022-08-01 NOTE — DISCHARGE NOTE NURSING/CASE MANAGEMENT/SOCIAL WORK - NSDCPEFALRISK_GEN_ALL_CORE
For information on Fall & Injury Prevention, visit: [email or web address] OR  [email or web address] OR  https://www.cdc.gov/steadi/patient.html

## 2022-08-01 NOTE — PROGRESS NOTE ADULT - SUBJECTIVE AND OBJECTIVE BOX
INTERVAL HPI/OVERNIGHT EVENTS: follow up of diabetes    MEDICATIONS  (STANDING):  dextrose 5%. 1000 milliLiter(s) (100 mL/Hr) IV Continuous <Continuous>  dextrose 5%. 1000 milliLiter(s) (50 mL/Hr) IV Continuous <Continuous>  dextrose 50% Injectable 25 Gram(s) IV Push once  dextrose 50% Injectable 12.5 Gram(s) IV Push once  dextrose 50% Injectable 25 Gram(s) IV Push once  enoxaparin Injectable 40 milliGRAM(s) SubCutaneous every 24 hours  ferrous    sulfate 325 milliGRAM(s) Oral daily  folic acid 1 milliGRAM(s) Oral daily  furosemide    Tablet 40 milliGRAM(s) Oral daily  glucagon  Injectable 1 milliGRAM(s) IntraMuscular once  glucagon  Injectable 1 milliGRAM(s) IntraMuscular once  insulin glargine Injectable (LANTUS) 50 Unit(s) SubCutaneous at bedtime  insulin lispro (ADMELOG) corrective regimen sliding scale   SubCutaneous three times a day before meals  insulin lispro (ADMELOG) corrective regimen sliding scale   SubCutaneous at bedtime  insulin lispro Injectable (ADMELOG) 16 Unit(s) SubCutaneous three times a day before meals  lactulose Syrup 15 Gram(s) Oral every 8 hours  levothyroxine 75 MICROGram(s) Oral daily  multivitamin 1 Tablet(s) Oral daily  pantoprazole    Tablet 40 milliGRAM(s) Oral before breakfast  propranolol 10 milliGRAM(s) Oral two times a day  rifAXIMin 550 milliGRAM(s) Oral two times a day  spironolactone 50 milliGRAM(s) Oral daily  thiamine 100 milliGRAM(s) Oral daily    MEDICATIONS  (PRN):  dextrose Oral Gel 15 Gram(s) Oral once PRN Blood Glucose LESS THAN 70 milliGRAM(s)/deciliter      Allergies    No Known Allergies    Intolerances      Vital Signs Last 24 Hrs  T(C): 36.8 (30 Jul 2022 11:18), Max: 37 (29 Jul 2022 16:47)  T(F): 98.2 (30 Jul 2022 11:18), Max: 98.6 (29 Jul 2022 16:47)  HR: 69 (30 Jul 2022 11:18) (67 - 83)  BP: 108/67 (30 Jul 2022 11:18) (103/60 - 138/81)  BP(mean): --  RR: 20 (30 Jul 2022 11:18) (18 - 20)  SpO2: 99% (30 Jul 2022 11:18) (91% - 99%)    Parameters below as of 29 Jul 2022 16:47  Patient On (Oxygen Delivery Method): room air      GENERAL: NAD, thin,  ill looking  CHEST/LUNG: Clear to percussion bilaterally; No rales, rhonchi, wheezing, or rubs  HEART: Regular rate and rhythm; No murmurs, rubs, or gallops  ABDOMEN: Soft, Nontender, Nondistended; Bowel sounds present  EXTREMITIES:  No clubbing, cyanosis, or edema      LABS:                        9.9    5.40  )-----------( 116      ( 30 Jul 2022 05:37 )             28.3     07-30    131<L>  |  100  |  20.7<H>  ----------------------------<  254<H>  3.9   |  20.0<L>  |  0.86    Ca    8.8      30 Jul 2022 05:37    TPro  x   /  Alb  x   /  TBili  0.6  /  DBili  x   /  AST  x   /  ALT  x   /  AlkPhos  x   07-30          POCT Blood Glucose.: 275 mg/dL (07-30-22 @ 11:52)  POCT Blood Glucose.: 264 mg/dL (07-30-22 @ 08:01)  POCT Blood Glucose.: 392 mg/dL (07-29-22 @ 23:37)  POCT Blood Glucose.: 476 mg/dL (07-29-22 @ 21:48)  POCT Blood Glucose.: 458 mg/dL (07-29-22 @ 21:47)  POCT Blood Glucose.: 249 mg/dL (07-29-22 @ 17:03)  POCT Blood Glucose.: 186 mg/dL (07-29-22 @ 11:04)  POCT Blood Glucose.: 404 mg/dL (07-29-22 @ 08:16)  POCT Blood Glucose.: 270 mg/dL (07-28-22 @ 21:23)  POCT Blood Glucose.: 134 mg/dL (07-28-22 @ 16:45)  POCT Blood Glucose.: 207 mg/dL (07-28-22 @ 11:24)  POCT Blood Glucose.: >530 mg/dL (07-28-22 @ 07:52)  POCT Blood Glucose.: 251 mg/dL (07-27-22 @ 22:36)  POCT Blood Glucose.: 193 mg/dL (07-27-22 @ 16:48)

## 2022-08-01 NOTE — DISCHARGE NOTE NURSING/CASE MANAGEMENT/SOCIAL WORK - PATIENT PORTAL LINK FT
You can access the FollowMyHealth Patient Portal offered by [facility] by registering at the following website: [email or web address]. By joining [employer]’s FollowMyHealth portal, you will also be able to view your health information using other applications (apps) compatible with our system.

## 2022-08-01 NOTE — PROGRESS NOTE ADULT - ASSESSMENT
65 y/o F PMHx HTN, DM2, Anemia, ETOH abuse, ETOH cirrhosis, Esophageal varices, Hepatic encephalopathy, Hypothyroidism presented with left arm and hand weakness and paresthesias.    HHS   Dehydration  Pseudohyponatremia   -Off Insulin gtt. FSS with 30U Lantus and ISS. BG goal <180.  -Started Consistent Carb diet  -Endo consult pending for management of DM given med non-compliance   -Na up trending, 134 this am.   -Trend labs, replete lytes as needed.     Cirrhosis   High Ammonia   -Ammonia 69. C/W Lactulose.   -C/W Rifaximin, Protonix, Folic acid, Multivitamin and Thiamine.     HTN  -C/W Spironolactone and Propanolol     DVT Prophylaxis with Lovenox.    Patient stable for downgrade to medicine. Plan discussed with MICU attending. 
67 y/o F with PMH of HTN, alcohol abuse, alcoholic liver cirrhosis complicated by esophageal varices and hepatic encephalopathy, DM2,  neuropathy, Anemia, Pancreatitis, recently discharged in june 2022 for UTI/ HHS/ uncontrolled DM2, was advised to c/w insulin injectable on discharge, currently presented on 7/25 with AMS, paresthesias of the left hand and admitted to MICU with acute metabolic/hepatic encephalopathy 2/2 HHS/ severe dehydration with glucose>1000, pseudohyponatremia, was given IVF, insulin ggt with improvement of glucose. Transitioned to injectable insulin and started on a diet without any complications. Also with hepatic encephalopathy with hyperammonemia continued on lactulose/ rifaxmin and now with resolution of encephalopathy. Downgraded to medical floor     Endo consulted for diabetes management.  a1c 16.4%.    Uncontrolled type 2 DM  complicated by HHS   A1c 16.4%  Sugars still high , to go up on dose of  Lantus to55 units and - Admelog to 20  units TID with meals  fingersticks ac tid hs.  diabetic diet.  Diabetes teaching     Hypothyroidism  c/w Levothyroxine 75mcg daily  follow up tfts after discharge in 4 to 6 weeks.      Alcoholic liver cirrhosis   c/b hx of esophageal varices, anemia and thrombocytopenia, transaminitis   s/p acute on chronic hepatic encephalopathy - now resolved    h/h stable, liver enzymes stable   pt to f/u with GI and hepatology    HTN - BP controlled on the lower side   Propranolol 10mg BID, Furosemide 40mg daily and  Aldactone 50mg daily  
Pt. remains hyperglycemic. Insulin doses adjusted upwards.
65 y/o F with PMH of HTN, alcohol abuse, alcoholic liver cirrhosis complicated by esophageal varices and hepatic encephalopathy, DM2,  neuropathy, Anemia, Pancreatitis, recently discharged in june 2022 for UTI/ HHS/ uncontrolled DM2, was advised to c/w insulin injectable on discharge, currently presented on 7/25 with AMS, paresthesias of the left hand and admitted to MICU with acute metabolic/hepatic encephalopathy 2/2 HHS/ severe dehydration with glucose>1000, pseudohyponatremia, was given IVF, insulin ggt with improvement of glucose. Transitioned to injectable insulin and started on a diet without any complications. Also with hepatic encephalopathy with hyperammonemia continued on lactulose/ rifaxmin and now with resolution of encephalopathy. Patient is medically stable to be transferred from the MICU to the hospitalist service.      Poorly controlled DMT2 complicated by HHS  - Due to poor patient compliance  - A1c 16.4  - initial glucose >1000   - Endo consult appreciated  - Lantus 40 units nightly   - Admelog 12 units TID   - FS with ISS    Alcoholic liver cirrhosis   - c/b hx of esophageal varices, anemia and thrombocytopenia, transaminitis   - s/p acute on chronic hepatic encephalopathy - now resolved- mentation at baseline AAOX3   - h/h stable, liver enzymes stable  - Propanolol 10mg TID  - Lactulose titrate to 3-4 bms a day   - Rifaximin   - Protonix 40mg daily  - pt to be restarted on lasix 40mg po qd and aldactone 50mg po qd  in the next 24-48 hrs - will follow labs and pts clinical state   - pt to f/u with GI and hepatologist Dr. Craven on discharge     Hypothyroidism  - Synthroid 75mcg daily  - recent hospitalization tsh 8   - Will need TFTs repeated in 4-6 weeks as outpt     HTN  - Propranolol 10mg BID  - Restarting aldactone/lasix in the next 24-48 hrs     DVT ppx  - Lovenox
Pt. again counseled on risks of uncontrolled diabetes if insulin not used. She has self-injected her insulin doses here. Obiously not a candidate for tight control of diabetes, but hopefully she will remain safe if taking most of her prescribed doses.
Pt. remains hyperglycemic. Similar pattern noted during prior hospitalization, and will raise basal and prandial doses. Nursing education needed to emphasize the need for continuing insulin treatment, and to verify patient's ability to self inject.
65 y/o F with PMH of HTN, alcohol abuse, alcoholic liver cirrhosis complicated by esophageal varices and hepatic encephalopathy, DM2,  neuropathy, Anemia, Pancreatitis, recently discharged in june 2022 for UTI/ HHS/ uncontrolled DM2, was advised to c/w insulin injectable on discharge, currently presented on 7/25 with AMS, paresthesias of the left hand and admitted to MICU with acute metabolic/hepatic encephalopathy 2/2 HHS/ severe dehydration with glucose>1000, pseudohyponatremia, was given IVF, insulin ggt with improvement of glucose. Transitioned to injectable insulin and started on a diet without any complications. Also with hepatic encephalopathy with hyperammonemia continued on lactulose/ rifaxmin and now with resolution of encephalopathy. Patient is medically stable to be transferred from the MICU to the hospitalist service.      Poorly controlled DMT2 complicated by HHS  - Due to poor patient compliance  - A1c 16.4  - initial glucose >1000   - Endo consult appreciated  - FS with ISS  - Admelog 12 units TID with meals  - Lantus 40 units nightly    Alcoholic liver cirrhosis   - c/b hx of esophageal varices, anemia and thrombocytopenia, transaminitis   - s/p acute on chronic hepatic encephalopathy - now resolved   - h/h stable, liver enzymes stable  - Propanolol 10mg TID  - Lactulose titrate to 3-4 bms a day   - Rifaximin   - Protonix 40mg daily  - Restart Aldactone 50mg daily   - Restart Lasix 40mg daily  - pt to f/u with GI and hepatologist Dr. Craven on discharge     Hypothyroidism  - Synthroid 75mcg daily  - recent hospitalization tsh 8   - Will need TFTs repeated in 4-6 weeks as outpt     HTN  - Propranolol 10mg BID  - Lasix 40mg daily  - Aldactone 50mg daily    DVT ppx  - Lovenox    Dispo: Possible DC tomorrow
65 y/o F with PMH of HTN, alcohol abuse, alcoholic liver cirrhosis complicated by esophageal varices and hepatic encephalopathy, DM2,  neuropathy, Anemia, Pancreatitis, recently discharged in june 2022 for UTI/ HHS/ uncontrolled DM2, was advised to c/w insulin injectable on discharge, currently presented on 7/25 with AMS, paresthesias of the left hand and admitted to MICU with acute metabolic/hepatic encephalopathy 2/2 HHS/ severe dehydration with glucose>1000, pseudohyponatremia, was given IVF, insulin ggt with improvement of glucose. Transitioned to injectable insulin and started on a diet without any complications. Also with hepatic encephalopathy with hyperammonemia continued on lactulose/ rifaxmin and now with resolution of encephalopathy. Patient is medically stable to be transferred from the MICU to the hospitalist service.      Poorly controlled DMT2 complicated by HHS  - Due to poor patient compliance  - A1c 16.4  - initial glucose >1000   - Endo consult appreciated  - FS with ISS  - Admelog 12 units TID with meals  - Lantus 40 units nightly  - FS still uncontrolled    Alcoholic liver cirrhosis   - c/b hx of esophageal varices, anemia and thrombocytopenia, transaminitis   - s/p acute on chronic hepatic encephalopathy - now resolved   - h/h stable, liver enzymes stable  - Propanolol 10mg TID  - Lactulose titrate to 3-4 bms a day   - Rifaximin   - Protonix 40mg daily  - Restart Aldactone 50mg daily   - Restart Lasix 40mg daily  - pt to f/u with GI and hepatologist Dr. Craven on discharge     Hypothyroidism  - Synthroid 75mcg daily  - recent hospitalization tsh 8   - Will need TFTs repeated in 4-6 weeks as outpt     HTN  - Propranolol 10mg BID  - Lasix 40mg daily  - Aldactone 50mg daily    DVT ppx  - Lovenox
Additional history obtained through . In spite of discharge instructions for insulin use, pt. declined to use insulin and continued to take her oral agents. She states that she is too "nervous" to inject insulin, and has no family support to assist her. Emphasized obligate need for insulin therapy, and will attempt to continue teaching.  Pt. now on basal/bolus regimen with some improvement, although likely needs further adjustments, and has had a high insulin requirement in the past. Would consider bid pre-mixed insulin to facilitate adherence.
67 y/o F with PMH of HTN, alcohol abuse, alcoholic liver cirrhosis complicated by esophageal varices and hepatic encephalopathy, DM2,  neuropathy, Anemia, Pancreatitis, recently discharged in june 2022 for UTI/ HHS/ uncontrolled DM2, was advised to c/w insulin injectable on discharge, currently presented on 7/25 with AMS, paresthesias of the left hand and admitted to MICU with acute metabolic/hepatic encephalopathy 2/2 HHS/ severe dehydration with glucose>1000, pseudohyponatremia, was given IVF, insulin ggt with improvement of glucose. Transitioned to injectable insulin and started on a diet without any complications. Also with hepatic encephalopathy with hyperammonemia continued on lactulose/ rifaxmin and now with resolution of encephalopathy. Downgraded to medical floor       Uncontrolled type 2 DM  complicated by HHS   A1c 16.4, BG not controlled   c/w Lantus increased to 50 units and - Admelog to 16  units TID with meals  Diabetes teaching     Alcoholic liver cirrhosis   - c/b hx of esophageal varices, anemia and thrombocytopenia, transaminitis   - s/p acute on chronic hepatic encephalopathy - now resolved   - h/h stable, liver enzymes stable  c/w  Propanolol 10mg TID,  Lactulose titrate to 3-4 bms a day ,  Rifaximin,  Protonix 40mg daily,  Aldactone 50mg daily and Furosemide    pt to f/u with GI and hepatologist Dr. Craven on discharge     Hypothyroidism  c/w Levothyroxine 75mcg daily    HTN - BP controlled on the lower side   - Propranolol 10mg BID  Furosemide 40mg daily  - Aldactone 50mg daily    DVT ppx  - Lovenox    Had extensive conversation regarding DM management and insulin injections.   Likely discharge tomorrow 
67 y/o F with PMH of HTN, alcohol abuse, alcoholic liver cirrhosis complicated by esophageal varices and hepatic encephalopathy, DM2,  neuropathy, Anemia, Pancreatitis, recently discharged in june 2022 for UTI/ HHS/ uncontrolled DM2, was advised to c/w insulin injectable on discharge, currently presented on 7/25 with AMS, paresthesias of the left hand and admitted to MICU with acute metabolic/hepatic encephalopathy 2/2 HHS/ severe dehydration with glucose>1000, pseudohyponatremia, was given IVF, insulin ggt with improvement of glucose. Transitioned to injectable insulin and started on a diet without any complications. Also with hepatic encephalopathy with hyperammonemia continued on lactulose/ rifaxmin and now with resolution of encephalopathy. Downgraded to medical floor       Uncontrolled type 2 DM  complicated by HHS   A1c 16.4, BG not controlled   c/w Lantus 40 units and - Admelog 12 units TID with meals    Alcoholic liver cirrhosis   - c/b hx of esophageal varices, anemia and thrombocytopenia, transaminitis   - s/p acute on chronic hepatic encephalopathy - now resolved   - h/h stable, liver enzymes stable  c/w  Propanolol 10mg TID,  Lactulose titrate to 3-4 bms a day ,  Rifaximin,  Protonix 40mg daily,  Aldactone 50mg daily and Furosemide    pt to f/u with GI and hepatologist Dr. Craven on discharge     Hypothyroidism  c/w Levothyroxine 75mcg daily    HTN - BP controlled on the lower side   - Propranolol 10mg BID  Furosemide 40mg daily  - Aldactone 50mg daily    DVT ppx  - Lovenox

## 2022-08-11 PROCEDURE — 83036 HEMOGLOBIN GLYCOSYLATED A1C: CPT

## 2022-08-11 PROCEDURE — 83880 ASSAY OF NATRIURETIC PEPTIDE: CPT

## 2022-08-11 PROCEDURE — 0225U NFCT DS DNA&RNA 21 SARSCOV2: CPT

## 2022-08-11 PROCEDURE — 82962 GLUCOSE BLOOD TEST: CPT

## 2022-08-11 PROCEDURE — 71045 X-RAY EXAM CHEST 1 VIEW: CPT

## 2022-08-11 PROCEDURE — 83735 ASSAY OF MAGNESIUM: CPT

## 2022-08-11 PROCEDURE — 99285 EMERGENCY DEPT VISIT HI MDM: CPT | Mod: 25

## 2022-08-11 PROCEDURE — 82009 KETONE BODYS QUAL: CPT

## 2022-08-11 PROCEDURE — 93005 ELECTROCARDIOGRAM TRACING: CPT

## 2022-08-11 PROCEDURE — 84295 ASSAY OF SERUM SODIUM: CPT

## 2022-08-11 PROCEDURE — 70450 CT HEAD/BRAIN W/O DYE: CPT | Mod: MA

## 2022-08-11 PROCEDURE — 85025 COMPLETE CBC W/AUTO DIFF WBC: CPT

## 2022-08-11 PROCEDURE — 80053 COMPREHEN METABOLIC PANEL: CPT

## 2022-08-11 PROCEDURE — 82435 ASSAY OF BLOOD CHLORIDE: CPT

## 2022-08-11 PROCEDURE — 83605 ASSAY OF LACTIC ACID: CPT

## 2022-08-11 PROCEDURE — 82803 BLOOD GASES ANY COMBINATION: CPT

## 2022-08-11 PROCEDURE — 84100 ASSAY OF PHOSPHORUS: CPT

## 2022-08-11 PROCEDURE — 81001 URINALYSIS AUTO W/SCOPE: CPT

## 2022-08-11 PROCEDURE — 82140 ASSAY OF AMMONIA: CPT

## 2022-08-11 PROCEDURE — 36415 COLL VENOUS BLD VENIPUNCTURE: CPT

## 2022-08-11 PROCEDURE — 85014 HEMATOCRIT: CPT

## 2022-08-11 PROCEDURE — 85027 COMPLETE CBC AUTOMATED: CPT

## 2022-08-11 PROCEDURE — 80048 BASIC METABOLIC PNL TOTAL CA: CPT

## 2022-08-11 PROCEDURE — 84484 ASSAY OF TROPONIN QUANT: CPT

## 2022-08-11 PROCEDURE — 82330 ASSAY OF CALCIUM: CPT

## 2022-08-11 PROCEDURE — 85018 HEMOGLOBIN: CPT

## 2022-08-11 PROCEDURE — 84132 ASSAY OF SERUM POTASSIUM: CPT

## 2022-08-11 PROCEDURE — 82947 ASSAY GLUCOSE BLOOD QUANT: CPT

## 2022-11-17 NOTE — ED ADULT NURSE NOTE - CAS TRG GEN SKIN CONDITION
Improving  Continue phentermine  Will continue to monitor  Discussed about low carb diet and regular exercise   We are going to hold off phentermine for 6 months  Come back in 6 months 
Lab Results   Component Value Date    EGFR 59 10/13/2022    EGFR 58 08/18/2022    EGFR 67 10/07/2021    CREATININE 0 97 10/13/2022    CREATININE 0 98 08/18/2022    CREATININE 0 88 10/07/2021   Slight improvement her GFR  Continue to encourage oral hydration  Will continue to monitor GFR 
Warm

## 2023-01-18 NOTE — ED ADULT NURSE NOTE - BRADEN SCORE (IF 18 OR LESS ACTIVATE SKIN INJURY RISK INCREASED GUIDELINE), MLM
Conjuntivae and eyelids appear normal, Sclerae : White without injection , Conjuntivae and eyelids appear normal, Sclerae : White without injection
16

## 2023-01-21 NOTE — ED ADULT TRIAGE NOTE - WEIGHT METHOD
Speech Therapy    Patient not seen in therapy today.    Additional details:      ST order received, chart reviewed, patient passed nursing swallow screen and has been placed on a PO diet. There are no overt concerns re: swallowing at this time. Plan to discontinue ST; please re-order should there be a change in status.         OBJECTIVE                       Therapy procedure time and total treatment time can be found documented on the Time Entry flowsheet   stated

## 2023-01-29 NOTE — ED ADULT NURSE REASSESSMENT NOTE - NIH STROKE SCALE: 3. VISUAL, QM
(0) No visual loss
You can access the FollowMyHealth Patient Portal offered by [facility] by registering at the following website: [email or web address]. By joining [employer]’s FollowMyHealth portal, you will also be able to view your health information using other applications (apps) compatible with our system.

## 2023-04-27 NOTE — PATIENT PROFILE ADULT - PUBLIC BENEFITS
Was The Patient On Physician Recommended Anticoagulation Therapy?: Please Select the Appropriate Response yes

## 2023-09-12 ENCOUNTER — DOCTOR'S OFFICE (OUTPATIENT)
Dept: URBAN - METROPOLITAN AREA CLINIC 163 | Facility: CLINIC | Age: 68
Setting detail: OPHTHALMOLOGY
End: 2023-09-12
Payer: COMMERCIAL

## 2023-09-12 DIAGNOSIS — H40.013: ICD-10-CM

## 2023-09-12 DIAGNOSIS — H25.13: ICD-10-CM

## 2023-09-12 DIAGNOSIS — H04.123: ICD-10-CM

## 2023-09-12 DIAGNOSIS — H40.033: ICD-10-CM

## 2023-09-12 DIAGNOSIS — D23.112: ICD-10-CM

## 2023-09-12 PROCEDURE — 92014 COMPRE OPH EXAM EST PT 1/>: CPT | Performed by: OPTOMETRIST

## 2023-09-12 PROCEDURE — 92250 FUNDUS PHOTOGRAPHY W/I&R: CPT | Performed by: OPTOMETRIST

## 2023-09-12 PROCEDURE — 92020 GONIOSCOPY: CPT | Performed by: OPTOMETRIST

## 2023-09-12 PROCEDURE — 92083 EXTENDED VISUAL FIELD XM: CPT | Performed by: OPTOMETRIST

## 2023-09-12 ASSESSMENT — REFRACTION_AUTOREFRACTION
OS_SPHERE: +2.50
OD_SPHERE: +1.75
OS_CYLINDER: +0.25
OS_AXIS: 010
OD_CYLINDER: +1.00
OD_AXIS: 049

## 2023-09-12 ASSESSMENT — SPHEQUIV_DERIVED
OD_SPHEQUIV: 2.5
OS_SPHEQUIV: 2.5
OS_SPHEQUIV: 2.625
OD_SPHEQUIV: 2.25
OD_SPHEQUIV: 2.5
OS_SPHEQUIV: 2.25

## 2023-09-12 ASSESSMENT — VISUAL ACUITY
OD_BCVA: 20/20
OS_BCVA: 20/20

## 2023-09-12 ASSESSMENT — REFRACTION_MANIFEST
OD_AXIS: 025
OS_AXIS: 160
OD_CYLINDER: +0.50
OD_ADD: +2.50
OS_CYLINDER: +0.50
OD_ADD: +2.50
OD_AXIS: 025
OD_SPHERE: +2.25
OD_VA1: 20/20
OS_ADD: +2.50
OS_AXIS: 160
OS_SPHERE: +2.00
OD_CYLINDER: +0.50
OS_ADD: +2.50
OD_SPHERE: +2.25
OS_VA1: 20/20
OS_CYLINDER: +0.50
OD_VA1: 20/20
OS_VA1: 20/20
OS_SPHERE: +2.25

## 2023-09-12 ASSESSMENT — CONFRONTATIONAL VISUAL FIELD TEST (CVF)
OD_FINDINGS: FULL
OS_FINDINGS: FULL

## 2023-09-12 NOTE — PATIENT PROFILE ADULT - PUBLIC BENEFITS
"Patient called writer to request if an order for Emla cream can be entered to \"help with accessing the port, as it is rather uncomfortable\". Writer updated patient that a request will be sent to the providers to review/order, if appropriate. Patient would liek order sent to [facility] in [place] [facility].     Patient also reports on-going itchy skin. He indicates that he has been using OTC hydrocortisone cream, \"that helps a little bit initially, but then the itching just comes back\". He states that his arms, bilaterally, are the \"worst area\", with some itching on the upper torso, and very mild/occasional itching on the legs, bilaterally. Patient denies any on-going rash/redness, but does indicate that a rash will appear if he itches his skin. He reports that his skin is dry as well, \"to the point it is flaky\".     Patient denies any open areas on the skin, or any fevers. Does endorse some diarrhea from time to time, but thinks this is related to his eating habits and states he is working on eliminating foods that he feels may be causing the diarrhea. He states he is eating and drinking \"just fine\". He denies any urinary symptoms, and no blood in the urine or stool. He reports his breathing is at his baseline, with no concerns raised.    Writer updated patient that call will be routed to the care team for review, and writer will follow up with him once recommendations are received.     Lee Ann Washington RN on 9/12/2023 at 9:31 AM     " no

## 2023-11-11 NOTE — PROCEDURE NOTE - NSSECUREBY_VASC_A_CORE
IVÁN on CKD2. Baseline Cr 2.1  - Trend with diuresis  - Hold ARNi   stabilization device/sterile occulsive dressing/tape

## 2024-02-28 NOTE — SWALLOW BEDSIDE ASSESSMENT ADULT - PHARYNGEAL PHASE
Speech Therapy      Visit Type: Treatment  -  Communication/cognition  Reason for consult: Cognitive communication evaluation     Relevant History/Co-morbidities: 2/27/24:  SCCAN initiated   2/26/24:  pt transferred to [facility]  2/23/24: Comm/cog Eval. 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    SUBJECTIVE  - Patient agreed to participate in therapy this date.    Pt seen in therapy office, motivated cooperative       - Patient/family goals: maximize function   Pain at onset of session:   Patient does not demonstrate pain behaviors., Patient reports pain is not an issue/concern.      OBJECTIVE         Communication/Cognition  Memory:       - Daily routine: moderate impairment (50-74% accuracy)    - Long term memory: mild impairment (75-89% accuracy)    - People memory: mild impairment (75-89% accuracy)    - Immediate memory: moderate impairment (50-74% accuracy)    - Delayed memory:  Severe impairment (25-49% accuracy)    - Visual memory: severe impairment (25-49% accuracy)  Problem Solving:      - Simple problem solving: moderate impairment (50-74% accuracy)    - Complex problem solving: severe impairment (25-49% accuracy)    - Compare/contrast moderate impairment (50-74% accuracy)    - Simple sequencing: moderate impairment (50-74% accuracy)    - Simple reasoning: severe impairment (25-49% accuracy)    - Inferential reasoning: severe impairment (25-49% accuracy)    - Insight: moderate impairment (50-74% accuracy)    - Processing speed:  mild impairment (75-89% accuracy)    - Flexibility of thought: severe impairment (25-49% accuracy)  Numerical Reasoning:      - Simple calculation: mild impairment (75-89% accuracy)        Test and Outcome Measures  Communication/cognition evaluation initiated with formal assessment, the Scales of Cognitive and Communicative Ability for Neurorehabilitation (SCCAN). The SCCAN is a research-based, norm-referenced method of  assessing cognitive and communicative functioning. Scores were as follows:    Oral Expression (speech production, repetition, naming, connected speech, and conversational interaction): 79%    Orientation: 83%    Memory (immediate and delayed recall for both visual and verbal stimuli): 32%    Speech Comprehension (attention and perception, word comprehension, following directions, and comprehension of conversational interactions): 62%    Reading Comprehension (attention and perception, matching, and contextual reading): TBA%    Writing (attention, perception, and motor control, copying, writing to dictation, picture description, and functional written communication): 57%    Attention (mental control/discrimination): TBA%    Problem Solving (planning, sequencing, organizing behavior, functional problem solving): 50%                ASSESSMENT  Impairments: verbal expression, motor speech/speech intelligibility, auditory comprehension, written expression, memory, attention/concentration, problem solving/executive function and visual deficits  Functional Limitations: communication/cognition, expression of ideas/needs, comprehension, medication management, finance management and IADLs  Requires SLP Follow Up: Yes    Discharge Recommendations  Recommendation for Discharge Location: SLP WI: Home with outpatient therapy, Home with Home therapy  Recommendation for Discharge Support: SLP [place]: 24 Hour assist, Assistance with medication, Assistance with IADLs        The patient is demonstrating good progress as evidenced by motivation, assessment data at this time.  At this time, the patient continues to demonstrate moderate impairments in verbal expression, motor speech/speech intelligibility, reading comprehension, memory, attention/concentration, problem solving/executive function, and visual deficits which limit the performance of communication/cognition, expression of ideas/needs, medication management, finance management,  and IADLs.  Patient is currently needing  moderate cueing for communication/cognition, medication management, finance management, and IADLs.              Interferring components: dysarthria        Rehab Potential: good    Therapy Participation: This patient participated in all scheduled speech therapy time this session.    Education:   - Present and ready to learn: patient  Education provided during session:  - cognition, role of SLP and dysarthria  - Results of above outlined education: Verbalizes understanding and Needs reinforcement    Patient at end of session:    - location: in wheelchair    - safety measures: equipment intact    - hand off to: rehab aide/tech    PLAN  SLP Frequency: 60min, 2/5 on 3/4 (2/28)  Duration: TBD     Cog/com orders only:  Complete SCCAN, eval meds pt completed at baseline using med box, tx to address:  visual scanning, dysarthria reduction, multiunit aud comp, memory, attention, problem solving, daughter can assist at discharge per pt as was helping at baseline although pt lived alone and was driving.      Interventions:  Communication/cognition evaluation and patient/family education    Plan/Goal Agreement:  Patient agrees with goals and individualized plan of care      RECOMMENDATIONS     -Diet:          *Liquid- Thin and Regular    -Communication Cognition:          *Patient continues to demonstrate acute communication and cognition deficits, will continue to follow.  Patient will require 24/7 supervision at discharge.        GOALS  Review date: 3/5/2024  Short Term Goals: to be met 7 days from date established, unless otherwise stated.  Pt will state two dysarthria reduction strategies and demonstrate use with multi syllable word and functional phrase productions at 90% accuracy with model.      Pt will complete left to right scanning tasks at 90% min assist.      Pt will state two memory strategies and demonstrate use during immediate and delayed recall tasks at 90% min assist.       Pt will complete multiunit auditory comprehension tasks at 90% accuracy modified independent   Long Term Goals: to be met by discharge from rehab program.  Improve motor speech to modified independent   Improve visual scanning to supervision   Improve memory to supervision   Improve attention to supervision   Improve auditory comprehension to modified independent     Documented in the chart in the following areas:    Assessment. patient education flowsheet        Therapy procedure time and total treatment time can be found documented on the Time Entry flowsheet   Within functional limits

## 2024-07-05 NOTE — ED PROVIDER NOTE - SKIN [+], MLM
Subjective   History was provided by the mother.  Abeba Kincaid is a 16 y.o. female who is here for this well child visit.  Immunization History   Administered Date(s) Administered    DTaP / HiB / IPV 01/18/2011, 09/06/2011    DTaP vaccine, pediatric  (INFANRIX) 2010, 2010    DTaP vaccine, pediatric (DAPTACEL) 06/01/2015    Hepatitis A vaccine, pediatric/adolescent (HAVRIX, VAQTA) 09/06/2011, 05/29/2012    Hepatitis B vaccine, 19 yrs and under (RECOMBIVAX, ENGERIX) 2010, 2010, 03/29/2011    HiB PRP-T conjugate vaccine (HIBERIX, ACTHIB) 2010, 03/29/2011    MMR vaccine, subcutaneous (MMR II) 05/31/2011, 12/19/2011    Meningococcal ACWY vaccine (MENVEO) 06/21/2021    Pneumococcal conjugate vaccine, 13-valent (PREVNAR 13) 2010, 01/18/2011, 03/29/2011, 09/06/2011    Poliovirus vaccine, subcutaneous (IPOL) 2010, 2010, 06/17/2014    Rotavirus pentavalent vaccine, oral (ROTATEQ) 2010    Tdap vaccine, age 7 year and older (BOOSTRIX, ADACEL) 06/21/2021    Varicella vaccine, subcutaneous (VARIVAX) 05/31/2011, 12/19/2011     History of previous adverse reactions to immunizations? no  The following portions of the patient's history were reviewed by a provider in this encounter and updated as appropriate:  Tobacco  Allergies  Meds  Problems  Med Hx  Surg Hx  Fam Hx       Well Child Assessment:  History was provided by the mother. Abeba lives with her mother and father.   Nutrition  Types of intake include cereals, cow's milk, vegetables, meats, eggs and fruits.   Dental  The patient has a dental home. The patient brushes teeth regularly. The patient flosses regularly. Last dental exam was less than 6 months ago.   Elimination  Elimination problems do not include constipation or diarrhea.   Sleep  Average sleep duration is 8 hours. The patient does not snore. There are no sleep problems.   Safety  There is no smoking in the home.   School  Current grade level is 9th.  "There are no signs of learning disabilities. Child is doing well in school.   Social  The caregiver enjoys the child. After school activity: volleyball, flag footbal. Sibling interactions are good.   Mental Health:  Depression Screening: not at risk  Thoughts of self harm/suicide? No    Menstrual Status:  Age of menarche: 13 years, LMP: 06/16/24, and Regular cycle intervals: Yes      Objective   Vitals:    07/05/24 0902   BP: 116/76   Pulse: 69   Resp: 18   Temp: 36.7 °C (98 °F)   SpO2: 99%   Weight: 83.3 kg   Height: 1.598 m (5' 2.91\")     Growth parameters are noted and are appropriate for age.  Physical Exam  Vitals reviewed. Exam conducted with a chaperone present.   Constitutional:       Appearance: Normal appearance. She is normal weight.   HENT:      Head: Normocephalic and atraumatic.      Right Ear: Tympanic membrane normal.      Left Ear: Tympanic membrane normal.      Nose: Nose normal.      Mouth/Throat:      Mouth: Mucous membranes are moist.      Pharynx: Oropharynx is clear.   Eyes:      Extraocular Movements: Extraocular movements intact.      Conjunctiva/sclera: Conjunctivae normal.      Pupils: Pupils are equal, round, and reactive to light.   Cardiovascular:      Rate and Rhythm: Normal rate and regular rhythm.      Pulses: Normal pulses.      Heart sounds: Normal heart sounds. No murmur heard.  Pulmonary:      Effort: Pulmonary effort is normal.      Breath sounds: Normal breath sounds.   Abdominal:      General: Abdomen is flat. Bowel sounds are normal.      Palpations: Abdomen is soft.   Genitourinary:     General: Normal vulva.   Musculoskeletal:         General: Normal range of motion.      Cervical back: Normal range of motion and neck supple.   Skin:     General: Skin is warm.   Neurological:      Mental Status: She is alert.   Psychiatric:         Mood and Affect: Mood normal.         Behavior: Behavior normal.         Assessment/Plan   Well adolescent.  1. Anticipatory guidance " discussed.  Specific topics reviewed: bicycle helmets, importance of regular dental care, importance of regular exercise, and importance of varied diet.  2.  Weight management:  The patient was counseled regarding nutrition and physical activity.  3. Development: appropriate for age  4.   Orders Placed This Encounter   Procedures    POCT hemoglobin manually resulted     5. Follow-up visit in 1 year for next well child visit, or sooner as needed.       lump on the head

## 2024-09-06 NOTE — PROCEDURE NOTE - ADDITIONAL PROCEDURE DETAILS
ultrasound-guided 18G IV secured. [L] Basillic vein. Good flash, flushes easily. Tourniquet removed, bed lowered, and sharps were disposed. Pt tolerated procedure well.
BARD POWER INJECTABLE     INSERTED 10CM
English

## 2024-09-14 NOTE — DISCHARGE NOTE PROVIDER - DISCHARGE DATE
Wear splint at all times elevate arm keep splint dry  Tylenol 650 mg every 6 hours as needed for pain  No sports or physical education in school  Follow-up with Dr. Sawant (orthopedics) in 1-2 days  Return to emergency department if worsening pain, numbness or tingling, discoloration of fingers, or other symptoms      Wrist Sprain in Children: What to Know  A wrist sprain is a stretch or tear of ligaments in the wrist, which are strong tissues that connect the wrist bones to each other. There are three types (grades) of wrist sprains:  Grade 1. The ligaments are stretched more than normal, but not torn. Your child may have a little pain in the wrist.  Grade 2. The ligaments are partially torn. Your child may be able to move their wrist, but not very much. There may be moderate pain in the wrist.  Grade 3. The ligaments are completely torn. Your child may find it difficult or very painful to move their wrist.  What are the causes?  A wrist sprain can be caused by using the wrist too much during work or while playing. It can also be caused by a fall or an accident.    What increases the risk?  A wrist sprain is more likely to occur in children who:  Have had an injury on the wrist or the arm.  Have poor strength and flexibility in the wrist.  Play contact sports, such as football or soccer.  Do sports that may result in a fall, such as skateboarding, biking, skiing, or snowboarding.  Do sports that put a lot of force on the joints, such as gymnastics.  Use exercise equipment that does not fit well.  What are the signs or symptoms?  Symptoms of this condition include:  Pain in the wrist, arm, or hand.  Swelling or bruised skin near the wrist, hand, or arm. The skin may look yellow or blue.  Stiffness or trouble moving the hand.  Hearing a noise, like a pop or a snap, at the time of the injury, or feeling a tear at the time of the injury.  A warm feeling in the skin around the wrist.  How is this diagnosed?  A wrist sprain is diagnosed with a physical exam. In some cases:  An X-ray is taken to make sure a bone did not break.  An MRI is done to check for torn ligaments.  How is this treated?  A wrist sprain is treated by resting the wrist and putting ice on it. Other treatments may include:  Medicine for pain and swelling.  A splint, brace, or cast to keep your child's wrist from moving.  Exercises to strengthen and stretch your child's wrist.  Surgery. This may be done if the ligament is completely torn.  Follow these instructions at home:  If your child has a splint or brace:    A splint on a person's hand and wrist.  Have your child wear the splint or brace as told. Take it off only if the health care provider says you can.  Check the skin around it every day. Tell the provider if you see problems.  Loosen the splint or brace if your child's fingers tingle, are numb, or turn cold and blue.  If your child has a cast:    Do not let your child put pressure on any part of the cast until it's hard. This may take a few hours.  Do not let your child stick anything inside it to scratch their skin. Doing this can lead to infection.  Check the skin around the cast every day. Tell your child's provider if you see problems.  It's OK to put lotion on dry skin around the cast.  Bathing    Keep the splint, brace, or cast clean and dry all the time.  If the splint, brace, or cast isn't waterproof:  Do not let it get wet.  Cover it when your child takes a bath or shower. Use a cover that doesn't let any water in.  Managing pain, stiffness, and swelling    Bag of ice on a towel on the skin.  Use ice or an ice pack as told.  If your child has a splint or brace that you can take off, remove it only as told.  Place a towel between your child's skin and the ice or between the cast and the ice.  Leave the ice on for 20 minutes, 2–3 times a day.  If your child's skin turns red, take off the ice right away to prevent skin damage. The risk of damage is higher if your child can't feel pain, heat, or cold.  Also, your child should:  Move their fingers often to reduce stiffness and swelling.  Raise their arm above the level of their heart while they're sitting or lying down. Use pillows to support your child's arm.  Activity    Make sure your child rests their wrist. Do not let the child do things that cause pain.  Have your child exercise as told.  Ask what things are safe for your child to do at home. Ask when your child can go back to work or school.  General instructions    Give your child medicines only as told.  Do not give your child aspirin. It can make your child very sick.  Contact a health care provider if:  Your child's pain, bruising, or swelling gets worse.  Your child's pain does not get better or gets worse.  The splint or brace causes skin problems.  Get help right away if:  Your child has a new or sudden sharp pain in the hand, arm, or wrist.  Your child has tingling or numbness in their hand.  Your child's fingers turn white, very red, or cold and blue.  Your child cannot move their fingers. 01-Aug-2022

## 2025-02-02 ENCOUNTER — INPATIENT (INPATIENT)
Facility: HOSPITAL | Age: 70
LOS: 11 days | Discharge: NURSING, REHAB OR LONG-TERM CARE | DRG: 639 | End: 2025-02-14
Attending: INTERNAL MEDICINE | Admitting: STUDENT IN AN ORGANIZED HEALTH CARE EDUCATION/TRAINING PROGRAM
Payer: MEDICARE

## 2025-02-02 VITALS
SYSTOLIC BLOOD PRESSURE: 83 MMHG | OXYGEN SATURATION: 91 % | DIASTOLIC BLOOD PRESSURE: 50 MMHG | HEART RATE: 120 BPM | HEIGHT: 63 IN | WEIGHT: 134.92 LBS | RESPIRATION RATE: 30 BRPM

## 2025-02-02 DIAGNOSIS — Z98.89 OTHER SPECIFIED POSTPROCEDURAL STATES: Chronic | ICD-10-CM

## 2025-02-02 DIAGNOSIS — E11.10 TYPE 2 DIABETES MELLITUS WITH KETOACIDOSIS WITHOUT COMA: ICD-10-CM

## 2025-02-02 LAB
ACETONE SERPL-MCNC: ABNORMAL
ALBUMIN SERPL ELPH-MCNC: 2.6 G/DL — LOW (ref 3.3–5.2)
ALBUMIN SERPL ELPH-MCNC: 2.7 G/DL — LOW (ref 3.3–5.2)
ALBUMIN SERPL ELPH-MCNC: 3.5 G/DL — SIGNIFICANT CHANGE UP (ref 3.3–5.2)
ALP SERPL-CCNC: 227 U/L — HIGH (ref 40–120)
ALP SERPL-CCNC: 232 U/L — HIGH (ref 40–120)
ALP SERPL-CCNC: 279 U/L — HIGH (ref 40–120)
ALT FLD-CCNC: 233 U/L — HIGH
ALT FLD-CCNC: 27 U/L — SIGNIFICANT CHANGE UP
ALT FLD-CCNC: 96 U/L — HIGH
AMMONIA BLD-MCNC: 124 UMOL/L — HIGH (ref 11–55)
ANION GAP SERPL CALC-SCNC: 24 MMOL/L — HIGH (ref 5–17)
ANION GAP SERPL CALC-SCNC: 37 MMOL/L — HIGH (ref 5–17)
ANION GAP SERPL CALC-SCNC: SIGNIFICANT CHANGE UP MMOL/L (ref 5–17)
ANISOCYTOSIS BLD QL: SIGNIFICANT CHANGE UP
APPEARANCE UR: ABNORMAL
AST SERPL-CCNC: 1266 U/L — HIGH
AST SERPL-CCNC: 419 U/L — HIGH
AST SERPL-CCNC: 73 U/L — HIGH
BACTERIA # UR AUTO: ABNORMAL /HPF
BASOPHILS # BLD AUTO: 0 K/UL — SIGNIFICANT CHANGE UP (ref 0–0.2)
BASOPHILS NFR BLD AUTO: 0 % — SIGNIFICANT CHANGE UP (ref 0–2)
BILIRUB SERPL-MCNC: 1 MG/DL — SIGNIFICANT CHANGE UP (ref 0.4–2)
BILIRUB SERPL-MCNC: 1.1 MG/DL — SIGNIFICANT CHANGE UP (ref 0.4–2)
BILIRUB SERPL-MCNC: 1.2 MG/DL — SIGNIFICANT CHANGE UP (ref 0.4–2)
BILIRUB UR-MCNC: NEGATIVE — SIGNIFICANT CHANGE UP
BUN SERPL-MCNC: 34.2 MG/DL — HIGH (ref 8–20)
BUN SERPL-MCNC: 37.2 MG/DL — HIGH (ref 8–20)
BUN SERPL-MCNC: 37.3 MG/DL — HIGH (ref 8–20)
BURR CELLS BLD QL SMEAR: PRESENT — SIGNIFICANT CHANGE UP
CALCIUM SERPL-MCNC: 10.3 MG/DL — SIGNIFICANT CHANGE UP (ref 8.4–10.5)
CALCIUM SERPL-MCNC: 12.6 MG/DL — HIGH (ref 8.4–10.5)
CALCIUM SERPL-MCNC: 9.7 MG/DL — SIGNIFICANT CHANGE UP (ref 8.4–10.5)
CAST: 2 /LPF — SIGNIFICANT CHANGE UP (ref 0–4)
CHLORIDE SERPL-SCNC: 92 MMOL/L — LOW (ref 96–108)
CHLORIDE SERPL-SCNC: 96 MMOL/L — SIGNIFICANT CHANGE UP (ref 96–108)
CHLORIDE SERPL-SCNC: 96 MMOL/L — SIGNIFICANT CHANGE UP (ref 96–108)
CO2 SERPL-SCNC: 13 MMOL/L — LOW (ref 22–29)
CO2 SERPL-SCNC: 23 MMOL/L — SIGNIFICANT CHANGE UP (ref 22–29)
CO2 SERPL-SCNC: <6 MMOL/L — CRITICAL LOW (ref 22–29)
COLOR SPEC: ABNORMAL
CREAT SERPL-MCNC: 2.22 MG/DL — HIGH (ref 0.5–1.3)
CREAT SERPL-MCNC: 2.34 MG/DL — HIGH (ref 0.5–1.3)
CREAT SERPL-MCNC: 2.7 MG/DL — HIGH (ref 0.5–1.3)
DIFF PNL FLD: ABNORMAL
EGFR: 19 ML/MIN/1.73M2 — LOW
EGFR: 22 ML/MIN/1.73M2 — LOW
EGFR: 24 ML/MIN/1.73M2 — LOW
EOSINOPHIL # BLD AUTO: 0.1 K/UL — SIGNIFICANT CHANGE UP (ref 0–0.5)
EOSINOPHIL NFR BLD AUTO: 0.9 % — SIGNIFICANT CHANGE UP (ref 0–6)
GAS PNL BLDA: SIGNIFICANT CHANGE UP
GAS PNL BLDV: SIGNIFICANT CHANGE UP
GAS PNL BLDV: SIGNIFICANT CHANGE UP
GIANT PLATELETS BLD QL SMEAR: PRESENT — SIGNIFICANT CHANGE UP
GLUCOSE BLDC GLUCOMTR-MCNC: >600 MG/DL — CRITICAL HIGH (ref 70–99)
GLUCOSE BLDC GLUCOMTR-MCNC: >600 MG/DL — CRITICAL HIGH (ref 70–99)
GLUCOSE SERPL-MCNC: 744 MG/DL — CRITICAL HIGH (ref 70–99)
GLUCOSE SERPL-MCNC: 809 MG/DL — CRITICAL HIGH (ref 70–99)
GLUCOSE SERPL-MCNC: 920 MG/DL — CRITICAL HIGH (ref 70–99)
GLUCOSE UR QL: >=1000 MG/DL
HCT VFR BLD CALC: 51.1 % — HIGH (ref 34.5–45)
HGB BLD-MCNC: 15.5 G/DL — SIGNIFICANT CHANGE UP (ref 11.5–15.5)
KETONES UR-MCNC: 15 MG/DL
LACTATE SERPL-SCNC: 12.3 MMOL/L — CRITICAL HIGH (ref 0.5–2)
LDH SERPL L TO P-CCNC: 933 U/L — HIGH (ref 98–192)
LEUKOCYTE ESTERASE UR-ACNC: ABNORMAL
LIDOCAIN IGE QN: >3000 U/L — HIGH (ref 22–51)
LYMPHOCYTES # BLD AUTO: 0.51 K/UL — LOW (ref 1–3.3)
LYMPHOCYTES # BLD AUTO: 4.4 % — LOW (ref 13–44)
MACROCYTES BLD QL: SIGNIFICANT CHANGE UP
MAGNESIUM SERPL-MCNC: 1.8 MG/DL — SIGNIFICANT CHANGE UP (ref 1.6–2.6)
MAGNESIUM SERPL-MCNC: 2.3 MG/DL — SIGNIFICANT CHANGE UP (ref 1.6–2.6)
MAGNESIUM SERPL-MCNC: 3.5 MG/DL — HIGH (ref 1.6–2.6)
MANUAL SMEAR VERIFICATION: SIGNIFICANT CHANGE UP
MCHC RBC-ENTMCNC: 30.3 G/DL — LOW (ref 32–36)
MCHC RBC-ENTMCNC: 31.5 PG — SIGNIFICANT CHANGE UP (ref 27–34)
MCV RBC AUTO: 103.9 FL — HIGH (ref 80–100)
METAMYELOCYTES # FLD: 1.7 % — HIGH (ref 0–0)
METAMYELOCYTES NFR BLD: 1.7 % — HIGH (ref 0–0)
MONOCYTES # BLD AUTO: 2.03 K/UL — HIGH (ref 0–0.9)
MONOCYTES NFR BLD AUTO: 17.5 % — HIGH (ref 2–14)
NEUTROPHILS # BLD AUTO: 7.74 K/UL — HIGH (ref 1.8–7.4)
NEUTROPHILS NFR BLD AUTO: 63.2 % — SIGNIFICANT CHANGE UP (ref 43–77)
NEUTS BAND # BLD: 3.5 % — SIGNIFICANT CHANGE UP (ref 0–8)
NEUTS BAND NFR BLD: 3.5 % — SIGNIFICANT CHANGE UP (ref 0–8)
NITRITE UR-MCNC: NEGATIVE — SIGNIFICANT CHANGE UP
NT-PROBNP SERPL-SCNC: 1061 PG/ML — HIGH (ref 0–300)
OSMOLALITY SERPL: 401 MOSMOL/KG — HIGH (ref 280–301)
PH UR: 5.5 — SIGNIFICANT CHANGE UP (ref 5–8)
PHOSPHATE SERPL-MCNC: 1 MG/DL — CRITICAL LOW (ref 2.4–4.7)
PHOSPHATE SERPL-MCNC: 6.1 MG/DL — HIGH (ref 2.4–4.7)
PLAT MORPH BLD: NORMAL — SIGNIFICANT CHANGE UP
PLATELET # BLD AUTO: 288 K/UL — SIGNIFICANT CHANGE UP (ref 150–400)
POIKILOCYTOSIS BLD QL AUTO: SLIGHT — SIGNIFICANT CHANGE UP
POLYCHROMASIA BLD QL SMEAR: SLIGHT — SIGNIFICANT CHANGE UP
POTASSIUM SERPL-MCNC: 3.6 MMOL/L — SIGNIFICANT CHANGE UP (ref 3.5–5.3)
POTASSIUM SERPL-MCNC: 4.5 MMOL/L — SIGNIFICANT CHANGE UP (ref 3.5–5.3)
POTASSIUM SERPL-MCNC: 5.2 MMOL/L — SIGNIFICANT CHANGE UP (ref 3.5–5.3)
POTASSIUM SERPL-SCNC: 3.6 MMOL/L — SIGNIFICANT CHANGE UP (ref 3.5–5.3)
POTASSIUM SERPL-SCNC: 4.5 MMOL/L — SIGNIFICANT CHANGE UP (ref 3.5–5.3)
POTASSIUM SERPL-SCNC: 5.2 MMOL/L — SIGNIFICANT CHANGE UP (ref 3.5–5.3)
PROCALCITONIN SERPL-MCNC: 0.25 NG/ML — HIGH (ref 0.02–0.1)
PROT SERPL-MCNC: 6.3 G/DL — LOW (ref 6.6–8.7)
PROT SERPL-MCNC: 6.9 G/DL — SIGNIFICANT CHANGE UP (ref 6.6–8.7)
PROT SERPL-MCNC: 9.5 G/DL — HIGH (ref 6.6–8.7)
PROT UR-MCNC: 100 MG/DL
RAPID RVP RESULT: SIGNIFICANT CHANGE UP
RBC # BLD: 4.92 M/UL — SIGNIFICANT CHANGE UP (ref 3.8–5.2)
RBC # FLD: 15.9 % — HIGH (ref 10.3–14.5)
RBC BLD AUTO: ABNORMAL
RBC CASTS # UR COMP ASSIST: 24 /HPF — HIGH (ref 0–4)
SARS-COV-2 RNA SPEC QL NAA+PROBE: SIGNIFICANT CHANGE UP
SMUDGE CELLS # BLD: PRESENT — SIGNIFICANT CHANGE UP
SODIUM SERPL-SCNC: 143 MMOL/L — SIGNIFICANT CHANGE UP (ref 135–145)
SODIUM SERPL-SCNC: 144 MMOL/L — SIGNIFICANT CHANGE UP (ref 135–145)
SODIUM SERPL-SCNC: 146 MMOL/L — HIGH (ref 135–145)
SP GR SPEC: >1.03 — HIGH (ref 1–1.03)
SQUAMOUS # UR AUTO: 7 /HPF — HIGH (ref 0–5)
TROPONIN T, HIGH SENSITIVITY RESULT: 19 NG/L — SIGNIFICANT CHANGE UP (ref 0–51)
UROBILINOGEN FLD QL: 1 MG/DL — SIGNIFICANT CHANGE UP (ref 0.2–1)
VARIANT LYMPHS # BLD: 8.8 % — HIGH (ref 0–6)
VARIANT LYMPHS NFR BLD MANUAL: 8.8 % — HIGH (ref 0–6)
WBC # BLD: 11.6 K/UL — HIGH (ref 3.8–10.5)
WBC # FLD AUTO: 11.6 K/UL — HIGH (ref 3.8–10.5)
WBC UR QL: 18 /HPF — HIGH (ref 0–5)

## 2025-02-02 PROCEDURE — 99291 CRITICAL CARE FIRST HOUR: CPT

## 2025-02-02 PROCEDURE — 71045 X-RAY EXAM CHEST 1 VIEW: CPT | Mod: 26

## 2025-02-02 PROCEDURE — 71250 CT THORAX DX C-: CPT | Mod: 26

## 2025-02-02 PROCEDURE — 70450 CT HEAD/BRAIN W/O DYE: CPT | Mod: 26

## 2025-02-02 PROCEDURE — 93010 ELECTROCARDIOGRAM REPORT: CPT

## 2025-02-02 PROCEDURE — 74176 CT ABD & PELVIS W/O CONTRAST: CPT | Mod: 26

## 2025-02-02 RX ORDER — ETOMIDATE 2 MG/ML
20 INJECTION, SOLUTION INTRAVENOUS ONCE
Refills: 0 | Status: COMPLETED | OUTPATIENT
Start: 2025-02-02 | End: 2025-02-02

## 2025-02-02 RX ORDER — SODIUM BICARBONATE 42 MG/ML
50 INJECTION, SOLUTION INTRAVENOUS ONCE
Refills: 0 | Status: COMPLETED | OUTPATIENT
Start: 2025-02-02 | End: 2025-02-02

## 2025-02-02 RX ORDER — POTASSIUM CHLORIDE 750 MG/1
40 TABLET, EXTENDED RELEASE ORAL EVERY 4 HOURS
Refills: 0 | Status: COMPLETED | OUTPATIENT
Start: 2025-02-02 | End: 2025-02-03

## 2025-02-02 RX ORDER — POTASSIUM CHLORIDE 750 MG/1
10 TABLET, EXTENDED RELEASE ORAL
Refills: 0 | Status: COMPLETED | OUTPATIENT
Start: 2025-02-02 | End: 2025-02-03

## 2025-02-02 RX ORDER — LACTULOSE 10 G/15 ML
30 SOLUTION, ORAL ORAL ONCE
Refills: 0 | Status: COMPLETED | OUTPATIENT
Start: 2025-02-02 | End: 2025-02-02

## 2025-02-02 RX ORDER — SODIUM CHLORIDE 9 G/ML
1000 INJECTION, SOLUTION INTRAVENOUS ONCE
Refills: 0 | Status: COMPLETED | OUTPATIENT
Start: 2025-02-02 | End: 2025-02-02

## 2025-02-02 RX ORDER — PIPERACILLIN SODIUM AND TAZOBACTAM SODIUM 2; 250 G/50ML; MG/50ML
3.38 INJECTION, POWDER, FOR SOLUTION INTRAVENOUS EVERY 12 HOURS
Refills: 0 | Status: DISCONTINUED | OUTPATIENT
Start: 2025-02-02 | End: 2025-02-04

## 2025-02-02 RX ORDER — ANTISEPTIC SURGICAL SCRUB 0.04 MG/ML
15 SOLUTION TOPICAL EVERY 12 HOURS
Refills: 0 | Status: DISCONTINUED | OUTPATIENT
Start: 2025-02-02 | End: 2025-02-08

## 2025-02-02 RX ORDER — ROCURONIUM BROMIDE 10 MG/ML
100 INJECTION INTRAVENOUS ONCE
Refills: 0 | Status: COMPLETED | OUTPATIENT
Start: 2025-02-02 | End: 2025-02-02

## 2025-02-02 RX ORDER — HEPARIN SODIUM,PORCINE 10000/ML
5000 VIAL (ML) INJECTION EVERY 12 HOURS
Refills: 0 | Status: DISCONTINUED | OUTPATIENT
Start: 2025-02-02 | End: 2025-02-04

## 2025-02-02 RX ORDER — FENTANYL CITRATE 50 UG/ML
50 INJECTION INTRAMUSCULAR; INTRAVENOUS ONCE
Refills: 0 | Status: DISCONTINUED | OUTPATIENT
Start: 2025-02-02 | End: 2025-02-02

## 2025-02-02 RX ORDER — SODIUM CHLORIDE 9 G/ML
1000 INJECTION, SOLUTION INTRAVENOUS
Refills: 0 | Status: DISCONTINUED | OUTPATIENT
Start: 2025-02-02 | End: 2025-02-03

## 2025-02-02 RX ORDER — PIPERACILLIN SODIUM AND TAZOBACTAM SODIUM 2; 250 G/50ML; MG/50ML
3.38 INJECTION, POWDER, FOR SOLUTION INTRAVENOUS ONCE
Refills: 0 | Status: COMPLETED | OUTPATIENT
Start: 2025-02-02 | End: 2025-02-02

## 2025-02-02 RX ORDER — SODIUM BICARBONATE 42 MG/ML
0.12 INJECTION, SOLUTION INTRAVENOUS
Qty: 150 | Refills: 0 | Status: DISCONTINUED | OUTPATIENT
Start: 2025-02-02 | End: 2025-02-03

## 2025-02-02 RX ORDER — VANCOMYCIN HYDROCHLORIDE 50 MG/ML
1000 KIT ORAL ONCE
Refills: 0 | Status: COMPLETED | OUTPATIENT
Start: 2025-02-02 | End: 2025-02-02

## 2025-02-02 RX ORDER — PANTOPRAZOLE 20 MG/1
40 TABLET, DELAYED RELEASE ORAL DAILY
Refills: 0 | Status: DISCONTINUED | OUTPATIENT
Start: 2025-02-02 | End: 2025-02-09

## 2025-02-02 RX ORDER — LACTULOSE 10 G/15 ML
10 SOLUTION, ORAL ORAL EVERY 8 HOURS
Refills: 0 | Status: DISCONTINUED | OUTPATIENT
Start: 2025-02-02 | End: 2025-02-03

## 2025-02-02 RX ORDER — PROPOFOL 10 MG/ML
10 INJECTION, EMULSION INTRAVENOUS
Qty: 1000 | Refills: 0 | Status: DISCONTINUED | OUTPATIENT
Start: 2025-02-02 | End: 2025-02-03

## 2025-02-02 RX ORDER — SOD PHOSPHATE,MONOBASIC-DIBAS 3MMOL/ML
30 VIAL (ML) INTRAVENOUS ONCE
Refills: 0 | Status: COMPLETED | OUTPATIENT
Start: 2025-02-02 | End: 2025-02-03

## 2025-02-02 RX ORDER — LACTULOSE 10 G/15 ML
200 SOLUTION, ORAL ORAL ONCE
Refills: 0 | Status: DISCONTINUED | OUTPATIENT
Start: 2025-02-02 | End: 2025-02-02

## 2025-02-02 RX ADMIN — PROPOFOL 3.67 MICROGRAM(S)/KG/MIN: 10 INJECTION, EMULSION INTRAVENOUS at 16:54

## 2025-02-02 RX ADMIN — SODIUM CHLORIDE 1000 MILLILITER(S): 9 INJECTION, SOLUTION INTRAVENOUS at 13:35

## 2025-02-02 RX ADMIN — PROPOFOL 3.67 MICROGRAM(S)/KG/MIN: 10 INJECTION, EMULSION INTRAVENOUS at 22:22

## 2025-02-02 RX ADMIN — SODIUM BICARBONATE 50 MEQ/KG/HR: 42 INJECTION, SOLUTION INTRAVENOUS at 21:19

## 2025-02-02 RX ADMIN — FENTANYL CITRATE 50 MICROGRAM(S): 50 INJECTION INTRAMUSCULAR; INTRAVENOUS at 22:22

## 2025-02-02 RX ADMIN — SODIUM CHLORIDE 100 MILLILITER(S): 9 INJECTION, SOLUTION INTRAVENOUS at 22:59

## 2025-02-02 RX ADMIN — Medication 30 GRAM(S): at 18:31

## 2025-02-02 RX ADMIN — ETOMIDATE 20 MILLIGRAM(S): 2 INJECTION, SOLUTION INTRAVENOUS at 14:13

## 2025-02-02 RX ADMIN — PIPERACILLIN SODIUM AND TAZOBACTAM SODIUM 25 GRAM(S): 2; 250 INJECTION, POWDER, FOR SOLUTION INTRAVENOUS at 22:22

## 2025-02-02 RX ADMIN — PIPERACILLIN SODIUM AND TAZOBACTAM SODIUM 200 GRAM(S): 2; 250 INJECTION, POWDER, FOR SOLUTION INTRAVENOUS at 13:37

## 2025-02-02 RX ADMIN — PROPOFOL 3.67 MICROGRAM(S)/KG/MIN: 10 INJECTION, EMULSION INTRAVENOUS at 21:19

## 2025-02-02 RX ADMIN — PANTOPRAZOLE 40 MILLIGRAM(S): 20 TABLET, DELAYED RELEASE ORAL at 22:21

## 2025-02-02 RX ADMIN — SODIUM BICARBONATE 50 MILLIEQUIVALENT(S): 42 INJECTION, SOLUTION INTRAVENOUS at 15:07

## 2025-02-02 RX ADMIN — FENTANYL CITRATE 50 MICROGRAM(S): 50 INJECTION INTRAMUSCULAR; INTRAVENOUS at 22:45

## 2025-02-02 RX ADMIN — Medication 12 UNIT(S)/HR: at 23:04

## 2025-02-02 RX ADMIN — SODIUM BICARBONATE 50 MILLIEQUIVALENT(S): 42 INJECTION, SOLUTION INTRAVENOUS at 13:35

## 2025-02-02 RX ADMIN — SODIUM BICARBONATE 1000 MEQ/KG/HR: 42 INJECTION, SOLUTION INTRAVENOUS at 15:02

## 2025-02-02 RX ADMIN — Medication 6 UNIT(S)/HR: at 21:19

## 2025-02-02 RX ADMIN — ROCURONIUM BROMIDE 100 MILLIGRAM(S): 10 INJECTION INTRAVENOUS at 14:13

## 2025-02-02 RX ADMIN — Medication 10 GRAM(S): at 22:21

## 2025-02-02 RX ADMIN — VANCOMYCIN HYDROCHLORIDE 250 MILLIGRAM(S): KIT at 13:37

## 2025-02-02 RX ADMIN — Medication 6 UNIT(S)/HR: at 15:07

## 2025-02-02 RX ADMIN — SODIUM CHLORIDE 1000 MILLILITER(S): 9 INJECTION, SOLUTION INTRAVENOUS at 21:18

## 2025-02-02 RX ADMIN — POTASSIUM CHLORIDE 100 MILLIEQUIVALENT(S): 750 TABLET, EXTENDED RELEASE ORAL at 22:59

## 2025-02-02 NOTE — ED ADULT NURSE REASSESSMENT NOTE - NS ED NURSE REASSESS COMMENT FT1
icu PA called and made aware of heart rate in 140'150. verbal order to administer 500 cc LR at this time. med administered

## 2025-02-02 NOTE — ED ADULT NURSE NOTE - NS ED NURSE LEVEL OF CONSCIOUSNESS SPEECH
[FreeTextEntry3] : [facility] Staff   I saw and evaluated Ms. JAY with Dr. Rojas.   I modified the note above (if indicated) and agree with its contents in the present form.  DM2.  Fingersticks elevated.  We will increase the insulin regimen to Levemir 12 units before breakfast, 18 units before dinner, Lispro 7 units with meals, metformin 500mg BID.  Hypoglycemia precautions discussed. Follow up in one week. Anatomy ultrasound in one week.    Reza Sheppard MD          Unable to speak

## 2025-02-02 NOTE — ED ADULT NURSE NOTE - OBJECTIVE STATEMENT
assumed care of pt in CC. MD Acevedo at bedside.  as per ems pt has had flu like sym for the last couple of day. pt was found to be unresponsive in respiratory distress when they received call. p noted to be sating 86% on non rebreather, MD Lundberg and MD Acevedo at bedside to intubate pt. unable to obtain further information at this time. palced on cardiac monitor and . rr even and labored.

## 2025-02-02 NOTE — ED PROVIDER NOTE - PHYSICAL EXAMINATION
Gen: obtunded, in respiratory distress  HEENT: Normocephalic atraumatic. EOMI. No scleral icterus. very dry mucus membranes. Dry crusting to BL eyelids.   CV: tachycardic, regular. Audible S1 and S2. No murmurs. 2+ radial and PT pulses   Pulm: Diminshed L sided breath sounds, R sided rhonchi, tachypnea.   Abdomen: soft, normoactive BS, non distended, nontender, no rebound, no guarding  Musculoskeletal:  Moving all extremities equally. No gross deformity.   Skin: No rashes or lesions. Warm.  Neurologic: localizes to pain

## 2025-02-02 NOTE — H&P ADULT - HISTORY OF PRESENT ILLNESS
71 y/o F (name: Laverne Lai)  with hx of DM and cirrhosis, was brought in by EMS for AMS at home.  PEr son, patient was sick in bed for past two days, and today patient's sister call 911 to bring her to hospital because she was waking up.  In  In the ED patient was obtunded, intubated for airway protection.   Labs significant for DKA with incalculable GAP, s/p 3 IVF bolus, started on bicarb by ED, and s/p 1 dose of vanc and zosyn   MICU consulted

## 2025-02-02 NOTE — H&P ADULT - NSHPPHYSICALEXAM_GEN_ALL_CORE
GEN: intubated sedated  HEENT: dried MMM, pupil reactive   NECK: supple  RESP: CTA b/l   Cardiac: tachy, no murmur, S1 S2 heard  abd: soft, NTND, BS+  ext: warm to touch ,. no edema

## 2025-02-02 NOTE — ED ADULT NURSE NOTE - NSFALLHARMRISKINTERV_ED_ALL_ED
Assistance OOB with selected safe patient handling equipment if applicable/Assistance with ambulation/Communicate risk of Fall with Harm to all staff, patient, and family/Monitor gait and stability/Provide visual cue: red socks, yellow wristband, yellow gown, etc/Reinforce activity limits and safety measures with patient and family/Bed in lowest position, wheels locked, appropriate side rails in place/Call bell, personal items and telephone in reach/Instruct patient to call for assistance before getting out of bed/chair/stretcher/Non-slip footwear applied when patient is off stretcher/[place] to call system/Physically safe environment - no spills, clutter or unnecessary equipment/Purposeful Proactive Rounding/Room/bathroom lighting operational, light cord in reach

## 2025-02-02 NOTE — ED PROVIDER NOTE - OBJECTIVE STATEMENT
Patient is an apx 69yo F with PMHx of DM and cirrhosis who presents to the ED complaining of altered mental status. Patient from home, per EMS family reported she was sick in the bed for the past two days. Apparently flu like symptoms, confusion, not eating or drinking. Today completely obtunded. EMS was called, patient localizing to painful stimuli only, apparently hypoxic to 86% on RA, placed on non rebreather. In the ED, patient with poor mental status, intubated for airway protection. Very tachypneic, dry.

## 2025-02-02 NOTE — ED PROVIDER NOTE - CLINICAL SUMMARY MEDICAL DECISION MAKING FREE TEXT BOX
Patient is an apx 69yo F with PMHx of DM and cirrhosis who presents to the ED complaining of altered mental status. Suspect metabolic encephalopathy from sepsis vs hyperammonemia given hx of cirrhosis vs DKA given POCT glucose >600, kussmaul breathing. Sepsis orders placed, patient intubated for airway protection.

## 2025-02-02 NOTE — H&P ADULT - ASSESSMENT
69 y/o F (name: Laverne Lai)  with hx of DM and cirrhosis, was brought in by EMS for AMS at home.  PEr son, patient was sick in bed for past two days, and today patient's sister call 911 to bring her to hospital because she was waking up.  In  In the ED patient was obtunded, intubated for airway protection.   Labs significant for DKA with incalculable GAP, s/p 3 IVF bolus, started on bicarb by ED, and s/p 1 dose of vanc and zosyn   MICU consulted  ammonia 124      #pancreatitis  #colitis   #multifocal pneumonia  #severe DKA   #metabolic / hepatic  encephalpathy   #Acute hypoxic respiratory failure  #cirrhosis  #JOVANNI  #transaminitis  #r/o UTI       Neuro: ct head negative, will need to wean off prop and perform SAT , evaluate mental status  start lacutlose and rfiaximin     CV: monitor bp, maintain MAP > 65, continue IVF    Respiratory: maintain on mechanical vent, 6cc/kg tidal volume, daily SAT/SBT     GI: NPO for now, to place OG tube,  lactulose and rifaximin,  MELD score , PPI ppx     Renal: likely ATN, avoid nephrotoxic agent, renally dose med, maintain electrolyte mg > 2 and k > 4 .  contnue IVF.  will d/c bicarb with pH improves     ID: Vanc by level, AM vanc random level 2/3, continue empirical zosyn     Heme/Onc: heparin subq for dvt ppx     Endocrine: continue insulin drip, maintain fluid with LR, monitor fluid status given hx of cirrhosis.  BMP and vbg q4h     Lines/Tubes/Drains: IV and manriquez    Code status:full   Dispo MICU     prognosis guarded

## 2025-02-02 NOTE — ED ADULT NURSE REASSESSMENT NOTE - NS ED NURSE REASSESS COMMENT FT1
Two RN check performed with ENZO Wise prior to administering Insulin infusion. Medication administered as per order.

## 2025-02-02 NOTE — ED ADULT NURSE NOTE - CHIEF COMPLAINT QUOTE
pt BIBA minimally responsive in respiratory distress, SOB x few days. pt hypoxic on NRB on arrival, skin warm to touch.  placed in critical care, RT paged and ER MD @ bedside.

## 2025-02-02 NOTE — ED PROVIDER NOTE - CRITICAL CARE ATTENDING CONTRIBUTION TO CARE
Upon my evaluation, this patient had a high probability of imminent or life-threatening deterioration due to _AMS/ACUTE RESP FAILURE__ , which required my direct attention, intervention, and personal management.    I have personally provided __40__ minutes of critical care time exclusive of time spent on separately billable procedures.  Time includes review of laboratory data, radiology results, discussion with consultants, and monitoring for potential decompensation.  Interventions were performed as documented.    70yoF; with PMH HTN, alcohol abuse, alcoholic liver cirrhosis complicated by esophageal varices and hepatic encephalopathy, DM2,  neuropathy, Anemia, Pancreatitis; Upon my evaluation, this patient had a high probability of imminent or life-threatening deterioration due to _AMS/ACUTE RESP FAILURE__ , which required my direct attention, intervention, and personal management.    I have personally provided __40__ minutes of critical care time exclusive of time spent on separately billable procedures.  Time includes review of laboratory data, radiology results, discussion with consultants, and monitoring for potential decompensation.  Interventions were performed as documented.    70yoF; with PMH HTN, alcohol abuse, alcoholic liver cirrhosis complicated by esophageal varices and hepatic encephalopathy, DM2,  neuropathy, Anemia, Pancreatitis; Now presenting with AMS.  As per EMS, patient with generalized malaise x 2 days and laying in bed for the past 2 days.  Increased confusion over the past 2 to 3 days.  No p.o. intake over the past 2 days.  Upon arrival patient obtunded, hypoxic, tachypneic, FS high upon arrival.  General:    obtunded, tachypneic  Head:     NC/AT, EOMI, oral mucosa dry  Neck:     trachea midline  Lungs:    bilateral rhonchi  CVS:     S1S2, tachycardic  Abd:     +BS, s/nt/nd, no organomegaly  Ext:    2+ radial and pedal pulses, no c/c/e  Neuro: obtunded, not moving with painful stimuli  A/P: 70yoF; with PMH HTN, alcohol abuse, alcoholic liver cirrhosis complicated by esophageal varices and hepatic encephalopathy, DM2,  neuropathy, Anemia, Pancreatitis; Now presenting with AMS.  DDx including hepatic encephalopathy, DKA, sepsis, pneumonia. patient required emergent stabilization with intubation, ivf, levophed for hypotension, stat abg, ammonia level, ct to eval for source of infection, will require ICU level of care.

## 2025-02-02 NOTE — H&P ADULT - CRITICAL CARE ATTENDING COMMENT
greater than 50% of time spent reviewing labs, notes, orders and radiographs, coordinating care  discussed with nursing,  icu team, consultants  critically ill patient, actively managing ventilator,  high risk for clinical deterioration

## 2025-02-03 LAB
ALBUMIN SERPL ELPH-MCNC: 2.3 G/DL — LOW (ref 3.3–5.2)
ALBUMIN SERPL ELPH-MCNC: 2.9 G/DL — LOW (ref 3.3–5.2)
ALBUMIN SERPL ELPH-MCNC: 3.1 G/DL — LOW (ref 3.3–5.2)
ALBUMIN SERPL ELPH-MCNC: 3.1 G/DL — LOW (ref 3.3–5.2)
ALBUMIN SERPL ELPH-MCNC: 3.5 G/DL — SIGNIFICANT CHANGE UP (ref 3.3–5.2)
ALBUMIN SERPL ELPH-MCNC: 3.6 G/DL — SIGNIFICANT CHANGE UP (ref 3.3–5.2)
ALP SERPL-CCNC: 141 U/L — HIGH (ref 40–120)
ALP SERPL-CCNC: 141 U/L — HIGH (ref 40–120)
ALP SERPL-CCNC: 146 U/L — HIGH (ref 40–120)
ALP SERPL-CCNC: 167 U/L — HIGH (ref 40–120)
ALP SERPL-CCNC: 167 U/L — HIGH (ref 40–120)
ALP SERPL-CCNC: 186 U/L — HIGH (ref 40–120)
ALP SERPL-CCNC: 190 U/L — HIGH (ref 40–120)
ALP SERPL-CCNC: 222 U/L — HIGH (ref 40–120)
ALT FLD-CCNC: 244 U/L — HIGH
ALT FLD-CCNC: 258 U/L — HIGH
ALT FLD-CCNC: 264 U/L — HIGH
ALT FLD-CCNC: 276 U/L — HIGH
ALT FLD-CCNC: 287 U/L — HIGH
ALT FLD-CCNC: 287 U/L — HIGH
ALT FLD-CCNC: 307 U/L — HIGH
ALT FLD-CCNC: 311 U/L — HIGH
AMMONIA BLD-MCNC: 101 UMOL/L — HIGH (ref 11–55)
ANION GAP SERPL CALC-SCNC: 22 MMOL/L — HIGH (ref 5–17)
ANION GAP SERPL CALC-SCNC: 23 MMOL/L — HIGH (ref 5–17)
ANION GAP SERPL CALC-SCNC: 24 MMOL/L — HIGH (ref 5–17)
ANION GAP SERPL CALC-SCNC: 25 MMOL/L — HIGH (ref 5–17)
ANION GAP SERPL CALC-SCNC: 26 MMOL/L — HIGH (ref 5–17)
ANION GAP SERPL CALC-SCNC: 26 MMOL/L — HIGH (ref 5–17)
AST SERPL-CCNC: 1067 U/L — HIGH
AST SERPL-CCNC: 1140 U/L — HIGH
AST SERPL-CCNC: 1251 U/L — HIGH
AST SERPL-CCNC: 1376 U/L — HIGH
AST SERPL-CCNC: 1451 U/L — HIGH
AST SERPL-CCNC: 1500 U/L — HIGH
AST SERPL-CCNC: 1531 U/L — HIGH
AST SERPL-CCNC: 1574 U/L — HIGH
BILIRUB SERPL-MCNC: 0.9 MG/DL — SIGNIFICANT CHANGE UP (ref 0.4–2)
BILIRUB SERPL-MCNC: 1 MG/DL — SIGNIFICANT CHANGE UP (ref 0.4–2)
BILIRUB SERPL-MCNC: 1.1 MG/DL — SIGNIFICANT CHANGE UP (ref 0.4–2)
BILIRUB SERPL-MCNC: 1.2 MG/DL — SIGNIFICANT CHANGE UP (ref 0.4–2)
BILIRUB SERPL-MCNC: 1.3 MG/DL — SIGNIFICANT CHANGE UP (ref 0.4–2)
BILIRUB SERPL-MCNC: 1.5 MG/DL — SIGNIFICANT CHANGE UP (ref 0.4–2)
BILIRUB SERPL-MCNC: 2.1 MG/DL — HIGH (ref 0.4–2)
BILIRUB SERPL-MCNC: 2.1 MG/DL — HIGH (ref 0.4–2)
BUN SERPL-MCNC: 28.5 MG/DL — HIGH (ref 8–20)
BUN SERPL-MCNC: 29.7 MG/DL — HIGH (ref 8–20)
BUN SERPL-MCNC: 31.3 MG/DL — HIGH (ref 8–20)
BUN SERPL-MCNC: 33.1 MG/DL — HIGH (ref 8–20)
BUN SERPL-MCNC: 34.3 MG/DL — HIGH (ref 8–20)
BUN SERPL-MCNC: 35.7 MG/DL — HIGH (ref 8–20)
BUN SERPL-MCNC: 36.6 MG/DL — HIGH (ref 8–20)
BUN SERPL-MCNC: 36.9 MG/DL — HIGH (ref 8–20)
CALCIUM SERPL-MCNC: 10 MG/DL — SIGNIFICANT CHANGE UP (ref 8.4–10.5)
CALCIUM SERPL-MCNC: 8.3 MG/DL — LOW (ref 8.4–10.5)
CALCIUM SERPL-MCNC: 8.4 MG/DL — SIGNIFICANT CHANGE UP (ref 8.4–10.5)
CALCIUM SERPL-MCNC: 8.5 MG/DL — SIGNIFICANT CHANGE UP (ref 8.4–10.5)
CALCIUM SERPL-MCNC: 8.8 MG/DL — SIGNIFICANT CHANGE UP (ref 8.4–10.5)
CALCIUM SERPL-MCNC: 8.9 MG/DL — SIGNIFICANT CHANGE UP (ref 8.4–10.5)
CALCIUM SERPL-MCNC: 9.2 MG/DL — SIGNIFICANT CHANGE UP (ref 8.4–10.5)
CALCIUM SERPL-MCNC: 9.6 MG/DL — SIGNIFICANT CHANGE UP (ref 8.4–10.5)
CHLORIDE SERPL-SCNC: 93 MMOL/L — LOW (ref 96–108)
CHLORIDE SERPL-SCNC: 94 MMOL/L — LOW (ref 96–108)
CHLORIDE SERPL-SCNC: 95 MMOL/L — LOW (ref 96–108)
CHLORIDE SERPL-SCNC: 96 MMOL/L — SIGNIFICANT CHANGE UP (ref 96–108)
CHLORIDE SERPL-SCNC: 96 MMOL/L — SIGNIFICANT CHANGE UP (ref 96–108)
CHLORIDE SERPL-SCNC: 98 MMOL/L — SIGNIFICANT CHANGE UP (ref 96–108)
CHLORIDE SERPL-SCNC: 98 MMOL/L — SIGNIFICANT CHANGE UP (ref 96–108)
CHLORIDE SERPL-SCNC: 99 MMOL/L — SIGNIFICANT CHANGE UP (ref 96–108)
CO2 SERPL-SCNC: 22 MMOL/L — SIGNIFICANT CHANGE UP (ref 22–29)
CO2 SERPL-SCNC: 24 MMOL/L — SIGNIFICANT CHANGE UP (ref 22–29)
CO2 SERPL-SCNC: 24 MMOL/L — SIGNIFICANT CHANGE UP (ref 22–29)
CO2 SERPL-SCNC: 25 MMOL/L — SIGNIFICANT CHANGE UP (ref 22–29)
CO2 SERPL-SCNC: 25 MMOL/L — SIGNIFICANT CHANGE UP (ref 22–29)
CO2 SERPL-SCNC: 27 MMOL/L — SIGNIFICANT CHANGE UP (ref 22–29)
CO2 SERPL-SCNC: 29 MMOL/L — SIGNIFICANT CHANGE UP (ref 22–29)
CO2 SERPL-SCNC: 30 MMOL/L — HIGH (ref 22–29)
CREAT SERPL-MCNC: 1.61 MG/DL — HIGH (ref 0.5–1.3)
CREAT SERPL-MCNC: 1.7 MG/DL — HIGH (ref 0.5–1.3)
CREAT SERPL-MCNC: 1.81 MG/DL — HIGH (ref 0.5–1.3)
CREAT SERPL-MCNC: 2.14 MG/DL — HIGH (ref 0.5–1.3)
CREAT SERPL-MCNC: 2.29 MG/DL — HIGH (ref 0.5–1.3)
CREAT SERPL-MCNC: 2.29 MG/DL — HIGH (ref 0.5–1.3)
CREAT SERPL-MCNC: 2.36 MG/DL — HIGH (ref 0.5–1.3)
CREAT SERPL-MCNC: 2.42 MG/DL — HIGH (ref 0.5–1.3)
E COLI DNA BLD POS QL NAA+NON-PROBE: SIGNIFICANT CHANGE UP
EGFR: 21 ML/MIN/1.73M2 — LOW
EGFR: 22 ML/MIN/1.73M2 — LOW
EGFR: 23 ML/MIN/1.73M2 — LOW
EGFR: 23 ML/MIN/1.73M2 — LOW
EGFR: 25 ML/MIN/1.73M2 — LOW
EGFR: 30 ML/MIN/1.73M2 — LOW
EGFR: 32 ML/MIN/1.73M2 — LOW
EGFR: 35 ML/MIN/1.73M2 — LOW
GAS PNL BLDA: SIGNIFICANT CHANGE UP
GAS PNL BLDV: SIGNIFICANT CHANGE UP
GLUCOSE BLDC GLUCOMTR-MCNC: 163 MG/DL — HIGH (ref 70–99)
GLUCOSE BLDC GLUCOMTR-MCNC: 172 MG/DL — HIGH (ref 70–99)
GLUCOSE BLDC GLUCOMTR-MCNC: 192 MG/DL — HIGH (ref 70–99)
GLUCOSE BLDC GLUCOMTR-MCNC: 202 MG/DL — HIGH (ref 70–99)
GLUCOSE BLDC GLUCOMTR-MCNC: 212 MG/DL — HIGH (ref 70–99)
GLUCOSE BLDC GLUCOMTR-MCNC: 230 MG/DL — HIGH (ref 70–99)
GLUCOSE BLDC GLUCOMTR-MCNC: 246 MG/DL — HIGH (ref 70–99)
GLUCOSE BLDC GLUCOMTR-MCNC: 256 MG/DL — HIGH (ref 70–99)
GLUCOSE BLDC GLUCOMTR-MCNC: 270 MG/DL — HIGH (ref 70–99)
GLUCOSE BLDC GLUCOMTR-MCNC: 272 MG/DL — HIGH (ref 70–99)
GLUCOSE BLDC GLUCOMTR-MCNC: 279 MG/DL — HIGH (ref 70–99)
GLUCOSE BLDC GLUCOMTR-MCNC: 285 MG/DL — HIGH (ref 70–99)
GLUCOSE BLDC GLUCOMTR-MCNC: 300 MG/DL — HIGH (ref 70–99)
GLUCOSE BLDC GLUCOMTR-MCNC: 402 MG/DL — HIGH (ref 70–99)
GLUCOSE BLDC GLUCOMTR-MCNC: 429 MG/DL — HIGH (ref 70–99)
GLUCOSE BLDC GLUCOMTR-MCNC: 443 MG/DL — HIGH (ref 70–99)
GLUCOSE BLDC GLUCOMTR-MCNC: 498 MG/DL — CRITICAL HIGH (ref 70–99)
GLUCOSE BLDC GLUCOMTR-MCNC: 535 MG/DL — CRITICAL HIGH (ref 70–99)
GLUCOSE BLDC GLUCOMTR-MCNC: 539 MG/DL — CRITICAL HIGH (ref 70–99)
GLUCOSE BLDC GLUCOMTR-MCNC: 554 MG/DL — CRITICAL HIGH (ref 70–99)
GLUCOSE BLDC GLUCOMTR-MCNC: >600 MG/DL — CRITICAL HIGH (ref 70–99)
GLUCOSE SERPL-MCNC: 175 MG/DL — HIGH (ref 70–99)
GLUCOSE SERPL-MCNC: 217 MG/DL — HIGH (ref 70–99)
GLUCOSE SERPL-MCNC: 251 MG/DL — HIGH (ref 70–99)
GLUCOSE SERPL-MCNC: 269 MG/DL — HIGH (ref 70–99)
GLUCOSE SERPL-MCNC: 335 MG/DL — HIGH (ref 70–99)
GLUCOSE SERPL-MCNC: 520 MG/DL — CRITICAL HIGH (ref 70–99)
GLUCOSE SERPL-MCNC: 633 MG/DL — CRITICAL HIGH (ref 70–99)
GLUCOSE SERPL-MCNC: 703 MG/DL — CRITICAL HIGH (ref 70–99)
GRAM STN FLD: ABNORMAL
HCT VFR BLD CALC: 34.8 % — SIGNIFICANT CHANGE UP (ref 34.5–45)
HGB BLD-MCNC: 11.7 G/DL — SIGNIFICANT CHANGE UP (ref 11.5–15.5)
INR BLD: 1.64 RATIO — HIGH (ref 0.85–1.16)
LDH SERPL L TO P-CCNC: 864 U/L — HIGH (ref 98–192)
LIDOCAIN IGE QN: >3000 U/L — HIGH (ref 22–51)
MAGNESIUM SERPL-MCNC: 1.6 MG/DL — SIGNIFICANT CHANGE UP (ref 1.6–2.6)
MAGNESIUM SERPL-MCNC: 1.7 MG/DL — SIGNIFICANT CHANGE UP (ref 1.6–2.6)
MAGNESIUM SERPL-MCNC: 1.8 MG/DL — SIGNIFICANT CHANGE UP (ref 1.6–2.6)
MAGNESIUM SERPL-MCNC: 1.9 MG/DL — SIGNIFICANT CHANGE UP (ref 1.6–2.6)
MAGNESIUM SERPL-MCNC: 2.1 MG/DL — SIGNIFICANT CHANGE UP (ref 1.8–2.6)
MAGNESIUM SERPL-MCNC: 2.2 MG/DL — SIGNIFICANT CHANGE UP (ref 1.6–2.6)
MCHC RBC-ENTMCNC: 31.6 PG — SIGNIFICANT CHANGE UP (ref 27–34)
MCHC RBC-ENTMCNC: 33.6 G/DL — SIGNIFICANT CHANGE UP (ref 32–36)
MCV RBC AUTO: 94.1 FL — SIGNIFICANT CHANGE UP (ref 80–100)
METHOD TYPE: SIGNIFICANT CHANGE UP
MRSA PCR RESULT.: SIGNIFICANT CHANGE UP
PHOSPHATE SERPL-MCNC: 0.7 MG/DL — CRITICAL LOW (ref 2.4–4.7)
PHOSPHATE SERPL-MCNC: 0.9 MG/DL — CRITICAL LOW (ref 2.4–4.7)
PHOSPHATE SERPL-MCNC: 2.5 MG/DL — SIGNIFICANT CHANGE UP (ref 2.4–4.7)
PHOSPHATE SERPL-MCNC: 3 MG/DL — SIGNIFICANT CHANGE UP (ref 2.4–4.7)
PHOSPHATE SERPL-MCNC: 3.9 MG/DL — SIGNIFICANT CHANGE UP (ref 2.4–4.7)
PHOSPHATE SERPL-MCNC: 5 MG/DL — HIGH (ref 2.4–4.7)
PLATELET # BLD AUTO: 117 K/UL — LOW (ref 150–400)
POTASSIUM SERPL-MCNC: 3 MMOL/L — LOW (ref 3.5–5.3)
POTASSIUM SERPL-MCNC: 3.2 MMOL/L — LOW (ref 3.5–5.3)
POTASSIUM SERPL-MCNC: 3.6 MMOL/L — SIGNIFICANT CHANGE UP (ref 3.5–5.3)
POTASSIUM SERPL-MCNC: 3.7 MMOL/L — SIGNIFICANT CHANGE UP (ref 3.5–5.3)
POTASSIUM SERPL-MCNC: 3.9 MMOL/L — SIGNIFICANT CHANGE UP (ref 3.5–5.3)
POTASSIUM SERPL-MCNC: 4 MMOL/L — SIGNIFICANT CHANGE UP (ref 3.5–5.3)
POTASSIUM SERPL-MCNC: 4 MMOL/L — SIGNIFICANT CHANGE UP (ref 3.5–5.3)
POTASSIUM SERPL-MCNC: 4.1 MMOL/L — SIGNIFICANT CHANGE UP (ref 3.5–5.3)
POTASSIUM SERPL-SCNC: 3 MMOL/L — LOW (ref 3.5–5.3)
POTASSIUM SERPL-SCNC: 3.2 MMOL/L — LOW (ref 3.5–5.3)
POTASSIUM SERPL-SCNC: 3.6 MMOL/L — SIGNIFICANT CHANGE UP (ref 3.5–5.3)
POTASSIUM SERPL-SCNC: 3.7 MMOL/L — SIGNIFICANT CHANGE UP (ref 3.5–5.3)
POTASSIUM SERPL-SCNC: 3.9 MMOL/L — SIGNIFICANT CHANGE UP (ref 3.5–5.3)
POTASSIUM SERPL-SCNC: 4 MMOL/L — SIGNIFICANT CHANGE UP (ref 3.5–5.3)
POTASSIUM SERPL-SCNC: 4 MMOL/L — SIGNIFICANT CHANGE UP (ref 3.5–5.3)
POTASSIUM SERPL-SCNC: 4.1 MMOL/L — SIGNIFICANT CHANGE UP (ref 3.5–5.3)
PROT SERPL-MCNC: 5.5 G/DL — LOW (ref 6.6–8.7)
PROT SERPL-MCNC: 5.7 G/DL — LOW (ref 6.6–8.7)
PROT SERPL-MCNC: 6.1 G/DL — LOW (ref 6.6–8.7)
PROT SERPL-MCNC: 6.2 G/DL — LOW (ref 6.6–8.7)
PROT SERPL-MCNC: 6.3 G/DL — LOW (ref 6.6–8.7)
PROT SERPL-MCNC: 6.4 G/DL — LOW (ref 6.6–8.7)
PROT SERPL-MCNC: 6.4 G/DL — LOW (ref 6.6–8.7)
PROT SERPL-MCNC: 6.5 G/DL — LOW (ref 6.6–8.7)
PROTHROM AB SERPL-ACNC: 18.9 SEC — HIGH (ref 9.9–13.4)
RBC # BLD: 3.7 M/UL — LOW (ref 3.8–5.2)
RBC # FLD: 14.9 % — HIGH (ref 10.3–14.5)
S AUREUS DNA NOSE QL NAA+PROBE: SIGNIFICANT CHANGE UP
SODIUM SERPL-SCNC: 143 MMOL/L — SIGNIFICANT CHANGE UP (ref 135–145)
SODIUM SERPL-SCNC: 143 MMOL/L — SIGNIFICANT CHANGE UP (ref 135–145)
SODIUM SERPL-SCNC: 145 MMOL/L — SIGNIFICANT CHANGE UP (ref 135–145)
SODIUM SERPL-SCNC: 146 MMOL/L — HIGH (ref 135–145)
SODIUM SERPL-SCNC: 146 MMOL/L — HIGH (ref 135–145)
SODIUM SERPL-SCNC: 147 MMOL/L — HIGH (ref 135–145)
SODIUM SERPL-SCNC: 148 MMOL/L — HIGH (ref 135–145)
SODIUM SERPL-SCNC: 148 MMOL/L — HIGH (ref 135–145)
SPECIMEN SOURCE: SIGNIFICANT CHANGE UP
TRIGL SERPL-MCNC: 239 MG/DL — HIGH
TRIGL SERPL-MCNC: 242 MG/DL — HIGH
VANCOMYCIN FLD-MCNC: <4 UG/ML — SIGNIFICANT CHANGE UP
WBC # BLD: 11.12 K/UL — HIGH (ref 3.8–10.5)
WBC # FLD AUTO: 11.12 K/UL — HIGH (ref 3.8–10.5)

## 2025-02-03 PROCEDURE — 93010 ELECTROCARDIOGRAM REPORT: CPT

## 2025-02-03 PROCEDURE — 36556 INSERT NON-TUNNEL CV CATH: CPT

## 2025-02-03 PROCEDURE — 71045 X-RAY EXAM CHEST 1 VIEW: CPT | Mod: 26

## 2025-02-03 PROCEDURE — 36620 INSERTION CATHETER ARTERY: CPT

## 2025-02-03 PROCEDURE — 76705 ECHO EXAM OF ABDOMEN: CPT | Mod: 26

## 2025-02-03 PROCEDURE — 99291 CRITICAL CARE FIRST HOUR: CPT | Mod: GC

## 2025-02-03 RX ORDER — MIDODRINE HYDROCHLORIDE 5 MG/1
10 TABLET ORAL EVERY 8 HOURS
Refills: 0 | Status: DISCONTINUED | OUTPATIENT
Start: 2025-02-03 | End: 2025-02-03

## 2025-02-03 RX ORDER — BACTERIOSTATIC SODIUM CHLORIDE 0.9 %
10 VIAL (ML) INJECTION
Refills: 0 | Status: DISCONTINUED | OUTPATIENT
Start: 2025-02-03 | End: 2025-02-14

## 2025-02-03 RX ORDER — ANTISEPTIC SURGICAL SCRUB 0.04 MG/ML
1 SOLUTION TOPICAL
Refills: 0 | Status: DISCONTINUED | OUTPATIENT
Start: 2025-02-03 | End: 2025-02-14

## 2025-02-03 RX ORDER — MAGNESIUM SULFATE 0.8 MEQ/ML
2 AMPUL (ML) INJECTION ONCE
Refills: 0 | Status: COMPLETED | OUTPATIENT
Start: 2025-02-03 | End: 2025-02-03

## 2025-02-03 RX ORDER — PHENYLEPHRINE HYDROCHLORIDE 10 MG/ML
0.8 INJECTION INTRAVENOUS
Qty: 40 | Refills: 0 | Status: DISCONTINUED | OUTPATIENT
Start: 2025-02-03 | End: 2025-02-03

## 2025-02-03 RX ORDER — MIDODRINE HYDROCHLORIDE 5 MG/1
15 TABLET ORAL EVERY 8 HOURS
Refills: 0 | Status: DISCONTINUED | OUTPATIENT
Start: 2025-02-03 | End: 2025-02-07

## 2025-02-03 RX ORDER — FENTANYL CITRATE 50 UG/ML
25 INJECTION INTRAMUSCULAR; INTRAVENOUS
Refills: 0 | Status: DISCONTINUED | OUTPATIENT
Start: 2025-02-03 | End: 2025-02-08

## 2025-02-03 RX ORDER — SODIUM PHOSPHATE, DIBASIC, ANHYDROUS, POTASSIUM PHOSPHATE, MONOBASIC, AND SODIUM PHOSPHATE, MONOBASIC, MONOHYDRATE 852; 155; 130 MG/1; MG/1; MG/1
2 TABLET, COATED ORAL EVERY 4 HOURS
Refills: 0 | Status: COMPLETED | OUTPATIENT
Start: 2025-02-03 | End: 2025-02-03

## 2025-02-03 RX ORDER — DEXMEDETOMIDINE HYDROCHLORIDE 4 UG/ML
0.03 INJECTION, SOLUTION INTRAVENOUS
Qty: 400 | Refills: 0 | Status: DISCONTINUED | OUTPATIENT
Start: 2025-02-03 | End: 2025-02-03

## 2025-02-03 RX ORDER — POTASSIUM PHOSPHATE, MONOBASIC POTASSIUM PHOSPHATE, DIBASIC 236; 224 MG/ML; MG/ML
30 INJECTION, SOLUTION INTRAVENOUS ONCE
Refills: 0 | Status: COMPLETED | OUTPATIENT
Start: 2025-02-03 | End: 2025-02-03

## 2025-02-03 RX ORDER — LACTULOSE 10 G/15 ML
20 SOLUTION, ORAL ORAL EVERY 6 HOURS
Refills: 0 | Status: COMPLETED | OUTPATIENT
Start: 2025-02-03 | End: 2025-02-05

## 2025-02-03 RX ORDER — FENTANYL CITRATE 50 UG/ML
0.5 INJECTION INTRAMUSCULAR; INTRAVENOUS
Qty: 2500 | Refills: 0 | Status: DISCONTINUED | OUTPATIENT
Start: 2025-02-03 | End: 2025-02-03

## 2025-02-03 RX ORDER — ALBUMIN HUMAN 50 G/1000ML
100 SOLUTION INTRAVENOUS EVERY 6 HOURS
Refills: 0 | Status: DISCONTINUED | OUTPATIENT
Start: 2025-02-03 | End: 2025-02-07

## 2025-02-03 RX ORDER — SODIUM CHLORIDE 9 G/ML
1000 INJECTION, SOLUTION INTRAVENOUS
Refills: 0 | Status: DISCONTINUED | OUTPATIENT
Start: 2025-02-03 | End: 2025-02-05

## 2025-02-03 RX ORDER — ANTISEPTIC SURGICAL SCRUB 0.04 MG/ML
1 SOLUTION TOPICAL DAILY
Refills: 0 | Status: DISCONTINUED | OUTPATIENT
Start: 2025-02-03 | End: 2025-02-14

## 2025-02-03 RX ORDER — BUMETANIDE 2 MG/1
1 TABLET ORAL ONCE
Refills: 0 | Status: COMPLETED | OUTPATIENT
Start: 2025-02-03 | End: 2025-02-03

## 2025-02-03 RX ORDER — IBUPROFEN 600 MG/1
600 TABLET, FILM COATED ORAL ONCE
Refills: 0 | Status: COMPLETED | OUTPATIENT
Start: 2025-02-03 | End: 2025-02-03

## 2025-02-03 RX ORDER — SODIUM CHLORIDE 9 G/ML
500 INJECTION, SOLUTION INTRAVENOUS ONCE
Refills: 0 | Status: COMPLETED | OUTPATIENT
Start: 2025-02-03 | End: 2025-02-03

## 2025-02-03 RX ORDER — NOREPINEPHRINE BITARTRATE 1 MG/ML
0.05 INJECTION, SOLUTION, CONCENTRATE INTRAVENOUS
Qty: 8 | Refills: 0 | Status: DISCONTINUED | OUTPATIENT
Start: 2025-02-03 | End: 2025-02-05

## 2025-02-03 RX ADMIN — Medication 5000 UNIT(S): at 05:23

## 2025-02-03 RX ADMIN — Medication 5000 UNIT(S): at 17:47

## 2025-02-03 RX ADMIN — Medication 85 MILLIMOLE(S): at 02:29

## 2025-02-03 RX ADMIN — ALBUMIN HUMAN 50 MILLILITER(S): 50 SOLUTION INTRAVENOUS at 11:42

## 2025-02-03 RX ADMIN — IBUPROFEN 600 MILLIGRAM(S): 600 TABLET, FILM COATED ORAL at 09:47

## 2025-02-03 RX ADMIN — Medication 20 GRAM(S): at 05:23

## 2025-02-03 RX ADMIN — PIPERACILLIN SODIUM AND TAZOBACTAM SODIUM 25 GRAM(S): 2; 250 INJECTION, POWDER, FOR SOLUTION INTRAVENOUS at 05:23

## 2025-02-03 RX ADMIN — ALBUMIN HUMAN 50 MILLILITER(S): 50 SOLUTION INTRAVENOUS at 17:47

## 2025-02-03 RX ADMIN — SODIUM CHLORIDE 100 MILLILITER(S): 9 INJECTION, SOLUTION INTRAVENOUS at 16:00

## 2025-02-03 RX ADMIN — ANTISEPTIC SURGICAL SCRUB 15 MILLILITER(S): 0.04 SOLUTION TOPICAL at 05:23

## 2025-02-03 RX ADMIN — FENTANYL CITRATE 3.06 MICROGRAM(S)/KG/HR: 50 INJECTION INTRAMUSCULAR; INTRAVENOUS at 06:50

## 2025-02-03 RX ADMIN — SODIUM CHLORIDE 1000 MILLILITER(S): 9 INJECTION, SOLUTION INTRAVENOUS at 04:14

## 2025-02-03 RX ADMIN — PHENYLEPHRINE HYDROCHLORIDE 18.4 MICROGRAM(S)/KG/MIN: 10 INJECTION INTRAVENOUS at 05:24

## 2025-02-03 RX ADMIN — ANTISEPTIC SURGICAL SCRUB 1 APPLICATION(S): 0.04 SOLUTION TOPICAL at 11:43

## 2025-02-03 RX ADMIN — SODIUM CHLORIDE 500 MILLILITER(S): 9 INJECTION, SOLUTION INTRAVENOUS at 08:45

## 2025-02-03 RX ADMIN — ALBUMIN HUMAN 50 MILLILITER(S): 50 SOLUTION INTRAVENOUS at 06:46

## 2025-02-03 RX ADMIN — Medication 6 UNIT(S)/MIN: at 05:24

## 2025-02-03 RX ADMIN — Medication 3.06 MG/KG/HR: at 03:13

## 2025-02-03 RX ADMIN — POTASSIUM CHLORIDE 100 MILLIEQUIVALENT(S): 750 TABLET, EXTENDED RELEASE ORAL at 00:05

## 2025-02-03 RX ADMIN — Medication 10 UNIT(S)/HR: at 02:10

## 2025-02-03 RX ADMIN — Medication 100 MILLIGRAM(S): at 21:09

## 2025-02-03 RX ADMIN — POTASSIUM CHLORIDE 100 MILLIEQUIVALENT(S): 750 TABLET, EXTENDED RELEASE ORAL at 01:19

## 2025-02-03 RX ADMIN — MIDODRINE HYDROCHLORIDE 10 MILLIGRAM(S): 5 TABLET ORAL at 21:09

## 2025-02-03 RX ADMIN — SODIUM PHOSPHATE, DIBASIC, ANHYDROUS, POTASSIUM PHOSPHATE, MONOBASIC, AND SODIUM PHOSPHATE, MONOBASIC, MONOHYDRATE 2 PACKET(S): 852; 155; 130 TABLET, COATED ORAL at 09:10

## 2025-02-03 RX ADMIN — Medication 20 GRAM(S): at 11:42

## 2025-02-03 RX ADMIN — POTASSIUM PHOSPHATE, MONOBASIC POTASSIUM PHOSPHATE, DIBASIC 83.33 MILLIMOLE(S): 236; 224 INJECTION, SOLUTION INTRAVENOUS at 11:29

## 2025-02-03 RX ADMIN — Medication 100 MILLIGRAM(S): at 16:11

## 2025-02-03 RX ADMIN — SODIUM PHOSPHATE, DIBASIC, ANHYDROUS, POTASSIUM PHOSPHATE, MONOBASIC, AND SODIUM PHOSPHATE, MONOBASIC, MONOHYDRATE 2 PACKET(S): 852; 155; 130 TABLET, COATED ORAL at 09:47

## 2025-02-03 RX ADMIN — Medication 3.06 MG/KG/HR: at 18:19

## 2025-02-03 RX ADMIN — MIDODRINE HYDROCHLORIDE 10 MILLIGRAM(S): 5 TABLET ORAL at 16:11

## 2025-02-03 RX ADMIN — Medication 20 GRAM(S): at 23:00

## 2025-02-03 RX ADMIN — Medication 25 GRAM(S): at 09:10

## 2025-02-03 RX ADMIN — POTASSIUM CHLORIDE 40 MILLIEQUIVALENT(S): 750 TABLET, EXTENDED RELEASE ORAL at 05:24

## 2025-02-03 RX ADMIN — POTASSIUM CHLORIDE 40 MILLIEQUIVALENT(S): 750 TABLET, EXTENDED RELEASE ORAL at 02:36

## 2025-02-03 RX ADMIN — Medication 20 GRAM(S): at 17:47

## 2025-02-03 RX ADMIN — ALBUMIN HUMAN 50 MILLILITER(S): 50 SOLUTION INTRAVENOUS at 23:00

## 2025-02-03 RX ADMIN — SODIUM BICARBONATE 50 MEQ/KG/HR: 42 INJECTION, SOLUTION INTRAVENOUS at 04:38

## 2025-02-03 RX ADMIN — BUMETANIDE 1 MILLIGRAM(S): 2 TABLET ORAL at 00:25

## 2025-02-03 RX ADMIN — ANTISEPTIC SURGICAL SCRUB 15 MILLILITER(S): 0.04 SOLUTION TOPICAL at 17:47

## 2025-02-03 RX ADMIN — ANTISEPTIC SURGICAL SCRUB 1 APPLICATION(S): 0.04 SOLUTION TOPICAL at 05:23

## 2025-02-03 RX ADMIN — Medication 2 MILLIGRAM(S): at 00:25

## 2025-02-03 RX ADMIN — PANTOPRAZOLE 40 MILLIGRAM(S): 20 TABLET, DELAYED RELEASE ORAL at 11:42

## 2025-02-03 RX ADMIN — IBUPROFEN 600 MILLIGRAM(S): 600 TABLET, FILM COATED ORAL at 10:15

## 2025-02-03 RX ADMIN — Medication 9 UNIT(S)/HR: at 09:48

## 2025-02-03 RX ADMIN — PIPERACILLIN SODIUM AND TAZOBACTAM SODIUM 25 GRAM(S): 2; 250 INJECTION, POWDER, FOR SOLUTION INTRAVENOUS at 17:48

## 2025-02-03 NOTE — PROGRESS NOTE ADULT - ASSESSMENT
====================ASSESSMENT/PLAN==============        69 y/o F (name: Laverne Lai)  with hx of DM and cirrhosis, was brought in by EMS for AMS at home.  PEr son, patient was sick in bed for past two days, and today patient's sister call 911 to bring her to hospital because she was waking up.  In  In the ED patient was obtunded, intubated for airway protection.   Labs significant for DKA with incalculable GAP, s/p 3 IVF bolus, started on bicarb by ED, and s/p 1 dose of vanc and zosyn   MICU consulted  ammonia 124      [facility], [place]  MRN-[id number]  Patient is a 70y old  Female who presents with a chief complaint of DKA (02 Feb 2025 21:32)          ====================== NEUROLOGY=====================  fentaNYL    Injectable 25 MICROGram(s) IV Push every 2 hours PRN vent dysynchrony  fentaNYL   Infusion. 0.5 MICROgram(s)/kG/Hr (3.06 mL/Hr) IV Continuous <Continuous>  midazolam Infusion 0.05 mG/kG/Hr (3.06 mL/Hr) IV Continuous <Continuous>    ==================== RESPIRATORY======================  Mechanical Ventilation:  Mode: AC/ CMV (Assist Control/ Continuous Mandatory Ventilation)  RR (machine): 16  TV (machine): 320  FiO2: 50  PEEP: 6  ITime: 1  MAP: 10  PIP: 18      ====================CARDIOVASCULAR==================  midodrine 10 milliGRAM(s) Oral every 8 hours  norepinephrine Infusion 0.05 MICROgram(s)/kG/Min (5.74 mL/Hr) IV Continuous <Continuous>  phenylephrine    Infusion 0.8 MICROgram(s)/kG/Min (18.4 mL/Hr) IV Continuous <Continuous>    ===================HEMATOLOGIC/ONC ===================  heparin   Injectable 5000 Unit(s) SubCutaneous every 12 hours    ===================== RENAL =========================  Continue monitoring urine output    Avoid Nephrotoxic agents   Adjusting medications for renal  function   Replacing electrolytes as needed with Goal K> 4, PO> 3, Mg> 2  ==================== GASTROINTESTINAL===================  albumin human 25% IVPB 100 milliLiter(s) IV Intermittent every 6 hours  lactulose Syrup 20 Gram(s) Oral every 6 hours  pantoprazole  Injectable 40 milliGRAM(s) IV Push daily  sodium chloride 0.9% lock flush 10 milliLiter(s) IV Push every 1 hour PRN Pre/post blood products, medications, blood draw, and to maintain line patency      =======================    ENDOCRINE  =====================  hydrocortisone sodium succinate Injectable 100 milliGRAM(s) IV Push every 8 hours  insulin regular Infusion 9 Unit(s)/Hr (9 mL/Hr) IV Continuous <Continuous>  vasopressin Infusion 0.04 Unit(s)/Min (6 mL/Hr) IV Continuous <Continuous>    ========================INFECTIOUS DISEASE================  piperacillin/tazobactam IVPB.. 3.375 Gram(s) IV Intermittent every 12 hours  rifAXIMin 550 milliGRAM(s) Oral every 12 hours      ____________________________________________

## 2025-02-03 NOTE — PROCEDURE NOTE - NSPOSTPRCRAD_GEN_A_CORE
central line located in the/central line located in the superior vena cava/depth of insertion/post-procedure radiography performed

## 2025-02-03 NOTE — PROGRESS NOTE ADULT - SUBJECTIVE AND OBJECTIVE BOX
69 y/o F (name: Laverne Lai)  with hx of DM and cirrhosis, was brought in by EMS for AMS at home.  PEr son, patient was sick in bed for past two days, and today patient's sister call 911 to bring her to hospital because she was waking up.   In the ED patient was obtunded, intubated for airway protection.   Labs significant for DKA with incalculable GAP, s/p 3 IVF bolus, started on bicarb by ED, and s/p 1 dose of vanc and zosyn   MICU consulted    ROS: All negative except as listed above.    VITAL SIGNS:  ICU Vital Signs Last 24 Hrs  T(C): 37.5 (03 Feb 2025 12:00), Max: 39.4 (03 Feb 2025 09:15)  T(F): 99.5 (03 Feb 2025 12:00), Max: 102.9 (03 Feb 2025 09:15)  HR: 103 (03 Feb 2025 12:15) (88 - 150)  BP: 96/58 (03 Feb 2025 00:15) (74/50 - 170/100)  BP(mean): 71 (03 Feb 2025 00:15) (60 - 95)  ABP: 105/62 (03 Feb 2025 12:15) (69/39 - 135/72)  ABP(mean): 79 (03 Feb 2025 12:15) (50 - 92)  RR: 26 (03 Feb 2025 12:15) (12 - 37)  SpO2: 95% (03 Feb 2025 12:15) (86% - 100%)    O2 Parameters below as of 03 Feb 2025 12:00      O2 Concentration (%): 50      Mode: AC/ CMV (Assist Control/ Continuous Mandatory Ventilation), RR (machine): 16, TV (machine): 320, FiO2: 50, PEEP: 6, ITime: 1, MAP: 10, PIP: 18  Plateau pressure:   P/F ratio:     02-02 @ 07:01  -  02-03 @ 07:00  --------------------------------------------------------  IN: 4869.7 mL / OUT: 480 mL / NET: 4389.7 mL    02-03 @ 07:01  -  02-03 @ 12:40  --------------------------------------------------------  IN: 1059.5 mL / OUT: 600 mL / NET: 459.5 mL      CAPILLARY BLOOD GLUCOSE      POCT Blood Glucose.: 202 mg/dL (03 Feb 2025 11:49)      ECG: reviewed.    PHYSICAL EXAM:    GENERAL: In NAD, intubated and sedated  HEENT: NC/AT  PULM: B/L coarse breath sounds  CVS: +S1, S2  ABD: Soft, non-tender  EXTREMITIES: No pedal edema B/L  SKIN: No open wounds  NEURO: Grossly non-focal    MEDICATIONS:  MEDICATIONS  (STANDING):  albumin human 25% IVPB 100 milliLiter(s) IV Intermittent every 6 hours  chlorhexidine 0.12% Liquid 15 milliLiter(s) Oral Mucosa every 12 hours  chlorhexidine 2% Cloths 1 Application(s) Topical daily  chlorhexidine 4% Liquid 1 Application(s) Topical <User Schedule>  fentaNYL   Infusion. 0.5 MICROgram(s)/kG/Hr (3.06 mL/Hr) IV Continuous <Continuous>  heparin   Injectable 5000 Unit(s) SubCutaneous every 12 hours  hydrocortisone sodium succinate Injectable 100 milliGRAM(s) IV Push every 8 hours  insulin regular Infusion 9 Unit(s)/Hr (9 mL/Hr) IV Continuous <Continuous>  lactulose Syrup 20 Gram(s) Oral every 6 hours  midazolam Infusion 0.05 mG/kG/Hr (3.06 mL/Hr) IV Continuous <Continuous>  midodrine 10 milliGRAM(s) Oral every 8 hours  norepinephrine Infusion 0.05 MICROgram(s)/kG/Min (5.74 mL/Hr) IV Continuous <Continuous>  pantoprazole  Injectable 40 milliGRAM(s) IV Push daily  phenylephrine    Infusion 0.8 MICROgram(s)/kG/Min (18.4 mL/Hr) IV Continuous <Continuous>  piperacillin/tazobactam IVPB.. 3.375 Gram(s) IV Intermittent every 12 hours  rifAXIMin 550 milliGRAM(s) Oral every 12 hours  vasopressin Infusion 0.04 Unit(s)/Min (6 mL/Hr) IV Continuous <Continuous>    MEDICATIONS  (PRN):  fentaNYL    Injectable 25 MICROGram(s) IV Push every 2 hours PRN vent dysynchrony  sodium chloride 0.9% lock flush 10 milliLiter(s) IV Push every 1 hour PRN Pre/post blood products, medications, blood draw, and to maintain line patency      ALLERGIES:  Allergies    Allergy Status Unknown    Intolerances        LABS:                        11.7   11.12 )-----------( 117      ( 03 Feb 2025 02:50 )             34.8     02-03    143  |  93[L]  |  34.3[H]  ----------------------------<  335[H]  3.9   |  25.0  |  2.42[H]    Ca    9.2      03 Feb 2025 09:27  Phos  0.7     02-03  Mg     2.2     02-03    TPro  6.4[L]  /  Alb  2.9[L]  /  TBili  1.2  /  DBili  x   /  AST  1574[H]  /  ALT  311[H]  /  AlkPhos  167[H]  02-03    PT/INR - ( 03 Feb 2025 02:50 )   PT: 18.9 sec;   INR: 1.64 ratio           Urinalysis Basic - ( 03 Feb 2025 09:27 )    Color: x / Appearance: x / SG: x / pH: x  Gluc: 335 mg/dL / Ketone: x  / Bili: x / Urobili: x   Blood: x / Protein: x / Nitrite: x   Leuk Esterase: x / RBC: x / WBC x   Sq Epi: x / Non Sq Epi: x / Bacteria: x      ABG:  pO2, Arterial: 62 mmHg (02-03-25 @ 09:22)  pH, Arterial: 7.580 (02-03-25 @ 09:22)  pCO2, Arterial: 34 mmHg (02-03-25 @ 09:22)  pO2, Arterial: 81 mmHg (02-03-25 @ 05:00)  pH, Arterial: 7.540 (02-03-25 @ 05:00)  pCO2, Arterial: 32 mmHg (02-03-25 @ 05:00)  pH, Arterial: 7.530 (02-03-25 @ 02:50)  pCO2, Arterial: 32 mmHg (02-03-25 @ 02:50)  pO2, Arterial: 89 mmHg (02-03-25 @ 02:50)  pCO2, Arterial: 39 mmHg (02-02-25 @ 14:53)  pO2, Arterial: 82 mmHg (02-02-25 @ 14:53)  pH, Arterial: 7.200 (02-02-25 @ 14:53)      vBG:  pO2, Venous: 38 mmHg (02-03-25 @ 01:05)  pH, Venous: 7.450 (02-03-25 @ 01:05)  HCO3, Venous: 28 mmol/L (02-03-25 @ 01:05)  pCO2, Venous: 41 mmHg (02-03-25 @ 01:05)  pH, Venous: 7.100 (02-02-25 @ 16:20)  HCO3, Venous: 14 mmol/L (02-02-25 @ 16:20)  pCO2, Venous: 44 mmHg (02-02-25 @ 16:20)  pO2, Venous: 117 mmHg (02-02-25 @ 16:20)  pO2, Venous: 94 mmHg (02-02-25 @ 13:02)  HCO3, Venous: 6 mmol/L (02-02-25 @ 13:02)  pH, Venous: <6.942 (02-02-25 @ 13:02)  pCO2, Venous: 28 mmHg (02-02-25 @ 13:02)    Micro:        RADIOLOGY & ADDITIONAL TESTS: Reviewed.

## 2025-02-03 NOTE — PROCEDURE NOTE - NSINFORMCONSENT_GEN_A_CORE
son/Benefits, risks, and possible complications of procedure explained to patient/caregiver who verbalized understanding and gave verbal consent.
son/Benefits, risks, and possible complications of procedure explained to patient/caregiver who verbalized understanding and gave verbal consent.

## 2025-02-03 NOTE — PROCEDURE NOTE - NSINDICATIONS_GEN_A_CORE
critical illness/hemodynamic monitoring/venous access
arterial puncture to obtain ABG's/critical patient/monitoring purposes

## 2025-02-04 LAB
ALBUMIN SERPL ELPH-MCNC: 3.8 G/DL — SIGNIFICANT CHANGE UP (ref 3.3–5.2)
ALBUMIN SERPL ELPH-MCNC: 3.9 G/DL — SIGNIFICANT CHANGE UP (ref 3.3–5.2)
ALBUMIN SERPL ELPH-MCNC: 4 G/DL — SIGNIFICANT CHANGE UP (ref 3.3–5.2)
ALBUMIN SERPL ELPH-MCNC: 4.1 G/DL — SIGNIFICANT CHANGE UP (ref 3.3–5.2)
ALBUMIN SERPL ELPH-MCNC: 4.1 G/DL — SIGNIFICANT CHANGE UP (ref 3.3–5.2)
ALP SERPL-CCNC: 150 U/L — HIGH (ref 40–120)
ALP SERPL-CCNC: 158 U/L — HIGH (ref 40–120)
ALP SERPL-CCNC: 168 U/L — HIGH (ref 40–120)
ALP SERPL-CCNC: 171 U/L — HIGH (ref 40–120)
ALP SERPL-CCNC: 184 U/L — HIGH (ref 40–120)
ALT FLD-CCNC: 165 U/L — HIGH
ALT FLD-CCNC: 208 U/L — HIGH
ALT FLD-CCNC: 228 U/L — HIGH
ALT FLD-CCNC: 229 U/L — HIGH
ALT FLD-CCNC: 234 U/L — HIGH
ANION GAP SERPL CALC-SCNC: 14 MMOL/L — SIGNIFICANT CHANGE UP (ref 5–17)
ANION GAP SERPL CALC-SCNC: 18 MMOL/L — HIGH (ref 5–17)
ANION GAP SERPL CALC-SCNC: 21 MMOL/L — HIGH (ref 5–17)
ANION GAP SERPL CALC-SCNC: 23 MMOL/L — HIGH (ref 5–17)
ANION GAP SERPL CALC-SCNC: 24 MMOL/L — HIGH (ref 5–17)
ANISOCYTOSIS BLD QL: SLIGHT — SIGNIFICANT CHANGE UP
APTT BLD: 39.1 SEC — HIGH (ref 24.5–35.6)
APTT BLD: 47.4 SEC — HIGH (ref 24.5–35.6)
AST SERPL-CCNC: 436 U/L — HIGH
AST SERPL-CCNC: 607 U/L — HIGH
AST SERPL-CCNC: 773 U/L — HIGH
AST SERPL-CCNC: 823 U/L — HIGH
AST SERPL-CCNC: 892 U/L — HIGH
BASOPHILS # BLD AUTO: 0 K/UL — SIGNIFICANT CHANGE UP (ref 0–0.2)
BASOPHILS NFR BLD AUTO: 0 % — SIGNIFICANT CHANGE UP (ref 0–2)
BILIRUB DIRECT SERPL-MCNC: 0.8 MG/DL — HIGH (ref 0–0.3)
BILIRUB INDIRECT FLD-MCNC: 1.3 MG/DL — HIGH (ref 0.2–1)
BILIRUB SERPL-MCNC: 1.9 MG/DL — SIGNIFICANT CHANGE UP (ref 0.4–2)
BILIRUB SERPL-MCNC: 2 MG/DL — SIGNIFICANT CHANGE UP (ref 0.4–2)
BILIRUB SERPL-MCNC: 2 MG/DL — SIGNIFICANT CHANGE UP (ref 0.4–2)
BILIRUB SERPL-MCNC: 2.1 MG/DL — HIGH (ref 0.4–2)
BILIRUB SERPL-MCNC: 2.2 MG/DL — HIGH (ref 0.4–2)
BILIRUB SERPL-MCNC: 2.5 MG/DL — HIGH (ref 0.4–2)
BUN SERPL-MCNC: 23.1 MG/DL — HIGH (ref 8–20)
BUN SERPL-MCNC: 24.5 MG/DL — HIGH (ref 8–20)
BUN SERPL-MCNC: 25.1 MG/DL — HIGH (ref 8–20)
BUN SERPL-MCNC: 25.4 MG/DL — HIGH (ref 8–20)
BUN SERPL-MCNC: 27.4 MG/DL — HIGH (ref 8–20)
C DIFF BY PCR RESULT: SIGNIFICANT CHANGE UP
CALCIUM SERPL-MCNC: 7.9 MG/DL — LOW (ref 8.4–10.5)
CALCIUM SERPL-MCNC: 8.4 MG/DL — SIGNIFICANT CHANGE UP (ref 8.4–10.5)
CALCIUM SERPL-MCNC: 8.5 MG/DL — SIGNIFICANT CHANGE UP (ref 8.4–10.5)
CALCIUM SERPL-MCNC: 8.7 MG/DL — SIGNIFICANT CHANGE UP (ref 8.4–10.5)
CALCIUM SERPL-MCNC: 8.8 MG/DL — SIGNIFICANT CHANGE UP (ref 8.4–10.5)
CHLORIDE SERPL-SCNC: 104 MMOL/L — SIGNIFICANT CHANGE UP (ref 96–108)
CHLORIDE SERPL-SCNC: 92 MMOL/L — LOW (ref 96–108)
CHLORIDE SERPL-SCNC: 93 MMOL/L — LOW (ref 96–108)
CHLORIDE SERPL-SCNC: 97 MMOL/L — SIGNIFICANT CHANGE UP (ref 96–108)
CHLORIDE SERPL-SCNC: 99 MMOL/L — SIGNIFICANT CHANGE UP (ref 96–108)
CO2 SERPL-SCNC: 27 MMOL/L — SIGNIFICANT CHANGE UP (ref 22–29)
CO2 SERPL-SCNC: 28 MMOL/L — SIGNIFICANT CHANGE UP (ref 22–29)
CO2 SERPL-SCNC: 29 MMOL/L — SIGNIFICANT CHANGE UP (ref 22–29)
CO2 SERPL-SCNC: 31 MMOL/L — HIGH (ref 22–29)
CO2 SERPL-SCNC: 31 MMOL/L — HIGH (ref 22–29)
CREAT SERPL-MCNC: 1.09 MG/DL — SIGNIFICANT CHANGE UP (ref 0.5–1.3)
CREAT SERPL-MCNC: 1.29 MG/DL — SIGNIFICANT CHANGE UP (ref 0.5–1.3)
CREAT SERPL-MCNC: 1.34 MG/DL — HIGH (ref 0.5–1.3)
CREAT SERPL-MCNC: 1.36 MG/DL — HIGH (ref 0.5–1.3)
CREAT SERPL-MCNC: 1.52 MG/DL — HIGH (ref 0.5–1.3)
EGFR: 37 ML/MIN/1.73M2 — LOW
EGFR: 42 ML/MIN/1.73M2 — LOW
EGFR: 43 ML/MIN/1.73M2 — LOW
EGFR: 45 ML/MIN/1.73M2 — LOW
EGFR: 56 ML/MIN/1.73M2 — LOW
EOSINOPHIL # BLD AUTO: 0 K/UL — SIGNIFICANT CHANGE UP (ref 0–0.5)
EOSINOPHIL NFR BLD AUTO: 0 % — SIGNIFICANT CHANGE UP (ref 0–6)
FIBRINOGEN PPP-MCNC: 173 MG/DL — LOW (ref 200–450)
GAS PNL BLDA: SIGNIFICANT CHANGE UP
GAS PNL BLDA: SIGNIFICANT CHANGE UP
GGT SERPL-CCNC: 179 U/L — HIGH (ref 8–40)
GLUCOSE BLDC GLUCOMTR-MCNC: 144 MG/DL — HIGH (ref 70–99)
GLUCOSE BLDC GLUCOMTR-MCNC: 150 MG/DL — HIGH (ref 70–99)
GLUCOSE BLDC GLUCOMTR-MCNC: 173 MG/DL — HIGH (ref 70–99)
GLUCOSE BLDC GLUCOMTR-MCNC: 175 MG/DL — HIGH (ref 70–99)
GLUCOSE BLDC GLUCOMTR-MCNC: 181 MG/DL — HIGH (ref 70–99)
GLUCOSE BLDC GLUCOMTR-MCNC: 185 MG/DL — HIGH (ref 70–99)
GLUCOSE BLDC GLUCOMTR-MCNC: 193 MG/DL — HIGH (ref 70–99)
GLUCOSE BLDC GLUCOMTR-MCNC: 193 MG/DL — HIGH (ref 70–99)
GLUCOSE BLDC GLUCOMTR-MCNC: 198 MG/DL — HIGH (ref 70–99)
GLUCOSE BLDC GLUCOMTR-MCNC: 209 MG/DL — HIGH (ref 70–99)
GLUCOSE BLDC GLUCOMTR-MCNC: 219 MG/DL — HIGH (ref 70–99)
GLUCOSE BLDC GLUCOMTR-MCNC: 219 MG/DL — HIGH (ref 70–99)
GLUCOSE BLDC GLUCOMTR-MCNC: 226 MG/DL — HIGH (ref 70–99)
GLUCOSE BLDC GLUCOMTR-MCNC: 235 MG/DL — HIGH (ref 70–99)
GLUCOSE BLDC GLUCOMTR-MCNC: 240 MG/DL — HIGH (ref 70–99)
GLUCOSE BLDC GLUCOMTR-MCNC: 250 MG/DL — HIGH (ref 70–99)
GLUCOSE BLDC GLUCOMTR-MCNC: 262 MG/DL — HIGH (ref 70–99)
GLUCOSE BLDC GLUCOMTR-MCNC: 267 MG/DL — HIGH (ref 70–99)
GLUCOSE BLDC GLUCOMTR-MCNC: 269 MG/DL — HIGH (ref 70–99)
GLUCOSE BLDC GLUCOMTR-MCNC: 274 MG/DL — HIGH (ref 70–99)
GLUCOSE BLDC GLUCOMTR-MCNC: 306 MG/DL — HIGH (ref 70–99)
GLUCOSE BLDC GLUCOMTR-MCNC: 315 MG/DL — HIGH (ref 70–99)
GLUCOSE SERPL-MCNC: 139 MG/DL — HIGH (ref 70–99)
GLUCOSE SERPL-MCNC: 186 MG/DL — HIGH (ref 70–99)
GLUCOSE SERPL-MCNC: 192 MG/DL — HIGH (ref 70–99)
GLUCOSE SERPL-MCNC: 245 MG/DL — HIGH (ref 70–99)
GLUCOSE SERPL-MCNC: 292 MG/DL — HIGH (ref 70–99)
HCT VFR BLD CALC: 25.3 % — LOW (ref 34.5–45)
HCT VFR BLD CALC: 25.4 % — LOW (ref 34.5–45)
HCT VFR BLD CALC: 25.4 % — LOW (ref 34.5–45)
HCT VFR BLD CALC: 25.5 % — LOW (ref 34.5–45)
HCT VFR BLD CALC: 25.6 % — LOW (ref 34.5–45)
HGB BLD-MCNC: 8.6 G/DL — LOW (ref 11.5–15.5)
HGB BLD-MCNC: 8.9 G/DL — LOW (ref 11.5–15.5)
HGB BLD-MCNC: 8.9 G/DL — LOW (ref 11.5–15.5)
HGB BLD-MCNC: 9.1 G/DL — LOW (ref 11.5–15.5)
HGB BLD-MCNC: 9.2 G/DL — LOW (ref 11.5–15.5)
INR BLD: 2.34 RATIO — HIGH (ref 0.85–1.16)
INR BLD: 2.34 RATIO — HIGH (ref 0.85–1.16)
INR BLD: 2.46 RATIO — HIGH (ref 0.85–1.16)
INR BLD: 2.49 RATIO — HIGH (ref 0.85–1.16)
LDH SERPL L TO P-CCNC: 594 U/L — HIGH (ref 98–192)
LYMPHOCYTES # BLD AUTO: 0.24 K/UL — LOW (ref 1–3.3)
LYMPHOCYTES # BLD AUTO: 0.9 % — LOW (ref 13–44)
MACROCYTES BLD QL: SLIGHT — SIGNIFICANT CHANGE UP
MAGNESIUM SERPL-MCNC: 1.6 MG/DL — LOW (ref 1.8–2.6)
MAGNESIUM SERPL-MCNC: 2.1 MG/DL — SIGNIFICANT CHANGE UP (ref 1.6–2.6)
MAGNESIUM SERPL-MCNC: 2.2 MG/DL — SIGNIFICANT CHANGE UP (ref 1.6–2.6)
MAGNESIUM SERPL-MCNC: 2.2 MG/DL — SIGNIFICANT CHANGE UP (ref 1.8–2.6)
MAGNESIUM SERPL-MCNC: 2.5 MG/DL — SIGNIFICANT CHANGE UP (ref 1.6–2.6)
MANUAL SMEAR VERIFICATION: SIGNIFICANT CHANGE UP
MCHC RBC-ENTMCNC: 31.9 PG — SIGNIFICANT CHANGE UP (ref 27–34)
MCHC RBC-ENTMCNC: 32.2 PG — SIGNIFICANT CHANGE UP (ref 27–34)
MCHC RBC-ENTMCNC: 32.5 PG — SIGNIFICANT CHANGE UP (ref 27–34)
MCHC RBC-ENTMCNC: 32.7 PG — SIGNIFICANT CHANGE UP (ref 27–34)
MCHC RBC-ENTMCNC: 33 PG — SIGNIFICANT CHANGE UP (ref 27–34)
MCHC RBC-ENTMCNC: 33.9 G/DL — SIGNIFICANT CHANGE UP (ref 32–36)
MCHC RBC-ENTMCNC: 34.8 G/DL — SIGNIFICANT CHANGE UP (ref 32–36)
MCHC RBC-ENTMCNC: 35 G/DL — SIGNIFICANT CHANGE UP (ref 32–36)
MCHC RBC-ENTMCNC: 36 G/DL — SIGNIFICANT CHANGE UP (ref 32–36)
MCHC RBC-ENTMCNC: 36.1 G/DL — HIGH (ref 32–36)
MCV RBC AUTO: 90.7 FL — SIGNIFICANT CHANGE UP (ref 80–100)
MCV RBC AUTO: 91.7 FL — SIGNIFICANT CHANGE UP (ref 80–100)
MCV RBC AUTO: 92.7 FL — SIGNIFICANT CHANGE UP (ref 80–100)
MCV RBC AUTO: 92.8 FL — SIGNIFICANT CHANGE UP (ref 80–100)
MCV RBC AUTO: 94.1 FL — SIGNIFICANT CHANGE UP (ref 80–100)
METAMYELOCYTES # FLD: 3.5 % — HIGH (ref 0–0)
METAMYELOCYTES NFR BLD: 3.5 % — HIGH (ref 0–0)
MONOCYTES # BLD AUTO: 0.68 K/UL — SIGNIFICANT CHANGE UP (ref 0–0.9)
MONOCYTES NFR BLD AUTO: 2.6 % — SIGNIFICANT CHANGE UP (ref 2–14)
NEUTROPHILS # BLD AUTO: 24.39 K/UL — HIGH (ref 1.8–7.4)
NEUTROPHILS NFR BLD AUTO: 82.6 % — HIGH (ref 43–77)
NEUTS BAND # BLD: 10.4 % — HIGH (ref 0–8)
NEUTS BAND NFR BLD: 10.4 % — HIGH (ref 0–8)
OVALOCYTES BLD QL SMEAR: SLIGHT — SIGNIFICANT CHANGE UP
PHOSPHATE SERPL-MCNC: 1.2 MG/DL — LOW (ref 2.4–4.7)
PHOSPHATE SERPL-MCNC: 1.4 MG/DL — LOW (ref 2.4–4.7)
PHOSPHATE SERPL-MCNC: 1.4 MG/DL — LOW (ref 2.4–4.7)
PHOSPHATE SERPL-MCNC: 2.4 MG/DL — SIGNIFICANT CHANGE UP (ref 2.4–4.7)
PHOSPHATE SERPL-MCNC: 2.5 MG/DL — SIGNIFICANT CHANGE UP (ref 2.4–4.7)
PLAT MORPH BLD: NORMAL — SIGNIFICANT CHANGE UP
PLATELET # BLD AUTO: 38 K/UL — LOW (ref 150–400)
PLATELET # BLD AUTO: 45 K/UL — LOW (ref 150–400)
PLATELET # BLD AUTO: 50 K/UL — LOW (ref 150–400)
PLATELET # BLD AUTO: 52 K/UL — LOW (ref 150–400)
PLATELET # BLD AUTO: 58 K/UL — LOW (ref 150–400)
POIKILOCYTOSIS BLD QL AUTO: SLIGHT — SIGNIFICANT CHANGE UP
POLYCHROMASIA BLD QL SMEAR: SLIGHT — SIGNIFICANT CHANGE UP
POTASSIUM SERPL-MCNC: 2.8 MMOL/L — CRITICAL LOW (ref 3.5–5.3)
POTASSIUM SERPL-MCNC: 3.3 MMOL/L — LOW (ref 3.5–5.3)
POTASSIUM SERPL-MCNC: 3.7 MMOL/L — SIGNIFICANT CHANGE UP (ref 3.5–5.3)
POTASSIUM SERPL-MCNC: 4.1 MMOL/L — SIGNIFICANT CHANGE UP (ref 3.5–5.3)
POTASSIUM SERPL-MCNC: 4.6 MMOL/L — SIGNIFICANT CHANGE UP (ref 3.5–5.3)
POTASSIUM SERPL-SCNC: 2.8 MMOL/L — CRITICAL LOW (ref 3.5–5.3)
POTASSIUM SERPL-SCNC: 3.3 MMOL/L — LOW (ref 3.5–5.3)
POTASSIUM SERPL-SCNC: 3.7 MMOL/L — SIGNIFICANT CHANGE UP (ref 3.5–5.3)
POTASSIUM SERPL-SCNC: 4.1 MMOL/L — SIGNIFICANT CHANGE UP (ref 3.5–5.3)
POTASSIUM SERPL-SCNC: 4.6 MMOL/L — SIGNIFICANT CHANGE UP (ref 3.5–5.3)
PROT SERPL-MCNC: 6.3 G/DL — LOW (ref 6.6–8.7)
PROT SERPL-MCNC: 6.5 G/DL — LOW (ref 6.6–8.7)
PROT SERPL-MCNC: 6.5 G/DL — LOW (ref 6.6–8.7)
PROT SERPL-MCNC: 6.6 G/DL — SIGNIFICANT CHANGE UP (ref 6.6–8.7)
PROT SERPL-MCNC: 6.7 G/DL — SIGNIFICANT CHANGE UP (ref 6.6–8.7)
PROTHROM AB SERPL-ACNC: 26.2 SEC — HIGH (ref 9.9–13.4)
PROTHROM AB SERPL-ACNC: 26.2 SEC — HIGH (ref 9.9–13.4)
PROTHROM AB SERPL-ACNC: 27.6 SEC — HIGH (ref 9.9–13.4)
PROTHROM AB SERPL-ACNC: 27.9 SEC — HIGH (ref 9.9–13.4)
RBC # BLD: 2.7 M/UL — LOW (ref 3.8–5.2)
RBC # BLD: 2.74 M/UL — LOW (ref 3.8–5.2)
RBC # BLD: 2.76 M/UL — LOW (ref 3.8–5.2)
RBC # BLD: 2.76 M/UL — LOW (ref 3.8–5.2)
RBC # BLD: 2.81 M/UL — LOW (ref 3.8–5.2)
RBC # FLD: 14.9 % — HIGH (ref 10.3–14.5)
RBC # FLD: 15.1 % — HIGH (ref 10.3–14.5)
RBC # FLD: 15.3 % — HIGH (ref 10.3–14.5)
RBC # FLD: 15.4 % — HIGH (ref 10.3–14.5)
RBC # FLD: 15.7 % — HIGH (ref 10.3–14.5)
RBC BLD AUTO: ABNORMAL
SODIUM SERPL-SCNC: 143 MMOL/L — SIGNIFICANT CHANGE UP (ref 135–145)
SODIUM SERPL-SCNC: 146 MMOL/L — HIGH (ref 135–145)
SODIUM SERPL-SCNC: 147 MMOL/L — HIGH (ref 135–145)
SODIUM SERPL-SCNC: 147 MMOL/L — HIGH (ref 135–145)
SODIUM SERPL-SCNC: 148 MMOL/L — HIGH (ref 135–145)
WBC # BLD: 24.43 K/UL — HIGH (ref 3.8–10.5)
WBC # BLD: 24.77 K/UL — HIGH (ref 3.8–10.5)
WBC # BLD: 24.99 K/UL — HIGH (ref 3.8–10.5)
WBC # BLD: 26.23 K/UL — HIGH (ref 3.8–10.5)
WBC # BLD: 26.52 K/UL — HIGH (ref 3.8–10.5)
WBC # FLD AUTO: 24.43 K/UL — HIGH (ref 3.8–10.5)
WBC # FLD AUTO: 24.77 K/UL — HIGH (ref 3.8–10.5)
WBC # FLD AUTO: 24.99 K/UL — HIGH (ref 3.8–10.5)
WBC # FLD AUTO: 26.23 K/UL — HIGH (ref 3.8–10.5)
WBC # FLD AUTO: 26.52 K/UL — HIGH (ref 3.8–10.5)

## 2025-02-04 PROCEDURE — 99291 CRITICAL CARE FIRST HOUR: CPT | Mod: GC

## 2025-02-04 RX ORDER — POTASSIUM CHLORIDE 750 MG/1
40 TABLET, EXTENDED RELEASE ORAL ONCE
Refills: 0 | Status: COMPLETED | OUTPATIENT
Start: 2025-02-04 | End: 2025-02-04

## 2025-02-04 RX ORDER — METRONIDAZOLE 500 MG
500 TABLET ORAL ONCE
Refills: 0 | Status: COMPLETED | OUTPATIENT
Start: 2025-02-04 | End: 2025-02-04

## 2025-02-04 RX ORDER — POTASSIUM PHOSPHATE, MONOBASIC POTASSIUM PHOSPHATE, DIBASIC 236; 224 MG/ML; MG/ML
30 INJECTION, SOLUTION INTRAVENOUS ONCE
Refills: 0 | Status: COMPLETED | OUTPATIENT
Start: 2025-02-04 | End: 2025-02-04

## 2025-02-04 RX ORDER — VANCOMYCIN HYDROCHLORIDE 50 MG/ML
500 KIT ORAL EVERY 6 HOURS
Refills: 0 | Status: DISCONTINUED | OUTPATIENT
Start: 2025-02-04 | End: 2025-02-04

## 2025-02-04 RX ORDER — CEFTRIAXONE 250 MG/1
1000 INJECTION, POWDER, FOR SOLUTION INTRAMUSCULAR; INTRAVENOUS EVERY 24 HOURS
Refills: 0 | Status: DISCONTINUED | OUTPATIENT
Start: 2025-02-05 | End: 2025-02-05

## 2025-02-04 RX ORDER — METRONIDAZOLE 500 MG
TABLET ORAL
Refills: 0 | Status: DISCONTINUED | OUTPATIENT
Start: 2025-02-04 | End: 2025-02-05

## 2025-02-04 RX ORDER — CEFTRIAXONE 250 MG/1
INJECTION, POWDER, FOR SOLUTION INTRAMUSCULAR; INTRAVENOUS
Refills: 0 | Status: DISCONTINUED | OUTPATIENT
Start: 2025-02-04 | End: 2025-02-05

## 2025-02-04 RX ORDER — POTASSIUM CHLORIDE 750 MG/1
20 TABLET, EXTENDED RELEASE ORAL
Refills: 0 | Status: COMPLETED | OUTPATIENT
Start: 2025-02-04 | End: 2025-02-04

## 2025-02-04 RX ORDER — CEFTRIAXONE 250 MG/1
1000 INJECTION, POWDER, FOR SOLUTION INTRAMUSCULAR; INTRAVENOUS ONCE
Refills: 0 | Status: COMPLETED | OUTPATIENT
Start: 2025-02-04 | End: 2025-02-04

## 2025-02-04 RX ORDER — MAGNESIUM SULFATE 0.8 MEQ/ML
2 AMPUL (ML) INJECTION ONCE
Refills: 0 | Status: COMPLETED | OUTPATIENT
Start: 2025-02-04 | End: 2025-02-04

## 2025-02-04 RX ORDER — METRONIDAZOLE 500 MG
500 TABLET ORAL EVERY 8 HOURS
Refills: 0 | Status: DISCONTINUED | OUTPATIENT
Start: 2025-02-04 | End: 2025-02-05

## 2025-02-04 RX ORDER — CEFTRIAXONE 250 MG/1
1000 INJECTION, POWDER, FOR SOLUTION INTRAMUSCULAR; INTRAVENOUS EVERY 24 HOURS
Refills: 0 | Status: DISCONTINUED | OUTPATIENT
Start: 2025-02-04 | End: 2025-02-04

## 2025-02-04 RX ADMIN — Medication 6 UNIT(S)/MIN: at 03:48

## 2025-02-04 RX ADMIN — ANTISEPTIC SURGICAL SCRUB 15 MILLILITER(S): 0.04 SOLUTION TOPICAL at 17:02

## 2025-02-04 RX ADMIN — POTASSIUM CHLORIDE 50 MILLIEQUIVALENT(S): 750 TABLET, EXTENDED RELEASE ORAL at 08:09

## 2025-02-04 RX ADMIN — Medication 100 MILLIGRAM(S): at 05:08

## 2025-02-04 RX ADMIN — Medication 5000 UNIT(S): at 05:08

## 2025-02-04 RX ADMIN — SODIUM CHLORIDE 100 MILLILITER(S): 9 INJECTION, SOLUTION INTRAVENOUS at 21:46

## 2025-02-04 RX ADMIN — POTASSIUM CHLORIDE 40 MILLIEQUIVALENT(S): 750 TABLET, EXTENDED RELEASE ORAL at 18:49

## 2025-02-04 RX ADMIN — Medication 100 MILLIGRAM(S): at 13:14

## 2025-02-04 RX ADMIN — POTASSIUM CHLORIDE 50 MILLIEQUIVALENT(S): 750 TABLET, EXTENDED RELEASE ORAL at 03:18

## 2025-02-04 RX ADMIN — Medication 100 MILLIGRAM(S): at 13:15

## 2025-02-04 RX ADMIN — NOREPINEPHRINE BITARTRATE 5.74 MICROGRAM(S)/KG/MIN: 1 INJECTION, SOLUTION, CONCENTRATE INTRAVENOUS at 03:47

## 2025-02-04 RX ADMIN — ALBUMIN HUMAN 50 MILLILITER(S): 50 SOLUTION INTRAVENOUS at 05:07

## 2025-02-04 RX ADMIN — POTASSIUM CHLORIDE 40 MILLIEQUIVALENT(S): 750 TABLET, EXTENDED RELEASE ORAL at 06:39

## 2025-02-04 RX ADMIN — MIDODRINE HYDROCHLORIDE 15 MILLIGRAM(S): 5 TABLET ORAL at 05:08

## 2025-02-04 RX ADMIN — POTASSIUM CHLORIDE 50 MILLIEQUIVALENT(S): 750 TABLET, EXTENDED RELEASE ORAL at 04:35

## 2025-02-04 RX ADMIN — ANTISEPTIC SURGICAL SCRUB 1 APPLICATION(S): 0.04 SOLUTION TOPICAL at 06:35

## 2025-02-04 RX ADMIN — MIDODRINE HYDROCHLORIDE 15 MILLIGRAM(S): 5 TABLET ORAL at 21:45

## 2025-02-04 RX ADMIN — VANCOMYCIN HYDROCHLORIDE 500 MILLIGRAM(S): KIT at 13:15

## 2025-02-04 RX ADMIN — ANTISEPTIC SURGICAL SCRUB 1 APPLICATION(S): 0.04 SOLUTION TOPICAL at 13:16

## 2025-02-04 RX ADMIN — Medication 20 GRAM(S): at 13:15

## 2025-02-04 RX ADMIN — CEFTRIAXONE 1000 MILLIGRAM(S): 250 INJECTION, POWDER, FOR SOLUTION INTRAMUSCULAR; INTRAVENOUS at 13:15

## 2025-02-04 RX ADMIN — POTASSIUM CHLORIDE 50 MILLIEQUIVALENT(S): 750 TABLET, EXTENDED RELEASE ORAL at 21:46

## 2025-02-04 RX ADMIN — ALBUMIN HUMAN 50 MILLILITER(S): 50 SOLUTION INTRAVENOUS at 17:02

## 2025-02-04 RX ADMIN — POTASSIUM CHLORIDE 50 MILLIEQUIVALENT(S): 750 TABLET, EXTENDED RELEASE ORAL at 20:03

## 2025-02-04 RX ADMIN — POTASSIUM CHLORIDE 40 MILLIEQUIVALENT(S): 750 TABLET, EXTENDED RELEASE ORAL at 03:18

## 2025-02-04 RX ADMIN — PIPERACILLIN SODIUM AND TAZOBACTAM SODIUM 25 GRAM(S): 2; 250 INJECTION, POWDER, FOR SOLUTION INTRAVENOUS at 05:08

## 2025-02-04 RX ADMIN — Medication 6 UNIT(S)/HR: at 03:48

## 2025-02-04 RX ADMIN — Medication 100 MILLIGRAM(S): at 21:45

## 2025-02-04 RX ADMIN — POTASSIUM CHLORIDE 50 MILLIEQUIVALENT(S): 750 TABLET, EXTENDED RELEASE ORAL at 18:48

## 2025-02-04 RX ADMIN — Medication 25 GRAM(S): at 03:18

## 2025-02-04 RX ADMIN — MIDODRINE HYDROCHLORIDE 15 MILLIGRAM(S): 5 TABLET ORAL at 13:14

## 2025-02-04 RX ADMIN — ANTISEPTIC SURGICAL SCRUB 15 MILLILITER(S): 0.04 SOLUTION TOPICAL at 05:08

## 2025-02-04 RX ADMIN — Medication 100 MILLIGRAM(S): at 21:46

## 2025-02-04 RX ADMIN — PANTOPRAZOLE 40 MILLIGRAM(S): 20 TABLET, DELAYED RELEASE ORAL at 13:14

## 2025-02-04 RX ADMIN — Medication 100 MILLIGRAM(S): at 08:09

## 2025-02-04 RX ADMIN — Medication 20 GRAM(S): at 17:02

## 2025-02-04 RX ADMIN — ALBUMIN HUMAN 50 MILLILITER(S): 50 SOLUTION INTRAVENOUS at 13:13

## 2025-02-04 NOTE — PROGRESS NOTE ADULT - SUBJECTIVE AND OBJECTIVE BOX
INTERVAL HPI/OVERNIGHT EVENTS:69 y/o F (name: Laverne Lai)  with hx of DM and cirrhosis, was brought in by EMS for AMS at home.  PEr son, patient was sick in bed for past two days, and today patient's sister call 911 to bring her to hospital because she was waking up.   In the ED patient was obtunded, intubated for airway protection.   Labs significant for DKA with incalculable GAP, s/p 3 IVF bolus, started on bicarb by ED, and s/p 1 dose of vanc and zosyn   MICU consulted    SUBJECTIVE: Patient seen and examined at bedside. off sedation at this time.     ROS: All negative except as listed above.    VITAL SIGNS:  ICU Vital Signs Last 24 Hrs  T(C): 37.4 (04 Feb 2025 00:00), Max: 37.4 (04 Feb 2025 00:00)  T(F): 99.3 (04 Feb 2025 00:00), Max: 99.3 (04 Feb 2025 00:00)  HR: 73 (04 Feb 2025 12:08) (73 - 94)  BP: --  BP(mean): --  ABP: 129/57 (04 Feb 2025 09:00) (90/45 - 147/66)  ABP(mean): 86 (04 Feb 2025 09:00) (63 - 100)  RR: 30 (04 Feb 2025 09:00) (16 - 32)  SpO2: 97% (04 Feb 2025 12:08) (90% - 97%)    O2 Parameters below as of 04 Feb 2025 09:00  Patient On (Oxygen Delivery Method): ventilator    O2 Concentration (%): 60      Mode: AC/ CMV (Assist Control/ Continuous Mandatory Ventilation), RR (machine): 16, TV (machine): 300, FiO2: 60, PEEP: 6, ITime: 1, MAP: 11, PIP: 17  Plateau pressure:   P/F ratio:     02-03 @ 07:01  -  02-04 @ 07:00  --------------------------------------------------------  IN: 4200.5 mL / OUT: 4725 mL / NET: -524.5 mL    02-04 @ 07:01  - 02-04 @ 12:51  --------------------------------------------------------  IN: 351.5 mL / OUT: 500 mL / NET: -148.5 mL      CAPILLARY BLOOD GLUCOSE      POCT Blood Glucose.: 219 mg/dL (04 Feb 2025 11:30)      ECG: reviewed.    PHYSICAL EXAM:    GENERAL: In NAD, intubated and sedated  HEENT: NC/AT  PULM: B/L coarse breath sounds  CVS: +S1, S2  ABD: Soft, non-tender  EXTREMITIES: No pedal edema B/L  SKIN: No open wounds  NEURO: Grossly non-focal    MEDICATIONS:  MEDICATIONS  (STANDING):  albumin human 25% IVPB 100 milliLiter(s) IV Intermittent every 6 hours  cefTRIAXone Injectable.      cefTRIAXone Injectable. 1000 milliGRAM(s) IV Push once  chlorhexidine 0.12% Liquid 15 milliLiter(s) Oral Mucosa every 12 hours  chlorhexidine 2% Cloths 1 Application(s) Topical daily  chlorhexidine 4% Liquid 1 Application(s) Topical <User Schedule>  dextrose 5% + lactated ringers. 1000 milliLiter(s) (100 mL/Hr) IV Continuous <Continuous>  heparin   Injectable 5000 Unit(s) SubCutaneous every 12 hours  hydrocortisone sodium succinate Injectable 100 milliGRAM(s) IV Push every 8 hours  insulin regular Infusion 6 Unit(s)/Hr (6 mL/Hr) IV Continuous <Continuous>  lactulose Syrup 20 Gram(s) Oral every 6 hours  metroNIDAZOLE  IVPB 500 milliGRAM(s) IV Intermittent every 8 hours  metroNIDAZOLE  IVPB      midodrine 15 milliGRAM(s) Oral every 8 hours  norepinephrine Infusion 0.05 MICROgram(s)/kG/Min (5.74 mL/Hr) IV Continuous <Continuous>  pantoprazole  Injectable 40 milliGRAM(s) IV Push daily  rifAXIMin 550 milliGRAM(s) Oral every 12 hours  vancomycin    Solution 500 milliGRAM(s) Oral every 6 hours  vasopressin Infusion 0.04 Unit(s)/Min (6 mL/Hr) IV Continuous <Continuous>    MEDICATIONS  (PRN):  fentaNYL    Injectable 25 MICROGram(s) IV Push every 2 hours PRN vent dysynchrony  sodium chloride 0.9% lock flush 10 milliLiter(s) IV Push every 1 hour PRN Pre/post blood products, medications, blood draw, and to maintain line patency      ALLERGIES:  Allergies    Allergy Status Unknown    Intolerances        LABS:                        8.9    24.99 )-----------( 52       ( 04 Feb 2025 08:22 )             25.6     02-04    143  |  92[L]  |  25.4[H]  ----------------------------<  292[H]  4.6   |  27.0  |  1.36[H]    Ca    8.4      04 Feb 2025 08:32  Phos  2.5     02-04  Mg     2.5     02-04    TPro  6.7  /  Alb  4.0  /  TBili  2.5[H]  /  DBili  x   /  AST  823[H]  /  ALT  234[H]  /  AlkPhos  158[H]  02-04    PT/INR - ( 04 Feb 2025 05:23 )   PT: 27.9 sec;   INR: 2.49 ratio         PTT - ( 04 Feb 2025 05:23 )  PTT:39.1 sec  Urinalysis Basic - ( 04 Feb 2025 08:32 )    Color: x / Appearance: x / SG: x / pH: x  Gluc: 292 mg/dL / Ketone: x  / Bili: x / Urobili: x   Blood: x / Protein: x / Nitrite: x   Leuk Esterase: x / RBC: x / WBC x   Sq Epi: x / Non Sq Epi: x / Bacteria: x      ABG:  pCO2, Arterial: 37 mmHg (02-04-25 @ 04:54)  pO2, Arterial: 70 mmHg (02-04-25 @ 04:54)  pH, Arterial: 7.590 (02-04-25 @ 04:54)  pH, Arterial: 7.570 (02-03-25 @ 21:15)  pO2, Arterial: 69 mmHg (02-03-25 @ 21:15)  pCO2, Arterial: 36 mmHg (02-03-25 @ 21:15)        Culture - Blood (collected 02-02-25 @ 13:02)  Source: .Blood BLOOD  Preliminary Report (02-03-25 @ 20:02):    No growth at 24 hours    Culture - Blood (collected 02-02-25 @ 13:02)  Source: .Blood BLOOD  Gram Stain (02-03-25 @ 14:27):    Growth in aerobic bottle: Gram Negative Rods  Preliminary Report (02-03-25 @ 14:27):    Growth in aerobic bottle: Gram Negative Rods    Direct identification is available within approximately 3-5    hours either by Blood Panel Multiplexed PCR or Direct    MALDI-TOF. Details: [email or web address]  Organism: Blood Culture PCR (02-03-25 @ 15:44)  Organism: Blood Culture PCR (02-03-25 @ 15:44)      Method Type: PCR      -  Escherichia coli: Detec          RADIOLOGY & ADDITIONAL TESTS: Reviewed.

## 2025-02-04 NOTE — DIETITIAN INITIAL EVALUATION ADULT - OTHER INFO
69 yo F with pmhx of DM and cirrhosis, was brought in by EMS for AMS at home. Pt was obtunded in ED and intubated for airway protection. Labs significant for DKA, pt was given IVF, bicarb, vanc and zosyn in ED. Admitted to MICU, started on insulin gtt and IV pressors.

## 2025-02-04 NOTE — PROGRESS NOTE ADULT - ASSESSMENT
JUSTIN LOVE  MRN-[id number]  Patient is a 69y old  Female who presents with a chief complaint of Type 2 diabetes mellitus with ketoacidosis without coma     (04 Feb 2025 11:27)        ====================ASSESSMENT/PLAN==============    ====================== NEUROLOGY=====================  fentaNYL    Injectable 25 MICROGram(s) IV Push every 2 hours PRN vent dysynchrony    ==================== RESPIRATORY======================  Mechanical Ventilation:  Mode: AC/ CMV (Assist Control/ Continuous Mandatory Ventilation)  RR (machine): 16  TV (machine): 300  FiO2: 60  PEEP: 6  ITime: 1  MAP: 11  PIP: 17      ====================CARDIOVASCULAR==================  midodrine 15 milliGRAM(s) Oral every 8 hours  norepinephrine Infusion 0.05 MICROgram(s)/kG/Min (5.74 mL/Hr) IV Continuous <Continuous>    ===================HEMATOLOGIC/ONC ===================  heparin   Injectable 5000 Unit(s) SubCutaneous every 12 hours    ===================== RENAL =========================  Continue monitoring urine output    Avoid Nephrotoxic agents   Adjusting medications for renal  function   Replacing electrolytes as needed with Goal K> 4, PO> 3, Mg> 2  ==================== GASTROINTESTINAL===================  albumin human 25% IVPB 100 milliLiter(s) IV Intermittent every 6 hours  dextrose 5% + lactated ringers. 1000 milliLiter(s) (100 mL/Hr) IV Continuous <Continuous>  lactulose Syrup 20 Gram(s) Oral every 6 hours  pantoprazole  Injectable 40 milliGRAM(s) IV Push daily  sodium chloride 0.9% lock flush 10 milliLiter(s) IV Push every 1 hour PRN Pre/post blood products, medications, blood draw, and to maintain line patency      =======================    ENDOCRINE  =====================  hydrocortisone sodium succinate Injectable 100 milliGRAM(s) IV Push every 8 hours  insulin regular Infusion 6 Unit(s)/Hr (6 mL/Hr) IV Continuous <Continuous>  vasopressin Infusion 0.04 Unit(s)/Min (6 mL/Hr) IV Continuous <Continuous>    ========================INFECTIOUS DISEASE================  cefTRIAXone Injectable.      cefTRIAXone Injectable. 1000 milliGRAM(s) IV Push once  metroNIDAZOLE  IVPB 500 milliGRAM(s) IV Intermittent every 8 hours  metroNIDAZOLE  IVPB      rifAXIMin 550 milliGRAM(s) Oral every 12 hours  vancomycin    Solution 500 milliGRAM(s) Oral every 6 hours      ____________________________________________

## 2025-02-04 NOTE — DIETITIAN INITIAL EVALUATION ADULT - PERTINENT MEDS FT
MEDICATIONS  (STANDING):  dextrose 5% + lactated ringers. 1000 milliLiter(s) (100 mL/Hr) IV Continuous <Continuous>  hydrocortisone sodium succinate Injectable 100 milliGRAM(s) IV Push every 8 hours  insulin regular Infusion 6 Unit(s)/Hr (6 mL/Hr) IV Continuous <Continuous>  lactulose Syrup 20 Gram(s) Oral every 6 hours  metroNIDAZOLE  IVPB 500 milliGRAM(s) IV Intermittent every 8 hours  norepinephrine Infusion 0.05 MICROgram(s)/kG/Min (5.74 mL/Hr) IV Continuous <Continuous>  pantoprazole  Injectable 40 milliGRAM(s) IV Push daily  piperacillin/tazobactam IVPB.. 3.375 Gram(s) IV Intermittent every 12 hours  rifAXIMin 550 milliGRAM(s) Oral every 12 hours  vancomycin    Solution 500 milliGRAM(s) Oral every 6 hours  vasopressin Infusion 0.04 Unit(s)/Min (6 mL/Hr) IV Continuous <Continuous>    MEDICATIONS  (PRN):  fentaNYL    Injectable 25 MICROGram(s) IV Push every 2 hours PRN vent dysynchrony

## 2025-02-04 NOTE — PATIENT PROFILE ADULT - FALL HARM RISK - HARM RISK INTERVENTIONS

## 2025-02-04 NOTE — DIETITIAN INITIAL EVALUATION ADULT - ORAL INTAKE PTA/DIET HISTORY
Chart and events reviewed. Pt is intubated and on pressors in the ICU. Rectal tube in place, no output documented yet.  lbs this admission. Pt has been NPO since admission x 2 days. H/o DM, found to be DKA, on insulin gtt.

## 2025-02-04 NOTE — DIETITIAN INITIAL EVALUATION ADULT - ENTERAL
When feasible, start Glucerna 1.5 @ 10 cc/hr and increase by 10 cc q 6 hrs until goal rate 55 ml/hr x 18 hrs (990 ml, 1485 kcals, 63 g pro, 752 ml free water)  Additional free water per medical teams discretion

## 2025-02-04 NOTE — PATIENT PROFILE ADULT - NSTOBACCONEVERSMOKERY/N_GEN_A
No X Size Of Lesion In Cm (Optional): 0 Detail Level: Detailed When Should The Patient Follow-Up For Their Next Full-Body Skin Exam?: 6 Months

## 2025-02-04 NOTE — DIETITIAN INITIAL EVALUATION ADULT - PERTINENT LABORATORY DATA
02-04    143  |  92[L]  |  25.4[H]  ----------------------------<  292[H]  4.6   |  27.0  |  1.36[H]    Ca    8.4      04 Feb 2025 08:32  Phos  2.5     02-04  Mg     2.5     02-04    TPro  6.7  /  Alb  4.0  /  TBili  2.5[H]  /  DBili  x   /  AST  823[H]  /  ALT  234[H]  /  AlkPhos  158[H]  02-04  POCT Blood Glucose.: 262 mg/dL (02-04-25 @ 10:21)

## 2025-02-04 NOTE — DIETITIAN INITIAL EVALUATION ADULT - ADD RECOMMEND
Monitor initiation/tolerance of TF regimen  Monitor I/Os and BM trends  Monitor weights and malnutrition parameters

## 2025-02-05 LAB
-  AMOXICILLIN/CLAVULANIC ACID: SIGNIFICANT CHANGE UP
-  AMPICILLIN/SULBACTAM: SIGNIFICANT CHANGE UP
-  AMPICILLIN/SULBACTAM: SIGNIFICANT CHANGE UP
-  AMPICILLIN: SIGNIFICANT CHANGE UP
-  AMPICILLIN: SIGNIFICANT CHANGE UP
-  AZTREONAM: SIGNIFICANT CHANGE UP
-  AZTREONAM: SIGNIFICANT CHANGE UP
-  CEFAZOLIN: SIGNIFICANT CHANGE UP
-  CEFAZOLIN: SIGNIFICANT CHANGE UP
-  CEFEPIME: SIGNIFICANT CHANGE UP
-  CEFEPIME: SIGNIFICANT CHANGE UP
-  CEFOXITIN: SIGNIFICANT CHANGE UP
-  CEFOXITIN: SIGNIFICANT CHANGE UP
-  CEFTRIAXONE: SIGNIFICANT CHANGE UP
-  CEFTRIAXONE: SIGNIFICANT CHANGE UP
-  CEFUROXIME: SIGNIFICANT CHANGE UP
-  CIPROFLOXACIN: SIGNIFICANT CHANGE UP
-  CIPROFLOXACIN: SIGNIFICANT CHANGE UP
-  ERTAPENEM: SIGNIFICANT CHANGE UP
-  ERTAPENEM: SIGNIFICANT CHANGE UP
-  GENTAMICIN: SIGNIFICANT CHANGE UP
-  GENTAMICIN: SIGNIFICANT CHANGE UP
-  IMIPENEM: SIGNIFICANT CHANGE UP
-  IMIPENEM: SIGNIFICANT CHANGE UP
-  LEVOFLOXACIN: SIGNIFICANT CHANGE UP
-  LEVOFLOXACIN: SIGNIFICANT CHANGE UP
-  MEROPENEM: SIGNIFICANT CHANGE UP
-  MEROPENEM: SIGNIFICANT CHANGE UP
-  NITROFURANTOIN: SIGNIFICANT CHANGE UP
-  PIPERACILLIN/TAZOBACTAM: SIGNIFICANT CHANGE UP
-  PIPERACILLIN/TAZOBACTAM: SIGNIFICANT CHANGE UP
-  TOBRAMYCIN: SIGNIFICANT CHANGE UP
-  TOBRAMYCIN: SIGNIFICANT CHANGE UP
-  TRIMETHOPRIM/SULFAMETHOXAZOLE: SIGNIFICANT CHANGE UP
-  TRIMETHOPRIM/SULFAMETHOXAZOLE: SIGNIFICANT CHANGE UP
A1C WITH ESTIMATED AVERAGE GLUCOSE RESULT: 15.6 % — HIGH (ref 4–5.6)
ALBUMIN SERPL ELPH-MCNC: 4.3 G/DL — SIGNIFICANT CHANGE UP (ref 3.3–5.2)
ALBUMIN SERPL ELPH-MCNC: 4.4 G/DL — SIGNIFICANT CHANGE UP (ref 3.3–5.2)
ALBUMIN SERPL ELPH-MCNC: 4.4 G/DL — SIGNIFICANT CHANGE UP (ref 3.3–5.2)
ALP SERPL-CCNC: 217 U/L — HIGH (ref 40–120)
ALP SERPL-CCNC: 229 U/L — HIGH (ref 40–120)
ALP SERPL-CCNC: 297 U/L — HIGH (ref 40–120)
ALT FLD-CCNC: 115 U/L — HIGH
ALT FLD-CCNC: 150 U/L — HIGH
ALT FLD-CCNC: 158 U/L — HIGH
AMMONIA BLD-MCNC: 41 UMOL/L — SIGNIFICANT CHANGE UP (ref 11–55)
AMMONIA BLD-MCNC: 44 UMOL/L — SIGNIFICANT CHANGE UP (ref 11–55)
ANION GAP SERPL CALC-SCNC: 16 MMOL/L — SIGNIFICANT CHANGE UP (ref 5–17)
ANION GAP SERPL CALC-SCNC: 17 MMOL/L — SIGNIFICANT CHANGE UP (ref 5–17)
ANION GAP SERPL CALC-SCNC: 20 MMOL/L — HIGH (ref 5–17)
ANISOCYTOSIS BLD QL: SLIGHT — SIGNIFICANT CHANGE UP
APTT BLD: 37.6 SEC — HIGH (ref 24.5–35.6)
APTT BLD: 38.7 SEC — HIGH (ref 24.5–35.6)
APTT BLD: 41.1 SEC — HIGH (ref 24.5–35.6)
AST SERPL-CCNC: 209 U/L — HIGH
AST SERPL-CCNC: 325 U/L — HIGH
AST SERPL-CCNC: 381 U/L — HIGH
BASOPHILS # BLD AUTO: 0 K/UL — SIGNIFICANT CHANGE UP (ref 0–0.2)
BASOPHILS NFR BLD AUTO: 0 % — SIGNIFICANT CHANGE UP (ref 0–2)
BILIRUB SERPL-MCNC: 2 MG/DL — SIGNIFICANT CHANGE UP (ref 0.4–2)
BILIRUB SERPL-MCNC: 2.1 MG/DL — HIGH (ref 0.4–2)
BILIRUB SERPL-MCNC: 2.1 MG/DL — HIGH (ref 0.4–2)
BUN SERPL-MCNC: 23.6 MG/DL — HIGH (ref 8–20)
BUN SERPL-MCNC: 24.8 MG/DL — HIGH (ref 8–20)
BUN SERPL-MCNC: 29.5 MG/DL — HIGH (ref 8–20)
CALCIUM SERPL-MCNC: 7.9 MG/DL — LOW (ref 8.4–10.5)
CALCIUM SERPL-MCNC: 8 MG/DL — LOW (ref 8.4–10.5)
CALCIUM SERPL-MCNC: 8.1 MG/DL — LOW (ref 8.4–10.5)
CHLORIDE SERPL-SCNC: 104 MMOL/L — SIGNIFICANT CHANGE UP (ref 96–108)
CHLORIDE SERPL-SCNC: 106 MMOL/L — SIGNIFICANT CHANGE UP (ref 96–108)
CHLORIDE SERPL-SCNC: 106 MMOL/L — SIGNIFICANT CHANGE UP (ref 96–108)
CO2 SERPL-SCNC: 25 MMOL/L — SIGNIFICANT CHANGE UP (ref 22–29)
CO2 SERPL-SCNC: 28 MMOL/L — SIGNIFICANT CHANGE UP (ref 22–29)
CO2 SERPL-SCNC: 28 MMOL/L — SIGNIFICANT CHANGE UP (ref 22–29)
CREAT SERPL-MCNC: 1.01 MG/DL — SIGNIFICANT CHANGE UP (ref 0.5–1.3)
CREAT SERPL-MCNC: 1.06 MG/DL — SIGNIFICANT CHANGE UP (ref 0.5–1.3)
CREAT SERPL-MCNC: 1.1 MG/DL — SIGNIFICANT CHANGE UP (ref 0.5–1.3)
CULTURE RESULTS: ABNORMAL
CULTURE RESULTS: ABNORMAL
EGFR: 55 ML/MIN/1.73M2 — LOW
EGFR: 58 ML/MIN/1.73M2 — LOW
EGFR: 61 ML/MIN/1.73M2 — SIGNIFICANT CHANGE UP
EOSINOPHIL # BLD AUTO: 0 K/UL — SIGNIFICANT CHANGE UP (ref 0–0.5)
EOSINOPHIL NFR BLD AUTO: 0 % — SIGNIFICANT CHANGE UP (ref 0–6)
ESTIMATED AVERAGE GLUCOSE: 401 MG/DL — HIGH (ref 68–114)
FIBRINOGEN PPP-MCNC: 160 MG/DL — LOW (ref 200–450)
GAS PNL BLDA: SIGNIFICANT CHANGE UP
GI PCR PANEL: SIGNIFICANT CHANGE UP
GLUCOSE BLDC GLUCOMTR-MCNC: 138 MG/DL — HIGH (ref 70–99)
GLUCOSE BLDC GLUCOMTR-MCNC: 169 MG/DL — HIGH (ref 70–99)
GLUCOSE BLDC GLUCOMTR-MCNC: 175 MG/DL — HIGH (ref 70–99)
GLUCOSE BLDC GLUCOMTR-MCNC: 184 MG/DL — HIGH (ref 70–99)
GLUCOSE BLDC GLUCOMTR-MCNC: 204 MG/DL — HIGH (ref 70–99)
GLUCOSE BLDC GLUCOMTR-MCNC: 209 MG/DL — HIGH (ref 70–99)
GLUCOSE BLDC GLUCOMTR-MCNC: 291 MG/DL — HIGH (ref 70–99)
GLUCOSE BLDC GLUCOMTR-MCNC: 319 MG/DL — HIGH (ref 70–99)
GLUCOSE BLDC GLUCOMTR-MCNC: 335 MG/DL — HIGH (ref 70–99)
GLUCOSE BLDC GLUCOMTR-MCNC: 361 MG/DL — HIGH (ref 70–99)
GLUCOSE SERPL-MCNC: 161 MG/DL — HIGH (ref 70–99)
GLUCOSE SERPL-MCNC: 168 MG/DL — HIGH (ref 70–99)
GLUCOSE SERPL-MCNC: 305 MG/DL — HIGH (ref 70–99)
HCT VFR BLD CALC: 24.6 % — LOW (ref 34.5–45)
HCT VFR BLD CALC: 25 % — LOW (ref 34.5–45)
HCT VFR BLD CALC: 25.6 % — LOW (ref 34.5–45)
HEPARIN-PF4 AB RESULT: <0.6 U/ML — SIGNIFICANT CHANGE UP (ref 0–0.9)
HGB BLD-MCNC: 8.3 G/DL — LOW (ref 11.5–15.5)
HGB BLD-MCNC: 8.5 G/DL — LOW (ref 11.5–15.5)
HGB BLD-MCNC: 8.7 G/DL — LOW (ref 11.5–15.5)
HYPOCHROMIA BLD QL: SLIGHT — SIGNIFICANT CHANGE UP
INR BLD: 2.01 RATIO — HIGH (ref 0.85–1.16)
LYMPHOCYTES # BLD AUTO: 0.91 K/UL — LOW (ref 1–3.3)
LYMPHOCYTES # BLD AUTO: 4.3 % — LOW (ref 13–44)
MACROCYTES BLD QL: SLIGHT — SIGNIFICANT CHANGE UP
MAGNESIUM SERPL-MCNC: 2.1 MG/DL — SIGNIFICANT CHANGE UP (ref 1.6–2.6)
MAGNESIUM SERPL-MCNC: 2.2 MG/DL — SIGNIFICANT CHANGE UP (ref 1.6–2.6)
MAGNESIUM SERPL-MCNC: 2.2 MG/DL — SIGNIFICANT CHANGE UP (ref 1.8–2.6)
MANUAL SMEAR VERIFICATION: SIGNIFICANT CHANGE UP
MCHC RBC-ENTMCNC: 31.8 PG — SIGNIFICANT CHANGE UP (ref 27–34)
MCHC RBC-ENTMCNC: 32.4 PG — SIGNIFICANT CHANGE UP (ref 27–34)
MCHC RBC-ENTMCNC: 32.7 PG — SIGNIFICANT CHANGE UP (ref 27–34)
MCHC RBC-ENTMCNC: 33.2 G/DL — SIGNIFICANT CHANGE UP (ref 32–36)
MCHC RBC-ENTMCNC: 34 G/DL — SIGNIFICANT CHANGE UP (ref 32–36)
MCHC RBC-ENTMCNC: 34.6 G/DL — SIGNIFICANT CHANGE UP (ref 32–36)
MCV RBC AUTO: 93.9 FL — SIGNIFICANT CHANGE UP (ref 80–100)
MCV RBC AUTO: 95.8 FL — SIGNIFICANT CHANGE UP (ref 80–100)
MCV RBC AUTO: 96.2 FL — SIGNIFICANT CHANGE UP (ref 80–100)
METHOD TYPE: SIGNIFICANT CHANGE UP
METHOD TYPE: SIGNIFICANT CHANGE UP
MONOCYTES # BLD AUTO: 0.74 K/UL — SIGNIFICANT CHANGE UP (ref 0–0.9)
MONOCYTES NFR BLD AUTO: 3.5 % — SIGNIFICANT CHANGE UP (ref 2–14)
NEUTROPHILS # BLD AUTO: 19.44 K/UL — HIGH (ref 1.8–7.4)
NEUTROPHILS NFR BLD AUTO: 91.3 % — HIGH (ref 43–77)
NEUTS BAND # BLD: 0.9 % — SIGNIFICANT CHANGE UP (ref 0–8)
NEUTS BAND NFR BLD: 0.9 % — SIGNIFICANT CHANGE UP (ref 0–8)
NRBC # BLD: 1 /100 WBCS — HIGH (ref 0–0)
NRBC BLD-RTO: 1 /100 WBCS — HIGH (ref 0–0)
ORGANISM # SPEC MICROSCOPIC CNT: ABNORMAL
ORGANISM # SPEC MICROSCOPIC CNT: SIGNIFICANT CHANGE UP
ORGANISM # SPEC MICROSCOPIC CNT: SIGNIFICANT CHANGE UP
OVALOCYTES BLD QL SMEAR: SLIGHT — SIGNIFICANT CHANGE UP
PF4 HEPARIN CMPLX AB SER-ACNC: NEGATIVE — SIGNIFICANT CHANGE UP
PHOSPHATE SERPL-MCNC: 2.6 MG/DL — SIGNIFICANT CHANGE UP (ref 2.4–4.7)
PHOSPHATE SERPL-MCNC: 2.9 MG/DL — SIGNIFICANT CHANGE UP (ref 2.4–4.7)
PHOSPHATE SERPL-MCNC: 4.1 MG/DL — SIGNIFICANT CHANGE UP (ref 2.4–4.7)
PLAT MORPH BLD: NORMAL — SIGNIFICANT CHANGE UP
PLATELET # BLD AUTO: 36 K/UL — LOW (ref 150–400)
PLATELET # BLD AUTO: 36 K/UL — LOW (ref 150–400)
PLATELET # BLD AUTO: 43 K/UL — LOW (ref 150–400)
POIKILOCYTOSIS BLD QL AUTO: SLIGHT — SIGNIFICANT CHANGE UP
POLYCHROMASIA BLD QL SMEAR: SLIGHT — SIGNIFICANT CHANGE UP
POTASSIUM SERPL-MCNC: 3.8 MMOL/L — SIGNIFICANT CHANGE UP (ref 3.5–5.3)
POTASSIUM SERPL-MCNC: 4 MMOL/L — SIGNIFICANT CHANGE UP (ref 3.5–5.3)
POTASSIUM SERPL-MCNC: 4.6 MMOL/L — SIGNIFICANT CHANGE UP (ref 3.5–5.3)
POTASSIUM SERPL-SCNC: 3.8 MMOL/L — SIGNIFICANT CHANGE UP (ref 3.5–5.3)
POTASSIUM SERPL-SCNC: 4 MMOL/L — SIGNIFICANT CHANGE UP (ref 3.5–5.3)
POTASSIUM SERPL-SCNC: 4.6 MMOL/L — SIGNIFICANT CHANGE UP (ref 3.5–5.3)
PROT SERPL-MCNC: 6.5 G/DL — LOW (ref 6.6–8.7)
PROT SERPL-MCNC: 6.7 G/DL — SIGNIFICANT CHANGE UP (ref 6.6–8.7)
PROT SERPL-MCNC: 6.7 G/DL — SIGNIFICANT CHANGE UP (ref 6.6–8.7)
PROTHROM AB SERPL-ACNC: 23.2 SEC — HIGH (ref 9.9–13.4)
RBC # BLD: 2.61 M/UL — LOW (ref 3.8–5.2)
RBC # BLD: 2.62 M/UL — LOW (ref 3.8–5.2)
RBC # BLD: 2.66 M/UL — LOW (ref 3.8–5.2)
RBC # FLD: 15.6 % — HIGH (ref 10.3–14.5)
RBC # FLD: 15.9 % — HIGH (ref 10.3–14.5)
RBC # FLD: 16.1 % — HIGH (ref 10.3–14.5)
RBC BLD AUTO: ABNORMAL
SODIUM SERPL-SCNC: 148 MMOL/L — HIGH (ref 135–145)
SODIUM SERPL-SCNC: 150 MMOL/L — HIGH (ref 135–145)
SODIUM SERPL-SCNC: 151 MMOL/L — HIGH (ref 135–145)
SPECIMEN SOURCE: SIGNIFICANT CHANGE UP
SPECIMEN SOURCE: SIGNIFICANT CHANGE UP
WBC # BLD: 12.09 K/UL — HIGH (ref 3.8–10.5)
WBC # BLD: 19.45 K/UL — HIGH (ref 3.8–10.5)
WBC # BLD: 21.08 K/UL — HIGH (ref 3.8–10.5)
WBC # FLD AUTO: 12.09 K/UL — HIGH (ref 3.8–10.5)
WBC # FLD AUTO: 19.45 K/UL — HIGH (ref 3.8–10.5)
WBC # FLD AUTO: 21.08 K/UL — HIGH (ref 3.8–10.5)

## 2025-02-05 PROCEDURE — 99291 CRITICAL CARE FIRST HOUR: CPT | Mod: GC

## 2025-02-05 PROCEDURE — 99223 1ST HOSP IP/OBS HIGH 75: CPT

## 2025-02-05 RX ORDER — DM/PSEUDOEPHED/ACETAMINOPHEN 10-30-250
25 CAPSULE ORAL ONCE
Refills: 0 | Status: DISCONTINUED | OUTPATIENT
Start: 2025-02-05 | End: 2025-02-14

## 2025-02-05 RX ORDER — INSULIN LISPRO 100/ML
VIAL (ML) SUBCUTANEOUS EVERY 6 HOURS
Refills: 0 | Status: DISCONTINUED | OUTPATIENT
Start: 2025-02-05 | End: 2025-02-08

## 2025-02-05 RX ORDER — INSULIN LISPRO 100/ML
5 VIAL (ML) SUBCUTANEOUS EVERY 6 HOURS
Refills: 0 | Status: DISCONTINUED | OUTPATIENT
Start: 2025-02-05 | End: 2025-02-08

## 2025-02-05 RX ORDER — CEFTRIAXONE 250 MG/1
2000 INJECTION, POWDER, FOR SOLUTION INTRAMUSCULAR; INTRAVENOUS EVERY 24 HOURS
Refills: 0 | Status: COMPLETED | OUTPATIENT
Start: 2025-02-05 | End: 2025-02-12

## 2025-02-05 RX ORDER — DM/PSEUDOEPHED/ACETAMINOPHEN 10-30-250
12.5 CAPSULE ORAL ONCE
Refills: 0 | Status: DISCONTINUED | OUTPATIENT
Start: 2025-02-05 | End: 2025-02-14

## 2025-02-05 RX ORDER — SODIUM CHLORIDE 9 G/ML
500 INJECTION, SOLUTION INTRAVENOUS ONCE
Refills: 0 | Status: COMPLETED | OUTPATIENT
Start: 2025-02-05 | End: 2025-02-05

## 2025-02-05 RX ORDER — INSULIN GLARGINE-YFGN 100 [IU]/ML
10 INJECTION, SOLUTION SUBCUTANEOUS ONCE
Refills: 0 | Status: COMPLETED | OUTPATIENT
Start: 2025-02-05 | End: 2025-02-05

## 2025-02-05 RX ORDER — INSULIN GLARGINE-YFGN 100 [IU]/ML
20 INJECTION, SOLUTION SUBCUTANEOUS EVERY MORNING
Refills: 0 | Status: DISCONTINUED | OUTPATIENT
Start: 2025-02-05 | End: 2025-02-06

## 2025-02-05 RX ORDER — SODIUM CHLORIDE 9 G/ML
1000 INJECTION, SOLUTION INTRAVENOUS
Refills: 0 | Status: DISCONTINUED | OUTPATIENT
Start: 2025-02-05 | End: 2025-02-05

## 2025-02-05 RX ORDER — POTASSIUM CHLORIDE 750 MG/1
40 TABLET, EXTENDED RELEASE ORAL ONCE
Refills: 0 | Status: COMPLETED | OUTPATIENT
Start: 2025-02-05 | End: 2025-02-05

## 2025-02-05 RX ORDER — DM/PSEUDOEPHED/ACETAMINOPHEN 10-30-250
15 CAPSULE ORAL ONCE
Refills: 0 | Status: DISCONTINUED | OUTPATIENT
Start: 2025-02-05 | End: 2025-02-14

## 2025-02-05 RX ORDER — INSULIN GLARGINE-YFGN 100 [IU]/ML
30 INJECTION, SOLUTION SUBCUTANEOUS EVERY MORNING
Refills: 0 | Status: DISCONTINUED | OUTPATIENT
Start: 2025-02-06 | End: 2025-02-06

## 2025-02-05 RX ORDER — GLUCAGON 3 MG/1
1 POWDER NASAL ONCE
Refills: 0 | Status: DISCONTINUED | OUTPATIENT
Start: 2025-02-05 | End: 2025-02-05

## 2025-02-05 RX ADMIN — Medication 20 GRAM(S): at 05:16

## 2025-02-05 RX ADMIN — ALBUMIN HUMAN 50 MILLILITER(S): 50 SOLUTION INTRAVENOUS at 05:17

## 2025-02-05 RX ADMIN — SODIUM CHLORIDE 500 MILLILITER(S): 9 INJECTION, SOLUTION INTRAVENOUS at 20:58

## 2025-02-05 RX ADMIN — INSULIN GLARGINE-YFGN 20 UNIT(S): 100 INJECTION, SOLUTION SUBCUTANEOUS at 04:22

## 2025-02-05 RX ADMIN — Medication 20 GRAM(S): at 23:43

## 2025-02-05 RX ADMIN — ANTISEPTIC SURGICAL SCRUB 1 APPLICATION(S): 0.04 SOLUTION TOPICAL at 05:16

## 2025-02-05 RX ADMIN — Medication 20 GRAM(S): at 17:52

## 2025-02-05 RX ADMIN — Medication 5 UNIT(S): at 21:21

## 2025-02-05 RX ADMIN — ANTISEPTIC SURGICAL SCRUB 15 MILLILITER(S): 0.04 SOLUTION TOPICAL at 05:16

## 2025-02-05 RX ADMIN — ANTISEPTIC SURGICAL SCRUB 15 MILLILITER(S): 0.04 SOLUTION TOPICAL at 17:46

## 2025-02-05 RX ADMIN — CEFTRIAXONE 2000 MILLIGRAM(S): 250 INJECTION, POWDER, FOR SOLUTION INTRAMUSCULAR; INTRAVENOUS at 14:02

## 2025-02-05 RX ADMIN — ALBUMIN HUMAN 50 MILLILITER(S): 50 SOLUTION INTRAVENOUS at 14:02

## 2025-02-05 RX ADMIN — POTASSIUM PHOSPHATE, MONOBASIC POTASSIUM PHOSPHATE, DIBASIC 83.33 MILLIMOLE(S): 236; 224 INJECTION, SOLUTION INTRAVENOUS at 00:09

## 2025-02-05 RX ADMIN — Medication 20 GRAM(S): at 14:01

## 2025-02-05 RX ADMIN — Medication 2: at 05:19

## 2025-02-05 RX ADMIN — Medication 8: at 23:43

## 2025-02-05 RX ADMIN — POTASSIUM CHLORIDE 40 MILLIEQUIVALENT(S): 750 TABLET, EXTENDED RELEASE ORAL at 20:59

## 2025-02-05 RX ADMIN — Medication 100 MILLIGRAM(S): at 05:15

## 2025-02-05 RX ADMIN — Medication 20 GRAM(S): at 00:09

## 2025-02-05 RX ADMIN — ALBUMIN HUMAN 50 MILLILITER(S): 50 SOLUTION INTRAVENOUS at 23:43

## 2025-02-05 RX ADMIN — ANTISEPTIC SURGICAL SCRUB 1 APPLICATION(S): 0.04 SOLUTION TOPICAL at 14:06

## 2025-02-05 RX ADMIN — Medication 6: at 14:06

## 2025-02-05 RX ADMIN — Medication 100 MILLIGRAM(S): at 14:07

## 2025-02-05 RX ADMIN — INSULIN GLARGINE-YFGN 10 UNIT(S): 100 INJECTION, SOLUTION SUBCUTANEOUS at 21:21

## 2025-02-05 RX ADMIN — Medication 100 MILLIGRAM(S): at 05:16

## 2025-02-05 RX ADMIN — ALBUMIN HUMAN 50 MILLILITER(S): 50 SOLUTION INTRAVENOUS at 00:08

## 2025-02-05 RX ADMIN — PANTOPRAZOLE 40 MILLIGRAM(S): 20 TABLET, DELAYED RELEASE ORAL at 14:03

## 2025-02-05 RX ADMIN — Medication 8: at 17:50

## 2025-02-05 RX ADMIN — MIDODRINE HYDROCHLORIDE 15 MILLIGRAM(S): 5 TABLET ORAL at 05:16

## 2025-02-05 RX ADMIN — ALBUMIN HUMAN 50 MILLILITER(S): 50 SOLUTION INTRAVENOUS at 17:52

## 2025-02-05 RX ADMIN — Medication 5 UNIT(S): at 23:44

## 2025-02-05 RX ADMIN — Medication 100 MILLIGRAM(S): at 14:01

## 2025-02-05 NOTE — CONSULT NOTE ADULT - ASSESSMENT
69 y/o F (name: Laverne Lai)  with hx of DM and cirrhosis, was brought in by EMS for AMS at home.  PEr son, patient was sick in bed for past two days, and today patient's sister call 911 to bring her to hospital because she was waking up.  In the ED patient was obtunded, intubated for airway protection.  (02 Feb 2025 21:32) Pt admitted to MICU with severe DKA, pancolitis, acute pancreatitis, UTI and pneumonia. Endocrine consulted for uncontrolled diabetes. A1c 15.6% on admission  1. likely T2DM admitted with DKA unclear etiology - medication nonadherence and pneumonia/colitis/pancreatitis/UTI  - admitted to MICU, s/p insulin gtt and IV hydration and transition to basal insulin with Admelog scale q6 this am  - also on stress dose steroids which can cause hyperglycemia, low threshold for resuming insulin gtt  - cont current insulin doses, will reassess tomorrow  2. acute pancreatitis ?due to gallstones seen on RUQ sonogram, trig level elevated at 240's  - trend lipase levels  - cont care per MICU team  3. UTI, pneuomonia  - on IV Abx, cont care per primary team  - glycemic control

## 2025-02-05 NOTE — PROGRESS NOTE ADULT - SUBJECTIVE AND OBJECTIVE BOX
INTERVAL HPI/OVERNIGHT EVENTS: was transitioned off insulin gtt to long acting    SUBJECTIVE: Patient seen and examined at bedside. intubated, not on sedation currently rass-4.      ROS: All negative except as listed above.    VITAL SIGNS:  ICU Vital Signs Last 24 Hrs  T(C): 37.4 (05 Feb 2025 08:00), Max: 37.5 (04 Feb 2025 12:00)  T(F): 99.3 (05 Feb 2025 08:00), Max: 99.5 (04 Feb 2025 12:00)  HR: 60 (05 Feb 2025 08:00) (59 - 79)  BP: 110/56 (05 Feb 2025 08:00) (106/59 - 142/76)  BP(mean): 73 (05 Feb 2025 08:00) (73 - 98)  ABP: 109/47 (05 Feb 2025 08:00) (88/40 - 154/68)  ABP(mean): 70 (05 Feb 2025 08:00) (49 - 101)  RR: 23 (05 Feb 2025 08:00) (16 - 30)  SpO2: 97% (05 Feb 2025 08:00) (92% - 100%)    O2 Parameters below as of 05 Feb 2025 08:00  Patient On (Oxygen Delivery Method): ventilator    O2 Concentration (%): 50      Mode: AC/ CMV (Assist Control/ Continuous Mandatory Ventilation), RR (machine): 16, TV (machine): 300, FiO2: 50, PEEP: 6, ITime: 1, MAP: 9, PIP: 14  Plateau pressure:   P/F ratio:     02-04 @ 07:01  -  02-05 @ 07:00  --------------------------------------------------------  IN: 3636.2 mL / OUT: 3200 mL / NET: 436.2 mL    02-05 @ 07:01  -  02-05 @ 08:48  --------------------------------------------------------  IN: 0 mL / OUT: 25 mL / NET: -25 mL      CAPILLARY BLOOD GLUCOSE      POCT Blood Glucose.: 175 mg/dL (05 Feb 2025 06:44)      ECG: reviewed.    PHYSICAL EXAM:    GENERAL: In NAD, intubated and sedated  HEENT: NC/AT  PULM: B/L coarse breath sounds  CVS: +S1, S2  ABD: Soft, non-tender  EXTREMITIES: No pedal edema B/L  SKIN: No open wounds  NEURO: Grossly non-focal    MEDICATIONS:  MEDICATIONS  (STANDING):  albumin human 25% IVPB 100 milliLiter(s) IV Intermittent every 6 hours  cefTRIAXone Injectable.      cefTRIAXone Injectable. 1000 milliGRAM(s) IV Push every 24 hours  chlorhexidine 0.12% Liquid 15 milliLiter(s) Oral Mucosa every 12 hours  chlorhexidine 2% Cloths 1 Application(s) Topical daily  chlorhexidine 4% Liquid 1 Application(s) Topical <User Schedule>  dextrose 50% Injectable 25 Gram(s) IV Push once  dextrose 50% Injectable 12.5 Gram(s) IV Push once  dextrose 50% Injectable 25 Gram(s) IV Push once  glucagon  Injectable 1 milliGRAM(s) IntraMuscular once  hydrocortisone sodium succinate Injectable 100 milliGRAM(s) IV Push every 8 hours  insulin glargine Injectable (LANTUS) 20 Unit(s) SubCutaneous every morning  insulin lispro (ADMELOG) corrective regimen sliding scale   SubCutaneous every 6 hours  insulin regular Infusion 6 Unit(s)/Hr (6 mL/Hr) IV Continuous <Continuous>  lactulose Syrup 20 Gram(s) Oral every 6 hours  metroNIDAZOLE  IVPB 500 milliGRAM(s) IV Intermittent every 8 hours  metroNIDAZOLE  IVPB      midodrine 15 milliGRAM(s) Oral every 8 hours  norepinephrine Infusion 0.05 MICROgram(s)/kG/Min (5.74 mL/Hr) IV Continuous <Continuous>  pantoprazole  Injectable 40 milliGRAM(s) IV Push daily  rifAXIMin 550 milliGRAM(s) Oral every 12 hours  vasopressin Infusion 0.04 Unit(s)/Min (6 mL/Hr) IV Continuous <Continuous>    MEDICATIONS  (PRN):  dextrose Oral Gel 15 Gram(s) Oral once PRN Blood Glucose LESS THAN 70 milliGRAM(s)/deciliter  fentaNYL    Injectable 25 MICROGram(s) IV Push every 2 hours PRN vent dysynchrony  sodium chloride 0.9% lock flush 10 milliLiter(s) IV Push every 1 hour PRN Pre/post blood products, medications, blood draw, and to maintain line patency      ALLERGIES:  Allergies    Allergy Status Unknown    Intolerances        LABS:                        8.7    19.45 )-----------( 43       ( 05 Feb 2025 06:47 )             25.6     02-05    150[H]  |  106  |  24.8[H]  ----------------------------<  168[H]  4.0   |  28.0  |  1.10    Ca    7.9[L]      05 Feb 2025 06:47  Phos  4.1     02-05  Mg     2.1     02-05    TPro  6.5[L]  /  Alb  4.3  /  TBili  2.0  /  DBili  x   /  AST  325[H]  /  ALT  150[H]  /  AlkPhos  229[H]  02-05    PT/INR - ( 05 Feb 2025 02:16 )   PT: 23.2 sec;   INR: 2.01 ratio         PTT - ( 05 Feb 2025 06:47 )  PTT:38.7 sec  Urinalysis Basic - ( 05 Feb 2025 06:47 )    Color: x / Appearance: x / SG: x / pH: x  Gluc: 168 mg/dL / Ketone: x  / Bili: x / Urobili: x   Blood: x / Protein: x / Nitrite: x   Leuk Esterase: x / RBC: x / WBC x   Sq Epi: x / Non Sq Epi: x / Bacteria: x      ABG:  pCO2, Arterial: 38 mmHg (02-05-25 @ 03:59)  pO2, Arterial: 104 mmHg (02-05-25 @ 03:59)  pH, Arterial: 7.520 (02-05-25 @ 03:59)  pO2, Arterial: 77 mmHg (02-04-25 @ 13:46)  pH, Arterial: 7.610 (02-04-25 @ 13:46)  pCO2, Arterial: 37 mmHg (02-04-25 @ 13:46)      Micro:    Culture - Blood (collected 02-02-25 @ 13:02)  Source: .Blood BLOOD  Gram Stain (02-03-25 @ 14:27):    Growth in aerobic bottle: Gram Negative Rods  Preliminary Report (02-04-25 @ 14:31):    Growth in aerobic bottle: Escherichia coli    Direct identification is available within approximately 3-5    hours either by Blood Panel Multiplexed PCR or Direct    MALDI-TOF. Details: [email or web address]  Organism: Blood Culture PCR (02-03-25 @ 15:44)  Organism: Blood Culture PCR (02-03-25 @ 15:44)      -  Escherichia coli: Detec      Method Type: PCR    Culture - Blood (collected 02-02-25 @ 13:02)  Source: .Blood BLOOD  Preliminary Report (02-04-25 @ 20:01):    No growth at 48 Hours          RADIOLOGY & ADDITIONAL TESTS: Reviewed.

## 2025-02-05 NOTE — CONSULT NOTE ADULT - SUBJECTIVE AND OBJECTIVE BOX
HPI:  71 y/o F (name: Laverne Lai)  with hx of DM and cirrhosis, was brought in by EMS for AMS at home.  PEr son, patient was sick in bed for past two days, and today patient's sister call 911 to bring her to hospital because she was waking up.  In the ED patient was obtunded, intubated for airway protection.  (02 Feb 2025 21:32) Pt admitted to MICU with severe DKA, pancolitis, acute pancreatitis and pneumonia    Endocrine consulted for uncontrolled diabetes. Pt intubated/sedated, History per chart, Son at bedside but does not know mom's medical history. A1c 15.6% on admission    REVIEW OF SYSTEMS: unable to assess due to medical condition  PAST MEDICAL & SURGICAL HISTORY: unable to assess due to medical condition  FAMILY HISTORY: unable to assess due to medical condition  SOCIAL HISTORY: unable to assess due to medical condition    MEDICATIONS  (STANDING):  albumin human 25% IVPB 100 milliLiter(s) IV Intermittent every 6 hours  cefTRIAXone Injectable. 2000 milliGRAM(s) IV Push every 24 hours  chlorhexidine 0.12% Liquid 15 milliLiter(s) Oral Mucosa every 12 hours  chlorhexidine 2% Cloths 1 Application(s) Topical daily  chlorhexidine 4% Liquid 1 Application(s) Topical <User Schedule>  dextrose 50% Injectable 25 Gram(s) IV Push once  dextrose 50% Injectable 12.5 Gram(s) IV Push once  dextrose 50% Injectable 25 Gram(s) IV Push once  hydrocortisone sodium succinate Injectable 100 milliGRAM(s) IV Push every 8 hours  insulin glargine Injectable (LANTUS) 20 Unit(s) SubCutaneous every morning  insulin lispro (ADMELOG) corrective regimen sliding scale   SubCutaneous every 6 hours  lactulose Syrup 20 Gram(s) Oral every 6 hours  metroNIDAZOLE  IVPB 500 milliGRAM(s) IV Intermittent every 8 hours  metroNIDAZOLE  IVPB      midodrine 15 milliGRAM(s) Oral every 8 hours  norepinephrine Infusion 0.05 MICROgram(s)/kG/Min (5.74 mL/Hr) IV Continuous <Continuous>  pantoprazole  Injectable 40 milliGRAM(s) IV Push daily  rifAXIMin 550 milliGRAM(s) Oral every 12 hours  vasopressin Infusion 0.04 Unit(s)/Min (6 mL/Hr) IV Continuous <Continuous>    MEDICATIONS  (PRN):  dextrose Oral Gel 15 Gram(s) Oral once PRN Blood Glucose LESS THAN 70 milliGRAM(s)/deciliter  fentaNYL    Injectable 25 MICROGram(s) IV Push every 2 hours PRN vent dysynchrony  sodium chloride 0.9% lock flush 10 milliLiter(s) IV Push every 1 hour PRN Pre/post blood products, medications, blood draw, and to maintain line patency      ALLERGIES: Allergy Status Unknown    Vital Signs Last 24 Hrs  T(C): 37.4 (05 Feb 2025 08:00), Max: 37.5 (04 Feb 2025 21:00)  T(F): 99.3 (05 Feb 2025 08:00), Max: 99.5 (04 Feb 2025 21:00)  HR: 64 (05 Feb 2025 14:00) (58 - 74)  BP: 129/67 (05 Feb 2025 14:00) (106/59 - 142/76)  BP(mean): 85 (05 Feb 2025 14:00) (73 - 98)  RR: 23 (05 Feb 2025 14:00) (16 - 26)  SpO2: 95% (05 Feb 2025 14:00) (92% - 100%)    Physical Exam: limited, she is intubated/sedated  Lungs: Normal respiratory excursion. Lungs clear   CV: Normal S1S2, regular.   Abdomen: Soft,  Musculoskeletal: No  edema.  Skin: Warm and moist.       LABS:                        8.7    19.45 )-----------( 43       ( 05 Feb 2025 06:47 )             25.6     02-05    150[H]  |  106  |  24.8[H]  ----------------------------<  168[H]  4.0   |  28.0  |  1.10    Ca    7.9[L]      05 Feb 2025 06:47  Phos  4.1     02-05  Mg     2.1     02-05    TPro  6.5[L]  /  Alb  4.3  /  TBili  2.0  /  DBili  x   /  AST  325[H]  /  ALT  150[H]  /  AlkPhos  229[H]  02-05    LIVER FUNCTIONS - ( 05 Feb 2025 06:47 )  Alb: 4.3 g/dL / Pro: 6.5 g/dL / ALK PHOS: 229 U/L / ALT: 150 U/L / AST: 325 U/L / GGT: x             A1C with Estimated Average Glucose Result: 15.6 % (02-05-25 @ 02:16)    Lipase: >3000 U/L (02-03-25 @ 02:50)  Lipase: >3000 U/L (02-02-25 @ 21:37)        CAPILLARY BLOOD GLUCOSE  POCT Blood Glucose.: 291 mg/dL (05 Feb 2025 13:37)  POCT Blood Glucose.: 175 mg/dL (05 Feb 2025 06:44)  POCT Blood Glucose.: 184 mg/dL (05 Feb 2025 05:19)  POCT Blood Glucose.: 209 mg/dL (05 Feb 2025 04:02)  POCT Blood Glucose.: 204 mg/dL (05 Feb 2025 04:00)  POCT Blood Glucose.: 169 mg/dL (05 Feb 2025 02:29)  POCT Blood Glucose.: 138 mg/dL (05 Feb 2025 00:49)  POCT Blood Glucose.: 144 mg/dL (04 Feb 2025 23:39)  POCT Blood Glucose.: 150 mg/dL (04 Feb 2025 22:23)  POCT Blood Glucose.: 175 mg/dL (04 Feb 2025 21:08)  POCT Blood Glucose.: 173 mg/dL (04 Feb 2025 20:06)  POCT Blood Glucose.: 181 mg/dL (04 Feb 2025 18:54)  POCT Blood Glucose.: 193 mg/dL (04 Feb 2025 18:11)  POCT Blood Glucose.: 185 mg/dL (04 Feb 2025 16:59)  POCT Blood Glucose.: 198 mg/dL (04 Feb 2025 15:56)  POCT Blood Glucose.: 209 mg/dL (04 Feb 2025 14:56)  POCT Blood Glucose.: 193 mg/dL (04 Feb 2025 13:29)  POCT Blood Glucose.: 226 mg/dL (04 Feb 2025 12:32)  POCT Blood Glucose.: 219 mg/dL (04 Feb 2025 11:30)  POCT Blood Glucose.: 262 mg/dL (04 Feb 2025 10:21)  POCT Blood Glucose.: 306 mg/dL (04 Feb 2025 09:15)  POCT Blood Glucose.: 315 mg/dL (04 Feb 2025 08:20)  POCT Blood Glucose.: 269 mg/dL (04 Feb 2025 07:22)  POCT Blood Glucose.: 240 mg/dL (04 Feb 2025 06:01)  POCT Blood Glucose.: 219 mg/dL (04 Feb 2025 05:01)  POCT Blood Glucose.: 235 mg/dL (04 Feb 2025 04:07)  POCT Blood Glucose.: 274 mg/dL (04 Feb 2025 03:04)  POCT Blood Glucose.: 267 mg/dL (04 Feb 2025 02:06)  POCT Blood Glucose.: 250 mg/dL (04 Feb 2025 00:56)  POCT Blood Glucose.: 230 mg/dL (03 Feb 2025 23:54)  POCT Blood Glucose.: 256 mg/dL (03 Feb 2025 23:02)  POCT Blood Glucose.: 246 mg/dL (03 Feb 2025 21:52)  POCT Blood Glucose.: 270 mg/dL (03 Feb 2025 20:52)  POCT Blood Glucose.: 285 mg/dL (03 Feb 2025 19:55)  POCT Blood Glucose.: 300 mg/dL (03 Feb 2025 19:10)  POCT Blood Glucose.: 272 mg/dL (03 Feb 2025 18:04)  POCT Blood Glucose.: 212 mg/dL (03 Feb 2025 16:50)  POCT Blood Glucose.: 163 mg/dL (03 Feb 2025 15:39)

## 2025-02-05 NOTE — PROGRESS NOTE ADULT - ASSESSMENT
JUSTIN LOVE  MRN-[id number]  Patient is a 69y old  Female who presents with a chief complaint of DKA (04 Feb 2025 12:51)  currently transitioned off insulin gtt to long acting          ====================ASSESSMENT/PLAN==============    ====================== NEUROLOGY=====================  fentaNYL    Injectable 25 MICROGram(s) IV Push every 2 hours PRN vent dysynchrony    ==================== RESPIRATORY======================  Mechanical Ventilation:  Mode: AC/ CMV (Assist Control/ Continuous Mandatory Ventilation)  RR (machine): 16  TV (machine): 300  FiO2: 50  PEEP: 6  ITime: 1  MAP: 9  PIP: 14      ====================CARDIOVASCULAR==================  midodrine 15 milliGRAM(s) Oral every 8 hours  norepinephrine Infusion 0.05 MICROgram(s)/kG/Min (5.74 mL/Hr) IV Continuous <Continuous>  -not requiring pressor support at this time    ===================== RENAL =========================  Continue monitoring urine output    Avoid Nephrotoxic agents   Adjusting medications for renal  function   Replacing electrolytes as needed with Goal K> 4, PO> 3, Mg> 2  ==================== GASTROINTESTINAL===================  albumin human 25% IVPB 100 milliLiter(s) IV Intermittent every 6 hours  lactulose Syrup 20 Gram(s) Oral every 6 hours  pantoprazole  Injectable 40 milliGRAM(s) IV Push daily  sodium chloride 0.9% lock flush 10 milliLiter(s) IV Push every 1 hour PRN Pre/post blood products, medications, blood draw, and to maintain line patency     diet: tube feeds glucerna  =======================    ENDOCRINE  =====================  dextrose 50% Injectable 25 Gram(s) IV Push once  dextrose 50% Injectable 12.5 Gram(s) IV Push once  dextrose 50% Injectable 25 Gram(s) IV Push once  dextrose Oral Gel 15 Gram(s) Oral once PRN Blood Glucose LESS THAN 70 milliGRAM(s)/deciliter  glucagon  Injectable 1 milliGRAM(s) IntraMuscular once  hydrocortisone sodium succinate Injectable 100 milliGRAM(s) IV Push every 8 hours  insulin glargine Injectable (LANTUS) 20 Unit(s) SubCutaneous every morning  insulin lispro (ADMELOG) corrective regimen sliding scale   SubCutaneous every 6 hours  insulin regular Infusion 6 Unit(s)/Hr (6 mL/Hr) IV Continuous <Continuous>  vasopressin Infusion 0.04 Unit(s)/Min (6 mL/Hr) IV Continuous <Continuous>    ========================INFECTIOUS DISEASE================  cefTRIAXone Injectable.      cefTRIAXone Injectable. 1000 milliGRAM(s) IV Push every 24 hours  metroNIDAZOLE  IVPB 500 milliGRAM(s) IV Intermittent every 8 hours  metroNIDAZOLE  IVPB      rifAXIMin 550 milliGRAM(s) Oral every 12 hours      ____________________________________________

## 2025-02-06 LAB
ALBUMIN SERPL ELPH-MCNC: 4.7 G/DL — SIGNIFICANT CHANGE UP (ref 3.3–5.2)
ALP SERPL-CCNC: 311 U/L — HIGH (ref 40–120)
ALT FLD-CCNC: 85 U/L — HIGH
ANION GAP SERPL CALC-SCNC: 16 MMOL/L — SIGNIFICANT CHANGE UP (ref 5–17)
AST SERPL-CCNC: 135 U/L — HIGH
BASE EXCESS BLDA CALC-SCNC: 6.5 MMOL/L — HIGH (ref -2–3)
BASOPHILS # BLD AUTO: 0.03 K/UL — SIGNIFICANT CHANGE UP (ref 0–0.2)
BASOPHILS NFR BLD AUTO: 0.3 % — SIGNIFICANT CHANGE UP (ref 0–2)
BILIRUB SERPL-MCNC: 1.8 MG/DL — SIGNIFICANT CHANGE UP (ref 0.4–2)
BLOOD GAS COMMENTS ARTERIAL: SIGNIFICANT CHANGE UP
BUN SERPL-MCNC: 32 MG/DL — HIGH (ref 8–20)
CALCIUM SERPL-MCNC: 8.3 MG/DL — LOW (ref 8.4–10.5)
CHLORIDE SERPL-SCNC: 111 MMOL/L — HIGH (ref 96–108)
CO2 SERPL-SCNC: 26 MMOL/L — SIGNIFICANT CHANGE UP (ref 22–29)
CREAT SERPL-MCNC: 1.07 MG/DL — SIGNIFICANT CHANGE UP (ref 0.5–1.3)
CULTURE RESULTS: SIGNIFICANT CHANGE UP
EGFR: 57 ML/MIN/1.73M2 — LOW
EOSINOPHIL # BLD AUTO: 0 K/UL — SIGNIFICANT CHANGE UP (ref 0–0.5)
EOSINOPHIL NFR BLD AUTO: 0 % — SIGNIFICANT CHANGE UP (ref 0–6)
GAS PNL BLDA: SIGNIFICANT CHANGE UP
GLUCOSE BLDC GLUCOMTR-MCNC: 239 MG/DL — HIGH (ref 70–99)
GLUCOSE BLDC GLUCOMTR-MCNC: 253 MG/DL — HIGH (ref 70–99)
GLUCOSE BLDC GLUCOMTR-MCNC: 260 MG/DL — HIGH (ref 70–99)
GLUCOSE BLDC GLUCOMTR-MCNC: 293 MG/DL — HIGH (ref 70–99)
GLUCOSE SERPL-MCNC: 271 MG/DL — HIGH (ref 70–99)
HCO3 BLDA-SCNC: 29 MMOL/L — HIGH (ref 21–28)
HCT VFR BLD CALC: 24.8 % — LOW (ref 34.5–45)
HEPARIN-PF4 AB RESULT: <0.6 U/ML — SIGNIFICANT CHANGE UP (ref 0–0.9)
HGB BLD-MCNC: 8.1 G/DL — LOW (ref 11.5–15.5)
HOROWITZ INDEX BLDA+IHG-RTO: SIGNIFICANT CHANGE UP
IMM GRANULOCYTES NFR BLD AUTO: 0.9 % — SIGNIFICANT CHANGE UP (ref 0–0.9)
LYMPHOCYTES # BLD AUTO: 0.82 K/UL — LOW (ref 1–3.3)
LYMPHOCYTES # BLD AUTO: 8.8 % — LOW (ref 13–44)
MAGNESIUM SERPL-MCNC: 2.3 MG/DL — SIGNIFICANT CHANGE UP (ref 1.6–2.6)
MCHC RBC-ENTMCNC: 31.9 PG — SIGNIFICANT CHANGE UP (ref 27–34)
MCHC RBC-ENTMCNC: 32.7 G/DL — SIGNIFICANT CHANGE UP (ref 32–36)
MCV RBC AUTO: 97.6 FL — SIGNIFICANT CHANGE UP (ref 80–100)
MONOCYTES # BLD AUTO: 0.44 K/UL — SIGNIFICANT CHANGE UP (ref 0–0.9)
MONOCYTES NFR BLD AUTO: 4.7 % — SIGNIFICANT CHANGE UP (ref 2–14)
NEUTROPHILS # BLD AUTO: 7.93 K/UL — HIGH (ref 1.8–7.4)
NEUTROPHILS NFR BLD AUTO: 85.3 % — HIGH (ref 43–77)
PCO2 BLDA: 35 MMHG — SIGNIFICANT CHANGE UP (ref 32–45)
PF4 HEPARIN CMPLX AB SER-ACNC: NEGATIVE — SIGNIFICANT CHANGE UP
PH BLDA: 7.53 — HIGH (ref 7.35–7.45)
PHOSPHATE SERPL-MCNC: 1.3 MG/DL — LOW (ref 2.4–4.7)
PLATELET # BLD AUTO: 27 K/UL — LOW (ref 150–400)
PO2 BLDA: 86 MMHG — SIGNIFICANT CHANGE UP (ref 83–108)
POTASSIUM SERPL-MCNC: 3.4 MMOL/L — LOW (ref 3.5–5.3)
POTASSIUM SERPL-SCNC: 3.4 MMOL/L — LOW (ref 3.5–5.3)
PROT SERPL-MCNC: 6.7 G/DL — SIGNIFICANT CHANGE UP (ref 6.6–8.7)
RBC # BLD: 2.54 M/UL — LOW (ref 3.8–5.2)
RBC # FLD: 16.1 % — HIGH (ref 10.3–14.5)
SAO2 % BLDA: 99.3 % — HIGH (ref 94–98)
SODIUM SERPL-SCNC: 153 MMOL/L — HIGH (ref 135–145)
SPECIMEN SOURCE: SIGNIFICANT CHANGE UP
TSH SERPL-MCNC: 5.27 UIU/ML — HIGH (ref 0.27–4.2)
WBC # BLD: 9.3 K/UL — SIGNIFICANT CHANGE UP (ref 3.8–10.5)
WBC # FLD AUTO: 9.3 K/UL — SIGNIFICANT CHANGE UP (ref 3.8–10.5)

## 2025-02-06 PROCEDURE — 99291 CRITICAL CARE FIRST HOUR: CPT | Mod: GC

## 2025-02-06 PROCEDURE — 99233 SBSQ HOSP IP/OBS HIGH 50: CPT

## 2025-02-06 PROCEDURE — 70450 CT HEAD/BRAIN W/O DYE: CPT | Mod: 26

## 2025-02-06 RX ORDER — INSULIN GLARGINE-YFGN 100 [IU]/ML
20 INJECTION, SOLUTION SUBCUTANEOUS EVERY MORNING
Refills: 0 | Status: DISCONTINUED | OUTPATIENT
Start: 2025-02-07 | End: 2025-02-08

## 2025-02-06 RX ORDER — INSULIN GLARGINE-YFGN 100 [IU]/ML
20 INJECTION, SOLUTION SUBCUTANEOUS ONCE
Refills: 0 | Status: COMPLETED | OUTPATIENT
Start: 2025-02-06 | End: 2025-02-06

## 2025-02-06 RX ORDER — INSULIN GLARGINE-YFGN 100 [IU]/ML
20 INJECTION, SOLUTION SUBCUTANEOUS AT BEDTIME
Refills: 0 | Status: DISCONTINUED | OUTPATIENT
Start: 2025-02-07 | End: 2025-02-08

## 2025-02-06 RX ORDER — SOD PHOSPHATE,MONOBASIC-DIBAS 3MMOL/ML
30 VIAL (ML) INTRAVENOUS ONCE
Refills: 0 | Status: DISCONTINUED | OUTPATIENT
Start: 2025-02-06 | End: 2025-02-06

## 2025-02-06 RX ORDER — HEPARIN SODIUM,PORCINE 10000/ML
5000 VIAL (ML) INJECTION EVERY 12 HOURS
Refills: 0 | Status: DISCONTINUED | OUTPATIENT
Start: 2025-02-06 | End: 2025-02-07

## 2025-02-06 RX ORDER — POTASSIUM PHOSPHATE, MONOBASIC POTASSIUM PHOSPHATE, DIBASIC 236; 224 MG/ML; MG/ML
30 INJECTION, SOLUTION INTRAVENOUS ONCE
Refills: 0 | Status: COMPLETED | OUTPATIENT
Start: 2025-02-06 | End: 2025-02-06

## 2025-02-06 RX ORDER — POTASSIUM CHLORIDE 750 MG/1
40 TABLET, EXTENDED RELEASE ORAL EVERY 4 HOURS
Refills: 0 | Status: COMPLETED | OUTPATIENT
Start: 2025-02-06 | End: 2025-02-06

## 2025-02-06 RX ADMIN — Medication 4: at 17:28

## 2025-02-06 RX ADMIN — ANTISEPTIC SURGICAL SCRUB 1 APPLICATION(S): 0.04 SOLUTION TOPICAL at 11:23

## 2025-02-06 RX ADMIN — Medication 5 UNIT(S): at 05:11

## 2025-02-06 RX ADMIN — Medication 5 UNIT(S): at 17:28

## 2025-02-06 RX ADMIN — ALBUMIN HUMAN 50 MILLILITER(S): 50 SOLUTION INTRAVENOUS at 05:09

## 2025-02-06 RX ADMIN — POTASSIUM CHLORIDE 40 MILLIEQUIVALENT(S): 750 TABLET, EXTENDED RELEASE ORAL at 06:31

## 2025-02-06 RX ADMIN — INSULIN GLARGINE-YFGN 30 UNIT(S): 100 INJECTION, SOLUTION SUBCUTANEOUS at 08:13

## 2025-02-06 RX ADMIN — Medication 5 UNIT(S): at 11:23

## 2025-02-06 RX ADMIN — PANTOPRAZOLE 40 MILLIGRAM(S): 20 TABLET, DELAYED RELEASE ORAL at 11:21

## 2025-02-06 RX ADMIN — Medication 6: at 05:10

## 2025-02-06 RX ADMIN — POTASSIUM CHLORIDE 40 MILLIEQUIVALENT(S): 750 TABLET, EXTENDED RELEASE ORAL at 11:21

## 2025-02-06 RX ADMIN — ANTISEPTIC SURGICAL SCRUB 1 APPLICATION(S): 0.04 SOLUTION TOPICAL at 05:08

## 2025-02-06 RX ADMIN — ALBUMIN HUMAN 50 MILLILITER(S): 50 SOLUTION INTRAVENOUS at 17:26

## 2025-02-06 RX ADMIN — CEFTRIAXONE 2000 MILLIGRAM(S): 250 INJECTION, POWDER, FOR SOLUTION INTRAMUSCULAR; INTRAVENOUS at 14:43

## 2025-02-06 RX ADMIN — Medication 6: at 11:23

## 2025-02-06 RX ADMIN — ANTISEPTIC SURGICAL SCRUB 15 MILLILITER(S): 0.04 SOLUTION TOPICAL at 05:08

## 2025-02-06 RX ADMIN — ANTISEPTIC SURGICAL SCRUB 15 MILLILITER(S): 0.04 SOLUTION TOPICAL at 17:27

## 2025-02-06 RX ADMIN — ALBUMIN HUMAN 50 MILLILITER(S): 50 SOLUTION INTRAVENOUS at 11:22

## 2025-02-06 RX ADMIN — MIDODRINE HYDROCHLORIDE 15 MILLIGRAM(S): 5 TABLET ORAL at 14:43

## 2025-02-06 RX ADMIN — POTASSIUM PHOSPHATE, MONOBASIC POTASSIUM PHOSPHATE, DIBASIC 83.33 MILLIMOLE(S): 236; 224 INJECTION, SOLUTION INTRAVENOUS at 06:31

## 2025-02-06 RX ADMIN — MIDODRINE HYDROCHLORIDE 15 MILLIGRAM(S): 5 TABLET ORAL at 05:10

## 2025-02-06 RX ADMIN — Medication 5000 UNIT(S): at 17:27

## 2025-02-06 NOTE — PROGRESS NOTE ADULT - ASSESSMENT
JUSTIN LOVE  [id number]  Patient is a 69y old  Female who presents with a chief complaint of DKA (06 Feb 2025 10:42)  -was on insulin gtt, transitioned to long acting  -remains intubated  -has been off sedation, still rass -4  -on tube feeds         ====================ASSESSMENT/PLAN==============    ====================== NEUROLOGY=====================  fentaNYL    Injectable 25 MICROGram(s) IV Push every 2 hours PRN vent dysynchrony  -repeat CT head non con as rass remains -4     ==================== RESPIRATORY======================  Mechanical Ventilation:  Mode: CPAP with PS  FiO2: 50  PEEP: 6  PS: 10  ITime: 1  MAP: 9      ====================CARDIOVASCULAR==================  midodrine 15 milliGRAM(s) Oral every 8 hours  -maps been above 65, holding  -not requiring pressor support at this time    ===================HEMATOLOGIC/ONC ===================  heparin   Injectable 5000 Unit(s) SubCutaneous every 12 hours    ===================== RENAL =========================  Continue monitoring urine output  -Cr remains normal     Avoid Nephrotoxic agents   Adjusting medications for renal  function   Replacing electrolytes as needed with Goal K> 4, PO> 3, Mg> 2  ==================== GASTROINTESTINAL===================  albumin human 25% IVPB 100 milliLiter(s) IV Intermittent every 6 hours  pantoprazole  Injectable 40 milliGRAM(s) IV Push daily  sodium chloride 0.9% lock flush 10 milliLiter(s) IV Push every 1 hour PRN Pre/post blood products, medications, blood draw, and to maintain line patency  -on lactulose for ammonia levels, titrate to 3 BM daily      diet: tube feeds glucerna   =======================    ENDOCRINE  =====================  dextrose 50% Injectable 25 Gram(s) IV Push once  dextrose 50% Injectable 12.5 Gram(s) IV Push once  dextrose 50% Injectable 25 Gram(s) IV Push once  dextrose Oral Gel 15 Gram(s) Oral once PRN Blood Glucose LESS THAN 70 milliGRAM(s)/deciliter  insulin glargine Injectable (LANTUS) 30 Unit(s) SubCutaneous every morning  insulin lispro (ADMELOG) corrective regimen sliding scale   SubCutaneous every 6 hours  insulin lispro Injectable (ADMELOG) 5 Unit(s) SubCutaneous every 6 hours    ========================INFECTIOUS DISEASE================  cefTRIAXone Injectable. 2000 milliGRAM(s) IV Push every 24 hours  rifAXIMin 550 milliGRAM(s) Oral every 12 hours  -sepsis 2/2 UTI       ____________________________________________

## 2025-02-06 NOTE — PROGRESS NOTE ADULT - ASSESSMENT
69 y/o F (name: Laverne Lai)  with hx of DM and cirrhosis, was brought in by EMS for AMS at home.  PEr son, patient was sick in bed for past two days, and today patient's sister call 911 to bring her to hospital because she was waking up.  In the ED patient was obtunded, intubated for airway protection.  (02 Feb 2025 21:32) Pt admitted to MICU with severe DKA, pancolitis, acute pancreatitis, UTI and pneumonia. Endocrine consulted for uncontrolled diabetes. A1c 15.6% on admission      1. Likely T2DM admitted with DKA unclear etiology - medication nonadherence and pneumonia/colitis/pancreatitis/UTI  - BG remain above target  - s/p insulin gtt and IV hydration and transition to Basal/Bolus   - s/p stress dose steroids which can cause hyperglycemia  - Lantus increase to 30 units daily this AM  - On Admelog 5 units Q6  - Continue MSSI Q6      2. acute pancreatitis ?due to gallstones seen on RUQ sonogram, trig level elevated at 240's  - Trend lipase levels  - care per MICU team      3. UTI, pneuomonia  - on IV Abx, cont care per primary team  - glycemic control   69 y/o F (name: Laverne Lai)  with hx of DM and cirrhosis, was brought in by EMS for AMS at home.  PEr son, patient was sick in bed for past two days, and today patient's sister call 911 to bring her to hospital because she was waking up.  In the ED patient was obtunded, intubated for airway protection.  (02 Feb 2025 21:32) Pt admitted to MICU with severe DKA, pancolitis, acute pancreatitis, UTI and pneumonia. Endocrine consulted for uncontrolled diabetes. A1c 15.6% on admission      1. Likely T2DM admitted with DKA unclear etiology - medication nonadherence and pneumonia/colitis/pancreatitis/UTI  - BG remain above target  - s/p insulin gtt and IV hydration and transition to Basal/Bolus   - s/p stress dose steroids which can cause hyperglycemia  - Lantus increase to 30 units daily this AM  - On Admelog 5 units Q6  - Continue MSSI Q6      2. acute pancreatitis ?due to gallstones seen on RUQ sonogram, trig level elevated at 240's  - Trend lipase levels  - care per MICU team      3. UTI, pneuomonia  - on IV Abx, cont care per primary team  - glycemic control    4. Hx of Hypothyroidism  - Reviewed past chart notes, in 2022 was on LT4 75 mcg  - Check TSH today   69 y/o F (name: Laverne Lai)  with hx of DM and cirrhosis, was brought in by EMS for AMS at home.  PEr son, patient was sick in bed for past two days, and today patient's sister call 911 to bring her to hospital because she was waking up.  In the ED patient was obtunded, intubated for airway protection.  (02 Feb 2025 21:32) Pt admitted to MICU with severe DKA, pancolitis, acute pancreatitis, UTI and pneumonia. Endocrine consulted for uncontrolled diabetes. A1c 15.6% on admission      1. Likely T2DM admitted with DKA unclear etiology - medication nonadherence and pneumonia/colitis/pancreatitis/UTI  - BG remain above target  - s/p insulin gtt and IV hydration and transition to Basal/Bolus   - s/p stress dose steroids which can cause hyperglycemia  - Lantus increase to 30 units daily this AM  - On Admelog 5 units Q6  - Continue MSSI Q6  - will follow and adjust insulin doses      2. acute pancreatitis ?due to gallstones seen on RUQ sonogram, trig level elevated at 240's  - Trend lipase levels  - cont care per MICU team    3. UTI, pneuomonia  - on IV Ceftriaxone  - cont care per primary team  - glycemic control    4. Hx of Hypothyroidism  - Reviewed past chart notes, in 2022 was on LT4 75 mcg  - Check TSH today

## 2025-02-06 NOTE — PROGRESS NOTE ADULT - SUBJECTIVE AND OBJECTIVE BOX
INTERVAL HPI/OVERNIGHT EVENTS:  -DCd flagyl   -increased lantus to 30 units fro increased BG    SUBJECTIVE: Patient seen and examined at bedside. off sedation at this time, rass -4 unchanged from yesterday    ROS: All negative except as listed above.    VITAL SIGNS:  ICU Vital Signs Last 24 Hrs  T(C): 37.5 (06 Feb 2025 08:00), Max: 38.3 (05 Feb 2025 16:00)  T(F): 99.5 (06 Feb 2025 08:00), Max: 100.9 (05 Feb 2025 16:00)  HR: 76 (06 Feb 2025 11:00) (63 - 83)  BP: 132/104 (06 Feb 2025 11:00) (111/64 - 146/76)  BP(mean): 114 (06 Feb 2025 11:00) (78 - 114)  ABP: 125/56 (06 Feb 2025 11:00) (108/51 - 149/65)  ABP(mean): 84 (06 Feb 2025 11:00) (73 - 99)  RR: 17 (06 Feb 2025 11:00) (17 - 27)  SpO2: 95% (06 Feb 2025 11:00) (94% - 98%)    O2 Parameters below as of 06 Feb 2025 08:00  Patient On (Oxygen Delivery Method): ventilator    O2 Concentration (%): 50      Mode: CPAP with PS, FiO2: 50, PEEP: 6, PS: 10, ITime: 1, MAP: 9  Plateau pressure:   P/F ratio:     02-05 @ 07:01  -  02-06 @ 07:00  --------------------------------------------------------  IN: 3353.3 mL / OUT: 2495 mL / NET: 858.3 mL    02-06 @ 07:01  -  02-06 @ 12:19  --------------------------------------------------------  IN: 983.2 mL / OUT: 190 mL / NET: 793.2 mL      CAPILLARY BLOOD GLUCOSE      POCT Blood Glucose.: 253 mg/dL (06 Feb 2025 11:04)      ECG: reviewed.    PHYSICAL EXAM:    GENERAL: In NAD, intubated and sedated  HEENT: NC/AT  PULM: B/L coarse breath sounds  CVS: +S1, S2  ABD: Soft, non-tender  EXTREMITIES: No pedal edema B/L  SKIN: No open wounds  NEURO: Grossly non-focal    MEDICATIONS:  MEDICATIONS  (STANDING):  albumin human 25% IVPB 100 milliLiter(s) IV Intermittent every 6 hours  cefTRIAXone Injectable. 2000 milliGRAM(s) IV Push every 24 hours  chlorhexidine 0.12% Liquid 15 milliLiter(s) Oral Mucosa every 12 hours  chlorhexidine 2% Cloths 1 Application(s) Topical daily  chlorhexidine 4% Liquid 1 Application(s) Topical <User Schedule>  dextrose 50% Injectable 25 Gram(s) IV Push once  dextrose 50% Injectable 12.5 Gram(s) IV Push once  dextrose 50% Injectable 25 Gram(s) IV Push once  heparin   Injectable 5000 Unit(s) SubCutaneous every 12 hours  insulin glargine Injectable (LANTUS) 30 Unit(s) SubCutaneous every morning  insulin lispro (ADMELOG) corrective regimen sliding scale   SubCutaneous every 6 hours  insulin lispro Injectable (ADMELOG) 5 Unit(s) SubCutaneous every 6 hours  midodrine 15 milliGRAM(s) Oral every 8 hours  pantoprazole  Injectable 40 milliGRAM(s) IV Push daily  rifAXIMin 550 milliGRAM(s) Oral every 12 hours    MEDICATIONS  (PRN):  dextrose Oral Gel 15 Gram(s) Oral once PRN Blood Glucose LESS THAN 70 milliGRAM(s)/deciliter  fentaNYL    Injectable 25 MICROGram(s) IV Push every 2 hours PRN vent dysynchrony  sodium chloride 0.9% lock flush 10 milliLiter(s) IV Push every 1 hour PRN Pre/post blood products, medications, blood draw, and to maintain line patency      ALLERGIES:  Allergies    Allergy Status Unknown    Intolerances        LABS:                        8.1    9.30  )-----------( 27       ( 06 Feb 2025 02:06 )             24.8     02-06    153[H]  |  111[H]  |  32.0[H]  ----------------------------<  271[H]  3.4[L]   |  26.0  |  1.07    Ca    8.3[L]      06 Feb 2025 02:06  Phos  1.3     02-06  Mg     2.3     02-06    TPro  6.7  /  Alb  4.7  /  TBili  1.8  /  DBili  x   /  AST  135[H]  /  ALT  85[H]  /  AlkPhos  311[H]  02-06    PT/INR - ( 05 Feb 2025 02:16 )   PT: 23.2 sec;   INR: 2.01 ratio         PTT - ( 05 Feb 2025 17:00 )  PTT:37.6 sec  Urinalysis Basic - ( 06 Feb 2025 02:06 )    Color: x / Appearance: x / SG: x / pH: x  Gluc: 271 mg/dL / Ketone: x  / Bili: x / Urobili: x   Blood: x / Protein: x / Nitrite: x   Leuk Esterase: x / RBC: x / WBC x   Sq Epi: x / Non Sq Epi: x / Bacteria: x      ABG:  pH, Arterial: 7.530 (02-06-25 @ 10:31)  pCO2, Arterial: 35 mmHg (02-06-25 @ 10:31)  pO2, Arterial: 86 mmHg (02-06-25 @ 10:31)        Culture - Blood (collected 02-02-25 @ 13:02)  Source: .Blood BLOOD  Preliminary Report (02-05-25 @ 20:01):    No growth at 72 Hours    Culture - Blood (collected 02-02-25 @ 13:02)  Source: .Blood BLOOD  Gram Stain (02-03-25 @ 14:27):    Growth in aerobic bottle: Gram Negative Rods  Final Report (02-05-25 @ 09:16):    Growth in aerobic bottle: Escherichia coli    Direct identification is available within approximately 3-5    hours either by Blood Panel Multiplexed PCR or Direct    MALDI-TOF. Details: [email or web address]  Organism: Blood Culture PCR  Escherichia coli (02-05-25 @ 09:16)  Organism: Escherichia coli (02-05-25 @ 09:16)      Method Type: JOHANNA      -  Ampicillin: S <=8 These ampicillin results predict results for amoxicillin      -  Ampicillin/Sulbactam: S <=4/2      -  Aztreonam: S <=4      -  Cefazolin: S <=2      -  Cefepime: S <=2      -  Cefoxitin: S <=8      -  Ceftriaxone: S <=1      -  Ciprofloxacin: S <=0.25      -  Ertapenem: S <=0.5      -  Gentamicin: S <=2      -  Imipenem: S <=1      -  Levofloxacin: S <=0.5      -  Meropenem: S <=1      -  Piperacillin/Tazobactam: S <=8      -  Tobramycin: S <=2      -  Trimethoprim/Sulfamethoxazole: S <=0.5/9.5  Organism: Blood Culture PCR (02-05-25 @ 09:16)      Method Type: PCR      -  Escherichia coli: Detec          RADIOLOGY & ADDITIONAL TESTS: Reviewed.

## 2025-02-06 NOTE — PROGRESS NOTE ADULT - SUBJECTIVE AND OBJECTIVE BOX
INTERVAL HPI/OVERNIGHT EVENTS:  follow up diabetes management  intubated, not responsive  family at bedside    MEDICATIONS  (STANDING):  albumin human 25% IVPB 100 milliLiter(s) IV Intermittent every 6 hours  cefTRIAXone Injectable. 2000 milliGRAM(s) IV Push every 24 hours  chlorhexidine 0.12% Liquid 15 milliLiter(s) Oral Mucosa every 12 hours  chlorhexidine 2% Cloths 1 Application(s) Topical daily  chlorhexidine 4% Liquid 1 Application(s) Topical <User Schedule>  dextrose 50% Injectable 25 Gram(s) IV Push once  dextrose 50% Injectable 12.5 Gram(s) IV Push once  dextrose 50% Injectable 25 Gram(s) IV Push once  heparin   Injectable 5000 Unit(s) SubCutaneous every 12 hours  insulin glargine Injectable (LANTUS) 30 Unit(s) SubCutaneous every morning  insulin lispro (ADMELOG) corrective regimen sliding scale   SubCutaneous every 6 hours  insulin lispro Injectable (ADMELOG) 5 Unit(s) SubCutaneous every 6 hours  midodrine 15 milliGRAM(s) Oral every 8 hours  pantoprazole  Injectable 40 milliGRAM(s) IV Push daily  potassium chloride   Powder 40 milliEquivalent(s) Oral every 4 hours  rifAXIMin 550 milliGRAM(s) Oral every 12 hours    MEDICATIONS  (PRN):  dextrose Oral Gel 15 Gram(s) Oral once PRN Blood Glucose LESS THAN 70 milliGRAM(s)/deciliter  fentaNYL    Injectable 25 MICROGram(s) IV Push every 2 hours PRN vent dysynchrony  sodium chloride 0.9% lock flush 10 milliLiter(s) IV Push every 1 hour PRN Pre/post blood products, medications, blood draw, and to maintain line patency      Allergies  Allergy Status Unknown      Vital Signs Last 24 Hrs  T(C): 37.5 (06 Feb 2025 08:00), Max: 38.3 (05 Feb 2025 16:00)  T(F): 99.5 (06 Feb 2025 08:00), Max: 100.9 (05 Feb 2025 16:00)  HR: 72 (06 Feb 2025 10:00) (62 - 83)  BP: 133/76 (06 Feb 2025 10:00) (111/64 - 146/76)  BP(mean): 93 (06 Feb 2025 10:00) (78 - 97)  RR: 23 (06 Feb 2025 10:00) (19 - 27)  SpO2: 95% (06 Feb 2025 10:00) (94% - 98%)    Parameters below as of 06 Feb 2025 08:00  Patient On (Oxygen Delivery Method): ventilator    O2 Concentration (%): 50    PHYSICAL EXAM:  GENERAL: intubated  CHEST/LUNG: Clear to auscultation bilaterally; No wheezes  HEART: Regular rate and rhythm  ABDOMEN: Soft, Nontender, Nondistended  NEURO: not responsive  EXTREMITIES:  no edema        LABS:                        8.1    9.30  )-----------( 27       ( 06 Feb 2025 02:06 )             24.8     02-06    153[H]  |  111[H]  |  32.0[H]  ----------------------------<  271[H]  3.4[L]   |  26.0  |  1.07    Ca    8.3[L]      06 Feb 2025 02:06  Phos  1.3     02-06  Mg     2.3     02-06    TPro  6.7  /  Alb  4.7  /  TBili  1.8  /  DBili  x   /  AST  135[H]  /  ALT  85[H]  /  AlkPhos  311[H]  02-06    Urinalysis Basic - ( 06 Feb 2025 02:06 )    Color: x / Appearance: x / SG: x / pH: x  Gluc: 271 mg/dL / Ketone: x  / Bili: x / Urobili: x   Blood: x / Protein: x / Nitrite: x   Leuk Esterase: x / RBC: x / WBC x   Sq Epi: x / Non Sq Epi: x / Bacteria: x          POCT Blood Glucose.: 260 mg/dL (02-06-25 @ 07:45)  POCT Blood Glucose.: 293 mg/dL (02-06-25 @ 05:07)  POCT Blood Glucose.: 335 mg/dL (02-05-25 @ 23:32)  POCT Blood Glucose.: 361 mg/dL (02-05-25 @ 21:09)  POCT Blood Glucose.: 319 mg/dL (02-05-25 @ 16:55)  POCT Blood Glucose.: 291 mg/dL (02-05-25 @ 13:37)         INTERVAL HPI/OVERNIGHT EVENTS:  follow up diabetes management  intubated, not responsive  family at bedside    MEDICATIONS  (STANDING):  albumin human 25% IVPB 100 milliLiter(s) IV Intermittent every 6 hours  cefTRIAXone Injectable. 2000 milliGRAM(s) IV Push every 24 hours  chlorhexidine 0.12% Liquid 15 milliLiter(s) Oral Mucosa every 12 hours  chlorhexidine 2% Cloths 1 Application(s) Topical daily  chlorhexidine 4% Liquid 1 Application(s) Topical <User Schedule>  dextrose 50% Injectable 25 Gram(s) IV Push once  dextrose 50% Injectable 12.5 Gram(s) IV Push once  dextrose 50% Injectable 25 Gram(s) IV Push once  heparin   Injectable 5000 Unit(s) SubCutaneous every 12 hours  insulin glargine Injectable (LANTUS) 30 Unit(s) SubCutaneous every morning  insulin lispro (ADMELOG) corrective regimen sliding scale   SubCutaneous every 6 hours  insulin lispro Injectable (ADMELOG) 5 Unit(s) SubCutaneous every 6 hours  midodrine 15 milliGRAM(s) Oral every 8 hours  pantoprazole  Injectable 40 milliGRAM(s) IV Push daily  potassium chloride   Powder 40 milliEquivalent(s) Oral every 4 hours  rifAXIMin 550 milliGRAM(s) Oral every 12 hours    MEDICATIONS  (PRN):  dextrose Oral Gel 15 Gram(s) Oral once PRN Blood Glucose LESS THAN 70 milliGRAM(s)/deciliter  fentaNYL    Injectable 25 MICROGram(s) IV Push every 2 hours PRN vent dysynchrony  sodium chloride 0.9% lock flush 10 milliLiter(s) IV Push every 1 hour PRN Pre/post blood products, medications, blood draw, and to maintain line patency      Allergies  Allergy Status Unknown      Vital Signs Last 24 Hrs  T(C): 37.5 (06 Feb 2025 08:00), Max: 38.3 (05 Feb 2025 16:00)  T(F): 99.5 (06 Feb 2025 08:00), Max: 100.9 (05 Feb 2025 16:00)  HR: 72 (06 Feb 2025 10:00) (62 - 83)  BP: 133/76 (06 Feb 2025 10:00) (111/64 - 146/76)  BP(mean): 93 (06 Feb 2025 10:00) (78 - 97)  RR: 23 (06 Feb 2025 10:00) (19 - 27)  SpO2: 95% (06 Feb 2025 10:00) (94% - 98%)    Parameters below as of 06 Feb 2025 08:00  Patient On (Oxygen Delivery Method): ventilator    O2 Concentration (%): 50    PHYSICAL EXAM: exam limited due to medical condition  GENERAL: intubated  CHEST/LUNG: Clear to auscultation bilaterally; No wheezes  HEART: Regular rate and rhythm  ABDOMEN: Soft, Nontender, Nondistended  NEURO: not responsive  EXTREMITIES:  no edema        LABS:                        8.1    9.30  )-----------( 27       ( 06 Feb 2025 02:06 )             24.8     02-06    153[H]  |  111[H]  |  32.0[H]  ----------------------------<  271[H]  3.4[L]   |  26.0  |  1.07    Ca    8.3[L]      06 Feb 2025 02:06  Phos  1.3     02-06  Mg     2.3     02-06    TPro  6.7  /  Alb  4.7  /  TBili  1.8  /  DBili  x   /  AST  135[H]  /  ALT  85[H]  /  AlkPhos  311[H]  02-06    POCT Blood Glucose.: 260 mg/dL (02-06-25 @ 07:45)  POCT Blood Glucose.: 293 mg/dL (02-06-25 @ 05:07)  POCT Blood Glucose.: 335 mg/dL (02-05-25 @ 23:32)  POCT Blood Glucose.: 361 mg/dL (02-05-25 @ 21:09)  POCT Blood Glucose.: 319 mg/dL (02-05-25 @ 16:55)  POCT Blood Glucose.: 291 mg/dL (02-05-25 @ 13:37)

## 2025-02-07 LAB
ALBUMIN SERPL ELPH-MCNC: 3.9 G/DL — SIGNIFICANT CHANGE UP (ref 3.3–5.2)
ALBUMIN SERPL ELPH-MCNC: 4.4 G/DL — SIGNIFICANT CHANGE UP (ref 3.3–5.2)
ALP SERPL-CCNC: 330 U/L — HIGH (ref 40–120)
ALP SERPL-CCNC: 339 U/L — HIGH (ref 40–120)
ALT FLD-CCNC: 32 U/L — SIGNIFICANT CHANGE UP
ALT FLD-CCNC: 47 U/L — HIGH
AMMONIA BLD-MCNC: 41 UMOL/L — SIGNIFICANT CHANGE UP (ref 11–55)
ANION GAP SERPL CALC-SCNC: 13 MMOL/L — SIGNIFICANT CHANGE UP (ref 5–17)
ANION GAP SERPL CALC-SCNC: 16 MMOL/L — SIGNIFICANT CHANGE UP (ref 5–17)
ANISOCYTOSIS BLD QL: SIGNIFICANT CHANGE UP
APTT BLD: 38.3 SEC — HIGH (ref 24.5–35.6)
AST SERPL-CCNC: 47 U/L — HIGH
AST SERPL-CCNC: 65 U/L — HIGH
BASE EXCESS BLDA CALC-SCNC: 4.1 MMOL/L — HIGH (ref -2–3)
BASOPHILS # BLD AUTO: 0 K/UL — SIGNIFICANT CHANGE UP (ref 0–0.2)
BASOPHILS # BLD AUTO: 0.02 K/UL — SIGNIFICANT CHANGE UP (ref 0–0.2)
BASOPHILS NFR BLD AUTO: 0 % — SIGNIFICANT CHANGE UP (ref 0–2)
BASOPHILS NFR BLD AUTO: 0.4 % — SIGNIFICANT CHANGE UP (ref 0–2)
BILIRUB SERPL-MCNC: 1.8 MG/DL — SIGNIFICANT CHANGE UP (ref 0.4–2)
BILIRUB SERPL-MCNC: 1.9 MG/DL — SIGNIFICANT CHANGE UP (ref 0.4–2)
BLOOD GAS COMMENTS ARTERIAL: SIGNIFICANT CHANGE UP
BUN SERPL-MCNC: 22.4 MG/DL — HIGH (ref 8–20)
BUN SERPL-MCNC: 28.9 MG/DL — HIGH (ref 8–20)
CA-I BLD-SCNC: 1.08 MMOL/L — LOW (ref 1.15–1.33)
CALCIUM SERPL-MCNC: 8.2 MG/DL — LOW (ref 8.4–10.5)
CALCIUM SERPL-MCNC: 8.5 MG/DL — SIGNIFICANT CHANGE UP (ref 8.4–10.5)
CHLORIDE SERPL-SCNC: 107 MMOL/L — SIGNIFICANT CHANGE UP (ref 96–108)
CHLORIDE SERPL-SCNC: 109 MMOL/L — HIGH (ref 96–108)
CK SERPL-CCNC: 21 U/L — LOW (ref 25–170)
CO2 SERPL-SCNC: 22 MMOL/L — SIGNIFICANT CHANGE UP (ref 22–29)
CO2 SERPL-SCNC: 22 MMOL/L — SIGNIFICANT CHANGE UP (ref 22–29)
CREAT SERPL-MCNC: 0.59 MG/DL — SIGNIFICANT CHANGE UP (ref 0.5–1.3)
CREAT SERPL-MCNC: 0.79 MG/DL — SIGNIFICANT CHANGE UP (ref 0.5–1.3)
CULTURE RESULTS: SIGNIFICANT CHANGE UP
DACRYOCYTES BLD QL SMEAR: SLIGHT — SIGNIFICANT CHANGE UP
EGFR: 82 ML/MIN/1.73M2 — SIGNIFICANT CHANGE UP
EGFR: 99 ML/MIN/1.73M2 — SIGNIFICANT CHANGE UP
EOSINOPHIL # BLD AUTO: 0.04 K/UL — SIGNIFICANT CHANGE UP (ref 0–0.5)
EOSINOPHIL # BLD AUTO: 0.1 K/UL — SIGNIFICANT CHANGE UP (ref 0–0.5)
EOSINOPHIL NFR BLD AUTO: 0.9 % — SIGNIFICANT CHANGE UP (ref 0–6)
EOSINOPHIL NFR BLD AUTO: 2.1 % — SIGNIFICANT CHANGE UP (ref 0–6)
GAS PNL BLDA: SIGNIFICANT CHANGE UP
GIANT PLATELETS BLD QL SMEAR: PRESENT — SIGNIFICANT CHANGE UP
GLUCOSE BLDC GLUCOMTR-MCNC: 216 MG/DL — HIGH (ref 70–99)
GLUCOSE BLDC GLUCOMTR-MCNC: 219 MG/DL — HIGH (ref 70–99)
GLUCOSE BLDC GLUCOMTR-MCNC: 234 MG/DL — HIGH (ref 70–99)
GLUCOSE BLDC GLUCOMTR-MCNC: 246 MG/DL — HIGH (ref 70–99)
GLUCOSE BLDC GLUCOMTR-MCNC: 274 MG/DL — HIGH (ref 70–99)
GLUCOSE BLDC GLUCOMTR-MCNC: 289 MG/DL — HIGH (ref 70–99)
GLUCOSE SERPL-MCNC: 239 MG/DL — HIGH (ref 70–99)
GLUCOSE SERPL-MCNC: 265 MG/DL — HIGH (ref 70–99)
HCO3 BLDA-SCNC: 27 MMOL/L — SIGNIFICANT CHANGE UP (ref 21–28)
HCT VFR BLD CALC: 24.5 % — LOW (ref 34.5–45)
HCT VFR BLD CALC: 25.6 % — LOW (ref 34.5–45)
HGB BLD-MCNC: 8.2 G/DL — LOW (ref 11.5–15.5)
HGB BLD-MCNC: 8.4 G/DL — LOW (ref 11.5–15.5)
HOROWITZ INDEX BLDA+IHG-RTO: SIGNIFICANT CHANGE UP
IMM GRANULOCYTES NFR BLD AUTO: 1.9 % — HIGH (ref 0–0.9)
INR BLD: 1.79 RATIO — HIGH (ref 0.85–1.16)
LACTATE SERPL-SCNC: 1.3 MMOL/L — SIGNIFICANT CHANGE UP (ref 0.5–2)
LYMPHOCYTES # BLD AUTO: 0.96 K/UL — LOW (ref 1–3.3)
LYMPHOCYTES # BLD AUTO: 1.21 K/UL — SIGNIFICANT CHANGE UP (ref 1–3.3)
LYMPHOCYTES # BLD AUTO: 20.4 % — SIGNIFICANT CHANGE UP (ref 13–44)
LYMPHOCYTES # BLD AUTO: 28.7 % — SIGNIFICANT CHANGE UP (ref 13–44)
MACROCYTES BLD QL: SIGNIFICANT CHANGE UP
MAGNESIUM SERPL-MCNC: 1.8 MG/DL — SIGNIFICANT CHANGE UP (ref 1.6–2.6)
MAGNESIUM SERPL-MCNC: 2 MG/DL — SIGNIFICANT CHANGE UP (ref 1.6–2.6)
MANUAL SMEAR VERIFICATION: SIGNIFICANT CHANGE UP
MCHC RBC-ENTMCNC: 31.6 PG — SIGNIFICANT CHANGE UP (ref 27–34)
MCHC RBC-ENTMCNC: 32 PG — SIGNIFICANT CHANGE UP (ref 27–34)
MCHC RBC-ENTMCNC: 32.8 G/DL — SIGNIFICANT CHANGE UP (ref 32–36)
MCHC RBC-ENTMCNC: 33.5 G/DL — SIGNIFICANT CHANGE UP (ref 32–36)
MCV RBC AUTO: 95.7 FL — SIGNIFICANT CHANGE UP (ref 80–100)
MCV RBC AUTO: 96.2 FL — SIGNIFICANT CHANGE UP (ref 80–100)
MONOCYTES # BLD AUTO: 0.22 K/UL — SIGNIFICANT CHANGE UP (ref 0–0.9)
MONOCYTES # BLD AUTO: 0.56 K/UL — SIGNIFICANT CHANGE UP (ref 0–0.9)
MONOCYTES NFR BLD AUTO: 11.9 % — SIGNIFICANT CHANGE UP (ref 2–14)
MONOCYTES NFR BLD AUTO: 5.2 % — SIGNIFICANT CHANGE UP (ref 2–14)
MYELOCYTES NFR BLD: 0.9 % — HIGH (ref 0–0)
NEUTROPHILS # BLD AUTO: 2.64 K/UL — SIGNIFICANT CHANGE UP (ref 1.8–7.4)
NEUTROPHILS # BLD AUTO: 2.98 K/UL — SIGNIFICANT CHANGE UP (ref 1.8–7.4)
NEUTROPHILS NFR BLD AUTO: 62.6 % — SIGNIFICANT CHANGE UP (ref 43–77)
NEUTROPHILS NFR BLD AUTO: 63.3 % — SIGNIFICANT CHANGE UP (ref 43–77)
NRBC # BLD: 1 /100 WBCS — HIGH (ref 0–0)
NRBC BLD-RTO: 1 /100 WBCS — HIGH (ref 0–0)
OVALOCYTES BLD QL SMEAR: SLIGHT — SIGNIFICANT CHANGE UP
PCO2 BLDA: 36 MMHG — SIGNIFICANT CHANGE UP (ref 32–45)
PH BLDA: 7.49 — HIGH (ref 7.35–7.45)
PHOSPHATE SERPL-MCNC: 1 MG/DL — CRITICAL LOW (ref 2.4–4.7)
PHOSPHATE SERPL-MCNC: 2.8 MG/DL — SIGNIFICANT CHANGE UP (ref 2.4–4.7)
PLAT MORPH BLD: NORMAL — SIGNIFICANT CHANGE UP
PLATELET # BLD AUTO: 28 K/UL — LOW (ref 150–400)
PLATELET # BLD AUTO: 31 K/UL — LOW (ref 150–400)
PO2 BLDA: 109 MMHG — HIGH (ref 83–108)
POTASSIUM SERPL-MCNC: 3.6 MMOL/L — SIGNIFICANT CHANGE UP (ref 3.5–5.3)
POTASSIUM SERPL-MCNC: 4.4 MMOL/L — SIGNIFICANT CHANGE UP (ref 3.5–5.3)
POTASSIUM SERPL-SCNC: 3.6 MMOL/L — SIGNIFICANT CHANGE UP (ref 3.5–5.3)
POTASSIUM SERPL-SCNC: 4.4 MMOL/L — SIGNIFICANT CHANGE UP (ref 3.5–5.3)
PROT SERPL-MCNC: 6 G/DL — LOW (ref 6.6–8.7)
PROT SERPL-MCNC: 6.4 G/DL — LOW (ref 6.6–8.7)
PROTHROM AB SERPL-ACNC: 20.6 SEC — HIGH (ref 9.9–13.4)
RBC # BLD: 2.56 M/UL — LOW (ref 3.8–5.2)
RBC # BLD: 2.66 M/UL — LOW (ref 3.8–5.2)
RBC # FLD: 16.1 % — HIGH (ref 10.3–14.5)
RBC # FLD: 16.1 % — HIGH (ref 10.3–14.5)
RBC BLD AUTO: ABNORMAL
SAO2 % BLDA: 99.1 % — HIGH (ref 94–98)
SMUDGE CELLS # BLD: PRESENT — SIGNIFICANT CHANGE UP
SODIUM SERPL-SCNC: 142 MMOL/L — SIGNIFICANT CHANGE UP (ref 135–145)
SODIUM SERPL-SCNC: 146 MMOL/L — HIGH (ref 135–145)
SPECIMEN SOURCE: SIGNIFICANT CHANGE UP
STOMATOCYTES BLD QL SMEAR: SLIGHT — SIGNIFICANT CHANGE UP
TARGETS BLD QL SMEAR: SIGNIFICANT CHANGE UP
UNFRACTIONATED HEPARIN INTERPRETATION: SIGNIFICANT CHANGE UP
UNFRACTIONATED HEPARIN RESULT: NEGATIVE — SIGNIFICANT CHANGE UP
UNFRACTIONATED HEPARIN-HIGH DOSE: 0 % — SIGNIFICANT CHANGE UP
UNFRACTIONATED HEPARIN-LOW DOSE: 0 % — SIGNIFICANT CHANGE UP
VARIANT LYMPHS # BLD: 1.7 % — SIGNIFICANT CHANGE UP (ref 0–6)
VARIANT LYMPHS NFR BLD MANUAL: 1.7 % — SIGNIFICANT CHANGE UP (ref 0–6)
WBC # BLD: 4.22 K/UL — SIGNIFICANT CHANGE UP (ref 3.8–10.5)
WBC # BLD: 4.71 K/UL — SIGNIFICANT CHANGE UP (ref 3.8–10.5)
WBC # FLD AUTO: 4.22 K/UL — SIGNIFICANT CHANGE UP (ref 3.8–10.5)
WBC # FLD AUTO: 4.71 K/UL — SIGNIFICANT CHANGE UP (ref 3.8–10.5)

## 2025-02-07 PROCEDURE — 99291 CRITICAL CARE FIRST HOUR: CPT | Mod: GC

## 2025-02-07 PROCEDURE — 99233 SBSQ HOSP IP/OBS HIGH 50: CPT

## 2025-02-07 RX ORDER — SOD PHOSPHATE,MONOBASIC-DIBAS 3MMOL/ML
30 VIAL (ML) INTRAVENOUS ONCE
Refills: 0 | Status: COMPLETED | OUTPATIENT
Start: 2025-02-07 | End: 2025-02-07

## 2025-02-07 RX ORDER — POTASSIUM CHLORIDE 750 MG/1
40 TABLET, EXTENDED RELEASE ORAL ONCE
Refills: 0 | Status: COMPLETED | OUTPATIENT
Start: 2025-02-07 | End: 2025-02-07

## 2025-02-07 RX ORDER — POTASSIUM PHOSPHATE, MONOBASIC POTASSIUM PHOSPHATE, DIBASIC 236; 224 MG/ML; MG/ML
30 INJECTION, SOLUTION INTRAVENOUS ONCE
Refills: 0 | Status: COMPLETED | OUTPATIENT
Start: 2025-02-07 | End: 2025-02-07

## 2025-02-07 RX ORDER — MIDODRINE HYDROCHLORIDE 5 MG/1
5 TABLET ORAL EVERY 8 HOURS
Refills: 0 | Status: DISCONTINUED | OUTPATIENT
Start: 2025-02-07 | End: 2025-02-08

## 2025-02-07 RX ORDER — DEXMEDETOMIDINE HYDROCHLORIDE 4 UG/ML
0.5 INJECTION, SOLUTION INTRAVENOUS
Qty: 400 | Refills: 0 | Status: DISCONTINUED | OUTPATIENT
Start: 2025-02-07 | End: 2025-02-07

## 2025-02-07 RX ADMIN — Medication 5 UNIT(S): at 00:45

## 2025-02-07 RX ADMIN — MIDODRINE HYDROCHLORIDE 15 MILLIGRAM(S): 5 TABLET ORAL at 14:30

## 2025-02-07 RX ADMIN — FENTANYL CITRATE 25 MICROGRAM(S): 50 INJECTION INTRAMUSCULAR; INTRAVENOUS at 13:03

## 2025-02-07 RX ADMIN — ANTISEPTIC SURGICAL SCRUB 1 APPLICATION(S): 0.04 SOLUTION TOPICAL at 05:20

## 2025-02-07 RX ADMIN — FENTANYL CITRATE 25 MICROGRAM(S): 50 INJECTION INTRAMUSCULAR; INTRAVENOUS at 10:15

## 2025-02-07 RX ADMIN — Medication 4: at 12:25

## 2025-02-07 RX ADMIN — Medication 6: at 00:45

## 2025-02-07 RX ADMIN — Medication 6: at 23:21

## 2025-02-07 RX ADMIN — POTASSIUM CHLORIDE 40 MILLIEQUIVALENT(S): 750 TABLET, EXTENDED RELEASE ORAL at 04:13

## 2025-02-07 RX ADMIN — ANTISEPTIC SURGICAL SCRUB 1 APPLICATION(S): 0.04 SOLUTION TOPICAL at 13:04

## 2025-02-07 RX ADMIN — ANTISEPTIC SURGICAL SCRUB 15 MILLILITER(S): 0.04 SOLUTION TOPICAL at 05:18

## 2025-02-07 RX ADMIN — Medication 5 UNIT(S): at 23:22

## 2025-02-07 RX ADMIN — Medication 5 UNIT(S): at 17:40

## 2025-02-07 RX ADMIN — INSULIN GLARGINE-YFGN 20 UNIT(S): 100 INJECTION, SOLUTION SUBCUTANEOUS at 00:45

## 2025-02-07 RX ADMIN — Medication 85 MILLIMOLE(S): at 04:13

## 2025-02-07 RX ADMIN — ALBUMIN HUMAN 50 MILLILITER(S): 50 SOLUTION INTRAVENOUS at 00:53

## 2025-02-07 RX ADMIN — INSULIN GLARGINE-YFGN 20 UNIT(S): 100 INJECTION, SOLUTION SUBCUTANEOUS at 21:49

## 2025-02-07 RX ADMIN — FENTANYL CITRATE 25 MICROGRAM(S): 50 INJECTION INTRAMUSCULAR; INTRAVENOUS at 15:30

## 2025-02-07 RX ADMIN — Medication 4: at 05:19

## 2025-02-07 RX ADMIN — ALBUMIN HUMAN 50 MILLILITER(S): 50 SOLUTION INTRAVENOUS at 12:23

## 2025-02-07 RX ADMIN — FENTANYL CITRATE 25 MICROGRAM(S): 50 INJECTION INTRAMUSCULAR; INTRAVENOUS at 13:00

## 2025-02-07 RX ADMIN — Medication 5 UNIT(S): at 13:02

## 2025-02-07 RX ADMIN — CEFTRIAXONE 2000 MILLIGRAM(S): 250 INJECTION, POWDER, FOR SOLUTION INTRAMUSCULAR; INTRAVENOUS at 14:31

## 2025-02-07 RX ADMIN — Medication 4: at 17:40

## 2025-02-07 RX ADMIN — ANTISEPTIC SURGICAL SCRUB 15 MILLILITER(S): 0.04 SOLUTION TOPICAL at 17:39

## 2025-02-07 RX ADMIN — INSULIN GLARGINE-YFGN 20 UNIT(S): 100 INJECTION, SOLUTION SUBCUTANEOUS at 08:14

## 2025-02-07 RX ADMIN — PANTOPRAZOLE 40 MILLIGRAM(S): 20 TABLET, DELAYED RELEASE ORAL at 12:22

## 2025-02-07 RX ADMIN — DEXMEDETOMIDINE HYDROCHLORIDE 7.65 MICROGRAM(S)/KG/HR: 4 INJECTION, SOLUTION INTRAVENOUS at 14:30

## 2025-02-07 RX ADMIN — ALBUMIN HUMAN 50 MILLILITER(S): 50 SOLUTION INTRAVENOUS at 05:18

## 2025-02-07 RX ADMIN — Medication 5000 UNIT(S): at 05:18

## 2025-02-07 RX ADMIN — POTASSIUM PHOSPHATE, MONOBASIC POTASSIUM PHOSPHATE, DIBASIC 83.33 MILLIMOLE(S): 236; 224 INJECTION, SOLUTION INTRAVENOUS at 09:09

## 2025-02-07 RX ADMIN — Medication 5 UNIT(S): at 05:19

## 2025-02-07 NOTE — PROVIDER CONTACT NOTE (CRITICAL VALUE NOTIFICATION) - SITUATION
Phos: 1.0
Pt with a critical lactate of 14 & Glucose of 651 on recent VBG.
Pt with a critical glucose of 744 & phos of 1.0

## 2025-02-07 NOTE — PROVIDER CONTACT NOTE (CRITICAL VALUE NOTIFICATION) - NAME OF MD/NP/PA/DO NOTIFIED:
MD whyte
MICU team
marcia
Catia Oconnell, [facility] NP
Dr. Haq
JOYCE Harris
Nathaniel Narvaez ([facility] PA)

## 2025-02-07 NOTE — PROVIDER CONTACT NOTE (CRITICAL VALUE NOTIFICATION) - TEST AND RESULT REPORTED:
phos: 1.0
Phos/ 0.7
k 2.8
pos blood culutre/ growth aerobic bottle: gram neg rods
glucose 703
Critical Lactate & Glucose
On ABG- Glucose 634 and Lactate 15.2
Critical Glucose & phosphorous

## 2025-02-07 NOTE — PROVIDER CONTACT NOTE (CRITICAL VALUE NOTIFICATION) - ACTION/TREATMENT ORDERED:
Dr. Reyes aware. No new orders at this time.
30 milimol sodium phos
keep trending
k phos to be ordered
cont w/ zosyn
JOSE Harris aware. Insulin gtt increased to 12 units/hr.

## 2025-02-07 NOTE — PROGRESS NOTE ADULT - SUBJECTIVE AND OBJECTIVE BOX
INTERVAL EVENTS:  Follow up diabetes management, glucoses above goal.  Unable to obtain ROS due to mental status    MEDICATIONS  (STANDING):  albumin human 25% IVPB 100 milliLiter(s) IV Intermittent every 6 hours  cefTRIAXone Injectable. 2000 milliGRAM(s) IV Push every 24 hours  chlorhexidine 0.12% Liquid 15 milliLiter(s) Oral Mucosa every 12 hours  chlorhexidine 2% Cloths 1 Application(s) Topical daily  chlorhexidine 4% Liquid 1 Application(s) Topical <User Schedule>  dextrose 50% Injectable 25 Gram(s) IV Push once  dextrose 50% Injectable 12.5 Gram(s) IV Push once  dextrose 50% Injectable 25 Gram(s) IV Push once  insulin glargine Injectable (LANTUS) 20 Unit(s) SubCutaneous every morning  insulin glargine Injectable (LANTUS) 20 Unit(s) SubCutaneous at bedtime  insulin lispro (ADMELOG) corrective regimen sliding scale   SubCutaneous every 6 hours  insulin lispro Injectable (ADMELOG) 5 Unit(s) SubCutaneous every 6 hours  midodrine 15 milliGRAM(s) Oral every 8 hours  pantoprazole  Injectable 40 milliGRAM(s) IV Push daily  rifAXIMin 550 milliGRAM(s) Oral every 12 hours    MEDICATIONS  (PRN):  dextrose Oral Gel 15 Gram(s) Oral once PRN Blood Glucose LESS THAN 70 milliGRAM(s)/deciliter  fentaNYL    Injectable 25 MICROGram(s) IV Push every 2 hours PRN vent dysynchrony  sodium chloride 0.9% lock flush 10 milliLiter(s) IV Push every 1 hour PRN Pre/post blood products, medications, blood draw, and to maintain line patency    Allergies  Allergy Status Unknown    Vital Signs Last 24 Hrs  T(C): 37.7 (07 Feb 2025 07:00), Max: 38.6 (06 Feb 2025 16:00)  T(F): 99.9 (07 Feb 2025 07:00), Max: 101.5 (06 Feb 2025 16:00)  HR: 91 (07 Feb 2025 10:00) (70 - 96)  BP: 138/75 (07 Feb 2025 10:00) (111/59 - 144/66)  BP(mean): 93 (07 Feb 2025 10:00) (75 - 101)  RR: 19 (07 Feb 2025 10:00) (17 - 30)  SpO2: 98% (07 Feb 2025 10:00) (93% - 99%)    Parameters below as of 07 Feb 2025 04:00  Patient On (Oxygen Delivery Method): BiPAP/CPAP    O2 Concentration (%): 30    PHYSICAL EXAM:  General: No apparent distress  Respiratory:Intubated  Cardiac: +S1, S2, no m/r/g  GI: +BS, soft, non tender, non distended  Extremities: No peripheral edema  Neuro: Does not arouse to voice    LABS:                        8.2    4.22  )-----------( 28       ( 07 Feb 2025 02:39 )             24.5     02-07    146[H]  |  109[H]  |  28.9[H]  ----------------------------<  239[H]  3.6   |  22.0  |  0.79    Ca    8.5      07 Feb 2025 02:39  Phos  1.0     02-07  Mg     2.0     02-07    TPro  6.4[L]  /  Alb  4.4  /  TBili  1.9  /  DBili  x   /  AST  65[H]  /  ALT  47[H]  /  AlkPhos  330[H]  02-07    Urinalysis Basic - ( 07 Feb 2025 02:39 )    Color: x / Appearance: x / SG: x / pH: x  Gluc: 239 mg/dL / Ketone: x  / Bili: x / Urobili: x   Blood: x / Protein: x / Nitrite: x   Leuk Esterase: x / RBC: x / WBC x   Sq Epi: x / Non Sq Epi: x / Bacteria: x    POCT Blood Glucose.: 246 mg/dL (02-07-25 @ 11:37)  POCT Blood Glucose.: 219 mg/dL (02-07-25 @ 07:47)  POCT Blood Glucose.: 234 mg/dL (02-07-25 @ 05:06)  POCT Blood Glucose.: 289 mg/dL (02-07-25 @ 00:36)  POCT Blood Glucose.: 239 mg/dL (02-06-25 @ 17:16)    Thyroid Stimulating Hormone, Serum: 5.27 uIU/mL (02-06-25 @ 18:29)   INTERVAL EVENTS:  Follow up diabetes management, glucoses above goal.  Unable to obtain ROS due to mental status    MEDICATIONS  (STANDING):  albumin human 25% IVPB 100 milliLiter(s) IV Intermittent every 6 hours  cefTRIAXone Injectable. 2000 milliGRAM(s) IV Push every 24 hours  chlorhexidine 0.12% Liquid 15 milliLiter(s) Oral Mucosa every 12 hours  chlorhexidine 2% Cloths 1 Application(s) Topical daily  chlorhexidine 4% Liquid 1 Application(s) Topical <User Schedule>  dextrose 50% Injectable 25 Gram(s) IV Push once  dextrose 50% Injectable 12.5 Gram(s) IV Push once  dextrose 50% Injectable 25 Gram(s) IV Push once  insulin glargine Injectable (LANTUS) 20 Unit(s) SubCutaneous every morning  insulin glargine Injectable (LANTUS) 20 Unit(s) SubCutaneous at bedtime  insulin lispro (ADMELOG) corrective regimen sliding scale   SubCutaneous every 6 hours  insulin lispro Injectable (ADMELOG) 5 Unit(s) SubCutaneous every 6 hours  midodrine 15 milliGRAM(s) Oral every 8 hours  pantoprazole  Injectable 40 milliGRAM(s) IV Push daily  rifAXIMin 550 milliGRAM(s) Oral every 12 hours    MEDICATIONS  (PRN):  dextrose Oral Gel 15 Gram(s) Oral once PRN Blood Glucose LESS THAN 70 milliGRAM(s)/deciliter  fentaNYL    Injectable 25 MICROGram(s) IV Push every 2 hours PRN vent dysynchrony  sodium chloride 0.9% lock flush 10 milliLiter(s) IV Push every 1 hour PRN Pre/post blood products, medications, blood draw, and to maintain line patency    Allergies  Allergy Status Unknown    Vital Signs Last 24 Hrs  T(C): 37.7 (07 Feb 2025 07:00), Max: 38.6 (06 Feb 2025 16:00)  T(F): 99.9 (07 Feb 2025 07:00), Max: 101.5 (06 Feb 2025 16:00)  HR: 91 (07 Feb 2025 10:00) (70 - 96)  BP: 138/75 (07 Feb 2025 10:00) (111/59 - 144/66)  BP(mean): 93 (07 Feb 2025 10:00) (75 - 101)  RR: 19 (07 Feb 2025 10:00) (17 - 30)  SpO2: 98% (07 Feb 2025 10:00) (93% - 99%)    Parameters below as of 07 Feb 2025 04:00  Patient On (Oxygen Delivery Method): BiPAP/CPAP    O2 Concentration (%): 30    PHYSICAL EXAM:  General: No apparent distress  Respiratory:Intubated  Cardiac: +S1, S2, no m/r/g  GI: +BS, soft, non tender, non distended  Extremities: No peripheral edema  Neuro: Does not arouse to voice    LABS:                        8.2    4.22  )-----------( 28       ( 07 Feb 2025 02:39 )             24.5     02-07    146[H]  |  109[H]  |  28.9[H]  ----------------------------<  239[H]  3.6   |  22.0  |  0.79    Ca    8.5      07 Feb 2025 02:39  Phos  1.0     02-07  Mg     2.0     02-07    TPro  6.4[L]  /  Alb  4.4  /  TBili  1.9  /  DBili  x   /  AST  65[H]  /  ALT  47[H]  /  AlkPhos  330[H]  02-07    POCT Blood Glucose.: 246 mg/dL (02-07-25 @ 11:37)  POCT Blood Glucose.: 219 mg/dL (02-07-25 @ 07:47)  POCT Blood Glucose.: 234 mg/dL (02-07-25 @ 05:06)  POCT Blood Glucose.: 289 mg/dL (02-07-25 @ 00:36)  POCT Blood Glucose.: 239 mg/dL (02-06-25 @ 17:16)    Thyroid Stimulating Hormone, Serum: 5.27 uIU/mL (02-06-25 @ 18:29)

## 2025-02-07 NOTE — ADVANCED PRACTICE NURSE CONSULT - REASON FOR CONSULT
Wound Care was consulted for evaluation of the following:  -buttock wounds     HPI: per chart review  -69F PMH cirrhosis, DM who presented 2/2/25 with AMS, metabolic encephalopathy, found with DKA, E. coli bacteremia and pyelonephritis, B/L PNA, pancreatitis, pancolitis, acute renal failure, and shock. She was intubated for airway protection.  ABx were switched to CTX given sensitivities. C/w daily SAT/SBT. Pending repeat CTH today given ongoing poor mental status. Has been weaned off pressors since 2/4/25. Son updated at bedside. Tube feeds. DVT + GI PPx. Prognosis guarded.    Pt seen at bedside today:  -nonverbal, not following commands, oral intubated to vent with oral feeds

## 2025-02-07 NOTE — PROGRESS NOTE ADULT - ASSESSMENT
71 y/o F (name: Laverne Lai)  with hx of DM and cirrhosis, was brought in by EMS for AMS at home.  PEr son, patient was sick in bed for past two days, and today patient's sister call 911 to bring her to hospital because she was waking up.  In the ED patient was obtunded, intubated for airway protection. Admitted to MICU with severe DKA, pancolitis, acute pancreatitis, UTI and pneumonia. Endocrine consulted for uncontrolled diabetes. A1c 15.6% on admission.    1. Uncontrolled T2DM s/p DKA, a1c 15.6%  - Glucoses above target, tube feeds are currently held  - Increase admelog to 8 units q6 hr (hold when NPO)  - Continue lantus 20 units BID  - Continue moderate correction scale q6 hr  - S/p stress dose steroids which can be affecting elevated glucoses    2. History of hypothyroid  - On prior notes from 2022, was on levothyroxine 75mcg, unclear if on treatment prior to admission  - TSH 5.27, check free thyroxine and TPO ab  - Start IV levothyroxine 25mcg daily    2. Acute pancreatitis  - ?due to gallstones seen on RUQ sonogram, trig level elevated at 240's  - Trend lipase levels, last >3000 on 2/3, recheck lipase  - Care per ICU    3. UTI, pneumonia  - On IV Ceftriaxone  - Care per ICU     71 y/o F (name: Laverne Lai)  with hx of DM and cirrhosis, was brought in by EMS for AMS at home.  PEr son, patient was sick in bed for past two days, and today patient's sister call 911 to bring her to hospital because she was waking up.  In the ED patient was obtunded, intubated for airway protection. Admitted to MICU with severe DKA, pancolitis, acute pancreatitis, UTI and pneumonia. Endocrine consulted for uncontrolled diabetes. A1c 15.6% on admission.    1. Uncontrolled T2DM s/p DKA, a1c 15.6%  - Glucoses above target, tube feeds are currently held  - Increase admelog to 8 units q6 hr (hold when NPO)  - Continue lantus 20 units BID  - Continue moderate correction scale q6 hr  - S/p stress dose steroids which can be affecting elevated glucoses    2. Hypothyroid  - On prior notes from 2022, was on levothyroxine 75mcg, unclear if on treatment prior to admission  - TSH 5.27, check free thyroxine and TPO ab  - Start IV levothyroxine 25mcg daily    2. Acute pancreatitis  - ?due to gallstones seen on RUQ sonogram, trig level elevated at 240's  - Trend lipase levels, last >3000 on 2/3, recheck lipase  - Care per ICU    3. UTI, pneumonia  - On IV Ceftriaxone  - Care per ICU

## 2025-02-07 NOTE — PROGRESS NOTE ADULT - SUBJECTIVE AND OBJECTIVE BOX
INTERVAL HPI/OVERNIGHT EVENTS:   -mental status slightly improved overnight  -CT head non con done showing no intracranial pathology       SUBJECTIVE: Patient seen and examined at bedside. off sedation, rass -3 to -4. slightly improved from yesterday    ROS: All negative except as listed above.    VITAL SIGNS:  ICU Vital Signs Last 24 Hrs  T(C): 37.7 (07 Feb 2025 07:00), Max: 38.6 (06 Feb 2025 16:00)  T(F): 99.9 (07 Feb 2025 07:00), Max: 101.5 (06 Feb 2025 16:00)  HR: 87 (07 Feb 2025 12:15) (70 - 96)  BP: 138/75 (07 Feb 2025 10:00) (112/63 - 144/66)  BP(mean): 93 (07 Feb 2025 10:00) (76 - 101)  ABP: 150/72 (07 Feb 2025 10:00) (110/50 - 150/72)  ABP(mean): 105 (07 Feb 2025 10:00) (75 - 125)  RR: 19 (07 Feb 2025 10:00) (17 - 30)  SpO2: 99% (07 Feb 2025 12:15) (93% - 99%)    O2 Parameters below as of 07 Feb 2025 04:00  Patient On (Oxygen Delivery Method): BiPAP/CPAP    O2 Concentration (%): 30      Mode: CPAP with PS, FiO2: 50, PEEP: 6, PS: 10, MAP: 9  Plateau pressure:   P/F ratio:     02-06 @ 07:01  -  02-07 @ 07:00  --------------------------------------------------------  IN: 4666.4 mL / OUT: 2630 mL / NET: 2036.4 mL    02-07 @ 07:01  -  02-07 @ 12:29  --------------------------------------------------------  IN: 0 mL / OUT: 100 mL / NET: -100 mL      CAPILLARY BLOOD GLUCOSE  214 (07 Feb 2025 08:30)      POCT Blood Glucose.: 246 mg/dL (07 Feb 2025 11:37)      ECG: reviewed.    PHYSICAL EXAM:    GENERAL: In NAD, intubated and sedated  HEENT: NC/AT  PULM: B/L coarse breath sounds  CVS: +S1, S2  ABD: Soft, non-tender  EXTREMITIES: No pedal edema B/L  SKIN: No open wounds  NEURO: Grossly non-focal    MEDICATIONS:  MEDICATIONS  (STANDING):  albumin human 25% IVPB 100 milliLiter(s) IV Intermittent every 6 hours  cefTRIAXone Injectable. 2000 milliGRAM(s) IV Push every 24 hours  chlorhexidine 0.12% Liquid 15 milliLiter(s) Oral Mucosa every 12 hours  chlorhexidine 2% Cloths 1 Application(s) Topical daily  chlorhexidine 4% Liquid 1 Application(s) Topical <User Schedule>  dextrose 50% Injectable 25 Gram(s) IV Push once  dextrose 50% Injectable 12.5 Gram(s) IV Push once  dextrose 50% Injectable 25 Gram(s) IV Push once  insulin glargine Injectable (LANTUS) 20 Unit(s) SubCutaneous every morning  insulin glargine Injectable (LANTUS) 20 Unit(s) SubCutaneous at bedtime  insulin lispro (ADMELOG) corrective regimen sliding scale   SubCutaneous every 6 hours  insulin lispro Injectable (ADMELOG) 5 Unit(s) SubCutaneous every 6 hours  midodrine 15 milliGRAM(s) Oral every 8 hours  pantoprazole  Injectable 40 milliGRAM(s) IV Push daily  rifAXIMin 550 milliGRAM(s) Oral every 12 hours    MEDICATIONS  (PRN):  dextrose Oral Gel 15 Gram(s) Oral once PRN Blood Glucose LESS THAN 70 milliGRAM(s)/deciliter  fentaNYL    Injectable 25 MICROGram(s) IV Push every 2 hours PRN vent dysynchrony  sodium chloride 0.9% lock flush 10 milliLiter(s) IV Push every 1 hour PRN Pre/post blood products, medications, blood draw, and to maintain line patency      ALLERGIES:  Allergies    Allergy Status Unknown    Intolerances        LABS:                        8.2    4.22  )-----------( 28       ( 07 Feb 2025 02:39 )             24.5     02-07    146[H]  |  109[H]  |  28.9[H]  ----------------------------<  239[H]  3.6   |  22.0  |  0.79    Ca    8.5      07 Feb 2025 02:39  Phos  1.0     02-07  Mg     2.0     02-07    TPro  6.4[L]  /  Alb  4.4  /  TBili  1.9  /  DBili  x   /  AST  65[H]  /  ALT  47[H]  /  AlkPhos  330[H]  02-07    PTT - ( 05 Feb 2025 17:00 )  PTT:37.6 sec  Urinalysis Basic - ( 07 Feb 2025 02:39 )    Color: x / Appearance: x / SG: x / pH: x  Gluc: 239 mg/dL / Ketone: x  / Bili: x / Urobili: x   Blood: x / Protein: x / Nitrite: x   Leuk Esterase: x / RBC: x / WBC x   Sq Epi: x / Non Sq Epi: x / Bacteria: x      ABG:  pO2, Arterial: 109 mmHg (02-07-25 @ 11:48)  pH, Arterial: 7.490 (02-07-25 @ 11:48)  pCO2, Arterial: 36 mmHg (02-07-25 @ 11:48)        Culture - Blood (collected 02-06-25 @ 02:32)  Source: .Blood BLOOD  Preliminary Report (02-07-25 @ 06:01):    No growth at 24 hours    Culture - Blood (collected 02-02-25 @ 13:02)  Source: .Blood BLOOD  Preliminary Report (02-06-25 @ 20:00):    No growth at 4 days    Culture - Blood (collected 02-02-25 @ 13:02)  Source: .Blood BLOOD  Gram Stain (02-03-25 @ 14:27):    Growth in aerobic bottle: Gram Negative Rods  Final Report (02-05-25 @ 09:16):    Growth in aerobic bottle: Escherichia coli    Direct identification is available within approximately 3-5    hours either by Blood Panel Multiplexed PCR or Direct    MALDI-TOF. Details: [email or web address]  Organism: Blood Culture PCR  Escherichia coli (02-05-25 @ 09:16)  Organism: Escherichia coli (02-05-25 @ 09:16)      Method Type: JOHANNA      -  Ampicillin: S <=8 These ampicillin results predict results for amoxicillin      -  Ampicillin/Sulbactam: S <=4/2      -  Aztreonam: S <=4      -  Cefazolin: S <=2      -  Cefepime: S <=2      -  Cefoxitin: S <=8      -  Ceftriaxone: S <=1      -  Ciprofloxacin: S <=0.25      -  Ertapenem: S <=0.5      -  Gentamicin: S <=2      -  Imipenem: S <=1      -  Levofloxacin: S <=0.5      -  Meropenem: S <=1      -  Piperacillin/Tazobactam: S <=8      -  Tobramycin: S <=2      -  Trimethoprim/Sulfamethoxazole: S <=0.5/9.5  Organism: Blood Culture PCR (02-05-25 @ 09:16)      Method Type: PCR      -  Escherichia coli: Detec          RADIOLOGY & ADDITIONAL TESTS: Reviewed.

## 2025-02-07 NOTE — PROVIDER CONTACT NOTE (CRITICAL VALUE NOTIFICATION) - PERSON GIVING RESULT:
Surendra Belle / Lab
Lab- [place] [facility]
lab
Isis STONE /[place]
Vero WALKER/ [facility]
[place] / Lab
bronson Freed

## 2025-02-07 NOTE — ADVANCED PRACTICE NURSE CONSULT - RECOMMEDATIONS
The process of organ failure can result in injury or interference to the skin as a result of decreased cutaneous perfusion and localised hypoxia. This reduced availability of blood denies the body of vital nutrients and other factors required to sustain normal skin function. When this compromised state occurs, it is referred to as ‘Skin Changes at Life’s End’ (SCALE). These changes lie on a spectrum which according to literature can range from Acute Skin Failure (ASF), Chronic Skin Failure, and immenent changes preceeding expiration/end of life. these changes MAY occur despite standard of care and prevention measures in place. This physical status can increase the risk of pressure damage occurring resulting in lesions/ulcerating wounds including but not limited too Gigi Terminal Ulcers (KTU), TB-TTI.  Skin changes may develop at life’s end despite optimal care, as it may be impossible to protect the skin from environmental insults in its compromised state. These changes are often related to other cofactors including aging, co-existing diseases, and drug adverse events.    In relation to the pt in question: pt with DKA, AMS with metabolic encephalopathy, peylonehpritis, bl/ lobar PNA, pracreatitis, pancolitis, ARF and shock, intubated for airway protection, and pressure (d/cd 2-4-25). per MD documentation guarded prognosis. the multiorgan impairment and critical ill status of the pt may suggest wound etiolgoy over buttocks as ASF, and terminal lesions.     1. acute skin failure with scattered discoloration over buttocks-NOT POA    WOUNDS:  =======  buttocks  -cleanse the wound and periwound area appropriately per policy   -apply critic aid moisture clear barrier ointment in nickel thick layer to affected area, until barely visible  -DO NOT cover with foam or external dressing  -Apply Q 12 hr and again with each episode of incontinence/cleansing.   ***in the event skin continues to breakdown further, would consider using alternative of triad zinc/dimethicone paste QD       GENERAL GUIDELINES, AND RECOMMENDATIONS  ==============================  -Carefully check that no tubes are entangled with the bed linens or have contact to patient’s skin. Keep bed sheets smooth and wrinkle free to prevent injury to the skin   - Assure to position pt feet at 20 degrees, then HOB at a 30 degree angle  to limit sliding/friction/shearing, especially in bed bound populations.       Turn and position the patient Q2 hours. Use wedges when turning and positioning to the left/right, notably if patient can only assist minimally.  Place wedges / moldable pillows above the waist for effective sacral offloading. Keep shoulders and hips properly aligned for greater comfort. Place pillow between legs to support bony prominences and reduce friction/rubbing.  Apply waffle/sacral cushion for sacral offloading, both in bed and in chair. if limited mobility, maintain full fowlers chair seated position for <2 hours at a time.     Keep the pt’s heels protected and elevated off the surface of the bed. Place what the patient can tolerate: pillows, moldable pillows, offloading heel boots      This was a 20  minute visit, with greater than 50% counseling. My findings and suggestions that may benefit the pt were disscussed with the family/caregiver if present at bedside and with pt permission,  shift RN at bedside, in addition to overseeing team/provider via chat and or telephone.     Attending on file for patient notified was Tom Tellez     Wound Care will continue to follow the patient? : no    Continue to monitor skin integrity as per policy,  and call or re-consult Wound Care with any acute changes or lack of progression of current recommendations. Wound care rceommendations are generally for shift RN dressing changes, unless otherwise specified. Wound Care IS NOT continuing to follow the patient unless otherwise specified as noted above.     Phuc SERRANON, RN, CWCN  Wound Ostomy Nurse Educator   [facility]  Please call/message over Teams and/or Email for clarification/contact.  (E): [email or web address]

## 2025-02-07 NOTE — PROGRESS NOTE ADULT - ASSESSMENT
JUSTIN LOVE  MRN-[id number]  Patient is a 69y old  Female who presents with a chief complaint of DKA, sepsis 2/2 UTI   -has been off insulin gtt since 2/3   -mental status slowly improving since being off sedation   -on ceftriaxone, no longer requiring pressor support         ====================ASSESSMENT/PLAN==============    ====================== NEUROLOGY=====================  fentaNYL    Injectable 25 MICROGram(s) IV Push every 2 hours PRN vent dysynchrony  -currently rass -3 off sedation improving slowly   -will continue to monitor mental status     ==================== RESPIRATORY======================  Mechanical Ventilation:  Mode: CPAP with PS  FiO2: 50  PEEP: 6  PS: 10  MAP: 9  -abg showing normal pco2   -will attempt sbt when mentation improves      ====================CARDIOVASCULAR==================  midodrine 15 milliGRAM(s) Oral every 8 hours  -goal map > 65mmhg, been holding  -not requiring pressor support at this time       ===================== RENAL =========================  Continue monitoring urine output    Avoid Nephrotoxic agents   Adjusting medications for renal  function   Replacing electrolytes as needed with Goal K> 4, PO> 3, Mg> 2  ==================== GASTROINTESTINAL===================  albumin human 25% IVPB 100 milliLiter(s) IV Intermittent every 6 hours  pantoprazole  Injectable 40 milliGRAM(s) IV Push daily  sodium chloride 0.9% lock flush 10 milliLiter(s) IV Push every 1 hour PRN Pre/post blood products, medications, blood draw, and to maintain line patency     diet: tube feeds glucerna   =======================    ENDOCRINE  =====================  dextrose 50% Injectable 25 Gram(s) IV Push once  dextrose 50% Injectable 12.5 Gram(s) IV Push once  dextrose 50% Injectable 25 Gram(s) IV Push once  dextrose Oral Gel 15 Gram(s) Oral once PRN Blood Glucose LESS THAN 70 milliGRAM(s)/deciliter  insulin glargine Injectable (LANTUS) 20 Unit(s) SubCutaneous every morning  insulin glargine Injectable (LANTUS) 20 Unit(s) SubCutaneous at bedtime  insulin lispro (ADMELOG) corrective regimen sliding scale   SubCutaneous every 6 hours  insulin lispro Injectable (ADMELOG) 5 Unit(s) SubCutaneous every 6 hours    ========================INFECTIOUS DISEASE================  cefTRIAXone Injectable. 2000 milliGRAM(s) IV Push every 24 hours  -sepsis 2/2 UTI will continue ceftriaxone   rifAXIMin 550 milliGRAM(s) Oral every 12 hours  -rifaximin for ammonia levels       ========================HEMATOLOGY================  -holding DVT ppx at this time due to thrombocytopenia     ____________________________________________

## 2025-02-07 NOTE — ADVANCED PRACTICE NURSE CONSULT - ASSESSMENT
SARAH SCORE  Last sarah score is : ---8  19+: pt is NOT at risk for developing pressure injuries  15-18: pt is AT RISK for developing pressure injuries   13-14: pt is AT MODERATE RISK for developing pressure injuries   10-12: pt is AT HIGH RISK for developing pressure injuries   6-9: pt is AT VERY HIGH RISK for developing pressure injuries     PHYSICAL EXAM  Is pt AOx?: unable to ascertain at this time   Bed bound?: yes   Incontinent?: yes   Mattress/ bed active?: low airloss surface    Can pt assist with turn or dependent?: totally dependent at this time   Active pressure injury prevention measures? : heel offloading boots, low airloss surface, TAP wedges, NO airtap in place     SKIN CHECK  - thin appearing elderly female  -b/l heels are blanching without obvious injury. skin however appears thin with poor turgor   -over the b/l buttocks are three seperate scattered areas of deep discoloration with partial skin breakdown over  cleft, not particularly over bony prominence resembling butterfly shape, not associated with periwound redness, and appears superficial extent of damage. approximated area is 4x8cm spanning both buttocks.

## 2025-02-08 LAB
ALBUMIN SERPL ELPH-MCNC: 3.6 G/DL — SIGNIFICANT CHANGE UP (ref 3.3–5.2)
ALP SERPL-CCNC: 310 U/L — HIGH (ref 40–120)
ALT FLD-CCNC: 30 U/L — SIGNIFICANT CHANGE UP
ANION GAP SERPL CALC-SCNC: 16 MMOL/L — SIGNIFICANT CHANGE UP (ref 5–17)
ANISOCYTOSIS BLD QL: SIGNIFICANT CHANGE UP
AST SERPL-CCNC: 42 U/L — HIGH
BASOPHILS # BLD AUTO: 0.07 K/UL — SIGNIFICANT CHANGE UP (ref 0–0.2)
BASOPHILS NFR BLD AUTO: 1.4 % — SIGNIFICANT CHANGE UP (ref 0–2)
BILIRUB SERPL-MCNC: 1.9 MG/DL — SIGNIFICANT CHANGE UP (ref 0.4–2)
BUN SERPL-MCNC: 21.2 MG/DL — HIGH (ref 8–20)
CALCIUM SERPL-MCNC: 8.6 MG/DL — SIGNIFICANT CHANGE UP (ref 8.4–10.5)
CHLORIDE SERPL-SCNC: 107 MMOL/L — SIGNIFICANT CHANGE UP (ref 96–108)
CO2 SERPL-SCNC: 21 MMOL/L — LOW (ref 22–29)
CREAT SERPL-MCNC: 0.64 MG/DL — SIGNIFICANT CHANGE UP (ref 0.5–1.3)
EGFR: 97 ML/MIN/1.73M2 — SIGNIFICANT CHANGE UP
EOSINOPHIL # BLD AUTO: 0.07 K/UL — SIGNIFICANT CHANGE UP (ref 0–0.5)
EOSINOPHIL NFR BLD AUTO: 1.4 % — SIGNIFICANT CHANGE UP (ref 0–6)
GIANT PLATELETS BLD QL SMEAR: PRESENT — SIGNIFICANT CHANGE UP
GLUCOSE BLDC GLUCOMTR-MCNC: 108 MG/DL — HIGH (ref 70–99)
GLUCOSE BLDC GLUCOMTR-MCNC: 121 MG/DL — HIGH (ref 70–99)
GLUCOSE BLDC GLUCOMTR-MCNC: 127 MG/DL — HIGH (ref 70–99)
GLUCOSE BLDC GLUCOMTR-MCNC: 165 MG/DL — HIGH (ref 70–99)
GLUCOSE BLDC GLUCOMTR-MCNC: 167 MG/DL — HIGH (ref 70–99)
GLUCOSE SERPL-MCNC: 179 MG/DL — HIGH (ref 70–99)
HCT VFR BLD CALC: 22.5 % — LOW (ref 34.5–45)
HGB BLD-MCNC: 7.6 G/DL — LOW (ref 11.5–15.5)
LYMPHOCYTES # BLD AUTO: 0.2 K/UL — LOW (ref 1–3.3)
LYMPHOCYTES # BLD AUTO: 4.2 % — LOW (ref 13–44)
MACROCYTES BLD QL: SIGNIFICANT CHANGE UP
MAGNESIUM SERPL-MCNC: 2.4 MG/DL — SIGNIFICANT CHANGE UP (ref 1.6–2.6)
MANUAL SMEAR VERIFICATION: SIGNIFICANT CHANGE UP
MCHC RBC-ENTMCNC: 31.8 PG — SIGNIFICANT CHANGE UP (ref 27–34)
MCHC RBC-ENTMCNC: 33.8 G/DL — SIGNIFICANT CHANGE UP (ref 32–36)
MCV RBC AUTO: 94.1 FL — SIGNIFICANT CHANGE UP (ref 80–100)
MONOCYTES # BLD AUTO: 0.55 K/UL — SIGNIFICANT CHANGE UP (ref 0–0.9)
MONOCYTES NFR BLD AUTO: 11.3 % — SIGNIFICANT CHANGE UP (ref 2–14)
NEUTROPHILS # BLD AUTO: 3.95 K/UL — SIGNIFICANT CHANGE UP (ref 1.8–7.4)
NEUTROPHILS NFR BLD AUTO: 80.3 % — HIGH (ref 43–77)
NEUTS BAND # BLD: 1.4 % — SIGNIFICANT CHANGE UP (ref 0–8)
NEUTS BAND NFR BLD: 1.4 % — SIGNIFICANT CHANGE UP (ref 0–8)
NRBC # BLD: 1 /100 WBCS — HIGH (ref 0–0)
NRBC BLD-RTO: 1 /100 WBCS — HIGH (ref 0–0)
PHOSPHATE SERPL-MCNC: 2.2 MG/DL — LOW (ref 2.4–4.7)
PLAT MORPH BLD: NORMAL — SIGNIFICANT CHANGE UP
PLATELET # BLD AUTO: 29 K/UL — LOW (ref 150–400)
POTASSIUM SERPL-MCNC: 3.9 MMOL/L — SIGNIFICANT CHANGE UP (ref 3.5–5.3)
POTASSIUM SERPL-SCNC: 3.9 MMOL/L — SIGNIFICANT CHANGE UP (ref 3.5–5.3)
PROT SERPL-MCNC: 6 G/DL — LOW (ref 6.6–8.7)
RBC # BLD: 2.39 M/UL — LOW (ref 3.8–5.2)
RBC # FLD: 15.8 % — HIGH (ref 10.3–14.5)
RBC BLD AUTO: NORMAL — SIGNIFICANT CHANGE UP
SMUDGE CELLS # BLD: PRESENT — SIGNIFICANT CHANGE UP
SODIUM SERPL-SCNC: 144 MMOL/L — SIGNIFICANT CHANGE UP (ref 135–145)
T4 FREE SERPL-MCNC: 0.7 NG/DL — LOW (ref 0.9–1.7)
THYROPEROXIDASE AB SERPL-ACNC: 10 IU/ML — SIGNIFICANT CHANGE UP
WBC # BLD: 4.83 K/UL — SIGNIFICANT CHANGE UP (ref 3.8–10.5)
WBC # FLD AUTO: 4.83 K/UL — SIGNIFICANT CHANGE UP (ref 3.8–10.5)

## 2025-02-08 PROCEDURE — 99233 SBSQ HOSP IP/OBS HIGH 50: CPT

## 2025-02-08 PROCEDURE — 99233 SBSQ HOSP IP/OBS HIGH 50: CPT | Mod: GC

## 2025-02-08 RX ORDER — INSULIN GLARGINE-YFGN 100 [IU]/ML
25 INJECTION, SOLUTION SUBCUTANEOUS AT BEDTIME
Refills: 0 | Status: DISCONTINUED | OUTPATIENT
Start: 2025-02-08 | End: 2025-02-08

## 2025-02-08 RX ORDER — LEVOTHYROXINE SODIUM 25 UG/1
50 TABLET ORAL DAILY
Refills: 0 | Status: DISCONTINUED | OUTPATIENT
Start: 2025-02-08 | End: 2025-02-14

## 2025-02-08 RX ORDER — INSULIN GLARGINE-YFGN 100 [IU]/ML
25 INJECTION, SOLUTION SUBCUTANEOUS EVERY MORNING
Refills: 0 | Status: DISCONTINUED | OUTPATIENT
Start: 2025-02-08 | End: 2025-02-08

## 2025-02-08 RX ORDER — INSULIN LISPRO 100/ML
VIAL (ML) SUBCUTANEOUS
Refills: 0 | Status: DISCONTINUED | OUTPATIENT
Start: 2025-02-08 | End: 2025-02-14

## 2025-02-08 RX ORDER — INSULIN LISPRO 100/ML
5 VIAL (ML) SUBCUTANEOUS
Refills: 0 | Status: DISCONTINUED | OUTPATIENT
Start: 2025-02-08 | End: 2025-02-14

## 2025-02-08 RX ORDER — INSULIN GLARGINE-YFGN 100 [IU]/ML
20 INJECTION, SOLUTION SUBCUTANEOUS
Refills: 0 | Status: DISCONTINUED | OUTPATIENT
Start: 2025-02-08 | End: 2025-02-14

## 2025-02-08 RX ORDER — INSULIN GLARGINE-YFGN 100 [IU]/ML
20 INJECTION, SOLUTION SUBCUTANEOUS AT BEDTIME
Refills: 0 | Status: DISCONTINUED | OUTPATIENT
Start: 2025-02-08 | End: 2025-02-13

## 2025-02-08 RX ORDER — MAGNESIUM SULFATE 0.8 MEQ/ML
2 AMPUL (ML) INJECTION ONCE
Refills: 0 | Status: COMPLETED | OUTPATIENT
Start: 2025-02-08 | End: 2025-02-08

## 2025-02-08 RX ORDER — SODIUM PHOSPHATE, DIBASIC, ANHYDROUS, POTASSIUM PHOSPHATE, MONOBASIC, AND SODIUM PHOSPHATE, MONOBASIC, MONOHYDRATE 852; 155; 130 MG/1; MG/1; MG/1
2 TABLET, COATED ORAL ONCE
Refills: 0 | Status: COMPLETED | OUTPATIENT
Start: 2025-02-08 | End: 2025-02-08

## 2025-02-08 RX ADMIN — CEFTRIAXONE 2000 MILLIGRAM(S): 250 INJECTION, POWDER, FOR SOLUTION INTRAMUSCULAR; INTRAVENOUS at 02:31

## 2025-02-08 RX ADMIN — Medication 25 GRAM(S): at 03:13

## 2025-02-08 RX ADMIN — Medication 2: at 05:27

## 2025-02-08 RX ADMIN — INSULIN GLARGINE-YFGN 20 UNIT(S): 100 INJECTION, SOLUTION SUBCUTANEOUS at 22:06

## 2025-02-08 RX ADMIN — ANTISEPTIC SURGICAL SCRUB 15 MILLILITER(S): 0.04 SOLUTION TOPICAL at 05:27

## 2025-02-08 RX ADMIN — ANTISEPTIC SURGICAL SCRUB 1 APPLICATION(S): 0.04 SOLUTION TOPICAL at 06:00

## 2025-02-08 RX ADMIN — PANTOPRAZOLE 40 MILLIGRAM(S): 20 TABLET, DELAYED RELEASE ORAL at 11:37

## 2025-02-08 RX ADMIN — SODIUM PHOSPHATE, DIBASIC, ANHYDROUS, POTASSIUM PHOSPHATE, MONOBASIC, AND SODIUM PHOSPHATE, MONOBASIC, MONOHYDRATE 2 PACKET(S): 852; 155; 130 TABLET, COATED ORAL at 18:28

## 2025-02-08 RX ADMIN — MIDODRINE HYDROCHLORIDE 5 MILLIGRAM(S): 5 TABLET ORAL at 02:33

## 2025-02-08 RX ADMIN — ANTISEPTIC SURGICAL SCRUB 1 APPLICATION(S): 0.04 SOLUTION TOPICAL at 15:34

## 2025-02-08 RX ADMIN — MIDODRINE HYDROCHLORIDE 5 MILLIGRAM(S): 5 TABLET ORAL at 05:26

## 2025-02-08 RX ADMIN — Medication 2: at 22:07

## 2025-02-08 NOTE — SWALLOW BEDSIDE ASSESSMENT ADULT - SLP PRECAUTIONS/LIMITATIONS: HEARING
Occupational Therapy Discharge Summary    Lucy Perez  MRN: [id number]   Acute kidney injury (nontraumatic)   Patient Discharged from acute Occupational Therapy on 8/4/17.  Please refer to prior OT note dated on 8/3/17 for functional status.     Assessment:   Patient appropriate for care in another setting.  GOALS:    Occupational Therapy Goals        Problem: Occupational Therapy Goal    Goal Priority Disciplines Outcome Interventions   Occupational Therapy Goal     OT, PT/OT Ongoing (interventions implemented as appropriate)    Description:  Goals to be met by: 8/10/17    Patient will increase functional independence with ADLs by performing:    UE Dressing with Maximum Assistance.  LE Dressing (from supine if necessary) with Maximum Assistance.  Grooming while EOB with Stand-by Assistance.  Sitting at edge of bed x10 minutes with Moderate Assistance.  Rolling to Bilateral with Moderate Assistance.   Supine to sit with Maximum Assistance.                    Reasons for Discontinuation of Therapy Services  Transfer to alternate level of care.      Plan:  Patient Discharged to: Home with Home Health Service.   within functional limits

## 2025-02-08 NOTE — SWALLOW BEDSIDE ASSESSMENT ADULT - SLP GENERAL OBSERVATIONS
Pt recd A&A in bed, [place]ian speaking, son present at bedside requesting to assist in Citizen of [place] interpretation, dysphonic, +02 via nc Sp02 93-95% baseline and throughout, s/p extubation this morning, pain 0/10, family at bedside

## 2025-02-08 NOTE — SWALLOW BEDSIDE ASSESSMENT ADULT - SWALLOW EVAL: DIAGNOSIS
Oral and pharyngeal stages of swallow appear functional for purees only. Suspect pharyngeal dysphagia with administered fluids.

## 2025-02-08 NOTE — AIRWAY REMOVAL NOTE  ADULT & PEDS - ARTIFICAL AIRWAY REMOVAL COMMENTS
Pt completed SBT. Pt assessed by MICU MD. Pt extubated to 3L n/c with RN at bedside. Pt appears comfortable. SPo2 96%.

## 2025-02-08 NOTE — SWALLOW BEDSIDE ASSESSMENT ADULT - ADDITIONAL RECOMMENDATIONS
**Monitor diet tolerance. If any overt s/s aspiration noted, please d/c PO diet and resume NPO status pending reevaluation.  **Alternative means of hydration  **This service to follow to reassess at bedside

## 2025-02-08 NOTE — PROGRESS NOTE ADULT - ASSESSMENT
71 y/o F (name: Laverne Lai)  with hx of DM and cirrhosis, was brought in by EMS for AMS at home.  PEr son, patient was sick in bed for past two days, and today patient's sister call 911 to bring her to hospital because she was waking up.  In the ED patient was obtunded, intubated for airway protection. Admitted to MICU with severe DKA, pancolitis, acute pancreatitis, UTI and pneumonia. Endocrine consulted for uncontrolled diabetes. A1c 15.6% on admission.    1. Uncontrolled T2DM s/p DKA, a1c 15.6% - glucoses improved today, nurse held insulin today due to poor oral intake and tube on hold  - decrease Lantus to 20 units BID - she received this dose yesterday and glucoses controlled today  - Glucoses above target, tube feeds are currently held  - change standing prandil admelog to TID before meals  - change Admelog scale to ac and HS    2. Hypothyroid  - On prior notes from 2022, was on levothyroxine 75mcg, unclear if on treatment prior to admission  - TSH 5.27, low free thyroxine and neg TPO ab  - Start IV levothyroxine 25mcg daily or PO Levothyroxine 50 mcg daily if she is able to take PO meds    2. Acute pancreatitis  - ?due to gallstones seen on RUQ sonogram, trig level elevated at 240's  - Trend lipase levels, last >3000 on 2/3, recheck lipase  - Care per ICU    3. UTI, pneumonia  - On IV Ceftriaxone  - Care per ICU 71 y/o F (name: Laverne Lai)  with hx of DM and cirrhosis, was brought in by EMS for AMS at home.  PEr son, patient was sick in bed for past two days, and today patient's sister call 911 to bring her to hospital because she was waking up.  In the ED patient was obtunded, intubated for airway protection. Admitted to MICU with severe DKA, pancolitis, acute pancreatitis, UTI and pneumonia. Endocrine consulted for uncontrolled diabetes. A1c 15.6% on admission.    1. Uncontrolled T2DM s/p DKA, a1c 15.6% - glucoses improved today, nurse held insulin today due to poor oral intake and tube on hold  - decrease Lantus to 20 units BID - she received this dose yesterday and glucoses controlled today  - change standing prandial admelog to TID before meals  - change Admelog scale to ac and HS    2. Hypothyroid  - On prior notes from 2022, was on levothyroxine 75mcg, unclear if on treatment prior to admission  - TSH 5.27, low free thyroxine and neg TPO ab  - Start IV levothyroxine 25mcg daily or PO Levothyroxine 50 mcg daily if she is able to take PO meds    2. Acute pancreatitis  - ?due to gallstones seen on RUQ sonogram, trig level elevated at 240's  - Trend lipase levels, last >3000 on 2/3, recheck lipase  - Care per ICU    3. UTI, pneumonia  - On IV Ceftriaxone  - Care per ICU

## 2025-02-08 NOTE — PROGRESS NOTE ADULT - ASSESSMENT
ASSESSMENT  JUSTIN LOVE is a 69y Female    PLAN:  #    Neuro:  -A&Ox  CVS:  - Maintain MAP>65  - Cardiac monitor  Resp:  - Sating well on RA, continue to monitor on   - Aspiration precautions  GI:  - Protonix for GI ppx  Renal:  -Monitor I&Os, replete lytes as needed  ID:  Heme:  Endo:    Code Status: Full Code  Dispo: Admit to MICU    Case discussed with MICU Attending:     ASSESSMENT  70 y/o Fwith hx of DM and cirrhosis, was brought in by EMS for AMS at home. In the ED patient was obtunded, intubated for airway protection (2/2/25). Pt admitted to MICU with severe DKA, pancolitis, acute pancreatitis, UTI and pneumonia.    PLAN:  #DKA  #Sepsis secondary to UTI   #Pancolitis   #Acute pancreatitis     Neuro:  - Pt awake, following commands   - Neuro Q4     CVS:  - No longer requiring pressors since 2/5  - Maintain MAP>65  - Cardiac monitor    Resp:  - Extubated this morning, Sating well on 3L NC, continue to monitor on   - Aspiration precautions    GI:  - Protonix for GI ppx  - C/w rifaximin, trend ammonia  - Awaiting official speech and swallow consult, advance diet after consult  - Tube feeds on hold d/t hyperglycemia     Renal:  -Monitor I&Os, replete lytes as needed    ID:  -Ceftriaxone 2g Q24       Heme:  - DVT ppx on hold for thrombocytopenia     Endo:  - Off insulin gtt since 2/3   - Endo consult appreciated c/w lantus 20 BID and moderate correction scale  - Trend lipase (>3000)    Code Status: Full Code  Dispo: Continue to monitor respiratory status post extubation, to be downgraded later this afternoon    Case discussed with MICU Attending: Dr. Reyes

## 2025-02-08 NOTE — PROGRESS NOTE ADULT - ASSESSMENT
70 y/o F w hx of IDDM (non-compliant to meds), cirrhosis admitted to MICU for DKA, UTI, acute pancreatitis, UTI, E.coli bacteremia, colitis, PNA. Patient required pressors and insulin drip as well as intubation during MICU stay. Patient now off pressors, on subcuteanous insulin, and on RA. Downgraded to general medical service.    #DKA  -s/p insulin drip (dc on 02/03)  -lantus 20 units BID  -aspart 5 units TID qAC  -FSG  -endocrinology on board, recs appreciated     #E.coli UTI  #E.coli bacteremia  -continue ceftriaxone 2g daily  -ID consult in AM  -plan to dc manriquez and TOV    #PNA  -patient was on vanc, zosyn  -now on ceftriaxone  -clinically no signs of pna at this time    #acute pancreatitis  #colitis  -tolerating PO diet  -no abdominal pain  -will recheck lipase levels w AM labs  -clinically no signs of colitis   -d/c rectal tube    #hypothyroidism  -will start synthroid 50mcg daily  -endocrine on board, recs appreciated     #cirrhosis   -s/p lactulose  -c/w rifaxamin  -ammonia levels now normal  -will need outpatient GI follow up    diet: carb consistent  dvt ppx: lovenox  dispo: anticipate 2-3 day LOS. need PT eval for dispo.     68 y/o F w hx of IDDM (non-compliant to meds), cirrhosis admitted to MICU for DKA, UTI, acute pancreatitis, UTI, E.coli bacteremia, colitis, PNA. Patient required pressors and insulin drip as well as intubation during MICU stay. Patient now off pressors, on subcutaneous insulin, and on RA. Downgraded to general medical service.    #DKA  -s/p insulin drip (dc on 02/03)  -lantus 20 units BID  -aspart 5 units TID qAC  -FSG  -endocrinology on board, recs appreciated     #E.coli UTI  #E.coli bacteremia  -continue ceftriaxone 2g daily  -ID consult in AM  -plan to dc manriquez and TOV    #PNA  -patient was on vanc, zosyn  -now on ceftriaxone  -clinically no signs of pna at this time    #acute pancreatitis  #colitis  -tolerating PO diet  -no abdominal pain  -will recheck lipase levels w AM labs  -clinically no signs of colitis   -d/c rectal tube    #hypothyroidism  -will start synthroid 50mcg daily  -endocrine on board, recs appreciated     #cirrhosis   -s/p lactulose  -c/w rifaxamin  -ammonia levels now normal  -will need outpatient GI follow up    diet: carb consistent  dvt ppx: lovenox  dispo: anticipate 2-3 day LOS. need PT eval for dispo.     68 y/o F w hx of IDDM (non-compliant to meds), cirrhosis admitted to MICU for DKA, UTI, acute pancreatitis, UTI, E.coli bacteremia, colitis, PNA. Patient required pressors and insulin drip as well as intubation during MICU stay. Patient now off pressors, on subcutaneous insulin, and on RA. Downgraded to general medical service.    #DKA  -s/p insulin drip (dc on 02/03)  -lantus 20 units BID  -aspart 5 units TID qAC  -FSG  -endocrinology on board, recs appreciated     #E.coli UTI  #E.coli bacteremia  -continue ceftriaxone 2g daily  -ID consult in AM  -plan to dc manriquez and TOV    #PNA  -patient was on vanc, zosyn  -now on ceftriaxone  -clinically no signs of pna at this time    #acute pancreatitis  #colitis  -tolerating PO diet  -no abdominal pain  -will recheck lipase levels w AM labs  -clinically no signs of colitis   -d/c rectal tube    #hypothyroidism  -will start synthroid 50mcg daily  -endocrine on board, recs appreciated     #cirrhosis   -s/p lactulose  -c/w rifaxamin  -ammonia levels now normal  -will need outpatient GI follow up    #anemia  -no evidence of bleeding  -will obtain iron panel    diet: carb consistent  dvt ppx: lovenox  dispo: anticipate 2-3 day LOS. need PT eval for dispo.     70 y/o F w hx of IDDM (non-compliant to meds), cirrhosis admitted to MICU for DKA, UTI, acute pancreatitis, UTI, E.coli bacteremia, colitis, PNA. Patient required pressors and insulin drip as well as intubation during MICU stay. Patient now off pressors, on subcutaneous insulin, and on RA. Downgraded to general medical service.    #DKA  -s/p insulin drip (dc on 02/03)  -lantus 20 units BID  -aspart 5 units TID qAC  -FSG  -endocrinology on board, recs appreciated     #E.coli UTI  #E.coli bacteremia  -continue ceftriaxone 2g daily  -ID consult in AM  -plan to dc manriquez and TOV    #PNA  -patient was on vanc, zosyn  -now on ceftriaxone  -clinically no signs of pna at this time    #acute pancreatitis  #colitis  -tolerating PO diet  -no abdominal pain  -will recheck lipase levels w AM labs  -clinically no signs of colitis   -d/c rectal tube    #hypothyroidism  -will start synthroid 50mcg daily  -endocrine on board, recs appreciated     #cirrhosis   -s/p lactulose  -c/w rifaxamin  -ammonia levels now normal  -will need outpatient GI follow up    #anemia  -no evidence of bleeding  -will obtain iron panel    diet: carb consistent pureed. SLP eval pending.  dvt ppx: lovenox  dispo: anticipate 2-3 day LOS. need PT eval for dispo.

## 2025-02-08 NOTE — PROGRESS NOTE ADULT - SUBJECTIVE AND OBJECTIVE BOX
Patient is a 69y old  Female who presents with a chief complaint of DKA (07 Feb 2025 12:28)      BRIEF HOSPITAL COURSE:     Interval HPI: 69 y/o Fwith hx of DM and cirrhosis, was brought in by EMS for AMS at home.  PEr son, patient was sick in bed for past two days, and patient's sister called 911 to bring her to hospital because she wasn't waking up.  In the ED patient was obtunded, intubated for airway protection (2/2/25).  Pt admitted to MICU with severe DKA, pancolitis, acute pancreatitis, UTI and pneumonia.    PAST MEDICAL & SURGICAL HISTORY:      Review of Systems: Limited to patient confusion      Medications:  cefTRIAXone Injectable. 2000 milliGRAM(s) IV Push every 24 hours  rifAXIMin 550 milliGRAM(s) Oral every 12 hours    midodrine 5 milliGRAM(s) Oral every 8 hours      fentaNYL    Injectable 25 MICROGram(s) IV Push every 2 hours PRN        pantoprazole  Injectable 40 milliGRAM(s) IV Push daily      dextrose 50% Injectable 25 Gram(s) IV Push once  dextrose 50% Injectable 12.5 Gram(s) IV Push once  dextrose 50% Injectable 25 Gram(s) IV Push once  dextrose Oral Gel 15 Gram(s) Oral once PRN  insulin glargine Injectable (LANTUS) 25 Unit(s) SubCutaneous at bedtime  insulin glargine Injectable (LANTUS) 25 Unit(s) SubCutaneous every morning  insulin lispro (ADMELOG) corrective regimen sliding scale   SubCutaneous every 6 hours  insulin lispro Injectable (ADMELOG) 5 Unit(s) SubCutaneous every 6 hours    potassium phosphate / sodium phosphate Powder (PHOS-NaK) 2 Packet(s) Oral once  sodium chloride 0.9% lock flush 10 milliLiter(s) IV Push every 1 hour PRN      chlorhexidine 2% Cloths 1 Application(s) Topical daily  chlorhexidine 4% Liquid 1 Application(s) Topical <User Schedule>        ICU Vital Signs Last 24 Hrs  T(C): 37.6 (08 Feb 2025 10:00), Max: 37.8 (07 Feb 2025 16:00)  T(F): 99.7 (08 Feb 2025 10:00), Max: 100 (07 Feb 2025 16:00)  HR: 79 (08 Feb 2025 10:00) (71 - 94)  BP: 114/69 (08 Feb 2025 10:00) (108/64 - 149/79)  BP(mean): 82 (08 Feb 2025 10:00) (79 - 105)  ABP: 126/58 (08 Feb 2025 10:00) (97/43 - 145/74)  ABP(mean): 86 (08 Feb 2025 10:00) (65 - 100)  RR: 23 (08 Feb 2025 10:00) (16 - 30)  SpO2: 96% (08 Feb 2025 10:00) (94% - 100%)    O2 Parameters below as of 08 Feb 2025 06:21    O2 Flow (L/min): 3          ABG - ( 07 Feb 2025 11:48 )  pH, Arterial: 7.490 pH, Blood: x     /  pCO2: 36    /  pO2: 109   / HCO3: 27    / Base Excess: 4.1   /  SaO2: 99.1                I&O's Detail    07 Feb 2025 07:01  -  08 Feb 2025 07:00  --------------------------------------------------------  IN:    Albumin 25%  -  50 mL: 50 mL    Dexmedetomidine: 68.4 mL    Enteral Tube Flush: 2350 mL    Glucerna 1.5: 1150 mL    IV PiggyBack: 500 mL    IV PiggyBack: 50 mL  Total IN: 4168.4 mL    OUT:    Indwelling Catheter - Urethral (mL): 1500 mL    Rectal Tube (mL): 600 mL  Total OUT: 2100 mL    Total NET: 2068.4 mL      08 Feb 2025 07:01  -  08 Feb 2025 10:56  --------------------------------------------------------  IN:  Total IN: 0 mL    OUT:    Enteral Tube Flush: 0 mL    Glucerna 1.5: 0 mL    Indwelling Catheter - Urethral (mL): 200 mL  Total OUT: 200 mL    Total NET: -200 mL            LABS:                        7.6    4.83  )-----------( 29       ( 08 Feb 2025 05:18 )             22.5     02-08    144  |  107  |  21.2[H]  ----------------------------<  179[H]  3.9   |  21.0[L]  |  0.64    Ca    8.6      08 Feb 2025 05:18  Phos  2.2     02-08  Mg     2.4     02-08    TPro  6.0[L]  /  Alb  3.6  /  TBili  1.9  /  DBili  x   /  AST  42[H]  /  ALT  30  /  AlkPhos  310[H]  02-08          CAPILLARY BLOOD GLUCOSE  127 (08 Feb 2025 09:00)      POCT Blood Glucose.: 127 mg/dL (08 Feb 2025 09:56)    PT/INR - ( 07 Feb 2025 23:13 )   PT: 20.6 sec;   INR: 1.79 ratio         PTT - ( 07 Feb 2025 23:13 )  PTT:38.3 sec  Urinalysis Basic - ( 08 Feb 2025 05:18 )    Color: x / Appearance: x / SG: x / pH: x  Gluc: 179 mg/dL / Ketone: x  / Bili: x / Urobili: x   Blood: x / Protein: x / Nitrite: x   Leuk Esterase: x / RBC: x / WBC x   Sq Epi: x / Non Sq Epi: x / Bacteria: x      CULTURES:  Culture Results:   No growth at 48 Hours (02-06 @ 02:32)  Culture Results:   No enteric pathogens isolated.  (Stool culture examined for Salmonella,  Shigella, Campylobacter, Aeromonas, Plesiomonas,  Vibrio, E.coli O157 and Yersinia) (02-04 @ 13:20)  C Diff by PCR Result: NotDetec (02-04 @ 13:20)  Culture Results:   >100,000 CFU/ml Escherichia coli (02-02 @ 14:00)  Rapid RVP Result: NotDetec (02-02 @ 13:30)  Culture Results:   No growth at 5 days (02-02 @ 13:02)  Culture Results:   Growth in aerobic bottle: Escherichia coli  Direct identification is available within approximately 3-5  hours either by Blood Panel Multiplexed PCR or Direct  MALDI-TOF. Details: [email or web address] (02-02 @ 13:02)      Physical Examination:    General: No acute distress.    HEENT: Pupils equal, reactive to light.  Symmetric.  PULM: Clear to auscultation bilaterally, no significant sputum production  NECK: Supple, no lymphadenopathy, trachea midline  CVS: Regular rate and rhythm, no murmurs, rubs, or gallops  ABD: Soft, nondistended, nontender, normoactive bowel sounds, no masses  EXT: No edema, nontender  SKIN: Warm and well perfused, no rashes noted.  NEURO: Patient alert, following commands      DEVICES:     RADIOLOGY: ***    CRITICAL CARE TIME SPENT: ***   Patient is a 69y old  Female who presents with a chief complaint of DKA (07 Feb 2025 12:28)      BRIEF HOSPITAL COURSE:     Interval HPI: 70 y/o Fwith hx of DM and cirrhosis, was brought in by EMS for AMS at home.  PEr son, patient was sick in bed for past two days, and patient's sister called 911 to bring her to hospital because she wasn't waking up.  In the ED patient was obtunded, intubated for airway protection (2/2/25).  Pt admitted to MICU with severe DKA, pancolitis, acute pancreatitis, UTI and pneumonia.    - Extubated this AM (2/8) at 6:30 am, doing well on 3L NC   - off insulin drip since 2/3   - off pressors since 2/5       PAST MEDICAL & SURGICAL HISTORY:      Review of Systems: Limited to patient confusion      Medications:  cefTRIAXone Injectable. 2000 milliGRAM(s) IV Push every 24 hours  rifAXIMin 550 milliGRAM(s) Oral every 12 hours    midodrine 5 milliGRAM(s) Oral every 8 hours      fentaNYL    Injectable 25 MICROGram(s) IV Push every 2 hours PRN        pantoprazole  Injectable 40 milliGRAM(s) IV Push daily      dextrose 50% Injectable 25 Gram(s) IV Push once  dextrose 50% Injectable 12.5 Gram(s) IV Push once  dextrose 50% Injectable 25 Gram(s) IV Push once  dextrose Oral Gel 15 Gram(s) Oral once PRN  insulin glargine Injectable (LANTUS) 25 Unit(s) SubCutaneous at bedtime  insulin glargine Injectable (LANTUS) 25 Unit(s) SubCutaneous every morning  insulin lispro (ADMELOG) corrective regimen sliding scale   SubCutaneous every 6 hours  insulin lispro Injectable (ADMELOG) 5 Unit(s) SubCutaneous every 6 hours    potassium phosphate / sodium phosphate Powder (PHOS-NaK) 2 Packet(s) Oral once  sodium chloride 0.9% lock flush 10 milliLiter(s) IV Push every 1 hour PRN      chlorhexidine 2% Cloths 1 Application(s) Topical daily  chlorhexidine 4% Liquid 1 Application(s) Topical <User Schedule>        ICU Vital Signs Last 24 Hrs  T(C): 37.6 (08 Feb 2025 10:00), Max: 37.8 (07 Feb 2025 16:00)  T(F): 99.7 (08 Feb 2025 10:00), Max: 100 (07 Feb 2025 16:00)  HR: 79 (08 Feb 2025 10:00) (71 - 94)  BP: 114/69 (08 Feb 2025 10:00) (108/64 - 149/79)  BP(mean): 82 (08 Feb 2025 10:00) (79 - 105)  ABP: 126/58 (08 Feb 2025 10:00) (97/43 - 145/74)  ABP(mean): 86 (08 Feb 2025 10:00) (65 - 100)  RR: 23 (08 Feb 2025 10:00) (16 - 30)  SpO2: 96% (08 Feb 2025 10:00) (94% - 100%)    O2 Parameters below as of 08 Feb 2025 06:21    O2 Flow (L/min): 3          ABG - ( 07 Feb 2025 11:48 )  pH, Arterial: 7.490 pH, Blood: x     /  pCO2: 36    /  pO2: 109   / HCO3: 27    / Base Excess: 4.1   /  SaO2: 99.1                I&O's Detail    07 Feb 2025 07:01  -  08 Feb 2025 07:00  --------------------------------------------------------  IN:    Albumin 25%  -  50 mL: 50 mL    Dexmedetomidine: 68.4 mL    Enteral Tube Flush: 2350 mL    Glucerna 1.5: 1150 mL    IV PiggyBack: 500 mL    IV PiggyBack: 50 mL  Total IN: 4168.4 mL    OUT:    Indwelling Catheter - Urethral (mL): 1500 mL    Rectal Tube (mL): 600 mL  Total OUT: 2100 mL    Total NET: 2068.4 mL      08 Feb 2025 07:01  -  08 Feb 2025 10:56  --------------------------------------------------------  IN:  Total IN: 0 mL    OUT:    Enteral Tube Flush: 0 mL    Glucerna 1.5: 0 mL    Indwelling Catheter - Urethral (mL): 200 mL  Total OUT: 200 mL    Total NET: -200 mL            LABS:                        7.6    4.83  )-----------( 29       ( 08 Feb 2025 05:18 )             22.5     02-08    144  |  107  |  21.2[H]  ----------------------------<  179[H]  3.9   |  21.0[L]  |  0.64    Ca    8.6      08 Feb 2025 05:18  Phos  2.2     02-08  Mg     2.4     02-08    TPro  6.0[L]  /  Alb  3.6  /  TBili  1.9  /  DBili  x   /  AST  42[H]  /  ALT  30  /  AlkPhos  310[H]  02-08          CAPILLARY BLOOD GLUCOSE  127 (08 Feb 2025 09:00)      POCT Blood Glucose.: 127 mg/dL (08 Feb 2025 09:56)    PT/INR - ( 07 Feb 2025 23:13 )   PT: 20.6 sec;   INR: 1.79 ratio         PTT - ( 07 Feb 2025 23:13 )  PTT:38.3 sec  Urinalysis Basic - ( 08 Feb 2025 05:18 )    Color: x / Appearance: x / SG: x / pH: x  Gluc: 179 mg/dL / Ketone: x  / Bili: x / Urobili: x   Blood: x / Protein: x / Nitrite: x   Leuk Esterase: x / RBC: x / WBC x   Sq Epi: x / Non Sq Epi: x / Bacteria: x      CULTURES:  Culture Results:   No growth at 48 Hours (02-06 @ 02:32)  Culture Results:   No enteric pathogens isolated.  (Stool culture examined for Salmonella,  Shigella, Campylobacter, Aeromonas, Plesiomonas,  Vibrio, E.coli O157 and Yersinia) (02-04 @ 13:20)  C Diff by PCR Result: NotDetec (02-04 @ 13:20)  Culture Results:   >100,000 CFU/ml Escherichia coli (02-02 @ 14:00)  Rapid RVP Result: NotDetec (02-02 @ 13:30)  Culture Results:   No growth at 5 days (02-02 @ 13:02)  Culture Results:   Growth in aerobic bottle: Escherichia coli  Direct identification is available within approximately 3-5  hours either by Blood Panel Multiplexed PCR or Direct  MALDI-TOF. Details: [email or web address] (02-02 @ 13:02)      Physical Examination:    General: No acute distress.    HEENT: Pupils equal, reactive to light.  Symmetric.  PULM: Clear to auscultation bilaterally, no significant sputum production  NECK: Supple, no lymphadenopathy, trachea midline  CVS: Regular rate and rhythm, no murmurs, rubs, or gallops  ABD: Soft, nondistended, nontender, normoactive bowel sounds, no masses  EXT: No edema, nontender  SKIN: Warm and well perfused, no rashes noted.  NEURO: Patient alert, following commands      DEVICES:     RADIOLOGY: ***    CRITICAL CARE TIME SPENT: ***   Patient is a 69y old  Female who presents with a chief complaint of DKA (07 Feb 2025 12:28)      BRIEF HOSPITAL COURSE:     Interval HPI: 70 y/o Fwith hx of DM and cirrhosis, was brought in by EMS for AMS at home.  PEr son, patient was sick in bed for past two days, and patient's sister called 911 to bring her to hospital because she wasn't waking up.  In the ED patient was obtunded, intubated for airway protection (2/2/25).  Pt admitted to MICU with severe DKA, pancolitis, acute pancreatitis, UTI and pneumonia.    - 2/2 admitted to MICU for triple pressor shock secondary to bilateral PNA, pancreatitis, pancolitis, pyelonephritis on broad spectrum abx (vanco, zosyn, flagyl), also found to be in DKA  - 2/3 blood cultures positive for gram negative rods, c/w broad spec abx, anion gap improved, insulin gtt d/c'ed  - 2/4 broad spectrum abx narrowed to ceftriaxone 2g daily given sensitivities  - 2/5 weaned off pressors, continuing to wean sedation, awaiting improved mentation prior to SBT   - 2/8 6:30 am pt passed SBT, improved mentation able to be extubated to 3L NC      PAST MEDICAL & SURGICAL HISTORY:      Review of Systems: Limited to patient confusion      Medications:  cefTRIAXone Injectable. 2000 milliGRAM(s) IV Push every 24 hours  rifAXIMin 550 milliGRAM(s) Oral every 12 hours    midodrine 5 milliGRAM(s) Oral every 8 hours      fentaNYL    Injectable 25 MICROGram(s) IV Push every 2 hours PRN        pantoprazole  Injectable 40 milliGRAM(s) IV Push daily      dextrose 50% Injectable 25 Gram(s) IV Push once  dextrose 50% Injectable 12.5 Gram(s) IV Push once  dextrose 50% Injectable 25 Gram(s) IV Push once  dextrose Oral Gel 15 Gram(s) Oral once PRN  insulin glargine Injectable (LANTUS) 25 Unit(s) SubCutaneous at bedtime  insulin glargine Injectable (LANTUS) 25 Unit(s) SubCutaneous every morning  insulin lispro (ADMELOG) corrective regimen sliding scale   SubCutaneous every 6 hours  insulin lispro Injectable (ADMELOG) 5 Unit(s) SubCutaneous every 6 hours    potassium phosphate / sodium phosphate Powder (PHOS-NaK) 2 Packet(s) Oral once  sodium chloride 0.9% lock flush 10 milliLiter(s) IV Push every 1 hour PRN      chlorhexidine 2% Cloths 1 Application(s) Topical daily  chlorhexidine 4% Liquid 1 Application(s) Topical <User Schedule>        ICU Vital Signs Last 24 Hrs  T(C): 37.6 (08 Feb 2025 10:00), Max: 37.8 (07 Feb 2025 16:00)  T(F): 99.7 (08 Feb 2025 10:00), Max: 100 (07 Feb 2025 16:00)  HR: 79 (08 Feb 2025 10:00) (71 - 94)  BP: 114/69 (08 Feb 2025 10:00) (108/64 - 149/79)  BP(mean): 82 (08 Feb 2025 10:00) (79 - 105)  ABP: 126/58 (08 Feb 2025 10:00) (97/43 - 145/74)  ABP(mean): 86 (08 Feb 2025 10:00) (65 - 100)  RR: 23 (08 Feb 2025 10:00) (16 - 30)  SpO2: 96% (08 Feb 2025 10:00) (94% - 100%)    O2 Parameters below as of 08 Feb 2025 06:21    O2 Flow (L/min): 3          ABG - ( 07 Feb 2025 11:48 )  pH, Arterial: 7.490 pH, Blood: x     /  pCO2: 36    /  pO2: 109   / HCO3: 27    / Base Excess: 4.1   /  SaO2: 99.1                I&O's Detail    07 Feb 2025 07:01  -  08 Feb 2025 07:00  --------------------------------------------------------  IN:    Albumin 25%  -  50 mL: 50 mL    Dexmedetomidine: 68.4 mL    Enteral Tube Flush: 2350 mL    Glucerna 1.5: 1150 mL    IV PiggyBack: 500 mL    IV PiggyBack: 50 mL  Total IN: 4168.4 mL    OUT:    Indwelling Catheter - Urethral (mL): 1500 mL    Rectal Tube (mL): 600 mL  Total OUT: 2100 mL    Total NET: 2068.4 mL      08 Feb 2025 07:01  -  08 Feb 2025 10:56  --------------------------------------------------------  IN:  Total IN: 0 mL    OUT:    Enteral Tube Flush: 0 mL    Glucerna 1.5: 0 mL    Indwelling Catheter - Urethral (mL): 200 mL  Total OUT: 200 mL    Total NET: -200 mL            LABS:                        7.6    4.83  )-----------( 29       ( 08 Feb 2025 05:18 )             22.5     02-08    144  |  107  |  21.2[H]  ----------------------------<  179[H]  3.9   |  21.0[L]  |  0.64    Ca    8.6      08 Feb 2025 05:18  Phos  2.2     02-08  Mg     2.4     02-08    TPro  6.0[L]  /  Alb  3.6  /  TBili  1.9  /  DBili  x   /  AST  42[H]  /  ALT  30  /  AlkPhos  310[H]  02-08          CAPILLARY BLOOD GLUCOSE  127 (08 Feb 2025 09:00)      POCT Blood Glucose.: 127 mg/dL (08 Feb 2025 09:56)    PT/INR - ( 07 Feb 2025 23:13 )   PT: 20.6 sec;   INR: 1.79 ratio         PTT - ( 07 Feb 2025 23:13 )  PTT:38.3 sec  Urinalysis Basic - ( 08 Feb 2025 05:18 )    Color: x / Appearance: x / SG: x / pH: x  Gluc: 179 mg/dL / Ketone: x  / Bili: x / Urobili: x   Blood: x / Protein: x / Nitrite: x   Leuk Esterase: x / RBC: x / WBC x   Sq Epi: x / Non Sq Epi: x / Bacteria: x      CULTURES:  Culture Results:   No growth at 48 Hours (02-06 @ 02:32)  Culture Results:   No enteric pathogens isolated.  (Stool culture examined for Salmonella,  Shigella, Campylobacter, Aeromonas, Plesiomonas,  Vibrio, E.coli O157 and Yersinia) (02-04 @ 13:20)  C Diff by PCR Result: NotDetec (02-04 @ 13:20)  Culture Results:   >100,000 CFU/ml Escherichia coli (02-02 @ 14:00)  Rapid RVP Result: NotDetec (02-02 @ 13:30)  Culture Results:   No growth at 5 days (02-02 @ 13:02)  Culture Results:   Growth in aerobic bottle: Escherichia coli  Direct identification is available within approximately 3-5  hours either by Blood Panel Multiplexed PCR or Direct  MALDI-TOF. Details: [email or web address] (02-02 @ 13:02)      Physical Examination:    General: No acute distress.    HEENT: Pupils equal, reactive to light.  Symmetric.  PULM: Clear to auscultation bilaterally, no significant sputum production  NECK: Supple, no lymphadenopathy, trachea midline  CVS: Regular rate and rhythm, no murmurs, rubs, or gallops  ABD: Soft, nondistended, nontender, normoactive bowel sounds, no masses  EXT: No edema, nontender  SKIN: Warm and well perfused, no rashes noted.  NEURO: Patient alert, following commands      DEVICES:     RADIOLOGY: ***    CRITICAL CARE TIME SPENT: ***

## 2025-02-08 NOTE — PROGRESS NOTE ADULT - SUBJECTIVE AND OBJECTIVE BOX
Follow up: diabetes, hypothyroid  Interval Hx/Events: extubated this am    MEDICATIONS  (STANDING):  cefTRIAXone Injectable. 2000 milliGRAM(s) IV Push every 24 hours  chlorhexidine 2% Cloths 1 Application(s) Topical daily  chlorhexidine 4% Liquid 1 Application(s) Topical <User Schedule>  dextrose 50% Injectable 25 Gram(s) IV Push once  dextrose 50% Injectable 12.5 Gram(s) IV Push once  dextrose 50% Injectable 25 Gram(s) IV Push once  insulin glargine Injectable (LANTUS) 25 Unit(s) SubCutaneous at bedtime  insulin glargine Injectable (LANTUS) 25 Unit(s) SubCutaneous every morning  insulin lispro (ADMELOG) corrective regimen sliding scale   SubCutaneous every 6 hours  insulin lispro Injectable (ADMELOG) 5 Unit(s) SubCutaneous every 6 hours  midodrine 5 milliGRAM(s) Oral every 8 hours  pantoprazole  Injectable 40 milliGRAM(s) IV Push daily  potassium phosphate / sodium phosphate Powder (PHOS-NaK) 2 Packet(s) Oral once  rifAXIMin 550 milliGRAM(s) Oral every 12 hours    MEDICATIONS  (PRN):  dextrose Oral Gel 15 Gram(s) Oral once PRN Blood Glucose LESS THAN 70 milliGRAM(s)/deciliter  fentaNYL    Injectable 25 MICROGram(s) IV Push every 2 hours PRN vent dysynchrony  sodium chloride 0.9% lock flush 10 milliLiter(s) IV Push every 1 hour PRN Pre/post blood products, medications, blood draw, and to maintain line patency      ALLERGIES: Allergy Status Unknown    Vital Signs Last 24 Hrs  T(C): 36.9 (08 Feb 2025 11:10), Max: 37.8 (07 Feb 2025 16:00)  T(F): 98.5 (08 Feb 2025 11:10), Max: 100 (07 Feb 2025 16:00)  HR: 87 (08 Feb 2025 14:00) (71 - 93)  BP: 94/63 (08 Feb 2025 14:00) (94/63 - 149/79)  BP(mean): 70 (08 Feb 2025 14:00) (70 - 101)  RR: 18 (08 Feb 2025 14:00) (15 - 30)  SpO2: 94% (08 Feb 2025 14:00) (94% - 100%)    Parameters below as of 08 Feb 2025 06:21    O2 Flow (L/min): 3    Physical Exam:  General appearance: thin appearing  Eyes: EOMI  Lungs: Normal respiratory excursion. Lungs clear no w/r/r  CV: Normal S1S2, regular. No m/r/g.   Abdomen: Soft, nontender, nondistended, (+) BS  Musculoskeletal: No  edema.  Skin: Warm and moist        LABS:                        7.6    4.83  )-----------( 29       ( 08 Feb 2025 05:18 )             22.5     02-08    144  |  107  |  21.2[H]  ----------------------------<  179[H]  3.9   |  21.0[L]  |  0.64    Ca    8.6      08 Feb 2025 05:18  Phos  2.2     02-08  Mg     2.4     02-08    TPro  6.0[L]  /  Alb  3.6  /  TBili  1.9  /  DBili  x   /  AST  42[H]  /  ALT  30  /  AlkPhos  310[H]  02-08    LIVER FUNCTIONS - ( 08 Feb 2025 05:18 )  Alb: 3.6 g/dL / Pro: 6.0 g/dL / ALK PHOS: 310 U/L / ALT: 30 U/L / AST: 42 U/L / GGT: x             A1C with Estimated Average Glucose Result: 15.6 % (02-05-25 @ 02:16)      CAPILLARY BLOOD GLUCOSE  POCT Blood Glucose.: 108 mg/dL (08 Feb 2025 11:03)  POCT Blood Glucose.: 127 mg/dL (08 Feb 2025 09:56)  POCT Blood Glucose.: 167 mg/dL (08 Feb 2025 05:01)  POCT Blood Glucose.: 274 mg/dL (07 Feb 2025 23:12)  POCT Blood Glucose.: 216 mg/dL (07 Feb 2025 17:11)  POCT Blood Glucose.: 246 mg/dL (07 Feb 2025 11:37)  POCT Blood Glucose.: 219 mg/dL (07 Feb 2025 07:47)  POCT Blood Glucose.: 234 mg/dL (07 Feb 2025 05:06)  POCT Blood Glucose.: 289 mg/dL (07 Feb 2025 00:36)  POCT Blood Glucose.: 239 mg/dL (06 Feb 2025 17:16)      Free Thyroxine, Serum: 0.7 ng/dL [0.9 - 1.7] (02-08-25)  Thyroperoxidase Antibody: 10.0 IU/mL (02-08-25)  Thyroid Stimulating Hormone, Serum: 5.27 uIU/mL [0.27 - 4.20] (02-06-25)

## 2025-02-08 NOTE — PROGRESS NOTE ADULT - SUBJECTIVE AND OBJECTIVE BOX
[facility] Division of Hospital Medicine    MICU Downgrade   70 y/o Fwith hx of DM and cirrhosis, was brought in by EMS for AMS at home.  PEr son, patient was sick in bed for past two days, and patient's sister called 911 to bring her to hospital because she wasn't waking up.  In the ED patient was obtunded, intubated for airway protection (2/2/25).  Pt admitted to MICU with severe DKA, pancolitis, acute pancreatitis, UTI and pneumonia.    - 2/2 admitted to MICU for triple pressor shock secondary to bilateral PNA, pancreatitis, pancolitis, pyelonephritis on broad spectrum abx (vanco, zosyn, flagyl), also found to be in DKA  - 2/3 blood cultures positive for gram negative rods, c/w broad spec abx, anion gap improved, insulin gtt d/c'ed  - 2/4 broad spectrum abx narrowed to ceftriaxone 2g daily given sensitivities  - 2/5 weaned off pressors, continuing to wean sedation, awaiting improved mentation prior to SBT   - 2/8 6:30 am pt passed SBT, improved mentation able to be extubated to 3L NC      ---    Patient seen and examined at bedside.  Pt feeling okay, no complaints voiced.  Patient eating at bedside, no complaints of abdominal pain, nausea, vomiting, chills, fevers.  Rectal tube in place but stool seems to be formed.  Patient back to baseline mental status as per sons at bedside.  Not having cough, runny nose.  At this time patient is on RA (extubated today) and O2 sat >95.    MEDICATIONS  (STANDING):  cefTRIAXone Injectable. 2000 milliGRAM(s) IV Push every 24 hours  chlorhexidine 2% Cloths 1 Application(s) Topical daily  chlorhexidine 4% Liquid 1 Application(s) Topical <User Schedule>  dextrose 50% Injectable 25 Gram(s) IV Push once  dextrose 50% Injectable 12.5 Gram(s) IV Push once  dextrose 50% Injectable 25 Gram(s) IV Push once  insulin glargine Injectable (LANTUS) 25 Unit(s) SubCutaneous at bedtime  insulin glargine Injectable (LANTUS) 25 Unit(s) SubCutaneous every morning  insulin lispro (ADMELOG) corrective regimen sliding scale   SubCutaneous every 6 hours  insulin lispro Injectable (ADMELOG) 5 Unit(s) SubCutaneous every 6 hours  midodrine 5 milliGRAM(s) Oral every 8 hours  pantoprazole  Injectable 40 milliGRAM(s) IV Push daily  potassium phosphate / sodium phosphate Powder (PHOS-NaK) 2 Packet(s) Oral once  rifAXIMin 550 milliGRAM(s) Oral every 12 hours    MEDICATIONS  (PRN):  dextrose Oral Gel 15 Gram(s) Oral once PRN Blood Glucose LESS THAN 70 milliGRAM(s)/deciliter  fentaNYL    Injectable 25 MICROGram(s) IV Push every 2 hours PRN vent dysynchrony  sodium chloride 0.9% lock flush 10 milliLiter(s) IV Push every 1 hour PRN Pre/post blood products, medications, blood draw, and to maintain line patency        I&O's Summary    07 Feb 2025 07:01  -  08 Feb 2025 07:00  --------------------------------------------------------  IN: 4168.4 mL / OUT: 2100 mL / NET: 2068.4 mL    08 Feb 2025 07:01  -  08 Feb 2025 16:45  --------------------------------------------------------  IN: 0 mL / OUT: 200 mL / NET: -200 mL        PHYSICAL EXAM:  Vital Signs Last 24 Hrs  T(C): 36.9 (08 Feb 2025 15:46), Max: 37.8 (07 Feb 2025 19:00)  T(F): 98.5 (08 Feb 2025 15:46), Max: 100 (07 Feb 2025 19:00)  HR: 87 (08 Feb 2025 14:00) (71 - 93)  BP: 94/63 (08 Feb 2025 14:00) (94/63 - 149/79)  BP(mean): 70 (08 Feb 2025 14:00) (70 - 101)  RR: 18 (08 Feb 2025 14:00) (15 - 30)  SpO2: 94% (08 Feb 2025 14:00) (94% - 100%)    Parameters below as of 08 Feb 2025 06:21    O2 Flow (L/min): 3          CONSTITUTIONAL: NAD, well-groomed  ENMT: Moist oral mucosa, no pharyngeal injection or exudates; normal dentition  RESPIRATORY: Normal respiratory effort; lungs are clear to auscultation bilaterally  CARDIOVASCULAR: Regular rate and rhythm, normal S1 and S2, no murmur/rub/gallop; No lower extremity edema; Peripheral pulses are 2+ bilaterally  ABDOMEN: Nontender to palpation, normoactive bowel sounds, no rebound/guarding; No hepatosplenomegaly  MUSCLOSKELETAL:  Normal gait; no clubbing or cyanosis of digits; no joint swelling or tenderness to palpation  PSYCH: A+O to person, place, and time; affect appropriate  NEUROLOGY: CN 2-12 are intact and symmetric; no gross sensory deficits;   SKIN: No rashes; no palpable lesions  Lines: manriquez catheter in place. rectal tube with formed stool in tubing    LABS:                        7.6    4.83  )-----------( 29       ( 08 Feb 2025 05:18 )             22.5     02-08    144  |  107  |  21.2[H]  ----------------------------<  179[H]  3.9   |  21.0[L]  |  0.64    Ca    8.6      08 Feb 2025 05:18  Phos  2.2     02-08  Mg     2.4     02-08    TPro  6.0[L]  /  Alb  3.6  /  TBili  1.9  /  DBili  x   /  AST  42[H]  /  ALT  30  /  AlkPhos  310[H]  02-08    PT/INR - ( 07 Feb 2025 23:13 )   PT: 20.6 sec;   INR: 1.79 ratio         PTT - ( 07 Feb 2025 23:13 )  PTT:38.3 sec      Urinalysis Basic - ( 08 Feb 2025 05:18 )    Color: x / Appearance: x / SG: x / pH: x  Gluc: 179 mg/dL / Ketone: x  / Bili: x / Urobili: x   Blood: x / Protein: x / Nitrite: x   Leuk Esterase: x / RBC: x / WBC x   Sq Epi: x / Non Sq Epi: x / Bacteria: x        Culture - Blood (collected 06 Feb 2025 02:32)  Source: .Blood BLOOD  Preliminary Report (08 Feb 2025 06:01):    No growth at 48 Hours      CAPILLARY BLOOD GLUCOSE  108 (08 Feb 2025 12:00)  127 (08 Feb 2025 09:00)      POCT Blood Glucose.: 108 mg/dL (08 Feb 2025 11:03)  POCT Blood Glucose.: 127 mg/dL (08 Feb 2025 09:56)  POCT Blood Glucose.: 167 mg/dL (08 Feb 2025 05:01)  POCT Blood Glucose.: 274 mg/dL (07 Feb 2025 23:12)  POCT Blood Glucose.: 216 mg/dL (07 Feb 2025 17:11)

## 2025-02-09 LAB
ALBUMIN SERPL ELPH-MCNC: 3.6 G/DL — SIGNIFICANT CHANGE UP (ref 3.3–5.2)
ALP SERPL-CCNC: 292 U/L — HIGH (ref 40–120)
ALT FLD-CCNC: 27 U/L — SIGNIFICANT CHANGE UP
AMMONIA BLD-MCNC: 25 UMOL/L — SIGNIFICANT CHANGE UP (ref 11–55)
ANION GAP SERPL CALC-SCNC: 13 MMOL/L — SIGNIFICANT CHANGE UP (ref 5–17)
AST SERPL-CCNC: 41 U/L — HIGH
BASOPHILS # BLD AUTO: 0.02 K/UL — SIGNIFICANT CHANGE UP (ref 0–0.2)
BASOPHILS NFR BLD AUTO: 0.3 % — SIGNIFICANT CHANGE UP (ref 0–2)
BILIRUB SERPL-MCNC: 2.3 MG/DL — HIGH (ref 0.4–2)
BUN SERPL-MCNC: 16 MG/DL — SIGNIFICANT CHANGE UP (ref 8–20)
CALCIUM SERPL-MCNC: 8.7 MG/DL — SIGNIFICANT CHANGE UP (ref 8.4–10.5)
CHLORIDE SERPL-SCNC: 106 MMOL/L — SIGNIFICANT CHANGE UP (ref 96–108)
CO2 SERPL-SCNC: 22 MMOL/L — SIGNIFICANT CHANGE UP (ref 22–29)
CREAT SERPL-MCNC: 0.48 MG/DL — LOW (ref 0.5–1.3)
EGFR: 104 ML/MIN/1.73M2 — SIGNIFICANT CHANGE UP
EOSINOPHIL # BLD AUTO: 0.06 K/UL — SIGNIFICANT CHANGE UP (ref 0–0.5)
EOSINOPHIL NFR BLD AUTO: 0.9 % — SIGNIFICANT CHANGE UP (ref 0–6)
FERRITIN SERPL-MCNC: 350 NG/ML — HIGH (ref 13–330)
GLUCOSE BLDC GLUCOMTR-MCNC: 102 MG/DL — HIGH (ref 70–99)
GLUCOSE BLDC GLUCOMTR-MCNC: 126 MG/DL — HIGH (ref 70–99)
GLUCOSE BLDC GLUCOMTR-MCNC: 142 MG/DL — HIGH (ref 70–99)
GLUCOSE BLDC GLUCOMTR-MCNC: 184 MG/DL — HIGH (ref 70–99)
GLUCOSE SERPL-MCNC: 99 MG/DL — SIGNIFICANT CHANGE UP (ref 70–99)
HCT VFR BLD CALC: 23 % — LOW (ref 34.5–45)
HGB BLD-MCNC: 7.7 G/DL — LOW (ref 11.5–15.5)
IMM GRANULOCYTES NFR BLD AUTO: 3.2 % — HIGH (ref 0–0.9)
IRON SATN MFR SERPL: 40 UG/DL — SIGNIFICANT CHANGE UP (ref 37–145)
IRON SATN MFR SERPL: SIGNIFICANT CHANGE UP % (ref 14–50)
LIDOCAIN IGE QN: 246 U/L — HIGH (ref 22–51)
LYMPHOCYTES # BLD AUTO: 1.1 K/UL — SIGNIFICANT CHANGE UP (ref 1–3.3)
LYMPHOCYTES # BLD AUTO: 16.7 % — SIGNIFICANT CHANGE UP (ref 13–44)
MAGNESIUM SERPL-MCNC: 2 MG/DL — SIGNIFICANT CHANGE UP (ref 1.6–2.6)
MCHC RBC-ENTMCNC: 32.6 PG — SIGNIFICANT CHANGE UP (ref 27–34)
MCHC RBC-ENTMCNC: 33.5 G/DL — SIGNIFICANT CHANGE UP (ref 32–36)
MCV RBC AUTO: 97.5 FL — SIGNIFICANT CHANGE UP (ref 80–100)
MONOCYTES # BLD AUTO: 0.66 K/UL — SIGNIFICANT CHANGE UP (ref 0–0.9)
MONOCYTES NFR BLD AUTO: 10 % — SIGNIFICANT CHANGE UP (ref 2–14)
NEUTROPHILS # BLD AUTO: 4.52 K/UL — SIGNIFICANT CHANGE UP (ref 1.8–7.4)
NEUTROPHILS NFR BLD AUTO: 68.9 % — SIGNIFICANT CHANGE UP (ref 43–77)
PHOSPHATE SERPL-MCNC: 3.2 MG/DL — SIGNIFICANT CHANGE UP (ref 2.4–4.7)
PLATELET # BLD AUTO: 61 K/UL — LOW (ref 150–400)
POTASSIUM SERPL-MCNC: 3.4 MMOL/L — LOW (ref 3.5–5.3)
POTASSIUM SERPL-SCNC: 3.4 MMOL/L — LOW (ref 3.5–5.3)
PROT SERPL-MCNC: 6.2 G/DL — LOW (ref 6.6–8.7)
RBC # BLD: 2.36 M/UL — LOW (ref 3.8–5.2)
RBC # FLD: 16.1 % — HIGH (ref 10.3–14.5)
SODIUM SERPL-SCNC: 141 MMOL/L — SIGNIFICANT CHANGE UP (ref 135–145)
TIBC SERPL-MCNC: SIGNIFICANT CHANGE UP UG/DL (ref 220–430)
TRANSFERRIN SERPL-MCNC: <80 MG/DL — LOW (ref 192–382)
WBC # BLD: 6.57 K/UL — SIGNIFICANT CHANGE UP (ref 3.8–10.5)
WBC # FLD AUTO: 6.57 K/UL — SIGNIFICANT CHANGE UP (ref 3.8–10.5)

## 2025-02-09 PROCEDURE — 99233 SBSQ HOSP IP/OBS HIGH 50: CPT

## 2025-02-09 RX ORDER — ACETAMINOPHEN 160 MG/5ML
650 SUSPENSION ORAL EVERY 6 HOURS
Refills: 0 | Status: DISCONTINUED | OUTPATIENT
Start: 2025-02-09 | End: 2025-02-14

## 2025-02-09 RX ORDER — POTASSIUM CHLORIDE 750 MG/1
40 TABLET, EXTENDED RELEASE ORAL ONCE
Refills: 0 | Status: COMPLETED | OUTPATIENT
Start: 2025-02-09 | End: 2025-02-09

## 2025-02-09 RX ORDER — ONDANSETRON 4 MG/1
4 TABLET, ORALLY DISINTEGRATING ORAL EVERY 6 HOURS
Refills: 0 | Status: DISCONTINUED | OUTPATIENT
Start: 2025-02-09 | End: 2025-02-14

## 2025-02-09 RX ORDER — POTASSIUM CHLORIDE 750 MG/1
20 TABLET, EXTENDED RELEASE ORAL ONCE
Refills: 0 | Status: COMPLETED | OUTPATIENT
Start: 2025-02-09 | End: 2025-02-09

## 2025-02-09 RX ORDER — PANTOPRAZOLE 20 MG/1
40 TABLET, DELAYED RELEASE ORAL DAILY
Refills: 0 | Status: DISCONTINUED | OUTPATIENT
Start: 2025-02-10 | End: 2025-02-14

## 2025-02-09 RX ADMIN — POTASSIUM CHLORIDE 40 MILLIEQUIVALENT(S): 750 TABLET, EXTENDED RELEASE ORAL at 08:31

## 2025-02-09 RX ADMIN — ANTISEPTIC SURGICAL SCRUB 1 APPLICATION(S): 0.04 SOLUTION TOPICAL at 05:16

## 2025-02-09 RX ADMIN — PANTOPRAZOLE 40 MILLIGRAM(S): 20 TABLET, DELAYED RELEASE ORAL at 11:10

## 2025-02-09 RX ADMIN — Medication 5 UNIT(S): at 07:37

## 2025-02-09 RX ADMIN — INSULIN GLARGINE-YFGN 20 UNIT(S): 100 INJECTION, SOLUTION SUBCUTANEOUS at 21:11

## 2025-02-09 RX ADMIN — Medication 5 UNIT(S): at 16:44

## 2025-02-09 RX ADMIN — CEFTRIAXONE 2000 MILLIGRAM(S): 250 INJECTION, POWDER, FOR SOLUTION INTRAMUSCULAR; INTRAVENOUS at 13:45

## 2025-02-09 RX ADMIN — Medication 2: at 07:37

## 2025-02-09 RX ADMIN — Medication 0: at 11:11

## 2025-02-09 RX ADMIN — POTASSIUM CHLORIDE 20 MILLIEQUIVALENT(S): 750 TABLET, EXTENDED RELEASE ORAL at 17:17

## 2025-02-09 RX ADMIN — ANTISEPTIC SURGICAL SCRUB 1 APPLICATION(S): 0.04 SOLUTION TOPICAL at 11:15

## 2025-02-09 RX ADMIN — Medication 5 UNIT(S): at 11:10

## 2025-02-09 RX ADMIN — LEVOTHYROXINE SODIUM 50 MICROGRAM(S): 25 TABLET ORAL at 05:16

## 2025-02-09 NOTE — PROGRESS NOTE ADULT - ASSESSMENT
68 y/o F w hx of IDDM (non-compliant to meds), cirrhosis admitted to MICU for DKA, UTI, E.coli bacteremia, acute pancreatitis, colitis and ? PNA. Patient required pressors and insulin drip as well as intubation during MICU stay. Patient now off pressors, on subcutaneous insulin, and on RA. Downgraded to general medical service on 2/8.    #DKA  A1c 15.6  -s/p insulin drip (dc on 02/03)  -Accu checks and ISS  -Lantus 20 units BID  -Admelog 5 units premeals  -endocrinology on board, recs appreciated     #E.coli UTI  #E.coli bacteremia  -Repeat blood culture negative   -continue ceftriaxone 2g daily to complete 10 days   -Start voiding trial     #PNA  -Continue Rocephin as above     #acute pancreatitis  #colitis  -C. Diff, GI PCR and Stool culture negative   -no abdominal pain  -rectal tube was discontinued on 2/8  -s/p IVF  -tolerating PO diet, encouraged for oral intake    #hypothyroidism  -continue synthroid 50mcg daily  -endocrine on board, recs appreciated     #cirrhosis   -s/p lactulose  -c/w Rifaximin  -ammonia levels now normal  -will need outpatient GI follow up    #anemia / thrombocytopenia   -no evidence of bleeding  -likely chronic disease (liver cirrhosis) / acute illness   -monitor     #Dysphagia  -Speech & Swallow following: pureed with mildly thick liquids     #Hypokalemia  -Replete    DVT Prophylaxis -- Venodyne    Dispo: Home vs [facility] in 24-48 hours. PT Eval.

## 2025-02-09 NOTE — PROGRESS NOTE ADULT - SUBJECTIVE AND OBJECTIVE BOX
Type 2 diabetes mellitus with ketoacidosis without coma    HPI:  71 y/o F (name: Laverne Lai)  with hx of DM and cirrhosis, was brought in by EMS for AMS at home.  PEr son, patient was sick in bed for past two days, and today patient's sister call 911 to bring her to hospital because she was waking up.  In  In the ED patient was obtunded, intubated for airway protection.   Labs significant for DKA with incalculable GAP, s/p 3 IVF bolus, started on bicarb by ED, and s/p 1 dose of vanc and zosyn   MICU consulted. (02 Feb 2025 21:32)    Interval History:  Patient was seen and examined at bedside around 10:15 am.  Feels better.   Denies chest pain, palpitations, shortness of breath, headache, dizziness, nausea, vomiting or abdominal pain.    ROS:  As per interval history otherwise unremarkable.    PHYSICAL EXAM:  Vital Signs   T(C): 36.9 (09 Feb 2025 07:21), Max: 37.5 (08 Feb 2025 23:52)  T(F): 98.4 (09 Feb 2025 07:21), Max: 99.5 (08 Feb 2025 23:52)  HR: 91 (09 Feb 2025 11:00) (80 - 91)  BP: 144/72 (09 Feb 2025 11:00) (94/63 - 150/78)  BP(mean): 95 (09 Feb 2025 11:00) (70 - 101)  RR: 21 (09 Feb 2025 11:00) (14 - 22)  SpO2: 95% (09 Feb 2025 11:00) (91% - 98%)  Parameters below as of 09 Feb 2025 08:00  Patient On (Oxygen Delivery Method): room air  General: Elderly female lying in bed comfortably. No acute distress  HEENT: EOMI. Clear conjunctivae. Moist mucus membrane  Neck: Supple.   Chest: Good air entry. No wheezing, rales or rhonchi.   Heart: Normal S1 & S2. RRR.   Abdomen: Non distended. Soft. Non-tender. + BS  Ext: No pedal edema. No calf tenderness   Neuro: Awake. Moves all extremities. Speech clear.   Skin: Warm and Dry  Psychiatry: Normal mood and affect    I&O's Summary    08 Feb 2025 07:01  -  09 Feb 2025 07:00  --------------------------------------------------------  IN: 0 mL / OUT: 1495 mL / NET: -1495 mL    09 Feb 2025 07:01  -  09 Feb 2025 12:32  --------------------------------------------------------  IN: 100 mL / OUT: 235 mL / NET: -135 mL    LABS:  CAPILLARY BLOOD GLUCOSE  121 (08 Feb 2025 17:00)  POCT Blood Glucose.: 142 mg/dL (09 Feb 2025 11:10)  POCT Blood Glucose.: 184 mg/dL (09 Feb 2025 07:36)  POCT Blood Glucose.: 165 mg/dL (08 Feb 2025 22:02)  POCT Blood Glucose.: 121 mg/dL (08 Feb 2025 17:09)                      7.7    6.57  )-----------( 61       ( 09 Feb 2025 03:05 )             23.0     02-09    141  |  106  |  16.0  ----------------------------<  99  3.4[L]   |  22.0  |  0.48[L]    Ca    8.7      09 Feb 2025 03:05  Phos  3.2     02-09  Mg     2.0     02-09    TPro  6.2[L]  /  Alb  3.6  /  TBili  2.3[H]  /  DBili  x   /  AST  41[H]  /  ALT  27  /  AlkPhos  292[H]  02-09    PT/INR - ( 07 Feb 2025 23:13 )   PT: 20.6 sec;   INR: 1.79 ratio       PTT - ( 07 Feb 2025 23:13 )  PTT:38.3 sec  Urinalysis Basic - ( 09 Feb 2025 03:05 )    Color: x / Appearance: x / SG: x / pH: x  Gluc: 99 mg/dL / Ketone: x  / Bili: x / Urobili: x   Blood: x / Protein: x / Nitrite: x   Leuk Esterase: x / RBC: x / WBC x   Sq Epi: x / Non Sq Epi: x / Bacteria: x    RADIOLOGY & ADDITIONAL STUDIES:  Reviewed     MEDICATIONS  (STANDING):  cefTRIAXone Injectable. 2000 milliGRAM(s) IV Push every 24 hours  chlorhexidine 2% Cloths 1 Application(s) Topical daily  chlorhexidine 4% Liquid 1 Application(s) Topical <User Schedule>  dextrose 50% Injectable 25 Gram(s) IV Push once  dextrose 50% Injectable 12.5 Gram(s) IV Push once  dextrose 50% Injectable 25 Gram(s) IV Push once  insulin glargine Injectable (LANTUS) 20 Unit(s) SubCutaneous at bedtime  insulin glargine Injectable (LANTUS) 20 Unit(s) SubCutaneous before breakfast  insulin lispro (ADMELOG) corrective regimen sliding scale   SubCutaneous Before meals and at bedtime  insulin lispro Injectable (ADMELOG) 5 Unit(s) SubCutaneous three times a day before meals  levothyroxine 50 MICROGram(s) Oral daily  pantoprazole  Injectable 40 milliGRAM(s) IV Push daily  potassium chloride   Powder 20 milliEquivalent(s) Oral once  rifAXIMin 550 milliGRAM(s) Oral every 12 hours    MEDICATIONS  (PRN):  acetaminophen     Tablet .. 650 milliGRAM(s) Oral every 6 hours PRN Temp greater or equal to 38C (100.4F), Mild Pain (1 - 3), Moderate Pain (4 - 6)  dextrose Oral Gel 15 Gram(s) Oral once PRN Blood Glucose LESS THAN 70 milliGRAM(s)/deciliter  ondansetron Injectable 4 milliGRAM(s) IV Push every 6 hours PRN Nausea and/or Vomiting  sodium chloride 0.9% lock flush 10 milliLiter(s) IV Push every 1 hour PRN Pre/post blood products, medications, blood draw, and to maintain line patency

## 2025-02-10 ENCOUNTER — TRANSCRIPTION ENCOUNTER (OUTPATIENT)
Age: 70
End: 2025-02-10

## 2025-02-10 LAB
ANION GAP SERPL CALC-SCNC: 17 MMOL/L — SIGNIFICANT CHANGE UP (ref 5–17)
BUN SERPL-MCNC: 10.4 MG/DL — SIGNIFICANT CHANGE UP (ref 8–20)
CALCIUM SERPL-MCNC: 9.5 MG/DL — SIGNIFICANT CHANGE UP (ref 8.4–10.5)
CHLORIDE SERPL-SCNC: 100 MMOL/L — SIGNIFICANT CHANGE UP (ref 96–108)
CO2 SERPL-SCNC: 18 MMOL/L — LOW (ref 22–29)
CREAT SERPL-MCNC: 0.43 MG/DL — LOW (ref 0.5–1.3)
EGFR: 107 ML/MIN/1.73M2 — SIGNIFICANT CHANGE UP
GLUCOSE BLDC GLUCOMTR-MCNC: 126 MG/DL — HIGH (ref 70–99)
GLUCOSE BLDC GLUCOMTR-MCNC: 132 MG/DL — HIGH (ref 70–99)
GLUCOSE BLDC GLUCOMTR-MCNC: 142 MG/DL — HIGH (ref 70–99)
GLUCOSE BLDC GLUCOMTR-MCNC: 91 MG/DL — SIGNIFICANT CHANGE UP (ref 70–99)
GLUCOSE SERPL-MCNC: 113 MG/DL — HIGH (ref 70–99)
HCT VFR BLD CALC: 25.9 % — LOW (ref 34.5–45)
HGB BLD-MCNC: 8.5 G/DL — LOW (ref 11.5–15.5)
MAGNESIUM SERPL-MCNC: 1.6 MG/DL — LOW (ref 1.8–2.6)
MCHC RBC-ENTMCNC: 32.3 PG — SIGNIFICANT CHANGE UP (ref 27–34)
MCHC RBC-ENTMCNC: 32.8 G/DL — SIGNIFICANT CHANGE UP (ref 32–36)
MCV RBC AUTO: 98.5 FL — SIGNIFICANT CHANGE UP (ref 80–100)
PLATELET # BLD AUTO: 98 K/UL — LOW (ref 150–400)
POTASSIUM SERPL-MCNC: 4.2 MMOL/L — SIGNIFICANT CHANGE UP (ref 3.5–5.3)
POTASSIUM SERPL-SCNC: 4.2 MMOL/L — SIGNIFICANT CHANGE UP (ref 3.5–5.3)
RBC # BLD: 2.63 M/UL — LOW (ref 3.8–5.2)
RBC # FLD: 16.6 % — HIGH (ref 10.3–14.5)
SODIUM SERPL-SCNC: 135 MMOL/L — SIGNIFICANT CHANGE UP (ref 135–145)
WBC # BLD: 8.56 K/UL — SIGNIFICANT CHANGE UP (ref 3.8–10.5)
WBC # FLD AUTO: 8.56 K/UL — SIGNIFICANT CHANGE UP (ref 3.8–10.5)

## 2025-02-10 PROCEDURE — 99233 SBSQ HOSP IP/OBS HIGH 50: CPT

## 2025-02-10 PROCEDURE — 99233 SBSQ HOSP IP/OBS HIGH 50: CPT | Mod: GC

## 2025-02-10 RX ORDER — MAGNESIUM SULFATE 0.8 MEQ/ML
2 AMPUL (ML) INJECTION ONCE
Refills: 0 | Status: COMPLETED | OUTPATIENT
Start: 2025-02-10 | End: 2025-02-10

## 2025-02-10 RX ADMIN — Medication 5 UNIT(S): at 17:00

## 2025-02-10 RX ADMIN — LEVOTHYROXINE SODIUM 50 MICROGRAM(S): 25 TABLET ORAL at 05:18

## 2025-02-10 RX ADMIN — PANTOPRAZOLE 40 MILLIGRAM(S): 20 TABLET, DELAYED RELEASE ORAL at 17:00

## 2025-02-10 RX ADMIN — ANTISEPTIC SURGICAL SCRUB 1 APPLICATION(S): 0.04 SOLUTION TOPICAL at 13:07

## 2025-02-10 RX ADMIN — INSULIN GLARGINE-YFGN 20 UNIT(S): 100 INJECTION, SOLUTION SUBCUTANEOUS at 21:26

## 2025-02-10 RX ADMIN — Medication 25 GRAM(S): at 13:07

## 2025-02-10 RX ADMIN — ANTISEPTIC SURGICAL SCRUB 1 APPLICATION(S): 0.04 SOLUTION TOPICAL at 05:18

## 2025-02-10 RX ADMIN — INSULIN GLARGINE-YFGN 20 UNIT(S): 100 INJECTION, SOLUTION SUBCUTANEOUS at 08:07

## 2025-02-10 RX ADMIN — Medication 5 UNIT(S): at 08:08

## 2025-02-10 RX ADMIN — CEFTRIAXONE 2000 MILLIGRAM(S): 250 INJECTION, POWDER, FOR SOLUTION INTRAMUSCULAR; INTRAVENOUS at 13:06

## 2025-02-10 RX ADMIN — Medication 5 UNIT(S): at 11:51

## 2025-02-10 NOTE — DISCHARGE NOTE PROVIDER - NSDCCPCAREPLAN_GEN_ALL_CORE_FT
PRINCIPAL DISCHARGE DIAGNOSIS  Diagnosis: DKA (diabetic ketoacidosis)  Assessment and Plan of Treatment: You were admitted to the medical intensive care unit for a condition called DKA. This is when uncontrolled blood sugars cause severe disturbances in your bodies metabolism, specifically causing your blood to be very acidotic. You were treated appropriately but your A1C was found to be 15.6 suggesting very long standing poor diabetes control. You will be discharged on insulin and must follow up with your outpatient doctors to optimize your diabetes medication regimen.      SECONDARY DISCHARGE DIAGNOSES  Diagnosis: Acute UTI  Assessment and Plan of Treatment: WHAT YOU NEED TO KNOW:  A urinary tract infection (UTI) is caused by bacteria that get inside your urinary tract. Most bacteria that enter your urinary tract come out when you urinate. If the bacteria stay in your urinary tract, you may get an infection. Your urinary tract includes your kidneys, ureters, bladder, and urethra. Urine is made in your kidneys, and it flows from the ureters to the bladder. Urine leaves the bladder through the urethra. A UTI is more common in your lower urinary tract, which includes your bladder and urethra.  Seek care immediately if:  •You are urinating very little or not at all.  •You have a high fever with shaking chills.  •You have side or back pain that gets worse.  Contact your healthcare provider if:  •You have a fever.  •You do not feel better after 2 days of taking antibiotics.  •You are vomiting.  •You have questions or concerns about your condition or care.  Prevent another UTI:  •Empty your bladder often. Urinate and empty your bladder as soon as you feel the need. Do not hold your urine for long periods of time.  •Wipe from front to back after you urinate or have a bowel movement. This will help prevent germs from getting into your urinary tract through your urethra.  •Drink liquids as directed. Ask how much liquid to drink each day and which liquids are best for you. You may need to drink more liquids than usual to help flush out the bacteria. Do not drink alcohol, caffeine, or citrus juices. These can irritate your bladder and increase your symptoms. Your healthcare provider may recommend cranberry juice to help prevent a UTI    Diagnosis: Pneumonia  Assessment and Plan of Treatment: Pneumonia  Pneumonia is an infection of the lungs. Pneumonia may be caused by bacteria, viruses, or funguses. Symptoms include coughing, fever, chest pain when breathing deeply or coughing, shortness of breath, fatigue, or muscle aches. Pneumonia can be diagnosed with a medical history and physical exam, as well as other tests which may include a chest X-ray. If you were prescribed an antibiotic medicine, take it as told by your health care provider and do not stop taking the antibiotic even if you start to feel better. Do not use tobacco products, including cigarettes, chewing tobacco, and e-cigarettes.  SEEK IMMEDIATE MEDICAL CARE IF YOU HAVE ANY OF THE FOLLOWING SYMPTOMS: worsening shortness of breath, worsening chest pain, coughing up blood, change in mental status, lightheadedness/dizziness.    Diagnosis: Pancreatitis  Assessment and Plan of Treatment: CT imaging revealed pancreatitis which you were successfully treated for. There are a number of issues this may occur, yours may be related to gallstones. It is important that you follow up with GI outpatient for further workup.    Diagnosis: Colitis  Assessment and Plan of Treatment: You were found to have an infection in your colon. You have been treated for this and will be discharged on additional antibiotics. Please follow up with GI outpatient for further work up.    Diagnosis: Hypothyroidism  Assessment and Plan of Treatment: Our endocrinology team evaluated you this admission, and increased your synthroid dose to 50 mcg daily. please take medications as seen in the medicine reconcilliation and follow up with their team outpatient.    Diagnosis: E coli bacteremia  Assessment and Plan of Treatment: The infection in your kidneys likely spread into your blood stream causing a condition called bacteremia. We will be discharging you on antibiotics to ensure this infection is adequately treated. Please take the antibiotics as prescribed in the medicine reconcilliation and follow up with your primary care for further monitoring.    Diagnosis: Cirrhosis  Assessment and Plan of Treatment: You have a condition called cirrhosis that you will need to follow up with a hepatologist outpatient for further management. We will discharge you on medications to help you manage this condition in the interim. Take all medications as seen in the medicine reconcilliation.    Diagnosis: Anemia  Assessment and Plan of Treatment: You were found to be anemic on laboratory testing.  Follow up with your primary care for further monitoring.    Diagnosis: Dysphagia  Assessment and Plan of Treatment: You were found to have difficulty swallowing thin liquids this admission when evaluated by our speech pathology team. We have given you modified diet recommendations and recommend you follow them to prevent further infections in your lung.    Diagnosis: Thrombocytopenia  Assessment and Plan of Treatment: Your platelets were found to be low. This could be related to the condition of your liver; however it will be important to follow up with a hepatologist and your primary care doctor for further management.    Diagnosis: Pyelonephritis  Assessment and Plan of Treatment: Pyelonephritis  Pyelonephritis is a kidney infection. In most cases, the infection clears up with treatment and does not cause further problems. More severe infections or chronic infections can sometimes spread to the bloodstream or lead to other problems with the kidneys. Symptoms include frequent or painful urination, abdominal pain, back pain, flank pain, fever/chills, nausea, or vomiting. If you were prescribed an antibiotic medicine, take it as told by your health care provider. Do not stop taking the antibiotic even if you start to feel better.  SEEK IMMEDIATE MEDICAL CARE IF YOU HAVE ANY OF THE FOLLOWING SYMPTOMS: inability to hold down antibiotics or fluids, worsening pain, dizziness/lightheadedness, or change in mental status.     PRINCIPAL DISCHARGE DIAGNOSIS  Diagnosis: DKA (diabetic ketoacidosis)  Assessment and Plan of Treatment: You were admitted to the medical intensive care unit for a condition called DKA. This is when uncontrolled blood sugars cause severe disturbances in your bodies metabolism, specifically causing your blood to be very acidotic. You were treated appropriately but your A1C was found to be 15.6 suggesting very long standing poor diabetes control. You will be discharged on insulin and must follow up with your outpatient doctors to optimize your diabetes medication regimen.      SECONDARY DISCHARGE DIAGNOSES  Diagnosis: Acute UTI  Assessment and Plan of Treatment: A urinary tract infection  is caused by bacteria that get inside your urinary tract. In your case, the infection spread to your kidneys, and then your blood. You have been treated for this condition with antibiotics, and will be discharged on antibiotics to throughly treat your condition.      Diagnosis: Pneumonia  Assessment and Plan of Treatment: You were found to have pneumonia during your hospital stay which you were treated for with antibiotics. You should follow up with your PCP, and seek medical care if you develop difficulties breathing, high fevers or any other symptoms you find concerning.      Diagnosis: Pancreatitis  Assessment and Plan of Treatment: CT imaging revealed pancreatitis which you were successfully treated for. There are a number of issues this may occur, yours may be related to gallstones. It is important that you follow up with GI outpatient for further workup.    Diagnosis: Colitis  Assessment and Plan of Treatment: You were found to have an infection in your colon. You have been treated for this and will be discharged on additional antibiotics. Please follow up with GI outpatient for further work up.    Diagnosis: Hypothyroidism  Assessment and Plan of Treatment: Our endocrinology team evaluated you this admission, and increased your synthroid dose to 50 mcg daily. please take medications as seen in the medicine reconcilliation and follow up with their team outpatient.    Diagnosis: E coli bacteremia  Assessment and Plan of Treatment: The infection in your kidneys likely spread into your blood stream causing a condition called bacteremia. We will be discharging you on antibiotics to ensure this infection is adequately treated. Please take the antibiotics as prescribed in the medicine reconcilliation and follow up with your primary care for further monitoring.    Diagnosis: Cirrhosis  Assessment and Plan of Treatment: You have a condition called cirrhosis that you will need to follow up with a hepatologist outpatient for further management. We will discharge you on medications to help you manage this condition in the interim. Take all medications as seen in the medicine reconcilliation.    Diagnosis: Anemia  Assessment and Plan of Treatment: You were found to be anemic on laboratory testing.  Follow up with your primary care for further monitoring.    Diagnosis: Dysphagia  Assessment and Plan of Treatment: You were found to have difficulty swallowing thin liquids this admission when evaluated by our speech pathology team. We have given you modified diet recommendations and recommend you follow them to prevent further infections in your lung.    Diagnosis: Thrombocytopenia  Assessment and Plan of Treatment: Your platelets were found to be low. This could be related to the condition of your liver; however it will be important to follow up with a hepatologist and your primary care doctor for further management.    Diagnosis: Pyelonephritis  Assessment and Plan of Treatment: Pyelonephritis is a kidney infection. In most cases, the infection clears up with treatment and does not cause further problems.   Follow up with your PCP, and seek care if you develop back pain, pain with urination, high fevers or any other symptoms you find concerning.

## 2025-02-10 NOTE — PHYSICAL THERAPY INITIAL EVALUATION ADULT - NSPTDISCHREC_GEN_A_CORE
and assistance (pending CCC verfication, if assistance not available pt would benefit from Subacute Rehab to maximize her functional status/Home PT

## 2025-02-10 NOTE — DISCHARGE NOTE PROVIDER - ATTENDING ATTESTATION STATEMENT
Speech Therapy: Orders received per stroke protocol. Per chart review, NIH=0, MRI negative for acute findings, pt passed dysphagia screen and was initiated on general solid, thin liquid diet. RN/pt report no concerns re: swallow function/diet tolerance or com/cog changes. Speech therapy assessment not judged warranted at this time. Will sign off. Available for reconsult if indicated.   I have personally seen and examined the patient. I have collaborated with and supervised the

## 2025-02-10 NOTE — PHYSICAL THERAPY INITIAL EVALUATION ADULT - IMPAIRMENTS CONTRIBUTING IMPAIRED BED MOBILITY, REHAB EVAL
at first pt unable to maintain sitting heavy posterior lean/impaired balance/decreased flexibility/decreased strength

## 2025-02-10 NOTE — PROGRESS NOTE ADULT - ASSESSMENT
71 y/o F (name: Laverne Lai)  with hx of DM and cirrhosis, was brought in by EMS for AMS at home.  PEr son, patient was sick in bed for past two days, and today patient's sister call 911 to bring her to hospital because she was waking up.  In the ED patient was obtunded, intubated for airway protection. Admitted to MICU with severe DKA, pancolitis, acute pancreatitis, UTI and pneumonia. Endocrine consulted for uncontrolled diabetes. A1c 15.6% on admission.    1. Uncontrolled T2DM s/p DKA, a1c 15.6%  - Glucoses are stable  - Continue lantus 20 units BID  - Continue admelog 5 units TID with meals  - Moderate correction scale  - Will need insulin on discharge, please review insulin teaching and diabetes education prior to discharge    2. Hypothyroid  - On prior notes from 2022, was on levothyroxine 75mcg, unclear if on treatment prior to admission  - TSH 5.27 with low free thyroxine, negative TPO ab  - Started on PO levothyroxine 50mcg daily on 2/8/25    3. UTI, pneumonia  - On IV Ceftriaxone  - Care per primary team

## 2025-02-10 NOTE — DISCHARGE NOTE PROVIDER - NSDCCAREPROVSEEN_GEN_ALL_CORE_FT
Ben, Clarissa Hassan, Sterling Valera, Sonya Ben, Clarissa Valera, [facility]

## 2025-02-10 NOTE — DISCHARGE NOTE PROVIDER - NSDCMRMEDTOKEN_GEN_ALL_CORE_FT
labetalol 100 mg oral tablet: 1 tab(s) orally 2 times a day  Lantus Solostar Pen 100 units/mL subcutaneous solution: 5 unit(s) subcutaneous once a day (at bedtime)  metFORMIN 1000 mg oral tablet: 1 tab(s) orally 2 times a day  pioglitazone 45 mg oral tablet: 1 tab(s) orally once a day  Protonix 40 mg oral delayed release tablet: 1 tab(s) orally once a day  Seroquel 25 mg oral tablet: 1 tab(s) orally once a day (at bedtime)  Synthroid 25 mcg (0.025 mg) oral tablet: 1 tab(s) orally once a day   Admelog 100 units/mL injectable solution: 5 unit(s) injectable 3 times a day (before meals)  insulin glargine 100 units/mL subcutaneous solution: 20 unit(s) subcutaneous once a day (before a meal) in the morning before breakfast  insulin glargine 100 units/mL subcutaneous solution: 18 unit(s) subcutaneous once a day (at bedtime)  labetalol 100 mg oral tablet: 1 tab(s) orally 2 times a day  Lantus Solostar Pen 100 units/mL subcutaneous solution: 5 unit(s) subcutaneous once a day (at bedtime)  levothyroxine 50 mcg (0.05 mg) oral tablet: 1 tab(s) orally once a day  Protonix 40 mg oral delayed release tablet: 1 tab(s) orally once a day  Seroquel 25 mg oral tablet: 1 tab(s) orally once a day (at bedtime)  Synthroid 25 mcg (0.025 mg) oral tablet: 1 tab(s) orally once a day   Admelog 100 units/mL injectable solution: 5 unit(s) injectable 3 times a day (before meals)  cefpodoxime 200 mg oral tablet: 1 tab(s) orally 2 times a day  insulin glargine 100 units/mL subcutaneous solution: 20 unit(s) subcutaneous once a day (before a meal) in the morning before breakfast  insulin glargine 100 units/mL subcutaneous solution: 18 unit(s) subcutaneous once a day (at bedtime)  levothyroxine 50 mcg (0.05 mg) oral tablet: 1 tab(s) orally once a day  metroNIDAZOLE 500 mg oral tablet: 1 tab(s) orally 3 times a day  Protonix 40 mg oral delayed release tablet: 1 tab(s) orally once a day  rifAXIMin 550 mg oral tablet: 1 tab(s) orally every 12 hours  Seroquel 25 mg oral tablet: 1 tab(s) orally once a day (at bedtime)

## 2025-02-10 NOTE — DISCHARGE NOTE PROVIDER - HOSPITAL COURSE
Admission HPI: 69 y/o F (name: Laverne Lai)  with hx of DM and cirrhosis, was brought in by EMS for AMS at home.  PEr son, patient was sick in bed for past two days, and today patient's sister call 911 to bring her to hospital because she was waking up.  In the  ED patient was obtunded, intubated for airway protection. Labs significant for DKA with incalculable GAP, s/p 3 IVF bolus, started on bicarb by ED, and s/p 1 dose of vanc and zosyn. MICU consulted    Hospital Course:  Pt is a 69y Albanian speaking Female with a PMHx of IDDM and cirrhosis who was BIBA for AMS noted by the patient's son. PTA patient was sick and bed bound for 2 days before her family called EMS. In ED, patient was noted to be obtunded and was intubated for airway protection. Further investigations revealed that the patient was in DKA with BGL > 600 and an incalculablle AG. Admitted to MICU for insulin gtt, where the patient was found to also have RLL PNA, pyelo, Colitis and Gallstone pancreatitis. Blood cx were positive for pansensitive Ecoli. Patient was treated with broad spectrum abx, and was in triple pressor shock.     Important Medication Changes and Reason:    Started on rifaximin for encephalopathy 2/2 hyperammonemia     Diabetes medications have been adjusted, please ensure you follow instructions in the medicine rec and follow up with endo.    Synthroid dose increased.    Active or Pending Issues Requiring Follow-up: Follow up with endo, GI, hepatology and PCP.     Advanced Directives: Full code Admission HPI: 69 y/o F (name: Laverne Lai)  with hx of DM and cirrhosis, was brought in by EMS for AMS at home.  PEr son, patient was sick in bed for past two days, and today patient's sister call 911 to bring her to hospital because she was waking up.  In the  ED patient was obtunded, intubated for airway protection. Labs significant for DKA with incalculable GAP, s/p 3 IVF bolus, started on bicarb by ED, and s/p 1 dose of vanc and zosyn. MICU consulted    Hospital Course:  Pt is a 69y Urdu speaking Female with a PMHx of IDDM and cirrhosis who was BIBA for AMS noted by the patient's son. PTA patient was sick and bed bound for 2 days before her family called EMS. In ED, patient was noted to be obtunded and was intubated for airway protection. Further investigations revealed that the patient was in DKA with BGL > 600 and an incalculablle AG. Admitted to MICU for insulin gtt, where the patient was found to also have RLL PNA, pyelo, Colitis and Gallstone pancreatitis. Blood cx were positive for pansensitive Ecoli. Patient was treated with broad spectrum abx, and was in triple pressor shock. Patient was downgraded to general medicine floors on 02/08, and has been receiving abx and having DM regimen optimized.     Patient medically stable for dc to [facility].     Important Medication Changes and Reason:    Started on rifaximin for encephalopathy 2/2 hyperammonemia     Diabetes medications have been adjusted, please ensure you follow instructions in the medicine rec and follow up with endo.    Synthroid dose increased.    Active or Pending Issues Requiring Follow-up: Follow up with endo, GI, hepatology and PCP.     Advanced Directives: Full code Admission HPI: 69 y/o F (name: Laverne Lai)  with hx of DM and cirrhosis, was brought in by EMS for AMS at home.  PEr son, patient was sick in bed for past two days, and today patient's sister call 911 to bring her to hospital because she was waking up.  In the  ED patient was obtunded, intubated for airway protection. Labs significant for DKA with incalculable GAP, s/p 3 IVF bolus, started on bicarb by ED, and s/p 1 dose of vanc and zosyn. MICU consulted    Hospital Course:  Pt is a 69y German speaking Female with a PMHx of IDDM and cirrhosis who was BIBA for AMS noted by the patient's son. PTA patient was sick and bed bound for 2 days before her family called EMS. In ED, patient was noted to be obtunded and was intubated for airway protection. Further investigations revealed that the patient was in DKA with BGL > 600 and an incalculablle AG. Admitted to MICU for insulin gtt, where the patient was found to also have RLL PNA, pyelo, Colitis and Gallstone pancreatitis. Blood cx were positive for pansensitive Ecoli. Patient was treated with broad spectrum abx, and was in triple pressor shock. Patient was downgraded to general medicine floors on 02/08, and has been receiving abx and having DM regimen optimized.     Patient medically stable for dc to [facility].     Important Medication Changes and Reason:    Started on rifaximin for encephalopathy 2/2 hyperammonemia     Diabetes medications have been adjusted, please ensure you follow instructions in the medicine rec and follow up with endo.    Synthroid dose increased.    Home blood pressure medications discontinued in setting of normal blood pressure readings without meds in days prior to dc, follow up with PCP for further management,     Active or Pending Issues Requiring Follow-up: Follow up with endo, GI, hepatology and PCP.     Advanced Directives: Full code

## 2025-02-10 NOTE — DISCHARGE NOTE PROVIDER - ATTENDING DISCHARGE PHYSICAL EXAMINATION:
GENERAL: No acute distress, comfortably in bed  HEENT: Atraumatic, normocephalic, non-icteric, no JVD  NEURO: A&Ox3, no focal deficits, moving all extremities spontaneously, CN II-XII grossly intact  PSYCH: Normal affect, calm, appropriate insight and judgment, fluent speech  LUNGS: CTAB, no wrr, non-labored breathing  HEART: RRR, no murmur appreciated  ABD: Soft, non-tender, non-distended, no organomegaly, no appreciable masses, +bs   EXTREMITIES: Nontender, no clubbing, cyanosis, or edema

## 2025-02-10 NOTE — DISCHARGE NOTE PROVIDER - DISCHARGE DIET
Consistent Carbohydrate Diabetic Diets/Pureed Diet/Mildly Thick Liquids Consistent Carbohydrate Diabetic Diets

## 2025-02-10 NOTE — PHYSICAL THERAPY INITIAL EVALUATION ADULT - PERTINENT HX OF CURRENT PROBLEM, REHAB EVAL
as per medical chart:  patient was sick and bed bound for 2 days before her family called EMS. In ED, patient was noted to be obtunded and was intubated for airway protection. Further investigations revealed that the patient was in DKA.  Admitted to MICU for insulin gtt, where the patient was found to also have RLL PNA, pyelo, Colitis and Gallstone pancreatitis,  positive for pansensitive Ecoli.

## 2025-02-10 NOTE — DISCHARGE NOTE PROVIDER - CARE PROVIDER_API CALL
Miguelito Mccarthy  Internal Medicine  [address] [place] [address]  Phone: [phone]  Fax: [phone]  Follow Up Time: 2 weeks    Ashish Muir  Gastroenterology  [address], [address]  [place] [address]  Phone: [phone]  Fax: [phone]  Follow Up Time: 2 weeks    Suha Craven  Transplant Hepatology  [address] [facility] [address]  [place] [address]  Phone: [phone]  Fax: [phone]  Follow Up Time: 2 weeks    Jayleen Vizcaino  Endocrinology/Metab/Diabetes  [address] [place] [id number]  Phone: [phone]  Fax: [phone]  Follow Up Time: 2 weeks

## 2025-02-10 NOTE — DISCHARGE NOTE PROVIDER - PROVIDER TOKENS
PROVIDER:[TOKEN:[[id number]],FOLLOWUP:[2 weeks]],PROVIDER:[TOKEN:[[id number]],FOLLOWUP:[2 weeks]],PROVIDER:[TOKEN:[[id number]],FOLLOWUP:[2 weeks]],PROVIDER:[TOKEN:[[id number]],FOLLOWUP:[2 weeks]]

## 2025-02-10 NOTE — PROGRESS NOTE ADULT - ASSESSMENT
70 y/o F w hx of IDDM (non-compliant to meds), cirrhosis admitted to MICU for DKA, UTI, E.coli bacteremia, acute pancreatitis, colitis and ? PNA. Patient required pressors and insulin drip as well as intubation during MICU stay. Patient now off pressors, on subcutaneous insulin, and on RA. Downgraded to general medical service on 2/8.    #DKA  A1c 15.6  -s/p insulin drip (dc on 02/03)  -Accu checks and ISS  -Lantus 20 units BID  -Admelog 5 units premeals  -endocrinology on board, recs appreciated     #E.coli UTI  #E.coli bacteremia  -Repeat blood culture negative   -continue ceftriaxone 2g daily to complete 10 days   -Start voiding trial     #PNA  -Continue Rocephin as above     #acute pancreatitis  #colitis  -C. Diff, GI PCR and Stool culture negative   -no abdominal pain  -rectal tube was discontinued on 2/8  -s/p IVF  -tolerating PO diet, encouraged for oral intake    #hypothyroidism  -continue synthroid 50mcg daily  -endocrine on board, recs appreciated     #cirrhosis   -s/p lactulose  -c/w Rifaximin  -ammonia levels now normal  -will need outpatient GI follow up    #anemia / thrombocytopenia   -no evidence of bleeding  -likely chronic disease (liver cirrhosis) / acute illness   -monitor     #Dysphagia  -Speech & Swallow following: pureed with mildly thick liquids     #Hypokalemia  -Replete    DVT Prophylaxis -- Venodyne    Dispo: Home vs [facility] in 24-48 hours. PT Eval.    70 y/o F w hx of IDDM (non-compliant to meds), cirrhosis admitted to MICU for DKA, UTI, E.coli bacteremia, acute pancreatitis, colitis and ? PNA. Patient required pressors and insulin drip as well as intubation during MICU stay. Patient now off pressors, on subcutaneous insulin, and on RA. Downgraded to general medical service on 2/8.    #DKA  -A1c 15.6  -s/p insulin drip (dc on 02/03)  -Accu checks and ISS  -Lantus 20 units BID  -Admelog 5 units premeals  -endocrinology on board, recs appreciated     #E.coli UTI  #E.coli bacteremia  -Repeat blood culture negative   -Discussed with ID, will complete 14 day course of abx with vantin and flagyl on dc  - Voiding freely    #PNA  -Continue Rocephin as above     #acute pancreatitis  #colitis  -C. Diff, GI PCR and Stool culture negative   -no abdominal pain  -rectal tube was discontinued on 2/8  -s/p IVF  -tolerating PO diet, encouraged for oral intake    #hypothyroidism  -continue synthroid 50mcg daily  -endocrine on board, recs appreciated     #cirrhosis   -s/p lactulose  -c/w Rifaximin  -ammonia levels now normal  -will need outpatient GI follow up    #anemia / thrombocytopenia   -no evidence of bleeding  -likely chronic disease (liver cirrhosis) / acute illness   -monitor     #Dysphagia  -Speech & Swallow following: pureed with mildly thick liquids     #Hypokalemia (resolved)  - Monitor and replete as needed    DVT Prophylaxis -- Venodyne    Dispo: likely [facility] in 24-48 hours. PT Tod.

## 2025-02-10 NOTE — PROGRESS NOTE ADULT - SUBJECTIVE AND OBJECTIVE BOX
INTERVAL EVENTS:  Follow up diabetes management, glucoses stable.  Son at bedside to assist with Lao translation. Pt endorses good appetite, denies acute pain.     MEDICATIONS  (STANDING):  cefTRIAXone Injectable. 2000 milliGRAM(s) IV Push every 24 hours  chlorhexidine 2% Cloths 1 Application(s) Topical daily  chlorhexidine 4% Liquid 1 Application(s) Topical <User Schedule>  dextrose 50% Injectable 25 Gram(s) IV Push once  dextrose 50% Injectable 12.5 Gram(s) IV Push once  dextrose 50% Injectable 25 Gram(s) IV Push once  insulin glargine Injectable (LANTUS) 20 Unit(s) SubCutaneous at bedtime  insulin glargine Injectable (LANTUS) 20 Unit(s) SubCutaneous before breakfast  insulin lispro (ADMELOG) corrective regimen sliding scale   SubCutaneous Before meals and at bedtime  insulin lispro Injectable (ADMELOG) 5 Unit(s) SubCutaneous three times a day before meals  levothyroxine 50 MICROGram(s) Oral daily  magnesium sulfate  IVPB 2 Gram(s) IV Intermittent once  pantoprazole    Tablet 40 milliGRAM(s) Oral daily  rifAXIMin 550 milliGRAM(s) Oral every 12 hours    MEDICATIONS  (PRN):  acetaminophen     Tablet .. 650 milliGRAM(s) Oral every 6 hours PRN Temp greater or equal to 38C (100.4F), Mild Pain (1 - 3), Moderate Pain (4 - 6)  dextrose Oral Gel 15 Gram(s) Oral once PRN Blood Glucose LESS THAN 70 milliGRAM(s)/deciliter  ondansetron Injectable 4 milliGRAM(s) IV Push every 6 hours PRN Nausea and/or Vomiting  sodium chloride 0.9% lock flush 10 milliLiter(s) IV Push every 1 hour PRN Pre/post blood products, medications, blood draw, and to maintain line patency    Allergies  Allergy Status Unknown    Vital Signs Last 24 Hrs  T(C): 36.9 (10 Feb 2025 08:14), Max: 37.4 (09 Feb 2025 20:31)  T(F): 98.5 (10 Feb 2025 08:14), Max: 99.3 (09 Feb 2025 20:31)  HR: 94 (10 Feb 2025 08:14) (86 - 94)  BP: 164/82 (10 Feb 2025 08:14) (136/81 - 164/82)  BP(mean): --  RR: 19 (10 Feb 2025 08:14) (18 - 19)  SpO2: 94% (10 Feb 2025 08:14) (93% - 97%)    Parameters below as of 10 Feb 2025 08:14  Patient On (Oxygen Delivery Method): room air    PHYSICAL EXAM:  General: No apparent distress  Respiratory: Lungs clear bilaterally  Cardiac: +S1, S2, no m/r/g  GI: +BS, soft, non tender, non distended  Extremities: No peripheral edema  Neuro: Awake, follows commands    LABS:                        8.5    8.56  )-----------( 98       ( 10 Feb 2025 05:10 )             25.9     02-10    135  |  100  |  10.4  ----------------------------<  113[H]  4.2   |  18.0[L]  |  0.43[L]    Ca    9.5      10 Feb 2025 05:10  Phos  3.2     02-09  Mg     1.6     02-10    TPro  6.2[L]  /  Alb  3.6  /  TBili  2.3[H]  /  DBili  x   /  AST  41[H]  /  ALT  27  /  AlkPhos  292[H]  02-09    Urinalysis Basic - ( 10 Feb 2025 05:10 )    Color: x / Appearance: x / SG: x / pH: x  Gluc: 113 mg/dL / Ketone: x  / Bili: x / Urobili: x   Blood: x / Protein: x / Nitrite: x   Leuk Esterase: x / RBC: x / WBC x   Sq Epi: x / Non Sq Epi: x / Bacteria: x    POCT Blood Glucose.: 132 mg/dL (02-10-25 @ 11:50)  POCT Blood Glucose.: 126 mg/dL (02-10-25 @ 08:07)  POCT Blood Glucose.: 102 mg/dL (02-09-25 @ 20:53)  POCT Blood Glucose.: 126 mg/dL (02-09-25 @ 16:41)    Free Thyroxine, Serum: 0.7 ng/dL (02-08-25 @ 05:18)  Thyroid Stimulating Hormone, Serum: 5.27 uIU/mL (02-06-25 @ 18:29)

## 2025-02-10 NOTE — DISCHARGE NOTE PROVIDER - CARE PROVIDERS DIRECT ADDRESSES
,DirectAddress_Unknown,[email or web address]

## 2025-02-10 NOTE — PHYSICAL THERAPY INITIAL EVALUATION ADULT - ADDITIONAL COMMENTS
Pt is a poor historian, CCC made aware. Clarification needed for PLF, social support upon D/C. As per CCC 1 step to enter.

## 2025-02-11 LAB
ANION GAP SERPL CALC-SCNC: 12 MMOL/L — SIGNIFICANT CHANGE UP (ref 5–17)
BUN SERPL-MCNC: 8.1 MG/DL — SIGNIFICANT CHANGE UP (ref 8–20)
CALCIUM SERPL-MCNC: 9.2 MG/DL — SIGNIFICANT CHANGE UP (ref 8.4–10.5)
CHLORIDE SERPL-SCNC: 102 MMOL/L — SIGNIFICANT CHANGE UP (ref 96–108)
CO2 SERPL-SCNC: 21 MMOL/L — LOW (ref 22–29)
CREAT SERPL-MCNC: 0.41 MG/DL — LOW (ref 0.5–1.3)
CULTURE RESULTS: SIGNIFICANT CHANGE UP
EGFR: 108 ML/MIN/1.73M2 — SIGNIFICANT CHANGE UP
GLUCOSE BLDC GLUCOMTR-MCNC: 115 MG/DL — HIGH (ref 70–99)
GLUCOSE BLDC GLUCOMTR-MCNC: 148 MG/DL — HIGH (ref 70–99)
GLUCOSE BLDC GLUCOMTR-MCNC: 205 MG/DL — HIGH (ref 70–99)
GLUCOSE BLDC GLUCOMTR-MCNC: 205 MG/DL — HIGH (ref 70–99)
GLUCOSE SERPL-MCNC: 128 MG/DL — HIGH (ref 70–99)
HCT VFR BLD CALC: 24 % — LOW (ref 34.5–45)
HGB BLD-MCNC: 8 G/DL — LOW (ref 11.5–15.5)
MAGNESIUM SERPL-MCNC: 1.8 MG/DL — SIGNIFICANT CHANGE UP (ref 1.8–2.6)
MCHC RBC-ENTMCNC: 32.9 PG — SIGNIFICANT CHANGE UP (ref 27–34)
MCHC RBC-ENTMCNC: 33.3 G/DL — SIGNIFICANT CHANGE UP (ref 32–36)
MCV RBC AUTO: 98.8 FL — SIGNIFICANT CHANGE UP (ref 80–100)
MELD SCORE WITH DIALYSIS: SIGNIFICANT CHANGE UP POINTS
MELD SCORE WITHOUT DIALYSIS: SIGNIFICANT CHANGE UP POINTS
PLATELET # BLD AUTO: 126 K/UL — LOW (ref 150–400)
POTASSIUM SERPL-MCNC: 4.1 MMOL/L — SIGNIFICANT CHANGE UP (ref 3.5–5.3)
POTASSIUM SERPL-SCNC: 4.1 MMOL/L — SIGNIFICANT CHANGE UP (ref 3.5–5.3)
RBC # BLD: 2.43 M/UL — LOW (ref 3.8–5.2)
RBC # FLD: 17.2 % — HIGH (ref 10.3–14.5)
SODIUM SERPL-SCNC: 135 MMOL/L — SIGNIFICANT CHANGE UP (ref 135–145)
SPECIMEN SOURCE: SIGNIFICANT CHANGE UP
WBC # BLD: 7.85 K/UL — SIGNIFICANT CHANGE UP (ref 3.8–10.5)
WBC # FLD AUTO: 7.85 K/UL — SIGNIFICANT CHANGE UP (ref 3.8–10.5)

## 2025-02-11 PROCEDURE — 99233 SBSQ HOSP IP/OBS HIGH 50: CPT | Mod: GC

## 2025-02-11 PROCEDURE — 99233 SBSQ HOSP IP/OBS HIGH 50: CPT

## 2025-02-11 RX ORDER — ENOXAPARIN SODIUM 100 MG/ML
40 INJECTION SUBCUTANEOUS EVERY 24 HOURS
Refills: 0 | Status: DISCONTINUED | OUTPATIENT
Start: 2025-02-11 | End: 2025-02-14

## 2025-02-11 RX ADMIN — ANTISEPTIC SURGICAL SCRUB 1 APPLICATION(S): 0.04 SOLUTION TOPICAL at 12:40

## 2025-02-11 RX ADMIN — Medication 4: at 18:33

## 2025-02-11 RX ADMIN — Medication 5 UNIT(S): at 10:25

## 2025-02-11 RX ADMIN — CEFTRIAXONE 2000 MILLIGRAM(S): 250 INJECTION, POWDER, FOR SOLUTION INTRAMUSCULAR; INTRAVENOUS at 14:49

## 2025-02-11 RX ADMIN — INSULIN GLARGINE-YFGN 20 UNIT(S): 100 INJECTION, SOLUTION SUBCUTANEOUS at 21:16

## 2025-02-11 RX ADMIN — LEVOTHYROXINE SODIUM 50 MICROGRAM(S): 25 TABLET ORAL at 06:13

## 2025-02-11 RX ADMIN — Medication 4: at 21:16

## 2025-02-11 RX ADMIN — Medication 5 UNIT(S): at 18:33

## 2025-02-11 RX ADMIN — INSULIN GLARGINE-YFGN 20 UNIT(S): 100 INJECTION, SOLUTION SUBCUTANEOUS at 10:26

## 2025-02-11 RX ADMIN — Medication 5 UNIT(S): at 13:48

## 2025-02-11 RX ADMIN — ENOXAPARIN SODIUM 40 MILLIGRAM(S): 100 INJECTION SUBCUTANEOUS at 14:50

## 2025-02-11 RX ADMIN — ANTISEPTIC SURGICAL SCRUB 1 APPLICATION(S): 0.04 SOLUTION TOPICAL at 06:13

## 2025-02-11 RX ADMIN — PANTOPRAZOLE 40 MILLIGRAM(S): 20 TABLET, DELAYED RELEASE ORAL at 12:40

## 2025-02-11 NOTE — ADVANCED PRACTICE NURSE CONSULT - RECOMMEDATIONS
older female with DKA, triple pressor shock, now doing well on medical floor with family at bedside. pt is now able to ambualte with assist x1 in walker some distance but residual weakenss turning in bed   wound beds appaer more shallow, c/w epithelial slopughing, and not particularly over bony prominence. likely friction vs moisture vs ASF etiolgoy >pressure  continue heel offloding in bed with boots  continue heel elevation when in chair  continue waffle cushion and T&R     1. friction shear wounds with underlying masd over b/l buttocks       WOUNDS:  =======  buttocks  -please apply triad (hydrophilic wound barrier ointment) over the specified area.   -apply Q 12 hr in a nickel thick layer, and offload the area to facilitate airflow so ointment can thicken into a paste.   -DO NOT cover with external dressing.   on dc, can consider ordering zinc oxide paste for pt home wound care       GENERAL GUIDELINES, AND RECOMMENDATIONS  ==============================  -Carefully check that no tubes are entangled with the bed linens or have contact to patient’s skin. Keep bed sheets smooth and wrinkle free to prevent injury to the skin   - Assure to position pt feet at 20 degrees, then HOB at a 30 degree angle  to limit sliding/friction/shearing, especially in bed bound populations.         Turn and position the patient Q2 hours. Use wedges when turning and positioning to the left/right, notably if patient can only assist minimally.    Place wedges / moldable pillows above the waist for effective sacral offloading. Keep shoulders and hips properly aligned for greater comfort. Place pillow between legs to support bony prominences and reduce friction/rubbing.    Apply waffle/sacral cushion for sacral offloading, both in bed and in chair. if limited mobility, maintain full fowlers chair seated position for <2 hours at a time.     Keep the pt’s heels protected and elevated off the surface of the bed. Place what the patient can tolerate: pillows, moldable pillows, offloading heel boots      This was a ***   minute visit, with greater than 50% counseling. My findings and suggestions that may benefit the pt were disscussed with the family/caregiver if present at bedside and with pt permission,  shift RN at bedside, in addition to overseeing team/provider via chat and or telephone.     Attending on file for patient notified was ***    Wound Care will continue to follow the patient? : ***    Continue to monitor skin integrity as per policy,  and call or re-consult Wound Care with any acute changes or lack of progression of current recommendations. Wound care rceommendations are generally for shift RN dressing changes, unless otherwise specified. Wound Care IS NOT continuing to follow the patient unless otherwise specified as noted above.     Phuc CAMARENA, RN, CWCN  Wound Ostomy Nurse Educator   [facility]  Please call/message over Teams and/or Email for clarification/contact.  (E): [email or web address]  older female with DKA, triple pressor shock, now doing well on medical floor with family at bedside. pt is now able to ambualte with assist x1 in walker some distance but residual weakenss turning in bed   wound beds appaer more shallow, c/w epithelial slopughing, and not particularly over bony prominence. likely friction vs moisture vs ASF etiolgoy >pressure  continue heel offloding in bed with boots  continue heel elevation when in chair  continue waffle cushion and T&R     1. friction shear wounds in setting of acute skin failure       WOUNDS:  =======  buttocks  -please apply triad (hydrophilic wound barrier ointment) over the specified area.   -apply Q 12 hr in a nickel thick layer, and offload the area to facilitate airflow so ointment can thicken into a paste.   -DO NOT cover with external dressing.   on dc, can consider ordering zinc oxide paste for pt home wound care       GENERAL GUIDELINES, AND RECOMMENDATIONS  ==============================  -Carefully check that no tubes are entangled with the bed linens or have contact to patient’s skin. Keep bed sheets smooth and wrinkle free to prevent injury to the skin   - Assure to position pt feet at 20 degrees, then HOB at a 30 degree angle  to limit sliding/friction/shearing, especially in bed bound populations.         Turn and position the patient Q2 hours. Use wedges when turning and positioning to the left/right, notably if patient can only assist minimally.    Place wedges / moldable pillows above the waist for effective sacral offloading. Keep shoulders and hips properly aligned for greater comfort. Place pillow between legs to support bony prominences and reduce friction/rubbing.    Apply waffle/sacral cushion for sacral offloading, both in bed and in chair. if limited mobility, maintain full fowlers chair seated position for <2 hours at a time.     Keep the pt’s heels protected and elevated off the surface of the bed. Place what the patient can tolerate: pillows, moldable pillows, offloading heel boots      This was a 15  minute visit, with greater than 50% counseling. My findings and suggestions that may benefit the pt were disscussed with the family/caregiver if present at bedside and with pt permission,  shift RN at bedside, in addition to overseeing team/provider via chat and or telephone.     Wound Care will continue to follow the patient? : no  Continue to monitor skin integrity as per policy,  and call or re-consult Wound Care with any acute changes or lack of progression of current recommendations. Wound care rceommendations are generally for shift RN dressing changes, unless otherwise specified. Wound Care IS NOT continuing to follow the patient unless otherwise specified as noted above.     Phuc CAMARENA, RN, CWCN  Wound Ostomy Nurse Educator   [facility]  Please call/message over Teams and/or Email for clarification/contact.  (E): [email or web address]

## 2025-02-11 NOTE — PROGRESS NOTE ADULT - ASSESSMENT
69 y/o F (name: Laverne Lai)  with hx of DM and cirrhosis, was brought in by EMS for AMS at home.  PEr son, patient was sick in bed for past two days, and today patient's sister call 911 to bring her to hospital because she was waking up.  In the ED patient was obtunded, intubated for airway protection. Admitted to MICU with severe DKA, pancolitis, acute pancreatitis, UTI and pneumonia. Endocrine consulted for uncontrolled diabetes. A1c 15.6% on admission.    1. Uncontrolled T2DM s/p DKA, a1c 15.6%  - Glucoses are stable  - Continue lantus 20 units BID  - Continue admelog 5 units TID with meals  - Moderate correction scale  - Will need insulin on discharge, please review insulin teaching and diabetes education prior to discharge. If going to [facility], can discharge on current insulin regimen.    2. Hypothyroid  - On prior notes from 2022, was on levothyroxine 75mcg, unclear if on treatment prior to admission  - TSH 5.27 with low free thyroxine, negative TPO ab  - Started on PO levothyroxine 50mcg daily on 2/8/25    3. UTI, pneumonia  - On IV Ceftriaxone  - Care per primary team

## 2025-02-11 NOTE — PROGRESS NOTE ADULT - ASSESSMENT
70 y/o F w hx of IDDM (non-compliant to meds), cirrhosis admitted to MICU for DKA, UTI, E.coli bacteremia, acute pancreatitis, colitis and ? PNA. Patient required pressors and insulin drip as well as intubation during MICU stay. Patient now off pressors, on subcutaneous insulin, and on RA. Downgraded to general medical service on 2/8.    #DKA  -A1c 15.6  -s/p insulin drip (dc on 02/03)  -Accu checks and ISS  -Lantus 20 units BID  -Admelog 5 units premeals  -endocrinology on board, recs appreciated     #E.coli UTI  #E.coli bacteremia  -Repeat blood culture negative   -Discussed with ID, will complete 14 day course of abx with vantin and flagyl on dc  - Voiding freely    #PNA  -Continue Rocephin as above     #acute pancreatitis  #colitis  -C. Diff, GI PCR and Stool culture negative   -no abdominal pain  -rectal tube was discontinued on 2/8  -s/p IVF  -tolerating PO diet, encouraged for oral intake    #hypothyroidism  -continue synthroid 50mcg daily  -endocrine on board, recs appreciated     #cirrhosis   -s/p lactulose  -c/w Rifaximin  -ammonia levels now normal  -will need outpatient GI follow up    #anemia / thrombocytopenia   -no evidence of bleeding  -likely chronic disease (liver cirrhosis) / acute illness   -monitor     #Dysphagia  -Speech & Swallow following: pureed with mildly thick liquids     #Hypokalemia (resolved)  - Monitor and replete as needed    DVT Prophylaxis - Lovenox and SCDs    Dispo: Dc pending placement

## 2025-02-11 NOTE — ADVANCED PRACTICE NURSE CONSULT - ASSESSMENT
SARAH SCORE  Last sarah score is : ---  19+: pt is NOT at risk for developing pressure injuries  15-18: pt is AT RISK for developing pressure injuries   13-14: pt is AT MODERATE RISK for developing pressure injuries   10-12: pt is AT HIGH RISK for developing pressure injuries   6-9: pt is AT VERY HIGH RISK for developing pressure injuries     PHYSICAL EXAM  Is pt AOx?:  Bed bound?:   Incontinent?:  Mattress/ bed active?:   Can pt assist with turn or dependent?:  Active pressure injury prevention measures? :     SKIN CHECK  -  SARAH SCORE  Last sarah score is : ---15  19+: pt is NOT at risk for developing pressure injuries  15-18: pt is AT RISK for developing pressure injuries   13-14: pt is AT MODERATE RISK for developing pressure injuries   10-12: pt is AT HIGH RISK for developing pressure injuries   6-9: pt is AT VERY HIGH RISK for developing pressure injuries     PHYSICAL EXAM  Is pt AOx?: x4 in nad resting comfortably in bed   Bed bound?: no chairfast   Incontinent?: at times   Mattress/ bed active?: low airlos ssurface    Can pt assist with turn or dependent?: turns with assistance   Active pressure injury prevention measures? : low airloss surface heel offloading boots     SKIN CHECK  -thin appearing femal e  -over the buttocks per prior examination there were scatttered hypopigmented regions across the b/l buttocks not particularly over bony prominences   at bedside today there was epithelial sloughing revealing partial thickness wound beds with serous drainage, somewhat tender, without e/o infection nor necrosis.

## 2025-02-11 NOTE — ADVANCED PRACTICE NURSE CONSULT - REASON FOR CONSULT
Wound Care was consulted for evaluation of the following:  -    HPI: per chart review  -    Pt seen at bedside today:  -  Wound Care was consulted for evaluation of the following:  -evaluation of buttock lesions       Pt seen at bedside today:  - reporting that she is overlal doing well, having occasional pain to the buttocks butotherwise denies f/c

## 2025-02-11 NOTE — PROGRESS NOTE ADULT - TIME BILLING
Reviewing charts and coordinating care.
Time spent reviewing the chart documentation, reviewing labs and imaging studies, evaluating the patient, discussing the plan of care with the consultants & medical team, and documenting.
patient care , labs ,meds, chart review , documentation
review of chart notes, labs, imaging, bedside assessment, coordination of care with MICU NP student/RN and documentation
patient care , labs ,meds, chart review , documentation

## 2025-02-11 NOTE — CHART NOTE - NSCHARTNOTEFT_GEN_A_CORE
Left axillary arterial line removed under supervision as per protocol. Site cleansed with chlorhexidine. Sutures removed. Line removed and pressure applied for 10 minutes. Hemostasis achieved and a gauze and Tegaderm dressing was placed. RN to notify provider for any signs of bleeding.
Source: Patient [ ]  Family [ ]   other [x] EMR    Current Diet: Diet, NPO with Tube Feed:   Tube Feeding Modality: Orogastric  Glucerna 1.5 Meño (GLUCERNA1.5)  Continuous  Starting Tube Feed Rate {mL per Hour}: 10  Increase Tube Feed Rate by (mL): 20     Every 6 hours  Until Goal Tube Feed Rate (mL per Hour): 50  Tube Feed Duration (in Hours): 24  Tube Feed Start Time: 08:35  Free Water Flush   Total Volume per Flush (mL): 250   Frequency: Every 6 Hours (02-05-25 @ 08:33)    Enteral /Parenteral Nutrition:   Glucerna 1.5 @ 50 ml/hr x 24 hrs = 1200 ml, 1800 kcals, 99 g pro, 912 ml free water, > 100% RDIs vitamins/minerals  1000 ml free water from additional free water flushes     Current Weight:   2/6 52.2 kg  2/5 51.0 kg  +3 R hand edema per documentation     Pertinent Medications: MEDICATIONS  (STANDING):  cefTRIAXone Injectable. 2000 milliGRAM(s) IV Push every 24 hours  insulin glargine Injectable (LANTUS) 20 Unit(s) SubCutaneous at bedtime  insulin lispro (ADMELOG) corrective regimen sliding scale   SubCutaneous every 6 hours  pantoprazole  Injectable 40 milliGRAM(s) IV Push daily  rifAXIMin 550 milliGRAM(s) Oral every 12 hours    MEDICATIONS  (PRN):  fentaNYL    Injectable 25 MICROGram(s) IV Push every 2 hours PRN vent dysynchrony    Pertinent Labs: 02-07 Na146 mmol/L[H] Glu 239 mg/dL[H] K+ 3.6 mmol/L Cr  0.79 mg/dL BUN 28.9 mg/dL[H] Phos 1.0 mg/dL[LL] Alb 4.4 g/dL  A1c 15.6%    Skin: IAD/MAD    Estimated Needs:   9308-6407 kcals/day (based on 30-35 kcal/kg BW 52.1 kg)  68-78 g pro/day (based on 1.3-1.5 g/kg BW 52.1 kg)    Clinical Course: 68 yo female who presents with a chief complaint of DKA, sepsis 2/2 UTI, has been off insulin gtt since 2/3, mental status slowly improving since being off sedation, on ceftriaxone, no longer requiring pressor support.    Current Nutrition Diagnosis: Inadequate oral intake related to critical illness, DKA requiring intubation as evidenced by pt intubated, tube feeds held.  Chart and events reviewed. TF was started on 2/5 and reached goal rate 50 ml/hr Glucerna 1.5 on 2/6. TF held earlier today. Rectal tube yesterday 1500 ml output, 500 ml documented so far today. Mental status slowly improving off sedation per notes, eventual SBT. Hyperglycemia noted, transitioned off insulin gtt to lantus/SSI. Hypophosphatemia noted, potassium on lower end of normal, supplemented with KPhos today. Recommendations below. RD remains available.     Recommendations:   1. If pt to remain intubated, re-start enteral nutrition Glucerna 1.5 @ 10 ml/hr and increase by 10 ml q 6 hrs until goal rate 55 ml/hr x 18 hrs (990 ml, 1485 kcals, 82 g pro, 752 ml free water)  2. If loose BMs continue, consider adding banatrol BID to bulk stools (40 kcals/packet)  3. Additional free water per medical teams discretion  4. Continue to monitor electrolytes and replete prn     Monitoring and Evaluation:   [x] Tolerance to diet prescription [X] Weights  [X] Follow up per protocol [X] Labs: chem 8, phos, mg, BGM
Source: Patient [ ]  Family [ ]   other [x ]EMR    Current Diet: cons CHO, pureed with Glucerna shake BID    Patient reports [ ] nausea  [ ] vomiting [ ] diarrhea [ ] constipation  [ ]chewing problems [ ] swallowing issues  [ ] other:     PO intake:  < 50% [ ]   50-75%  [x ]   %  [ ]  other :    Source for PO intake [ ] Patient [ ] family [x ] chart [ ] staff [ ] other    Enteral /Parenteral Nutrition:     Current Weight: 117.9# 2/19, 134.9# 2/2  generalized 1+    % Weight Change     Pertinent Medications: MEDICATIONS  (STANDING):    enoxaparin Injectable 40 milliGRAM(s) SubCutaneous every 24 hours    levothyroxine 50 MICROGram(s) Oral daily  pantoprazole    Tablet 40 milliGRAM(s) Oral daily  rifAXIMin 550 milliGRAM(s) Oral every 12 hours    MEDICATIONS  (PRN):  acetaminophen     Tablet .. 650 milliGRAM(s) Oral every 6 hours PRN Temp greater or equal to 38C (100.4F), Mild Pain (1 - 3), Moderate Pain (4 - 6)  dextrose Oral Gel 15 Gram(s) Oral once PRN Blood Glucose LESS THAN 70 milliGRAM(s)/deciliter  ondansetron Injectable 4 milliGRAM(s) IV Push every 6 hours PRN Nausea and/or Vomiting  sodium chloride 0.9% lock flush 10 milliLiter(s) IV Push every 1 hour PRN Pre/post blood products, medications, blood draw, and to maintain line patency    Pertinent Labs: CBC Full  -  ( 11 Feb 2025 05:15 )  WBC Count : 7.85 K/uL  RBC Count : 2.43 M/uL  Hemoglobin : 8.0 g/dL  Hematocrit : 24.0 %  Platelet Count - Automated : 126 K/uL  Mean Cell Volume : 98.8 fl  Mean Cell Hemoglobin : 32.9 pg  Mean Cell Hemoglobin Concentration : 33.3 g/dL      02-11 Na135 mmol/L Glu 128 mg/dL[H] K+ 4.1 mmol/L Cr  0.41 mg/dL[L] BUN 8.1 mg/dL Phos n/a   Alb n/a   PAB n/a           Skin:     Nutrition focused physical exam conducted - found signs of malnutrition [ ]absent [ ]present    Subcutaneous fat loss: [ ] Orbital fat pads region, [ ]Buccal fat region, [ ]Triceps region,  [ ]Ribs region    Muscle wasting: [ ]Temples region, [ ]Clavicle region, [ ]Shoulder region, [ ]Scapula region, [ ]Interosseous region,  [ ]thigh region, [ ]Calf region    Estimated Needs:   [x ] no change since previous assessment  [ ] recalculated:     Current Nutrition Diagnosis:  Altered Nutrition labs related to hx DM as evidenced by A1C 15.6 noted. Mental status slowly improving off sedation per notes, had PT today. Hyperglycemia noted, transitioned off insulin gtt to lantus/SSI. Potassium and Phosphorus levels are wnl.  RD remains available.       Recommendations:   Continue Glucerna shake BID to optimize po intake and provide an additional 220 kcal, 10g protein per serving   Continue pureed diet consistency as per SLP    Monitoring and Evaluation:   [x ] PO intake [x ] Tolerance to diet prescription [X] Weights  [X] Follow up per protocol [X] Labs:

## 2025-02-11 NOTE — PROGRESS NOTE ADULT - SUBJECTIVE AND OBJECTIVE BOX
SUBJECTIVE  BRIEF HOSPITAL COURSE SUMMARY: Pt is a 69y [place] speaking Female with a PMHx of IDDM and cirrhosis who was BIBA for AMS noted by the patient's son. PTA patient was sick and bed bound for 2 days before her family called EMS. In ED, patient was noted to be obtunded and was intubated for airway protection. Further investigations revealed that the patient was in DKA with BGL > 600 and an incalculablle AG. Admitted to MICU for insulin gtt, where the patient was found to also have RLL PNA, pyelo, Colitis and Gallstone pancreatitis. Blood cx were positive for pansensitive Ecoli. Patient was treated with broad spectrum abx, and was in triple pressor shock.     LAST 24 HOURS: No acute events.     TODAY: Pt seen at bedside this AM. Pt son present at bedside, patient prefers son translates. Offers no acute complaints & ROS otherwise negative.     OBJECTIVE  PHYSICAL EXAM:  GENERAL: No acute distress, comfortably in bed  HEENT: Atraumatic, normocephalic, non-icteric, no JVD  NEURO: A&Ox3, no focal deficits, moving all extremities spontaneously, CN II-XII grossly intact  PSYCH: Normal affect, calm, appropriate insight and judgment, fluent speech  LUNGS: CTAB, no wrr, non-labored breathing  HEART: RRR, no murmur appreciated  ABD: Soft, non-tender, non-distended, no organomegaly, no appreciable masses, +bs   EXTREMITIES: Nontender, no clubbing, cyanosis, or edema       Vital Signs Last 24 Hrs  T(C): 37 (11 Feb 2025 08:35), Max: 37.1 (10 Feb 2025 20:00)  T(F): 98.6 (11 Feb 2025 08:35), Max: 98.7 (10 Feb 2025 20:00)  HR: 75 (11 Feb 2025 08:35) (74 - 92)  BP: 144/87 (11 Feb 2025 08:35) (134/72 - 156/79)  BP(mean): --  RR: 18 (11 Feb 2025 08:35) (18 - 18)  SpO2: 96% (11 Feb 2025 08:35) (94% - 97%)    Parameters below as of 11 Feb 2025 08:35  Patient On (Oxygen Delivery Method): room air            MEDICATIONS  (STANDING):  cefTRIAXone Injectable. 2000 milliGRAM(s) IV Push every 24 hours  chlorhexidine 2% Cloths 1 Application(s) Topical daily  chlorhexidine 4% Liquid 1 Application(s) Topical <User Schedule>  dextrose 50% Injectable 25 Gram(s) IV Push once  dextrose 50% Injectable 12.5 Gram(s) IV Push once  dextrose 50% Injectable 25 Gram(s) IV Push once  insulin glargine Injectable (LANTUS) 20 Unit(s) SubCutaneous at bedtime  insulin glargine Injectable (LANTUS) 20 Unit(s) SubCutaneous before breakfast  insulin lispro (ADMELOG) corrective regimen sliding scale   SubCutaneous Before meals and at bedtime  insulin lispro Injectable (ADMELOG) 5 Unit(s) SubCutaneous three times a day before meals  levothyroxine 50 MICROGram(s) Oral daily  pantoprazole    Tablet 40 milliGRAM(s) Oral daily  rifAXIMin 550 milliGRAM(s) Oral every 12 hours    MEDICATIONS  (PRN):  acetaminophen     Tablet .. 650 milliGRAM(s) Oral every 6 hours PRN Temp greater or equal to 38C (100.4F), Mild Pain (1 - 3), Moderate Pain (4 - 6)  dextrose Oral Gel 15 Gram(s) Oral once PRN Blood Glucose LESS THAN 70 milliGRAM(s)/deciliter  ondansetron Injectable 4 milliGRAM(s) IV Push every 6 hours PRN Nausea and/or Vomiting  sodium chloride 0.9% lock flush 10 milliLiter(s) IV Push every 1 hour PRN Pre/post blood products, medications, blood draw, and to maintain line patency    Allergies    Allergy Status Unknown    Intolerances        LABS:                        8.0    7.85  )-----------( 126      ( 11 Feb 2025 05:15 )             24.0     02-11    135  |  102  |  8.1  ----------------------------<  128[H]  4.1   |  21.0[L]  |  0.41[L]    Ca    9.2      11 Feb 2025 05:15  Mg     1.8     02-11        Urinalysis Basic - ( 11 Feb 2025 05:15 )    Color: x / Appearance: x / SG: x / pH: x  Gluc: 128 mg/dL / Ketone: x  / Bili: x / Urobili: x   Blood: x / Protein: x / Nitrite: x   Leuk Esterase: x / RBC: x / WBC x   Sq Epi: x / Non Sq Epi: x / Bacteria: x      CAPILLARY BLOOD GLUCOSE      POCT Blood Glucose.: 148 mg/dL (11 Feb 2025 12:36)  POCT Blood Glucose.: 115 mg/dL (11 Feb 2025 08:32)  POCT Blood Glucose.: 142 mg/dL (10 Feb 2025 21:25)  POCT Blood Glucose.: 91 mg/dL (10 Feb 2025 16:54)      CULTURE DATA:    Culture - Blood (collected 02-02-25 @ 13:02)  Source: .Blood BLOOD  Final Report (02-07-25 @ 20:01):    No growth at 5 days    Culture - Blood (collected 02-02-25 @ 13:02)  Source: .Blood BLOOD  Gram Stain (02-03-25 @ 14:27):    Growth in aerobic bottle: Gram Negative Rods  Final Report (02-05-25 @ 09:16):    Growth in aerobic bottle: Escherichia coli    Direct identification is available within approximately 3-5    hours either by Blood Panel Multiplexed PCR or Direct    MALDI-TOF. Details: [email or web address]  Organism: Blood Culture PCR  Escherichia coli (02-05-25 @ 09:16)  Organism: Escherichia coli (02-05-25 @ 09:16)    Sensitivities:      Method Type: JOHANNA      -  Ampicillin: S <=8 These ampicillin results predict results for amoxicillin      -  Ampicillin/Sulbactam: S <=4/2      -  Aztreonam: S <=4      -  Cefazolin: S <=2      -  Cefepime: S <=2      -  Cefoxitin: S <=8      -  Ceftriaxone: S <=1      -  Ciprofloxacin: S <=0.25      -  Ertapenem: S <=0.5      -  Gentamicin: S <=2      -  Imipenem: S <=1      -  Levofloxacin: S <=0.5      -  Meropenem: S <=1      -  Piperacillin/Tazobactam: S <=8      -  Tobramycin: S <=2      -  Trimethoprim/Sulfamethoxazole: S <=0.5/9.5  Organism: Blood Culture PCR (02-05-25 @ 09:16)    Sensitivities:      Method Type: PCR      -  Escherichia coli: Detec    Culture - Urine (collected 02-02-25 @ 14:00)  Source: Clean Catch Clean Catch (Midstream)  Final Report (02-05-25 @ 11:59):    >100,000 CFU/ml Escherichia coli  Organism: Escherichia coli (02-05-25 @ 11:59)  Organism: Escherichia coli (02-05-25 @ 11:59)    Sensitivities:      Method Type: JOHANNA      -  Amoxicillin/Clavulanic Acid: S <=8/4      -  Ampicillin: S <=8 These ampicillin results predict results for amoxicillin      -  Ampicillin/Sulbactam: S <=4/2      -  Aztreonam: S <=4      -  Cefazolin: S <=2 For uncomplicated UTI with K. pneumoniae, E. coli, or P. mirablis: JOHANNA <=16 is sensitive and JOHANNA >=32 is resistant. This also predicts results for oral agents cefaclor, cefdinir, cefpodoxime, cefprozil, cefuroxime axetil, cephalexin and locarbef for uncomplicated UTI. Note that some isolates may be susceptible to these agents while testing resistant to cefazolin.      -  Cefepime: S <=2      -  Cefoxitin: S <=8      -  Ceftriaxone: S <=1      -  Cefuroxime: S <=4      -  Ciprofloxacin: S <=0.25      -  Ertapenem: S <=0.5      -  Gentamicin: S <=2      -  Imipenem: S <=1      -  Levofloxacin: S <=0.5      -  Meropenem: S <=1      -  Nitrofurantoin: S <=32 Should not be used to treat pyelonephritis      -  Piperacillin/Tazobactam: S <=8      -  Tobramycin: S <=2      -  Trimethoprim/Sulfamethoxazole: S <=0.5/9.5    Culture - Stool (collected 02-04-25 @ 13:20)  Source: .Stool  Final Report (02-06-25 @ 16:19):    No enteric pathogens isolated.    (Stool culture examined for Salmonella,    Shigella, Campylobacter, Aeromonas, Plesiomonas,    Vibrio, E.coli O157 and Yersinia)    Culture - Blood (collected 02-06-25 @ 02:32)  Source: .Blood BLOOD  Final Report (02-11-25 @ 06:00):    No growth at 5 days        RADIOLOGY & ADDITIONAL TESTS:    < from: CT Head No Cont (02.06.25 @ 21:24) >  FINDINGS:  There is periventricular and subcortical white matter hypodensity without   mass effect, nonspecific, likely representing mild chronic microvascular   ischemic changes. There is no compelling evidencefor an acute   transcortical infarction. There is no evidence of mass, mass effect,   midline shift or extra-axial fluid collection. The lateral ventricles and   cortical sulci are age-appropriate in size and configuration. Mild   inflammatory mucosal changes within air-fluid level are seen throughout   the various portions of the paranasal sinuses. The orbits and mastoid air   cells are unremarkable. The calvarium is intact. Consider MRI as   clinically warranted.    IMPRESSION:  Mild chronic microvascular changes without evidence of an   acute transcortical infarction or hemorrhage. Sinusitis.    < end of copied text >    < from: CT Abdomen and Pelvis No Cont (02.02.25 @ 18:19) >  FINDINGS:  CHEST:  LUNGS AND LARGE AIRWAYS: The tip of the endotracheal tube is above the   laurence. Bilateral lower lobe endobronchial mucoid impaction. Associated   bibasilar consolidation, right greater than left. Additional tree-in-bud   opacities throughout the right lung suggesting endobronchial spread of   infection.  PLEURA: No pleural abnormality.  VESSELS: Normal caliber aorta.  HEART: No cardiomegaly. No pericardial effusion. Coronary artery   calcifications are present.  MEDIASTINUM AND SOL: No adenopathy.  CHEST WALL AND LOWER NECK: No masses.    ABDOMEN AND PELVIS:  LIVER: Morphologic changes of cirrhosis.  BILE DUCTS: Nondilated.  GALLBLADDER: Gallstones.  SPLEEN: Normal.  PANCREAS: Diffuse peripancreatic edema compatible with acute pancreatitis.  ADRENALS: Normal.  KIDNEYS/URETERS: No hydronephrosis or urinary tract calculi. Right renal   cortical scarring and lobulation.    BLADDER: Collapsed around a Soliman catheter balloon.  REPRODUCTIVE ORGANS: Small calcified fibroid.    BOWEL: The NG tube is in the stomach. No bowel obstruction. Edematous   colonic wall from the ascending to the transverse to the descending colon   suggestive of colitis. Normal appendix.  PERITONEUM: Trace ascites. No free air.  VESSELS: Normal caliber aorta.  RETROPERITONEUM/LYMPH NODES: No adenopathy or hematoma.  ABDOMINAL WALL: Small umbilical hernia containing unobstructed small   bowel.  BONES: No aggressive lesion.    IMPRESSION:  *  Bibasilar bronchial mucoid impaction with bibasilar consolidation,   right greater than left. Correlate for pneumonia. Additional tree-in-bud   opacities throughout the right lung suggesting endobronchial spread of   infection.  *  No significant ascites.  *  Diffuse peripancreatic edema compatible with acute pancreatitis.  *  Pancolitis.      --- End of Report ---      < end of copied text >

## 2025-02-11 NOTE — PROGRESS NOTE ADULT - SUBJECTIVE AND OBJECTIVE BOX
INTERVAL EVENTS:  Follow up diabetes management, glucoses stable.   Son at bedside to assist with translation. Pt denies acute complaints, tolerating diet.    MEDICATIONS  (STANDING):  cefTRIAXone Injectable. 2000 milliGRAM(s) IV Push every 24 hours  chlorhexidine 2% Cloths 1 Application(s) Topical daily  chlorhexidine 4% Liquid 1 Application(s) Topical <User Schedule>  dextrose 50% Injectable 25 Gram(s) IV Push once  dextrose 50% Injectable 12.5 Gram(s) IV Push once  dextrose 50% Injectable 25 Gram(s) IV Push once  insulin glargine Injectable (LANTUS) 20 Unit(s) SubCutaneous at bedtime  insulin glargine Injectable (LANTUS) 20 Unit(s) SubCutaneous before breakfast  insulin lispro (ADMELOG) corrective regimen sliding scale   SubCutaneous Before meals and at bedtime  insulin lispro Injectable (ADMELOG) 5 Unit(s) SubCutaneous three times a day before meals  levothyroxine 50 MICROGram(s) Oral daily  pantoprazole    Tablet 40 milliGRAM(s) Oral daily  rifAXIMin 550 milliGRAM(s) Oral every 12 hours    MEDICATIONS  (PRN):  acetaminophen     Tablet .. 650 milliGRAM(s) Oral every 6 hours PRN Temp greater or equal to 38C (100.4F), Mild Pain (1 - 3), Moderate Pain (4 - 6)  dextrose Oral Gel 15 Gram(s) Oral once PRN Blood Glucose LESS THAN 70 milliGRAM(s)/deciliter  ondansetron Injectable 4 milliGRAM(s) IV Push every 6 hours PRN Nausea and/or Vomiting  sodium chloride 0.9% lock flush 10 milliLiter(s) IV Push every 1 hour PRN Pre/post blood products, medications, blood draw, and to maintain line patency    Allergies  Allergy Status Unknown    Vital Signs Last 24 Hrs  T(C): 37 (11 Feb 2025 08:35), Max: 37.1 (10 Feb 2025 20:00)  T(F): 98.6 (11 Feb 2025 08:35), Max: 98.7 (10 Feb 2025 20:00)  HR: 75 (11 Feb 2025 08:35) (74 - 92)  BP: 144/87 (11 Feb 2025 08:35) (134/72 - 156/79)  BP(mean): --  RR: 18 (11 Feb 2025 08:35) (18 - 18)  SpO2: 96% (11 Feb 2025 08:35) (94% - 97%)    Parameters below as of 11 Feb 2025 08:35  Patient On (Oxygen Delivery Method): room air    PHYSICAL EXAM:  General: No apparent distress  Respiratory: Lungs clear bilaterally  Cardiac: +S1, S2, no m/r/g  GI: +BS, soft, non tender, non distended  Extremities: No peripheral edema, no pedal lesions  Neuro: A+O X3    LABS:                        8.0    7.85  )-----------( 126      ( 11 Feb 2025 05:15 )             24.0     02-11    135  |  102  |  8.1  ----------------------------<  128[H]  4.1   |  21.0[L]  |  0.41[L]    Ca    9.2      11 Feb 2025 05:15  Mg     1.8     02-11      Urinalysis Basic - ( 11 Feb 2025 05:15 )    Color: x / Appearance: x / SG: x / pH: x  Gluc: 128 mg/dL / Ketone: x  / Bili: x / Urobili: x   Blood: x / Protein: x / Nitrite: x   Leuk Esterase: x / RBC: x / WBC x   Sq Epi: x / Non Sq Epi: x / Bacteria: x    POCT Blood Glucose.: 115 mg/dL (02-11-25 @ 08:32)  POCT Blood Glucose.: 142 mg/dL (02-10-25 @ 21:25)  POCT Blood Glucose.: 91 mg/dL (02-10-25 @ 16:54)  POCT Blood Glucose.: 132 mg/dL (02-10-25 @ 11:50)    Free Thyroxine, Serum: 0.7 ng/dL (02-08-25 @ 05:18)  Thyroid Stimulating Hormone, Serum: 5.27 uIU/mL (02-06-25 @ 18:29)

## 2025-02-12 LAB
ALBUMIN SERPL ELPH-MCNC: 4 G/DL — SIGNIFICANT CHANGE UP (ref 3.3–5.2)
ALP SERPL-CCNC: 448 U/L — HIGH (ref 40–120)
ALT FLD-CCNC: 22 U/L — SIGNIFICANT CHANGE UP
ANION GAP SERPL CALC-SCNC: 13 MMOL/L — SIGNIFICANT CHANGE UP (ref 5–17)
AST SERPL-CCNC: 47 U/L — HIGH
BASOPHILS # BLD AUTO: 0.05 K/UL — SIGNIFICANT CHANGE UP (ref 0–0.2)
BASOPHILS NFR BLD AUTO: 0.6 % — SIGNIFICANT CHANGE UP (ref 0–2)
BILIRUB SERPL-MCNC: 1.8 MG/DL — SIGNIFICANT CHANGE UP (ref 0.4–2)
BUN SERPL-MCNC: 7.3 MG/DL — LOW (ref 8–20)
CALCIUM SERPL-MCNC: 9.9 MG/DL — SIGNIFICANT CHANGE UP (ref 8.4–10.5)
CHLORIDE SERPL-SCNC: 100 MMOL/L — SIGNIFICANT CHANGE UP (ref 96–108)
CO2 SERPL-SCNC: 19 MMOL/L — LOW (ref 22–29)
CREAT SERPL-MCNC: 0.46 MG/DL — LOW (ref 0.5–1.3)
EGFR: 105 ML/MIN/1.73M2 — SIGNIFICANT CHANGE UP
EOSINOPHIL # BLD AUTO: 0.03 K/UL — SIGNIFICANT CHANGE UP (ref 0–0.5)
EOSINOPHIL NFR BLD AUTO: 0.4 % — SIGNIFICANT CHANGE UP (ref 0–6)
GLUCOSE BLDC GLUCOMTR-MCNC: 147 MG/DL — HIGH (ref 70–99)
GLUCOSE BLDC GLUCOMTR-MCNC: 156 MG/DL — HIGH (ref 70–99)
GLUCOSE BLDC GLUCOMTR-MCNC: 225 MG/DL — HIGH (ref 70–99)
GLUCOSE BLDC GLUCOMTR-MCNC: 88 MG/DL — SIGNIFICANT CHANGE UP (ref 70–99)
GLUCOSE SERPL-MCNC: 80 MG/DL — SIGNIFICANT CHANGE UP (ref 70–99)
HCT VFR BLD CALC: 30.7 % — LOW (ref 34.5–45)
HGB BLD-MCNC: 9.6 G/DL — LOW (ref 11.5–15.5)
IMM GRANULOCYTES # BLD AUTO: 0.06 K/UL — SIGNIFICANT CHANGE UP (ref 0–0.07)
IMM GRANULOCYTES NFR BLD AUTO: 0.7 % — SIGNIFICANT CHANGE UP (ref 0–0.9)
LYMPHOCYTES # BLD AUTO: 1.21 K/UL — SIGNIFICANT CHANGE UP (ref 1–3.3)
LYMPHOCYTES NFR BLD AUTO: 15.1 % — SIGNIFICANT CHANGE UP (ref 13–44)
MAGNESIUM SERPL-MCNC: 1.7 MG/DL — SIGNIFICANT CHANGE UP (ref 1.6–2.6)
MCHC RBC-ENTMCNC: 31.3 G/DL — LOW (ref 32–36)
MCHC RBC-ENTMCNC: 31.9 PG — SIGNIFICANT CHANGE UP (ref 27–34)
MCV RBC AUTO: 102 FL — HIGH (ref 80–100)
MONOCYTES # BLD AUTO: 0.55 K/UL — SIGNIFICANT CHANGE UP (ref 0–0.9)
MONOCYTES NFR BLD AUTO: 6.9 % — SIGNIFICANT CHANGE UP (ref 2–14)
NEUTROPHILS # BLD AUTO: 6.11 K/UL — SIGNIFICANT CHANGE UP (ref 1.8–7.4)
NEUTROPHILS NFR BLD AUTO: 76.3 % — SIGNIFICANT CHANGE UP (ref 43–77)
NRBC # BLD AUTO: 0 K/UL — SIGNIFICANT CHANGE UP (ref 0–0)
NRBC # FLD: 0 K/UL — SIGNIFICANT CHANGE UP (ref 0–0)
NRBC BLD AUTO-RTO: 0 /100 WBCS — SIGNIFICANT CHANGE UP (ref 0–0)
PLATELET # BLD AUTO: 173 K/UL — SIGNIFICANT CHANGE UP (ref 150–400)
PMV BLD: 11.6 FL — SIGNIFICANT CHANGE UP (ref 7–13)
POTASSIUM SERPL-MCNC: 4.1 MMOL/L — SIGNIFICANT CHANGE UP (ref 3.5–5.3)
POTASSIUM SERPL-SCNC: 4.1 MMOL/L — SIGNIFICANT CHANGE UP (ref 3.5–5.3)
PROT SERPL-MCNC: 8.3 G/DL — SIGNIFICANT CHANGE UP (ref 6.6–8.7)
RBC # BLD: 3.01 M/UL — LOW (ref 3.8–5.2)
RBC # FLD: 18.4 % — HIGH (ref 10.3–14.5)
SODIUM SERPL-SCNC: 132 MMOL/L — LOW (ref 135–145)
WBC # BLD: 8.01 K/UL — SIGNIFICANT CHANGE UP (ref 3.8–10.5)
WBC # FLD AUTO: 8.01 K/UL — SIGNIFICANT CHANGE UP (ref 3.8–10.5)

## 2025-02-12 PROCEDURE — 99233 SBSQ HOSP IP/OBS HIGH 50: CPT

## 2025-02-12 PROCEDURE — 99233 SBSQ HOSP IP/OBS HIGH 50: CPT | Mod: GC

## 2025-02-12 RX ADMIN — INSULIN GLARGINE-YFGN 20 UNIT(S): 100 INJECTION, SOLUTION SUBCUTANEOUS at 08:55

## 2025-02-12 RX ADMIN — Medication 4: at 14:15

## 2025-02-12 RX ADMIN — Medication 2: at 21:17

## 2025-02-12 RX ADMIN — ANTISEPTIC SURGICAL SCRUB 1 APPLICATION(S): 0.04 SOLUTION TOPICAL at 06:27

## 2025-02-12 RX ADMIN — CEFTRIAXONE 2000 MILLIGRAM(S): 250 INJECTION, POWDER, FOR SOLUTION INTRAMUSCULAR; INTRAVENOUS at 14:15

## 2025-02-12 RX ADMIN — ANTISEPTIC SURGICAL SCRUB 1 APPLICATION(S): 0.04 SOLUTION TOPICAL at 08:55

## 2025-02-12 RX ADMIN — Medication 5 UNIT(S): at 14:15

## 2025-02-12 RX ADMIN — ENOXAPARIN SODIUM 40 MILLIGRAM(S): 100 INJECTION SUBCUTANEOUS at 14:15

## 2025-02-12 RX ADMIN — Medication 5 UNIT(S): at 17:05

## 2025-02-12 RX ADMIN — LEVOTHYROXINE SODIUM 50 MICROGRAM(S): 25 TABLET ORAL at 06:28

## 2025-02-12 RX ADMIN — Medication 5 UNIT(S): at 08:55

## 2025-02-12 RX ADMIN — INSULIN GLARGINE-YFGN 20 UNIT(S): 100 INJECTION, SOLUTION SUBCUTANEOUS at 21:18

## 2025-02-12 RX ADMIN — PANTOPRAZOLE 40 MILLIGRAM(S): 20 TABLET, DELAYED RELEASE ORAL at 14:15

## 2025-02-12 NOTE — PROGRESS NOTE ADULT - SUBJECTIVE AND OBJECTIVE BOX
INTERVAL EVENTS:  Follow up diabetes management, glucoses acceptable. Denies acute pain.    MEDICATIONS  (STANDING):  cefTRIAXone Injectable. 2000 milliGRAM(s) IV Push every 24 hours  chlorhexidine 2% Cloths 1 Application(s) Topical daily  chlorhexidine 4% Liquid 1 Application(s) Topical <User Schedule>  dextrose 50% Injectable 25 Gram(s) IV Push once  dextrose 50% Injectable 12.5 Gram(s) IV Push once  dextrose 50% Injectable 25 Gram(s) IV Push once  enoxaparin Injectable 40 milliGRAM(s) SubCutaneous every 24 hours  insulin glargine Injectable (LANTUS) 20 Unit(s) SubCutaneous at bedtime  insulin glargine Injectable (LANTUS) 20 Unit(s) SubCutaneous before breakfast  insulin lispro (ADMELOG) corrective regimen sliding scale   SubCutaneous Before meals and at bedtime  insulin lispro Injectable (ADMELOG) 5 Unit(s) SubCutaneous three times a day before meals  levothyroxine 50 MICROGram(s) Oral daily  pantoprazole    Tablet 40 milliGRAM(s) Oral daily  rifAXIMin 550 milliGRAM(s) Oral every 12 hours    MEDICATIONS  (PRN):  acetaminophen     Tablet .. 650 milliGRAM(s) Oral every 6 hours PRN Temp greater or equal to 38C (100.4F), Mild Pain (1 - 3), Moderate Pain (4 - 6)  dextrose Oral Gel 15 Gram(s) Oral once PRN Blood Glucose LESS THAN 70 milliGRAM(s)/deciliter  ondansetron Injectable 4 milliGRAM(s) IV Push every 6 hours PRN Nausea and/or Vomiting  sodium chloride 0.9% lock flush 10 milliLiter(s) IV Push every 1 hour PRN Pre/post blood products, medications, blood draw, and to maintain line patency    Allergies  Allergy Status Unknown    Vital Signs Last 24 Hrs  T(C): 37.1 (12 Feb 2025 10:18), Max: 37.1 (12 Feb 2025 10:18)  T(F): 98.8 (12 Feb 2025 10:18), Max: 98.8 (12 Feb 2025 10:18)  HR: 79 (12 Feb 2025 10:18) (79 - 84)  BP: 127/69 (12 Feb 2025 10:18) (127/69 - 145/75)  BP(mean): --  RR: 18 (12 Feb 2025 10:18) (18 - 18)  SpO2: 96% (12 Feb 2025 10:18) (96% - 98%)    Parameters below as of 12 Feb 2025 10:18  Patient On (Oxygen Delivery Method): room air    PHYSICAL EXAM:  General: No apparent distress  Respiratory: Lungs clear bilaterally  Cardiac: +S1, S2, no m/r/g  GI: +BS, soft, non tender, non distended  Extremities: No peripheral edema  Neuro: Awake, follows commands    LABS:                        9.6    8.01  )-----------( 173      ( 12 Feb 2025 10:17 )             30.7     02-12    132[L]  |  100  |  7.3[L]  ----------------------------<  80  4.1   |  19.0[L]  |  0.46[L]    Ca    9.9      12 Feb 2025 10:17  Mg     1.7     02-12    TPro  8.3  /  Alb  4.0  /  TBili  1.8  /  DBili  x   /  AST  47[H]  /  ALT  22  /  AlkPhos  448[H]  02-12    Urinalysis Basic - ( 12 Feb 2025 10:17 )    Color: x / Appearance: x / SG: x / pH: x  Gluc: 80 mg/dL / Ketone: x  / Bili: x / Urobili: x   Blood: x / Protein: x / Nitrite: x   Leuk Esterase: x / RBC: x / WBC x   Sq Epi: x / Non Sq Epi: x / Bacteria: x    POCT Blood Glucose.: 88 mg/dL (02-12-25 @ 08:54)  POCT Blood Glucose.: 205 mg/dL (02-11-25 @ 21:12)  POCT Blood Glucose.: 205 mg/dL (02-11-25 @ 18:04)  POCT Blood Glucose.: 148 mg/dL (02-11-25 @ 12:36)    Free Thyroxine, Serum: 0.7 ng/dL (02-08-25 @ 05:18)  Thyroid Stimulating Hormone, Serum: 5.27 uIU/mL (02-06-25 @ 18:29)

## 2025-02-12 NOTE — PROGRESS NOTE ADULT - SUBJECTIVE AND OBJECTIVE BOX
SUBJECTIVE    BRIEF HOSPITAL COURSE SUMMARY:  Pt is a 69y Wolof speaking Female with a PMHx of IDDM and cirrhosis who was BIBA for AMS noted by the patient's son. PTA patient was sick and bed bound for 2 days before her family called EMS. In ED, patient was noted to be obtunded and was intubated for airway protection. Further investigations revealed that the patient was in DKA with BGL > 600 and an incalculablle AG. Admitted to MICU for insulin gtt, where the patient was found to also have RLL PNA, pyelo, Colitis and Gallstone pancreatitis. Blood cx were positive for pansensitive Ecoli. Patient was treated with broad spectrum abx, and was in triple pressor shock.     LAST 24 HOURS:  TODAY:    OBJECTIVE    PHYSICAL EXAM:  GENERAL: No acute distress, comfortably in bed  HEENT: Atraumatic, normocephalic, non-icteric, no JVD  NEURO: A&Ox3, no focal deficits, moving all extremities spontaneously, CN II-XII grossly intact  PSYCH: Normal affect, calm, appropriate insight and judgment, fluent speech  LUNGS: CTAB, no wrr, non-labored breathing  HEART: RRR, no murmur appreciated  ABD: Soft, non-tender, non-distended, no organomegaly, no appreciable masses, +bs   EXTREMITIES: Nontender, no clubbing, cyanosis, or edema         Vital Signs Last 24 Hrs  T(C): 37 (12 Feb 2025 04:19), Max: 37 (11 Feb 2025 08:35)  T(F): 98.6 (12 Feb 2025 04:19), Max: 98.6 (11 Feb 2025 08:35)  HR: 79 (12 Feb 2025 04:19) (75 - 84)  BP: 145/75 (12 Feb 2025 04:19) (142/77 - 145/75)  BP(mean): --  RR: 18 (12 Feb 2025 04:19) (18 - 18)  SpO2: 98% (12 Feb 2025 04:19) (96% - 98%)    Parameters below as of 12 Feb 2025 04:19  Patient On (Oxygen Delivery Method): room air            MEDICATIONS  (STANDING):  cefTRIAXone Injectable. 2000 milliGRAM(s) IV Push every 24 hours  chlorhexidine 2% Cloths 1 Application(s) Topical daily  chlorhexidine 4% Liquid 1 Application(s) Topical <User Schedule>  dextrose 50% Injectable 25 Gram(s) IV Push once  dextrose 50% Injectable 12.5 Gram(s) IV Push once  dextrose 50% Injectable 25 Gram(s) IV Push once  enoxaparin Injectable 40 milliGRAM(s) SubCutaneous every 24 hours  insulin glargine Injectable (LANTUS) 20 Unit(s) SubCutaneous at bedtime  insulin glargine Injectable (LANTUS) 20 Unit(s) SubCutaneous before breakfast  insulin lispro (ADMELOG) corrective regimen sliding scale   SubCutaneous Before meals and at bedtime  insulin lispro Injectable (ADMELOG) 5 Unit(s) SubCutaneous three times a day before meals  levothyroxine 50 MICROGram(s) Oral daily  pantoprazole    Tablet 40 milliGRAM(s) Oral daily  rifAXIMin 550 milliGRAM(s) Oral every 12 hours    MEDICATIONS  (PRN):  acetaminophen     Tablet .. 650 milliGRAM(s) Oral every 6 hours PRN Temp greater or equal to 38C (100.4F), Mild Pain (1 - 3), Moderate Pain (4 - 6)  dextrose Oral Gel 15 Gram(s) Oral once PRN Blood Glucose LESS THAN 70 milliGRAM(s)/deciliter  ondansetron Injectable 4 milliGRAM(s) IV Push every 6 hours PRN Nausea and/or Vomiting  sodium chloride 0.9% lock flush 10 milliLiter(s) IV Push every 1 hour PRN Pre/post blood products, medications, blood draw, and to maintain line patency    Allergies    Allergy Status Unknown    Intolerances        LABS:                        8.0    7.85  )-----------( 126      ( 11 Feb 2025 05:15 )             24.0     02-11    135  |  102  |  8.1  ----------------------------<  128[H]  4.1   |  21.0[L]  |  0.41[L]    Ca    9.2      11 Feb 2025 05:15  Mg     1.8     02-11        Urinalysis Basic - ( 11 Feb 2025 05:15 )    Color: x / Appearance: x / SG: x / pH: x  Gluc: 128 mg/dL / Ketone: x  / Bili: x / Urobili: x   Blood: x / Protein: x / Nitrite: x   Leuk Esterase: x / RBC: x / WBC x   Sq Epi: x / Non Sq Epi: x / Bacteria: x      CAPILLARY BLOOD GLUCOSE      POCT Blood Glucose.: 205 mg/dL (11 Feb 2025 21:12)  POCT Blood Glucose.: 205 mg/dL (11 Feb 2025 18:04)  POCT Blood Glucose.: 148 mg/dL (11 Feb 2025 12:36)  POCT Blood Glucose.: 115 mg/dL (11 Feb 2025 08:32)      CULTURE DATA:    Culture - Blood (collected 02-02-25 @ 13:02)  Source: .Blood BLOOD  Final Report (02-07-25 @ 20:01):    No growth at 5 days    Culture - Blood (collected 02-02-25 @ 13:02)  Source: .Blood BLOOD  Gram Stain (02-03-25 @ 14:27):    Growth in aerobic bottle: Gram Negative Rods  Final Report (02-05-25 @ 09:16):    Growth in aerobic bottle: Escherichia coli    Direct identification is available within approximately 3-5    hours either by Blood Panel Multiplexed PCR or Direct    MALDI-TOF. Details: [email or web address]  Organism: Blood Culture PCR  Escherichia coli (02-05-25 @ 09:16)  Organism: Escherichia coli (02-05-25 @ 09:16)    Sensitivities:      Method Type: JOHANNA      -  Ampicillin: S <=8 These ampicillin results predict results for amoxicillin      -  Ampicillin/Sulbactam: S <=4/2      -  Aztreonam: S <=4      -  Cefazolin: S <=2      -  Cefepime: S <=2      -  Cefoxitin: S <=8      -  Ceftriaxone: S <=1      -  Ciprofloxacin: S <=0.25      -  Ertapenem: S <=0.5      -  Gentamicin: S <=2      -  Imipenem: S <=1      -  Levofloxacin: S <=0.5      -  Meropenem: S <=1      -  Piperacillin/Tazobactam: S <=8      -  Tobramycin: S <=2      -  Trimethoprim/Sulfamethoxazole: S <=0.5/9.5  Organism: Blood Culture PCR (02-05-25 @ 09:16)    Sensitivities:      Method Type: PCR      -  Escherichia coli: Detec    Culture - Urine (collected 02-02-25 @ 14:00)  Source: Clean Catch Clean Catch (Midstream)  Final Report (02-05-25 @ 11:59):    >100,000 CFU/ml Escherichia coli  Organism: Escherichia coli (02-05-25 @ 11:59)  Organism: Escherichia coli (02-05-25 @ 11:59)    Sensitivities:      Method Type: JOHANNA      -  Amoxicillin/Clavulanic Acid: S <=8/4      -  Ampicillin: S <=8 These ampicillin results predict results for amoxicillin      -  Ampicillin/Sulbactam: S <=4/2      -  Aztreonam: S <=4      -  Cefazolin: S <=2 For uncomplicated UTI with K. pneumoniae, E. coli, or P. mirablis: JOHANNA <=16 is sensitive and JOHANNA >=32 is resistant. This also predicts results for oral agents cefaclor, cefdinir, cefpodoxime, cefprozil, cefuroxime axetil, cephalexin and locarbef for uncomplicated UTI. Note that some isolates may be susceptible to these agents while testing resistant to cefazolin.      -  Cefepime: S <=2      -  Cefoxitin: S <=8      -  Ceftriaxone: S <=1      -  Cefuroxime: S <=4      -  Ciprofloxacin: S <=0.25      -  Ertapenem: S <=0.5      -  Gentamicin: S <=2      -  Imipenem: S <=1      -  Levofloxacin: S <=0.5      -  Meropenem: S <=1      -  Nitrofurantoin: S <=32 Should not be used to treat pyelonephritis      -  Piperacillin/Tazobactam: S <=8      -  Tobramycin: S <=2      -  Trimethoprim/Sulfamethoxazole: S <=0.5/9.5    Culture - Stool (collected 02-04-25 @ 13:20)  Source: .Stool  Final Report (02-06-25 @ 16:19):    No enteric pathogens isolated.    (Stool culture examined for Salmonella,    Shigella, Campylobacter, Aeromonas, Plesiomonas,    Vibrio, E.coli O157 and Yersinia)    Culture - Blood (collected 02-06-25 @ 02:32)  Source: .Blood BLOOD  Final Report (02-11-25 @ 06:00):    No growth at 5 days        RADIOLOGY & ADDITIONAL TESTS:    < from: CT Head No Cont (02.06.25 @ 21:24) >  FINDINGS:  There is periventricular and subcortical white matter hypodensity without   mass effect, nonspecific, likely representing mild chronic microvascular   ischemic changes. There is no compelling evidencefor an acute   transcortical infarction. There is no evidence of mass, mass effect,   midline shift or extra-axial fluid collection. The lateral ventricles and   cortical sulci are age-appropriate in size and configuration. Mild   inflammatory mucosal changes within air-fluid level are seen throughout   the various portions of the paranasal sinuses. The orbits and mastoid air   cells are unremarkable. The calvarium is intact. Consider MRI as   clinically warranted.    IMPRESSION:  Mild chronic microvascular changes without evidence of an   acute transcortical infarction or hemorrhage. Sinusitis.    < end of copied text >    < from: CT Abdomen and Pelvis No Cont (02.02.25 @ 18:19) >  FINDINGS:  CHEST:  LUNGS AND LARGE AIRWAYS: The tip of the endotracheal tube is above the   laurence. Bilateral lower lobe endobronchial mucoid impaction. Associated   bibasilar consolidation, right greater than left. Additional tree-in-bud   opacities throughout the right lung suggesting endobronchial spread of   infection.  PLEURA: No pleural abnormality.  VESSELS: Normal caliber aorta.  HEART: No cardiomegaly. No pericardial effusion. Coronary artery   calcifications are present.  MEDIASTINUM AND SOL: No adenopathy.  CHEST WALL AND LOWER NECK: No masses.    ABDOMEN AND PELVIS:  LIVER: Morphologic changes of cirrhosis.  BILE DUCTS: Nondilated.  GALLBLADDER: Gallstones.  SPLEEN: Normal.  PANCREAS: Diffuse peripancreatic edema compatible with acute pancreatitis.  ADRENALS: Normal.  KIDNEYS/URETERS: No hydronephrosis or urinary tract calculi. Right renal   cortical scarring and lobulation.    BLADDER: Collapsed around a Soliman catheter balloon.  REPRODUCTIVE ORGANS: Small calcified fibroid.    BOWEL: The NG tube is in the stomach. No bowel obstruction. Edematous   colonic wall from the ascending to the transverse to the descending colon   suggestive of colitis. Normal appendix.  PERITONEUM: Trace ascites. No free air.  VESSELS: Normal caliber aorta.  RETROPERITONEUM/LYMPH NODES: No adenopathy or hematoma.  ABDOMINAL WALL: Small umbilical hernia containing unobstructed small   bowel.  BONES: No aggressive lesion.    IMPRESSION:  *  Bibasilar bronchial mucoid impaction with bibasilar consolidation,   right greater than left. Correlate for pneumonia. Additional tree-in-bud   opacities throughout the right lung suggesting endobronchial spread of   infection.  *  No significant ascites.  *  Diffuse peripancreatic edema compatible with acute pancreatitis.  *  Pancolitis.      --- End of Report ---      < end of copied text >   SUBJECTIVE    BRIEF HOSPITAL COURSE SUMMARY:  Pt is a 69y [place]ian speaking Female with a PMHx of IDDM and cirrhosis who was BIBA for AMS noted by the patient's son. PTA patient was sick and bed bound for 2 days before her family called EMS. In ED, patient was noted to be obtunded and was intubated for airway protection. Further investigations revealed that the patient was in DKA with BGL > 600 and an incalculablle AG. Admitted to MICU for insulin gtt, where the patient was found to also have RLL PNA, pyelo, Colitis and Gallstone pancreatitis. Blood cx were positive for pansensitive Ecoli. Patient was treated with broad spectrum abx, and was in triple pressor shock.     LAST 24 HOURS: No acute events.     TODAY: Pt seen at bedside this AM. Pt son present at bedside, patient prefers son translates. Offers no acute complaints & ROS otherwise negative.     OBJECTIVE    PHYSICAL EXAM:  GENERAL: No acute distress, comfortably in bed  HEENT: Atraumatic, normocephalic, non-icteric, no JVD  NEURO: A&Ox3, no focal deficits, moving all extremities spontaneously, CN II-XII grossly intact  PSYCH: Normal affect, calm, appropriate insight and judgment, fluent speech  LUNGS: CTAB, no wrr, non-labored breathing  HEART: RRR, no murmur appreciated  ABD: Soft, non-tender, non-distended, no organomegaly, no appreciable masses, +bs   EXTREMITIES: Nontender, no clubbing, cyanosis, or edema         Vital Signs Last 24 Hrs  T(C): 37 (12 Feb 2025 04:19), Max: 37 (11 Feb 2025 08:35)  T(F): 98.6 (12 Feb 2025 04:19), Max: 98.6 (11 Feb 2025 08:35)  HR: 79 (12 Feb 2025 04:19) (75 - 84)  BP: 145/75 (12 Feb 2025 04:19) (142/77 - 145/75)  BP(mean): --  RR: 18 (12 Feb 2025 04:19) (18 - 18)  SpO2: 98% (12 Feb 2025 04:19) (96% - 98%)    Parameters below as of 12 Feb 2025 04:19  Patient On (Oxygen Delivery Method): room air            MEDICATIONS  (STANDING):  cefTRIAXone Injectable. 2000 milliGRAM(s) IV Push every 24 hours  chlorhexidine 2% Cloths 1 Application(s) Topical daily  chlorhexidine 4% Liquid 1 Application(s) Topical <User Schedule>  dextrose 50% Injectable 25 Gram(s) IV Push once  dextrose 50% Injectable 12.5 Gram(s) IV Push once  dextrose 50% Injectable 25 Gram(s) IV Push once  enoxaparin Injectable 40 milliGRAM(s) SubCutaneous every 24 hours  insulin glargine Injectable (LANTUS) 20 Unit(s) SubCutaneous at bedtime  insulin glargine Injectable (LANTUS) 20 Unit(s) SubCutaneous before breakfast  insulin lispro (ADMELOG) corrective regimen sliding scale   SubCutaneous Before meals and at bedtime  insulin lispro Injectable (ADMELOG) 5 Unit(s) SubCutaneous three times a day before meals  levothyroxine 50 MICROGram(s) Oral daily  pantoprazole    Tablet 40 milliGRAM(s) Oral daily  rifAXIMin 550 milliGRAM(s) Oral every 12 hours    MEDICATIONS  (PRN):  acetaminophen     Tablet .. 650 milliGRAM(s) Oral every 6 hours PRN Temp greater or equal to 38C (100.4F), Mild Pain (1 - 3), Moderate Pain (4 - 6)  dextrose Oral Gel 15 Gram(s) Oral once PRN Blood Glucose LESS THAN 70 milliGRAM(s)/deciliter  ondansetron Injectable 4 milliGRAM(s) IV Push every 6 hours PRN Nausea and/or Vomiting  sodium chloride 0.9% lock flush 10 milliLiter(s) IV Push every 1 hour PRN Pre/post blood products, medications, blood draw, and to maintain line patency    Allergies    Allergy Status Unknown    Intolerances        LABS:                        8.0    7.85  )-----------( 126      ( 11 Feb 2025 05:15 )             24.0     02-11    135  |  102  |  8.1  ----------------------------<  128[H]  4.1   |  21.0[L]  |  0.41[L]    Ca    9.2      11 Feb 2025 05:15  Mg     1.8     02-11        Urinalysis Basic - ( 11 Feb 2025 05:15 )    Color: x / Appearance: x / SG: x / pH: x  Gluc: 128 mg/dL / Ketone: x  / Bili: x / Urobili: x   Blood: x / Protein: x / Nitrite: x   Leuk Esterase: x / RBC: x / WBC x   Sq Epi: x / Non Sq Epi: x / Bacteria: x      CAPILLARY BLOOD GLUCOSE      POCT Blood Glucose.: 205 mg/dL (11 Feb 2025 21:12)  POCT Blood Glucose.: 205 mg/dL (11 Feb 2025 18:04)  POCT Blood Glucose.: 148 mg/dL (11 Feb 2025 12:36)  POCT Blood Glucose.: 115 mg/dL (11 Feb 2025 08:32)      CULTURE DATA:    Culture - Blood (collected 02-02-25 @ 13:02)  Source: .Blood BLOOD  Final Report (02-07-25 @ 20:01):    No growth at 5 days    Culture - Blood (collected 02-02-25 @ 13:02)  Source: .Blood BLOOD  Gram Stain (02-03-25 @ 14:27):    Growth in aerobic bottle: Gram Negative Rods  Final Report (02-05-25 @ 09:16):    Growth in aerobic bottle: Escherichia coli    Direct identification is available within approximately 3-5    hours either by Blood Panel Multiplexed PCR or Direct    MALDI-TOF. Details: [email or web address]  Organism: Blood Culture PCR  Escherichia coli (02-05-25 @ 09:16)  Organism: Escherichia coli (02-05-25 @ 09:16)    Sensitivities:      Method Type: JOHANNA      -  Ampicillin: S <=8 These ampicillin results predict results for amoxicillin      -  Ampicillin/Sulbactam: S <=4/2      -  Aztreonam: S <=4      -  Cefazolin: S <=2      -  Cefepime: S <=2      -  Cefoxitin: S <=8      -  Ceftriaxone: S <=1      -  Ciprofloxacin: S <=0.25      -  Ertapenem: S <=0.5      -  Gentamicin: S <=2      -  Imipenem: S <=1      -  Levofloxacin: S <=0.5      -  Meropenem: S <=1      -  Piperacillin/Tazobactam: S <=8      -  Tobramycin: S <=2      -  Trimethoprim/Sulfamethoxazole: S <=0.5/9.5  Organism: Blood Culture PCR (02-05-25 @ 09:16)    Sensitivities:      Method Type: PCR      -  Escherichia coli: Detec    Culture - Urine (collected 02-02-25 @ 14:00)  Source: Clean Catch Clean Catch (Midstream)  Final Report (02-05-25 @ 11:59):    >100,000 CFU/ml Escherichia coli  Organism: Escherichia coli (02-05-25 @ 11:59)  Organism: Escherichia coli (02-05-25 @ 11:59)    Sensitivities:      Method Type: JOHANNA      -  Amoxicillin/Clavulanic Acid: S <=8/4      -  Ampicillin: S <=8 These ampicillin results predict results for amoxicillin      -  Ampicillin/Sulbactam: S <=4/2      -  Aztreonam: S <=4      -  Cefazolin: S <=2 For uncomplicated UTI with K. pneumoniae, E. coli, or P. mirablis: JOHANNA <=16 is sensitive and JOHANNA >=32 is resistant. This also predicts results for oral agents cefaclor, cefdinir, cefpodoxime, cefprozil, cefuroxime axetil, cephalexin and locarbef for uncomplicated UTI. Note that some isolates may be susceptible to these agents while testing resistant to cefazolin.      -  Cefepime: S <=2      -  Cefoxitin: S <=8      -  Ceftriaxone: S <=1      -  Cefuroxime: S <=4      -  Ciprofloxacin: S <=0.25      -  Ertapenem: S <=0.5      -  Gentamicin: S <=2      -  Imipenem: S <=1      -  Levofloxacin: S <=0.5      -  Meropenem: S <=1      -  Nitrofurantoin: S <=32 Should not be used to treat pyelonephritis      -  Piperacillin/Tazobactam: S <=8      -  Tobramycin: S <=2      -  Trimethoprim/Sulfamethoxazole: S <=0.5/9.5    Culture - Stool (collected 02-04-25 @ 13:20)  Source: .Stool  Final Report (02-06-25 @ 16:19):    No enteric pathogens isolated.    (Stool culture examined for Salmonella,    Shigella, Campylobacter, Aeromonas, Plesiomonas,    Vibrio, E.coli O157 and Yersinia)    Culture - Blood (collected 02-06-25 @ 02:32)  Source: .Blood BLOOD  Final Report (02-11-25 @ 06:00):    No growth at 5 days        RADIOLOGY & ADDITIONAL TESTS:    < from: CT Head No Cont (02.06.25 @ 21:24) >  FINDINGS:  There is periventricular and subcortical white matter hypodensity without   mass effect, nonspecific, likely representing mild chronic microvascular   ischemic changes. There is no compelling evidencefor an acute   transcortical infarction. There is no evidence of mass, mass effect,   midline shift or extra-axial fluid collection. The lateral ventricles and   cortical sulci are age-appropriate in size and configuration. Mild   inflammatory mucosal changes within air-fluid level are seen throughout   the various portions of the paranasal sinuses. The orbits and mastoid air   cells are unremarkable. The calvarium is intact. Consider MRI as   clinically warranted.    IMPRESSION:  Mild chronic microvascular changes without evidence of an   acute transcortical infarction or hemorrhage. Sinusitis.    < end of copied text >    < from: CT Abdomen and Pelvis No Cont (02.02.25 @ 18:19) >  FINDINGS:  CHEST:  LUNGS AND LARGE AIRWAYS: The tip of the endotracheal tube is above the   laurence. Bilateral lower lobe endobronchial mucoid impaction. Associated   bibasilar consolidation, right greater than left. Additional tree-in-bud   opacities throughout the right lung suggesting endobronchial spread of   infection.  PLEURA: No pleural abnormality.  VESSELS: Normal caliber aorta.  HEART: No cardiomegaly. No pericardial effusion. Coronary artery   calcifications are present.  MEDIASTINUM AND SOL: No adenopathy.  CHEST WALL AND LOWER NECK: No masses.    ABDOMEN AND PELVIS:  LIVER: Morphologic changes of cirrhosis.  BILE DUCTS: Nondilated.  GALLBLADDER: Gallstones.  SPLEEN: Normal.  PANCREAS: Diffuse peripancreatic edema compatible with acute pancreatitis.  ADRENALS: Normal.  KIDNEYS/URETERS: No hydronephrosis or urinary tract calculi. Right renal   cortical scarring and lobulation.    BLADDER: Collapsed around a Soliman catheter balloon.  REPRODUCTIVE ORGANS: Small calcified fibroid.    BOWEL: The NG tube is in the stomach. No bowel obstruction. Edematous   colonic wall from the ascending to the transverse to the descending colon   suggestive of colitis. Normal appendix.  PERITONEUM: Trace ascites. No free air.  VESSELS: Normal caliber aorta.  RETROPERITONEUM/LYMPH NODES: No adenopathy or hematoma.  ABDOMINAL WALL: Small umbilical hernia containing unobstructed small   bowel.  BONES: No aggressive lesion.    IMPRESSION:  *  Bibasilar bronchial mucoid impaction with bibasilar consolidation,   right greater than left. Correlate for pneumonia. Additional tree-in-bud   opacities throughout the right lung suggesting endobronchial spread of   infection.  *  No significant ascites.  *  Diffuse peripancreatic edema compatible with acute pancreatitis.  *  Pancolitis.      --- End of Report ---      < end of copied text >

## 2025-02-12 NOTE — PROGRESS NOTE ADULT - ASSESSMENT
71 y/o F (name: Laverne Lai)  with hx of DM and cirrhosis, was brought in by EMS for AMS at home.  PEr son, patient was sick in bed for past two days, and today patient's sister call 911 to bring her to hospital because she was waking up.  In the ED patient was obtunded, intubated for airway protection. Admitted to MICU with severe DKA, pancolitis, acute pancreatitis, UTI and pneumonia. Endocrine consulted for uncontrolled diabetes. A1c 15.6% on admission.    1. Uncontrolled T2DM s/p DKA, a1c 15.6%  - Glucoses are stable  - Continue lantus 20 units BID  - Continue admelog 5 units TID with meals  - Moderate correction scale  - Will need insulin on discharge, please review insulin teaching and diabetes education prior to discharge. If going to [facility], can discharge on current insulin regimen.    2. Hypothyroid  - On prior notes from 2022, was on levothyroxine 75mcg, unclear if on treatment prior to admission  - TSH 5.27 with low free thyroxine, negative TPO ab  - Started on PO levothyroxine 50mcg daily on 2/8/25    3. UTI, pneumonia  - On IV Ceftriaxone  - Care per primary team

## 2025-02-13 ENCOUNTER — TRANSCRIPTION ENCOUNTER (OUTPATIENT)
Age: 70
End: 2025-02-13

## 2025-02-13 LAB
GLUCOSE BLDC GLUCOMTR-MCNC: 107 MG/DL — HIGH (ref 70–99)
GLUCOSE BLDC GLUCOMTR-MCNC: 109 MG/DL — HIGH (ref 70–99)
GLUCOSE BLDC GLUCOMTR-MCNC: 179 MG/DL — HIGH (ref 70–99)
GLUCOSE BLDC GLUCOMTR-MCNC: 188 MG/DL — HIGH (ref 70–99)
GLUCOSE BLDC GLUCOMTR-MCNC: 73 MG/DL — SIGNIFICANT CHANGE UP (ref 70–99)

## 2025-02-13 PROCEDURE — 99232 SBSQ HOSP IP/OBS MODERATE 35: CPT | Mod: GC

## 2025-02-13 PROCEDURE — 99233 SBSQ HOSP IP/OBS HIGH 50: CPT

## 2025-02-13 RX ORDER — METRONIDAZOLE 500 MG
500 TABLET ORAL EVERY 8 HOURS
Refills: 0 | Status: DISCONTINUED | OUTPATIENT
Start: 2025-02-13 | End: 2025-02-14

## 2025-02-13 RX ORDER — CEFTRIAXONE 250 MG/1
2000 INJECTION, POWDER, FOR SOLUTION INTRAMUSCULAR; INTRAVENOUS EVERY 24 HOURS
Refills: 0 | Status: DISCONTINUED | OUTPATIENT
Start: 2025-02-13 | End: 2025-02-14

## 2025-02-13 RX ORDER — CEFTRIAXONE 250 MG/1
2000 INJECTION, POWDER, FOR SOLUTION INTRAMUSCULAR; INTRAVENOUS EVERY 24 HOURS
Refills: 0 | Status: DISCONTINUED | OUTPATIENT
Start: 2025-02-13 | End: 2025-02-13

## 2025-02-13 RX ORDER — INSULIN GLARGINE-YFGN 100 [IU]/ML
18 INJECTION, SOLUTION SUBCUTANEOUS AT BEDTIME
Refills: 0 | Status: DISCONTINUED | OUTPATIENT
Start: 2025-02-13 | End: 2025-02-14

## 2025-02-13 RX ADMIN — ENOXAPARIN SODIUM 40 MILLIGRAM(S): 100 INJECTION SUBCUTANEOUS at 14:35

## 2025-02-13 RX ADMIN — Medication 5 UNIT(S): at 09:35

## 2025-02-13 RX ADMIN — Medication 100 MILLIGRAM(S): at 21:59

## 2025-02-13 RX ADMIN — Medication 5 UNIT(S): at 17:54

## 2025-02-13 RX ADMIN — ANTISEPTIC SURGICAL SCRUB 1 APPLICATION(S): 0.04 SOLUTION TOPICAL at 09:35

## 2025-02-13 RX ADMIN — INSULIN GLARGINE-YFGN 20 UNIT(S): 100 INJECTION, SOLUTION SUBCUTANEOUS at 09:35

## 2025-02-13 RX ADMIN — INSULIN GLARGINE-YFGN 18 UNIT(S): 100 INJECTION, SOLUTION SUBCUTANEOUS at 21:59

## 2025-02-13 RX ADMIN — PANTOPRAZOLE 40 MILLIGRAM(S): 20 TABLET, DELAYED RELEASE ORAL at 14:30

## 2025-02-13 RX ADMIN — Medication 2: at 17:54

## 2025-02-13 RX ADMIN — Medication 2: at 11:50

## 2025-02-13 RX ADMIN — LEVOTHYROXINE SODIUM 50 MICROGRAM(S): 25 TABLET ORAL at 05:57

## 2025-02-13 RX ADMIN — Medication 5 UNIT(S): at 11:50

## 2025-02-13 RX ADMIN — Medication 100 MILLIGRAM(S): at 14:35

## 2025-02-13 RX ADMIN — ANTISEPTIC SURGICAL SCRUB 1 APPLICATION(S): 0.04 SOLUTION TOPICAL at 05:57

## 2025-02-13 NOTE — PROGRESS NOTE ADULT - ASSESSMENT
ASSESSMENT    70 y/o F w hx of IDDM (non-compliant to meds), cirrhosis admitted to MICU for DKA, UTI, E.coli bacteremia, acute pancreatitis, colitis and ? PNA. Patient required pressors and insulin drip as well as intubation during MICU stay. Patient now off pressors, on subcutaneous insulin, and on RA. Downgraded to general medical service on 2/8.    PROBLEM LIST    #DKA  -A1c 15.6  -s/p insulin drip (dc on 02/03)  -Accu checks and ISS  -Lantus 18 units QHS, Lantus 20 u AM   -Admelog 5 units premeals  -endocrinology on board, recs appreciated     #E.coli UTI  #E.coli bacteremia  -Repeat blood culture negative   -Discussed with ID, will complete 14 day course of abx with vantin and flagyl on dc, End date:   - Voiding freely    #PNA  -Continue Rocephin as above     #acute pancreatitis  #colitis  -C. Diff, GI PCR and Stool culture negative   -no abdominal pain  -rectal tube was discontinued on 2/8  -s/p IVF  -tolerating PO diet, encouraged for oral intake    #hypothyroidism  -continue synthroid 50mcg daily  -endocrine on board, recs appreciated     #cirrhosis   -s/p lactulose  -c/w Rifaximin  -ammonia levels now normal  -will need outpatient GI follow up    #anemia / thrombocytopenia   -no evidence of bleeding  -likely chronic disease (liver cirrhosis) / acute illness   -monitor     #Dysphagia  -Speech & Swallow following: pureed with mildly thick liquids     #Hypokalemia (resolved)  - Monitor and replete as needed    DVT Prophylaxis - Lovenox and SCDs    Dispo: Dc pending placement

## 2025-02-13 NOTE — DISCHARGE NOTE NURSING/CASE MANAGEMENT/SOCIAL WORK - PATIENT PORTAL LINK FT
You can access the FollowMyHealth Patient Portal offered by [facility] by registering at the following website: [email or web address]. By joining [facility]’s FollowMyHealth portal, you will also be able to view your health information using other applications (apps) compatible with our system.

## 2025-02-13 NOTE — PROGRESS NOTE ADULT - SUBJECTIVE AND OBJECTIVE BOX
INTERVAL HPI/OVERNIGHT EVENTS:  follow up diabetes management  family at bedside to translate    ROS: Denies chest pain or sob. endorses good appetite    MEDICATIONS  (STANDING):  cefTRIAXone Injectable. 2000 milliGRAM(s) IV Push every 24 hours  chlorhexidine 2% Cloths 1 Application(s) Topical daily  chlorhexidine 4% Liquid 1 Application(s) Topical <User Schedule>  dextrose 50% Injectable 25 Gram(s) IV Push once  dextrose 50% Injectable 12.5 Gram(s) IV Push once  dextrose 50% Injectable 25 Gram(s) IV Push once  enoxaparin Injectable 40 milliGRAM(s) SubCutaneous every 24 hours  insulin glargine Injectable (LANTUS) 20 Unit(s) SubCutaneous before breakfast  insulin glargine Injectable (LANTUS) 18 Unit(s) SubCutaneous at bedtime  insulin lispro (ADMELOG) corrective regimen sliding scale   SubCutaneous Before meals and at bedtime  insulin lispro Injectable (ADMELOG) 5 Unit(s) SubCutaneous three times a day before meals  levothyroxine 50 MICROGram(s) Oral daily  metroNIDAZOLE  IVPB 500 milliGRAM(s) IV Intermittent every 8 hours  pantoprazole    Tablet 40 milliGRAM(s) Oral daily  rifAXIMin 550 milliGRAM(s) Oral every 12 hours    MEDICATIONS  (PRN):  acetaminophen     Tablet .. 650 milliGRAM(s) Oral every 6 hours PRN Temp greater or equal to 38C (100.4F), Mild Pain (1 - 3), Moderate Pain (4 - 6)  dextrose Oral Gel 15 Gram(s) Oral once PRN Blood Glucose LESS THAN 70 milliGRAM(s)/deciliter  ondansetron Injectable 4 milliGRAM(s) IV Push every 6 hours PRN Nausea and/or Vomiting  sodium chloride 0.9% lock flush 10 milliLiter(s) IV Push every 1 hour PRN Pre/post blood products, medications, blood draw, and to maintain line patency      Allergies  Allergy Status Unknown      Vital Signs Last 24 Hrs  T(C): 36.8 (13 Feb 2025 08:30), Max: 36.8 (13 Feb 2025 08:30)  T(F): 98.3 (13 Feb 2025 08:30), Max: 98.3 (13 Feb 2025 08:30)  HR: 72 (13 Feb 2025 08:30) (72 - 83)  BP: 149/78 (13 Feb 2025 08:30) (113/68 - 149/78)  BP(mean): 90 (13 Feb 2025 04:16) (90 - 94)  RR: 16 (13 Feb 2025 08:30) (16 - 18)  SpO2: 96% (13 Feb 2025 08:30) (94% - 99%)    Parameters below as of 13 Feb 2025 08:30  Patient On (Oxygen Delivery Method): room air        PHYSICAL EXAM:  GENERAL: NAD, comfortable  CHEST/LUNG: Clear to auscultation bilaterally; No wheezes  HEART: Regular rate and rhythm  ABDOMEN: Soft, Nontender, Nondistended  NEURO: Awake, alert  EXTREMITIES:  no edema      LABS:                        9.6    8.01  )-----------( 173      ( 12 Feb 2025 10:17 )             30.7     02-12    132[L]  |  100  |  7.3[L]  ----------------------------<  80  4.1   |  19.0[L]  |  0.46[L]    Ca    9.9      12 Feb 2025 10:17  Mg     1.7     02-12    TPro  8.3  /  Alb  4.0  /  TBili  1.8  /  DBili  x   /  AST  47[H]  /  ALT  22  /  AlkPhos  448[H]  02-12    Urinalysis Basic - ( 12 Feb 2025 10:17 )    Color: x / Appearance: x / SG: x / pH: x  Gluc: 80 mg/dL / Ketone: x  / Bili: x / Urobili: x   Blood: x / Protein: x / Nitrite: x   Leuk Esterase: x / RBC: x / WBC x   Sq Epi: x / Non Sq Epi: x / Bacteria: x          POCT Blood Glucose.: 179 mg/dL (02-13-25 @ 11:45)  POCT Blood Glucose.: 109 mg/dL (02-13-25 @ 09:19)  POCT Blood Glucose.: 73 mg/dL (02-13-25 @ 08:26)  POCT Blood Glucose.: 156 mg/dL (02-12-25 @ 21:13)  POCT Blood Glucose.: 147 mg/dL (02-12-25 @ 17:01)  POCT Blood Glucose.: 225 mg/dL (02-12-25 @ 14:14)        Free Thyroxine, Serum: 0.7 ng/dL (02-08-25 @ 05:18)  Thyroid Stimulating Hormone, Serum: 5.27 uIU/mL (02-06-25 @ 18:29)

## 2025-02-13 NOTE — DISCHARGE NOTE NURSING/CASE MANAGEMENT/SOCIAL WORK - FINANCIAL ASSISTANCE
[facility] provides services at a reduced cost to those who are determined to be eligible through [facility]’s financial assistance program. Information regarding [facility]’s financial assistance program can be found by going to [email or web address] or by calling [phone].

## 2025-02-13 NOTE — PROGRESS NOTE ADULT - ATTENDING SUPERVISION STATEMENT
Resident
Resident/Student
Resident/Student
Resident
Resident/Student
Student

## 2025-02-13 NOTE — PROGRESS NOTE ADULT - ATTENDING COMMENTS
.    70F PMH DM, cirrhosis who presented 2/2/25 with AMS, obtunded, was intubated in the ED for airway protection, ammonia 124, found to be in DKA, noted with multifocal PNA, pancreatitis, colitis, and UTI. Is on 320/16/50%/6. C/w insulin gtt, serial electrolytes. C/w Zosyn, f/u cultures. Currently on Levo + Vaso + Randal. Will add Midodrine 10mg Q8H and Hydrocortisone 100mg IV Q8H to wean off pressors. Monitor anion gap and transition to Lantus when closed. Daily SAT/SBT. RUQ sonogram showed gallstones. DVT + GI PPx. C/w care in ICU. Prognosis guarded.      Houston Haq MD, [facility]  , Pulmonary & Critical Care Medicine  [facility]  Pulmonary and Sleep Medicine at [place]  [address]  [place] [address]  T: [phone]  F: [phone]
patient medically stable   change to po abxs on dc to complete course   speech re eval to advnace diet   plan dc to [facility] in am
.    69F PMH cirrhosis, DM who presented 2/2/25 with AMS, metabolic encephalopathy, found with DKA, E. coli bacteremia and pyelonephritis, B/L PNA, pancreatitis, pancolitis, acute renal failure, and shock. She was intubated for airway protection.  ABx were switched to CTX given sensitivities. C/w daily SAT/SBT. Pending repeat CTH today given ongoing poor mental status. Has been weaned off pressors since 2/4/25. Son updated at bedside. Tube feeds. DVT + GI PPx. Prognosis guarded.      Houston Haq MD, [facility]  , Pulmonary & Critical Care Medicine  [facility]  Pulmonary and Sleep Medicine at [place]  [address]  [place] [address]  T: [phone]  F: [phone]
c/w insulin as per endo recs   c/w iv abxs , change to po on dc   plan for [facility] , needs auth
c/w insulin as per endo recs   c/w iv abxs , change to po on dc   plan for [facility] , needs auth .
patient sitting in recliner , awake, alert x 3 , Bangladeshi speaking , son preet  at bedside/ speak fluent  english, patient wanted son to translate.   as per op med rec review patient is not on farxiga as op.   endo consulted , follow recs   c/w abxs , to change to po abxs on dc to complete x 14 days.   patient requesting [facility] till family able to arrange aides at home   pt adriana
.    69F PMH cirrhosis, DM who presented 2/2/25 with AMS, metabolic encephalopathy, found with DKA, E. coli bacteremia and pyelonephritis, B/L PNA, pancreatitis, pancolitis, acute renal failure, and shock. She was intubated for airway protection.  ABx were switched to CTX given sensitivities. C/w daily SAT/SBT. Repeat CTH 2/6/25 without acute process. Has been weaned off pressors since 2/4/25. Son updated at bedside. Tube feeds. DVT + GI PPx. Prognosis guarded. Needs time to wake up. Can keep on CPAP trial as long as tolerated. C/w care in ICU.      Houston Haq MD, [facility]  , Pulmonary & Critical Care Medicine  [facility]  Pulmonary and Sleep Medicine at [place]  [address]  [place] [address]  T: [phone]  F: [phone]
68 yo female with DM, cirrhosis, presented to the hospital with AMS/encephalopathy, found to have DKA, E Coli pyelonephritis and bacteremia, pancreatitis, colitis, JOVANNI, shock, intubated for acute respiratory failure.    CT shows right lower lobe pneumonia, colitis, no significant ascites.  TTE unremarkable.   Lactate clearing but slowly.
70 yo female with DM, cirrhosis, presented to the hospital with AMS/encephalopathy, found to have DKA, E Coli pyelonephritis and bacteremia, pancreatitis, colitis, JOVANNI, shock, intubated for acute respiratory failure.    CT shows right lower lobe pneumonia, colitis, no significant ascites.  TTE unremarkable.   Now doing better, off pressor, still encephalopathic (ammonia level ok) likely from sedation.
Pt seen and examined with MICU NP during AM rounds 2/8   70F with hx of DM, cirrhosis, hypothyroidism brought into the ED 2/2 due to progressive lethargy noted at home with flu like symptoms and poor PO intake found to be obtunded and hypoxic on arrival requiring intubation. CT head negative but was found with severely elevated ammonia. CT chest/abdomen/pelvis with bibasilar consolidation, pancolitis and acute pancreatitis. Pt also noted to be in DKA treated per protocol and since weaned off insulin gtt with endocrine following.  Pt also developed septic shock and found to have E coli bacteremia now de-escalated to ceftriaxone. Mentation remained poor but likely due to residual sedative effect given normal repeat CT head and normalized ammonia. Overnight pt taken off sedation and able to follow commands and passed SBT and subsequently extubated to nasal cannula. Pt mentating appropriately and offers no acute complaints. Denies abdominal pain. Remains hemodynamically stable off pressors for several days. L axillary line discontinued. Underwent swallow evaluation and recommended for puree without liquids. Remains on ceftriaxone with repeat blood cx 2/6 negative to date. As pt doing well post extubation and remains hemodynamically stable, pt stable for downgrade to [facility] for further care. Please call back if any change in clinical condition. Will need trial of void.

## 2025-02-13 NOTE — PROGRESS NOTE ADULT - SUBJECTIVE AND OBJECTIVE BOX
SUBJECTIVE    BRIEF HOSPITAL COURSE SUMMARY: Pt is a 69y Bahraini speaking Female with a PMHx of IDDM and cirrhosis who was BIBA for AMS noted by the patient's son. PTA patient was sick and bed bound for 2 days before her family called EMS. In ED, patient was noted to be obtunded and was intubated for airway protection. Further investigations revealed that the patient was in DKA with BGL > 600 and an incalculablle AG. Admitted to MICU for insulin gtt, where the patient was found to also have RLL PNA, pyelo, Colitis and Gallstone pancreatitis. Blood cx were positive for pansensitive Ecoli. Patient was treated with broad spectrum abx, and was in triple pressor shock.     LAST 24 HOURS: No acute events.     TODAY: Pt seen at bedside this AM. Pt son present at bedside, patient prefers son translates. Son requesting new speech eval as patient found teeth. Offers no acute complaints & ROS otherwise negative.       OBJECTIVE  PHYSICAL EXAM:  GENERAL: No acute distress, comfortably in bed  HEENT: Atraumatic, normocephalic, non-icteric, no JVD  NEURO: A&Ox3, no focal deficits, moving all extremities spontaneously, CN II-XII grossly intact  PSYCH: Normal affect, calm, appropriate insight and judgment, fluent speech  LUNGS: CTAB, no wrr, non-labored breathing  HEART: RRR, no murmur appreciated  ABD: Soft, non-tender, non-distended, no organomegaly, no appreciable masses, +bs   EXTREMITIES: Nontender, no clubbing, cyanosis, or edema       Vital Signs Last 24 Hrs  T(C): 36.8 (13 Feb 2025 08:30), Max: 36.8 (13 Feb 2025 08:30)  T(F): 98.3 (13 Feb 2025 08:30), Max: 98.3 (13 Feb 2025 08:30)  HR: 72 (13 Feb 2025 08:30) (72 - 83)  BP: 149/78 (13 Feb 2025 08:30) (113/68 - 149/78)  BP(mean): 90 (13 Feb 2025 04:16) (90 - 94)  RR: 16 (13 Feb 2025 08:30) (16 - 18)  SpO2: 96% (13 Feb 2025 08:30) (94% - 99%)    Parameters below as of 13 Feb 2025 08:30  Patient On (Oxygen Delivery Method): room air            MEDICATIONS  (STANDING):  cefTRIAXone Injectable. 2000 milliGRAM(s) IV Push every 24 hours  chlorhexidine 2% Cloths 1 Application(s) Topical daily  chlorhexidine 4% Liquid 1 Application(s) Topical <User Schedule>  dextrose 50% Injectable 25 Gram(s) IV Push once  dextrose 50% Injectable 12.5 Gram(s) IV Push once  dextrose 50% Injectable 25 Gram(s) IV Push once  enoxaparin Injectable 40 milliGRAM(s) SubCutaneous every 24 hours  insulin glargine Injectable (LANTUS) 20 Unit(s) SubCutaneous before breakfast  insulin glargine Injectable (LANTUS) 18 Unit(s) SubCutaneous at bedtime  insulin lispro (ADMELOG) corrective regimen sliding scale   SubCutaneous Before meals and at bedtime  insulin lispro Injectable (ADMELOG) 5 Unit(s) SubCutaneous three times a day before meals  levothyroxine 50 MICROGram(s) Oral daily  metroNIDAZOLE  IVPB 500 milliGRAM(s) IV Intermittent every 8 hours  pantoprazole    Tablet 40 milliGRAM(s) Oral daily  rifAXIMin 550 milliGRAM(s) Oral every 12 hours    MEDICATIONS  (PRN):  acetaminophen     Tablet .. 650 milliGRAM(s) Oral every 6 hours PRN Temp greater or equal to 38C (100.4F), Mild Pain (1 - 3), Moderate Pain (4 - 6)  dextrose Oral Gel 15 Gram(s) Oral once PRN Blood Glucose LESS THAN 70 milliGRAM(s)/deciliter  ondansetron Injectable 4 milliGRAM(s) IV Push every 6 hours PRN Nausea and/or Vomiting  sodium chloride 0.9% lock flush 10 milliLiter(s) IV Push every 1 hour PRN Pre/post blood products, medications, blood draw, and to maintain line patency    Allergies    Allergy Status Unknown    Intolerances        LABS:                        9.6    8.01  )-----------( 173      ( 12 Feb 2025 10:17 )             30.7     02-12    132[L]  |  100  |  7.3[L]  ----------------------------<  80  4.1   |  19.0[L]  |  0.46[L]    Ca    9.9      12 Feb 2025 10:17  Mg     1.7     02-12    TPro  8.3  /  Alb  4.0  /  TBili  1.8  /  DBili  x   /  AST  47[H]  /  ALT  22  /  AlkPhos  448[H]  02-12      Urinalysis Basic - ( 12 Feb 2025 10:17 )    Color: x / Appearance: x / SG: x / pH: x  Gluc: 80 mg/dL / Ketone: x  / Bili: x / Urobili: x   Blood: x / Protein: x / Nitrite: x   Leuk Esterase: x / RBC: x / WBC x   Sq Epi: x / Non Sq Epi: x / Bacteria: x      CAPILLARY BLOOD GLUCOSE      POCT Blood Glucose.: 179 mg/dL (13 Feb 2025 11:45)  POCT Blood Glucose.: 109 mg/dL (13 Feb 2025 09:19)  POCT Blood Glucose.: 73 mg/dL (13 Feb 2025 08:26)  POCT Blood Glucose.: 156 mg/dL (12 Feb 2025 21:13)  POCT Blood Glucose.: 147 mg/dL (12 Feb 2025 17:01)      CULTURE DATA:    Culture - Blood (collected 02-02-25 @ 13:02)  Source: .Blood BLOOD  Final Report (02-07-25 @ 20:01):    No growth at 5 days    Culture - Blood (collected 02-02-25 @ 13:02)  Source: .Blood BLOOD  Gram Stain (02-03-25 @ 14:27):    Growth in aerobic bottle: Gram Negative Rods  Final Report (02-05-25 @ 09:16):    Growth in aerobic bottle: Escherichia coli    Direct identification is available within approximately 3-5    hours either by Blood Panel Multiplexed PCR or Direct    MALDI-TOF. Details: [email or web address]  Organism: Blood Culture PCR  Escherichia coli (02-05-25 @ 09:16)  Organism: Escherichia coli (02-05-25 @ 09:16)    Sensitivities:      Method Type: JOHANNA      -  Ampicillin: S <=8 These ampicillin results predict results for amoxicillin      -  Ampicillin/Sulbactam: S <=4/2      -  Aztreonam: S <=4      -  Cefazolin: S <=2      -  Cefepime: S <=2      -  Cefoxitin: S <=8      -  Ceftriaxone: S <=1      -  Ciprofloxacin: S <=0.25      -  Ertapenem: S <=0.5      -  Gentamicin: S <=2      -  Imipenem: S <=1      -  Levofloxacin: S <=0.5      -  Meropenem: S <=1      -  Piperacillin/Tazobactam: S <=8      -  Tobramycin: S <=2      -  Trimethoprim/Sulfamethoxazole: S <=0.5/9.5  Organism: Blood Culture PCR (02-05-25 @ 09:16)    Sensitivities:      Method Type: PCR      -  Escherichia coli: Detec    Culture - Urine (collected 02-02-25 @ 14:00)  Source: Clean Catch Clean Catch (Midstream)  Final Report (02-05-25 @ 11:59):    >100,000 CFU/ml Escherichia coli  Organism: Escherichia coli (02-05-25 @ 11:59)  Organism: Escherichia coli (02-05-25 @ 11:59)    Sensitivities:      Method Type: JOHANNA      -  Amoxicillin/Clavulanic Acid: S <=8/4      -  Ampicillin: S <=8 These ampicillin results predict results for amoxicillin      -  Ampicillin/Sulbactam: S <=4/2      -  Aztreonam: S <=4      -  Cefazolin: S <=2 For uncomplicated UTI with K. pneumoniae, E. coli, or P. mirablis: JOHANNA <=16 is sensitive and JOHANNA >=32 is resistant. This also predicts results for oral agents cefaclor, cefdinir, cefpodoxime, cefprozil, cefuroxime axetil, cephalexin and locarbef for uncomplicated UTI. Note that some isolates may be susceptible to these agents while testing resistant to cefazolin.      -  Cefepime: S <=2      -  Cefoxitin: S <=8      -  Ceftriaxone: S <=1      -  Cefuroxime: S <=4      -  Ciprofloxacin: S <=0.25      -  Ertapenem: S <=0.5      -  Gentamicin: S <=2      -  Imipenem: S <=1      -  Levofloxacin: S <=0.5      -  Meropenem: S <=1      -  Nitrofurantoin: S <=32 Should not be used to treat pyelonephritis      -  Piperacillin/Tazobactam: S <=8      -  Tobramycin: S <=2      -  Trimethoprim/Sulfamethoxazole: S <=0.5/9.5    Culture - Stool (collected 02-04-25 @ 13:20)  Source: .Stool  Final Report (02-06-25 @ 16:19):    No enteric pathogens isolated.    (Stool culture examined for Salmonella,    Shigella, Campylobacter, Aeromonas, Plesiomonas,    Vibrio, E.coli O157 and Yersinia)    Culture - Blood (collected 02-06-25 @ 02:32)  Source: .Blood BLOOD  Final Report (02-11-25 @ 06:00):    No growth at 5 days        RADIOLOGY & ADDITIONAL TESTS:    < from: CT Head No Cont (02.06.25 @ 21:24) >  FINDINGS:  There is periventricular and subcortical white matter hypodensity without   mass effect, nonspecific, likely representing mild chronic microvascular   ischemic changes. There is no compelling evidencefor an acute   transcortical infarction. There is no evidence of mass, mass effect,   midline shift or extra-axial fluid collection. The lateral ventricles and   cortical sulci are age-appropriate in size and configuration. Mild   inflammatory mucosal changes within air-fluid level are seen throughout   the various portions of the paranasal sinuses. The orbits and mastoid air   cells are unremarkable. The calvarium is intact. Consider MRI as   clinically warranted.    IMPRESSION:  Mild chronic microvascular changes without evidence of an   acute transcortical infarction or hemorrhage. Sinusitis.    < end of copied text >    < from: CT Abdomen and Pelvis No Cont (02.02.25 @ 18:19) >  FINDINGS:  CHEST:  LUNGS AND LARGE AIRWAYS: The tip of the endotracheal tube is above the   laurence. Bilateral lower lobe endobronchial mucoid impaction. Associated   bibasilar consolidation, right greater than left. Additional tree-in-bud   opacities throughout the right lung suggesting endobronchial spread of   infection.  PLEURA: No pleural abnormality.  VESSELS: Normal caliber aorta.  HEART: No cardiomegaly. No pericardial effusion. Coronary artery   calcifications are present.  MEDIASTINUM AND SOL: No adenopathy.  CHEST WALL AND LOWER NECK: No masses.    ABDOMEN AND PELVIS:  LIVER: Morphologic changes of cirrhosis.  BILE DUCTS: Nondilated.  GALLBLADDER: Gallstones.  SPLEEN: Normal.  PANCREAS: Diffuse peripancreatic edema compatible with acute pancreatitis.  ADRENALS: Normal.  KIDNEYS/URETERS: No hydronephrosis or urinary tract calculi. Right renal   cortical scarring and lobulation.    BLADDER: Collapsed around a Soliman catheter balloon.  REPRODUCTIVE ORGANS: Small calcified fibroid.    BOWEL: The NG tube is in the stomach. No bowel obstruction. Edematous   colonic wall from the ascending to the transverse to the descending colon   suggestive of colitis. Normal appendix.  PERITONEUM: Trace ascites. No free air.  VESSELS: Normal caliber aorta.  RETROPERITONEUM/LYMPH NODES: No adenopathy or hematoma.  ABDOMINAL WALL: Small umbilical hernia containing unobstructed small   bowel.  BONES: No aggressive lesion.    IMPRESSION:  *  Bibasilar bronchial mucoid impaction with bibasilar consolidation,   right greater than left. Correlate for pneumonia. Additional tree-in-bud   opacities throughout the right lung suggesting endobronchial spread of   infection.  *  No significant ascites.  *  Diffuse peripancreatic edema compatible with acute pancreatitis.  *  Pancolitis.      --- End of Report ---      < end of copied text >

## 2025-02-13 NOTE — PROGRESS NOTE ADULT - ASSESSMENT
69 y/o F (name: Laverne Lai)  with hx of DM and cirrhosis, was brought in by EMS for AMS at home.  PEr son, patient was sick in bed for past two days, and today patient's sister call 911 to bring her to hospital because she was waking up.  In the ED patient was obtunded, intubated for airway protection. Admitted to MICU with severe DKA, pancolitis, acute pancreatitis, UTI and pneumonia. Endocrine consulted for uncontrolled diabetes. A1c 15.6% on admission.    1. Uncontrolled T2DM s/p DKA, a1c 15.6%  - FBG 88 yesterday and 73 this AM  - Continue lantus 20 units in am but decrease lantus to 18 units qhs  - Continue admelog 5 units TID with meals  - Moderate correction scale  - Will need insulin on discharge, please review insulin teaching and diabetes education prior to discharge. If going to [facility], can discharge on current insulin regimen.    2. Hypothyroid  - On prior notes from 2022, was on levothyroxine 75mcg, unclear if on treatment prior to admission  - TSH 5.27 with low free thyroxine, negative TPO ab  - Started on PO levothyroxine 50mcg daily on 2/8/25    3. UTI, pneumonia  - On IV Ceftriaxone  - Care per primary team

## 2025-02-14 VITALS
SYSTOLIC BLOOD PRESSURE: 150 MMHG | RESPIRATION RATE: 16 BRPM | TEMPERATURE: 98 F | DIASTOLIC BLOOD PRESSURE: 78 MMHG | OXYGEN SATURATION: 98 % | HEART RATE: 80 BPM

## 2025-02-14 LAB — GLUCOSE BLDC GLUCOMTR-MCNC: 160 MG/DL — HIGH (ref 70–99)

## 2025-02-14 PROCEDURE — 99233 SBSQ HOSP IP/OBS HIGH 50: CPT

## 2025-02-14 PROCEDURE — 99239 HOSP IP/OBS DSCHRG MGMT >30: CPT | Mod: GC

## 2025-02-14 RX ORDER — LEVOTHYROXINE SODIUM 25 UG/1
1 TABLET ORAL
Qty: 0 | Refills: 0 | DISCHARGE
Start: 2025-02-14

## 2025-02-14 RX ORDER — CEFPODOXIME PROXETIL 200 MG
1 TABLET ORAL
Qty: 10 | Refills: 0
Start: 2025-02-14 | End: 2025-02-18

## 2025-02-14 RX ORDER — INSULIN LISPRO 100/ML
5 VIAL (ML) SUBCUTANEOUS
Qty: 0 | Refills: 0 | DISCHARGE
Start: 2025-02-14

## 2025-02-14 RX ORDER — INSULIN GLARGINE-YFGN 100 [IU]/ML
18 INJECTION, SOLUTION SUBCUTANEOUS
Qty: 1 | Refills: 0
Start: 2025-02-14 | End: 2025-03-15

## 2025-02-14 RX ORDER — RIFAXIMIN 200 MG
1 TABLET ORAL
Qty: 0 | Refills: 0 | DISCHARGE
Start: 2025-02-14

## 2025-02-14 RX ORDER — INSULIN GLARGINE-YFGN 100 [IU]/ML
20 INJECTION, SOLUTION SUBCUTANEOUS
Qty: 0 | Refills: 0 | DISCHARGE
Start: 2025-02-14

## 2025-02-14 RX ORDER — METRONIDAZOLE 500 MG
1 TABLET ORAL
Qty: 15 | Refills: 0
Start: 2025-02-14 | End: 2025-02-18

## 2025-02-14 RX ADMIN — CEFTRIAXONE 2000 MILLIGRAM(S): 250 INJECTION, POWDER, FOR SOLUTION INTRAMUSCULAR; INTRAVENOUS at 05:11

## 2025-02-14 RX ADMIN — Medication 5 UNIT(S): at 09:08

## 2025-02-14 RX ADMIN — ANTISEPTIC SURGICAL SCRUB 1 APPLICATION(S): 0.04 SOLUTION TOPICAL at 05:11

## 2025-02-14 RX ADMIN — INSULIN GLARGINE-YFGN 20 UNIT(S): 100 INJECTION, SOLUTION SUBCUTANEOUS at 09:08

## 2025-02-14 RX ADMIN — LEVOTHYROXINE SODIUM 50 MICROGRAM(S): 25 TABLET ORAL at 05:11

## 2025-02-14 RX ADMIN — Medication 100 MILLIGRAM(S): at 05:12

## 2025-02-14 NOTE — PROGRESS NOTE ADULT - NS ATTEND AMEND GEN_ALL_CORE FT
The case was reviewed and discussed with NP in details. Agree with above assessment and recommendations.
The case was discussed with NP in details. Agree with above assessment and recommendations.
In brief, 69 y/o F (name: Laverne Lai)  with hx of DM and cirrhosis, was brought in by EMS for AMS at home.   In the ED patient was obtunded, intubated for airway protection.  Pt admitted to MICU with severe DKA, pancolitis, acute pancreatitis, UTI and pneumonia. DKA resolved and transitioned to basal/bolus insulin but remains hyperglycemic and insulin doses adjusted.  Also with h/o hypothyroidism on LT4 on prior endo consult note TSH mildly elevated, please start low dose IV Levothyroxine. Agree with above plan.
The case was discussed in details with NP. Agree with above assessment and recommendations.
The case was reviewed and discussed with NP in details. Agree with above assessment and recommendations.
The case was reviewed and discussed with NP in details. Agree with above assessment and recommendations.
In brief, 69 y/o F (name: Laverne Lai)  with hx of DM and cirrhosis, was brought in by EMS for AMS at home.   In the ED patient was obtunded, intubated for airway protection.  Pt admitted to MICU with severe DKA, pancolitis, acute pancreatitis, UTI and pneumonia. DKA resolved and transitioned to basal/bolus insulin but remains hyperglycemic and insulin doses adjusted.  ALso with h/o hypothyroidism on LT4 on prior endo consult notes, check TSH level. Remaining plan as above.

## 2025-02-14 NOTE — PROGRESS NOTE ADULT - PROVIDER SPECIALTY LIST ADULT
Endocrinology
Hospitalist
MICU
Internal Medicine
MICU
Endocrinology
Hospitalist
Endocrinology
Internal Medicine
MICU
MICU

## 2025-02-14 NOTE — PROGRESS NOTE ADULT - ASSESSMENT
69 y/o F (name: Laverne Lai)  with hx of DM and cirrhosis, was brought in by EMS for AMS at home.  PEr son, patient was sick in bed for past two days, and today patient's sister call 911 to bring her to hospital because she was waking up.  In the ED patient was obtunded, intubated for airway protection. Admitted to MICU with severe DKA, pancolitis, acute pancreatitis, UTI and pneumonia. Endocrine consulted for uncontrolled diabetes. A1c 15.6% on admission.    1. Uncontrolled T2DM s/p DKA, a1c 15.6%  - Glucoses stable  - Continue lantus 20 units in AM and lantus 18 units qhs  - Continue admelog 5 units TID with meals  - Moderate correction scale  - Will need insulin on discharge, please review insulin teaching and diabetes education prior to discharge. If going to [facility], can discharge on current insulin regimen.    2. Hypothyroid  - On prior notes from 2022, was on levothyroxine 75mcg, unclear if on treatment prior to admission  - TSH 5.27 with low free thyroxine, negative TPO ab  - Started on PO levothyroxine 50mcg daily on 2/8/25  - Repeat TFTs in 4 weeks    3. UTI, pneumonia  - On IV Ceftriaxone  - Care per primary team

## 2025-02-14 NOTE — PROGRESS NOTE ADULT - SUBJECTIVE AND OBJECTIVE BOX
INTERVAL EVENTS:  Follow up diabetes management, glucoses are stable. Pt offers no acute complaints at time of exam.     MEDICATIONS  (STANDING):  cefTRIAXone Injectable. 2000 milliGRAM(s) IV Push every 24 hours  chlorhexidine 2% Cloths 1 Application(s) Topical daily  chlorhexidine 4% Liquid 1 Application(s) Topical <User Schedule>  dextrose 50% Injectable 25 Gram(s) IV Push once  dextrose 50% Injectable 12.5 Gram(s) IV Push once  dextrose 50% Injectable 25 Gram(s) IV Push once  enoxaparin Injectable 40 milliGRAM(s) SubCutaneous every 24 hours  insulin glargine Injectable (LANTUS) 18 Unit(s) SubCutaneous at bedtime  insulin glargine Injectable (LANTUS) 20 Unit(s) SubCutaneous before breakfast  insulin lispro (ADMELOG) corrective regimen sliding scale   SubCutaneous Before meals and at bedtime  insulin lispro Injectable (ADMELOG) 5 Unit(s) SubCutaneous three times a day before meals  levothyroxine 50 MICROGram(s) Oral daily  metroNIDAZOLE  IVPB 500 milliGRAM(s) IV Intermittent every 8 hours  pantoprazole    Tablet 40 milliGRAM(s) Oral daily  rifAXIMin 550 milliGRAM(s) Oral every 12 hours    MEDICATIONS  (PRN):  acetaminophen     Tablet .. 650 milliGRAM(s) Oral every 6 hours PRN Temp greater or equal to 38C (100.4F), Mild Pain (1 - 3), Moderate Pain (4 - 6)  dextrose Oral Gel 15 Gram(s) Oral once PRN Blood Glucose LESS THAN 70 milliGRAM(s)/deciliter  ondansetron Injectable 4 milliGRAM(s) IV Push every 6 hours PRN Nausea and/or Vomiting  sodium chloride 0.9% lock flush 10 milliLiter(s) IV Push every 1 hour PRN Pre/post blood products, medications, blood draw, and to maintain line patency    Allergies  Allergy Status Unknown    Vital Signs Last 24 Hrs  T(C): 36.8 (14 Feb 2025 08:38), Max: 36.9 (14 Feb 2025 00:24)  T(F): 98.3 (14 Feb 2025 08:38), Max: 98.5 (14 Feb 2025 00:24)  HR: 80 (14 Feb 2025 08:38) (74 - 98)  BP: 149/73 (14 Feb 2025 08:38) (114/69 - 149/73)  BP(mean): --  RR: 18 (14 Feb 2025 08:38) (18 - 18)  SpO2: 97% (14 Feb 2025 08:38) (96% - 98%)    Parameters below as of 14 Feb 2025 08:38  Patient On (Oxygen Delivery Method): room air    PHYSICAL EXAM:  General: No apparent distress  Respiratory: Lungs clear bilaterally  Cardiac: +S1, S2, no m/r/g  GI: +BS, soft, non tender, non distended  Extremities: No peripheral edema, no pedal lesions  Neuro: A+O X3    POCT Blood Glucose.: 160 mg/dL (02-14-25 @ 09:07)  POCT Blood Glucose.: 107 mg/dL (02-13-25 @ 21:55)  POCT Blood Glucose.: 188 mg/dL (02-13-25 @ 17:52)  POCT Blood Glucose.: 179 mg/dL (02-13-25 @ 11:45)    Free Thyroxine, Serum: 0.7 ng/dL (02-08-25 @ 05:18)  Thyroid Stimulating Hormone, Serum: 5.27 uIU/mL (02-06-25 @ 18:29)

## 2025-03-03 DIAGNOSIS — K76.6 PORTAL HYPERTENSION: ICD-10-CM

## 2025-03-03 DIAGNOSIS — I85.10 UNSPECIFIED CIRRHOSIS OF LIVER: ICD-10-CM

## 2025-03-03 DIAGNOSIS — K76.82 HEPATIC ENCEPHALOPATHY: ICD-10-CM

## 2025-03-03 DIAGNOSIS — K31.89 PORTAL HYPERTENSION: ICD-10-CM

## 2025-03-03 DIAGNOSIS — K74.60 UNSPECIFIED CIRRHOSIS OF LIVER: ICD-10-CM

## 2025-03-04 ENCOUNTER — NON-APPOINTMENT (OUTPATIENT)
Age: 70
End: 2025-03-04

## 2025-03-04 ENCOUNTER — APPOINTMENT (OUTPATIENT)
Dept: GASTROENTEROLOGY | Facility: CLINIC | Age: 70
End: 2025-03-04
Payer: MEDICARE

## 2025-03-04 VITALS — HEART RATE: 92 BPM | OXYGEN SATURATION: 96 % | SYSTOLIC BLOOD PRESSURE: 160 MMHG | DIASTOLIC BLOOD PRESSURE: 100 MMHG

## 2025-03-04 DIAGNOSIS — K70.30 ALCOHOLIC CIRRHOSIS OF LIVER W/OUT ASCITES: ICD-10-CM

## 2025-03-04 PROCEDURE — 99204 OFFICE O/P NEW MOD 45 MIN: CPT

## 2025-03-04 RX ORDER — QUETIAPINE FUMARATE 25 MG/1
25 TABLET ORAL
Refills: 0 | Status: ACTIVE | COMMUNITY

## 2025-03-04 RX ORDER — PANTOPRAZOLE SODIUM 20 MG/1
20 TABLET, DELAYED RELEASE ORAL
Refills: 0 | Status: ACTIVE | COMMUNITY

## 2025-03-04 RX ORDER — SODIUM PHOSPHATE, DIBASIC AND SODIUM PHOSPHATE, MONOBASIC 7; 19 G/230ML; G/230ML
ENEMA RECTAL
Refills: 0 | Status: ACTIVE | COMMUNITY

## 2025-03-04 RX ORDER — RIFAXIMIN 550 MG/1
550 TABLET ORAL
Refills: 0 | Status: ACTIVE | COMMUNITY

## 2025-03-04 RX ORDER — SORBITOL SOLUTION 70 %
70 SOLUTION, ORAL MISCELLANEOUS
Refills: 0 | Status: ACTIVE | COMMUNITY

## 2025-03-04 NOTE — REASON FOR VISIT
[Post Hospitalization] : a post hospitalization visit [FreeTextEntry1] : For alcoholic cirrhosis without ascites

## 2025-03-04 NOTE — ASSESSMENT
[FreeTextEntry1] : Impression: Decompensated alcoholic cirrhosis without ascites but with recent episode of hepatic encephalopathy and previous ascites.  Patient is presently alert and oriented x 3.  Recommendations: Continue current medications including lactulose and Xifaxan and spironolactone.  She was sent for lab work including CMP and CBC with differential and AFP and PT/INR and will see our hepatologist here for further treatment and management of her decompensated alcoholic cirrhosis.

## 2025-03-04 NOTE — PHYSICAL EXAM
[Alert] : alert [Normal Voice/Communication] : normal voice/communication [Healthy Appearing] : healthy appearing [No Acute Distress] : no acute distress [Well Developed] : well developed [Well Nourished] : well nourished [Sclera] : the sclera and conjunctiva were normal [Hearing Threshold Finger Rub Not Assumption] : hearing was normal [Normal Lips/Gums] : the lips and gums were normal [Oropharynx] : the oropharynx was normal [Normal Appearance] : the appearance of the neck was normal [No Neck Mass] : no neck mass was observed [No Respiratory Distress] : no respiratory distress [No Acc Muscle Use] : no accessory muscle use [Respiration, Rhythm And Depth] : normal respiratory rhythm and effort [Auscultation Breath Sounds / Voice Sounds] : lungs were clear to auscultation bilaterally [Heart Rate And Rhythm] : heart rate was normal and rhythm regular [Normal S1, S2] : normal S1 and S2 [Murmurs] : no murmurs [None] : no edema [Bowel Sounds] : normal bowel sounds [Abdomen Tenderness] : non-tender [No Masses] : no abdominal mass palpated [Abdomen Soft] : soft [] : no hepatosplenomegaly [Normal Color / Pigmentation] : normal skin color and pigmentation [Oriented To Time, Place, And Person] : oriented to person, place, and time [de-identified] : Patient is somewhat malnourished appearing [de-identified] : No clinically detectable ascites or hepatosplenomegaly [de-identified] : Deferred at this time

## 2025-03-04 NOTE — HISTORY OF PRESENT ILLNESS
[FreeTextEntry1] : Unknown. [de-identified] : February 2025.  Cirrhosis without ascites.  Possible pancreatitis and pancolitis.

## 2025-03-05 RX ORDER — METFORMIN HYDROCHLORIDE 1000 MG/1
1 TABLET, COATED ORAL
Refills: 0 | DISCHARGE

## 2025-03-05 RX ORDER — PIOGLITAZONE 30 MG/1
1 TABLET ORAL
Refills: 0 | DISCHARGE

## 2025-03-05 RX ORDER — LABETALOL HYDROCHLORIDE 300 MG/1
1 TABLET, FILM COATED ORAL
Refills: 0 | DISCHARGE

## 2025-03-05 RX ORDER — INSULIN GLARGINE-YFGN 100 [IU]/ML
5 INJECTION, SOLUTION SUBCUTANEOUS
Refills: 0 | DISCHARGE

## 2025-03-05 RX ORDER — PANTOPRAZOLE 20 MG/1
1 TABLET, DELAYED RELEASE ORAL
Refills: 0 | DISCHARGE

## 2025-03-05 RX ORDER — QUETIAPINE FUMARATE 300 MG/1
1 TABLET ORAL
Refills: 0 | DISCHARGE

## 2025-03-05 RX ORDER — LEVOTHYROXINE SODIUM 25 UG/1
1 TABLET ORAL
Refills: 0 | DISCHARGE

## 2025-05-19 ENCOUNTER — APPOINTMENT (OUTPATIENT)
Dept: GASTROENTEROLOGY | Facility: CLINIC | Age: 70
End: 2025-05-19

## 2025-05-19 VITALS
WEIGHT: 122 LBS | BODY MASS INDEX: 27.44 KG/M2 | RESPIRATION RATE: 16 BRPM | HEART RATE: 70 BPM | SYSTOLIC BLOOD PRESSURE: 140 MMHG | OXYGEN SATURATION: 98 % | DIASTOLIC BLOOD PRESSURE: 90 MMHG | HEIGHT: 56 IN

## 2025-05-19 DIAGNOSIS — K74.60 UNSPECIFIED CIRRHOSIS OF LIVER: ICD-10-CM

## 2025-05-19 PROCEDURE — 99205 OFFICE O/P NEW HI 60 MIN: CPT

## 2025-05-21 NOTE — HISTORY OF PRESENT ILLNESS
[FreeTextEntry1] : 69-year-old female with pmh of GERD, anemia, DM, cirrhosis, hypothyroidism, HTN, pancreatitis, and alcohol abuse referred by Dr. Guerrero   Patient presents for posthospitalization follow-up visit for alcoholic cirrhosis without ascites status post a recent hospitalization at [facility] in early February of this year for hepatic encephalopathy. During that admission she had a CAT scan that showed morphologic changes of cirrhosis without ascites and with possible evidence of pancreatitis and pancolitis. She returns today from a skilled nursing facility with an aide present in a wheelchair. She apparently has lower extremity weakness but is currently alert and oriented x 3 and is on lactulose and Xifaxan to prevent recurrent hepatic encephalopathy. She has never seen a liver specialist. She also has been sober from alcohol for several months. She had an EGD done in June 2021 that showed an antral ulcer with grade 1 esophageal varices and portal hypertensive gastropathy. It is unclear whether she has ever had a colonoscopy.  CT February 2025. Cirrhosis without ascites  CT 2019 shows cirrhosis. No HCC  Labs 2/15/2025 AST 41 ALT 19 Alk phos 338 total bilirubin 1.6 INR wnl   Alcohol consumption: last drink was 3 months ago. she used to drink 1 drink per week Tattoos: no  OTC or herbal supplements: no Surgical history: foot sx Family history liver disease: no Social history: retired unmarried 5 children  Symptoms like fatigue, pruritus, recent infection: no Recent hospitalization in January  Abdominal distension: no Skin changes like jaundice: no Any blood in poop or vomiting blood: no Any confusion, unintentional weight loss or gain: no Family history of sudden death or late trimester miscarriages:

## 2025-05-21 NOTE — ASSESSMENT
[FreeTextEntry1] :  69-year-old female with pmh of GERD, anemia, DM, cirrhosis, hypothyroidism, HTN, pancreatitis, and alcohol abuse referred by Dr. Guerrero   LIVER CIRRHOSIS The nature of cirrhosis was discussed with possible development of hepatic encephalopathy, ascites, esophageal variceal bleeding, liver cancer, chronic kidney injury, fatal complications after significant surgery, need for liver transplant, and death.    -Ascites Managed on diuretics. History of SBP/DBP prophylaxis Check for recent creatinine  Educated patient about salt restriction to 2 g in 24 hours. Educated patient about NSAIDs being contraindicated in cirrhotic due to increased ascites formation.   -HCC screening (hepatocellular carcinoma)  Importance of imaging every 6 months is explained to patient  US 2025 shows no HCC.    -Variceal screening  EGD in 2021 showed grade 1 varices. She is currently due  Beta-blockers to prevent bleeding and shrink varices   -Encephalopathy. Ensure 1-2 bowel movements daily. Counseled patient to avoid constipation. Counseled patient to call us if he develops symptoms of encephalopathy such as confusion   Other Salt restriction to 2 g in 24 hours. NSAIDs contraindicated. For pain control can take up to 2 g of Tylenol in 24 hours. High-protein diet recommended. Prolonged fasting to be avoided. I went over this with the patient in detail.

## 2025-05-21 NOTE — PHYSICAL EXAM
[Scleral Icterus] : No Scleral Icterus [Spider Angioma] : No spider angioma(s) were observed [Abdominal  Ascites] : no ascites [Asterixis] : no asterixis observed [Jaundice] : No jaundice [General Appearance - Alert] : alert [General Appearance - In No Acute Distress] : in no acute distress [Sclera] : the sclera and conjunctiva were normal [Outer Ear] : the ears and nose were normal in appearance [Neck Appearance] : the appearance of the neck was normal [] : no respiratory distress [Abdomen Soft] : soft [Abdomen Tenderness] : non-tender [Skin Color & Pigmentation] : normal skin color and pigmentation [Oriented To Time, Place, And Person] : oriented to person, place, and time [Impaired Insight] : insight and judgment were intact

## 2025-05-21 NOTE — HISTORY OF PRESENT ILLNESS
[FreeTextEntry1] : 69-year-old female with pmh of GERD, anemia, DM, cirrhosis, hypothyroidism, HTN, pancreatitis, and alcohol abuse referred by Dr. Guerrero   Patient presents for posthospitalization follow-up visit for alcoholic cirrhosis without ascites status post a recent hospitalization at [facility] in early February of this year for hepatic encephalopathy. During that admission she had a CAT scan that showed morphologic changes of cirrhosis without ascites and with possible evidence of pancreatitis and pancolitis. She returns today from a skilled nursing facility with an aide present in a wheelchair. She apparently has lower extremity weakness but is currently alert and oriented x 3 and is on lactulose and Xifaxan to prevent recurrent hepatic encephalopathy. She has never seen a liver specialist. She also has been sober from alcohol for several months. She had an EGD done in June 2021 that showed an antral ulcer with grade 1 esophageal varices and portal hypertensive gastropathy. It is unclear whether she has ever had a colonoscopy.  CT February 2025. Cirrhosis without ascites  CT 2019 shows cirrhosis. No HCC  Labs 2/15/2025 AST 41 ALT 19 Alk phos 338 total bilirubin 1.6 INR wnl   Alcohol consumption: last drink was 3 months ago. she used to drink 1 drink per week Tattoos: no  OTC or herbal supplements: no Surgical history: foot sx Family history liver disease: no Social history: retired unmarried 5 children  Symptoms like fatigue, pruritus, recent infection: no Recent hospitalization in January  Abdominal distension: no Skin changes like jaundice: no Any blood in poop or vomiting blood: no Any confusion, unintentional weight loss or gain: no Family history of sudden death or late trimester miscarriages:

## 2025-06-18 ENCOUNTER — APPOINTMENT (OUTPATIENT)
Dept: GASTROENTEROLOGY | Facility: CLINIC | Age: 70
End: 2025-06-18

## 2025-07-07 NOTE — H&P ADULT - NSHPREVIEWOFSYSTEMS_GEN_ALL_CORE
Review of Systems:  CONSTITUTIONAL: No fever, chills, or fatigue  EYES: No eye pain, visual disturbances, or discharge  ENMT:  No difficulty hearing, tinnitus, vertigo; No sinus or throat pain  NECK: No pain or stiffness  RESPIRATORY: No cough, wheezing, chills or hemoptysis; No shortness of breath  CARDIOVASCULAR: No chest pain, palpitations, dizziness, or leg swelling  GASTROINTESTINAL: No abdominal or epigastric pain. No nausea, vomiting, or hematemesis; No diarrhea or constipation. No melena or hematochezia.  GENITOURINARY: No dysuria, frequency, hematuria, or incontinence  NEUROLOGICAL: No headaches, memory loss, + loss of strength, numbness  SKIN: No itching, burning, rashes, or lesions   MUSCULOSKELETAL: No joint pain or swelling; No muscle, back, or extremity pain  PSYCHIATRIC: No depression, anxiety, mood swings, or difficulty sleeping
Alert-The patient is alert, awake and responds to voice. The patient is oriented to time, place, and person. The triage nurse is able to obtain subjective information.